# Patient Record
Sex: FEMALE | Race: WHITE | NOT HISPANIC OR LATINO | Employment: FULL TIME | URBAN - METROPOLITAN AREA
[De-identification: names, ages, dates, MRNs, and addresses within clinical notes are randomized per-mention and may not be internally consistent; named-entity substitution may affect disease eponyms.]

---

## 2017-01-04 ENCOUNTER — ALLSCRIPTS OFFICE VISIT (OUTPATIENT)
Dept: OTHER | Facility: OTHER | Age: 52
End: 2017-01-04

## 2017-01-13 ENCOUNTER — HOSPITAL ENCOUNTER (OUTPATIENT)
Dept: NON INVASIVE DIAGNOSTICS | Facility: CLINIC | Age: 52
Discharge: HOME | End: 2017-01-13
Payer: COMMERCIAL

## 2017-01-13 DIAGNOSIS — C50.011 MALIGNANT NEOPLASM INVOLVING BOTH NIPPLE AND AREOLA OF RIGHT BREAST IN FEMALE (HCC): ICD-10-CM

## 2017-01-13 PROCEDURE — 93308 TTE F-UP OR LMTD: CPT

## 2017-01-18 ENCOUNTER — ALLSCRIPTS OFFICE VISIT (OUTPATIENT)
Dept: OTHER | Facility: OTHER | Age: 52
End: 2017-01-18

## 2017-01-19 RX ORDER — SODIUM CHLORIDE 9 MG/ML
20 INJECTION, SOLUTION INTRAVENOUS CONTINUOUS
Status: DISCONTINUED | OUTPATIENT
Start: 2017-01-20 | End: 2017-01-23 | Stop reason: HOSPADM

## 2017-01-20 ENCOUNTER — HOSPITAL ENCOUNTER (OUTPATIENT)
Dept: INFUSION CENTER | Facility: CLINIC | Age: 52
Discharge: HOME | End: 2017-01-20
Payer: COMMERCIAL

## 2017-01-20 VITALS
WEIGHT: 203.48 LBS | DIASTOLIC BLOOD PRESSURE: 76 MMHG | SYSTOLIC BLOOD PRESSURE: 151 MMHG | RESPIRATION RATE: 18 BRPM | TEMPERATURE: 98.5 F | BODY MASS INDEX: 33.86 KG/M2 | HEART RATE: 87 BPM

## 2017-01-20 PROCEDURE — 96413 CHEMO IV INFUSION 1 HR: CPT

## 2017-01-20 PROCEDURE — 96417 CHEMO IV INFUS EACH ADDL SEQ: CPT

## 2017-01-20 RX ADMIN — Medication 565 MG: at 14:49

## 2017-01-20 RX ADMIN — PERTUZUMAB 420 MG: 30 INJECTION, SOLUTION, CONCENTRATE INTRAVENOUS at 14:16

## 2017-01-20 RX ADMIN — Medication 300 UNITS: at 15:24

## 2017-01-20 RX ADMIN — SODIUM CHLORIDE 20 ML/HR: 0.9 INJECTION, SOLUTION INTRAVENOUS at 13:36

## 2017-01-26 ENCOUNTER — GENERIC CONVERSION - ENCOUNTER (OUTPATIENT)
Dept: OTHER | Facility: OTHER | Age: 52
End: 2017-01-26

## 2017-01-26 ENCOUNTER — APPOINTMENT (OUTPATIENT)
Dept: RADIATION ONCOLOGY | Facility: CLINIC | Age: 52
End: 2017-01-26
Attending: RADIOLOGY
Payer: COMMERCIAL

## 2017-01-26 PROCEDURE — 99214 OFFICE O/P EST MOD 30 MIN: CPT | Performed by: RADIOLOGY

## 2017-02-09 RX ORDER — SODIUM CHLORIDE 9 MG/ML
20 INJECTION, SOLUTION INTRAVENOUS CONTINUOUS
Status: DISCONTINUED | OUTPATIENT
Start: 2017-02-10 | End: 2017-02-13 | Stop reason: HOSPADM

## 2017-02-10 ENCOUNTER — HOSPITAL ENCOUNTER (OUTPATIENT)
Dept: INFUSION CENTER | Facility: CLINIC | Age: 52
Discharge: HOME | End: 2017-02-10
Payer: COMMERCIAL

## 2017-02-10 VITALS
WEIGHT: 207.23 LBS | RESPIRATION RATE: 18 BRPM | HEART RATE: 90 BPM | DIASTOLIC BLOOD PRESSURE: 87 MMHG | SYSTOLIC BLOOD PRESSURE: 167 MMHG | TEMPERATURE: 99.1 F | BODY MASS INDEX: 34.49 KG/M2

## 2017-02-10 LAB — CALCIUM SERPL-MCNC: 8.8 MG/DL (ref 8.3–10.1)

## 2017-02-10 PROCEDURE — 96401 CHEMO ANTI-NEOPL SQ/IM: CPT

## 2017-02-10 PROCEDURE — 96413 CHEMO IV INFUSION 1 HR: CPT

## 2017-02-10 PROCEDURE — 96417 CHEMO IV INFUS EACH ADDL SEQ: CPT

## 2017-02-10 PROCEDURE — 82310 ASSAY OF CALCIUM: CPT | Performed by: INTERNAL MEDICINE

## 2017-02-10 RX ADMIN — SODIUM CHLORIDE 20 ML/HR: 0.9 INJECTION, SOLUTION INTRAVENOUS at 13:35

## 2017-02-10 RX ADMIN — Medication 300 UNITS: at 14:47

## 2017-02-10 RX ADMIN — Medication 562 MG: at 14:16

## 2017-02-10 RX ADMIN — DENOSUMAB 120 MG: 120 INJECTION SUBCUTANEOUS at 14:47

## 2017-02-10 RX ADMIN — PERTUZUMAB 420 MG: 30 INJECTION, SOLUTION, CONCENTRATE INTRAVENOUS at 13:42

## 2017-02-10 NOTE — PROGRESS NOTES
Patient tolerated chemotherapy infusion without complication  Patients calcium level 8 8  XGeva injection given in right upper arm  Patient aware of next appointments   Declined AVS

## 2017-03-02 RX ORDER — SODIUM CHLORIDE 9 MG/ML
20 INJECTION, SOLUTION INTRAVENOUS CONTINUOUS
Status: DISCONTINUED | OUTPATIENT
Start: 2017-03-03 | End: 2017-03-06 | Stop reason: HOSPADM

## 2017-03-03 ENCOUNTER — HOSPITAL ENCOUNTER (OUTPATIENT)
Dept: INFUSION CENTER | Facility: CLINIC | Age: 52
Discharge: HOME | End: 2017-03-03
Payer: COMMERCIAL

## 2017-03-03 VITALS
WEIGHT: 209.44 LBS | HEIGHT: 66 IN | RESPIRATION RATE: 20 BRPM | BODY MASS INDEX: 33.66 KG/M2 | TEMPERATURE: 98.2 F | HEART RATE: 89 BPM | DIASTOLIC BLOOD PRESSURE: 70 MMHG | SYSTOLIC BLOOD PRESSURE: 148 MMHG

## 2017-03-03 PROCEDURE — 96413 CHEMO IV INFUSION 1 HR: CPT

## 2017-03-03 PROCEDURE — 96417 CHEMO IV INFUS EACH ADDL SEQ: CPT

## 2017-03-03 RX ADMIN — Medication 562 MG: at 14:41

## 2017-03-03 RX ADMIN — SODIUM CHLORIDE 20 ML/HR: 0.9 INJECTION, SOLUTION INTRAVENOUS at 13:46

## 2017-03-03 RX ADMIN — PERTUZUMAB 420 MG: 30 INJECTION, SOLUTION, CONCENTRATE INTRAVENOUS at 14:06

## 2017-03-03 RX ADMIN — Medication 300 UNITS: at 15:17

## 2017-03-03 NOTE — PLAN OF CARE
Problem: Potential for Falls  Goal: Patient will remain free of falls  INTERVENTIONS:  - Assess patient frequently for physical needs  - Identify cognitive and physical deficits and behaviors that affect risk of falls    - [place] fall precautions as indicated by assessment   - Educate patient/family on patient safety including physical limitations  - Instruct patient to call for assistance with activity based on assessment  - Modify environment to reduce risk of injury  - Consider OT/PT consult to assist with strengthening/mobility   Outcome: Progressing

## 2017-03-23 RX ORDER — SODIUM CHLORIDE 9 MG/ML
20 INJECTION, SOLUTION INTRAVENOUS CONTINUOUS
Status: DISCONTINUED | OUTPATIENT
Start: 2017-03-24 | End: 2017-03-27 | Stop reason: HOSPADM

## 2017-03-24 ENCOUNTER — HOSPITAL ENCOUNTER (OUTPATIENT)
Dept: INFUSION CENTER | Facility: CLINIC | Age: 52
Discharge: HOME | End: 2017-03-24
Payer: COMMERCIAL

## 2017-03-24 VITALS
WEIGHT: 210.98 LBS | TEMPERATURE: 97.3 F | HEART RATE: 74 BPM | BODY MASS INDEX: 34.52 KG/M2 | RESPIRATION RATE: 18 BRPM | SYSTOLIC BLOOD PRESSURE: 153 MMHG | DIASTOLIC BLOOD PRESSURE: 96 MMHG

## 2017-03-24 DIAGNOSIS — C79.51 SECONDARY MALIGNANT NEOPLASM OF BONE (HCC): ICD-10-CM

## 2017-03-24 LAB — CALCIUM SERPL-MCNC: 8.9 MG/DL (ref 8.3–10.1)

## 2017-03-24 PROCEDURE — 96413 CHEMO IV INFUSION 1 HR: CPT

## 2017-03-24 PROCEDURE — 36593 DECLOT VASCULAR DEVICE: CPT

## 2017-03-24 PROCEDURE — 96417 CHEMO IV INFUS EACH ADDL SEQ: CPT

## 2017-03-24 PROCEDURE — 96401 CHEMO ANTI-NEOPL SQ/IM: CPT

## 2017-03-24 PROCEDURE — 82310 ASSAY OF CALCIUM: CPT | Performed by: INTERNAL MEDICINE

## 2017-03-24 RX ADMIN — DENOSUMAB 120 MG: 120 INJECTION SUBCUTANEOUS at 14:33

## 2017-03-24 RX ADMIN — PERTUZUMAB 420 MG: 30 INJECTION, SOLUTION, CONCENTRATE INTRAVENOUS at 14:01

## 2017-03-24 RX ADMIN — SODIUM CHLORIDE 20 ML/HR: 0.9 INJECTION, SOLUTION INTRAVENOUS at 14:00

## 2017-03-24 RX ADMIN — Medication 300 UNITS: at 15:10

## 2017-03-24 RX ADMIN — ALTEPLASE 2 MG: 2.2 INJECTION, POWDER, LYOPHILIZED, FOR SOLUTION INTRAVENOUS at 13:54

## 2017-03-24 RX ADMIN — TRASTUZUMAB 562 MG: KIT at 14:32

## 2017-03-24 NOTE — PROGRESS NOTES
No blood return noted when port accessed  Cath fausto instilled and good blood return noted after 30 minutes  Peripheral IV started and Calcium level collected  Pt tolerated Perjeta and Herceptin infusion well  Xgeva given as ordered  Pt is aware of next appointment   Refused AVS

## 2017-04-03 DIAGNOSIS — C50.412 MALIGNANT NEOPLASM OF UPPER-OUTER QUADRANT OF LEFT FEMALE BREAST (HCC): ICD-10-CM

## 2017-04-04 ENCOUNTER — ALLSCRIPTS OFFICE VISIT (OUTPATIENT)
Dept: OTHER | Facility: OTHER | Age: 52
End: 2017-04-04

## 2017-04-13 RX ORDER — SODIUM CHLORIDE 9 MG/ML
20 INJECTION, SOLUTION INTRAVENOUS CONTINUOUS
Status: DISCONTINUED | OUTPATIENT
Start: 2017-04-14 | End: 2017-04-17 | Stop reason: HOSPADM

## 2017-04-14 ENCOUNTER — HOSPITAL ENCOUNTER (OUTPATIENT)
Dept: INFUSION CENTER | Facility: CLINIC | Age: 52
Discharge: HOME | End: 2017-04-14
Payer: COMMERCIAL

## 2017-04-14 VITALS
WEIGHT: 207.23 LBS | TEMPERATURE: 98.3 F | RESPIRATION RATE: 18 BRPM | HEART RATE: 76 BPM | BODY MASS INDEX: 33.91 KG/M2 | DIASTOLIC BLOOD PRESSURE: 87 MMHG | SYSTOLIC BLOOD PRESSURE: 151 MMHG

## 2017-04-14 LAB
ALBUMIN SERPL BCP-MCNC: 3.7 G/DL (ref 3.5–5)
ALP SERPL-CCNC: 42 U/L (ref 46–116)
ALT SERPL W P-5'-P-CCNC: 14 U/L (ref 12–78)
ANION GAP SERPL CALCULATED.3IONS-SCNC: 8 MMOL/L (ref 4–13)
ANISOCYTOSIS BLD QL SMEAR: PRESENT
AST SERPL W P-5'-P-CCNC: 12 U/L (ref 5–45)
BASOPHILS # BLD AUTO: 0.08 THOUSAND/UL (ref 0–0.1)
BASOPHILS NFR MAR MANUAL: 1 % (ref 0–1)
BILIRUB SERPL-MCNC: 0.5 MG/DL (ref 0.2–1)
BUN SERPL-MCNC: 15 MG/DL (ref 5–25)
CALCIUM SERPL-MCNC: 9.2 MG/DL (ref 8.3–10.1)
CHLORIDE SERPL-SCNC: 108 MMOL/L (ref 100–108)
CO2 SERPL-SCNC: 25 MMOL/L (ref 21–32)
CREAT SERPL-MCNC: 0.85 MG/DL (ref 0.6–1.3)
EOSINOPHIL # BLD AUTO: 0.53 THOUSAND/UL (ref 0–0.61)
EOSINOPHIL NFR BLD MANUAL: 7 % (ref 0–6)
ERYTHROCYTE [DISTWIDTH] IN BLOOD BY AUTOMATED COUNT: 15.3 % (ref 11.6–15.1)
GFR SERPL CREATININE-BSD FRML MDRD: >60 ML/MIN/1.73SQ M
GLUCOSE SERPL-MCNC: 98 MG/DL (ref 65–140)
HCT VFR BLD AUTO: 39.7 % (ref 34.8–46.1)
HGB BLD-MCNC: 13.2 G/DL (ref 11.5–15.4)
LYMPHOCYTES # BLD AUTO: 1.5 THOUSAND/UL (ref 0.6–4.47)
LYMPHOCYTES # BLD AUTO: 20 % (ref 14–44)
MCH RBC QN AUTO: 28.3 PG (ref 26.8–34.3)
MCHC RBC AUTO-ENTMCNC: 33.3 G/DL (ref 31.4–37.4)
MCV RBC AUTO: 85 FL (ref 82–98)
MONOCYTES # BLD AUTO: 0.08 THOUSAND/UL (ref 0–1.22)
MONOCYTES NFR BLD AUTO: 1 % (ref 4–12)
NEUTS BAND NFR BLD MANUAL: 2 % (ref 0–8)
NEUTS SEG # BLD: 5.18 THOUSAND/UL (ref 1.81–6.82)
NEUTS SEG NFR BLD AUTO: 67 % (ref 43–75)
PLATELET # BLD AUTO: 293 THOUSANDS/UL (ref 149–390)
PLATELET BLD QL SMEAR: ADEQUATE
PMV BLD AUTO: 8.6 FL (ref 8.9–12.7)
POTASSIUM SERPL-SCNC: 4 MMOL/L (ref 3.5–5.3)
PROT SERPL-MCNC: 6.7 G/DL (ref 6.4–8.2)
RBC # BLD AUTO: 4.66 MILLION/UL (ref 3.81–5.12)
SODIUM SERPL-SCNC: 141 MMOL/L (ref 136–145)
TOTAL CELLS COUNTED SPEC: 100
VARIANT LYMPHS # BLD AUTO: 2 % (ref 0–0)
WBC # BLD AUTO: 7.5 THOUSAND/UL (ref 4.31–10.16)
WBC NRBC COR # BLD: 7.5 THOUSAND/UL (ref 4.31–10.16)

## 2017-04-14 PROCEDURE — 85027 COMPLETE CBC AUTOMATED: CPT | Performed by: INTERNAL MEDICINE

## 2017-04-14 PROCEDURE — 80053 COMPREHEN METABOLIC PANEL: CPT | Performed by: INTERNAL MEDICINE

## 2017-04-14 PROCEDURE — 85007 BL SMEAR W/DIFF WBC COUNT: CPT | Performed by: INTERNAL MEDICINE

## 2017-04-14 PROCEDURE — 86300 IMMUNOASSAY TUMOR CA 15-3: CPT | Performed by: INTERNAL MEDICINE

## 2017-04-14 PROCEDURE — 96413 CHEMO IV INFUSION 1 HR: CPT

## 2017-04-14 PROCEDURE — 96417 CHEMO IV INFUS EACH ADDL SEQ: CPT

## 2017-04-14 RX ADMIN — PERTUZUMAB 420 MG: 30 INJECTION, SOLUTION, CONCENTRATE INTRAVENOUS at 13:37

## 2017-04-14 RX ADMIN — SODIUM CHLORIDE 20 ML/HR: 0.9 INJECTION, SOLUTION INTRAVENOUS at 13:37

## 2017-04-14 RX ADMIN — Medication 300 UNITS: at 14:50

## 2017-04-14 RX ADMIN — Medication 562 MG: at 14:13

## 2017-04-15 LAB — CANCER AG27-29 SERPL-ACNC: 27.1 U/ML (ref 0–42.3)

## 2017-04-19 ENCOUNTER — ALLSCRIPTS OFFICE VISIT (OUTPATIENT)
Dept: OTHER | Facility: OTHER | Age: 52
End: 2017-04-19

## 2017-04-19 ENCOUNTER — TRANSCRIBE ORDERS (OUTPATIENT)
Dept: ADMINISTRATIVE | Facility: HOSPITAL | Age: 52
End: 2017-04-19

## 2017-04-19 DIAGNOSIS — C50.412 MALIGNANT NEOPLASM OF UPPER-OUTER QUADRANT OF LEFT FEMALE BREAST (HCC): Primary | ICD-10-CM

## 2017-05-02 ENCOUNTER — HOSPITAL ENCOUNTER (OUTPATIENT)
Dept: NON INVASIVE DIAGNOSTICS | Facility: HOSPITAL | Age: 52
Discharge: HOME | End: 2017-05-02
Attending: INTERNAL MEDICINE
Payer: COMMERCIAL

## 2017-05-02 DIAGNOSIS — C50.412 MALIGNANT NEOPLASM OF UPPER-OUTER QUADRANT OF LEFT FEMALE BREAST (HCC): ICD-10-CM

## 2017-05-02 PROCEDURE — 93308 TTE F-UP OR LMTD: CPT

## 2017-05-04 RX ORDER — SODIUM CHLORIDE 9 MG/ML
20 INJECTION, SOLUTION INTRAVENOUS CONTINUOUS
Status: DISCONTINUED | OUTPATIENT
Start: 2017-05-05 | End: 2017-05-08 | Stop reason: HOSPADM

## 2017-05-05 ENCOUNTER — HOSPITAL ENCOUNTER (OUTPATIENT)
Dept: INFUSION CENTER | Facility: CLINIC | Age: 52
Discharge: HOME | End: 2017-05-05
Payer: COMMERCIAL

## 2017-05-05 VITALS
SYSTOLIC BLOOD PRESSURE: 152 MMHG | HEART RATE: 76 BPM | TEMPERATURE: 98.6 F | RESPIRATION RATE: 18 BRPM | WEIGHT: 209.44 LBS | BODY MASS INDEX: 34.27 KG/M2 | DIASTOLIC BLOOD PRESSURE: 78 MMHG

## 2017-05-05 PROCEDURE — 96413 CHEMO IV INFUSION 1 HR: CPT

## 2017-05-05 PROCEDURE — 96401 CHEMO ANTI-NEOPL SQ/IM: CPT

## 2017-05-05 PROCEDURE — 96417 CHEMO IV INFUS EACH ADDL SEQ: CPT

## 2017-05-05 RX ADMIN — DENOSUMAB 120 MG: 120 INJECTION SUBCUTANEOUS at 15:00

## 2017-05-05 RX ADMIN — PERTUZUMAB 420 MG: 30 INJECTION, SOLUTION, CONCENTRATE INTRAVENOUS at 13:51

## 2017-05-05 RX ADMIN — Medication 300 UNITS: at 15:00

## 2017-05-05 RX ADMIN — Medication 567 MG: at 14:28

## 2017-05-05 RX ADMIN — SODIUM CHLORIDE 20 ML/HR: 0.9 INJECTION, SOLUTION INTRAVENOUS at 13:40

## 2017-05-05 NOTE — PROGRESS NOTES
Patient tolerated chemotherapy treqatment today without complications  Yari Harp given in left upper arm  Patient tolerated well

## 2017-05-25 RX ORDER — SODIUM CHLORIDE 9 MG/ML
20 INJECTION, SOLUTION INTRAVENOUS CONTINUOUS
Status: DISCONTINUED | OUTPATIENT
Start: 2017-05-26 | End: 2017-05-29 | Stop reason: HOSPADM

## 2017-05-26 ENCOUNTER — HOSPITAL ENCOUNTER (OUTPATIENT)
Dept: INFUSION CENTER | Facility: CLINIC | Age: 52
Discharge: HOME | End: 2017-05-26
Payer: COMMERCIAL

## 2017-05-26 VITALS
DIASTOLIC BLOOD PRESSURE: 86 MMHG | BODY MASS INDEX: 34.41 KG/M2 | RESPIRATION RATE: 18 BRPM | WEIGHT: 210.32 LBS | TEMPERATURE: 98.9 F | HEART RATE: 78 BPM | SYSTOLIC BLOOD PRESSURE: 137 MMHG

## 2017-05-26 PROCEDURE — 96417 CHEMO IV INFUS EACH ADDL SEQ: CPT

## 2017-05-26 PROCEDURE — 96413 CHEMO IV INFUSION 1 HR: CPT

## 2017-05-26 RX ADMIN — PERTUZUMAB 420 MG: 30 INJECTION, SOLUTION, CONCENTRATE INTRAVENOUS at 14:09

## 2017-05-26 RX ADMIN — TRASTUZUMAB 567 MG: KIT at 14:43

## 2017-05-26 RX ADMIN — SODIUM CHLORIDE 20 ML/HR: 0.9 INJECTION, SOLUTION INTRAVENOUS at 14:06

## 2017-05-26 RX ADMIN — Medication 300 UNITS: at 15:20

## 2017-05-26 NOTE — PLAN OF CARE
Problem: Potential for Falls  Goal: Patient will remain free of falls  INTERVENTIONS:  - Assess patient frequently for physical needs  - Identify cognitive and physical deficits and behaviors that affect risk of falls    - [place] fall precautions as indicated by assessment   - Educate patient/family on patient safety including physical limitations  - Instruct patient to call for assistance with activity based on assessment  - Modify environment to reduce risk of injury  - Consider OT/PT consult to assist with strengthening/mobility   Outcome: Progressing

## 2017-06-15 RX ORDER — SODIUM CHLORIDE 9 MG/ML
20 INJECTION, SOLUTION INTRAVENOUS CONTINUOUS
Status: DISCONTINUED | OUTPATIENT
Start: 2017-06-16 | End: 2017-06-19 | Stop reason: HOSPADM

## 2017-06-16 ENCOUNTER — HOSPITAL ENCOUNTER (OUTPATIENT)
Dept: INFUSION CENTER | Facility: CLINIC | Age: 52
Discharge: HOME | End: 2017-06-16
Payer: COMMERCIAL

## 2017-06-16 VITALS
HEART RATE: 82 BPM | TEMPERATURE: 97.5 F | DIASTOLIC BLOOD PRESSURE: 88 MMHG | SYSTOLIC BLOOD PRESSURE: 158 MMHG | BODY MASS INDEX: 34.36 KG/M2 | WEIGHT: 210 LBS | RESPIRATION RATE: 16 BRPM

## 2017-06-16 DIAGNOSIS — C79.51 SECONDARY MALIGNANT NEOPLASM OF BONE (HCC): ICD-10-CM

## 2017-06-16 LAB
ALBUMIN SERPL BCP-MCNC: 3.5 G/DL (ref 3.5–5)
ALP SERPL-CCNC: 46 U/L (ref 46–116)
ALT SERPL W P-5'-P-CCNC: 17 U/L (ref 12–78)
ANION GAP SERPL CALCULATED.3IONS-SCNC: 6 MMOL/L (ref 4–13)
AST SERPL W P-5'-P-CCNC: 15 U/L (ref 5–45)
BILIRUB SERPL-MCNC: 0.4 MG/DL (ref 0.2–1)
BUN SERPL-MCNC: 16 MG/DL (ref 5–25)
CALCIUM SERPL-MCNC: 8.7 MG/DL (ref 8.3–10.1)
CHLORIDE SERPL-SCNC: 107 MMOL/L (ref 100–108)
CO2 SERPL-SCNC: 28 MMOL/L (ref 21–32)
CREAT SERPL-MCNC: 0.85 MG/DL (ref 0.6–1.3)
GFR SERPL CREATININE-BSD FRML MDRD: >60 ML/MIN/1.73SQ M
GLUCOSE SERPL-MCNC: 107 MG/DL (ref 65–140)
POTASSIUM SERPL-SCNC: 3.8 MMOL/L (ref 3.5–5.3)
PROT SERPL-MCNC: 6.2 G/DL (ref 6.4–8.2)
SODIUM SERPL-SCNC: 141 MMOL/L (ref 136–145)

## 2017-06-16 PROCEDURE — 80053 COMPREHEN METABOLIC PANEL: CPT | Performed by: INTERNAL MEDICINE

## 2017-06-16 PROCEDURE — 96401 CHEMO ANTI-NEOPL SQ/IM: CPT

## 2017-06-16 PROCEDURE — 96417 CHEMO IV INFUS EACH ADDL SEQ: CPT

## 2017-06-16 PROCEDURE — 96413 CHEMO IV INFUSION 1 HR: CPT

## 2017-06-16 RX ADMIN — TRASTUZUMAB 567 MG: KIT at 15:04

## 2017-06-16 RX ADMIN — DENOSUMAB 120 MG: 120 INJECTION SUBCUTANEOUS at 15:37

## 2017-06-16 RX ADMIN — SODIUM CHLORIDE 20 ML/HR: 0.9 INJECTION, SOLUTION INTRAVENOUS at 14:25

## 2017-06-16 RX ADMIN — PERTUZUMAB 420 MG: 30 INJECTION, SOLUTION, CONCENTRATE INTRAVENOUS at 14:28

## 2017-06-16 RX ADMIN — Medication 300 UNITS: at 15:40

## 2017-06-16 NOTE — PROGRESS NOTES
Patient calcium level 8 7  Bessiee Funmilayo given in patients right upper arm  Patient tolerated well  Aware of upcoming appointments   Declined AVS

## 2017-06-16 NOTE — PLAN OF CARE
Problem: Potential for Falls  Goal: Patient will remain free of falls  INTERVENTIONS:  - Assess patient frequently for physical needs  -  Identify cognitive and physical deficits and behaviors that affect risk of falls  -  [place] fall precautions as indicated by assessment   - Educate patient/family on patient safety including physical limitations  - Instruct patient to call for assistance with activity based on assessment  - Modify environment to reduce risk of injury  - Consider OT/PT consult to assist with strengthening/mobility   Outcome: Progressing      Problem: SAFETY ADULT  Goal: Patient will remain free of falls  INTERVENTIONS:  - Assess patient frequently for physical needs  -  Identify cognitive and physical deficits and behaviors that affect risk of falls  -  [place] fall precautions as indicated by assessment   - Educate patient/family on patient safety including physical limitations  - Instruct patient to call for assistance with activity based on assessment  - Modify environment to reduce risk of injury  - Consider OT/PT consult to assist with strengthening/mobility   Outcome: Progressing      Problem: Knowledge Deficit  Goal: Patient/family/caregiver demonstrates understanding of disease process, treatment plan, medications, and discharge instructions  Complete learning assessment and assess knowledge base    Interventions:  - Provide teaching at level of understanding  - Provide teaching via preferred learning methods  Outcome: Progressing

## 2017-06-29 ENCOUNTER — APPOINTMENT (OUTPATIENT)
Dept: RADIATION ONCOLOGY | Facility: CLINIC | Age: 52
End: 2017-06-29
Attending: RADIOLOGY
Payer: COMMERCIAL

## 2017-06-29 ENCOUNTER — GENERIC CONVERSION - ENCOUNTER (OUTPATIENT)
Dept: OTHER | Facility: OTHER | Age: 52
End: 2017-06-29

## 2017-06-29 PROCEDURE — 99214 OFFICE O/P EST MOD 30 MIN: CPT | Performed by: RADIOLOGY

## 2017-06-29 PROCEDURE — G0463 HOSPITAL OUTPT CLINIC VISIT: HCPCS | Performed by: RADIOLOGY

## 2017-07-03 ENCOUNTER — HOSPITAL ENCOUNTER (OUTPATIENT)
Dept: NUCLEAR MEDICINE | Facility: HOSPITAL | Age: 52
Discharge: HOME | End: 2017-07-03
Attending: INTERNAL MEDICINE
Payer: COMMERCIAL

## 2017-07-03 ENCOUNTER — HOSPITAL ENCOUNTER (OUTPATIENT)
Dept: CT IMAGING | Facility: HOSPITAL | Age: 52
Discharge: HOME | End: 2017-07-03
Attending: INTERNAL MEDICINE
Payer: COMMERCIAL

## 2017-07-03 DIAGNOSIS — C50.412 MALIGNANT NEOPLASM OF UPPER-OUTER QUADRANT OF LEFT FEMALE BREAST (HCC): ICD-10-CM

## 2017-07-03 DIAGNOSIS — C79.51 SECONDARY MALIGNANT NEOPLASM OF BONE (HCC): ICD-10-CM

## 2017-07-03 PROCEDURE — 74177 CT ABD & PELVIS W/CONTRAST: CPT

## 2017-07-03 PROCEDURE — 71260 CT THORAX DX C+: CPT

## 2017-07-03 PROCEDURE — 78306 BONE IMAGING WHOLE BODY: CPT

## 2017-07-03 PROCEDURE — A9503 TC99M MEDRONATE: HCPCS

## 2017-07-03 RX ADMIN — IOHEXOL 100 ML: 350 INJECTION, SOLUTION INTRAVENOUS at 09:58

## 2017-07-05 ENCOUNTER — ALLSCRIPTS OFFICE VISIT (OUTPATIENT)
Dept: OTHER | Facility: OTHER | Age: 52
End: 2017-07-05

## 2017-07-05 ENCOUNTER — TRANSCRIBE ORDERS (OUTPATIENT)
Dept: ADMINISTRATIVE | Facility: HOSPITAL | Age: 52
End: 2017-07-05

## 2017-07-05 ENCOUNTER — GENERIC CONVERSION - ENCOUNTER (OUTPATIENT)
Dept: OTHER | Facility: OTHER | Age: 52
End: 2017-07-05

## 2017-07-05 DIAGNOSIS — C50.412 MALIGNANT NEOPLASM OF UPPER-OUTER QUADRANT OF LEFT FEMALE BREAST (HCC): Primary | ICD-10-CM

## 2017-07-06 RX ORDER — SODIUM CHLORIDE 9 MG/ML
20 INJECTION, SOLUTION INTRAVENOUS CONTINUOUS
Status: DISCONTINUED | OUTPATIENT
Start: 2017-07-07 | End: 2017-07-10 | Stop reason: HOSPADM

## 2017-07-07 ENCOUNTER — HOSPITAL ENCOUNTER (OUTPATIENT)
Dept: INFUSION CENTER | Facility: CLINIC | Age: 52
Discharge: HOME | End: 2017-07-07
Payer: COMMERCIAL

## 2017-07-07 VITALS
WEIGHT: 212.08 LBS | SYSTOLIC BLOOD PRESSURE: 150 MMHG | DIASTOLIC BLOOD PRESSURE: 84 MMHG | BODY MASS INDEX: 34.7 KG/M2 | RESPIRATION RATE: 16 BRPM | TEMPERATURE: 97.6 F | HEART RATE: 75 BPM

## 2017-07-07 PROCEDURE — 96417 CHEMO IV INFUS EACH ADDL SEQ: CPT

## 2017-07-07 PROCEDURE — 96413 CHEMO IV INFUSION 1 HR: CPT

## 2017-07-07 RX ADMIN — PERTUZUMAB 420 MG: 30 INJECTION, SOLUTION, CONCENTRATE INTRAVENOUS at 13:51

## 2017-07-07 RX ADMIN — TRASTUZUMAB 569 MG: 150 INJECTION, POWDER, LYOPHILIZED, FOR SOLUTION INTRAVENOUS at 14:29

## 2017-07-07 RX ADMIN — Medication 300 UNITS: at 15:05

## 2017-07-07 RX ADMIN — SODIUM CHLORIDE 20 ML/HR: 0.9 INJECTION, SOLUTION INTRAVENOUS at 13:30

## 2017-07-27 RX ORDER — SODIUM CHLORIDE 9 MG/ML
20 INJECTION, SOLUTION INTRAVENOUS CONTINUOUS
Status: DISCONTINUED | OUTPATIENT
Start: 2017-07-28 | End: 2017-07-31 | Stop reason: HOSPADM

## 2017-07-28 ENCOUNTER — HOSPITAL ENCOUNTER (OUTPATIENT)
Dept: INFUSION CENTER | Facility: CLINIC | Age: 52
Discharge: HOME | End: 2017-07-28
Payer: COMMERCIAL

## 2017-07-28 VITALS
BODY MASS INDEX: 34.05 KG/M2 | TEMPERATURE: 98.3 F | DIASTOLIC BLOOD PRESSURE: 84 MMHG | HEART RATE: 87 BPM | SYSTOLIC BLOOD PRESSURE: 152 MMHG | WEIGHT: 208.11 LBS | RESPIRATION RATE: 18 BRPM

## 2017-07-28 LAB
ALBUMIN SERPL BCP-MCNC: 3.2 G/DL (ref 3.2–5)
ALP SERPL-CCNC: 53 U/L (ref 46–116)
ALT SERPL W P-5'-P-CCNC: 43 U/L (ref 12–78)
ANION GAP SERPL CALCULATED.3IONS-SCNC: 1 MMOL/L (ref 4–13)
ANISOCYTOSIS BLD QL SMEAR: PRESENT
AST SERPL W P-5'-P-CCNC: 41 U/L (ref 5–45)
BASOPHILS # BLD AUTO: 0 THOUSAND/UL (ref 0–0.1)
BASOPHILS NFR MAR MANUAL: 0 % (ref 0–1)
BILIRUB SERPL-MCNC: 0.6 MG/DL (ref 0.2–1)
BUN SERPL-MCNC: 10 MG/DL (ref 5–25)
CALCIUM SERPL-MCNC: 9.4 MG/DL (ref 8.3–10.1)
CHLORIDE SERPL-SCNC: 108 MMOL/L (ref 98–108)
CO2 SERPL-SCNC: 27 MMOL/L (ref 21–32)
CREAT SERPL-MCNC: 1 MG/DL (ref 0.6–1.3)
EOSINOPHIL # BLD AUTO: 0.38 THOUSAND/UL (ref 0–0.61)
EOSINOPHIL NFR BLD MANUAL: 4 % (ref 0–6)
ERYTHROCYTE [DISTWIDTH] IN BLOOD BY AUTOMATED COUNT: 15.6 % (ref 11.6–15.1)
GFR SERPL CREATININE-BSD FRML MDRD: 66 ML/MIN/1.73SQ M
GLUCOSE SERPL-MCNC: 109 MG/DL (ref 65–140)
HCT VFR BLD AUTO: 44.1 % (ref 34.8–46.1)
HGB BLD-MCNC: 13.9 G/DL (ref 11.5–15.4)
LYMPHOCYTES # BLD AUTO: 1.88 THOUSAND/UL (ref 0.6–4.47)
LYMPHOCYTES # BLD AUTO: 20 % (ref 14–44)
MCH RBC QN AUTO: 27 PG (ref 26.8–34.3)
MCHC RBC AUTO-ENTMCNC: 31.5 G/DL (ref 31.4–37.4)
MCV RBC AUTO: 86 FL (ref 82–98)
MONOCYTES # BLD AUTO: 0.38 THOUSAND/UL (ref 0–1.22)
MONOCYTES NFR BLD AUTO: 4 % (ref 4–12)
NEUTS BAND NFR BLD MANUAL: 0 % (ref 0–8)
NEUTS SEG # BLD: 6.77 THOUSAND/UL (ref 1.81–6.82)
NEUTS SEG NFR BLD AUTO: 72 % (ref 43–75)
PLATELET # BLD AUTO: 297 THOUSANDS/UL (ref 149–390)
PLATELET BLD QL SMEAR: ADEQUATE
PMV BLD AUTO: 8.4 FL (ref 8.9–12.7)
POTASSIUM SERPL-SCNC: 4.1 MMOL/L (ref 3.5–5.3)
PROT SERPL-MCNC: 6.8 G/DL (ref 6.4–8.2)
RBC # BLD AUTO: 5.13 MILLION/UL (ref 3.81–5.12)
SODIUM SERPL-SCNC: 136 MMOL/L (ref 136–145)
TOTAL CELLS COUNTED SPEC: 100
WBC # BLD AUTO: 9.4 THOUSAND/UL (ref 4.31–10.16)
WBC NRBC COR # BLD: 9.4 THOUSAND/UL (ref 4.31–10.16)

## 2017-07-28 PROCEDURE — 96413 CHEMO IV INFUSION 1 HR: CPT

## 2017-07-28 PROCEDURE — 96417 CHEMO IV INFUS EACH ADDL SEQ: CPT

## 2017-07-28 PROCEDURE — 96401 CHEMO ANTI-NEOPL SQ/IM: CPT

## 2017-07-28 PROCEDURE — 80053 COMPREHEN METABOLIC PANEL: CPT | Performed by: INTERNAL MEDICINE

## 2017-07-28 PROCEDURE — 85007 BL SMEAR W/DIFF WBC COUNT: CPT | Performed by: INTERNAL MEDICINE

## 2017-07-28 PROCEDURE — 85027 COMPLETE CBC AUTOMATED: CPT | Performed by: INTERNAL MEDICINE

## 2017-07-28 RX ADMIN — SODIUM CHLORIDE 20 ML/HR: 0.9 INJECTION, SOLUTION INTRAVENOUS at 13:52

## 2017-07-28 RX ADMIN — Medication 300 UNITS: at 15:31

## 2017-07-28 RX ADMIN — DENOSUMAB 120 MG: 120 INJECTION SUBCUTANEOUS at 14:16

## 2017-07-28 RX ADMIN — PERTUZUMAB 420 MG: 30 INJECTION, SOLUTION, CONCENTRATE INTRAVENOUS at 14:20

## 2017-07-28 RX ADMIN — TRASTUZUMAB 570 MG: 150 INJECTION, POWDER, LYOPHILIZED, FOR SOLUTION INTRAVENOUS at 14:54

## 2017-07-28 NOTE — PLAN OF CARE
Problem: Potential for Falls  Goal: Patient will remain free of falls  INTERVENTIONS:  - Assess patient frequently for physical needs  -  Identify cognitive and physical deficits and behaviors that affect risk of falls    -  [place] fall precautions as indicated by assessment   - Educate patient/family on patient safety including physical limitations  - Instruct patient to call for assistance with activity based on assessment  - Modify environment to reduce risk of injury  - Consider OT/PT consult to assist with strengthening/mobility   Outcome: Progressing

## 2017-08-17 RX ORDER — SODIUM CHLORIDE 9 MG/ML
20 INJECTION, SOLUTION INTRAVENOUS CONTINUOUS
Status: DISCONTINUED | OUTPATIENT
Start: 2017-08-18 | End: 2017-08-21 | Stop reason: HOSPADM

## 2017-08-18 ENCOUNTER — HOSPITAL ENCOUNTER (OUTPATIENT)
Dept: INFUSION CENTER | Facility: CLINIC | Age: 52
Discharge: HOME | End: 2017-08-18
Payer: COMMERCIAL

## 2017-08-18 VITALS — RESPIRATION RATE: 16 BRPM | BODY MASS INDEX: 33.73 KG/M2 | HEART RATE: 86 BPM | TEMPERATURE: 98.3 F | WEIGHT: 206.13 LBS

## 2017-08-18 PROCEDURE — 96413 CHEMO IV INFUSION 1 HR: CPT

## 2017-08-18 PROCEDURE — 96417 CHEMO IV INFUS EACH ADDL SEQ: CPT

## 2017-08-18 RX ADMIN — PERTUZUMAB 420 MG: 30 INJECTION, SOLUTION, CONCENTRATE INTRAVENOUS at 13:48

## 2017-08-18 RX ADMIN — Medication 300 UNITS: at 14:55

## 2017-08-18 RX ADMIN — TRASTUZUMAB 570 MG: 150 INJECTION, POWDER, LYOPHILIZED, FOR SOLUTION INTRAVENOUS at 14:21

## 2017-08-18 RX ADMIN — SODIUM CHLORIDE 20 ML/HR: 0.9 INJECTION, SOLUTION INTRAVENOUS at 13:35

## 2017-08-18 NOTE — PLAN OF CARE
Problem: Potential for Falls  Goal: Patient will remain free of falls  INTERVENTIONS:  - Assess patient frequently for physical needs  -  Identify cognitive and physical deficits and behaviors that affect risk of falls    -  [place] fall precautions as indicated by assessment   - Educate patient/family on patient safety including physical limitations  - Instruct patient to call for assistance with activity based on assessment  - Modify environment to reduce risk of injury  - Consider OT/PT consult to assist with strengthening/mobility   Outcome: Progressing

## 2017-08-22 DIAGNOSIS — C50.412 MALIGNANT NEOPLASM OF UPPER-OUTER QUADRANT OF LEFT FEMALE BREAST (HCC): ICD-10-CM

## 2017-08-31 ENCOUNTER — HOSPITAL ENCOUNTER (OUTPATIENT)
Dept: NON INVASIVE DIAGNOSTICS | Facility: CLINIC | Age: 52
Discharge: HOME | End: 2017-08-31
Payer: COMMERCIAL

## 2017-08-31 DIAGNOSIS — C50.412 MALIGNANT NEOPLASM OF UPPER-OUTER QUADRANT OF LEFT FEMALE BREAST (HCC): ICD-10-CM

## 2017-08-31 PROCEDURE — 93308 TTE F-UP OR LMTD: CPT

## 2017-09-07 RX ORDER — SODIUM CHLORIDE 9 MG/ML
20 INJECTION, SOLUTION INTRAVENOUS CONTINUOUS
Status: DISCONTINUED | OUTPATIENT
Start: 2017-09-08 | End: 2017-09-11 | Stop reason: HOSPADM

## 2017-09-08 ENCOUNTER — HOSPITAL ENCOUNTER (OUTPATIENT)
Dept: INFUSION CENTER | Facility: CLINIC | Age: 52
Discharge: HOME | End: 2017-09-08
Payer: COMMERCIAL

## 2017-09-08 VITALS
RESPIRATION RATE: 18 BRPM | HEART RATE: 80 BPM | SYSTOLIC BLOOD PRESSURE: 152 MMHG | BODY MASS INDEX: 34.38 KG/M2 | TEMPERATURE: 98.6 F | WEIGHT: 210.1 LBS | DIASTOLIC BLOOD PRESSURE: 85 MMHG

## 2017-09-08 DIAGNOSIS — C50.412 MALIGNANT NEOPLASM OF UPPER-OUTER QUADRANT OF LEFT FEMALE BREAST (HCC): ICD-10-CM

## 2017-09-08 DIAGNOSIS — C79.51 SECONDARY MALIGNANT NEOPLASM OF BONE (HCC): ICD-10-CM

## 2017-09-08 LAB
ALBUMIN SERPL BCP-MCNC: 2.9 G/DL (ref 3.2–5)
ALP SERPL-CCNC: 53 U/L (ref 46–116)
ALT SERPL W P-5'-P-CCNC: 44 U/L (ref 12–78)
ANION GAP SERPL CALCULATED.3IONS-SCNC: 4 MMOL/L (ref 4–13)
AST SERPL W P-5'-P-CCNC: 33 U/L (ref 5–45)
BILIRUB SERPL-MCNC: 0.5 MG/DL (ref 0.2–1)
BUN SERPL-MCNC: 15 MG/DL (ref 5–25)
CALCIUM SERPL-MCNC: 8.9 MG/DL (ref 8.3–10.1)
CHLORIDE SERPL-SCNC: 107 MMOL/L (ref 98–108)
CO2 SERPL-SCNC: 24 MMOL/L (ref 21–32)
CREAT SERPL-MCNC: 0.8 MG/DL (ref 0.6–1.3)
GFR SERPL CREATININE-BSD FRML MDRD: 87 ML/MIN/1.73SQ M
GLUCOSE SERPL-MCNC: 181 MG/DL (ref 65–140)
POTASSIUM SERPL-SCNC: 3.8 MMOL/L (ref 3.5–5.3)
PROT SERPL-MCNC: 6.5 G/DL (ref 6.4–8.2)
SODIUM SERPL-SCNC: 135 MMOL/L (ref 136–145)

## 2017-09-08 PROCEDURE — 80053 COMPREHEN METABOLIC PANEL: CPT | Performed by: INTERNAL MEDICINE

## 2017-09-08 PROCEDURE — 96413 CHEMO IV INFUSION 1 HR: CPT

## 2017-09-08 PROCEDURE — 96401 CHEMO ANTI-NEOPL SQ/IM: CPT

## 2017-09-08 PROCEDURE — 96417 CHEMO IV INFUS EACH ADDL SEQ: CPT

## 2017-09-08 RX ADMIN — PERTUZUMAB 420 MG: 30 INJECTION, SOLUTION, CONCENTRATE INTRAVENOUS at 13:50

## 2017-09-08 RX ADMIN — DENOSUMAB 120 MG: 120 INJECTION SUBCUTANEOUS at 14:31

## 2017-09-08 RX ADMIN — TRASTUZUMAB 569 MG: 150 INJECTION, POWDER, LYOPHILIZED, FOR SOLUTION INTRAVENOUS at 14:26

## 2017-09-08 RX ADMIN — Medication 300 UNITS: at 15:00

## 2017-09-08 RX ADMIN — SODIUM CHLORIDE 20 ML/HR: 0.9 INJECTION, SOLUTION INTRAVENOUS at 13:15

## 2017-09-28 RX ORDER — SODIUM CHLORIDE 9 MG/ML
20 INJECTION, SOLUTION INTRAVENOUS CONTINUOUS
Status: DISCONTINUED | OUTPATIENT
Start: 2017-09-29 | End: 2017-10-02 | Stop reason: HOSPADM

## 2017-09-29 ENCOUNTER — HOSPITAL ENCOUNTER (OUTPATIENT)
Dept: INFUSION CENTER | Facility: CLINIC | Age: 52
Discharge: HOME | End: 2017-09-29
Payer: COMMERCIAL

## 2017-09-29 VITALS
WEIGHT: 209.44 LBS | DIASTOLIC BLOOD PRESSURE: 84 MMHG | RESPIRATION RATE: 16 BRPM | HEART RATE: 75 BPM | BODY MASS INDEX: 34.27 KG/M2 | TEMPERATURE: 97.4 F | SYSTOLIC BLOOD PRESSURE: 152 MMHG

## 2017-09-29 LAB
ALBUMIN SERPL BCP-MCNC: 3.3 G/DL (ref 3.2–5)
ALP SERPL-CCNC: 53 U/L (ref 46–116)
ALT SERPL W P-5'-P-CCNC: 45 U/L (ref 12–78)
ANION GAP SERPL CALCULATED.3IONS-SCNC: 12 MMOL/L (ref 4–13)
AST SERPL W P-5'-P-CCNC: 36 U/L (ref 5–45)
BASOPHILS # BLD AUTO: 0 THOUSAND/UL (ref 0–0.1)
BASOPHILS NFR MAR MANUAL: 0 % (ref 0–1)
BILIRUB SERPL-MCNC: 0.6 MG/DL (ref 0.2–1)
BUN SERPL-MCNC: 17 MG/DL (ref 5–25)
CALCIUM SERPL-MCNC: 9.6 MG/DL (ref 8.3–10.1)
CHLORIDE SERPL-SCNC: 105 MMOL/L (ref 98–108)
CO2 SERPL-SCNC: 27 MMOL/L (ref 21–32)
CREAT SERPL-MCNC: 1 MG/DL (ref 0.6–1.3)
EOSINOPHIL # BLD AUTO: 0 THOUSAND/UL (ref 0–0.61)
EOSINOPHIL NFR BLD MANUAL: 0 % (ref 0–6)
ERYTHROCYTE [DISTWIDTH] IN BLOOD BY AUTOMATED COUNT: 14.5 % (ref 11.6–15.1)
GFR SERPL CREATININE-BSD FRML MDRD: 66 ML/MIN/1.73SQ M
GLUCOSE P FAST SERPL-MCNC: 97 MG/DL (ref 65–99)
GLUCOSE SERPL-MCNC: 97 MG/DL (ref 65–140)
HCT VFR BLD AUTO: 40.5 % (ref 34.8–46.1)
HGB BLD-MCNC: 13.5 G/DL (ref 11.5–15.4)
LG PLATELETS BLD QL SMEAR: PRESENT
LYMPHOCYTES # BLD AUTO: 1.64 THOUSAND/UL (ref 0.6–4.47)
LYMPHOCYTES # BLD AUTO: 21 % (ref 14–44)
MCH RBC QN AUTO: 28.7 PG (ref 26.8–34.3)
MCHC RBC AUTO-ENTMCNC: 33.3 G/DL (ref 31.4–37.4)
MCV RBC AUTO: 86 FL (ref 82–98)
MONOCYTES # BLD AUTO: 0.08 THOUSAND/UL (ref 0–1.22)
MONOCYTES NFR BLD AUTO: 1 % (ref 4–12)
MYELOCYTES NFR BLD MANUAL: 1 % (ref 0–1)
NEUTS BAND NFR BLD MANUAL: 2 % (ref 0–8)
NEUTS SEG # BLD: 6.01 THOUSAND/UL (ref 1.81–6.82)
NEUTS SEG NFR BLD AUTO: 75 % (ref 43–75)
OVALOCYTES BLD QL SMEAR: PRESENT
PLATELET # BLD AUTO: 262 THOUSANDS/UL (ref 149–390)
PLATELET BLD QL SMEAR: ADEQUATE
PMV BLD AUTO: 9 FL (ref 8.9–12.7)
POTASSIUM SERPL-SCNC: 4 MMOL/L (ref 3.5–5.3)
PROT SERPL-MCNC: 7 G/DL (ref 6.4–8.2)
RBC # BLD AUTO: 4.71 MILLION/UL (ref 3.81–5.12)
SODIUM SERPL-SCNC: 144 MMOL/L (ref 136–145)
TOTAL CELLS COUNTED SPEC: 100
WBC # BLD AUTO: 7.8 THOUSAND/UL (ref 4.31–10.16)
WBC NRBC COR # BLD: 7.8 THOUSAND/UL (ref 4.31–10.16)

## 2017-09-29 PROCEDURE — 86300 IMMUNOASSAY TUMOR CA 15-3: CPT | Performed by: INTERNAL MEDICINE

## 2017-09-29 PROCEDURE — 96413 CHEMO IV INFUSION 1 HR: CPT

## 2017-09-29 PROCEDURE — 85027 COMPLETE CBC AUTOMATED: CPT | Performed by: INTERNAL MEDICINE

## 2017-09-29 PROCEDURE — 96417 CHEMO IV INFUS EACH ADDL SEQ: CPT

## 2017-09-29 PROCEDURE — 80053 COMPREHEN METABOLIC PANEL: CPT | Performed by: INTERNAL MEDICINE

## 2017-09-29 PROCEDURE — 85007 BL SMEAR W/DIFF WBC COUNT: CPT | Performed by: INTERNAL MEDICINE

## 2017-09-29 RX ADMIN — PERTUZUMAB 420 MG: 30 INJECTION, SOLUTION, CONCENTRATE INTRAVENOUS at 14:10

## 2017-09-29 RX ADMIN — Medication 300 UNITS: at 15:15

## 2017-09-29 RX ADMIN — TRASTUZUMAB 570 MG: 150 INJECTION, POWDER, LYOPHILIZED, FOR SOLUTION INTRAVENOUS at 14:42

## 2017-09-29 RX ADMIN — SODIUM CHLORIDE 20 ML/HR: 0.9 INJECTION, SOLUTION INTRAVENOUS at 13:50

## 2017-09-29 NOTE — PROGRESS NOTES
Blood work collected via port a cath  Perjeta/Herceptin infused as ordered  Pt is aware of next appointment

## 2017-09-29 NOTE — PLAN OF CARE
Problem: Potential for Falls  Goal: Patient will remain free of falls  INTERVENTIONS:  - Assess patient frequently for physical needs  -  Identify cognitive and physical deficits and behaviors that affect risk of falls    -  [place] fall precautions as indicated by assessment   - Educate patient/family on patient safety including physical limitations  - Instruct patient to call for assistance with activity based on assessment  - Modify environment to reduce risk of injury  - Consider OT/PT consult to assist with strengthening/mobility   Outcome: Progressing

## 2017-09-30 LAB — CANCER AG27-29 SERPL-ACNC: 20.4 U/ML (ref 0–42.3)

## 2017-10-11 ENCOUNTER — ALLSCRIPTS OFFICE VISIT (OUTPATIENT)
Dept: OTHER | Facility: OTHER | Age: 52
End: 2017-10-11

## 2017-10-11 ENCOUNTER — TRANSCRIBE ORDERS (OUTPATIENT)
Dept: ADMINISTRATIVE | Facility: HOSPITAL | Age: 52
End: 2017-10-11

## 2017-10-11 DIAGNOSIS — C50.412 MALIGNANT NEOPLASM OF UPPER-OUTER QUADRANT OF LEFT FEMALE BREAST, UNSPECIFIED ESTROGEN RECEPTOR STATUS (HCC): Primary | ICD-10-CM

## 2017-10-11 DIAGNOSIS — C50.412 MALIGNANT NEOPLASM OF UPPER-OUTER QUADRANT OF LEFT FEMALE BREAST (HCC): ICD-10-CM

## 2017-10-16 ENCOUNTER — GENERIC CONVERSION - ENCOUNTER (OUTPATIENT)
Dept: OTHER | Facility: OTHER | Age: 52
End: 2017-10-16

## 2017-10-19 RX ORDER — SODIUM CHLORIDE 9 MG/ML
20 INJECTION, SOLUTION INTRAVENOUS CONTINUOUS
Status: DISCONTINUED | OUTPATIENT
Start: 2017-10-20 | End: 2017-10-23 | Stop reason: HOSPADM

## 2017-10-20 ENCOUNTER — HOSPITAL ENCOUNTER (OUTPATIENT)
Dept: INFUSION CENTER | Facility: CLINIC | Age: 52
Discharge: HOME | End: 2017-10-20
Payer: COMMERCIAL

## 2017-10-20 VITALS
HEART RATE: 78 BPM | BODY MASS INDEX: 34.59 KG/M2 | RESPIRATION RATE: 18 BRPM | SYSTOLIC BLOOD PRESSURE: 164 MMHG | TEMPERATURE: 97.8 F | DIASTOLIC BLOOD PRESSURE: 90 MMHG | WEIGHT: 211.42 LBS

## 2017-10-20 DIAGNOSIS — C79.51 SECONDARY MALIGNANT NEOPLASM OF BONE (HCC): ICD-10-CM

## 2017-10-20 PROCEDURE — 96401 CHEMO ANTI-NEOPL SQ/IM: CPT

## 2017-10-20 PROCEDURE — 96413 CHEMO IV INFUSION 1 HR: CPT

## 2017-10-20 PROCEDURE — 96417 CHEMO IV INFUS EACH ADDL SEQ: CPT

## 2017-10-20 RX ADMIN — DENOSUMAB 120 MG: 120 INJECTION SUBCUTANEOUS at 15:04

## 2017-10-20 RX ADMIN — SODIUM CHLORIDE 20 ML/HR: 0.9 INJECTION, SOLUTION INTRAVENOUS at 13:41

## 2017-10-20 RX ADMIN — TRASTUZUMAB 570 MG: 150 INJECTION, POWDER, LYOPHILIZED, FOR SOLUTION INTRAVENOUS at 14:50

## 2017-10-20 RX ADMIN — Medication 300 UNITS: at 15:29

## 2017-10-20 RX ADMIN — PERTUZUMAB 420 MG: 30 INJECTION, SOLUTION, CONCENTRATE INTRAVENOUS at 14:17

## 2017-10-20 NOTE — PLAN OF CARE
Problem: Potential for Falls  Goal: Patient will remain free of falls  INTERVENTIONS:  - Assess patient frequently for physical needs  -  Identify cognitive and physical deficits and behaviors that affect risk of falls    -  [place] fall precautions as indicated by assessment   - Educate patient/family on patient safety including physical limitations  - Instruct patient to call for assistance with activity based on assessment  - Modify environment to reduce risk of injury  - Consider OT/PT consult to assist with strengthening/mobility   Outcome: Progressing

## 2017-10-29 NOTE — PROGRESS NOTES
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)    Plan  Malignant neoplasm of upper-outer quadrant of left female breast    · We recommend that you examine your own breasts for lumps and other changes  ;Status:Complete;   Done: [id number]   Ordered; For:Malignant neoplasm of upper-outer quadrant of left female breast; Ordered By:Proothi, Sincere;   · (1) CA 27 29; Status:Active; Requested HEF:[id number]; Perform:[facility]; UMW:[id number]; Last Updated By:Pranav Felipe; 10/11/2017 10:01:42 AM;Ordered; For:Malignant neoplasm of upper-outer quadrant of left female breast; Ordered By:Proothi, Sincere;   · (1) CA 27 29; Status:Active; Requested for:[id number]; Perform:[facility]; DPY:31VQR1161; Last Updated By:Pranav Felipe; 10/11/2017 10:02:32 AM;Ordered; For:Malignant neoplasm of upper-outer quadrant of left female breast; Ordered By:Proothi, Sincere;   · (1) CA 27 29; Status:Active; Requested for:2017;    Perform:[facility]; Due:2018; Ordered; For:Malignant neoplasm of upper-outer quadrant of left female breast; Ordered By:Proothi, Sincere;   · (1) CA 27 29; Status:Active; Requested for:[id number]; Perform:[facility]; DLI:[id number]; Last Updated By:Pranav Felipe; 10/11/2017 10:01:57 AM;Ordered; For:Malignant neoplasm of upper-outer quadrant of left female breast; Ordered By:Proothi, Sincere;   · (1) CA 27 29; Status:Active; Requested for:2017;    Perform:[facility]; [id number]; Last Updated By:Pranav Felipe; 10/11/2017 10:01:27 AM;Ordered; For:Malignant neoplasm of upper-outer quadrant of left female breast; Ordered By:Sincere Benavidez;   · (1) CA 27 29; Status:Active; Requested for:2018;     Perform:[facility]; DVF:[id number]; Last Updated By:Anand Felipe; 10/11/2017 10:02:14 AM;Ordered;neoplasm of upper-outer quadrant of left female breast; Ordered By:Proothi, Sincere;   · (1) CA 27 29; Status:Complete; Requested for:Recurring Schedule: 10/11/2017;2017; 1/3/2018; 2018; 3/28/2018; 2018 ; Perform:[facility]; Due:2018; Ordered; For:Malignant neoplasm of upper-outer quadrant of left female breast; Ordered By:Proothi, Sincere;   · (1) CBC/PLT/DIFF; Status:Active; Requested NANETTE:[id number]; Perform:[facility]; [id number]; Last Updated By:Anand Felipe; 10/11/2017 10:01:42 AM;Ordered; For:Malignant neoplasm of upper-outer quadrant of left female breast; Ordered By:Proothi, Sincere;   · (1) CBC/PLT/DIFF; Status:Active; Requested for:[id number]; Perform:[facility]; PXE:[id number]; Last Updated By:Anand Felipe; 10/11/2017 10:02:32 AM;Ordered; For:Malignant neoplasm of upper-outer quadrant of left female breast; Ordered By:Proothi, Sincere;   · (1) CBC/PLT/DIFF; Status:Active; Requested for:2017;    Perform:[facility]; Due:2018; Ordered; For:Malignant neoplasm of upper-outer quadrant of left female breast; Ordered By:Proothi, Sincere;   · (1) CBC/PLT/DIFF; Status:Active; Requested for:[id number]; Perform:[facility]; CWC:52LKP5247; Last Updated By:Anand Felipe; 10/11/2017 10:01:57 AM;Ordered; For:Malignant neoplasm of upper-outer quadrant of left female breast; Ordered By:Proothi, Sincere;   · (1) CBC/PLT/DIFF; Status:Active; Requested for:2017;    Perform:[facility]; CUT:[id number]; Last Updated By:Anand Felipe; 10/11/2017 10:01:27 AM;Ordered; For:Malignant neoplasm of upper-outer quadrant of left female breast; Ordered By:Sincere Benavidez;   · (1) CBC/PLT/DIFF; Status:Active; Requested for:2018;     Perform:[facility]; Due:2019; Last Updated By:Anand Felipe; 10/11/2017 10:02:14 AM;Ordered;neoplasm of upper-outer quadrant of left female breast; Ordered By:Proothi, Sincere;   · (1) CBC/PLT/DIFF; Status:Complete; Requested for:Recurring Schedule: 10/11/2017;11/22/2017; 1/3/2018; 2/14/2018; 3/28/2018; 5/9/2018 ; Perform:[facility]; Due:11Oct2018; Ordered; For:Malignant neoplasm of upper-outer quadrant of left female breast; Ordered By:Proothi, Sincere;   · (1) COMPREHENSIVE METABOLIC PANEL; Status:Active; Requested HHQ:[id number]; Perform:[facility]; PGD:[id number]; Last Updated By:Wilfred Felipe; 10/11/2017 10:01:42 AM;Ordered; For:Malignant neoplasm of upper-outer quadrant of left female breast; Ordered By:Proothi, Sincere;   · (1) COMPREHENSIVE METABOLIC PANEL; Status:Active; Requested for:[id number]; Perform:[facility]; QWZ:08GOP7638; Last Updated By:Wilfred Felipe; 10/11/2017 10:02:32 AM;Ordered; For:Malignant neoplasm of upper-outer quadrant of left female breast; Ordered By:Proothi Sincere;   · (1) COMPREHENSIVE METABOLIC PANEL; Status:Active; Requested for:11Oct2017;    Perform:[facility]; Due:11Oct2018; Ordered; For:Malignant neoplasm of upper-outer quadrant of left female breast; Ordered By:Proothi Sincere;   · (1) COMPREHENSIVE METABOLIC PANEL; Status:Active; Requested for:[id number]; Perform:[facility]; DLD:[id number]; Last Updated By:Wilfred Felipe; 10/11/2017 10:01:57 AM;Ordered; For:Malignant neoplasm of upper-outer quadrant of left female breast; Ordered By:Proothi Sincere;   · (1) COMPREHENSIVE METABOLIC PANEL; Status:Active; Requested for:22Nov2017;    Perform:[facility]; EMD:[id number]; Last Updated By:Wilfred Felipe; 10/11/2017 10:01:27 AM;Ordered; For:Malignant neoplasm of upper-outer quadrant of left female breast; Ordered By:Sincere Benavidez;   · (1) COMPREHENSIVE METABOLIC PANEL; Status:Active; Requested for:28Mar2018;     Perform:[facility]; Due:28Mar2019; Last Updated By:Vangie Felipe; 10/11/2017 10:02:14 AM;Ordered;neoplasm of upper-outer quadrant of left female breast; Ordered By:ProVernell crosshash;   · (1) COMPREHENSIVE METABOLIC PANEL; Status:Complete; Requested for:RecurringSchedule: 10/11/2017; 11/22/2017; 1/3/2018; 2/14/2018; 3/28/2018; 5/9/2018 ; Perform:[facility]; Due:73Vdu4802; Ordered;neoplasm of upper-outer quadrant of left female breast; Ordered By:Vernell Benavidezhash;   · ECHO LIMITED WITH CONTRAST IF INDICATED; Status:Need Information - FinancialAuthorization; Requested SJB:[id number] 08:00AM;    Perform:[facility]; RJZ:[id number]; Last Updated By:Vangie Felipe; 10/11/2017 10:08:00 AM;Ordered;neoplasm of upper-outer quadrant of left female breast; Ordered By:Sincere Benavidez;   · Follow-up visit in 3 months Evaluation and Treatment  Follow-up  Status: Complete Done: 12UWJ3349 08:45AM   Ordered;Malignant neoplasm of upper-outer quadrant of left female breast; Ordered By: Fadi Roach Performed:  Due: 39BMX2493; Last Updated By: Nidhi Mcginnis; 10/11/2017 10:00:44 AM    Discussion/Summary  Discussion Summary:     Follow-up visit for triple positive stage IV left breast cancer, hormone receptor positive and HER-2 positive  This was diagnosed in January 2016  Grade was 3  She had locally advanced left breast cancer with metastases to liver and bones  She was started on a combination of Taxotere and Herceptin and Perjeta  She had very good response  After 6 cycles of systemic therapy Taxotere was discontinued and tamoxifen was added  Because her response was so good she had lumpectomy of left breast in September 2016 followed by radiation therapy and also she received radiation to her left hip for palliation  At the time of lumpectomy there was minimal residual disease, 1 4 cm  Lymph nodes were not sampled  She had radiation to left hip lesion  She has chemotherapy-induced amenorrhea  She gets hot flashes  Occasionally leg cramps at night  Presently patient is on Herceptin , Perjeta and tamoxifen  She is also on X Geva with calcium and vitamin D for bony metastatic disease  Negative MRI scan of the brain for metastatic disease  Normal ejection fraction  she gets cardiac evaluation every 3 months  Overall she is doing reasonably well  For leg cramps at night and she is taking one baby aspirin daily with food in the evening and also one magnesium tablet daily  She has Port-A-Cath blood pressure is high, 170/100  She states she was nervous coming in today  She will go to her primary physician for management of hypertension and for high blood sugar 181  Quinn Spruce examination and test results are as recorded and discussed  MRI scan of abdomen did not show liver lesions  It showed fatty changes in the liver and benign left adrenal adenoma   She is being continued on present medications as described above in detail  Condition discussed and explained  Questions answered       Counseling Documentation With Imm: The patient was counseled regarding diagnostic results,-- prognosis,-- patient and family education,-- impressions,-- importance of compliance with treatment  total time of encounter was 30 minutes-- and-- 20 minutes was spent counseling  Goals and Barriers: The patient has the current Goals: Treatment of stage IV breast cancer and prolongation of survival  The patent has the current Barriers: None  Patient's Capacity to Self-Care: Patient is able to Self-Care  Medication SE Review and Pt Understands Tx: Possible side effects of new medications were reviewed with the patient/guardian today  The treatment plan was reviewed with the patient/guardian  The patient/guardian understands and agrees with the treatment plan   Self Referrals:   Self Referrals: No   Understands and agrees with treatment plan: The treatment plan was reviewed with the patient/guardian   The patient/guardian understands and agrees with the treatment plan      Chief Complaint  Chief Complaint: Chief Complaint:  The patient presents to the office today with Breast cancer, stage IV and follow-up visit  History of Present Illness  HPI:   Follow-up visit for triple positive stage IV left breast cancer, hormone receptor positive and HER-2 positive  This was diagnosed in January 2016  Grade was 3  She had locally advanced left breast cancer with metastases to liver and bones  She was started on a combination of Taxotere and Herceptin and Perjeta  She had very good response  After 6 cycles of systemic therapy Taxotere was discontinued and tamoxifen was added  Because her response was so good she had lumpectomy of left breast in September 2016 followed by radiation therapy and also she received radiation to her left hip for palliation  At the time of lumpectomy there was minimal residual disease, 1 4 cm  Lymph nodes were not sampled  She had radiation to left hip lesion  She has chemotherapy-induced amenorrhea  She gets hot flashes  Occasionally leg cramps at night  Presently patient is on Herceptin , Perjeta and tamoxifen  She is also on X Geva with calcium and vitamin D for bony metastatic disease  Negative MRI scan of the brain for metastatic disease  Normal ejection fraction  she gets cardiac evaluation every 3 months  Overall she is doing reasonably well  For leg cramps at night and she is taking one baby aspirin daily with food in the evening and also one magnesium tablet daily  She has Port-A-Cath blood pressure is high, 170/100  She states she was nervous coming in today  She will go to her primary physician for management of hypertension  Review of Systems                                No headaches, seizures, diplopia, dysphagia, hoarseness, angina pain, chest pain, palpitations, shortness of breath, cough, hemoptysis, abdominal pain, melena, or hematuria  No fever, chills, bleeding, bone pains,  weight loss, nausea, vomiting and no change in bowel habits  Appetite is fair   No night sweats  No arthritic symptoms  No swelling of the ankles  No swelling of the left arm  No swollen glands  Anxious  Not unusually sensitive to heat or cold  No recent or frequent infections  No GYN symptoms  Reviewed 14 systems  Other symptoms as in history of present illness         ROS Reviewed:   ROS reviewed  Active Problems  1  Bone metastases (198 5) (C79 51)   2  Breast cancer (174 9) (C50 919)   3  Chemotherapy-induced nausea (787 02,E933 1) (R11 0,T45  1X5A)   4  Liver metastases (197 7) (C78 7)   5  Long term use of drug (V58 69) (Z79 899)   6  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)   7  Malignant neoplasm of upper-outer quadrant of left female breast (174 4) (C50 412)   8  Rash, drug (693 0) (L27 0)   9  Use of tamoxifen (Nolvadex) (V07 51) (Z79 810)    Past Medical History  1  History of chemotherapy (V87 41) (Z92 21)   2  History of pregnancy (V13 29)   3  History of radiation therapy (V15 3) (Z92 3)   4  History of Menarche (V21 8)   5  History of Open wound (879 8) (T14 8XXA)  Active Problems And Past Medical History Reviewed: The active problems and past medical history were reviewed and updated today  No significant past medical history  Surgical History    1  History of Biopsy Breast Percutaneous Needle Core   2  History of Ul  [address]   3  History of Cholecystectomy Laparoscopic   4  History of Left Breast Partial Mastectomy  Surgical History Reviewed: The surgical history was reviewed and updated today  No significant past surgical history  Family History  Mother    1  No pertinent family history  Father    2  No pertinent family history  Family History Reviewed: The family history was reviewed and updated today  Social History     · Former smoker (L39 44) (B02 417)   ·    · Non drinker / no alcohol use   · Two children  Social History Reviewed:  The social history was reviewed and updated today  Current Meds   1  Acetaminophen-Codeine #3 300-30 MG Oral Tablet; TAKE 1 TABLET EVERY 4 TO 6 HOURS AS NEEDED FOR PAIN; Therapy: 49Dur7583 to (Evaluate:04Sep2016); Last Rx:30And4582 Ordered   2  Calcium 600 600 MG Oral Tablet; Therapy: (Recorded:23Jan2017) to Recorded   3  Herceptin 440 MG Intravenous Solution Reconstituted; Therapy: ([facility]:58WBA4888) to Recorded   4  Magnesium 250 MG Oral Tablet; TAKE 1 TABLET DAILY; Therapy: ([facility]ury) to Recorded   5  Tamoxifen Citrate 20 MG Oral Tablet; TAKE 1 TABLET DAILY; Therapy: 35WOZ5789 to (Evaluate:21Apr2017)  Requested for: 20Feb2017; Last Rx:33Yny9854 Ordered   6  Vitamin D 1000 UNIT Oral Tablet; [facility] to Recorded   7  Xgeva 120 MG/1 7ML Subcutaneous Solution; Therapy: (Recorded:23Jan2017) to Recorded    Allergies    1  No Known Drug Allergies               Reviewed   Vitals  Vital Signs    Recorded: 32BCA8513 09:45AM Recorded: 32GVR3572 09:38AM   Temperature  98 3 F   Heart Rate  102   Respiration  15   Systolic 528, LUE, Sitting 238, LUE, Sitting   Diastolic 112, LUE, Sitting 118, LUE, Sitting   Height  5 ft 5 in   Weight  209 lb 8 oz   BMI Calculated  34 86   BSA Calculated  2 02   O2 Saturation  96   Pain Scale  0            Reviewed  Hypertension  Blood pressure 170/100   Physical Exam                                 Patient is alert and oriented  She is not in distress     High blood pressure       No icterus  No oral thrush  No palpable neck mass  Regular heart rate  Lung fields are clear to percussion and auscultation  Lumpectomy scar left breast   No lymphedema     Abdomen soft  Liver not palpably enlarged  No other palpable abdominal mass  No ascites  No edema of ankles  No calf tenderness  No peripheral palpable lymphadenopathy  No focal neurological deficit  No skin rash  Good arterial pulses  Anxious  No clubbing  Performance status 0     ECOG 0       Results/Data  (1) COMPREHENSIVE METABOLIC PANEL 63DOR1151 36:92TI Proothi, Sincere     Test Name Result Flag Reference   GLUCOSE,RANDM 181 mg/dL H      If the patient is fasting, the ADA then defines impaired fasting glucose as > 100 mg/dL and diabetes as > or equal to 123 mg/dL  Specimen collection should occur prior to Sulfasalazine administration due to the potential for falsely depressed results  Specimen collection should occur prior to Sulfapyridine administration due to the potential for falsely elevated results  SODIUM 135 mmol/L L 136-145   POTASSIUM 3 8 mmol/L  3 5-5 3   CHLORIDE 107 mmol/L     CARBON DIOXIDE 24 mmol/L  21-32   ANION GAP (CALC) 4 mmol/L  4-13   BLOOD UREA NITROGEN 15 mg/dL  5-25   CREATININE 0 80 mg/dL  0 60-1 30   Standardized to IDMS reference method   CALCIUM 8 9 mg/dL  8 3-10 1   BILI, TOTAL 0 50 mg/dL  0 20-1 00   ALK PHOSPHATAS 53 U/L     ALT (SGPT) 44 U/L  12-78   Specimen collection should occur prior to Sulfasalazine administration due to the potential for falsely depressed results  AST(SGOT) 33 U/L  5-45   Specimen collection should occur prior to Sulfasalazine administration due to the potential for falsely depressed results  ALBUMIN 2 9 g/dL L 3 2-5 0   TOTAL PROTEIN 6 5 g/dL  6 4-8 2   eGFR 87 ml/min/1 73sq m       National Kidney Disease Education Program recommendations are as follows: GFR calculation is accurate only with a steady state creatinine Chronic Kidney disease less than 60 ml/min/1 73 sq  meters Kidney failure less than 15 ml/min/1 73 sq  meters  ECHO LIMITED WITH CONTRAST IF INDICATED 26Vrl9687 02:55PM Cruz Hunter Order Number: [id number]   - Patient Instructions: HEART AND VASCULAR  [facility]     Test Name Result Flag Reference   ECHO LIMITED WITH CONTRAST IF INDICATED (Report)       Jadon Molina [address]    [place], [address]  [phone]   Transthoracic Echocardiogram  Limited 2D, Doppler, and Color Doppler   Study date: 31-Aug-2017   Patient: Kirstie Aly  MR number: [id number]  Account number: [de-identified]  : 1965  Age: 54 years  Gender: Female  Status: Outpatient  Location: [address] [place] [address]  Height: 65 in  Weight: 209 lb  BP: 174/ 86 mmHg   Indications: Chemotherapy treatment for breast cancer Assess left ventricular function  Diagnoses: V58 1 - CHEMOTHERAPY ENCOUNTER   Sonographer: NIELS Doss  Primary Physician: Naveed Javed MD  Referring Physician: Sb Brewer MD  Group: [facility]  Interpreting Physician: Iva Monreal DO   SUMMARY   PROCEDURE INFORMATION:  This was a technically difficult study  LEFT VENTRICLE:  Systolic function was normal  Ejection fraction was estimated to be 65 %  This study was inadequate for the evaluation of regional wall motion  Doppler parameters were consistent with abnormal left ventricular relaxation (grade 1 diastolic dysfunction)  HISTORY: PRIOR HISTORY: Chemotherapy  PROCEDURE: The study was performed in the [address] [place] [address]  This was a routine study  The transthoracic approach was used  The study included limited 2D imaging, limited spectral Doppler, and color Doppler  The heart rate was  69 bpm, at the start of the study  Echocardiographic views were limited due to decreased penetration  This was a technically difficult study  LEFT VENTRICLE: Size was normal  Systolic function was normal  Ejection fraction was estimated to be 65 %  This study was inadequate for the evaluation of regional wall motion  Wall thickness was normal  DOPPLER: Doppler parameters were  consistent with abnormal left ventricular relaxation (grade 1 diastolic dysfunction)  RIGHT VENTRICLE: The size was normal  Systolic function was normal  Wall thickness was normal    LEFT ATRIUM: Size was normal    RIGHT ATRIUM: Size was normal    MITRAL VALVE: Not well visualized   DOPPLER: There was no evidence for stenosis  There was no regurgitation  AORTIC VALVE: Leaflets exhibited normal thickness and normal cuspal separation  DOPPLER: Transaortic velocity was within the normal range  There was no evidence for stenosis  There was no regurgitation  TRICUSPID VALVE: Not well visualized  DOPPLER: There was no significant regurgitation  PULMONIC VALVE: Not assessed  PERICARDIUM: There was no pericardial effusion  The pericardium was normal in appearance  AORTA: The root exhibited normal size  SYSTEM MEASUREMENT TABLES   2D  %FS: 44 %  Ao Diam: 2 9 cm  EDV(Teich): 80 8 ml  EF(Teich): 75 5 %  ESV(Teich): 19 8 ml  IVSd: 0 9 cm  LA Diam: 3 cm  LVEDV MOD A4C: 80 2 ml  LVEF MOD A4C: 67 7 %  LVESV MOD A4C: 25 9 ml  LVIDd: 4 3 cm  LVIDs: 2 4 cm  LVLd A4C: 8 8 cm  LVLs A4C: 6 6 cm  LVPWd: 0 9 cm  SV MOD A4C: 54 3 ml  SV(Teich): 61 ml   PW  AVC: 364 1 ms  MV A David: 0 7 m/s  MV Dec [place]: 2 5 m/s2  MV DecT: 231 5 ms  MV E David: 0 6 m/s  MV E/A Ratio: 0 8  MV PHT: 67 1 ms  MVA By PHT: 3 3 cm2   [facility] Accredited Echocardiography Laboratory   Prepared and electronically signed by   Carly Guadarrama DO  Signed 31-Aug-2017 16:49:29     (1) CBC/PLT/DIFF [id number] 01:36PM Proothi, Sincere     Test Name Result Flag Reference   WBC COUNT 9 40 Thousand/uL  4 31-10 16   WBC ADJUSTED 9 40 Thousand/uL  4 31-10 16   RBC COUNT 5 13 Million/uL H 3 81-5 12   HEMOGLOBIN 13 9 g/dL  11 5-15 4   HEMATOCRIT 44 1 %  34 8-46  1   MCV 86 fL  82-98   MCH 27 0 pg  26 8-34 3   MCHC 31 5 g/dL  31 4-37 4   RDW 15 6 % H 11 6-15 1   MPV 8 4 fL L 8 9-12 7   PLATELET COUNT 512 Thousands/uL  149-390     * NM BONE SCAN WHOLE BODY 50JZR8349 09:02AM Proothi, Sincere     Test Name Result Flag Reference   NM BONE SCAN WHOLE BODY (Report)       BONE SCAN WHOLE BODY   INDICATION: Left breast cancer, status post lumpectomy, restaging for treatment management   PREVIOUS FILM CORRELATION:  No prior pertinent studies for comparison     TECHNIQUE: This study was performed following the intravenous administration of 26 1 mCi Tc-99m labeled MDP  Delayed, anterior and posterior whole body images were acquired, 2-3 hours after radiopharmaceutical administration  FINDINGS:    Probable degenerative change in the sternoclavicular joints  Mild activity in the proximal left femur just below the greater trochanter, corresponding with previously seen lytic lesion  This is compatible with a metastasis  No additional metastatic   osseous lesions visualized  Symmetric renal uptake  IMPRESSION:   1  Mild activity in the proximal left femur corresponding with previously seen lytic lesion, compatible with a metastasis  No additional osseous metastases visualized  Workstation performed: [id number]   Signed by:  Jahaira Boykin MD  7/3/17     * CT CHEST ABDOMEN PELVIS W CONTRAST 35SSS6724 09:00AM Pedro Luis eBnavidez Order Number: [id number]   - Patient Instructions: To schedule this appointment, please contact Central Scheduling at [phone]  Test Name Result Flag Reference   CT CHEST ABDOMEN PELVIS W CONTRAST (Report)       CT CHEST, ABDOMEN AND PELVIS WITH IV CONTRAST   INDICATION: History of left breast cancer  Follow-up evaluation  COMPARISON: August 2, 2016   TECHNIQUE: CT examination of the chest, abdomen and pelvis was performed  Reformatted images were created in axial, sagittal, and coronal planes  Radiation dose length product (DLP) for this visit: 732 mGy-cm   This examination, like all CT scans performed in the [facility], was performed utilizing techniques to minimize radiation dose exposure, including the use of iterative   reconstruction and automated exposure control  IV Contrast: 100 mL of iohexol (OMNIPAQUE)     Enteric Contrast: Enteric contrast was administered  FINDINGS:   CHEST   LUNGS: Lungs are clear  There is no tracheal or endobronchial lesion  PLEURA: Unremarkable     HEART/GREAT VESSELS: Unremarkable for patient's age  MEDIASTINUM AND INDER: Unremarkable  CHEST WALL AND LOWER NECK: There are postoperative changes of the left breast with what appears to be a postop seroma in the upper inner breast measuring 2 2 x 4 6 cm  There are some skin thickening of the left breast  The right breast is   unremarkable  There is no axillary adenopathy seen on either side  Infusion port catheter device present in the right upper chest wall  Visualized base of neck within normal limits  ABDOMEN   LIVER/BILIARY TREE: Interval development of significant regional fatty infiltration in the liver compared with August 2, 2016  The heterogeneity of the attenuation of the liver parenchyma on today's study could potentially limit the sensitivity for   detecting metastatic lesions  The 2 small hypoenhancing masses which were described on the prior study in the right hepatic lobe and caudate lobe are not visible on this exam  Near the surface of the left hepatic lobe on image 51 series 2 there is a 14  mm diameter rounded relative hyperdensity which may be focal fatty sparing although a mass is not excluded  The other areas of fatty sparing have a more typical geographic appearance  Patient may require follow-up MRI of the liver for more sensitive   characterization  There is no biliary dilatation  Portal and hepatic veins are patent  GALLBLADDER: Gallbladder is surgically absent  SPLEEN: Unremarkable  PANCREAS: Unremarkable  ADRENAL GLANDS: The right adrenal is normal  The left adrenal nodule is stable compared with the prior study, again most likely an adenoma  It measures 2 2 cm diameter  KIDNEYS/URETERS: Unremarkable  No hydronephrosis  STOMACH AND BOWEL: Unremarkable  APPENDIX: No findings to suggest appendicitis  ABDOMINOPELVIC CAVITY: No ascites or free intraperitoneal air  No lymphadenopathy  VESSELS: Unremarkable for patient's age  PELVIS   REPRODUCTIVE ORGANS: Unremarkable for patient's age     URINARY BLADDER: Unremarkable  ABDOMINAL WALL/INGUINAL REGIONS: Unremarkable  OSSEOUS STRUCTURES: There is an area of focal abnormal appearance within the proximal left femur in the greater trochanter area  It appears similar to the prior CT scan  On the prior PET CT scan from January 7, 2016 it appeared to be larger and more   lytic  The slightly increased sclerosis and cortical thickening in the area suggest that this is probably a treated metastatic lesion  Correlation with bone scan might be helpful for further assessment if deemed clinically relevant  Inferior endplate   Schmorl's node noted at T12  No acute fractures are seen  IMPRESSION:   The patient has developed moderate amount of regional fatty infiltration in the liver which limits assessment for underlying lesions  There is one relative rounded hyperdense lesion in the left lobe which might represent a mass and might require further  assessment with hepatic MRI  The previously identified subtle hypoenhancing caudate and right hepatic lobe lesions are no longer visible  Stable left adrenal nodule  Suspected treated osseous metastasis of the left greater trochanter of the femur  Postop changes of the left breast with suspected seroma and some skin thickening on the left as well  No axillary adenopathy seen  ##sigslh##sigslh    Workstation performed: [id number]   Signed by:  Shon Buerger, MD  7/5/17     (1) CA 27 29 [id number] 01:48PM Sincere Benavidez     Test Name Result Flag Reference   CA 27 29(NEW) 27 1 U/mL  0 0-42 3     Future Appointments    Date/Time Provider Specialty Site   10/16/2017 03:45 PM JOSE ALBERTO Faustin   Surgical Oncology [facility]   01/17/2018 08:45 AM Trina Benavidez MD Hematology Oncology [facility]       Signatures   Electronically signed by : Jeovanny Bell MD; Oct 12 2017  4:44AM EST                       (Author)

## 2017-11-09 RX ORDER — SODIUM CHLORIDE 9 MG/ML
20 INJECTION, SOLUTION INTRAVENOUS CONTINUOUS
Status: DISCONTINUED | OUTPATIENT
Start: 2017-11-10 | End: 2017-11-13 | Stop reason: HOSPADM

## 2017-11-10 ENCOUNTER — HOSPITAL ENCOUNTER (OUTPATIENT)
Dept: INFUSION CENTER | Facility: CLINIC | Age: 52
Discharge: HOME | End: 2017-11-10
Payer: COMMERCIAL

## 2017-11-10 VITALS
WEIGHT: 211.2 LBS | HEART RATE: 80 BPM | SYSTOLIC BLOOD PRESSURE: 150 MMHG | TEMPERATURE: 98.5 F | DIASTOLIC BLOOD PRESSURE: 88 MMHG | BODY MASS INDEX: 34.56 KG/M2 | RESPIRATION RATE: 16 BRPM

## 2017-11-10 PROCEDURE — 96417 CHEMO IV INFUS EACH ADDL SEQ: CPT

## 2017-11-10 PROCEDURE — 96413 CHEMO IV INFUSION 1 HR: CPT

## 2017-11-10 RX ADMIN — TRASTUZUMAB 581 MG: 150 INJECTION, POWDER, LYOPHILIZED, FOR SOLUTION INTRAVENOUS at 14:43

## 2017-11-10 RX ADMIN — Medication 300 UNITS: at 15:21

## 2017-11-10 RX ADMIN — PERTUZUMAB 420 MG: 30 INJECTION, SOLUTION, CONCENTRATE INTRAVENOUS at 14:05

## 2017-11-10 RX ADMIN — SODIUM CHLORIDE 20 ML/HR: 9 INJECTION, SOLUTION INTRAVENOUS at 14:01

## 2017-11-20 ENCOUNTER — APPOINTMENT (OUTPATIENT)
Dept: RADIOLOGY | Facility: MEDICAL CENTER | Age: 52
End: 2017-11-20
Payer: COMMERCIAL

## 2017-11-20 ENCOUNTER — TRANSCRIBE ORDERS (OUTPATIENT)
Dept: URGENT CARE | Facility: MEDICAL CENTER | Age: 52
End: 2017-11-20

## 2017-11-20 ENCOUNTER — HOSPITAL ENCOUNTER (EMERGENCY)
Facility: HOSPITAL | Age: 52
Discharge: HOME | End: 2017-11-20
Attending: EMERGENCY MEDICINE | Admitting: EMERGENCY MEDICINE
Payer: COMMERCIAL

## 2017-11-20 ENCOUNTER — OFFICE VISIT (OUTPATIENT)
Dept: URGENT CARE | Facility: MEDICAL CENTER | Age: 52
End: 2017-11-20
Payer: COMMERCIAL

## 2017-11-20 ENCOUNTER — APPOINTMENT (EMERGENCY)
Dept: CT IMAGING | Facility: HOSPITAL | Age: 52
End: 2017-11-20
Payer: COMMERCIAL

## 2017-11-20 VITALS
BODY MASS INDEX: 34.2 KG/M2 | TEMPERATURE: 98.4 F | DIASTOLIC BLOOD PRESSURE: 92 MMHG | WEIGHT: 209 LBS | SYSTOLIC BLOOD PRESSURE: 198 MMHG | RESPIRATION RATE: 18 BRPM | HEART RATE: 107 BPM | OXYGEN SATURATION: 97 %

## 2017-11-20 DIAGNOSIS — J18.9 PNEUMONIA: Primary | ICD-10-CM

## 2017-11-20 DIAGNOSIS — R05.9 COUGH: Primary | ICD-10-CM

## 2017-11-20 DIAGNOSIS — I10 HYPERTENSION: ICD-10-CM

## 2017-11-20 DIAGNOSIS — R05.9 COUGH: ICD-10-CM

## 2017-11-20 DIAGNOSIS — R06.2 WHEEZING: ICD-10-CM

## 2017-11-20 LAB
ANION GAP SERPL CALCULATED.3IONS-SCNC: 6 MMOL/L (ref 4–13)
APTT PPP: 31 SECONDS (ref 23–35)
BASOPHILS # BLD AUTO: 0.02 THOUSANDS/ΜL (ref 0–0.1)
BASOPHILS NFR BLD AUTO: 0 % (ref 0–1)
BILIRUB UR QL STRIP: NEGATIVE
BUN SERPL-MCNC: 14 MG/DL (ref 5–25)
CALCIUM SERPL-MCNC: 9.4 MG/DL (ref 8.3–10.1)
CHLORIDE SERPL-SCNC: 104 MMOL/L (ref 100–108)
CLARITY UR: CLEAR
CLARITY, POC: CLEAR
CO2 SERPL-SCNC: 31 MMOL/L (ref 21–32)
COLOR UR: YELLOW
COLOR, POC: YELLOW
CREAT SERPL-MCNC: 0.95 MG/DL (ref 0.6–1.3)
EOSINOPHIL # BLD AUTO: 0.46 THOUSAND/ΜL (ref 0–0.61)
EOSINOPHIL NFR BLD AUTO: 5 % (ref 0–6)
ERYTHROCYTE [DISTWIDTH] IN BLOOD BY AUTOMATED COUNT: 13.8 % (ref 11.6–15.1)
EXT PREG TEST URINE: NEGATIVE
GFR SERPL CREATININE-BSD FRML MDRD: 71 ML/MIN/1.73SQ M
GLUCOSE SERPL-MCNC: 126 MG/DL (ref 65–140)
GLUCOSE UR STRIP-MCNC: NEGATIVE MG/DL
HCT VFR BLD AUTO: 44.5 % (ref 34.8–46.1)
HGB BLD-MCNC: 14.8 G/DL (ref 11.5–15.4)
HGB UR QL STRIP.AUTO: NEGATIVE
INR PPP: 0.92 (ref 0.86–1.16)
KETONES UR STRIP-MCNC: NEGATIVE MG/DL
LEUKOCYTE ESTERASE UR QL STRIP: NEGATIVE
LYMPHOCYTES # BLD AUTO: 1.86 THOUSANDS/ΜL (ref 0.6–4.47)
LYMPHOCYTES NFR BLD AUTO: 19 % (ref 14–44)
MCH RBC QN AUTO: 28.8 PG (ref 26.8–34.3)
MCHC RBC AUTO-ENTMCNC: 33.3 G/DL (ref 31.4–37.4)
MCV RBC AUTO: 87 FL (ref 82–98)
MONOCYTES # BLD AUTO: 0.64 THOUSAND/ΜL (ref 0.17–1.22)
MONOCYTES NFR BLD AUTO: 6 % (ref 4–12)
NEUTROPHILS # BLD AUTO: 7.02 THOUSANDS/ΜL (ref 1.85–7.62)
NEUTS SEG NFR BLD AUTO: 70 % (ref 43–75)
NITRITE UR QL STRIP: NEGATIVE
NRBC BLD AUTO-RTO: 0 /100 WBCS
NT-PROBNP SERPL-MCNC: 9 PG/ML
PH UR STRIP.AUTO: 5.5 [PH] (ref 4.5–8)
PLATELET # BLD AUTO: 344 THOUSANDS/UL (ref 149–390)
PMV BLD AUTO: 10.3 FL (ref 8.9–12.7)
POTASSIUM SERPL-SCNC: 4.1 MMOL/L (ref 3.5–5.3)
PROT UR STRIP-MCNC: NEGATIVE MG/DL
PROTHROMBIN TIME: 12.4 SECONDS (ref 12.1–14.4)
RBC # BLD AUTO: 5.13 MILLION/UL (ref 3.81–5.12)
SODIUM SERPL-SCNC: 141 MMOL/L (ref 136–145)
SP GR UR STRIP.AUTO: 1.02 (ref 1–1.03)
SPECIMEN SOURCE: NORMAL
TROPONIN I BLD-MCNC: 0 NG/ML (ref 0–0.08)
UROBILINOGEN UR QL STRIP.AUTO: 0.2 E.U./DL
WBC # BLD AUTO: 10 THOUSAND/UL (ref 4.31–10.16)

## 2017-11-20 PROCEDURE — 81002 URINALYSIS NONAUTO W/O SCOPE: CPT | Performed by: EMERGENCY MEDICINE

## 2017-11-20 PROCEDURE — 83880 ASSAY OF NATRIURETIC PEPTIDE: CPT | Performed by: EMERGENCY MEDICINE

## 2017-11-20 PROCEDURE — 81003 URINALYSIS AUTO W/O SCOPE: CPT

## 2017-11-20 PROCEDURE — 80048 BASIC METABOLIC PNL TOTAL CA: CPT | Performed by: EMERGENCY MEDICINE

## 2017-11-20 PROCEDURE — 71020 HB CHEST X-RAY 2VW FRONTAL&LATL: CPT

## 2017-11-20 PROCEDURE — 94640 AIRWAY INHALATION TREATMENT: CPT

## 2017-11-20 PROCEDURE — 71275 CT ANGIOGRAPHY CHEST: CPT

## 2017-11-20 PROCEDURE — 85610 PROTHROMBIN TIME: CPT | Performed by: EMERGENCY MEDICINE

## 2017-11-20 PROCEDURE — 99203 OFFICE O/P NEW LOW 30 MIN: CPT

## 2017-11-20 PROCEDURE — 85025 COMPLETE CBC W/AUTO DIFF WBC: CPT | Performed by: EMERGENCY MEDICINE

## 2017-11-20 PROCEDURE — 93005 ELECTROCARDIOGRAM TRACING: CPT

## 2017-11-20 PROCEDURE — 99284 EMERGENCY DEPT VISIT MOD MDM: CPT

## 2017-11-20 PROCEDURE — 84484 ASSAY OF TROPONIN QUANT: CPT

## 2017-11-20 PROCEDURE — 36415 COLL VENOUS BLD VENIPUNCTURE: CPT | Performed by: EMERGENCY MEDICINE

## 2017-11-20 PROCEDURE — 85730 THROMBOPLASTIN TIME PARTIAL: CPT | Performed by: EMERGENCY MEDICINE

## 2017-11-20 PROCEDURE — 81025 URINE PREGNANCY TEST: CPT | Performed by: EMERGENCY MEDICINE

## 2017-11-20 PROCEDURE — 93005 ELECTROCARDIOGRAM TRACING: CPT | Performed by: EMERGENCY MEDICINE

## 2017-11-20 RX ORDER — ALBUTEROL SULFATE 2.5 MG/3ML
5 SOLUTION RESPIRATORY (INHALATION) ONCE
Status: COMPLETED | OUTPATIENT
Start: 2017-11-20 | End: 2017-11-20

## 2017-11-20 RX ORDER — AZITHROMYCIN 250 MG/1
250 TABLET, FILM COATED ORAL DAILY
Qty: 6 TABLET | Refills: 0 | Status: SHIPPED | OUTPATIENT
Start: 2017-11-20 | End: 2017-11-25

## 2017-11-20 RX ORDER — ALBUTEROL SULFATE 90 UG/1
1-2 AEROSOL, METERED RESPIRATORY (INHALATION) EVERY 6 HOURS PRN
Qty: 1 INHALER | Refills: 0 | Status: SHIPPED | OUTPATIENT
Start: 2017-11-20 | End: 2018-05-02

## 2017-11-20 RX ADMIN — ALBUTEROL SULFATE 5 MG: 2.5 SOLUTION RESPIRATORY (INHALATION) at 11:42

## 2017-11-20 RX ADMIN — IOHEXOL 85 ML: 350 INJECTION, SOLUTION INTRAVENOUS at 12:24

## 2017-11-20 NOTE — ED PROVIDER NOTES
History  Chief Complaint   Patient presents with    Cough     pt reports dry productive cough for 5 days, was seen at urgent care and had a CXR was referred to ED for r/o PE d/t hx of breast cancer  pt denies SOB, or CP  pt reports yellow mucus production  History provided by:  Patient   used: No    Cough   Cough characteristics:  Non-productive  Sputum characteristics:  Nondescript  Severity:  Moderate  Onset quality:  Gradual  Duration:  5 days  Timing:  Intermittent  Progression:  Waxing and waning  Chronicity:  New  Smoker: no    Context: weather changes    Relieved by:  Nothing  Worsened by:  Nothing  Ineffective treatments:  None tried  Associated symptoms: wheezing (mild)    Associated symptoms: no chest pain, no chills, no fever, no headaches, no rash, no shortness of breath and no sore throat    Wheezing:     Severity:  Mild    Onset quality:  Gradual    Duration:  5 days    Timing:  Intermittent    Progression:  Unchanged    Chronicity:  New  Risk factors: no recent travel    Risk factors comment:  Breast cancer      Prior to Admission Medications   Prescriptions Last Dose Informant Patient Reported? Taking? Calcium Carbonate-Vit D-Min (CALCIUM 1200 PO)   Yes Yes   Sig: Take 1 tablet by mouth daily  Trastuzumab (HERCEPTIN IV)   Yes Yes   Sig: Infuse into a venous catheter every 21 days  At infusion center   VITAMIN D, CHOLECALCIFEROL, PO   Yes Yes   Sig: Take by mouth daily  multivitamin (THERAGRAN) TABS   Yes Yes   Sig: Take 1 tablet by mouth daily  pertuzumab (PERJETA) 420 mg/14 mL SOLN   Yes Yes   Sig: Infuse into a venous catheter every 21 days  At infusion  center   tamoxifen (NOLVADEX) 20 mg tablet   Yes Yes   Sig: Take 20 mg by mouth daily        Facility-Administered Medications: None       Past Medical History:   Diagnosis Date    Cancer [facility])     left breast    Port-a-cath in place     right chest       Past Surgical History:   Procedure Laterality Date  BREAST SURGERY Left     biopsy    CHOLECYSTECTOMY      CHOLECYSTECTOMY      PORTACATH PLACEMENT Right     SIMPLE MASTECTOMY Left 9/16/2016    Procedure: BREAST PARTIAL MASTECTOMY ;  Surgeon: Isamar Lora MD;  Location: [facility];  Service:        History reviewed  No pertinent family history  I have reviewed and agree with the history as documented  Social History   Substance Use Topics    Smoking status: Former Smoker    Smokeless tobacco: Never Used    Alcohol use No        Review of Systems   Constitutional: Negative for activity change, chills and fever  HENT: Negative for facial swelling, sore throat and trouble swallowing  Eyes: Negative for pain and visual disturbance  Respiratory: Positive for cough and wheezing (mild)  Negative for chest tightness and shortness of breath  Cardiovascular: Negative for chest pain and leg swelling  Gastrointestinal: Negative for abdominal pain, blood in stool, diarrhea, nausea and vomiting  Genitourinary: Negative for dysuria and flank pain  Musculoskeletal: Negative for back pain, neck pain and neck stiffness  Skin: Negative for pallor and rash  Allergic/Immunologic: Negative for environmental allergies and immunocompromised state  Neurological: Negative for dizziness and headaches  Hematological: Negative for adenopathy  Does not bruise/bleed easily  Psychiatric/Behavioral: Negative for agitation and behavioral problems  All other systems reviewed and are negative        Physical Exam  ED Triage Vitals   Temperature Pulse Respirations Blood Pressure SpO2   11/20/17 1056 11/20/17 1056 11/20/17 1056 11/20/17 1056 11/20/17 1056   98 4 °F (36 9 °C) 97 17 (!) 209/98 95 %      Temp Source Heart Rate Source Patient Position - Orthostatic VS BP Location FiO2 (%)   11/20/17 1056 11/20/17 1056 11/20/17 1056 11/20/17 1056 --   Oral Monitor Sitting Right arm       Pain Score       11/20/17 1205       No Pain           Orthostatic Vital Signs  Vitals:    11/20/17 1056 11/20/17 1205   BP: (!) 209/98 (!) 198/92   Pulse: 97 (!) 107   Patient Position - Orthostatic VS: Sitting Lying       Physical Exam   Constitutional: She is oriented to person, place, and time  She appears well-developed and well-nourished  No distress  HENT:   Head: Normocephalic and atraumatic  Eyes: EOM are normal    Neck: Normal range of motion  Neck supple  Cardiovascular: Normal rate, regular rhythm, normal heart sounds and intact distal pulses  Pulmonary/Chest: Effort normal and breath sounds normal    Abdominal: Soft  Bowel sounds are normal  There is no tenderness  There is no rebound and no guarding  Musculoskeletal: Normal range of motion  Neurological: She is alert and oriented to person, place, and time  Skin: Skin is warm and dry  Psychiatric: She has a normal mood and affect  Nursing note and vitals reviewed  ED Medications  Medications   albuterol inhalation solution 5 mg (5 mg Nebulization Given 11/20/17 1142)   iohexol (OMNIPAQUE) 350 MG/ML injection (MULTI-DOSE) 85 mL (85 mL Intravenous Given 11/20/17 1224)       Diagnostic Studies  Results Reviewed     Procedure Component Value Units Date/Time    Basic metabolic panel [[id number]] Collected:  11/20/17 1140    Lab Status:  Final result Specimen:  Blood from Arm, Left Updated:  11/20/17 1207     Sodium 141 mmol/L      Potassium 4 1 mmol/L      Chloride 104 mmol/L      CO2 31 mmol/L      Anion Gap 6 mmol/L      BUN 14 mg/dL      Creatinine 0 95 mg/dL      Glucose 126 mg/dL      Calcium 9 4 mg/dL      eGFR 71 ml/min/1 73sq m     Narrative:         National Kidney Disease Education Program recommendations are as follows:  GFR calculation is accurate only with a steady state creatinine  Chronic Kidney disease less than 60 ml/min/1 73 sq  meters  Kidney failure less than 15 ml/min/1 73 sq  meters      B-type natriuretic peptide [[id number]]  (Normal) Collected:  11/20/17 1140    Lab Status:  Final result Specimen:  Blood from Arm, Left Updated:  11/20/17 1207     NT-proBNP 9 pg/mL     POCT troponin [[id number]]  (Normal) Collected:  11/20/17 1145    Lab Status:  Final result Updated:  11/20/17 1159     POC Troponin I 0 00 ng/ml      Specimen Type VENOUS    Narrative:         [employer] i-Stat handheld analyzer 99% cutoff is > 0 08ng/mL in network Emergency Departments    o cTnI 99% cutoff is useful only when applied to patients in the clinical setting of myocardial ischemia  o cTnI 99% cutoff should be interpreted in the context of clinical history, ECG findings and possibly cardiac imaging to establish correct diagnosis  o cTnI 99% cutoff may be suggestive but clearly not indicative of a coronary event without the clinical setting of myocardial ischemia  Gavin Serrano [[id number]]  (Normal) Collected:  11/20/17 1140    Lab Status:  Final result Specimen:  Blood from Arm, Left Updated:  11/20/17 1156     Protime 12 4 seconds      INR 0 92    APTT [[id number]]  (Normal) Collected:  11/20/17 1140    Lab Status:  Final result Specimen:  Blood from Arm, Left Updated:  11/20/17 1156     PTT 31 seconds     Narrative:          Therapeutic Heparin Range = 60-90 seconds    CBC and differential [[id number]]  (Abnormal) Collected:  11/20/17 1140    Lab Status:  Final result Specimen:  Blood from Arm, Left Updated:  11/20/17 1149     WBC 10 00 Thousand/uL      RBC 5 13 (H) Million/uL      Hemoglobin 14 8 g/dL      Hematocrit 44 5 %      MCV 87 fL      MCH 28 8 pg      MCHC 33 3 g/dL      RDW 13 8 %      MPV 10 3 fL      Platelets 994 Thousands/uL      nRBC 0 /100 WBCs      Neutrophils Relative 70 %      Lymphocytes Relative 19 %      Monocytes Relative 6 %      Eosinophils Relative 5 %      Basophils Relative 0 %      Neutrophils Absolute 7 02 Thousands/µL      Lymphocytes Absolute 1 86 Thousands/µL      Monocytes Absolute 0 64 Thousand/µL      Eosinophils Absolute 0 46 Thousand/µL      Basophils Absolute 0 02 Thousands/µL     POCT urinalysis dipstick [[id number]]  (Normal) Resulted:  11/20/17 1146    Lab Status:  Final result Specimen:  Urine Updated:  11/20/17 1148     Color, UA yellow     Clarity, UA clear    POCT pregnancy, urine [[id number]]  (Normal) Resulted:  11/20/17 1146    Lab Status:  Final result Updated:  11/20/17 1147     EXT PREG TEST UR (Ref: Negative) negative    ED Urine Macroscopic [[id number]]  (Normal) Collected:  11/20/17 1302    Lab Status:  Final result Specimen:  Urine Updated:  11/20/17 1146     Color, UA Yellow     Clarity, UA Clear     pH, UA 5 5     Leukocytes, UA Negative     Nitrite, UA Negative     Protein, UA Negative mg/dl      Glucose, UA Negative mg/dl      Ketones, UA Negative mg/dl      Urobilinogen, UA 0 2 E U /dl      Bilirubin, UA Negative     Blood, UA Negative     Specific Gravity, UA 1 020    Narrative:       CLINITEK RESULT                 CTA ED chest PE study   Final Result by Shon Buerger, MD (11/20 1258)      Minimally motion degraded study  No pulmonary emboli visible  There is bronchial wall thickening bilaterally with areas of presumed mucous plugging within lower lobe bronchial branches  Aspiration would be another potential consideration  Correlate with clinical history  Minimal infiltrate in the left upper lobe suspicious for an early pneumonia  Recommend reevaluation in about a month                             Workstation performed: [id number]                    Procedures  ECG 12 Lead Documentation  Date/Time: 11/20/2017 11:44 AM  Performed by: Tatyana Medina  Authorized by: Tatyana Medina     Indications / Diagnosis:  Cough, Dyspnea  ECG reviewed by me, the ED Provider: yes    Patient location:  ED  Interpretation:     Interpretation: normal    Rate:     ECG rate:  91    ECG rate assessment: normal    Rhythm:     Rhythm: sinus rhythm    Ectopy:     Ectopy: none    QRS:     QRS axis:  Normal  Conduction:     Conduction: normal    ST segments:     ST segments:  Normal  T waves:     T waves: normal             Phone Contacts  ED Phone Contact    ED Course  ED Course as of Nov 20 1521   Mon Nov 20, 2017   1317 Labs and Ct results reviewed, possible HEENA pneumonia vs Bronchitis  No PE  We will treat with Azithromycin and Albuterol inhaler; follow up with PCP; re-check BP as appears to be hypertensive without any end organ symptoms  MDM  Number of Diagnoses or Management Options  Cough: new and requires workup  Hypertension: new and requires workup  Pneumonia: new and requires workup  Wheezing: new and requires workup  Diagnosis management comments: Patient is a 53-year-old female, with history of breast cancer on hormone treatment, as PICC line, comes in with complaint of cough, dry, mild wheezing, going on for 5 days, also reports mild shortness of breath, seen at urgent care and sent here for evaluation for possible PE due to risk factor of cancer  On exam no acute distress, vital signs noted, oxygen saturation dipping down to 93-94 intermittently; lung exam shows mild wheezing, no peripheral edema, cardiovascular normal heart sounds, abdomen soft nontender, neuro intact  Differential diagnosis:  URI, bronchitis, wheezing, possible PE doubt acute coronary syndrome or congestive heart failure, she had echo done in August that was normal  Will check labs including CBC, BMP, EKG, troponin, CT PE study, given albuterol treatment         Amount and/or Complexity of Data Reviewed  Clinical lab tests: reviewed and ordered  Tests in the radiology section of CPT®: ordered and reviewed  Tests in the medicine section of CPT®: ordered and reviewed  Discuss the patient with other providers: yes  Independent visualization of images, tracings, or specimens: yes      CritCare Time    Disposition  Final diagnoses:   Pneumonia - possible   Cough   Wheezing - mild   Hypertension     Time reflects when diagnosis was documented in both MDM as applicable and the Disposition within this note     Time User Action Codes Description Comment    11/20/2017  1:20 PM Luisa Reinoso Add [J18 9] Pneumonia     11/20/2017  1:21 PM Alam, [address] [J18 9] Pneumonia possible    11/20/2017  1:21 PM Luisa Reinoso Add [R05] Cough     11/20/2017  1:21 PM Luisa Reinoso Add [R06 2] Wheezing     11/20/2017  1:21 PM Alam, [address] [R06 2] Wheezing mild    11/20/2017  1:22 PM Alam, [address] Hypertension       ED Disposition     ED Disposition Condition Comment    Discharge  Alejandrina Barba discharge to home/self care  Condition at discharge: Stable        Follow-up Information     Follow up With Specialties Details Why Angelita Narayan MD    [address] [address]  [phone]          Discharge Medication List as of 11/20/2017  1:23 PM      START taking these medications    Details   albuterol (PROVENTIL HFA,VENTOLIN HFA) 90 mcg/act inhaler Inhale 1-2 puffs every 6 (six) hours as needed for wheezing, Starting Mon 11/20/2017, Print      azithromycin (ZITHROMAX) 250 mg tablet Take 1 tablet by mouth daily for 5 days Take first 2 tablets together, then 1 every day until finished , Starting Mon 11/20/2017, Until Sat 11/25/2017, Print         CONTINUE these medications which have NOT CHANGED    Details   Calcium Carbonate-Vit D-Min (CALCIUM 1200 PO) Take 1 tablet by mouth daily  , Until Discontinued, Historical Med      multivitamin (THERAGRAN) TABS Take 1 tablet by mouth daily  , Until Discontinued, Historical Med      pertuzumab (PERJETA) 420 mg/14 mL SOLN Infuse into a venous catheter every 21 days  At infusion  center, Until Discontinued, Historical Med      tamoxifen (NOLVADEX) 20 mg tablet Take 20 mg by mouth daily  , Until Discontinued, Historical Med      Trastuzumab (HERCEPTIN IV) Infuse into a venous catheter every 21 days  At infusion center, Until Discontinued, Historical Med      VITAMIN D, CHOLECALCIFEROL, PO Take by mouth daily    , Until Discontinued, Historical Med           No discharge procedures on file      ED Provider  Electronically Signed by           Rodríguez Jain MD  11/20/17 5554

## 2017-11-20 NOTE — DISCHARGE INSTRUCTIONS
Pneumonia   WHAT YOU NEED TO KNOW:   What is pneumonia? Pneumonia is an infection in your lungs caused by bacteria, viruses, fungi, or parasites  You can become infected if you come in contact with someone who is sick  You can get pneumonia if you recently had surgery or needed a ventilator to help you breathe  Pneumonia can also be caused by accidentally inhaling saliva or small pieces of food  Pneumonia may cause mild symptoms, or it can be severe and life-threatening  What increases my risk for pneumonia? · A cold or the flu    · Health conditions, such as heart or lung disease    · A weakened immune system caused by HIV, cancer, or steroid use    · Recent hospitalization    · Smoking    · Excess alcohol use    · Older age  What are the signs and symptoms of pneumonia? · Fever or chills    · Cough    · Shortness of breath or rapid breathing    · Chest pain when you cough or breathe deeply    · Headache    · Vomiting    · Fatigue or confusion  How is pneumonia diagnosed? Your healthcare provider will listen to your lungs  Tell him or her if you have other health conditions  Give your provider a complete list of all medicines you have taken recently  You may need any of the following:  · Blood tests  may show signs of an infection or the bacteria causing your pneumonia  Blood tests can also show how much oxygen is in your blood  · A chest x-ray  may show signs of infection in your lungs  · Pulse oximetry  measures the amount of oxygen in your blood  · A mucus sample  is collected and tested for the germ that is causing your illness  It can help your healthcare provider choose the best medicine to treat the infection  How is pneumonia treated? · Antibiotics  treat pneumonia caused by bacteria  · Acetaminophen  decreases pain and fever  It is available without a doctor's order  Ask how much to take and how often to take it  Follow directions   Read the labels of all other medicines you are using to see if they also contain acetaminophen, or ask your doctor or pharmacist  Acetaminophen can cause liver damage if not taken correctly  Do not use more than 4 grams (4,000 milligrams) total of acetaminophen in one day  · NSAIDs , such as ibuprofen, help decrease swelling, pain, and fever  This medicine is available with or without a doctor's order  NSAIDs can cause stomach bleeding or kidney problems in certain people  If you take blood thinner medicine, always ask your healthcare provider if NSAIDs are safe for you  Always read the medicine label and follow directions  · Airway clearance techniques  are exercises to help remove mucus so you can breathe more easily  Your healthcare provider will show you how to do the exercises  These exercises may be used along with machines or devices to help decrease your symptoms  · Respiratory support  is given to help you breathe  You may receive oxygen to increase the level of oxygen in your blood  You may also need a machine to help you breathe  How can I manage my symptoms? · Rest as needed  Rest often while you recover  Slowly start to do more each day  · Drink liquids as directed  Ask how much liquid to drink each day and which liquids are best for you  Liquids help thin your mucus, which may make it easier for you to cough it up  · Do not smoke  Avoid secondhand smoke  Smoking increases your risk for pneumonia  Smoking also makes it harder for you to get better after you have had pneumonia  Ask your healthcare provider for information if you currently smoke and need help to quit  E-cigarettes or smokeless tobacco still contain nicotine  Talk to your healthcare provider before you use these products  · Use a cool mist humidifier  A humidifier will help increase air moisture in your home  This may make it easier for you to breathe and help decrease your cough  · Keep your head elevated    You may be able to breathe better if any medical regimen to see if it is safe and effective for you  © 2017 [employer] [employer] Information is for End User's use only and may not be sold, redistributed or otherwise used for commercial purposes  All illustrations and images included in CareNotes® are the copyrighted property of [employer]  or [employer]

## 2017-11-21 LAB
ATRIAL RATE: 91 BPM
P AXIS: 64 DEGREES
PR INTERVAL: 180 MS
QRS AXIS: 31 DEGREES
QRSD INTERVAL: 82 MS
QT INTERVAL: 332 MS
QTC INTERVAL: 408 MS
T WAVE AXIS: 76 DEGREES
VENTRICULAR RATE: 91 BPM

## 2017-11-21 NOTE — PROGRESS NOTES
Assessment  1  Cough (786 2) (R05)    Discussion/Summary  Discussion Summary:   Recommend patient go to nearest ED for further eval at this time  Chief Complaint    1  Cough  Chief Complaint Free Text Note Form: Patient here with a cough for several days  She states that initially it was a dry cough, but now she is expectorating thick yellow sputum  Denies fever  She has been taking DayQuil and NyQuil  Taj Shelton History of Present Illness  HPI: Patient is a 54year old female with a PMH of stage 4 breast cancer with liver and bone mets presenting with productive cough for 5 days  Patient states she has been spitting up thick yellow mucus, and the cough is worse at night  Patient states she has noticed some wheezing over the past few days  Associated symptoms of nasal congestion and sore throat  Nothing makes the cough better or worse  Took DayQuil and Nyquil with no relief of symptoms  Denies history of seasonal allergies or asthma  Denies history of blood clots, pleural effusion or pulmonary edema  Denies fever, chills, night sweats, blood in sputum, shortness of breath, chest pain, abdominal pain, nausea, vomiting, diarrhea  presents with dry cough for past 5 days  Reports it is worse at night  PMH includes stage 4 breast CA, currently undergoing treatment  Reports has been taking dayquil and nyquil for symptoms  Denies fevers, chills, night sweats though reports warm sensation after nyquil intake  Denies CP, SOB  + wheezing  Current complaint is of dry throat and dry cough  Denies history of blood clots, pleural effusions  Hospital Based Practices Required Assessment:  Pain Assessment  the patient states they do not have pain  Abuse And Domestic Violence Screen   Yes, the patient is safe at home  -- The patient states no one is hurting them  Depression And Suicide Screen  No, the patient has not had thoughts of hurting themself  No, the patient has not felt depressed in the past 7 days    Prefered Language vel [place]  Primary Language is  English  Review of Systems  Focused-Female:  Constitutional: no fever-- and-- no chills  ENT: sore throat,-- nasal discharge-- and-- hoarseness, but-- no earache-- and-- no hearing loss  Cardiovascular: no chest pain  Respiratory: cough,-- wheezing-- and-- Noted wheezing as squeaking, but-- no shortness of breath  Gastrointestinal: no abdominal pain,-- no nausea,-- no vomiting,-- no constipation-- and-- no diarrhea  Genitourinary: incontinence-- and-- Stress incontinence from cough  Neurological: no headache  Active Problems  1  Bone metastases (198 5) (C79 51)   2  Chemotherapy-induced nausea (787 02,E933 1) (R11 0,T45  1X5A)   3  Liver metastases (197 7) (C78 7)   4  Long term use of drug (V58 69) (Z79 899)   5  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)   6  Malignant neoplasm of upper-outer quadrant of left female breast (174 4) (C50 412)   7  Rash, drug (693 0) (L27 0)   8  Use of tamoxifen (Nolvadex) (V07 51) (Z79 810)    Past Medical History  1  History of chemotherapy (V87 41) (Z92 21)   2  History of pregnancy (V13 29)   3  History of radiation therapy (V15 3) (Z92 3)   4  History of Menarche (V21 8)   5  History of Open wound (879 8) (T14 8XXA)  Active Problems And Past Medical History Reviewed: The active problems and past medical history were reviewed and updated today  Family History  Mother    1  No pertinent family history  Father    2  No pertinent family history  Family History Reviewed: The family history was reviewed and updated today  Social History   · Former smoker (U07 37) (H20 943)   ·    · Non drinker / no alcohol use   · Two children  Social History Reviewed: The social history was reviewed and updated today  The social history was reviewed and is unchanged  Surgical History    1  History of Biopsy Breast Percutaneous Needle Core   2   History of [address]  [facility]   3  History of Cholecystectomy Laparoscopic   4  History of Left Breast Partial Mastectomy  Surgical History Reviewed: The surgical history was reviewed and updated today  Current Meds   1  Calcium 600 600 MG Oral Tablet; Therapy: (Recorded:2017) to Recorded   2  Herceptin 440 MG Intravenous Solution Reconstituted; Therapy: (Recorded:2017) to Recorded   3  Magnesium 250 MG Oral Tablet; TAKE 1 TABLET DAILY; Therapy: ([employer]) to Recorded   4  Tamoxifen Citrate 20 MG Oral Tablet; TAKE 1 TABLET DAILY; Therapy: 32GMF3376 to (Evaluate:2017)  Requested for: 2017; Last Rx:2017 Ordered   5  Vitamin D 1000 UNIT Oral Tablet; Therapy: ([id number]) to Recorded   6  Xgeva 120 MG/1 7ML Subcutaneous Solution; Therapy: (QZCOKBUH:36YAX6924) to Recorded  Medication List Reviewed: The medication list was reviewed and updated today  Allergies    1  No Known Drug Allergies    Vitals  Signs   Recorded: 2017 10:36AM   Heart Rate: 108, Apical  Respiration: 20  O2 Saturation: 97  Recorded: 2017 08:55AM   Temperature: 99 F, Tympanic  Heart Rate: 102  Respiration: 20  Systolic: 238  Diastolic: 84  Height: 5 ft 5 in  Weight: 213 lb   BMI Calculated: 35 45  BSA Calculated: 2 03  O2 Saturation: 93  Pain Scale: 0    Physical Exam   Constitutional  General appearance: No acute distress, well appearing and well nourished  Eyes  Conjunctiva and lids: No swelling, erythema or discharge  Pupils and irises: Equal, round and reactive to light  Ears, Nose, Mouth, and Throat  External inspection of ears and nose: Normal    Otoscopic examination: Tympanic membranes translucent with normal light reflex  Canals patent without erythema  Nasal mucosa, septum, and turbinates: Normal without edema or erythema  Oropharynx: Abnormal  -- Slightly erythematous  Pulmonary  Respiratory effort: Abnormal  -- Tachypneic    Auscultation of lungs: Abnormal  -- Rales noted in upper lobes b/l, end expiratory wheezing and rhonci noted in lower lobes b/l  Cardiovascular  Auscultation of heart: Abnormal  -- Tachycardic  Lymphatic  Palpation of lymph nodes in neck: No lymphadenopathy  Provider Comments  Provider Comments:   I have reviewed the student's history and physical, I have personally examined the patient and agree with the above stated findings and plan  In office chest x ray with suspicious findings though radiologist read as negative  Based on clinical presentation including cough, tachypnea, tachycardia and pmh, recommend patient have further eval in ED at this time, diff include: PE, pleural effusion, sepsis, pneumonia  Patient hesitant though compliant with suggestion  Refused ambulance escort  Reports will drive home, which is 2 min per patient, and have  take to ED  Patient left office in stable condition at 1008 to [facility] 109  Report called to hospital notes based on my personal attainment of history and physical exam of this patient  x 3, in no acute distress, pleasant and cooperative  Does not appear ill wnl, posterior pharynx slightly erythematous  Diminished throughout, with rales noted in upper lobes and rhonchi/expiratory wheezing in lower lobes  sounds wnl, no rubs, murmurs, or gallops noted  Dry and intact  Future Appointments    Date/Time Provider Specialty Site   05/16/2018 03:15 PM JOSE ALBERTO Giraldo   Surgical Oncology [facility]   01/17/2018 08:45 AM Hattie Benavidez MD Hematology Oncology [facility]       Signatures   Electronically signed by : LA NENA Romo; Nov 20 2017 10:44AM EST                       (Author)    Electronically signed by : JOSE ALBERTO Hoffman ; Nov 20 2017 12:25PM EST

## 2017-11-22 DIAGNOSIS — C79.51 SECONDARY MALIGNANT NEOPLASM OF BONE (HCC): ICD-10-CM

## 2017-11-22 DIAGNOSIS — C50.412 MALIGNANT NEOPLASM OF UPPER-OUTER QUADRANT OF LEFT FEMALE BREAST (HCC): ICD-10-CM

## 2017-11-30 RX ORDER — SODIUM CHLORIDE 9 MG/ML
20 INJECTION, SOLUTION INTRAVENOUS CONTINUOUS
Status: DISCONTINUED | OUTPATIENT
Start: 2017-12-01 | End: 2017-12-04 | Stop reason: HOSPADM

## 2017-12-01 ENCOUNTER — HOSPITAL ENCOUNTER (OUTPATIENT)
Dept: INFUSION CENTER | Facility: CLINIC | Age: 52
Discharge: HOME | End: 2017-12-01
Payer: COMMERCIAL

## 2017-12-01 VITALS
WEIGHT: 210.1 LBS | RESPIRATION RATE: 18 BRPM | DIASTOLIC BLOOD PRESSURE: 92 MMHG | HEART RATE: 78 BPM | TEMPERATURE: 97.6 F | BODY MASS INDEX: 34.38 KG/M2 | SYSTOLIC BLOOD PRESSURE: 142 MMHG

## 2017-12-01 PROCEDURE — 96401 CHEMO ANTI-NEOPL SQ/IM: CPT

## 2017-12-01 PROCEDURE — 96417 CHEMO IV INFUS EACH ADDL SEQ: CPT

## 2017-12-01 PROCEDURE — 96413 CHEMO IV INFUSION 1 HR: CPT

## 2017-12-01 RX ADMIN — Medication 300 UNITS: at 15:29

## 2017-12-01 RX ADMIN — DENOSUMAB 120 MG: 120 INJECTION SUBCUTANEOUS at 15:27

## 2017-12-01 RX ADMIN — SODIUM CHLORIDE 20 ML/HR: 0.9 INJECTION, SOLUTION INTRAVENOUS at 14:02

## 2017-12-01 RX ADMIN — TRASTUZUMAB 600 MG: 150 INJECTION, POWDER, LYOPHILIZED, FOR SOLUTION INTRAVENOUS at 14:52

## 2017-12-01 RX ADMIN — PERTUZUMAB 420 MG: 30 INJECTION, SOLUTION, CONCENTRATE INTRAVENOUS at 14:16

## 2017-12-21 RX ORDER — SODIUM CHLORIDE 9 MG/ML
20 INJECTION, SOLUTION INTRAVENOUS CONTINUOUS
Status: DISCONTINUED | OUTPATIENT
Start: 2017-12-22 | End: 2017-12-25 | Stop reason: HOSPADM

## 2017-12-22 ENCOUNTER — HOSPITAL ENCOUNTER (OUTPATIENT)
Dept: INFUSION CENTER | Facility: CLINIC | Age: 52
Discharge: HOME | End: 2017-12-24
Payer: COMMERCIAL

## 2017-12-22 VITALS
WEIGHT: 211.86 LBS | SYSTOLIC BLOOD PRESSURE: 148 MMHG | BODY MASS INDEX: 34.05 KG/M2 | RESPIRATION RATE: 18 BRPM | HEART RATE: 80 BPM | DIASTOLIC BLOOD PRESSURE: 88 MMHG | HEIGHT: 66 IN | TEMPERATURE: 97.1 F

## 2017-12-22 PROCEDURE — 96417 CHEMO IV INFUS EACH ADDL SEQ: CPT

## 2017-12-22 PROCEDURE — 96413 CHEMO IV INFUSION 1 HR: CPT

## 2017-12-22 RX ADMIN — TRASTUZUMAB 600 MG: 150 INJECTION, POWDER, LYOPHILIZED, FOR SOLUTION INTRAVENOUS at 14:47

## 2017-12-22 RX ADMIN — PERTUZUMAB 420 MG: 30 INJECTION, SOLUTION, CONCENTRATE INTRAVENOUS at 14:08

## 2017-12-22 RX ADMIN — Medication 300 UNITS: at 15:25

## 2017-12-22 RX ADMIN — SODIUM CHLORIDE 20 ML/HR: 9 INJECTION, SOLUTION INTRAVENOUS at 13:50

## 2017-12-22 NOTE — PROGRESS NOTES
Pt  Tolerated Perjeta and Herceptin without adverse event  Pt  Is scheduled to see Dr Orlena Litten on 1/17/18  Next infusion appointment is 1/12/18; Pt  Requires CBC, CMP and Ca 27 29  Prior to next appointment with Dr Orlena Litten  AVS provided

## 2017-12-22 NOTE — PROGRESS NOTES
Pt  Denies new symptoms or concerns at this time  Next echo scheduled for Jan 2018  Perjeta and Herceptin ordered for infusion today

## 2018-01-03 DIAGNOSIS — C50.412 MALIGNANT NEOPLASM OF UPPER-OUTER QUADRANT OF LEFT FEMALE BREAST (HCC): ICD-10-CM

## 2018-01-04 ENCOUNTER — GENERIC CONVERSION - ENCOUNTER (OUTPATIENT)
Dept: OTHER | Facility: OTHER | Age: 53
End: 2018-01-04

## 2018-01-04 ENCOUNTER — APPOINTMENT (OUTPATIENT)
Dept: RADIATION ONCOLOGY | Facility: CLINIC | Age: 53
End: 2018-01-04
Attending: RADIOLOGY
Payer: COMMERCIAL

## 2018-01-04 PROCEDURE — G0463 HOSPITAL OUTPT CLINIC VISIT: HCPCS | Performed by: RADIOLOGY

## 2018-01-04 PROCEDURE — 99214 OFFICE O/P EST MOD 30 MIN: CPT | Performed by: RADIOLOGY

## 2018-01-08 ENCOUNTER — GENERIC CONVERSION - ENCOUNTER (OUTPATIENT)
Dept: HEMATOLOGY ONCOLOGY | Facility: CLINIC | Age: 53
End: 2018-01-08

## 2018-01-08 ENCOUNTER — HOSPITAL ENCOUNTER (OUTPATIENT)
Dept: NON INVASIVE DIAGNOSTICS | Facility: CLINIC | Age: 53
Discharge: HOME | End: 2018-01-08
Payer: COMMERCIAL

## 2018-01-08 DIAGNOSIS — C50.412 MALIGNANT NEOPLASM OF UPPER-OUTER QUADRANT OF LEFT FEMALE BREAST, UNSPECIFIED ESTROGEN RECEPTOR STATUS (HCC): ICD-10-CM

## 2018-01-08 PROCEDURE — 93308 TTE F-UP OR LMTD: CPT

## 2018-01-08 PROCEDURE — 0399T HB MYOCARDIAL STRAIN IMAGING: CPT

## 2018-01-10 NOTE — MISCELLANEOUS
Message  Explained his CT scan findings to the patient and we'll order MRI scan of the liver  She was okay with that        Signatures   Electronically signed by : Jeovanny Bell MD; Jul 5 2017  5:31PM EST                       (Author)

## 2018-01-11 RX ORDER — SODIUM CHLORIDE 9 MG/ML
20 INJECTION, SOLUTION INTRAVENOUS CONTINUOUS
Status: DISCONTINUED | OUTPATIENT
Start: 2018-01-12 | End: 2018-01-15 | Stop reason: HOSPADM

## 2018-01-11 NOTE — MISCELLANEOUS
Message  Called pt for post operative follow up  She shares she has mild discomfort at surgical site  Her incision line had one episode of scant amount of drainage, serous in color  Her incision line is currently dry and intact  She is wearing the surgical bra which is comfortable for her  She denied any questions  She has follow up appt scheduled with Dr Loida MONSIVAIS 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)   7  Open wound (879 8) (T14 8)   8  Rash, drug (693 0) (L27 0)   9  Use of tamoxifen (Nolvadex) (V07 51) (Z79 810)    Current Meds   1  Acetaminophen-Codeine #3 300-30 MG Oral Tablet; TAKE 1 TABLET EVERY 4 TO 6   HOURS AS NEEDED FOR PAIN;   Therapy: 17Pnu9735 to (Evaluate:25Rwg0945); Last Rx:58Ryw5961 Ordered   2  Ondansetron HCl - 8 MG Oral Tablet; 1 TAB PO Q 8 HR PRN N/V;   Therapy: 14KYM5728 to (Last Rx:04Knu3127)  Requested for: 01UHA4780 Ordered   3  Tamoxifen Citrate 20 MG Oral Tablet; TAKE 1 TABLET DAILY; Therapy: 50MRO0372 to (Evaluate:84Grg0200)  Requested for: 69KOC6566; Last   Rx:46Pyb6867 Ordered    Allergies    1  No Known Drug Allergies    Signatures   Electronically signed by :  Ayla Lorenz, ; Sep 23 2016 10:01AM EST                       (Author)

## 2018-01-12 ENCOUNTER — HOSPITAL ENCOUNTER (OUTPATIENT)
Dept: INFUSION CENTER | Facility: CLINIC | Age: 53
Discharge: HOME | End: 2018-01-14
Payer: COMMERCIAL

## 2018-01-12 VITALS
TEMPERATURE: 98 F | WEIGHT: 210.1 LBS | RESPIRATION RATE: 18 BRPM | HEART RATE: 78 BPM | HEIGHT: 66 IN | BODY MASS INDEX: 33.77 KG/M2 | DIASTOLIC BLOOD PRESSURE: 78 MMHG | SYSTOLIC BLOOD PRESSURE: 128 MMHG

## 2018-01-12 LAB
ALBUMIN SERPL BCP-MCNC: 3.1 G/DL (ref 3.2–5)
ALP SERPL-CCNC: 46 U/L (ref 46–116)
ALT SERPL W P-5'-P-CCNC: 28 U/L (ref 12–78)
ANION GAP SERPL CALCULATED.3IONS-SCNC: 9 MMOL/L (ref 4–13)
ANISOCYTOSIS BLD QL SMEAR: PRESENT
AST SERPL W P-5'-P-CCNC: 28 U/L (ref 5–45)
BASOPHILS # BLD AUTO: 0.18 THOUSAND/UL (ref 0–0.1)
BASOPHILS NFR MAR MANUAL: 2 % (ref 0–1)
BILIRUB SERPL-MCNC: 0.6 MG/DL (ref 0.2–1)
BUN SERPL-MCNC: 13 MG/DL (ref 5–25)
CALCIUM SERPL-MCNC: 9.2 MG/DL (ref 8.3–10.1)
CHLORIDE SERPL-SCNC: 104 MMOL/L (ref 98–108)
CO2 SERPL-SCNC: 28 MMOL/L (ref 21–32)
CREAT SERPL-MCNC: 1 MG/DL (ref 0.6–1.3)
EOSINOPHIL # BLD AUTO: 0.53 THOUSAND/UL (ref 0–0.61)
EOSINOPHIL NFR BLD MANUAL: 6 % (ref 0–6)
ERYTHROCYTE [DISTWIDTH] IN BLOOD BY AUTOMATED COUNT: 14.5 % (ref 11.6–15.1)
GFR SERPL CREATININE-BSD FRML MDRD: 66 ML/MIN/1.73SQ M
GLUCOSE SERPL-MCNC: 125 MG/DL (ref 65–140)
HCT VFR BLD AUTO: 40.6 % (ref 34.8–46.1)
HGB BLD-MCNC: 13.3 G/DL (ref 11.5–15.4)
LYMPHOCYTES # BLD AUTO: 2.31 THOUSAND/UL (ref 0.6–4.47)
LYMPHOCYTES # BLD AUTO: 26 % (ref 14–44)
MCH RBC QN AUTO: 27.8 PG (ref 26.8–34.3)
MCHC RBC AUTO-ENTMCNC: 32.8 G/DL (ref 31.4–37.4)
MCV RBC AUTO: 85 FL (ref 82–98)
MONOCYTES # BLD AUTO: 0.36 THOUSAND/UL (ref 0–1.22)
MONOCYTES NFR BLD AUTO: 4 % (ref 4–12)
NEUTS BAND NFR BLD MANUAL: 0 % (ref 0–8)
NEUTS SEG # BLD: 5.52 THOUSAND/UL (ref 1.81–6.82)
NEUTS SEG NFR BLD AUTO: 62 % (ref 43–75)
PLATELET # BLD AUTO: 259 THOUSANDS/UL (ref 149–390)
PLATELET BLD QL SMEAR: ADEQUATE
PMV BLD AUTO: 8.7 FL (ref 8.9–12.7)
POTASSIUM SERPL-SCNC: 3.8 MMOL/L (ref 3.5–5.3)
PROT SERPL-MCNC: 6.5 G/DL (ref 6.4–8.2)
RBC # BLD AUTO: 4.8 MILLION/UL (ref 3.81–5.12)
SODIUM SERPL-SCNC: 141 MMOL/L (ref 136–145)
TOTAL CELLS COUNTED SPEC: 100
WBC # BLD AUTO: 8.9 THOUSAND/UL (ref 4.31–10.16)
WBC NRBC COR # BLD: 8.9 THOUSAND/UL (ref 4.31–10.16)

## 2018-01-12 PROCEDURE — 85027 COMPLETE CBC AUTOMATED: CPT | Performed by: INTERNAL MEDICINE

## 2018-01-12 PROCEDURE — 96413 CHEMO IV INFUSION 1 HR: CPT

## 2018-01-12 PROCEDURE — 85007 BL SMEAR W/DIFF WBC COUNT: CPT | Performed by: INTERNAL MEDICINE

## 2018-01-12 PROCEDURE — 96401 CHEMO ANTI-NEOPL SQ/IM: CPT

## 2018-01-12 PROCEDURE — 80053 COMPREHEN METABOLIC PANEL: CPT | Performed by: INTERNAL MEDICINE

## 2018-01-12 PROCEDURE — 96417 CHEMO IV INFUS EACH ADDL SEQ: CPT

## 2018-01-12 RX ADMIN — TRASTUZUMAB 600 MG: 150 INJECTION, POWDER, LYOPHILIZED, FOR SOLUTION INTRAVENOUS at 14:49

## 2018-01-12 RX ADMIN — SODIUM CHLORIDE 20 ML/HR: 0.9 INJECTION, SOLUTION INTRAVENOUS at 14:13

## 2018-01-12 RX ADMIN — PERTUZUMAB 420 MG: 30 INJECTION, SOLUTION, CONCENTRATE INTRAVENOUS at 14:14

## 2018-01-12 RX ADMIN — Medication 300 UNITS: at 15:26

## 2018-01-12 RX ADMIN — DENOSUMAB 120 MG: 120 INJECTION SUBCUTANEOUS at 14:56

## 2018-01-12 NOTE — PLAN OF CARE
Problem: Potential for Falls  Goal: Patient will remain free of falls  INTERVENTIONS:  - Assess patient frequently for physical needs  -  Identify cognitive and physical deficits and behaviors that affect risk of falls    -  [place] fall precautions as indicated by assessment   - Educate patient/family on patient safety including physical limitations  - Instruct patient to call for assistance with activity based on assessment  - Modify environment to reduce risk of injury  - Consider OT/PT consult to assist with strengthening/mobility   Outcome: Progressing

## 2018-01-13 VITALS
SYSTOLIC BLOOD PRESSURE: 176 MMHG | TEMPERATURE: 97.7 F | RESPIRATION RATE: 16 BRPM | HEART RATE: 105 BPM | BODY MASS INDEX: 34.86 KG/M2 | HEIGHT: 65 IN | WEIGHT: 209.25 LBS | OXYGEN SATURATION: 96 % | DIASTOLIC BLOOD PRESSURE: 86 MMHG

## 2018-01-13 VITALS
HEART RATE: 89 BPM | TEMPERATURE: 97.9 F | WEIGHT: 203.25 LBS | BODY MASS INDEX: 32.66 KG/M2 | OXYGEN SATURATION: 97 % | HEIGHT: 66 IN | DIASTOLIC BLOOD PRESSURE: 84 MMHG | RESPIRATION RATE: 15 BRPM | SYSTOLIC BLOOD PRESSURE: 138 MMHG

## 2018-01-13 VITALS
OXYGEN SATURATION: 97 % | BODY MASS INDEX: 33.17 KG/M2 | WEIGHT: 206.4 LBS | HEIGHT: 66 IN | HEART RATE: 82 BPM | SYSTOLIC BLOOD PRESSURE: 142 MMHG | DIASTOLIC BLOOD PRESSURE: 80 MMHG | RESPIRATION RATE: 16 BRPM

## 2018-01-13 VITALS
SYSTOLIC BLOOD PRESSURE: 142 MMHG | BODY MASS INDEX: 35.02 KG/M2 | RESPIRATION RATE: 16 BRPM | HEIGHT: 65 IN | OXYGEN SATURATION: 96 % | DIASTOLIC BLOOD PRESSURE: 88 MMHG | HEART RATE: 91 BPM | WEIGHT: 210.2 LBS

## 2018-01-13 VITALS
DIASTOLIC BLOOD PRESSURE: 102 MMHG | SYSTOLIC BLOOD PRESSURE: 178 MMHG | OXYGEN SATURATION: 96 % | RESPIRATION RATE: 15 BRPM | BODY MASS INDEX: 34.91 KG/M2 | HEIGHT: 65 IN | HEART RATE: 102 BPM | TEMPERATURE: 98.3 F | WEIGHT: 209.5 LBS

## 2018-01-13 VITALS
DIASTOLIC BLOOD PRESSURE: 82 MMHG | RESPIRATION RATE: 16 BRPM | OXYGEN SATURATION: 97 % | WEIGHT: 205.13 LBS | HEART RATE: 84 BPM | BODY MASS INDEX: 32.97 KG/M2 | TEMPERATURE: 97.1 F | HEIGHT: 66 IN | SYSTOLIC BLOOD PRESSURE: 130 MMHG

## 2018-01-13 NOTE — MISCELLANEOUS
Message   Recorded as Task   Date: 02/11/2016 11:48 AM, Created By: Sabine Roa   Task Name: Call Back   Assigned To: Wil Overall   Regarding Patient: Gayle Agustin, Status: Active   Ashley Connors - 11 Feb 2016 11:48 AM     TASK CREATED  Caller: Self; [phone] (Home)  Pt spoke with her insurance co b/c she would like a plastic penguin cap so she doesn't loose her hair during chemo  Insurance co needs a script with the procedure code on it if we do it? 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)   6  Malignant neoplasm of upper-outer quadrant of left female breast (174 4) (C50 412)    Current Meds   1  Dexamethasone 4 MG Oral Tablet; 2 tabs po bid x 5 doses with food  Start day before   chemo; Therapy: 52PUJ8173 to (Last Rx:79Ukw9703)  Requested for: 79ZWA1273; Status:   ACTIVE - Retrospective By Protocol Authorization Ordered   2  Ondansetron HCl - 8 MG Oral Tablet; 1 TAB PO Q 8 HR PRN N/V;   Therapy: 57OPW3056 to (Last Rx:04Hdz6838)  Requested for: 74VFF2180; Status:   ACTIVE - Retrospective By Protocol Authorization Ordered    Allergies    1   No Known Drug Allergies    Signatures   Electronically signed by : Adele Bethea RN; Feb 11 2016  2:52PM EST                       (Author)

## 2018-01-14 VITALS
SYSTOLIC BLOOD PRESSURE: 124 MMHG | WEIGHT: 211.38 LBS | DIASTOLIC BLOOD PRESSURE: 80 MMHG | HEART RATE: 88 BPM | OXYGEN SATURATION: 98 % | RESPIRATION RATE: 14 BRPM | BODY MASS INDEX: 33.97 KG/M2 | TEMPERATURE: 98.2 F | HEIGHT: 66 IN

## 2018-01-14 VITALS
SYSTOLIC BLOOD PRESSURE: 124 MMHG | RESPIRATION RATE: 17 BRPM | OXYGEN SATURATION: 97 % | BODY MASS INDEX: 34.7 KG/M2 | HEIGHT: 65 IN | HEART RATE: 94 BPM | WEIGHT: 208.25 LBS | DIASTOLIC BLOOD PRESSURE: 78 MMHG | TEMPERATURE: 97.3 F

## 2018-01-17 ENCOUNTER — TRANSCRIBE ORDERS (OUTPATIENT)
Dept: ADMINISTRATIVE | Facility: HOSPITAL | Age: 53
End: 2018-01-17

## 2018-01-17 ENCOUNTER — ALLSCRIPTS OFFICE VISIT (OUTPATIENT)
Dept: OTHER | Facility: OTHER | Age: 53
End: 2018-01-17

## 2018-01-17 DIAGNOSIS — C50.412 MALIGNANT NEOPLASM OF UPPER-OUTER QUADRANT OF LEFT FEMALE BREAST, UNSPECIFIED ESTROGEN RECEPTOR STATUS (HCC): Primary | ICD-10-CM

## 2018-01-18 NOTE — PROGRESS NOTES
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)   3  Liver metastases (197 7) (C78 7)   4  Bone metastases (198 5) (C79 51)    Plan   Bone metastases    · We recommend that you examine your own breasts for lumps and other changes ;    Status:Complete;   Done: [id number]   Ordered; For:Bone metastases; Ordered By:Proothi, Sincere;  Malignant neoplasm of upper-outer quadrant of left female breast    · Follow-up visit in 3 months Evaluation and Treatment  Follow-up  Status: Hold For -    Scheduling  Requested for: [id number]   Ordered; For: Malignant neoplasm of upper-outer quadrant of left female breast; Ordered By: Nguyễn Irby Performed:  Due: [id number]   · (1) CA 27 29; Status:Active; Requested QWD:78XPC2019; Perform:[facility]; RAT:33CVR6398;ONODTIM; For:Malignant neoplasm of upper-outer quadrant of left female breast; Ordered By:Proothi, Sincere;   · (1) CBC/PLT/DIFF; Status:Active; Requested NCF:93AQO6353; Perform:[facility]; DGV:86WXW1597;GHRGTXX; For:Malignant neoplasm of upper-outer quadrant of left female breast; Ordered By:Proothi, Sincere;   · (1) COMPREHENSIVE METABOLIC PANEL; Status:Active; Requested IRX:[id number]; Perform:[facility]; GRT:89BMN2727;HOXMHJQ; For:Malignant neoplasm of upper-outer quadrant of left female breast; Ordered By:Proothi, Sincere;   · ECHO LIMITED WITH CONTRAST IF INDICATED; Status:Need Information - Financial    Authorization; Requested for:30Apr2018; Perform:[address] [facility]; Due:30Apr2019;Ordered; For:Malignant neoplasm of upper-outer quadrant of left female breast; Ordered By:Proothi, Sincere;    Discussion/Summary   Discussion Summary:    Presently patient is on Perjeta, Herceptin and tamoxifen and also Xgeva with calcium and vitamin-D for 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  she has been tolerating treatments without much problem  Left breast cancer was diagnosed in January 2016  Grade was 3  She had locally advanced left breast cancer with metastases to liver and bones  She was started on a combination of Taxotere and Herceptin and Perjeta  She had very good response  After 6 cycles of systemic therapy Taxotere was discontinued and tamoxifen was added  Because her response was so good she had lumpectomy of left breast in September 2016 followed by radiation therapy and also she received radiation to her left hip for palliation  At the time of lumpectomy there was minimal residual disease, 1 4 cm  Lymph nodes were not sampled  She had radiation to left hip lesion  She has chemotherapy-induced amenorrhea  She gets hot flashes  Occasionally leg cramps at night  Negative MRI scan of the brain for metastatic disease  Normal ejection fraction  she gets cardiac evaluation every 3 months  Overall she is doing reasonably well  For leg cramps at night and she is taking one baby aspirin daily with food in the evening and also one magnesium tablet daily  She has Port-A-Cath  She will be going out of country in the 2nd half of April 2018  examination and test results are as recorded and discussed  MRI scan of abdomen did not show liver lesions  It showed fatty changes in the liver and benign left adrenal adenoma   She is being continued on present medications as described above in detail  Rawleibertha Spillers is being changed to once every 3 months  She will have imaging studies, CT scans, bone scan and mammography in July 2018  Condition discussed and explained  Questions answered             Counseling Documentation With Imm: The patient was counseled regarding diagnostic results,-- instructions for management,-- prognosis,-- patient and family education,-- impressions,-- importance of compliance with treatment  total time of encounter was 30 minutes-- and-- 20 minutes was spent counseling  Goals and Barriers: The patient has the current Goals: Treatment of stage IV breast cancer and prolongation of survival  The patent has the current Barriers: None  Patient's Capacity to Self-Care: Patient is able to Self-Care  Medication SE Review and Pt Understands Tx: Possible side effects of new medications were reviewed with the patient/guardian today  The treatment plan was reviewed with the patient/guardian  The patient/guardian understands and agrees with the treatment plan    Self Referrals:    Self Referrals: No    Understands and agrees with treatment plan: The treatment plan was reviewed with the patient/guardian  The patient/guardian understands and agrees with the treatment plan      Chief Complaint   Chief Complaint: Chief Complaint:      The patient presents to the office today with Breast cancer, stage IV and follow-up visit  History of Present Illness   HPI: Presently patient is on Perjeta, Herceptin and tamoxifen and also Xgeva with calcium and vitamin-D for 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  she has been tolerating treatments without much problem  Left breast cancer was diagnosed in January 2016  Grade was 3  She had locally advanced left breast cancer with metastases to liver and bones  She was started on a combination of Taxotere and Herceptin and Perjeta  She had very good response  After 6 cycles of systemic therapy Taxotere was discontinued and tamoxifen was added  Because her response was so good she had lumpectomy of left breast in September 2016 followed by radiation therapy and also she received radiation to her left hip for palliation  At the time of lumpectomy there was minimal residual disease, 1 4 cm  Lymph nodes were not sampled  She had radiation to left hip lesion  She has chemotherapy-induced amenorrhea  She gets hot flashes  Occasionally leg cramps at night  Negative MRI scan of the brain for metastatic disease   Normal ejection fraction  she gets cardiac evaluation every 3 months  Overall she is doing reasonably well  For leg cramps at night and she is taking one baby aspirin daily with food in the evening and also one magnesium tablet daily  She has Port-A-Cath  She will be going out of country in the 2nd half of April 2018  Review of Systems                                                                                          No headaches, seizures, diplopia, dysphagia, hoarseness, angina pain, chest pain, palpitations, shortness of breath, cough, hemoptysis, abdominal pain, melena, or hematuria  No fever, chills, bleeding, bone pains,  weight loss, nausea, vomiting and no change in bowel habits  Appetite is fair  No night sweats  No arthritic symptoms  No swelling of the ankles  No swelling of the left arm  No swollen glands  Anxious  Not unusually sensitive to heat or cold  No recent or frequent infections  No GYN symptoms  Reviewed 14 systems  Other symptoms as in history of present illness                        ROS Reviewed:    ROS reviewed  Active Problems   1  Bone metastases (198 5) (C79 51)   2  Chemotherapy-induced nausea (787 02,E933 1) (R11 0,T45  1X5A)   3  Cough (786 2) (R05)   4  Liver metastases (197 7) (C78 7)   5  Long term use of drug (V58 69) (Z79 899)   6  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)   7  Malignant neoplasm of upper-outer quadrant of left female breast (174 4) (C50 412)   8  Rash, drug (693 0) (L27 0)   9  Use of tamoxifen (Nolvadex) (V07 51) (Z79 810)    Past Medical History   1  History of chemotherapy (V87 41) (Z92 21)   2  History of pregnancy (V13 29)   3  History of radiation therapy (V15 3) (Z92 3)   4  History of Menarche (V21 8)   5  History of Open wound (879 8) (T14 8XXA)  Active Problems And Past Medical History Reviewed: The active problems and past medical history were reviewed and updated today     No significant past medical history  Surgical History   1  History of Biopsy Breast Percutaneous Needle Core   2  History of [address]   3  History of Cholecystectomy Laparoscopic   4  History of Left Breast Partial Mastectomy  Surgical History Reviewed: The surgical history was reviewed and updated today  No significant past surgical history  Family History   Mother    1  No pertinent family history  Father    2  No pertinent family history  Family History Reviewed: The family history was reviewed and updated today  Social History    · Former smoker (F13 29) (M37 106)   ·    · Non drinker / no alcohol use   · Two children  Social History Reviewed: The social history was reviewed and updated today  Current Meds    1  Calcium 600 600 MG Oral Tablet; Therapy: (Recorded:23Jan2017) to Recorded   2  Herceptin 440 MG Intravenous Solution Reconstituted; Therapy: (Recorded:23Jan2017) to Recorded   3  Magnesium 250 MG Oral Tablet; TAKE 1 TABLET DAILY; Therapy: (Neetu Valdes) to Recorded   4  Tamoxifen Citrate 20 MG Oral Tablet; TAKE 1 TABLET DAILY; Therapy: 70VEY7902 to (Evaluate:26Jan2018)  Requested for: 66KHF6085; Last     Rx:27Nov2017 Ordered   5  Vitamin D 1000 UNIT Oral Tablet; Therapy: (OTDGFJVF:09DCJ4752) to Recorded   6  Xgeva 120 MG/1 7ML Subcutaneous Solution; Therapy: (Recorded:23Jan2017) to Recorded    Allergies   1  No Known Drug Allergies                                                                        Reviewed       Vitals   Vital Signs    Recorded: 88MWH1335 08:53AM   Temperature 98 3 F   Heart Rate 93   Respiration 15   Systolic 449   Diastolic 82   Height 5 ft 5 in   Weight 211 lb 4 oz   BMI Calculated 35 15   BSA Calculated 2 02   O2 Saturation 95   Pain Scale 0                                       Reviewed  Hypertension    Blood pressure 170/100       Physical Exam Patient is alert and oriented  She is not in distress     High blood pressure       No icterus  No oral thrush  No palpable neck mass  Regular heart rate  Lung fields are clear to percussion and auscultation  Lumpectomy scar left breast   No lymphedema     Abdomen soft  Liver not palpably enlarged  No other palpable abdominal mass  No ascites  No edema of ankles  No calf tenderness  No peripheral palpable lymphadenopathy  No focal neurological deficit  No skin rash  Good arterial pulses  Anxious  No clubbing  Performance status 0  ECOG 0       Results/Data   (1) CBC/PLT/DIFF 02KTM0463 01:40PM ProothiMarlonHattie Lisa      Test Name Result Flag Reference   WBC COUNT 8 90 Thousand/uL  4 31-10 16   WBC ADJUSTED 8 90 Thousand/uL  4 31-10 16   RBC COUNT 4 80 Million/uL  3 81-5 12   HEMOGLOBIN 13 3 g/dL  11 5-15 4   HEMATOCRIT 40 6 %  34 8-46  1   MCV 85 fL  82-98   MCH 27 8 pg  26 8-34 3   MCHC 32 8 g/dL  31 4-37 4   RDW 14 5 %  11 6-15 1   MPV 8 7 fL L 8 9-12 7   PLATELET COUNT 955 Thousands/uL  149-390      (1) COMPREHENSIVE METABOLIC PANEL 15PUY4766 02:12GH Proothi, Sincere      Test Name Result Flag Reference   GLUCOSE,RANDM 125 mg/dL     If the patient is fasting, the ADA then defines impaired fasting glucose as > 100 mg/dL and diabetes as > or equal to 123 mg/dL  Specimen collection should occur prior to Sulfasalazine administration due to the potential for falsely depressed results  Specimen collection should occur prior to Sulfapyridine administration due to the potential for falsely elevated results     SODIUM 141 mmol/L  136-145   POTASSIUM 3 8 mmol/L  3 5-5 3   CHLORIDE 104 mmol/L     CARBON DIOXIDE 28 mmol/L  21-32   ANION GAP (CALC) 9 mmol/L  4-13   BLOOD UREA NITROGEN 13 mg/dL  5-25   CREATININE 1 00 mg/dL  0 60-1 30   Standardized to IDMS reference method   CALCIUM 9 2 mg/dL  8 3-10 1   BILI, TOTAL 0 60 mg/dL  0 20-1 00 ALK PHOSPHATAS 46 U/L     ALT (SGPT) 28 U/L  12-78   Specimen collection should occur prior to Sulfasalazine administration due to the potential for falsely depressed results  AST(SGOT) 28 U/L  5-45   Specimen collection should occur prior to Sulfasalazine administration due to the potential for falsely depressed results  ALBUMIN 3 1 g/dL L 3 2-5 0   TOTAL PROTEIN 6 5 g/dL  6 4-8 2   eGFR 66 ml/min/1 73sq m     National Kidney Disease Education Program recommendations are as follows:     GFR calculation is accurate only with a steady state creatinine     Chronic Kidney disease less than 60 ml/min/1 73 sq  meters     Kidney failure less than 15 ml/min/1 73 sq  meters  ECHO LIMITED WITH CONTRAST IF INDICATED [id number] 08:07AM Proothi, Sincere      Test Name Result Flag Reference   ECHO LIMITED WITH CONTRAST IF INDICATED (Report)     [address] [address]  [place], [address] [facility]      [phone]           Transthoracic Echocardiogram      Limited 2D, M-mode, Doppler, and Color Doppler           Study date: 2018           Patient: Jana Jacques      MR number: [id number]      Account number: [de-identified]      : 1965      Age: 50 years      Gender: Female      Status: Outpatient      Location: [facility]      Height: 65 in      Weight: 211 lb      BP: 146/ 80 mmHg           Indications: Breast CA           Diagnoses: T45 1X1A - Poisoning by antineoplastic and immunosuppressive drugs, accidental (unintentional), initial encount           Sonographer: NIELS Bhat      Primary Physician: Henrik Escamilla MD      Referring Physician: Jones Arevalo MD      Group: [facility]      cc: Valeriy Joseph MD      Interpreting Physician: Radha Wallace MD           SUMMARY           PROCEDURE INFORMATION:      This was a technically difficult study             LEFT VENTRICLE:      Systolic function was hyperdynamic  Ejection fraction was estimated to be 75 %  There were no regional wall motion abnormalities  Doppler parameters were consistent with abnormal left ventricular relaxation (grade 1 diastolic dysfunction)  HISTORY: PRIOR HISTORY: Chemotherapy, Breath CA, Liver metastases, Bone metastases           PROCEDURE: The study was performed in the [address] [place] [address] [facility]  This was a routine study  The transthoracic approach was used  The study included limited 2D imaging, M-mode, limited spectral Doppler, and color Doppler  The heart      rate was 92 bpm, at the start of the study  Images were obtained from the parasternal, apical, subcostal, and suprasternal notch acoustic windows  Echocardiographic views were limited due to lung interference  This was a technically      difficult study  LEFT VENTRICLE: Size was normal  Systolic function was hyperdynamic  Ejection fraction was estimated to be 75 %  There were no regional wall motion abnormalities  Wall thickness was normal  No evidence of apical thrombus  DOPPLER: Doppler      parameters were consistent with abnormal left ventricular relaxation (grade 1 diastolic dysfunction)  RIGHT VENTRICLE: The size was normal  Systolic function was normal  Wall thickness was normal            LEFT ATRIUM: Size was normal            RIGHT ATRIUM: Size was normal            MITRAL VALVE: Valve structure was normal  There was normal leaflet separation  DOPPLER: The transmitral velocity was within the normal range  There was no evidence for stenosis  There was no significant regurgitation  AORTIC VALVE: The valve was trileaflet  Leaflets exhibited normal thickness and normal cuspal separation  DOPPLER: Transaortic velocity was within the normal range  There was no evidence for stenosis  There was no significant      regurgitation             TRICUSPID VALVE: The valve structure was normal  There was normal leaflet separation  DOPPLER: The transtricuspid velocity was within the normal range  There was no evidence for stenosis  There was no significant regurgitation  The      tricuspid jet envelope definition was inadequate for estimation of RV systolic pressure  There are no indirect findings (abnormal RV volume or geometry, altered pulmonary flow velocity profile, or leftward septal displacement) which would      suggest moderate or severe pulmonary hypertension  PULMONIC VALVE: Leaflets exhibited normal thickness, no calcification, and normal cuspal separation  DOPPLER: The transpulmonic velocity was within the normal range  There was no significant regurgitation  PERICARDIUM: There was no pericardial effusion  The pericardium was normal in appearance  AORTA: The root exhibited normal size  SYSTEMIC VEINS: IVC: The inferior vena cava was normal in size   Respirophasic changes were normal            SYSTEM MEASUREMENT TABLES           2D      %FS: 47 6 %      Ao Diam: 3 3 cm      EDV(Teich): 77 ml      EF(Teich): 79 4 %      ESV(Teich): 15 9 ml      IVSd: 1 1 cm      LA Area: 15 4 cm2      LA Diam: 3 2 cm      LVEDV MOD A4C: 79 4 ml      LVEF MOD A4C: 75 4 %      LVESV MOD A4C: 19 5 ml      LVIDd: 4 2 cm      LVIDs: 2 2 cm      LVLd A4C: 8 5 cm      LVLs A4C: 7 1 cm      LVPWd: 1 1 cm      RA Area: 9 4 cm2      RVIDd: 2 9 cm      SV MOD A4C: 59 9 ml      SV(Teich): 61 1 ml           PW      AVC: 289 1 ms      E': 0 1 m/s      E/E': 8 8      MV A David: 0 9 m/s      MV Dec [place]: 3 8 m/s2      MV DecT: 191 8 ms      MV E David: 0 7 m/s      MV E/A Ratio: 0 8      MV PHT: 55 6 ms      MVA By PHT: 4 cm2           [employer] Accredited Echocardiography Laboratory           Prepared and electronically signed by           Melissa Flowers MD      Signed 08-Jan-2018 09:38:00           Addendum Date: 1/9/2018 10:15:12 AM      Left ventricular systolic function was normal by ejection fraction (biplane method of disks)  Ejection fraction was 61%  Left ventricular global longitudinal strain was measured at -20 0% (normal)  Serial assessment of left ventricular      strain may be helpful in early recognition of chemotherapy-associated myocardial dysfunction  Addendum entered by: Vicky Barcenas MD      Future Appointments      Date/Time Provider Specialty Site   05/16/2018 03:15 PM JOSE ALBERTO Waite   [facility] [place]     Signatures    Electronically signed by : Nicky Vail MD; Jan 17 2018  9:26AM EST                       (Author)

## 2018-01-22 VITALS
HEIGHT: 65 IN | OXYGEN SATURATION: 95 % | HEART RATE: 80 BPM | SYSTOLIC BLOOD PRESSURE: 146 MMHG | BODY MASS INDEX: 35.74 KG/M2 | RESPIRATION RATE: 16 BRPM | WEIGHT: 214.5 LBS | TEMPERATURE: 98.1 F | DIASTOLIC BLOOD PRESSURE: 80 MMHG

## 2018-01-22 VITALS
WEIGHT: 211.25 LBS | BODY MASS INDEX: 35.2 KG/M2 | HEIGHT: 65 IN | SYSTOLIC BLOOD PRESSURE: 144 MMHG | RESPIRATION RATE: 15 BRPM | HEART RATE: 93 BPM | DIASTOLIC BLOOD PRESSURE: 82 MMHG | OXYGEN SATURATION: 95 % | TEMPERATURE: 98.3 F

## 2018-01-22 VITALS
TEMPERATURE: 98.1 F | WEIGHT: 213.5 LBS | HEART RATE: 97 BPM | HEIGHT: 65 IN | DIASTOLIC BLOOD PRESSURE: 90 MMHG | OXYGEN SATURATION: 97 % | BODY MASS INDEX: 35.57 KG/M2 | SYSTOLIC BLOOD PRESSURE: 142 MMHG | RESPIRATION RATE: 15 BRPM

## 2018-02-01 RX ORDER — SODIUM CHLORIDE 9 MG/ML
20 INJECTION, SOLUTION INTRAVENOUS CONTINUOUS
Status: DISCONTINUED | OUTPATIENT
Start: 2018-02-02 | End: 2018-02-05 | Stop reason: HOSPADM

## 2018-02-02 ENCOUNTER — HOSPITAL ENCOUNTER (OUTPATIENT)
Dept: INFUSION CENTER | Facility: CLINIC | Age: 53
Discharge: HOME | End: 2018-02-02
Payer: COMMERCIAL

## 2018-02-02 VITALS
WEIGHT: 208.78 LBS | RESPIRATION RATE: 18 BRPM | HEART RATE: 64 BPM | SYSTOLIC BLOOD PRESSURE: 142 MMHG | TEMPERATURE: 97 F | BODY MASS INDEX: 34.74 KG/M2 | DIASTOLIC BLOOD PRESSURE: 85 MMHG

## 2018-02-02 PROCEDURE — 96413 CHEMO IV INFUSION 1 HR: CPT

## 2018-02-02 PROCEDURE — 96417 CHEMO IV INFUS EACH ADDL SEQ: CPT

## 2018-02-02 RX ADMIN — SODIUM CHLORIDE 20 ML/HR: 0.9 INJECTION, SOLUTION INTRAVENOUS at 14:00

## 2018-02-02 RX ADMIN — Medication 300 UNITS: at 15:30

## 2018-02-02 RX ADMIN — TRASTUZUMAB 600 MG: 150 INJECTION, POWDER, LYOPHILIZED, FOR SOLUTION INTRAVENOUS at 14:54

## 2018-02-02 RX ADMIN — PERTUZUMAB 420 MG: 30 INJECTION, SOLUTION, CONCENTRATE INTRAVENOUS at 14:22

## 2018-02-02 NOTE — PROGRESS NOTES
Herceptin/Perjecta infused as ordered  Patient offers no complaints  Pt is aware of all future appointments

## 2018-02-02 NOTE — PLAN OF CARE
Problem: Potential for Falls  Goal: Patient will remain free of falls  INTERVENTIONS:  - Assess patient frequently for physical needs  -  Identify cognitive and physical deficits and behaviors that affect risk of falls    -  [place] fall precautions as indicated by assessment   - Educate patient/family on patient safety including physical limitations  - Instruct patient to call for assistance with activity based on assessment  - Modify environment to reduce risk of injury  - Consider OT/PT consult to assist with strengthening/mobility   Outcome: Progressing

## 2018-02-14 DIAGNOSIS — C50.412 MALIGNANT NEOPLASM OF UPPER-OUTER QUADRANT OF LEFT FEMALE BREAST (HCC): ICD-10-CM

## 2018-02-22 RX ORDER — SODIUM CHLORIDE 9 MG/ML
20 INJECTION, SOLUTION INTRAVENOUS CONTINUOUS
Status: DISCONTINUED | OUTPATIENT
Start: 2018-02-23 | End: 2018-02-26 | Stop reason: HOSPADM

## 2018-02-23 ENCOUNTER — HOSPITAL ENCOUNTER (OUTPATIENT)
Dept: INFUSION CENTER | Facility: CLINIC | Age: 53
Discharge: HOME | End: 2018-02-23
Payer: COMMERCIAL

## 2018-02-23 VITALS
SYSTOLIC BLOOD PRESSURE: 140 MMHG | BODY MASS INDEX: 35.07 KG/M2 | TEMPERATURE: 97.6 F | WEIGHT: 210.76 LBS | HEART RATE: 78 BPM | DIASTOLIC BLOOD PRESSURE: 92 MMHG

## 2018-02-23 PROCEDURE — 96417 CHEMO IV INFUS EACH ADDL SEQ: CPT

## 2018-02-23 PROCEDURE — 96413 CHEMO IV INFUSION 1 HR: CPT

## 2018-02-23 RX ADMIN — TRASTUZUMAB 568 MG: 150 INJECTION, POWDER, LYOPHILIZED, FOR SOLUTION INTRAVENOUS at 14:46

## 2018-02-23 RX ADMIN — Medication 300 UNITS: at 15:27

## 2018-02-23 RX ADMIN — SODIUM CHLORIDE 20 ML/HR: 9 INJECTION, SOLUTION INTRAVENOUS at 14:09

## 2018-02-23 RX ADMIN — PERTUZUMAB 420 MG: 30 INJECTION, SOLUTION, CONCENTRATE INTRAVENOUS at 14:09

## 2018-02-23 NOTE — PROGRESS NOTES
Confirmed with Natalie Stoddard RN for Dr Niki Segal, that labs are not needed for today  Labs are needed prior to appointment on May 5th

## 2018-02-23 NOTE — PLAN OF CARE

## 2018-03-14 RX ORDER — SODIUM CHLORIDE 9 MG/ML
20 INJECTION, SOLUTION INTRAVENOUS CONTINUOUS
Status: DISCONTINUED | OUTPATIENT
Start: 2018-03-15 | End: 2018-03-18 | Stop reason: HOSPADM

## 2018-03-15 ENCOUNTER — HOSPITAL ENCOUNTER (OUTPATIENT)
Dept: INFUSION CENTER | Facility: CLINIC | Age: 53
Discharge: HOME | End: 2018-03-15
Payer: COMMERCIAL

## 2018-03-15 VITALS
HEART RATE: 86 BPM | BODY MASS INDEX: 35.77 KG/M2 | RESPIRATION RATE: 18 BRPM | SYSTOLIC BLOOD PRESSURE: 153 MMHG | DIASTOLIC BLOOD PRESSURE: 92 MMHG | WEIGHT: 214.95 LBS | TEMPERATURE: 98.8 F

## 2018-03-15 PROCEDURE — 96417 CHEMO IV INFUS EACH ADDL SEQ: CPT

## 2018-03-15 PROCEDURE — 96413 CHEMO IV INFUSION 1 HR: CPT

## 2018-03-15 RX ADMIN — PERTUZUMAB 420 MG: 30 INJECTION, SOLUTION, CONCENTRATE INTRAVENOUS at 14:12

## 2018-03-15 RX ADMIN — Medication 300 UNITS: at 15:29

## 2018-03-15 RX ADMIN — TRASTUZUMAB 568 MG: 150 INJECTION, POWDER, LYOPHILIZED, FOR SOLUTION INTRAVENOUS at 14:48

## 2018-03-15 RX ADMIN — SODIUM CHLORIDE 20 ML/HR: 0.9 INJECTION, SOLUTION INTRAVENOUS at 14:12

## 2018-03-15 NOTE — PLAN OF CARE

## 2018-03-28 DIAGNOSIS — C50.412 MALIGNANT NEOPLASM OF UPPER-OUTER QUADRANT OF LEFT FEMALE BREAST (HCC): ICD-10-CM

## 2018-04-05 RX ORDER — SODIUM CHLORIDE 9 MG/ML
20 INJECTION, SOLUTION INTRAVENOUS CONTINUOUS
Status: DISCONTINUED | OUTPATIENT
Start: 2018-04-06 | End: 2018-04-09 | Stop reason: HOSPADM

## 2018-04-06 ENCOUNTER — HOSPITAL ENCOUNTER (OUTPATIENT)
Dept: INFUSION CENTER | Facility: CLINIC | Age: 53
Discharge: HOME | End: 2018-04-06
Payer: COMMERCIAL

## 2018-04-06 VITALS
BODY MASS INDEX: 35.88 KG/M2 | SYSTOLIC BLOOD PRESSURE: 139 MMHG | HEART RATE: 76 BPM | TEMPERATURE: 98.3 F | DIASTOLIC BLOOD PRESSURE: 86 MMHG | WEIGHT: 215.61 LBS | RESPIRATION RATE: 18 BRPM

## 2018-04-06 PROCEDURE — 96417 CHEMO IV INFUS EACH ADDL SEQ: CPT

## 2018-04-06 PROCEDURE — 96413 CHEMO IV INFUSION 1 HR: CPT

## 2018-04-06 RX ADMIN — PERTUZUMAB 420 MG: 30 INJECTION, SOLUTION, CONCENTRATE INTRAVENOUS at 14:54

## 2018-04-06 RX ADMIN — Medication 300 UNITS: at 16:09

## 2018-04-06 RX ADMIN — TRASTUZUMAB 568 MG: 150 INJECTION, POWDER, LYOPHILIZED, FOR SOLUTION INTRAVENOUS at 15:34

## 2018-04-06 RX ADMIN — SODIUM CHLORIDE 20 ML/HR: 0.9 INJECTION, SOLUTION INTRAVENOUS at 14:55

## 2018-04-06 NOTE — PLAN OF CARE
Problem: Potential for Falls  Goal: Patient will remain free of falls  INTERVENTIONS:  - Assess patient frequently for physical needs  -  Identify cognitive and physical deficits and behaviors that affect risk of falls    -  [place] fall precautions as indicated by assessment   - Educate patient/family on patient safety including physical limitations  - Instruct patient to call for assistance with activity based on assessment  - Modify environment to reduce risk of injury  - Consider OT/PT consult to assist with strengthening/mobility   Outcome: Progressing

## 2018-04-09 ENCOUNTER — HOSPITAL ENCOUNTER (OUTPATIENT)
Dept: NON INVASIVE DIAGNOSTICS | Facility: CLINIC | Age: 53
Discharge: HOME | End: 2018-04-09
Payer: COMMERCIAL

## 2018-04-09 DIAGNOSIS — C50.412 MALIGNANT NEOPLASM OF UPPER-OUTER QUADRANT OF LEFT FEMALE BREAST, UNSPECIFIED ESTROGEN RECEPTOR STATUS (HCC): ICD-10-CM

## 2018-04-09 PROCEDURE — 93306 TTE W/DOPPLER COMPLETE: CPT | Performed by: INTERNAL MEDICINE

## 2018-04-09 PROCEDURE — 93308 TTE F-UP OR LMTD: CPT

## 2018-04-26 RX ORDER — SODIUM CHLORIDE 9 MG/ML
20 INJECTION, SOLUTION INTRAVENOUS CONTINUOUS
Status: DISCONTINUED | OUTPATIENT
Start: 2018-04-27 | End: 2018-04-30 | Stop reason: HOSPADM

## 2018-04-27 ENCOUNTER — HOSPITAL ENCOUNTER (OUTPATIENT)
Dept: INFUSION CENTER | Facility: CLINIC | Age: 53
Discharge: HOME | End: 2018-04-27
Payer: COMMERCIAL

## 2018-04-27 VITALS
RESPIRATION RATE: 18 BRPM | TEMPERATURE: 99 F | WEIGHT: 209.66 LBS | SYSTOLIC BLOOD PRESSURE: 144 MMHG | BODY MASS INDEX: 34.89 KG/M2 | DIASTOLIC BLOOD PRESSURE: 86 MMHG | HEART RATE: 82 BPM

## 2018-04-27 DIAGNOSIS — C50.412 MALIGNANT NEOPLASM OF UPPER-OUTER QUADRANT OF LEFT FEMALE BREAST (HCC): ICD-10-CM

## 2018-04-27 LAB
ALBUMIN SERPL BCP-MCNC: 2.9 G/DL (ref 3.5–5.7)
ALP SERPL-CCNC: 47 U/L (ref 46–116)
ALT SERPL W P-5'-P-CCNC: 51 U/L (ref 12–78)
ANION GAP SERPL CALCULATED.3IONS-SCNC: 9 MMOL/L (ref 4–13)
AST SERPL W P-5'-P-CCNC: 39 U/L (ref 5–45)
BASOPHILS # BLD AUTO: 0 THOUSAND/UL (ref 0–0.1)
BASOPHILS NFR MAR MANUAL: 0 % (ref 0–1)
BILIRUB SERPL-MCNC: 0.5 MG/DL (ref 0.2–1)
BUN SERPL-MCNC: 10 MG/DL (ref 5–25)
CALCIUM SERPL-MCNC: 8.8 MG/DL (ref 8.3–10.1)
CHLORIDE SERPL-SCNC: 109 MMOL/L (ref 98–108)
CO2 SERPL-SCNC: 25 MMOL/L (ref 21–32)
CREAT SERPL-MCNC: 1.1 MG/DL (ref 0.6–1.3)
EOSINOPHIL # BLD AUTO: 0.11 THOUSAND/UL (ref 0–0.61)
EOSINOPHIL NFR BLD MANUAL: 1 % (ref 0–6)
ERYTHROCYTE [DISTWIDTH] IN BLOOD BY AUTOMATED COUNT: 15 % (ref 11.6–15.1)
GFR SERPL CREATININE-BSD FRML MDRD: 59 ML/MIN/1.73SQ M
GLUCOSE SERPL-MCNC: 104 MG/DL (ref 65–140)
HCT VFR BLD AUTO: 39.7 % (ref 34.8–46.1)
HGB BLD-MCNC: 12.5 G/DL (ref 11.5–15.4)
LYMPHOCYTES # BLD AUTO: 1.59 THOUSAND/UL (ref 0.6–4.47)
LYMPHOCYTES # BLD AUTO: 15 % (ref 14–44)
MCH RBC QN AUTO: 27.4 PG (ref 26.8–34.3)
MCHC RBC AUTO-ENTMCNC: 31.4 G/DL (ref 31.4–37.4)
MCV RBC AUTO: 87 FL (ref 82–98)
MONOCYTES # BLD AUTO: 0.64 THOUSAND/UL (ref 0–1.22)
MONOCYTES NFR BLD AUTO: 6 % (ref 4–12)
NEUTS BAND NFR BLD MANUAL: 0 % (ref 0–8)
NEUTS SEG # BLD: 8.27 THOUSAND/UL (ref 1.81–6.82)
NEUTS SEG NFR BLD AUTO: 78 % (ref 43–75)
OVALOCYTES BLD QL SMEAR: PRESENT
PLATELET # BLD AUTO: 271 THOUSANDS/UL (ref 149–390)
PLATELET BLD QL SMEAR: ADEQUATE
PMV BLD AUTO: 8.6 FL (ref 8.9–12.7)
POTASSIUM SERPL-SCNC: 3.7 MMOL/L (ref 3.5–5.3)
PROT SERPL-MCNC: 6.3 G/DL (ref 6.4–8.2)
RBC # BLD AUTO: 4.56 MILLION/UL (ref 3.81–5.12)
SODIUM SERPL-SCNC: 143 MMOL/L (ref 136–145)
TOTAL CELLS COUNTED SPEC: 100
WBC # BLD AUTO: 10.6 THOUSAND/UL (ref 4.31–10.16)
WBC NRBC COR # BLD: 10.6 THOUSAND/UL (ref 4.31–10.16)

## 2018-04-27 PROCEDURE — 85027 COMPLETE CBC AUTOMATED: CPT

## 2018-04-27 PROCEDURE — 80053 COMPREHEN METABOLIC PANEL: CPT

## 2018-04-27 PROCEDURE — 85007 BL SMEAR W/DIFF WBC COUNT: CPT

## 2018-04-27 PROCEDURE — 96401 CHEMO ANTI-NEOPL SQ/IM: CPT

## 2018-04-27 PROCEDURE — 86300 IMMUNOASSAY TUMOR CA 15-3: CPT

## 2018-04-27 PROCEDURE — 96413 CHEMO IV INFUSION 1 HR: CPT

## 2018-04-27 PROCEDURE — 96417 CHEMO IV INFUS EACH ADDL SEQ: CPT

## 2018-04-27 RX ADMIN — DENOSUMAB 120 MG: 120 INJECTION SUBCUTANEOUS at 15:00

## 2018-04-27 RX ADMIN — SODIUM CHLORIDE 20 ML/HR: 0.9 INJECTION, SOLUTION INTRAVENOUS at 14:25

## 2018-04-27 RX ADMIN — PERTUZUMAB 420 MG: 30 INJECTION, SOLUTION, CONCENTRATE INTRAVENOUS at 14:47

## 2018-04-27 RX ADMIN — Medication 300 UNITS: at 15:54

## 2018-04-27 RX ADMIN — TRASTUZUMAB 600 MG: 150 INJECTION, POWDER, LYOPHILIZED, FOR SOLUTION INTRAVENOUS at 15:18

## 2018-04-27 NOTE — PROGRESS NOTES
Patient tolerated infusion well  Xgeva given as ordered  Patient is aware of all future appointments

## 2018-04-27 NOTE — PLAN OF CARE
Problem: Potential for Falls  Goal: Patient will remain free of falls  INTERVENTIONS:  - Assess patient frequently for physical needs  -  Identify cognitive and physical deficits and behaviors that affect risk of falls    -  [place] fall precautions as indicated by assessment   - Educate patient/family on patient safety including physical limitations  - Instruct patient to call for assistance with activity based on assessment  - Modify environment to reduce risk of injury  - Consider OT/PT consult to assist with strengthening/mobility   Outcome: Progressing

## 2018-04-28 LAB — CANCER AG27-29 SERPL-ACNC: 16.2 U/ML (ref 0–42.3)

## 2018-05-02 ENCOUNTER — OFFICE VISIT (OUTPATIENT)
Dept: SURGICAL ONCOLOGY | Facility: CLINIC | Age: 53
End: 2018-05-02
Payer: COMMERCIAL

## 2018-05-02 ENCOUNTER — OFFICE VISIT (OUTPATIENT)
Dept: HEMATOLOGY ONCOLOGY | Facility: CLINIC | Age: 53
End: 2018-05-02
Payer: COMMERCIAL

## 2018-05-02 VITALS
HEIGHT: 65 IN | SYSTOLIC BLOOD PRESSURE: 164 MMHG | BODY MASS INDEX: 34.32 KG/M2 | DIASTOLIC BLOOD PRESSURE: 88 MMHG | HEART RATE: 89 BPM | WEIGHT: 206 LBS | TEMPERATURE: 98.4 F | RESPIRATION RATE: 17 BRPM | OXYGEN SATURATION: 98 %

## 2018-05-02 VITALS
BODY MASS INDEX: 34.32 KG/M2 | RESPIRATION RATE: 17 BRPM | TEMPERATURE: 98.4 F | SYSTOLIC BLOOD PRESSURE: 164 MMHG | HEIGHT: 65 IN | HEART RATE: 89 BPM | DIASTOLIC BLOOD PRESSURE: 88 MMHG | WEIGHT: 206 LBS

## 2018-05-02 DIAGNOSIS — C79.51 BONE METASTASIS (HCC): ICD-10-CM

## 2018-05-02 DIAGNOSIS — C78.7 LIVER METASTASES (HCC): Primary | ICD-10-CM

## 2018-05-02 DIAGNOSIS — C78.7 LIVER METASTASES (HCC): ICD-10-CM

## 2018-05-02 DIAGNOSIS — Z95.828 PORT-A-CATH IN PLACE: ICD-10-CM

## 2018-05-02 DIAGNOSIS — Z79.810 USE OF TAMOXIFEN (NOLVADEX): ICD-10-CM

## 2018-05-02 DIAGNOSIS — Z79.899 LONG TERM USE OF DRUG: ICD-10-CM

## 2018-05-02 DIAGNOSIS — Z17.0 MALIGNANT NEOPLASM OF UPPER-OUTER QUADRANT OF LEFT BREAST IN FEMALE, ESTROGEN RECEPTOR POSITIVE (HCC): Primary | ICD-10-CM

## 2018-05-02 DIAGNOSIS — Z17.0 MALIGNANT NEOPLASM OF UPPER-INNER QUADRANT OF LEFT BREAST IN FEMALE, ESTROGEN RECEPTOR POSITIVE (HCC): ICD-10-CM

## 2018-05-02 DIAGNOSIS — C50.412 MALIGNANT NEOPLASM OF UPPER-OUTER QUADRANT OF LEFT BREAST IN FEMALE, ESTROGEN RECEPTOR POSITIVE (HCC): Primary | ICD-10-CM

## 2018-05-02 DIAGNOSIS — C50.212 MALIGNANT NEOPLASM OF UPPER-INNER QUADRANT OF LEFT BREAST IN FEMALE, ESTROGEN RECEPTOR POSITIVE (HCC): ICD-10-CM

## 2018-05-02 DIAGNOSIS — I42.7 CARDIOMYOPATHY SECONDARY TO DRUG (HCC): ICD-10-CM

## 2018-05-02 PROBLEM — Z92.3 HX OF RADIATION THERAPY: Status: RESOLVED | Noted: 2018-05-02 | Resolved: 2018-05-02

## 2018-05-02 PROBLEM — Z92.21 HX ANTINEOPLASTIC CHEMOTHERAPY: Status: RESOLVED | Noted: 2018-05-02 | Resolved: 2018-05-02

## 2018-05-02 PROCEDURE — 99214 OFFICE O/P EST MOD 30 MIN: CPT | Performed by: SURGERY

## 2018-05-02 PROCEDURE — 99214 OFFICE O/P EST MOD 30 MIN: CPT | Performed by: INTERNAL MEDICINE

## 2018-05-02 RX ORDER — MULTIVITAMIN WITH IRON
1 TABLET ORAL DAILY
COMMUNITY
End: 2019-08-08

## 2018-05-02 NOTE — LETTER
May 2, 2018     Celina Clemons MD  [address] [facility] [address]    Patient: Audie Jackson   YOB: 1965   Date of Visit: 5/2/2018       Dear Dr Nicolas Sparks: Thank you for referring Audie Jackson to me for evaluation  Below are my notes for this consultation  If you have questions, please do not hesitate to call me  I look forward to following your patient along with you  Sincerely,        Nahid Mendoza MD        CC: No Recipients  Nahid Mendoza MD  5/2/2018  9:40 AM  Sign at close encounter   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)   3  Liver metastases (197 7) (C78 7)   4  Bone metastases (198 5) (C79 51)      Last week patient came back from [facility]  and she is recovering from traveler's diarrhea  Also her  ear drum was popping in the plane and she still has some hearing problem  If he did not get better she will see an ENT doctor  Patient is being treated for stage IV breast cancer and she is on Perjeta, Herceptin and tamoxifen   Also Xgeva with calcium and vitamin-D for 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  she has been tolerating treatments without much problem  [place] Marnie[place] is being stopped because she had it for more than 2 years  Left breast cancer was diagnosed in January 2016  Grade was 3  She had locally advanced left breast cancer with metastases to liver and bones  She was started on a combination of Taxotere and Herceptin and Perjeta  She had very good response  After 6 cycles of systemic therapy Taxotere was discontinued and tamoxifen was added  Because her response was so good she had lumpectomy of left breast in September 2016 followed by radiation therapy and also she received radiation to her left hip for palliation   At the time of lumpectomy there was minimal residual disease, 1 4 cm  Lymph nodes were not sampled  She had radiation to left hip lesion    She has chemotherapy-induced amenorrhea  She gets hot flashes  Occasionally leg cramps at night  Negative MRI scan of the brain for metastatic disease  Normal ejection fraction  she gets cardiac evaluation every 3 months  Overall she is doing reasonably well  For leg cramps at night and she is taking one baby aspirin daily with food in the evening and also one magnesium tablet daily  She has Port-A-Cath               Current Outpatient Prescriptions:     Calcium Carbonate-Vit D-Min (CALCIUM 1200 PO), Take 1 tablet by mouth daily  , Disp: , Rfl:     Magnesium 250 MG TABS, Take 1 tablet by mouth daily, Disp: , Rfl:     multivitamin (THERAGRAN) TABS, Take 1 tablet by mouth daily  , Disp: , Rfl:     pertuzumab (PERJETA) 420 mg/14 mL SOLN, Infuse into a venous catheter every 21 days  At infusion  center, Disp: , Rfl:     tamoxifen (NOLVADEX) 20 mg tablet, Take 20 mg by mouth daily  , Disp: , Rfl:     Trastuzumab (HERCEPTIN IV), Infuse into a venous catheter every 21 days  At infusion center, Disp: , Rfl:     VITAMIN D, CHOLECALCIFEROL, PO, Take by mouth daily  , Disp: , Rfl:     No Known Allergies    ROS:  05/02/18 Reviewed 13 systems:  Presently no headaches, seizures, dizziness, diplopia, dysphagia, hoarseness, chest pain, palpitations, shortness of breath, cough, hemoptysis, abdominal pain, nausea, vomiting,  melena, hematuria, fever, chills, bleeding, bone pains, skin rash, weight loss, arthritic symptoms, tiredness ,  weakness, numbness,  claudication and gait problem  No frequent infections  No swelling of the ankles  No swollen glands  Patient is anxious     No GYN symptoms Other symptoms are in HPI        /88 (BP Location: Right arm, Cuff Size: Adult)   Pulse 89   Temp 98 4 °F (36 9 °C) (Tympanic)   Resp 17   Ht 5' 5" (1 651 m)   Wt 93 4 kg (206 lb)   SpO2 98%   BMI 34 28 kg/m² Physical Exam:  Alert, oriented, not in distress, no icterus, no oral thrush, no palpable neck mass, clear lung fields, regular heart rate, abdomen  soft and non tender, no palpable abdominal mass, no ascites, no edema of ankles, no calf tenderness, no focal neurological deficit, no skin rash, no palpable lymphadenopathy, good arterial pulses, no clubbing  Patient is anxious  No lymphedema Performance status 1      IMAGING:  No orders to display       LABS:  Results for orders placed or performed during the hospital encounter of 04/27/18   Cancer antigen 27 29   Result Value Ref Range    CA 27 29 16 2 0 0 - 42 3 U/mL   CBC and differential   Result Value Ref Range    WBC 10 60 (H) 4 31 - 10 16 Thousand/uL    Adjusted WBC 10 60 (H) 4 31 - 10 16 Thousand/uL    RBC 4 56 3 81 - 5 12 Million/uL    Hemoglobin 12 5 11 5 - 15 4 g/dL    Hematocrit 39 7 34 8 - 46 1 %    MCV 87 82 - 98 fL    MCH 27 4 26 8 - 34 3 pg    MCHC 31 4 31 4 - 37 4 g/dL    RDW 15 0 11 6 - 15 1 %    MPV 8 6 (L) 8 9 - 12 7 fL    Platelets 940 939 - 830 Thousands/uL   Comprehensive metabolic panel   Result Value Ref Range    Sodium 143 136 - 145 mmol/L    Potassium 3 7 3 5 - 5 3 mmol/L    Chloride 109 (H) 98 - 108 mmol/L    CO2 25 21 - 32 mmol/L    Anion Gap 9 4 - 13 mmol/L    BUN 10 5 - 25 mg/dL    Creatinine 1 10 0 60 - 1 30 mg/dL    Glucose 104 65 - 140 mg/dL    Calcium 8 8 8 3 - 10 1 mg/dL    AST 39 5 - 45 U/L    ALT 51 12 - 78 U/L    Alkaline Phosphatase 47 46 - 116 U/L    Total Protein 6 3 (L) 6 4 - 8 2 g/dL    Albumin 2 9 (L) 3 5 - 5 7 g/dL    Total Bilirubin 0 50 0 20 - 1 00 mg/dL    eGFR 59 ml/min/1 73sq m   Manual Differential (Non Wam)   Result Value Ref Range    Segmented % 78 (H) 43 - 75 %    Bands % 0 0 - 8 %    Lymphocytes % 15 14 - 44 %    Monocytes % 6 4 - 12 %    Eosinophils % 1 0 - 6 %    Basophils % 0 0 - 1 %    Neutrophils Absolute 8 27 (H) 1 81 - 6 82 Thousand/uL    Lymphocytes Absolute 1 59 0 60 - 4 47 Thousand/uL Monocytes Absolute 0 64 0 00 - 1 22 Thousand/uL    Eosinophils Absolute 0 11 0 00 - 0 61 Thousand/uL    Basophils Absolute 0 00 0 00 - 0 10 Thousand/uL    Total Counted 100     Ovalocytes Present     Platelet Estimate Adequate Adequate     Labs, Imaging, & Other studies:   All pertinent labs and imaging studies were personally reviewed    Lab Results   Component Value Date     04/27/2018    K 3 7 04/27/2018     (H) 04/27/2018    CO2 25 04/27/2018    ANIONGAP 9 04/27/2018    BUN 10 04/27/2018    CREATININE 1 10 04/27/2018    GLUCOSE 104 04/27/2018    GLUF 97 09/29/2017    CALCIUM 8 8 04/27/2018    AST 39 04/27/2018    ALT 51 04/27/2018    ALKPHOS 47 04/27/2018    PROT 6 3 (L) 04/27/2018    BILITOT 0 50 04/27/2018    EGFR 59 04/27/2018     Lab Results   Component Value Date    WBC 10 60 (H) 04/27/2018    HGB 12 5 04/27/2018    HCT 39 7 04/27/2018    MCV 87 04/27/2018     04/27/2018   No results found for: SEDRATE    Reviewed and discussed with patient  Assessment and plan: Follow-up visit for  1  Malignant neoplasm of upper-outer quadrant of left female breast (174 4) (C50 412)   2  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)   3  Liver metastases (197 7) (C78 7)   4  Bone metastases (198 5) (C79 51)      Last week patient came back from [facility]  and she is recovering from traveler's diarrhea  Also her  ear drum was popping in the plane and she still has some hearing problem  If he did not get better she will see an ENT doctor  Patient is being treated for stage IV breast cancer and she is on Perjeta, Herceptin and tamoxifen   Also Xgeva with calcium and vitamin-D for 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  she has been tolerating treatments without much problem  [place] is being stopped because she had it for more than 2 years  Left breast cancer was diagnosed in January 2016  Grade was 3   She had locally advanced left breast cancer with metastases to liver and bones  She was started on a combination of Taxotere and Herceptin and Perjeta  She had very good response  After 6 cycles of systemic therapy Taxotere was discontinued and tamoxifen was added  Because her response was so good she had lumpectomy of left breast in September 2016 followed by radiation therapy and also she received radiation to her left hip for palliation  At the time of lumpectomy there was minimal residual disease, 1 4 cm  Lymph nodes were not sampled  She had radiation to left hip lesion    She has chemotherapy-induced amenorrhea  She gets hot flashes  Occasionally leg cramps at night  Negative MRI scan of the brain for metastatic disease  Normal ejection fraction  she gets cardiac evaluation every 3 months  Overall she is doing reasonably well  For leg cramps at night and she is taking one baby aspirin daily with food in the evening and also one magnesium tablet daily  She has Port-A-Cath  Physical examination and test results as recorded and discussed  She continues to respond to systemic therapy for stage IV breast cancer  Stopping Xgeva  Condition and plan discussed  Questions answered  She will come back in 3 months with blood tests and echocardiogram   1  Malignant neoplasm of upper-outer quadrant of left breast in female, estrogen receptor positive (HCC)      - Cancer antigen 27 29; Future  - CBC and differential; Future  - Comprehensive metabolic panel; Future    2  Bone metastasis ([address] )        3  Liver metastases ([address] )        4  Cardiomyopathy secondary to drug ([address] )      - Echo limited with contrast if indicated; Future          Patient voiced understanding and agreement in the discussion  Counseling / Coordination of Care   Greater than 50% of total time was spent with the patient and / or family counseling and / or coordination of care

## 2018-05-02 NOTE — PROGRESS NOTES
Surgical Oncology Follow Up       [address] [facility] SURGICAL ONCOLOGY [address]  [facility] [address]    Alejandrina Barba  1965  [id number]  [facility] SURGICAL ONCOLOGY [place]  [address] [place] [address]    Chief Complaint   Patient presents with    Breast Cancer     Pt here for follow up       Assessment/Plan   Diagnoses and all orders for this visit:    Liver metastases ([facility] )    Bone metastasis ([facility] )    Malignant neoplasm of upper-inner quadrant of left breast in female, estrogen receptor positive ([facility] )    Port-a-cath in place    Long term use of drug    Use of tamoxifen (Nolvadex)        Advance Care Planning/Advance Directives:  Did not discuss  with the patient  Oncology History:       Malignant neoplasm of upper-outer quadrant of left female breast [facility])     Initial Diagnosis     Malignant neoplasm of upper-outer quadrant of left female breast ([facility] )       1/27/2016 Surgery     Port placement         9/16/2016 Surgery     Left breast partial mastectomy         11/7/2016 - 12/23/2016 Radiation     WBRT          Radiation     Left hip RT          Chemotherapy     taxotere, herceptin, perjeta, xgeva  Dr James Getting     Tamoxifen   Dr Clifton Klein            History of Present Illness: breast cancer follow up  -Interval History:none    Review of Systems:  Review of Systems   Constitutional: Negative  Negative for appetite change and fever  HENT: Positive for ear pain (after recent flight)  Eyes: Negative  Respiratory: Negative for shortness of breath  Cardiovascular: Negative  Gastrointestinal: Positive for diarrhea (resolving-from traveling)  Endocrine: Negative  Genitourinary: Negative  Musculoskeletal: Negative  Negative for arthralgias and myalgias  Skin: Negative  Allergic/Immunologic: Negative  Neurological: Negative  Hematological: Negative    Negative for adenopathy  Does not bruise/bleed easily  Psychiatric/Behavioral: Negative  Patient Active Problem List   Diagnosis    Malignant neoplasm of upper-outer quadrant of left female breast ([facility] )    Liver metastases ([facility] )    Bone metastasis ([facility] )    Port-a-cath in place    Long term use of drug    Use of tamoxifen (Nolvadex)     Past Medical History:   Diagnosis Date    Hx antineoplastic chemotherapy     Hx of radiation therapy     breast and left  hip    Liver metastases ([facility] )     Long term use of drug     Port-a-cath in place     right chest    Rash, drug     Use of tamoxifen (Nolvadex)      Past Surgical History:   Procedure Laterality Date    BREAST BIOPSY Left 12/30/2015    BREAST LUMPECTOMY Left 09/16/2016    BREAST SURGERY Left     biopsy    CHOLECYSTECTOMY      CHOLECYSTECTOMY      PORTACATH PLACEMENT Right     SIMPLE MASTECTOMY Left 9/16/2016    Procedure: BREAST PARTIAL MASTECTOMY ;  Surgeon: Carlee Manjarrez MD;  Location: [facility] OR;  Service:      Family History   Problem Relation Age of Onset    No Known Problems Mother     No Known Problems Father      Social History     Social History    Marital status: /Civil Union     Spouse name: N/A    Number of children: N/A    Years of education: N/A     Occupational History    Not on file  Social History Main Topics    Smoking status: Former Smoker    Smokeless tobacco: Never Used    Alcohol use No    Drug use: No    Sexual activity: Not on file     Other Topics Concern    Not on file     Social History Narrative    No narrative on file       Current Outpatient Prescriptions:     Calcium Carbonate-Vit D-Min (CALCIUM 1200 PO), Take 1 tablet by mouth daily  , Disp: , Rfl:     Magnesium 250 MG TABS, Take 1 tablet by mouth daily, Disp: , Rfl:     multivitamin (THERAGRAN) TABS, Take 1 tablet by mouth daily  , Disp: , Rfl:     pertuzumab (PERJETA) 420 mg/14 mL SOLN, Infuse into a venous catheter every 21 days   At infusion  center, Disp: , Rfl:     tamoxifen (NOLVADEX) 20 mg tablet, Take 20 mg by mouth daily  , Disp: , Rfl:     Trastuzumab (HERCEPTIN IV), Infuse into a venous catheter every 21 days  At infusion center, Disp: , Rfl:     VITAMIN D, CHOLECALCIFEROL, PO, Take by mouth daily  , Disp: , Rfl:   No Known Allergies    The following portions of the patient's history were reviewed and updated as appropriate: allergies, current medications, past family history, past medical history, past social history, past surgical history and problem list         Vitals:    05/02/18 1014   BP: 164/88   Pulse: 89   Resp: 17   Temp: 98 4 °F (36 9 °C)       Physical Exam   Constitutional: She is oriented to person, place, and time  She appears well-developed and well-nourished  HENT:   Head: Normocephalic and atraumatic  Cardiovascular: Normal heart sounds  Pulmonary/Chest: Breath sounds normal  Right breast exhibits no inverted nipple, no mass, no nipple discharge, no skin change and no tenderness  Left breast exhibits skin change (lumpectomy scar with radiation changes)  Left breast exhibits no inverted nipple, no mass, no nipple discharge and no tenderness  Abdominal: Soft  Lymphadenopathy:        Right axillary: No pectoral and no lateral adenopathy present  Left axillary: No pectoral and no lateral adenopathy present  Right: No supraclavicular adenopathy present  Left: No supraclavicular adenopathy present  Neurological: She is alert and oriented to person, place, and time  Psychiatric: She has a normal mood and affect  Discussion/Summary:  82-year-old female with stage IV breast carcinoma  She continues on Herceptin and Perjeta as well as tamoxifen  The [place] has been discontinued  There are no concerns on her examination today other then post lumpectomy and radiation changes in the left breast   She has not had recent imaging performed    She has not had any dedicated breast imaging for over a year and a half  Given the stability of her disease, I again discussed doing a mammogram   She would like to hold off on this  She seeing Dr Gary Mendoza again in three months  I will plan to see her again in six months or sooner should the need arise

## 2018-05-02 NOTE — PROGRESS NOTES
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)   3  Liver metastases (197 7) (C78 7)   4  Bone metastases (198 5) (C79 51)      Last week patient came back from [facility]  and she is recovering from traveler's diarrhea  Also her  ear drum was popping in the plane and she still has some hearing problem  If he did not get better she will see an ENT doctor  Patient is being treated for stage IV breast cancer and she is on Perjeta, Herceptin and tamoxifen   Also Xgeva with calcium and vitamin-D for 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  she has been tolerating treatments without much problem  Christina Jones is being stopped because she had it for more than 2 years  Left breast cancer was diagnosed in January 2016  Grade was 3  She had locally advanced left breast cancer with metastases to liver and bones  She was started on a combination of Taxotere and Herceptin and Perjeta  She had very good response  After 6 cycles of systemic therapy Taxotere was discontinued and tamoxifen was added  Because her response was so good she had lumpectomy of left breast in September 2016 followed by radiation therapy and also she received radiation to her left hip for palliation  At the time of lumpectomy there was minimal residual disease, 1 4 cm  Lymph nodes were not sampled  She had radiation to left hip lesion    She has chemotherapy-induced amenorrhea  She gets hot flashes  Occasionally leg cramps at night  Negative MRI scan of the brain for metastatic disease  Normal ejection fraction  she gets cardiac evaluation every 3 months  Overall she is doing reasonably well  For leg cramps at night and she is taking one baby aspirin daily with food in the evening and also one magnesium tablet daily      She has Port-A-Cath               Current Outpatient Prescriptions:     Calcium Carbonate-Vit D-Min (CALCIUM 1200 PO), Take 1 tablet by mouth daily  , Disp: , Rfl:     Magnesium 250 MG TABS, Take 1 tablet by mouth daily, Disp: , Rfl:     multivitamin (THERAGRAN) TABS, Take 1 tablet by mouth daily  , Disp: , Rfl:     pertuzumab (PERJETA) 420 mg/14 mL SOLN, Infuse into a venous catheter every 21 days  At infusion  center, Disp: , Rfl:     tamoxifen (NOLVADEX) 20 mg tablet, Take 20 mg by mouth daily  , Disp: , Rfl:     Trastuzumab (HERCEPTIN IV), Infuse into a venous catheter every 21 days  At infusion center, Disp: , Rfl:     VITAMIN D, CHOLECALCIFEROL, PO, Take by mouth daily  , Disp: , Rfl:     No Known Allergies    ROS:  05/02/18 Reviewed 13 systems:  Presently no headaches, seizures, dizziness, diplopia, dysphagia, hoarseness, chest pain, palpitations, shortness of breath, cough, hemoptysis, abdominal pain, nausea, vomiting,  melena, hematuria, fever, chills, bleeding, bone pains, skin rash, weight loss, arthritic symptoms, tiredness ,  weakness, numbness,  claudication and gait problem  No frequent infections  No swelling of the ankles  No swollen glands  Patient is anxious  No GYN symptoms Other symptoms are in HPI        /88 (BP Location: Right arm, Cuff Size: Adult)   Pulse 89   Temp 98 4 °F (36 9 °C) (Tympanic)   Resp 17   Ht 5' 5" (1 651 m)   Wt 93 4 kg (206 lb)   SpO2 98%   BMI 34 28 kg/m²     Physical Exam:  Alert, oriented, not in distress, no icterus, no oral thrush, no palpable neck mass, clear lung fields, regular heart rate, abdomen  soft and non tender, no palpable abdominal mass, no ascites, no edema of ankles, no calf tenderness, no focal neurological deficit, no skin rash, no palpable lymphadenopathy, good arterial pulses, no clubbing  Patient is anxious  No lymphedema Performance status 1      IMAGING:  No orders to display       LABS:  Results for orders placed or performed during the hospital encounter of 04/27/18   Cancer antigen 27 29   Result Value Ref Range    CA 27 29 16 2 0 0 - 42 3 U/mL   CBC and differential   Result Value Ref Range    WBC 10 60 (H) 4 31 - 10 16 Thousand/uL    Adjusted WBC 10 60 (H) 4 31 - 10 16 Thousand/uL    RBC 4 56 3 81 - 5 12 Million/uL    Hemoglobin 12 5 11 5 - 15 4 g/dL    Hematocrit 39 7 34 8 - 46 1 %    MCV 87 82 - 98 fL    MCH 27 4 26 8 - 34 3 pg    MCHC 31 4 31 4 - 37 4 g/dL    RDW 15 0 11 6 - 15 1 %    MPV 8 6 (L) 8 9 - 12 7 fL    Platelets 849 950 - 210 Thousands/uL   Comprehensive metabolic panel   Result Value Ref Range    Sodium 143 136 - 145 mmol/L    Potassium 3 7 3 5 - 5 3 mmol/L    Chloride 109 (H) 98 - 108 mmol/L    CO2 25 21 - 32 mmol/L    Anion Gap 9 4 - 13 mmol/L    BUN 10 5 - 25 mg/dL    Creatinine 1 10 0 60 - 1 30 mg/dL    Glucose 104 65 - 140 mg/dL    Calcium 8 8 8 3 - 10 1 mg/dL    AST 39 5 - 45 U/L    ALT 51 12 - 78 U/L    Alkaline Phosphatase 47 46 - 116 U/L    Total Protein 6 3 (L) 6 4 - 8 2 g/dL    Albumin 2 9 (L) 3 5 - 5 7 g/dL    Total Bilirubin 0 50 0 20 - 1 00 mg/dL    eGFR 59 ml/min/1 73sq m   Manual Differential (Non Wam)   Result Value Ref Range    Segmented % 78 (H) 43 - 75 %    Bands % 0 0 - 8 %    Lymphocytes % 15 14 - 44 %    Monocytes % 6 4 - 12 %    Eosinophils % 1 0 - 6 %    Basophils % 0 0 - 1 %    Neutrophils Absolute 8 27 (H) 1 81 - 6 82 Thousand/uL    Lymphocytes Absolute 1 59 0 60 - 4 47 Thousand/uL    Monocytes Absolute 0 64 0 00 - 1 22 Thousand/uL    Eosinophils Absolute 0 11 0 00 - 0 61 Thousand/uL    Basophils Absolute 0 00 0 00 - 0 10 Thousand/uL    Total Counted 100     Ovalocytes Present     Platelet Estimate Adequate Adequate     Labs, Imaging, & Other studies:   All pertinent labs and imaging studies were personally reviewed    Lab Results   Component Value Date     04/27/2018    K 3 7 04/27/2018     (H) 04/27/2018    CO2 25 04/27/2018    ANIONGAP 9 04/27/2018    BUN 10 04/27/2018    CREATININE 1 10 04/27/2018    GLUCOSE 104 04/27/2018    GLUF 97 09/29/2017    CALCIUM 8 8 04/27/2018    AST 39 04/27/2018    ALT 51 04/27/2018    ALKPHOS 47 04/27/2018    PROT 6 3 (L) 04/27/2018    BILITOT 0 50 04/27/2018    EGFR 59 04/27/2018     Lab Results   Component Value Date    WBC 10 60 (H) 04/27/2018    HGB 12 5 04/27/2018    HCT 39 7 04/27/2018    MCV 87 04/27/2018     04/27/2018   No results found for: [address] [place] [address] and discussed with patient  Assessment and plan: Follow-up visit for  1  Malignant neoplasm of upper-outer quadrant of left female breast (174 4) (C50 412)   2  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)   3  Liver metastases (197 7) (C78 7)   4  Bone metastases (198 5) (C79 51)      Last week patient came back from [facility]  and she is recovering from traveler's diarrhea  Also her  ear drum was popping in the plane and she still has some hearing problem  If he did not get better she will see an ENT doctor  Patient is being treated for stage IV breast cancer and she is on Perjeta, Herceptin and tamoxifen   Also Xgeva with calcium and vitamin-D for 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  she has been tolerating treatments without much problem  Franklin Pronto is being stopped because she had it for more than 2 years  Left breast cancer was diagnosed in January 2016  Grade was 3  She had locally advanced left breast cancer with metastases to liver and bones  She was started on a combination of Taxotere and Herceptin and Perjeta  She had very good response  After 6 cycles of systemic therapy Taxotere was discontinued and tamoxifen was added  Because her response was so good she had lumpectomy of left breast in September 2016 followed by radiation therapy and also she received radiation to her left hip for palliation  At the time of lumpectomy there was minimal residual disease, 1 4 cm  Lymph nodes were not sampled  She had radiation to left hip lesion  Terra Michael She has chemotherapy-induced amenorrhea  She gets hot flashes  Occasionally leg cramps at night  Negative MRI scan of the brain for metastatic disease  Normal ejection fraction  she gets cardiac evaluation every 3 months  Overall she is doing reasonably well  For leg cramps at night and she is taking one baby aspirin daily with food in the evening and also one magnesium tablet daily  She has Port-A-Cath  Physical examination and test results as recorded and discussed  She continues to respond to systemic therapy for stage IV breast cancer  Stopping Xgeva  Condition and plan discussed  Questions answered  She will come back in 3 months with blood tests and echocardiogram   1  Malignant neoplasm of upper-outer quadrant of left breast in female, estrogen receptor positive (HCC)      - Cancer antigen 27 29; Future  - CBC and differential; Future  - Comprehensive metabolic panel; Future    2  Bone metastasis ([address] )        3  Liver metastases ([address] )        4  Cardiomyopathy secondary to drug ([address] )      - Echo limited with contrast if indicated; Future          Patient voiced understanding and agreement in the discussion  Counseling / Coordination of Care   Greater than 50% of total time was spent with the patient and / or family counseling and / or coordination of care

## 2018-05-02 NOTE — LETTER
May 2, 2018     Constantino Vega MD  [address] [facility] [address]    Patient: Kaitlin Li   YOB: 1965   Date of Visit: 5/2/2018       Dear Dr Anoop Ayon: Thank you for referring Kaitlin Li to me for evaluation  Below are my notes for this consultation  If you have questions, please do not hesitate to call me  I look forward to following your patient along with you  Sincerely,        Shawn Lam MD        CC: No Recipients  Shawn Lam MD  5/2/2018 10:35 AM  Sign at close encounter     Surgical Oncology Follow Up       [facility]  [address] [place] [address]  1965  [id number]  [address] [facility] [address]    Chief Complaint   Patient presents with    Breast Cancer     Pt here for follow up       Assessment/Plan   Diagnoses and all orders for this visit:    Liver metastases ([facility] )    Bone metastasis ([facility] )    Malignant neoplasm of upper-inner quadrant of left breast in female, estrogen receptor positive ([facility] )    Port-a-cath in place    Long term use of drug    Use of tamoxifen (Nolvadex)        Advance Care Planning/Advance Directives:  Did not discuss  with the patient  Oncology History:       Malignant neoplasm of upper-outer quadrant of left female breast [facility])     Initial Diagnosis     Malignant neoplasm of upper-outer quadrant of left female breast ([facility] )       1/27/2016 Surgery     Port placement         9/16/2016 Surgery     Left breast partial mastectomy         11/7/2016 - 12/23/2016 Radiation     WBRT          Radiation     Left hip RT          Chemotherapy     taxotere, herceptin, perjeta, xgeva     Dr Giordano Pheasant     Tamoxifen   Dr Lisandra Esquivel            History of Present Illness: breast cancer follow up  -Interval History:none    Review of Systems:  Review of Systems Constitutional: Negative  Negative for appetite change and fever  HENT: Positive for ear pain (after recent flight)  Eyes: Negative  Respiratory: Negative for shortness of breath  Cardiovascular: Negative  Gastrointestinal: Positive for diarrhea (resolving-from traveling)  Endocrine: Negative  Genitourinary: Negative  Musculoskeletal: Negative  Negative for arthralgias and myalgias  Skin: Negative  Allergic/Immunologic: Negative  Neurological: Negative  Hematological: Negative  Negative for adenopathy  Does not bruise/bleed easily  Psychiatric/Behavioral: Negative  Patient Active Problem List   Diagnosis    Malignant neoplasm of upper-outer quadrant of left female breast ([facility] )    Liver metastases ([facility] )    Bone metastasis ([facility] )    Port-a-cath in place    Long term use of drug    Use of tamoxifen (Nolvadex)     Past Medical History:   Diagnosis Date    Hx antineoplastic chemotherapy     Hx of radiation therapy     breast and left  hip    Liver metastases ([facility] )     Long term use of drug     Port-a-cath in place     right chest    Rash, drug     Use of tamoxifen (Nolvadex)      Past Surgical History:   Procedure Laterality Date    BREAST BIOPSY Left 12/30/2015    BREAST LUMPECTOMY Left 09/16/2016    BREAST SURGERY Left     biopsy    CHOLECYSTECTOMY      CHOLECYSTECTOMY      PORTACATH PLACEMENT Right     SIMPLE MASTECTOMY Left 9/16/2016    Procedure: BREAST PARTIAL MASTECTOMY ;  Surgeon: Justine Lucio MD;  Location: [facility];  Service:      Family History   Problem Relation Age of Onset    No Known Problems Mother     No Known Problems Father      Social History     Social History    Marital status: /Civil Union     Spouse name: N/A    Number of children: N/A    Years of education: N/A     Occupational History    Not on file       Social History Main Topics    Smoking status: Former Smoker    Smokeless tobacco: Never Used    Alcohol use No    Drug use: No    Sexual activity: Not on file     Other Topics Concern    Not on file     Social History Narrative    No narrative on file       Current Outpatient Prescriptions:     Calcium Carbonate-Vit D-Min (CALCIUM 1200 PO), Take 1 tablet by mouth daily  , Disp: , Rfl:     Magnesium 250 MG TABS, Take 1 tablet by mouth daily, Disp: , Rfl:     multivitamin (THERAGRAN) TABS, Take 1 tablet by mouth daily  , Disp: , Rfl:     pertuzumab (PERJETA) 420 mg/14 mL SOLN, Infuse into a venous catheter every 21 days  At infusion  center, Disp: , Rfl:     tamoxifen (NOLVADEX) 20 mg tablet, Take 20 mg by mouth daily  , Disp: , Rfl:     Trastuzumab (HERCEPTIN IV), Infuse into a venous catheter every 21 days  At infusion center, Disp: , Rfl:     VITAMIN D, CHOLECALCIFEROL, PO, Take by mouth daily  , Disp: , Rfl:   No Known Allergies    The following portions of the patient's history were reviewed and updated as appropriate: allergies, current medications, past family history, past medical history, past social history, past surgical history and problem list         Vitals:    05/02/18 1014   BP: 164/88   Pulse: 89   Resp: 17   Temp: 98 4 °F (36 9 °C)       Physical Exam   Constitutional: She is oriented to person, place, and time  She appears well-developed and well-nourished  HENT:   Head: Normocephalic and atraumatic  Cardiovascular: Normal heart sounds  Pulmonary/Chest: Breath sounds normal  Right breast exhibits no inverted nipple, no mass, no nipple discharge, no skin change and no tenderness  Left breast exhibits skin change (lumpectomy scar with radiation changes)  Left breast exhibits no inverted nipple, no mass, no nipple discharge and no tenderness  Abdominal: Soft  Lymphadenopathy:        Right axillary: No pectoral and no lateral adenopathy present  Left axillary: No pectoral and no lateral adenopathy present  Right: No supraclavicular adenopathy present          Left: No supraclavicular adenopathy present  Neurological: She is alert and oriented to person, place, and time  Psychiatric: She has a normal mood and affect  Discussion/Summary:  46-year-old female with stage IV breast carcinoma  She continues on Herceptin and Perjeta as well as tamoxifen  The Wava Lowing has been discontinued  There are no concerns on her examination today other then post lumpectomy and radiation changes in the left breast   She has not had recent imaging performed  She has not had any dedicated breast imaging for over a year and a half  Given the stability of her disease, I again discussed doing a mammogram   She would like to hold off on this  She seeing Dr Shields Route again in three months  I will plan to see her again in six months or sooner should the need arise

## 2018-05-09 DIAGNOSIS — C50.412 MALIGNANT NEOPLASM OF UPPER-OUTER QUADRANT OF LEFT FEMALE BREAST (HCC): ICD-10-CM

## 2018-05-17 RX ORDER — SODIUM CHLORIDE 9 MG/ML
20 INJECTION, SOLUTION INTRAVENOUS CONTINUOUS
Status: DISCONTINUED | OUTPATIENT
Start: 2018-05-18 | End: 2018-05-21 | Stop reason: HOSPADM

## 2018-05-18 ENCOUNTER — HOSPITAL ENCOUNTER (OUTPATIENT)
Dept: INFUSION CENTER | Facility: CLINIC | Age: 53
Discharge: HOME | End: 2018-05-18
Payer: COMMERCIAL

## 2018-05-18 VITALS
WEIGHT: 206.13 LBS | DIASTOLIC BLOOD PRESSURE: 82 MMHG | SYSTOLIC BLOOD PRESSURE: 117 MMHG | BODY MASS INDEX: 34.3 KG/M2 | HEART RATE: 71 BPM | RESPIRATION RATE: 16 BRPM | TEMPERATURE: 97.7 F

## 2018-05-18 PROCEDURE — 96417 CHEMO IV INFUS EACH ADDL SEQ: CPT

## 2018-05-18 PROCEDURE — 36593 DECLOT VASCULAR DEVICE: CPT

## 2018-05-18 PROCEDURE — 96413 CHEMO IV INFUSION 1 HR: CPT

## 2018-05-18 RX ADMIN — PERTUZUMAB 420 MG: 30 INJECTION, SOLUTION, CONCENTRATE INTRAVENOUS at 14:50

## 2018-05-18 RX ADMIN — Medication 300 UNITS: at 15:25

## 2018-05-18 RX ADMIN — TRASTUZUMAB 560 MG: 150 INJECTION, POWDER, LYOPHILIZED, FOR SOLUTION INTRAVENOUS at 14:16

## 2018-05-18 RX ADMIN — SODIUM CHLORIDE 20 ML/HR: 0.9 INJECTION, SOLUTION INTRAVENOUS at 14:10

## 2018-05-18 RX ADMIN — ALTEPLASE 2 MG: 2.2 INJECTION, POWDER, LYOPHILIZED, FOR SOLUTION INTRAVENOUS at 14:00

## 2018-05-18 NOTE — PROGRESS NOTES
Patient arrived to the infusion center for Perjeta/Herceptin  Pt offers no complaints  Port accessed and no blood return noted  Cath fausto instilled and peripheral IV started

## 2018-05-18 NOTE — PLAN OF CARE
Problem: Potential for Falls  Goal: Patient will remain free of falls  INTERVENTIONS:  - Assess patient frequently for physical needs  -  Identify cognitive and physical deficits and behaviors that affect risk of falls    -  [place] fall precautions as indicated by assessment   - Educate patient/family on patient safety including physical limitations  - Instruct patient to call for assistance with activity based on assessment  - Modify environment to reduce risk of injury  - Consider OT/PT consult to assist with strengthening/mobility   Outcome: Progressing

## 2018-06-07 RX ORDER — SODIUM CHLORIDE 9 MG/ML
20 INJECTION, SOLUTION INTRAVENOUS CONTINUOUS
Status: DISCONTINUED | OUTPATIENT
Start: 2018-06-08 | End: 2018-06-11 | Stop reason: HOSPADM

## 2018-06-08 ENCOUNTER — HOSPITAL ENCOUNTER (OUTPATIENT)
Dept: INFUSION CENTER | Facility: CLINIC | Age: 53
Discharge: HOME | End: 2018-06-08
Payer: COMMERCIAL

## 2018-06-08 VITALS
RESPIRATION RATE: 18 BRPM | BODY MASS INDEX: 33.75 KG/M2 | SYSTOLIC BLOOD PRESSURE: 138 MMHG | DIASTOLIC BLOOD PRESSURE: 80 MMHG | WEIGHT: 202.82 LBS | TEMPERATURE: 98.9 F | HEART RATE: 68 BPM

## 2018-06-08 PROCEDURE — 96417 CHEMO IV INFUS EACH ADDL SEQ: CPT

## 2018-06-08 PROCEDURE — 96413 CHEMO IV INFUSION 1 HR: CPT

## 2018-06-08 RX ADMIN — Medication 300 UNITS: at 16:10

## 2018-06-08 RX ADMIN — TRASTUZUMAB 561 MG: 150 INJECTION, POWDER, LYOPHILIZED, FOR SOLUTION INTRAVENOUS at 15:32

## 2018-06-08 RX ADMIN — PERTUZUMAB 420 MG: 30 INJECTION, SOLUTION, CONCENTRATE INTRAVENOUS at 15:01

## 2018-06-08 RX ADMIN — SODIUM CHLORIDE 20 ML/HR: 0.9 INJECTION, SOLUTION INTRAVENOUS at 14:46

## 2018-06-08 RX ADMIN — SODIUM CHLORIDE 20 ML/HR: 0.9 INJECTION, SOLUTION INTRAVENOUS at 14:41

## 2018-06-08 NOTE — PLAN OF CARE
Problem: Potential for Falls  Goal: Patient will remain free of falls  INTERVENTIONS:  - Assess patient frequently for physical needs  -  Identify cognitive and physical deficits and behaviors that affect risk of falls    -  [place] fall precautions as indicated by assessment   - Educate patient/family on patient safety including physical limitations  - Instruct patient to call for assistance with activity based on assessment  - Modify environment to reduce risk of injury  - Consider OT/PT consult to assist with strengthening/mobility   Outcome: Progressing

## 2018-06-08 NOTE — PROGRESS NOTES
Pt wishes to move her June 29 appt to July 3rd  Spoke with Ezekiel Salmon RN who approved her request   Schedule change was made in the computer    Pt provided with GUSTAVOS

## 2018-07-02 RX ORDER — SODIUM CHLORIDE 9 MG/ML
20 INJECTION, SOLUTION INTRAVENOUS CONTINUOUS
Status: DISCONTINUED | OUTPATIENT
Start: 2018-07-03 | End: 2018-07-06 | Stop reason: HOSPADM

## 2018-07-03 ENCOUNTER — HOSPITAL ENCOUNTER (OUTPATIENT)
Dept: INFUSION CENTER | Facility: CLINIC | Age: 53
Discharge: HOME | End: 2018-07-03
Payer: COMMERCIAL

## 2018-07-03 VITALS
DIASTOLIC BLOOD PRESSURE: 87 MMHG | TEMPERATURE: 98.8 F | OXYGEN SATURATION: 95 % | BODY MASS INDEX: 34.78 KG/M2 | SYSTOLIC BLOOD PRESSURE: 149 MMHG | HEART RATE: 73 BPM | RESPIRATION RATE: 16 BRPM | WEIGHT: 209 LBS

## 2018-07-03 PROCEDURE — 96413 CHEMO IV INFUSION 1 HR: CPT

## 2018-07-03 PROCEDURE — 96417 CHEMO IV INFUS EACH ADDL SEQ: CPT

## 2018-07-03 RX ADMIN — PERTUZUMAB 420 MG: 30 INJECTION, SOLUTION, CONCENTRATE INTRAVENOUS at 14:49

## 2018-07-03 RX ADMIN — TRASTUZUMAB 552 MG: 150 INJECTION, POWDER, LYOPHILIZED, FOR SOLUTION INTRAVENOUS at 15:26

## 2018-07-03 RX ADMIN — Medication 300 UNITS: at 16:04

## 2018-07-03 RX ADMIN — SODIUM CHLORIDE 20 ML/HR: 0.9 INJECTION, SOLUTION INTRAVENOUS at 14:46

## 2018-07-03 NOTE — PLAN OF CARE
Problem: Potential for Falls  Goal: Patient will remain free of falls  INTERVENTIONS:  - Assess patient frequently for physical needs  -  Identify cognitive and physical deficits and behaviors that affect risk of falls    -  [place] fall precautions as indicated by assessment   - Educate patient/family on patient safety including physical limitations  - Instruct patient to call for assistance with activity based on assessment  - Modify environment to reduce risk of injury  - Consider OT/PT consult to assist with strengthening/mobility   Outcome: Progressing

## 2018-07-04 DIAGNOSIS — C50.412 MALIGNANT NEOPLASM OF UPPER-OUTER QUADRANT OF LEFT BREAST IN FEMALE, ESTROGEN RECEPTOR POSITIVE (HCC): Primary | ICD-10-CM

## 2018-07-04 DIAGNOSIS — Z17.0 MALIGNANT NEOPLASM OF UPPER-OUTER QUADRANT OF LEFT BREAST IN FEMALE, ESTROGEN RECEPTOR POSITIVE (HCC): Primary | ICD-10-CM

## 2018-07-04 RX ORDER — TAMOXIFEN CITRATE 20 MG/1
TABLET ORAL
Qty: 90 TABLET | Refills: 1 | Status: SHIPPED | OUTPATIENT
Start: 2018-07-04 | End: 2018-11-20 | Stop reason: SDUPTHER

## 2018-07-26 RX ORDER — SODIUM CHLORIDE 9 MG/ML
20 INJECTION, SOLUTION INTRAVENOUS CONTINUOUS
Status: DISCONTINUED | OUTPATIENT
Start: 2018-07-27 | End: 2018-07-30 | Stop reason: HOSPADM

## 2018-07-27 ENCOUNTER — HOSPITAL ENCOUNTER (OUTPATIENT)
Dept: INFUSION CENTER | Facility: CLINIC | Age: 53
Discharge: HOME | End: 2018-07-27
Payer: COMMERCIAL

## 2018-07-27 VITALS
BODY MASS INDEX: 34.96 KG/M2 | HEART RATE: 72 BPM | TEMPERATURE: 97.8 F | WEIGHT: 210.1 LBS | DIASTOLIC BLOOD PRESSURE: 94 MMHG | SYSTOLIC BLOOD PRESSURE: 166 MMHG

## 2018-07-27 PROCEDURE — 96417 CHEMO IV INFUS EACH ADDL SEQ: CPT

## 2018-07-27 PROCEDURE — 96413 CHEMO IV INFUSION 1 HR: CPT

## 2018-07-27 RX ADMIN — SODIUM CHLORIDE 20 ML/HR: 0.9 INJECTION, SOLUTION INTRAVENOUS at 14:41

## 2018-07-27 RX ADMIN — Medication 300 UNITS: at 16:02

## 2018-07-27 RX ADMIN — TRASTUZUMAB 569 MG: 150 INJECTION, POWDER, LYOPHILIZED, FOR SOLUTION INTRAVENOUS at 15:24

## 2018-07-27 RX ADMIN — PERTUZUMAB 420 MG: 30 INJECTION, SOLUTION, CONCENTRATE INTRAVENOUS at 14:47

## 2018-07-27 NOTE — PLAN OF CARE
Problem: Potential for Falls  Goal: Patient will remain free of falls  INTERVENTIONS:  - Assess patient frequently for physical needs  -  Identify cognitive and physical deficits and behaviors that affect risk of falls    -  [place] fall precautions as indicated by assessment   - Educate patient/family on patient safety including physical limitations  - Instruct patient to call for assistance with activity based on assessment  - Modify environment to reduce risk of injury  - Consider OT/PT consult to assist with strengthening/mobility   Outcome: Progressing

## 2018-08-02 ENCOUNTER — HOSPITAL ENCOUNTER (OUTPATIENT)
Dept: NON INVASIVE DIAGNOSTICS | Facility: CLINIC | Age: 53
Discharge: HOME | End: 2018-08-02
Payer: COMMERCIAL

## 2018-08-02 DIAGNOSIS — I42.7 CARDIOMYOPATHY SECONDARY TO DRUG (HCC): ICD-10-CM

## 2018-08-02 PROCEDURE — 93308 TTE F-UP OR LMTD: CPT | Performed by: INTERNAL MEDICINE

## 2018-08-02 PROCEDURE — 93308 TTE F-UP OR LMTD: CPT

## 2018-08-02 PROCEDURE — 93321 DOPPLER ECHO F-UP/LMTD STD: CPT | Performed by: INTERNAL MEDICINE

## 2018-08-02 PROCEDURE — 93325 DOPPLER ECHO COLOR FLOW MAPG: CPT | Performed by: INTERNAL MEDICINE

## 2018-08-08 ENCOUNTER — OFFICE VISIT (OUTPATIENT)
Dept: HEMATOLOGY ONCOLOGY | Facility: CLINIC | Age: 53
End: 2018-08-08
Payer: COMMERCIAL

## 2018-08-08 VITALS
BODY MASS INDEX: 34.82 KG/M2 | SYSTOLIC BLOOD PRESSURE: 152 MMHG | RESPIRATION RATE: 17 BRPM | OXYGEN SATURATION: 97 % | WEIGHT: 209 LBS | DIASTOLIC BLOOD PRESSURE: 96 MMHG | HEART RATE: 90 BPM | TEMPERATURE: 98.8 F | HEIGHT: 65 IN

## 2018-08-08 DIAGNOSIS — C50.212 MALIGNANT NEOPLASM OF UPPER-INNER QUADRANT OF LEFT BREAST IN FEMALE, ESTROGEN RECEPTOR POSITIVE (HCC): Primary | ICD-10-CM

## 2018-08-08 DIAGNOSIS — Z17.0 MALIGNANT NEOPLASM OF UPPER-INNER QUADRANT OF LEFT BREAST IN FEMALE, ESTROGEN RECEPTOR POSITIVE (HCC): Primary | ICD-10-CM

## 2018-08-08 DIAGNOSIS — C78.7 LIVER METASTASES (HCC): ICD-10-CM

## 2018-08-08 DIAGNOSIS — C79.51 BONE METASTASIS (HCC): ICD-10-CM

## 2018-08-08 PROCEDURE — 99214 OFFICE O/P EST MOD 30 MIN: CPT | Performed by: INTERNAL MEDICINE

## 2018-08-08 NOTE — PROGRESS NOTES
HPI:  Follow-up visit for  Stage IV breast cancer with metastasis and patient has been on Herceptin, Perjeta and tamoxifen and is doing well  Has some anxiety as before especially she will be going for scans and not knowing what the results are going to be  She has  1  Malignant neoplasm of upper-outer quadrant of left female breast (174 4) (C50 412)   2  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)   3  Liver metastases (197 7) (C78 7)   4  Bone metastases (198 5) (C79 51)       Patient is being treated for stage IV breast cancer and she is on Perjeta, Herceptin and tamoxifen   She is not on Xgeva anymore that she had for 2 years  She continues to take    calcium and vitamin-D for 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  she has been tolerating treatments without much problem  Left breast cancer was diagnosed in January 2016  Grade was 3  She had locally advanced left breast cancer with metastases to liver and bones  She was started on a combination of Taxotere and Herceptin and Perjeta  She had very good response  After 6 cycles of systemic therapy Taxotere was discontinued and tamoxifen was added  Because her response was so good she had lumpectomy of left breast in September 2016 followed by radiation therapy and also she received radiation to her left hip for palliation  At the time of lumpectomy there was minimal residual disease, 1 4 cm  Lymph nodes were not sampled  She had radiation to left hip lesion    She has chemotherapy-induced amenorrhea  She gets hot flashes  Occasionally leg cramps at night     Negative MRI scan of the brain for metastatic disease  Normal ejection fraction  she gets cardiac evaluation every 3 months  Overall she is doing reasonably well  For leg cramps at night and she is taking one baby aspirin daily with food in the evening and also one magnesium tablet daily     She has Port-A-Cath             Current Outpatient Prescriptions:     Calcium Carbonate-Vit D-Min (CALCIUM 1200 PO), Take 1 tablet by mouth daily  , Disp: , Rfl:     Magnesium 250 MG TABS, Take 1 tablet by mouth daily, Disp: , Rfl:     multivitamin (THERAGRAN) TABS, Take 1 tablet by mouth daily  , Disp: , Rfl:     pertuzumab (PERJETA) 420 mg/14 mL SOLN, Infuse into a venous catheter every 21 days  At infusion  center, Disp: , Rfl:     tamoxifen (NOLVADEX) 20 mg tablet, TAKE 1 TABLET DAILY  , Disp: 90 tablet, Rfl: 1    Trastuzumab (HERCEPTIN IV), Infuse into a venous catheter every 21 days  At infusion center, Disp: , Rfl:     VITAMIN D, CHOLECALCIFEROL, PO, Take by mouth daily  , Disp: , Rfl:     No Known Allergies       Malignant neoplasm of upper-inner quadrant of left female breast [facility])     Initial Diagnosis     Malignant neoplasm of upper-outer quadrant of left female breast ([facility] [address] )       1/27/2016 Surgery     Port placement         9/16/2016 Surgery     Left breast partial mastectomy         11/7/2016 - 12/23/2016 Radiation     WBRT          Radiation     Left hip RT          Chemotherapy     taxotere, herceptin, perjeta, xgeva  Dr Julianne Cuellar     Tamoxifen   Dr Sofia Aparicio            ROS:  08/08/18 Reviewed 13 systems:  Presently no headaches, seizures, dizziness, diplopia, dysphagia, hoarseness, chest pain, palpitations, shortness of breath, cough, hemoptysis, abdominal pain, nausea, vomiting,  Change in bowel movement, melena, hematuria, fever, chills, bleeding, bone pains, skin rash, weight loss, arthritic symptoms, tiredness ,  weakness, numbness,  claudication and gait problem  No frequent infections  No swelling of the ankles  No swollen glands  Patient is anxious     No GYN symptoms Other symptoms are in HPI        /96 (BP Location: Left arm, Cuff Size: Adult)   Pulse 90   Temp 98 8 °F (37 1 °C) (Tympanic)   Resp 17   Ht 5' 5" (1 651 m)   Wt 94 8 kg (209 lb)   SpO2 97%   BMI 34 78 kg/m²     Physical Exam:  Alert, oriented, not in distress, no icterus, no oral thrush, no palpable neck mass, clear lung fields, regular heart rate, abdomen  soft and non tender, no palpable abdominal mass, no ascites, no edema of ankles, no calf tenderness, no focal neurological deficit, no skin rash, no palpable lymphadenopathy, good arterial pulses, no clubbing  Patient is anxious  No lymphedema Performance status 1  IMAGING:   High normal ejection fraction on echocardiogram      Labs, Imaging, & Other studies:   All pertinent labs and imaging studies were personally reviewed    Lab Results   Component Value Date     04/27/2018    K 3 7 04/27/2018     (H) 04/27/2018    CO2 25 04/27/2018    ANIONGAP 9 04/27/2018    BUN 10 04/27/2018    CREATININE 1 10 04/27/2018    GLUCOSE 104 04/27/2018    GLUF 97 09/29/2017    CALCIUM 8 8 04/27/2018    AST 39 04/27/2018    ALT 51 04/27/2018    ALKPHOS 47 04/27/2018    PROT 6 3 (L) 04/27/2018    BILITOT 0 50 04/27/2018    EGFR 59 04/27/2018     Lab Results   Component Value Date    WBC 10 60 (H) 04/27/2018    HGB 12 5 04/27/2018    HCT 39 7 04/27/2018    MCV 87 04/27/2018     04/27/2018       Reviewed and discussed with patient  Assessment and plan: Follow-up visit for  Stage IV breast cancer with metastasis and patient has been on Herceptin, Perjeta and tamoxifen and is doing well  Has some anxiety as before especially she will be going for scans and not knowing what the results are going to be  She has  1  Malignant neoplasm of upper-outer quadrant of left female breast (174 4) (C50 412)   2  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)   3  Liver metastases (197 7) (C78 7)   4  Bone metastases (198 5) (C79 51)       Patient is being treated for stage IV breast cancer and she is on Perjeta, Herceptin and tamoxifen   She is not on Xgeva anymore that she had for 2 years    She continues to take    calcium and vitamin-D for 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  she has been tolerating treatments without much problem  Left breast cancer was diagnosed in January 2016  Grade was 3  She had locally advanced left breast cancer with metastases to liver and bones  She was started on a combination of Taxotere and Herceptin and Perjeta  She had very good response  After 6 cycles of systemic therapy Taxotere was discontinued and tamoxifen was added  Because her response was so good she had lumpectomy of left breast in September 2016 followed by radiation therapy and also she received radiation to her left hip for palliation  At the time of lumpectomy there was minimal residual disease, 1 4 cm  Lymph nodes were not sampled  She had radiation to left hip lesion    She has chemotherapy-induced amenorrhea  She gets hot flashes  Occasionally leg cramps at night     Negative MRI scan of the brain for metastatic disease  Normal ejection fraction  she gets cardiac evaluation every 3 months  Overall she is doing reasonably well  For leg cramps at night and she is taking one baby aspirin daily with food in the evening and also one magnesium tablet daily     She has Port-A-Cath  1  Malignant neoplasm of upper-inner quadrant of left breast in female, estrogen receptor positive (HCC)    - Cancer antigen 27 29; Standing  - CBC and differential; Standing  - Comprehensive metabolic panel; Standing  - Echo limited with contrast if indicated; Future  - CT chest abdomen pelvis w contrast; Future  - NM bone scan whole body; Future  - Cancer antigen 27 29  - CBC and differential  - Comprehensive metabolic panel    2  Bone metastasis ([address] )    - NM bone scan whole body; Future    3  Liver metastases ([address] )    - CT chest abdomen pelvis w contrast; Future     Goal is prolongation of progression-free survival   No change in therapy  Condition discussed and explained  Questions answered    Also discussed the importance of self-breast examination, eating healthy foods, staying active and health screening tests  Patient voiced understanding and agreement in the discussion  Counseling / Coordination of Care   Greater than 50% of total time was spent with the patient and / or family counseling and / or coordination of care

## 2018-08-15 RX ORDER — SODIUM CHLORIDE 9 MG/ML
20 INJECTION, SOLUTION INTRAVENOUS CONTINUOUS
Status: DISCONTINUED | OUTPATIENT
Start: 2018-08-16 | End: 2018-08-19 | Stop reason: HOSPADM

## 2018-08-16 ENCOUNTER — HOSPITAL ENCOUNTER (OUTPATIENT)
Dept: INFUSION CENTER | Facility: CLINIC | Age: 53
Discharge: HOME | End: 2018-08-16
Payer: COMMERCIAL

## 2018-08-16 VITALS
SYSTOLIC BLOOD PRESSURE: 112 MMHG | TEMPERATURE: 97.6 F | DIASTOLIC BLOOD PRESSURE: 82 MMHG | HEART RATE: 84 BPM | RESPIRATION RATE: 18 BRPM | WEIGHT: 208.78 LBS | HEIGHT: 66 IN | BODY MASS INDEX: 33.55 KG/M2

## 2018-08-16 DIAGNOSIS — C50.212 MALIGNANT NEOPLASM OF UPPER-INNER QUADRANT OF LEFT BREAST IN FEMALE, ESTROGEN RECEPTOR POSITIVE (HCC): ICD-10-CM

## 2018-08-16 DIAGNOSIS — Z17.0 MALIGNANT NEOPLASM OF UPPER-INNER QUADRANT OF LEFT BREAST IN FEMALE, ESTROGEN RECEPTOR POSITIVE (HCC): ICD-10-CM

## 2018-08-16 LAB
ALBUMIN SERPL BCP-MCNC: 3.1 G/DL (ref 3.5–5.7)
ALP SERPL-CCNC: 65 U/L (ref 46–116)
ALT SERPL W P-5'-P-CCNC: 28 U/L (ref 12–78)
ANION GAP SERPL CALCULATED.3IONS-SCNC: 2 MMOL/L (ref 4–13)
ANISOCYTOSIS BLD QL SMEAR: PRESENT
AST SERPL W P-5'-P-CCNC: 31 U/L (ref 5–45)
BASOPHILS # BLD AUTO: 0 THOUSAND/UL (ref 0–0.1)
BASOPHILS NFR MAR MANUAL: 0 % (ref 0–1)
BILIRUB SERPL-MCNC: 0.7 MG/DL (ref 0.2–1)
BUN SERPL-MCNC: 13 MG/DL (ref 5–25)
CALCIUM SERPL-MCNC: 9.2 MG/DL (ref 8.3–10.1)
CANCER AG27-29 SERPL-ACNC: 19.9 U/ML (ref 0–42.3)
CHLORIDE SERPL-SCNC: 106 MMOL/L (ref 98–108)
CO2 SERPL-SCNC: 29 MMOL/L (ref 21–32)
CREAT SERPL-MCNC: 0.8 MG/DL (ref 0.6–1.3)
EOSINOPHIL # BLD AUTO: 0.16 THOUSAND/UL (ref 0–0.61)
EOSINOPHIL NFR BLD MANUAL: 2 % (ref 0–6)
ERYTHROCYTE [DISTWIDTH] IN BLOOD BY AUTOMATED COUNT: 17.2 % (ref 11.6–15.1)
GFR SERPL CREATININE-BSD FRML MDRD: 86 ML/MIN/1.73SQ M
GLUCOSE SERPL-MCNC: 118 MG/DL (ref 65–140)
HCT VFR BLD AUTO: 41.3 % (ref 34.8–46.1)
HGB BLD-MCNC: 13 G/DL (ref 11.5–15.4)
LYMPHOCYTES # BLD AUTO: 1.95 THOUSAND/UL (ref 0.6–4.47)
LYMPHOCYTES # BLD AUTO: 25 % (ref 14–44)
MCH RBC QN AUTO: 28.2 PG (ref 26.8–34.3)
MCHC RBC AUTO-ENTMCNC: 31.4 G/DL (ref 31.4–37.4)
MCV RBC AUTO: 90 FL (ref 82–98)
MONOCYTES # BLD AUTO: 0.39 THOUSAND/UL (ref 0–1.22)
MONOCYTES NFR BLD AUTO: 5 % (ref 4–12)
NEUTS BAND NFR BLD MANUAL: 2 % (ref 0–8)
NEUTS SEG # BLD: 5.3 THOUSAND/UL (ref 1.81–6.82)
NEUTS SEG NFR BLD AUTO: 66 % (ref 43–75)
PLATELET # BLD AUTO: 272 THOUSANDS/UL (ref 149–390)
PLATELET BLD QL SMEAR: ADEQUATE
PMV BLD AUTO: 8.8 FL (ref 8.9–12.7)
POTASSIUM SERPL-SCNC: 3.6 MMOL/L (ref 3.5–5.3)
PROT SERPL-MCNC: 6.7 G/DL (ref 6.4–8.2)
RBC # BLD AUTO: 4.6 MILLION/UL (ref 3.81–5.12)
SODIUM SERPL-SCNC: 137 MMOL/L (ref 136–145)
TOTAL CELLS COUNTED SPEC: 100
WBC # BLD AUTO: 7.8 THOUSAND/UL (ref 4.31–10.16)
WBC NRBC COR # BLD: 7.8 THOUSAND/UL (ref 4.31–10.16)

## 2018-08-16 PROCEDURE — 85027 COMPLETE CBC AUTOMATED: CPT

## 2018-08-16 PROCEDURE — 96417 CHEMO IV INFUS EACH ADDL SEQ: CPT

## 2018-08-16 PROCEDURE — 80053 COMPREHEN METABOLIC PANEL: CPT

## 2018-08-16 PROCEDURE — 96413 CHEMO IV INFUSION 1 HR: CPT

## 2018-08-16 PROCEDURE — 86300 IMMUNOASSAY TUMOR CA 15-3: CPT

## 2018-08-16 PROCEDURE — 85007 BL SMEAR W/DIFF WBC COUNT: CPT

## 2018-08-16 RX ADMIN — Medication 300 UNITS: at 15:57

## 2018-08-16 RX ADMIN — PERTUZUMAB 420 MG: 30 INJECTION, SOLUTION, CONCENTRATE INTRAVENOUS at 14:49

## 2018-08-16 RX ADMIN — SODIUM CHLORIDE 20 ML/HR: 0.9 INJECTION, SOLUTION INTRAVENOUS at 14:49

## 2018-08-16 RX ADMIN — TRASTUZUMAB 569 MG: 150 INJECTION, POWDER, LYOPHILIZED, FOR SOLUTION INTRAVENOUS at 15:24

## 2018-08-16 NOTE — PROGRESS NOTES
Pt received all chemo without incident  Pt aware of next appt avs provided  Pt offers no complaints and no complications noted   Pt discharged in stable condition

## 2018-09-06 RX ORDER — SODIUM CHLORIDE 9 MG/ML
20 INJECTION, SOLUTION INTRAVENOUS CONTINUOUS
Status: DISCONTINUED | OUTPATIENT
Start: 2018-09-07 | End: 2018-09-10 | Stop reason: HOSPADM

## 2018-09-07 ENCOUNTER — HOSPITAL ENCOUNTER (OUTPATIENT)
Dept: INFUSION CENTER | Facility: CLINIC | Age: 53
Discharge: HOME | End: 2018-09-07
Payer: COMMERCIAL

## 2018-09-07 VITALS
DIASTOLIC BLOOD PRESSURE: 90 MMHG | HEART RATE: 84 BPM | BODY MASS INDEX: 33.29 KG/M2 | RESPIRATION RATE: 18 BRPM | SYSTOLIC BLOOD PRESSURE: 150 MMHG | WEIGHT: 205.91 LBS | TEMPERATURE: 98.8 F

## 2018-09-07 PROCEDURE — 96413 CHEMO IV INFUSION 1 HR: CPT

## 2018-09-07 PROCEDURE — 96417 CHEMO IV INFUS EACH ADDL SEQ: CPT

## 2018-09-07 RX ADMIN — PERTUZUMAB 420 MG: 30 INJECTION, SOLUTION, CONCENTRATE INTRAVENOUS at 13:57

## 2018-09-07 RX ADMIN — SODIUM CHLORIDE, PRESERVATIVE FREE 300 UNITS: 5 INJECTION INTRAVENOUS at 15:07

## 2018-09-07 RX ADMIN — SODIUM CHLORIDE 20 ML/HR: 0.9 INJECTION, SOLUTION INTRAVENOUS at 13:45

## 2018-09-07 RX ADMIN — TRASTUZUMAB 568 MG: 150 INJECTION, POWDER, LYOPHILIZED, FOR SOLUTION INTRAVENOUS at 14:35

## 2018-09-07 NOTE — PROGRESS NOTES
Pt  Tolerated Perjeta and Herceptin without adverse event  Future appointments reviewed as ordered  AVS provided

## 2018-09-07 NOTE — PLAN OF CARE
Problem: PAIN - ADULT  Goal: Verbalizes/displays adequate comfort level or baseline comfort level  Interventions:  - Encourage patient to monitor pain and request assistance  - Assess pain using appropriate pain scale  - Administer analgesics based on type and severity of pain and evaluate response  - Implement non-pharmacological measures as appropriate and evaluate response  - Consider cultural and social influences on pain and pain management  - Notify physician/advanced practitioner if interventions unsuccessful or patient reports new pain  Outcome: Progressing      Problem: INFECTION - ADULT  Goal: Absence or prevention of progression during hospitalization  INTERVENTIONS:  - Assess and monitor for signs and symptoms of infection  - Monitor lab/diagnostic results  - Monitor all insertion sites, i e  indwelling lines, tubes, and drains  - Monitor endotracheal (as able) and nasal secretions for changes in amount and color  - [place] appropriate cooling/warming therapies per order  - Administer medications as ordered  - Instruct and encourage patient and family to use good hand hygiene technique  - Identify and instruct in appropriate isolation precautions for identified infection/condition  Outcome: Progressing    Goal: Absence of fever/infection during neutropenic period  INTERVENTIONS:  - Monitor WBC  - Implement neutropenic guidelines  Outcome: Progressing      Problem: Knowledge Deficit  Goal: Patient/family/caregiver demonstrates understanding of disease process, treatment plan, medications, and discharge instructions  Complete learning assessment and assess knowledge base    Interventions:  - Provide teaching at level of understanding  - Provide teaching via preferred learning methods  Outcome: Progressing

## 2018-09-27 RX ORDER — SODIUM CHLORIDE 9 MG/ML
20 INJECTION, SOLUTION INTRAVENOUS CONTINUOUS
Status: DISCONTINUED | OUTPATIENT
Start: 2018-09-28 | End: 2018-10-01 | Stop reason: HOSPADM

## 2018-09-28 ENCOUNTER — HOSPITAL ENCOUNTER (OUTPATIENT)
Dept: INFUSION CENTER | Facility: CLINIC | Age: 53
Discharge: HOME | End: 2018-09-28
Payer: COMMERCIAL

## 2018-09-28 VITALS
RESPIRATION RATE: 18 BRPM | DIASTOLIC BLOOD PRESSURE: 90 MMHG | WEIGHT: 211.97 LBS | BODY MASS INDEX: 34.27 KG/M2 | OXYGEN SATURATION: 95 % | SYSTOLIC BLOOD PRESSURE: 158 MMHG | TEMPERATURE: 97.5 F | HEART RATE: 70 BPM

## 2018-09-28 PROCEDURE — 96417 CHEMO IV INFUS EACH ADDL SEQ: CPT

## 2018-09-28 PROCEDURE — 96413 CHEMO IV INFUSION 1 HR: CPT

## 2018-09-28 RX ADMIN — SODIUM CHLORIDE 20 ML/HR: 0.9 INJECTION, SOLUTION INTRAVENOUS at 15:03

## 2018-09-28 RX ADMIN — TRASTUZUMAB 560 MG: 150 INJECTION, POWDER, LYOPHILIZED, FOR SOLUTION INTRAVENOUS at 15:53

## 2018-09-28 RX ADMIN — Medication 300 UNITS: at 16:28

## 2018-09-28 RX ADMIN — PERTUZUMAB 420 MG: 30 INJECTION, SOLUTION, CONCENTRATE INTRAVENOUS at 15:20

## 2018-09-28 NOTE — PLAN OF CARE
Problem: Potential for Falls  Goal: Patient will remain free of falls  INTERVENTIONS:  - Assess patient frequently for physical needs  -  Identify cognitive and physical deficits and behaviors that affect risk of falls    -  [place] fall precautions as indicated by assessment   - Educate patient/family on patient safety including physical limitations  - Instruct patient to call for assistance with activity based on assessment  - Modify environment to reduce risk of injury  - Consider OT/PT consult to assist with strengthening/mobility   Outcome: Progressing

## 2018-10-17 RX ORDER — SODIUM CHLORIDE 9 MG/ML
20 INJECTION, SOLUTION INTRAVENOUS CONTINUOUS
Status: DISCONTINUED | OUTPATIENT
Start: 2018-10-18 | End: 2018-10-21 | Stop reason: HOSPADM

## 2018-10-18 ENCOUNTER — HOSPITAL ENCOUNTER (OUTPATIENT)
Dept: INFUSION CENTER | Facility: CLINIC | Age: 53
Discharge: HOME | End: 2018-10-18
Payer: COMMERCIAL

## 2018-10-18 VITALS — BODY MASS INDEX: 34.22 KG/M2 | WEIGHT: 211.64 LBS

## 2018-10-18 PROCEDURE — 96417 CHEMO IV INFUS EACH ADDL SEQ: CPT

## 2018-10-18 PROCEDURE — 96413 CHEMO IV INFUSION 1 HR: CPT

## 2018-10-18 RX ADMIN — Medication 300 UNITS: at 15:47

## 2018-10-18 RX ADMIN — PERTUZUMAB 420 MG: 30 INJECTION, SOLUTION, CONCENTRATE INTRAVENOUS at 14:36

## 2018-10-18 RX ADMIN — TRASTUZUMAB 600 MG: 150 INJECTION, POWDER, LYOPHILIZED, FOR SOLUTION INTRAVENOUS at 15:11

## 2018-10-18 RX ADMIN — SODIUM CHLORIDE 20 ML/HR: 0.9 INJECTION, SOLUTION INTRAVENOUS at 14:35

## 2018-10-18 NOTE — PLAN OF CARE
Problem: Potential for Falls  Goal: Patient will remain free of falls  INTERVENTIONS:  - Assess patient frequently for physical needs  -  Identify cognitive and physical deficits and behaviors that affect risk of falls    -  [place] fall precautions as indicated by assessment   - Educate patient/family on patient safety including physical limitations  - Instruct patient to call for assistance with activity based on assessment  - Modify environment to reduce risk of injury  - Consider OT/PT consult to assist with strengthening/mobility   Outcome: Progressing

## 2018-10-25 ENCOUNTER — CLINICAL SUPPORT (OUTPATIENT)
Dept: RADIATION ONCOLOGY | Facility: CLINIC | Age: 53
End: 2018-10-25
Payer: COMMERCIAL

## 2018-10-25 ENCOUNTER — RADIATION ONCOLOGY FOLLOW-UP (OUTPATIENT)
Dept: RADIATION ONCOLOGY | Facility: CLINIC | Age: 53
End: 2018-10-25
Attending: RADIOLOGY
Payer: COMMERCIAL

## 2018-10-25 VITALS
SYSTOLIC BLOOD PRESSURE: 150 MMHG | WEIGHT: 214.6 LBS | OXYGEN SATURATION: 94 % | TEMPERATURE: 98.3 F | RESPIRATION RATE: 18 BRPM | DIASTOLIC BLOOD PRESSURE: 82 MMHG | BODY MASS INDEX: 34.49 KG/M2 | HEART RATE: 78 BPM | HEIGHT: 66 IN

## 2018-10-25 DIAGNOSIS — C50.212 MALIGNANT NEOPLASM OF UPPER-INNER QUADRANT OF LEFT BREAST IN FEMALE, ESTROGEN RECEPTOR POSITIVE (HCC): Primary | ICD-10-CM

## 2018-10-25 DIAGNOSIS — Z17.0 MALIGNANT NEOPLASM OF UPPER-INNER QUADRANT OF LEFT BREAST IN FEMALE, ESTROGEN RECEPTOR POSITIVE (HCC): Primary | ICD-10-CM

## 2018-10-25 DIAGNOSIS — C79.51 BONE METASTASIS (HCC): ICD-10-CM

## 2018-10-25 PROCEDURE — 99215 OFFICE O/P EST HI 40 MIN: CPT | Performed by: RADIOLOGY

## 2018-10-25 PROCEDURE — G0463 HOSPITAL OUTPT CLINIC VISIT: HCPCS | Performed by: RADIOLOGY

## 2018-10-25 NOTE — PROGRESS NOTES
Follow-up - Radiation Oncology   Alejandrina Barba 1965 50 y o  female [id number]      History of Present Illness   Cancer Staging  Stage IV left breast inflammatory carcinoma  Victoria Dockery is a 50y o  year old female who returns for follow up post left femur and left breast, axillary and supraclavicular radiation completed on 12/23/16  She was last seen in follow up on 1/4/2018       Interval History:    1/17/2018 - Dr Bina Contreras - Continues Tamoxifen       5/2/2018 - Dr Mario Falk, Herceptin and tamoxifen  Xgeva discontinued as she had for more than 2 years  She has chemo induced amenorrhea and hot flashes  Doing reasonable well      5/2/2018 - Dr Nandini Duval, surg/onc  No concerns on her examination  Holding off on mammogram per patient request     Overall, she has been feeing well  She has no left breast nor left femur pain  She is performing breast massage in the shower  She has no lymphedema  She had a great trip with her  to visit her mother and friends in [place] [facility] and brother in [place] for 2 weeks in April 2018  She had not been there for 10 years  Future appointments:  CT Scan CAP and bone scan on 11/1/2018  Echo on 11/2/2018  Follow up with Dr Bina Contreras on 11/7/2018  Follow up with Dr Shelbi Moreno on 11/7/2018    Clinical Trial: no      Screening  Tobacco  Current tobacco user: no  If yes, brief counseling provided: NA     Hypertension  Hypertension screening performed: yes  Normotensive:  no  If no, referred to PCP: yes     Depression Screening  Screened for depression using PHQ-2: yes     Screened for depression using PHQ-9:  no  Screening positive or negative:  negative  If score >4, was any of the following actions taken?    Additional evaluation for depression, suicide risk assesment, referral to PCP or psychiatry, medication started:  no, n/a     Advanced Care Planning for Patients >65 years  Advanced Care Planning Discussed:  n/a  Patient named surrogate decision maker or care plan in chart: n/a    Historical Information      Malignant neoplasm of upper-inner quadrant of left female breast ([facility] [address] )    12/2015 Initial Diagnosis     Malignant neoplasm of upper-outer quadrant of left female breast ([facility] [address] )         1/27/2016 Surgery     Port placement         2/19/2016 - 6/2/2018 Chemotherapy     Taxotere,  herceptin, perjeta, xgeva  After six cycles, taxotere was discontinued and tamoxifen added     Continues with Perjeta and Herceptin every 3 weeks    - Dr Yakov Dunn           9/16/2016 Surgery     Left breast partial mastectomy         11/7/2016 - 12/23/2016 Radiation     Plan ID Energy Fractions Dose per Fraction (cGy) Total Dose Delivered (cGy) Elapsed Days   Lt Breast 6X 25 / 25 200 5,000 36   Lt Brst Boost 6X 8 / 8 200 1,600 9   Lt Femur 10X 10 / 10 300 3,000 11   Lt PAB 6X 25 / 25 60 1,500 36   Lt Sclav 6X 25 / 25 200 5,000 36                                   Total dose to left breast lumpectomy cavity: 6600 cGy                   Total dose to the supraclavicular and axillary regions: 5000 cGy            Hormone Therapy     Tamoxifen   Dr Yakov Dunn            Past Medical History:   Diagnosis Date    Hx antineoplastic chemotherapy     Hx of radiation therapy     breast and left  hip    Liver metastases ([facility] [address] )     Long term use of drug     Port-A-Cath in place     right chest    Rash, drug     Use of tamoxifen (Nolvadex)      Past Surgical History:   Procedure Laterality Date    BREAST BIOPSY Left 12/30/2015    BREAST LUMPECTOMY Left 09/16/2016    BREAST SURGERY Left     biopsy    CHOLECYSTECTOMY      CHOLECYSTECTOMY      PORTACATH PLACEMENT Right     SIMPLE MASTECTOMY Left 9/16/2016    Procedure: BREAST PARTIAL MASTECTOMY ;  Surgeon: Kelley Melara MD;  Location: [facility];  Service:        Social History   History   Alcohol Use No     History   Drug Use No     History   Smoking Status    Former Smoker   Smokeless Tobacco    Never Used     Meds/Allergies Current Outpatient Prescriptions:     Calcium Carbonate-Vit D-Min (CALCIUM 1200 PO), Take 1 tablet by mouth daily  , Disp: , Rfl:     Magnesium 250 MG TABS, Take 1 tablet by mouth daily, Disp: , Rfl:     multivitamin (THERAGRAN) TABS, Take 1 tablet by mouth daily  , Disp: , Rfl:     pertuzumab (PERJETA) 420 mg/14 mL SOLN, Infuse into a venous catheter every 21 days  At infusion  center, Disp: , Rfl:     tamoxifen (NOLVADEX) 20 mg tablet, TAKE 1 TABLET DAILY  , Disp: 90 tablet, Rfl: 1    Trastuzumab (HERCEPTIN IV), Infuse into a venous catheter every 21 days  At infusion center, Disp: , Rfl:     VITAMIN D, CHOLECALCIFEROL, PO, Take by mouth daily  , Disp: , Rfl:   No Known Allergies    Review of Systems   Constitutional: Positive for fatigue (at times)  HENT: Negative  Eyes: Negative  Respiratory: Negative  Cardiovascular: Negative  Gastrointestinal: Negative  Endocrine: Negative  Genitourinary: Negative  Musculoskeletal: Negative  Skin: Negative  Allergic/Immunologic: Negative  Neurological: Negative  Hematological: Negative  Psychiatric/Behavioral: Negative        OBJECTIVE:   /82   Pulse 78   Temp 98 3 °F (36 8 °C)   Resp 18   Ht 5' 5 5" (1 664 m)   Wt 97 3 kg (214 lb 9 6 oz)   SpO2 94%   BMI 35 17 kg/m²   Pain Assessment:  0  ECOG/[place]/WHO: 0 - Asymptomatic    Physical Exam   Constitutional: She is oriented to person, place, and time  She appears well-developed and well-nourished  No distress  HENT:   Head: Normocephalic and atraumatic  Mouth/Throat: No oropharyngeal exudate  Eyes: Pupils are equal, round, and reactive to light  Conjunctivae and EOM are normal  No scleral icterus  Neck: Normal range of motion  Neck supple  No tracheal deviation present  No thyromegaly present  Cardiovascular: Normal rate, regular rhythm and normal heart sounds  Pulmonary/Chest: Effort normal and breath sounds normal  No respiratory distress   She has no wheezes  She has no rales  She exhibits no tenderness  Right breast exhibits no inverted nipple, no mass, no nipple discharge, no skin change and no tenderness  Left breast exhibits no inverted nipple, no mass, no nipple discharge, no skin change (There is a well-healed lumpectomy incision with post treatment changes and no underlying masses noted ) and no tenderness  Abdominal: Soft  Bowel sounds are normal  She exhibits no distension and no mass  There is no tenderness  Musculoskeletal: Normal range of motion  She exhibits no edema or tenderness  Lymphadenopathy:     She has no cervical adenopathy  She has no axillary adenopathy  Right: No supraclavicular adenopathy present  Left: No supraclavicular adenopathy present  Neurological: She is alert and oriented to person, place, and time  No cranial nerve deficit  Coordination normal    Skin: Skin is warm and dry  No rash noted  She is not diaphoretic  No erythema  No pallor  Psychiatric: She has a normal mood and affect  Her behavior is normal  Judgment and thought content normal    Nursing note and vitals reviewed  RESULTS    Lab Results: No results found for this or any previous visit (from the past 672 hour(s))  Imaging Studies:No results found  Assessment/Plan:  No orders of the defined types were placed in this encounter  Mortimer Barb is a 50y o  year old female with a stage IV left breast inflammatory carcinoma that was hormone receptor positive, HER-2 nu positive with grade 3 disease having both liver and bone metastasis when she was initially diagnosed in December 2015  She did not have any significant left hip pain at that time from her left hip metastasis  She had declined prophylactic intramedullary kate placement in January and was started on chemotherapy from February 19, 2016 through June 2, 2016 with Herceptin, Perjeta and Taxol along with Vernon Laura   She had a good response with a reduction in her left breast mass as well as resolution of enlarged left axillary lymph nodes and a good response within the liver  She refused a mastectomy but did undergo a left lumpectomy on September 16, 2016 which revealed an invasive mammary carcinoma 1 4 cm with negative margins but there was invasive carcinoma directly invading the dermis without any skin ulceration  She is continuing with Perjeta, Herceptin, Xgeva and she is on tamoxifen  We recommended radiation therapy to the left breast, left supraclavicular, and axillary regions in addition to treatment to her metastatic disease in the left hip/femur  She has no symptoms from her disease and her performance status remains excellent  She worked full-time during treatment and continues to work full-time  She completed treatment on December 23, 2016  She returns today for follow-up and is having no pain in her left femur and also having no pain in the left breast  Clinically, there continues to be a complete response and she has no evidence of any residual disease within the left breast or the left axilla  She will continue on her treatment with Herceptin, Perjeta, Xgeva and tamoxifen  CT scans of the chest, abdomen, and pelvis with a bone scan from July 3, 2017 were stable without any progressive or recurrent disease  She had CTA study November 20, 2017 that was negative for PE and negative for metastatic disease  She has repeat CT scans of the chest, abdomen, and pelvis and a bone scan scheduled November 1, 2018  She will continue with her current treatment regimen and follow-up appointments with Dr Dalton Lopez and Dr Kvng Murphy  She will return here for follow-up in 6 months       Kely Gonzalez MD  10/25/2018,9:30 AM    Portions of the record may have been created with voice recognition software   Occasional wrong word or "sound a like" substitutions may have occurred due to the inherent limitations of voice recognition software   Read the chart carefully and recognize, using context, where substitutions have occurred

## 2018-10-25 NOTE — PROGRESS NOTES
Alejandrina Barba  1965   Ms Marisol Nichols is a 50 y o  female       Chief Complaint   Patient presents with    Follow-up     radiation oncology       Cancer Staging  No matching staging information was found for the patient  Oncology History    50year old with left breast cancer with liver and bone mets when diagnosed in 12/2015  Malignant neoplasm of upper-inner quadrant of left female breast ([facility] [address] )    12/2015 Initial Diagnosis     Malignant neoplasm of upper-outer quadrant of left female breast ([facility] [address] )         1/27/2016 Surgery     Port placement         2/19/2016 - 6/2/2018 Chemotherapy     taxotere,  herceptin, perjeta, xgeva  Dr Aidee Luna  After six cycles, taxotere was discontinued and tamoxifen added  9/16/2016 Surgery     Left breast partial mastectomy         11/7/2016 - 12/23/2016 Radiation     Plan ID Energy Fractions Dose per Fraction (cGy) Total Dose Delivered (cGy) Elapsed Days   Lt Breast 6X 25 / 25 200 5,000 36   Lt Brst Boost 6X 8 / 8 200 1,600 9   Lt Femur 10X 10 / 10 300 3,000 11   Lt PAB 6X 25 / 25 60 1,500 36   Lt Sclav 6X 25 / 25 200 5,000 36                                   Total dose to left breast lumpectomy cavity: 6600 cGy                   Total dose to the supraclavicular and axillary regions: 5000 cGy            Hormone Therapy     Tamoxifen   Dr Aidee Luna            Clinical Trial: no      Interval History:  Last seen 1/4/2018  Overall, she has been feeing well  No left breast or left femur pain  1/17/2018 - Dr Aidee Luna - Continues Tamoxifen  5/2/2018 - Dr Andrew Smith, Herceptin and tamoxifen  Xgeva discontinued as she had for more than 2 years  She has chemo induced amenorrhea and hot flashes  Doing reasonable well  5/2/2018 - Dr Issa Valentine, surg/onc  No concerns on her examination  Holding off on mammogram per patient request     Future appointments:  CT Scan CAP and bone scan on 11/1/2018    Echo on 11/2/2018  Follow up with   Pramod on 11/7/2018  Follow up with Dr Erica De Los Santos on 11/7/2018      Screening  Tobacco  Current tobacco user: no  If yes, brief counseling provided: NA    Hypertension  Hypertension screening performed: yes  Normotensive:  no  If no, referred to PCP: yes    Depression Screening  Screened for depression using PHQ-2: yes    Screened for depression using PHQ-9:  no  Screening positive or negative:  negative  If score >4, was any of the following actions taken?    Additional evaluation for depression, suicide risk assesment, referral to PCP or psychiatry, medication started:  no, n/a    Advanced Care Planning for Patients >65 years  Advanced Care Planning Discussed:  n/a  Patient named surrogate decision maker or care plan in chart: n/a    [unfilled]  Health Maintenance   Topic Date Due    Depression Screening PHQ  1965    CRC Screening: Colonoscopy  1965    Pneumococcal PPSV23 Highest Risk Adult (1 of 3 - PCV13) 11/24/1986    DTaP,Tdap,and Td Vaccines (1 - Tdap) 11/24/1988    INFLUENZA VACCINE  07/01/2018       Patient Active Problem List   Diagnosis    Malignant neoplasm of upper-inner quadrant of left female breast ([facility] [address] [facility]    Liver metastases ([facility] [address] [facility]    Bone metastasis ([facility] [address] [facility]    Port-A-Cath in place    Long term use of drug    Use of tamoxifen (Nolvadex)     Past Medical History:   Diagnosis Date    Hx antineoplastic chemotherapy     Hx of radiation therapy     breast and left  hip    Liver metastases ([facility] [address] [facility]     Long term use of drug     Port-A-Cath in place     right chest    Rash, drug     Use of tamoxifen (Nolvadex)      Past Surgical History:   Procedure Laterality Date    BREAST BIOPSY Left 12/30/2015    BREAST LUMPECTOMY Left 09/16/2016    BREAST SURGERY Left     biopsy    CHOLECYSTECTOMY      CHOLECYSTECTOMY      PORTACATH PLACEMENT Right     SIMPLE MASTECTOMY Left 9/16/2016    Procedure: BREAST PARTIAL MASTECTOMY ;  Surgeon: Jostin Bennett MD;  Location: [facility]; Service:      Family History   Problem Relation Age of Onset    No Known Problems Mother     No Known Problems Father      Social History     Social History    Marital status: /Civil Union     Spouse name: N/A    Number of children: N/A    Years of education: N/A     Occupational History    Not on file  Social History Main Topics    Smoking status: Former Smoker    Smokeless tobacco: Never Used    Alcohol use No    Drug use: No    Sexual activity: Not on file     Other Topics Concern    Not on file     Social History Narrative    No narrative on file       Current Outpatient Prescriptions:     Calcium Carbonate-Vit D-Min (CALCIUM 1200 PO), Take 1 tablet by mouth daily  , Disp: , Rfl:     Magnesium 250 MG TABS, Take 1 tablet by mouth daily, Disp: , Rfl:     multivitamin (THERAGRAN) TABS, Take 1 tablet by mouth daily  , Disp: , Rfl:     pertuzumab (PERJETA) 420 mg/14 mL SOLN, Infuse into a venous catheter every 21 days  At infusion  center, Disp: , Rfl:     tamoxifen (NOLVADEX) 20 mg tablet, TAKE 1 TABLET DAILY  , Disp: 90 tablet, Rfl: 1    Trastuzumab (HERCEPTIN IV), Infuse into a venous catheter every 21 days  At infusion center, Disp: , Rfl:     VITAMIN D, CHOLECALCIFEROL, PO, Take by mouth daily  , Disp: , Rfl:   No Known Allergies    Review of Systems:  Review of Systems   Constitutional: Positive for fatigue (at times)  HENT: Negative  Eyes: Negative  Respiratory: Negative  Cardiovascular: Negative  Gastrointestinal: Negative  Endocrine: Negative  Genitourinary: Negative  Musculoskeletal: Negative  Skin: Negative  Allergic/Immunologic: Negative  Neurological: Negative  Hematological: Negative  Psychiatric/Behavioral: Negative  Vitals:    10/25/18 0851   BP: 150/82   Pulse: 78   Resp: 18   Temp: 98 3 °F (36 8 °C)   SpO2: 94%   Weight: 97 3 kg (214 lb 9 6 oz)   Height: 5' 5 5" (1 664 m)            Imaging:No results found

## 2018-11-01 ENCOUNTER — HOSPITAL ENCOUNTER (OUTPATIENT)
Dept: CT IMAGING | Facility: HOSPITAL | Age: 53
Discharge: HOME | End: 2018-11-01
Attending: INTERNAL MEDICINE
Payer: COMMERCIAL

## 2018-11-01 ENCOUNTER — HOSPITAL ENCOUNTER (OUTPATIENT)
Dept: NUCLEAR MEDICINE | Facility: HOSPITAL | Age: 53
Discharge: HOME | End: 2018-11-01
Attending: INTERNAL MEDICINE
Payer: COMMERCIAL

## 2018-11-01 DIAGNOSIS — Z17.0 MALIGNANT NEOPLASM OF UPPER-INNER QUADRANT OF LEFT BREAST IN FEMALE, ESTROGEN RECEPTOR POSITIVE (HCC): ICD-10-CM

## 2018-11-01 DIAGNOSIS — C50.212 MALIGNANT NEOPLASM OF UPPER-INNER QUADRANT OF LEFT BREAST IN FEMALE, ESTROGEN RECEPTOR POSITIVE (HCC): ICD-10-CM

## 2018-11-01 DIAGNOSIS — C79.51 BONE METASTASIS (HCC): ICD-10-CM

## 2018-11-01 DIAGNOSIS — C78.7 LIVER METASTASES (HCC): ICD-10-CM

## 2018-11-01 PROCEDURE — A9503 TC99M MEDRONATE: HCPCS

## 2018-11-01 PROCEDURE — 74177 CT ABD & PELVIS W/CONTRAST: CPT

## 2018-11-01 PROCEDURE — 78306 BONE IMAGING WHOLE BODY: CPT

## 2018-11-01 PROCEDURE — 71260 CT THORAX DX C+: CPT

## 2018-11-01 RX ADMIN — IOHEXOL 100 ML: 350 INJECTION, SOLUTION INTRAVENOUS at 08:41

## 2018-11-02 ENCOUNTER — HOSPITAL ENCOUNTER (OUTPATIENT)
Dept: NON INVASIVE DIAGNOSTICS | Facility: CLINIC | Age: 53
Discharge: HOME | End: 2018-11-02
Payer: COMMERCIAL

## 2018-11-02 DIAGNOSIS — Z17.0 MALIGNANT NEOPLASM OF UPPER-INNER QUADRANT OF LEFT BREAST IN FEMALE, ESTROGEN RECEPTOR POSITIVE (HCC): ICD-10-CM

## 2018-11-02 DIAGNOSIS — C50.212 MALIGNANT NEOPLASM OF UPPER-INNER QUADRANT OF LEFT BREAST IN FEMALE, ESTROGEN RECEPTOR POSITIVE (HCC): ICD-10-CM

## 2018-11-02 PROCEDURE — 93308 TTE F-UP OR LMTD: CPT | Performed by: INTERNAL MEDICINE

## 2018-11-02 PROCEDURE — 93308 TTE F-UP OR LMTD: CPT

## 2018-11-02 PROCEDURE — 93325 DOPPLER ECHO COLOR FLOW MAPG: CPT | Performed by: INTERNAL MEDICINE

## 2018-11-02 PROCEDURE — 93321 DOPPLER ECHO F-UP/LMTD STD: CPT | Performed by: INTERNAL MEDICINE

## 2018-11-06 ENCOUNTER — TELEPHONE (OUTPATIENT)
Dept: HEMATOLOGY ONCOLOGY | Facility: CLINIC | Age: 53
End: 2018-11-06

## 2018-11-06 NOTE — TELEPHONE ENCOUNTER
Spoke to Corinne and informed her to have labs completed prior to her appt  On 11/7/18   She informed me that she will get them done on 11/06/18

## 2018-11-07 ENCOUNTER — OFFICE VISIT (OUTPATIENT)
Dept: HEMATOLOGY ONCOLOGY | Facility: CLINIC | Age: 53
End: 2018-11-07
Payer: COMMERCIAL

## 2018-11-07 ENCOUNTER — OFFICE VISIT (OUTPATIENT)
Dept: SURGICAL ONCOLOGY | Facility: CLINIC | Age: 53
End: 2018-11-07
Payer: COMMERCIAL

## 2018-11-07 ENCOUNTER — APPOINTMENT (OUTPATIENT)
Dept: LAB | Facility: CLINIC | Age: 53
End: 2018-11-07
Payer: COMMERCIAL

## 2018-11-07 VITALS
HEART RATE: 82 BPM | WEIGHT: 210.2 LBS | DIASTOLIC BLOOD PRESSURE: 96 MMHG | SYSTOLIC BLOOD PRESSURE: 138 MMHG | HEIGHT: 66 IN | OXYGEN SATURATION: 97 % | BODY MASS INDEX: 33.78 KG/M2 | TEMPERATURE: 98.6 F | RESPIRATION RATE: 20 BRPM

## 2018-11-07 VITALS
RESPIRATION RATE: 20 BRPM | TEMPERATURE: 98.6 F | HEART RATE: 82 BPM | SYSTOLIC BLOOD PRESSURE: 138 MMHG | HEIGHT: 66 IN | BODY MASS INDEX: 33.75 KG/M2 | DIASTOLIC BLOOD PRESSURE: 96 MMHG | WEIGHT: 210 LBS

## 2018-11-07 DIAGNOSIS — Z79.899 LONG TERM USE OF DRUG: ICD-10-CM

## 2018-11-07 DIAGNOSIS — C79.51 BONE METASTASIS (HCC): ICD-10-CM

## 2018-11-07 DIAGNOSIS — C78.7 LIVER METASTASES (HCC): Primary | ICD-10-CM

## 2018-11-07 DIAGNOSIS — Z79.810 USE OF TAMOXIFEN (NOLVADEX): ICD-10-CM

## 2018-11-07 DIAGNOSIS — T45.1X5D ADVERSE EFFECT OF CHEMOTHERAPY, SUBSEQUENT ENCOUNTER: ICD-10-CM

## 2018-11-07 DIAGNOSIS — C50.212 MALIGNANT NEOPLASM OF UPPER-INNER QUADRANT OF LEFT BREAST IN FEMALE, ESTROGEN RECEPTOR POSITIVE (HCC): Primary | ICD-10-CM

## 2018-11-07 DIAGNOSIS — Z95.828 PORT-A-CATH IN PLACE: ICD-10-CM

## 2018-11-07 DIAGNOSIS — Z92.21 HX ANTINEOPLASTIC CHEMOTHERAPY: ICD-10-CM

## 2018-11-07 DIAGNOSIS — Z17.0 MALIGNANT NEOPLASM OF UPPER-INNER QUADRANT OF LEFT BREAST IN FEMALE, ESTROGEN RECEPTOR POSITIVE (HCC): Primary | ICD-10-CM

## 2018-11-07 DIAGNOSIS — C78.7 LIVER METASTASES (HCC): ICD-10-CM

## 2018-11-07 DIAGNOSIS — Z92.3 HX OF RADIATION THERAPY: ICD-10-CM

## 2018-11-07 DIAGNOSIS — Z17.0 MALIGNANT NEOPLASM OF UPPER-INNER QUADRANT OF LEFT BREAST IN FEMALE, ESTROGEN RECEPTOR POSITIVE (HCC): ICD-10-CM

## 2018-11-07 DIAGNOSIS — C50.212 MALIGNANT NEOPLASM OF UPPER-INNER QUADRANT OF LEFT BREAST IN FEMALE, ESTROGEN RECEPTOR POSITIVE (HCC): ICD-10-CM

## 2018-11-07 LAB
ALBUMIN SERPL BCP-MCNC: 3.2 G/DL (ref 3.5–5.7)
ALP SERPL-CCNC: 76 U/L (ref 46–116)
ALT SERPL W P-5'-P-CCNC: 26 U/L (ref 12–78)
ANION GAP SERPL CALCULATED.3IONS-SCNC: 11 MMOL/L (ref 4–13)
AST SERPL W P-5'-P-CCNC: 30 U/L (ref 5–45)
BASOPHILS # BLD AUTO: 0 THOUSAND/UL (ref 0–0.1)
BASOPHILS NFR MAR MANUAL: 0 % (ref 0–1)
BILIRUB SERPL-MCNC: 0.5 MG/DL (ref 0.2–1)
BUN SERPL-MCNC: 15 MG/DL (ref 5–25)
CALCIUM SERPL-MCNC: 9.8 MG/DL (ref 8.3–10.1)
CANCER AG27-29 SERPL-ACNC: 25 U/ML (ref 0–42.3)
CHLORIDE SERPL-SCNC: 106 MMOL/L (ref 98–108)
CO2 SERPL-SCNC: 28 MMOL/L (ref 21–32)
CREAT SERPL-MCNC: 1.1 MG/DL (ref 0.6–1.3)
EOSINOPHIL # BLD AUTO: 0.47 THOUSAND/UL (ref 0–0.61)
EOSINOPHIL NFR BLD MANUAL: 6 % (ref 0–6)
ERYTHROCYTE [DISTWIDTH] IN BLOOD BY AUTOMATED COUNT: 13.6 % (ref 11.6–15.1)
GFR SERPL CREATININE-BSD FRML MDRD: 59 ML/MIN/1.73SQ M
GLUCOSE SERPL-MCNC: 136 MG/DL (ref 65–140)
HCT VFR BLD AUTO: 40.8 % (ref 34.8–46.1)
HGB BLD-MCNC: 14.1 G/DL (ref 11.5–15.4)
LYMPHOCYTES # BLD AUTO: 1.79 THOUSAND/UL (ref 0.6–4.47)
LYMPHOCYTES # BLD AUTO: 23 % (ref 14–44)
MCH RBC QN AUTO: 29.6 PG (ref 26.8–34.3)
MCHC RBC AUTO-ENTMCNC: 34.5 G/DL (ref 31.4–37.4)
MCV RBC AUTO: 86 FL (ref 82–98)
MONOCYTES # BLD AUTO: 0.23 THOUSAND/UL (ref 0–1.22)
MONOCYTES NFR BLD AUTO: 3 % (ref 4–12)
NEUTS BAND NFR BLD MANUAL: 2 % (ref 0–8)
NEUTS SEG # BLD: 5.3 THOUSAND/UL (ref 1.81–6.82)
NEUTS SEG NFR BLD AUTO: 66 % (ref 43–75)
PLATELET # BLD AUTO: 377 THOUSANDS/UL (ref 149–390)
PLATELET BLD QL SMEAR: ADEQUATE
PMV BLD AUTO: 8.4 FL (ref 8.9–12.7)
POTASSIUM SERPL-SCNC: 4.1 MMOL/L (ref 3.5–5.3)
PROT SERPL-MCNC: 7.5 G/DL (ref 6.4–8.2)
RBC # BLD AUTO: 4.76 MILLION/UL (ref 3.81–5.12)
RBC MORPH BLD: NORMAL
SODIUM SERPL-SCNC: 145 MMOL/L (ref 136–145)
TOTAL CELLS COUNTED SPEC: 100
WBC # BLD AUTO: 7.8 THOUSAND/UL (ref 4.31–10.16)
WBC NRBC COR # BLD: 7.8 THOUSAND/UL (ref 4.31–10.16)

## 2018-11-07 PROCEDURE — 99214 OFFICE O/P EST MOD 30 MIN: CPT | Performed by: INTERNAL MEDICINE

## 2018-11-07 PROCEDURE — 99214 OFFICE O/P EST MOD 30 MIN: CPT | Performed by: SURGERY

## 2018-11-07 PROCEDURE — 85027 COMPLETE CBC AUTOMATED: CPT | Performed by: INTERNAL MEDICINE

## 2018-11-07 PROCEDURE — 36415 COLL VENOUS BLD VENIPUNCTURE: CPT | Performed by: INTERNAL MEDICINE

## 2018-11-07 PROCEDURE — 80053 COMPREHEN METABOLIC PANEL: CPT | Performed by: INTERNAL MEDICINE

## 2018-11-07 PROCEDURE — 85007 BL SMEAR W/DIFF WBC COUNT: CPT | Performed by: INTERNAL MEDICINE

## 2018-11-07 PROCEDURE — 86300 IMMUNOASSAY TUMOR CA 15-3: CPT | Performed by: INTERNAL MEDICINE

## 2018-11-07 NOTE — PROGRESS NOTES
Surgical Oncology Follow Up       [address]  CANCER CARE [facility] SURGICAL ONCOLOGY [place]  [address] [facility]  [employer] [place] [address]    Alejandrina Barba  1965  [id number]  [address]  CANCER [facility] SURGICAL ONCOLOGY [place]  [address] [place] [id number]    Chief Complaint   Patient presents with    Breast Cancer     Pt is here for 6 month follow up        Assessment/Plan   Diagnoses and all orders for this visit:    Liver metastases ([facility] [address] )    Bone metastasis ([facility] [address] )    Hx antineoplastic chemotherapy    Hx of radiation therapy    Long term use of drug    Malignant neoplasm of upper-inner quadrant of left breast in female, estrogen receptor positive ([facility] [address] )    Port-A-Cath in place    Use of tamoxifen (Nolvadex)        Advance Care Planning/Advance Directives:  Discussed disease status, cancer treatment plans and/or cancer treatment goals with the patient  Oncology History:       Malignant neoplasm of upper-inner quadrant of left female breast ([facility] [address] )    12/2015 Initial Diagnosis     Malignant neoplasm of upper-outer quadrant of left female breast ([facility] [address] )         1/27/2016 Surgery     Port placement         2/19/2016 - 6/2/2018 Chemotherapy     Taxotere,  herceptin, perjeta, xgeva  After six cycles, taxotere was discontinued and tamoxifen added     Continues with Perjeta and Herceptin every 3 weeks    - Dr Demario Farfan           9/16/2016 Surgery     Left breast partial mastectomy         11/7/2016 - 12/23/2016 Radiation     Plan ID Energy Fractions Dose per Fraction (cGy) Total Dose Delivered (cGy) Elapsed Days   Lt Breast 6X 25 / 25 200 5,000 36   Lt Brst Boost 6X 8 / 8 200 1,600 9   Lt Femur 10X 10 / 10 300 3,000 11   Lt PAB 6X 25 / 25 60 1,500 36   Lt Sclav 6X 25 / 25 200 5,000 36                                   Total dose to left breast lumpectomy cavity: 6600 cGy                   Total dose to the supraclavicular and axillary regions: 5000 cGy            Hormone Therapy Tamoxifen   Dr Thierry Logan            History of Present Illness:  Breast cancer follow up   -Interval History: none    Review of Systems:  Review of Systems   Constitutional: Negative  Negative for appetite change and fever  Eyes: Negative  Respiratory: Negative for shortness of breath  Cardiovascular: Negative  Gastrointestinal: Negative  Endocrine: Negative  Genitourinary: Negative  Musculoskeletal: Negative  Negative for arthralgias and myalgias  Skin: Negative  Allergic/Immunologic: Negative  Neurological: Negative  Hematological: Negative  Negative for adenopathy  Does not bruise/bleed easily  Psychiatric/Behavioral: Negative  Patient Active Problem List   Diagnosis    Malignant neoplasm of upper-inner quadrant of left female breast ([facility] [address]    Liver metastases ([facility] [address] [facility]    Bone metastasis ([facility] [address] [facility]    Port-A-Cath in place    Long term use of drug    Use of tamoxifen (Nolvadex)     Past Medical History:   Diagnosis Date    Hx antineoplastic chemotherapy     Hx of radiation therapy     breast and left  hip    Liver metastases ([facility] [address] [facility]     Long term use of drug     Port-A-Cath in place     right chest    Rash, drug     Use of tamoxifen (Nolvadex)      Past Surgical History:   Procedure Laterality Date    BREAST BIOPSY Left 12/30/2015    BREAST LUMPECTOMY Left 09/16/2016    BREAST SURGERY Left     biopsy    CHOLECYSTECTOMY      CHOLECYSTECTOMY      PORTACATH PLACEMENT Right     SIMPLE MASTECTOMY Left 9/16/2016    Procedure: BREAST PARTIAL MASTECTOMY ;  Surgeon: Silvia Fernandez MD;  Location: [facility];  Service:      Family History   Problem Relation Age of Onset    No Known Problems Mother     No Known Problems Father      Social History     Social History    Marital status: /Civil Union     Spouse name: N/A    Number of children: N/A    Years of education: N/A     Occupational History    Not on file       Social History Main Topics    Smoking status: Former Smoker    Smokeless tobacco: Never Used    Alcohol use No    Drug use: No    Sexual activity: Not on file     Other Topics Concern    Not on file     Social History Narrative    No narrative on file       Current Outpatient Prescriptions:     Calcium Carbonate-Vit D-Min (CALCIUM 1200 PO), Take 1 tablet by mouth daily  , Disp: , Rfl:     Magnesium 250 MG TABS, Take 1 tablet by mouth daily, Disp: , Rfl:     multivitamin (THERAGRAN) TABS, Take 1 tablet by mouth daily  , Disp: , Rfl:     pertuzumab (PERJETA) 420 mg/14 mL SOLN, Infuse into a venous catheter every 21 days  At infusion  center, Disp: , Rfl:     tamoxifen (NOLVADEX) 20 mg tablet, TAKE 1 TABLET DAILY  , Disp: 90 tablet, Rfl: 1    Trastuzumab (HERCEPTIN IV), Infuse into a venous catheter every 21 days  At infusion center, Disp: , Rfl:     VITAMIN D, CHOLECALCIFEROL, PO, Take by mouth daily  , Disp: , Rfl:   No Known Allergies    The following portions of the patient's history were reviewed and updated as appropriate: allergies, current medications, past family history, past medical history, past social history, past surgical history and problem list         Vitals:    11/07/18 0927   BP: 138/96   Pulse: 82   Resp: 20   Temp: 98 6 °F (37 °C)       Physical Exam   Constitutional: She is oriented to person, place, and time  She appears well-developed and well-nourished  HENT:   Head: Normocephalic and atraumatic  Cardiovascular: Normal heart sounds  Pulmonary/Chest: Breath sounds normal  Right breast exhibits no inverted nipple, no mass, no nipple discharge, no skin change and no tenderness  Left breast exhibits skin change (breast scar and radiation changes)  Left breast exhibits no inverted nipple, no mass, no nipple discharge and no tenderness  Breasts are asymmetrical    Port right upper chest wall   Abdominal: Soft  Lymphadenopathy:        Right axillary: No pectoral and no lateral adenopathy present          Left axillary: No pectoral and no lateral adenopathy present  Right: No supraclavicular adenopathy present  Left: No supraclavicular adenopathy present  Neurological: She is alert and oriented to person, place, and time  Psychiatric: She has a normal mood and affect  Results:      Imaging  11/01/2018 cat scan of the chest abdomen and pelvis is stable  11/01/2018 bone scan shows a questionable small area in the left occiput suspicious for metastatic disease    I reviewed the above imaging data  Discussion/Summary:  61-year-old female with stage IV breast carcinoma with liver and bone Mets  Her recent CT scan shows no evidence of disease in the liver  Her osseous lesions were stable on the CT scan but there was a questionable new small lesion in the occiput on the bone scan  She states that Dr Rocio Ramírez will be doing a follow-up bone scan in three months  She reports feeling well overall with no issues secondary to the tamoxifen  Her port is functioning without difficulty  There are no concerns today on my examination  I will therefore see her again in six months for another clinical exam or sooner should the need arise

## 2018-11-07 NOTE — LETTER
November 7, 2018     Terra Ward MD  [address] [facility] [address]    Patient: Lucia Botello   YOB: 1965   Date of Visit: 11/7/2018       Dear Dr Ivanna Epstein: Thank you for referring Lucia Botello to me for evaluation  Below are my notes for this consultation  If you have questions, please do not hesitate to call me  I look forward to following your patient along with you  Sincerely,        Ruby Frye MD        CC: No Recipients  Ruby Frye MD  11/7/2018  9:20 AM  Sign at close encounter    HPI:  Patient has stage IV breast cancer with metastatic disease to liver and bones  Presently she is on Herceptin, Perjeta and tamoxifen for triple positive disease  She has been tolerating treatments without much problem  She goes for blood tests and echocardiogram on regular basis  Recently she had CT scans of chest abdomen and pelvis and bone scan and there was question of a 1 new area in the left occipital area  That will be monitored  There was no metastatic disease in the liver at this time  No change in therapy  She did not have genetic testing because lack of insurance coverage for the test and financial reasons  Has some anxiety as before   Elif Blend She is not on Xgeva anymore that she had for 2 years  She continues to take    calcium and vitamin-D   Left breast cancer was diagnosed in January 2016  Grade was 3  She had locally advanced left breast cancer with metastases to liver and bones  She was started on a combination of Taxotere and Herceptin and Perjeta  She had very good response  After 6 cycles of systemic therapy Taxotere was discontinued and tamoxifen was added  Because her response was so good she had lumpectomy of left breast in September 2016 followed by radiation therapy and also she received radiation to her left hip for palliation  At the time of lumpectomy there was minimal residual disease, 1 4 cm  Lymph nodes were not sampled   She had radiation to left hip lesion    She has chemotherapy-induced amenorrhea  She gets hot flashes  Occasionally leg cramps at night      For leg cramps at night and she is taking one baby aspirin daily with food in the evening and also one magnesium tablet daily     She has Port-A-Cath               Current Outpatient Prescriptions:     Calcium Carbonate-Vit D-Min (CALCIUM 1200 PO), Take 1 tablet by mouth daily  , Disp: , Rfl:     Magnesium 250 MG TABS, Take 1 tablet by mouth daily, Disp: , Rfl:     multivitamin (THERAGRAN) TABS, Take 1 tablet by mouth daily  , Disp: , Rfl:     pertuzumab (PERJETA) 420 mg/14 mL SOLN, Infuse into a venous catheter every 21 days  At infusion  center, Disp: , Rfl:     tamoxifen (NOLVADEX) 20 mg tablet, TAKE 1 TABLET DAILY  , Disp: 90 tablet, Rfl: 1    Trastuzumab (HERCEPTIN IV), Infuse into a venous catheter every 21 days  At infusion center, Disp: , Rfl:     VITAMIN D, CHOLECALCIFEROL, PO, Take by mouth daily  , Disp: , Rfl:     No Known Allergies       Malignant neoplasm of upper-inner quadrant of left female breast ([facility] [address] )    12/2015 Initial Diagnosis     Malignant neoplasm of upper-outer quadrant of left female breast ([facility] [address] )         1/27/2016 Surgery     Port placement         2/19/2016 - 6/2/2018 Chemotherapy     Taxotere,  herceptin, perjeta, xgeva  After six cycles, taxotere was discontinued and tamoxifen added     Continues with Perjeta and Herceptin every 3 weeks    - Dr Lucas Pittman           9/16/2016 Surgery     Left breast partial mastectomy         11/7/2016 - 12/23/2016 Radiation     Plan ID Energy Fractions Dose per Fraction (cGy) Total Dose Delivered (cGy) Elapsed Days   Lt Breast 6X 25 / 25 200 5,000 36   Lt Brst Boost 6X 8 / 8 200 1,600 9   Lt Femur 10X 10 / 10 300 3,000 11   Lt PAB 6X 25 / 25 60 1,500 36   Lt Sclav 6X 25 / 25 200 5,000 36                                   Total dose to left breast lumpectomy cavity: 6600 cGy                   Total dose to the supraclavicular and axillary regions: 5000 cGy            Hormone Therapy     Tamoxifen   Dr Andrei Garcia:  11/07/18 Reviewed 13 systems:  Presently no headaches, seizures, dizziness, diplopia, dysphagia, hoarseness, chest pain, palpitations, shortness of breath, cough, hemoptysis, abdominal pain, nausea, vomiting, change in bowel habits, melena, hematuria, fever, chills, bleeding, bone pains, skin rash, weight loss,  tiredness ,  weakness, numbness,   and gait problem  No frequent infections  Not unusually sensitive to  cold  No swelling of the ankles  No swollen glands  Patient is anxious  No GYN symptoms Other symptoms are in HPI        /96 (BP Location: Left arm, Patient Position: Sitting, Cuff Size: Adult)   Pulse 82   Temp 98 6 °F (37 °C)   Resp 20   Ht 5' 6 25" (1 683 m)   Wt 95 3 kg (210 lb 3 2 oz)   SpO2 97%   BMI 33 67 kg/m²      Physical Exam:  Alert, oriented, not in distress, no icterus, no oral thrush, no palpable neck mass, clear lung fields, regular heart rate, abdomen  soft and non tender, no palpable abdominal mass, no ascites, no edema of ankles, no calf tenderness, no focal neurological deficit, no skin rash, no palpable lymphadenopathy, good arterial pulses, no clubbing  Patient is anxious  No lymphedema  She goes to breast surgeon for examination and imaging studies  Performance status 0      IMAGING:      LABS:  Results for orders placed or performed during the hospital encounter of 08/16/18   Cancer antigen 27 29   Result Value Ref Range    CA 27 29 19 9 0 0 - 42 3 U/mL   CBC and differential   Result Value Ref Range    WBC 7 80 4 31 - 10 16 Thousand/uL    Adjusted WBC 7 80 4 31 - 10 16 Thousand/uL    RBC 4 60 3 81 - 5 12 Million/uL    Hemoglobin 13 0 11 5 - 15 4 g/dL    Hematocrit 41 3 34 8 - 46 1 %    MCV 90 82 - 98 fL    MCH 28 2 26 8 - 34 3 pg    MCHC 31 4 31 4 - 37 4 g/dL    RDW 17 2 (H) 11 6 - 15 1 %    MPV 8 8 (L) 8 9 - 12 7 fL    Platelets 716 928 - 234 Thousands/uL   Comprehensive metabolic panel   Result Value Ref Range    Sodium 137 136 - 145 mmol/L    Potassium 3 6 3 5 - 5 3 mmol/L    Chloride 106 98 - 108 mmol/L    CO2 29 21 - 32 mmol/L    ANION GAP 2 (L) 4 - 13 mmol/L    BUN 13 5 - 25 mg/dL    Creatinine 0 80 0 60 - 1 30 mg/dL    Glucose 118 65 - 140 mg/dL    Calcium 9 2 8 3 - 10 1 mg/dL    AST 31 5 - 45 U/L    ALT 28 12 - 78 U/L    Alkaline Phosphatase 65 46 - 116 U/L    Total Protein 6 7 6 4 - 8 2 g/dL    Albumin 3 1 (L) 3 5 - 5 7 g/dL    Total Bilirubin 0 70 0 20 - 1 00 mg/dL    eGFR 86 ml/min/1 73sq m   Manual Differential (Non Wam)   Result Value Ref Range    Segmented % 66 43 - 75 %    Bands % 2 0 - 8 %    Lymphocytes % 25 14 - 44 %    Monocytes % 5 4 - 12 %    Eosinophils, % 2 0 - 6 %    Basophils % 0 0 - 1 %    Neutrophils Absolute 5 30 1 81 - 6 82 Thousand/uL    Lymphocytes Absolute 1 95 0 60 - 4 47 Thousand/uL    Monocytes Absolute 0 39 0 00 - 1 22 Thousand/uL    Eosinophils Absolute 0 16 0 00 - 0 61 Thousand/uL    Basophils Absolute 0 00 0 00 - 0 10 Thousand/uL    Total Counted 100     RBC Morphology      Anisocytosis Present     Platelet Estimate Adequate Adequate     Labs, Imaging, & Other studies:   All pertinent labs and imaging studies were personally reviewed    Lab Results   Component Value Date    K 4 1 11/07/2018     11/07/2018    CO2 28 11/07/2018    BUN 15 11/07/2018    CREATININE 1 10 11/07/2018    GLUF 97 09/29/2017    CALCIUM 9 8 11/07/2018    AST 30 11/07/2018    ALT 26 11/07/2018    ALKPHOS 76 11/07/2018    EGFR 59 11/07/2018     Lab Results   Component Value Date    WBC 7 80 11/07/2018    HGB 14 1 11/07/2018    HCT 40 8 11/07/2018    MCV 86 11/07/2018     11/07/2018     IMPRESSION:     No suspicious pulmonary infiltrates    Minimal peribronchial thickening plus minus component of artifact related to motion      Stable postoperative changes seen in the left breast      Stable left adrenal nodule      Stable osseous lesions as above            Workstation performed: [id number]      Imaging     CT chest abdomen pelvis w contrast (Order #[id number]) on 11/1/2018 - Imaging Information     IMPRESSION:  1  Slightly more prominent focus of radiotracer uptake at the left proximal femur compatible with known osseous metastasis  2  New small focus radiotracer uptake at the left occiput suspicious for osseous metastasis  3   New focus radiotracer uptake at the midline cervical spine, nonspecific  This could be degenerative, underlying osseous lesion is not excluded  This could be further assessed with CT as warranted            Workstation performed: [id number]      Imaging     NM bone scan whole body (Order #[id number]) on 11/1/2018 - Imaging Information     Reviewed and discussed with patient  Assessment and plan:  Patient has stage IV breast cancer with metastatic disease to liver and bones  Presently she is on Herceptin, Perjeta and tamoxifen for triple positive disease  She has been tolerating treatments without much problem  She goes for blood tests and echocardiogram on regular basis  Recently she had CT scans of chest abdomen and pelvis and bone scan and there was question of a 1 new area in the left occipital area  That will be monitored  There was no metastatic disease in the liver at this time  No change in therapy  She did not have genetic testing because lack of insurance coverage for the test and financial reasons  Has some anxiety as before   Cherelle Chu She is not on Xgeva anymore that she had for 2 years  She continues to take    calcium and vitamin-D   Left breast cancer was diagnosed in January 2016  Grade was 3  She had locally advanced left breast cancer with metastases to liver and bones  She was started on a combination of Taxotere and Herceptin and Perjeta  She had very good response  After 6 cycles of systemic therapy Taxotere was discontinued and tamoxifen was added   Because her response was so good she had lumpectomy of left breast in September 2016 followed by radiation therapy and also she received radiation to her left hip for palliation  At the time of lumpectomy there was minimal residual disease, 1 4 cm  Lymph nodes were not sampled  She had radiation to left hip lesion    She has chemotherapy-induced amenorrhea  She gets hot flashes  Occasionally leg cramps at night      For leg cramps at night and she is taking one baby aspirin daily with food in the evening and also one magnesium tablet daily     She has Port-A-Cath        Physical examination and test results are as recorded and discussed especially results of CT scans and bone scan  No change in therapy  Bone scan will be checked in 3 months  Condition discussed and explained  Questions answered  Also discussed the importance of self-breast examination, eating healthy foods, staying active and health screening tests  She is self-care  1  Malignant neoplasm of upper-inner quadrant of left breast in female, estrogen receptor positive (HCC)    - Cancer antigen 27 29; Standing  - CBC and differential; Standing  - Comprehensive metabolic panel; Standing  - Cancer antigen 27 29  - CBC and differential  - Comprehensive metabolic panel  - NM bone scan whole body; Future    2  Liver metastases ([address] )      3  Bone metastasis ([address] )    - NM bone scan whole body; Future    4  Adverse effect of chemotherapy, subsequent encounter    - Echo limited with contrast if indicated; Future          Patient voiced understanding and agreement in the discussion  Counseling / Coordination of Care   Greater than 50% of total time was spent with the patient and / or family counseling and / or coordination of care

## 2018-11-07 NOTE — PROGRESS NOTES
HPI:  Patient has stage IV breast cancer with metastatic disease to liver and bones  Presently she is on Herceptin, Perjeta and tamoxifen for triple positive disease  She has been tolerating treatments without much problem  She goes for blood tests and echocardiogram on regular basis  Recently she had CT scans of chest abdomen and pelvis and bone scan and there was question of a 1 new area in the left occipital area  That will be monitored  There was no metastatic disease in the liver at this time  No change in therapy  She did not have genetic testing because lack of insurance coverage for the test and financial reasons  Has some anxiety as before   [place] She is not on Xgeva anymore that she had for 2 years  She continues to take    calcium and vitamin-D   Left breast cancer was diagnosed in January 2016  Grade was 3  She had locally advanced left breast cancer with metastases to liver and bones  She was started on a combination of Taxotere and Herceptin and Perjeta  She had very good response  After 6 cycles of systemic therapy Taxotere was discontinued and tamoxifen was added  Because her response was so good she had lumpectomy of left breast in September 2016 followed by radiation therapy and also she received radiation to her left hip for palliation  At the time of lumpectomy there was minimal residual disease, 1 4 cm  Lymph nodes were not sampled  She had radiation to left hip lesion    She has chemotherapy-induced amenorrhea  She gets hot flashes  Occasionally leg cramps at night      For leg cramps at night and she is taking one baby aspirin daily with food in the evening and also one magnesium tablet daily     She has Port-A-Cath               Current Outpatient Prescriptions:     Calcium Carbonate-Vit D-Min (CALCIUM 1200 PO), Take 1 tablet by mouth daily    , Disp: , Rfl:     Magnesium 250 MG TABS, Take 1 tablet by mouth daily, Disp: , Rfl:     multivitamin (THERAGRAN) TABS, Take 1 tablet by mouth daily  , Disp: , Rfl:     pertuzumab (PERJETA) 420 mg/14 mL SOLN, Infuse into a venous catheter every 21 days  At infusion  center, Disp: , Rfl:     tamoxifen (NOLVADEX) 20 mg tablet, TAKE 1 TABLET DAILY  , Disp: 90 tablet, Rfl: 1    Trastuzumab (HERCEPTIN IV), Infuse into a venous catheter every 21 days  At infusion center, Disp: , Rfl:     VITAMIN D, CHOLECALCIFEROL, PO, Take by mouth daily  , Disp: , Rfl:     No Known Allergies       Malignant neoplasm of upper-inner quadrant of left female breast ([facility] [address] )    12/2015 Initial Diagnosis     Malignant neoplasm of upper-outer quadrant of left female breast ([facility] [address] )         1/27/2016 Surgery     Port placement         2/19/2016 - 6/2/2018 Chemotherapy     Taxotere,  herceptin, perjeta, xgeva  After six cycles, taxotere was discontinued and tamoxifen added  Continues with Perjeta and Herceptin every 3 weeks    - Dr Leroy Gerrad           9/16/2016 Surgery     Left breast partial mastectomy         11/7/2016 - 12/23/2016 Radiation     Plan ID Energy Fractions Dose per Fraction (cGy) Total Dose Delivered (cGy) Elapsed Days   Lt Breast 6X 25 / 25 200 5,000 36   Lt Brst Boost 6X 8 / 8 200 1,600 9   Lt Femur 10X 10 / 10 300 3,000 11   Lt PAB 6X 25 / 25 60 1,500 36   Lt Sclav 6X 25 / 25 200 5,000 36                                   Total dose to left breast lumpectomy cavity: 6600 cGy                   Total dose to the supraclavicular and axillary regions: 5000 cGy            Hormone Therapy     Tamoxifen   Dr Leroy Gerard            ROS:  11/07/18 Reviewed 13 systems:  Presently no headaches, seizures, dizziness, diplopia, dysphagia, hoarseness, chest pain, palpitations, shortness of breath, cough, hemoptysis, abdominal pain, nausea, vomiting, change in bowel habits, melena, hematuria, fever, chills, bleeding, bone pains, skin rash, weight loss,  tiredness ,  weakness, numbness,   and gait problem  No frequent infections  Not unusually sensitive to  cold   No swelling of the ankles  No swollen glands  Patient is anxious  No GYN symptoms Other symptoms are in HPI        /96 (BP Location: Left arm, Patient Position: Sitting, Cuff Size: Adult)   Pulse 82   Temp 98 6 °F (37 °C)   Resp 20   Ht 5' 6 25" (1 683 m)   Wt 95 3 kg (210 lb 3 2 oz)   SpO2 97%   BMI 33 67 kg/m²     Physical Exam:  Alert, oriented, not in distress, no icterus, no oral thrush, no palpable neck mass, clear lung fields, regular heart rate, abdomen  soft and non tender, no palpable abdominal mass, no ascites, no edema of ankles, no calf tenderness, no focal neurological deficit, no skin rash, no palpable lymphadenopathy, good arterial pulses, no clubbing  Patient is anxious  No lymphedema  She goes to breast surgeon for examination and imaging studies  Performance status 0      IMAGING:      LABS:  Results for orders placed or performed during the hospital encounter of 08/16/18   Cancer antigen 27 29   Result Value Ref Range    CA 27 29 19 9 0 0 - 42 3 U/mL   CBC and differential   Result Value Ref Range    WBC 7 80 4 31 - 10 16 Thousand/uL    Adjusted WBC 7 80 4 31 - 10 16 Thousand/uL    RBC 4 60 3 81 - 5 12 Million/uL    Hemoglobin 13 0 11 5 - 15 4 g/dL    Hematocrit 41 3 34 8 - 46 1 %    MCV 90 82 - 98 fL    MCH 28 2 26 8 - 34 3 pg    MCHC 31 4 31 4 - 37 4 g/dL    RDW 17 2 (H) 11 6 - 15 1 %    MPV 8 8 (L) 8 9 - 12 7 fL    Platelets 050 369 - 648 Thousands/uL   Comprehensive metabolic panel   Result Value Ref Range    Sodium 137 136 - 145 mmol/L    Potassium 3 6 3 5 - 5 3 mmol/L    Chloride 106 98 - 108 mmol/L    CO2 29 21 - 32 mmol/L    ANION GAP 2 (L) 4 - 13 mmol/L    BUN 13 5 - 25 mg/dL    Creatinine 0 80 0 60 - 1 30 mg/dL    Glucose 118 65 - 140 mg/dL    Calcium 9 2 8 3 - 10 1 mg/dL    AST 31 5 - 45 U/L    ALT 28 12 - 78 U/L    Alkaline Phosphatase 65 46 - 116 U/L    Total Protein 6 7 6 4 - 8 2 g/dL    Albumin 3 1 (L) 3 5 - 5 7 g/dL    Total Bilirubin 0 70 0 20 - 1 00 mg/dL    eGFR 86 ml/min/1 73sq m   Manual Differential (Non Wam)   Result Value Ref Range    Segmented % 66 43 - 75 %    Bands % 2 0 - 8 %    Lymphocytes % 25 14 - 44 %    Monocytes % 5 4 - 12 %    Eosinophils, % 2 0 - 6 %    Basophils % 0 0 - 1 %    Neutrophils Absolute 5 30 1 81 - 6 82 Thousand/uL    Lymphocytes Absolute 1 95 0 60 - 4 47 Thousand/uL    Monocytes Absolute 0 39 0 00 - 1 22 Thousand/uL    Eosinophils Absolute 0 16 0 00 - 0 61 Thousand/uL    Basophils Absolute 0 00 0 00 - 0 10 Thousand/uL    Total Counted 100     RBC Morphology      Anisocytosis Present     Platelet Estimate Adequate Adequate     Labs, Imaging, & Other studies:   All pertinent labs and imaging studies were personally reviewed    Lab Results   Component Value Date    K 4 1 11/07/2018     11/07/2018    CO2 28 11/07/2018    BUN 15 11/07/2018    CREATININE 1 10 11/07/2018    GLUF 97 09/29/2017    CALCIUM 9 8 11/07/2018    AST 30 11/07/2018    ALT 26 11/07/2018    ALKPHOS 76 11/07/2018    EGFR 59 11/07/2018     Lab Results   Component Value Date    WBC 7 80 11/07/2018    HGB 14 1 11/07/2018    HCT 40 8 11/07/2018    MCV 86 11/07/2018     11/07/2018     IMPRESSION:     No suspicious pulmonary infiltrates  Minimal peribronchial thickening plus minus component of artifact related to motion      Stable postoperative changes seen in the left breast      Stable left adrenal nodule      Stable osseous lesions as above            Workstation performed: [id number]      Imaging     CT chest abdomen pelvis w contrast (Order #[id number]) on 11/1/2018 - Imaging Information     IMPRESSION:  1  Slightly more prominent focus of radiotracer uptake at the left proximal femur compatible with known osseous metastasis  2  New small focus radiotracer uptake at the left occiput suspicious for osseous metastasis  3   New focus radiotracer uptake at the midline cervical spine, nonspecific  This could be degenerative, underlying osseous lesion is not excluded  This could be further assessed with CT as warranted            Workstation performed: [id number]      Imaging     NM bone scan whole body (Order #[id number]) on 11/1/2018 - Imaging Information     Reviewed and discussed with patient  Assessment and plan:  Patient has stage IV breast cancer with metastatic disease to liver and bones  Presently she is on Herceptin, Perjeta and tamoxifen for triple positive disease  She has been tolerating treatments without much problem  She goes for blood tests and echocardiogram on regular basis  Recently she had CT scans of chest abdomen and pelvis and bone scan and there was question of a 1 new area in the left occipital area  That will be monitored  There was no metastatic disease in the liver at this time  No change in therapy  She did not have genetic testing because lack of insurance coverage for the test and financial reasons  Has some anxiety as before   Alber Christian She is not on Xgeva anymore that she had for 2 years  She continues to take    calcium and vitamin-D   Left breast cancer was diagnosed in January 2016  Grade was 3  She had locally advanced left breast cancer with metastases to liver and bones  She was started on a combination of Taxotere and Herceptin and Perjeta  She had very good response  After 6 cycles of systemic therapy Taxotere was discontinued and tamoxifen was added  Because her response was so good she had lumpectomy of left breast in September 2016 followed by radiation therapy and also she received radiation to her left hip for palliation  At the time of lumpectomy there was minimal residual disease, 1 4 cm  Lymph nodes were not sampled  She had radiation to left hip lesion    She has chemotherapy-induced amenorrhea  She gets hot flashes   Occasionally leg cramps at night      For leg cramps at night and she is taking one baby aspirin daily with food in the evening and also one magnesium tablet daily     She has Port-A-Cath        Physical examination and test results are as recorded and discussed especially results of CT scans and bone scan  No change in therapy  Bone scan will be checked in 3 months  Condition discussed and explained  Questions answered  Also discussed the importance of self-breast examination, eating healthy foods, staying active and health screening tests  She is self-care  1  Malignant neoplasm of upper-inner quadrant of left breast in female, estrogen receptor positive (HCC)    - Cancer antigen 27 29; Standing  - CBC and differential; Standing  - Comprehensive metabolic panel; Standing  - Cancer antigen 27 29  - CBC and differential  - Comprehensive metabolic panel  - NM bone scan whole body; Future    2  Liver metastases ([address] )      3  Bone metastasis ([facility] [address] )    - NM bone scan whole body; Future    4  Adverse effect of chemotherapy, subsequent encounter    - Echo limited with contrast if indicated; Future          Patient voiced understanding and agreement in the discussion  Counseling / Coordination of Care   Greater than 50% of total time was spent with the patient and / or family counseling and / or coordination of care

## 2018-11-08 RX ORDER — SODIUM CHLORIDE 9 MG/ML
20 INJECTION, SOLUTION INTRAVENOUS CONTINUOUS
Status: DISCONTINUED | OUTPATIENT
Start: 2018-11-09 | End: 2018-11-12 | Stop reason: HOSPADM

## 2018-11-09 ENCOUNTER — HOSPITAL ENCOUNTER (OUTPATIENT)
Dept: INFUSION CENTER | Facility: CLINIC | Age: 53
Discharge: HOME | End: 2018-11-09
Payer: COMMERCIAL

## 2018-11-09 VITALS
WEIGHT: 215.83 LBS | TEMPERATURE: 98.1 F | RESPIRATION RATE: 16 BRPM | BODY MASS INDEX: 34.57 KG/M2 | HEART RATE: 73 BPM | DIASTOLIC BLOOD PRESSURE: 78 MMHG | SYSTOLIC BLOOD PRESSURE: 118 MMHG

## 2018-11-09 PROCEDURE — 96417 CHEMO IV INFUS EACH ADDL SEQ: CPT

## 2018-11-09 PROCEDURE — 96413 CHEMO IV INFUSION 1 HR: CPT

## 2018-11-09 RX ADMIN — SODIUM CHLORIDE 20 ML/HR: 0.9 INJECTION, SOLUTION INTRAVENOUS at 14:45

## 2018-11-09 RX ADMIN — PERTUZUMAB 420 MG: 30 INJECTION, SOLUTION, CONCENTRATE INTRAVENOUS at 15:11

## 2018-11-09 RX ADMIN — Medication 300 UNITS: at 16:25

## 2018-11-09 RX ADMIN — TRASTUZUMAB 600 MG: 150 INJECTION, POWDER, LYOPHILIZED, FOR SOLUTION INTRAVENOUS at 15:46

## 2018-11-09 NOTE — PROGRESS NOTES
Pt  Denies new symptoms or concerns at this time  No lab parameters  EF from 11/2/18 75 %  Perjeta and Herceptin ordered for infusion today

## 2018-11-09 NOTE — PROGRESS NOTES
Pt  Tolerated Perjeta and Herceptin without adverse event  Future appointments reviewed  AVS provided

## 2018-11-09 NOTE — PLAN OF CARE
Problem: PAIN - ADULT  Goal: Verbalizes/displays adequate comfort level or baseline comfort level  Interventions:  - Encourage patient to monitor pain and request assistance  - Assess pain using appropriate pain scale  - Administer analgesics based on type and severity of pain and evaluate response  - Implement non-pharmacological measures as appropriate and evaluate response  - Consider cultural and social influences on pain and pain management  - Notify physician/advanced practitioner if interventions unsuccessful or patient reports new pain  Outcome: Progressing      Problem: INFECTION - ADULT  Goal: Absence or prevention of progression during hospitalization  INTERVENTIONS:  - Assess and monitor for signs and symptoms of infection  - Monitor lab/diagnostic results  - Monitor all insertion sites, i e  indwelling lines, tubes, and drains  - Monitor endotracheal (as able) and nasal secretions for changes in amount and color  - [place] appropriate cooling/warming therapies per order  - Administer medications as ordered  - Instruct and encourage patient and family to use good hand hygiene technique  - Identify and instruct in appropriate isolation precautions for identified infection/condition  Outcome: Progressing    Goal: Absence of fever/infection during neutropenic period  INTERVENTIONS:  - Monitor WBC  - Implement neutropenic guidelines  Outcome: Progressing      Problem: Knowledge Deficit  Goal: Patient/family/caregiver demonstrates understanding of disease process, treatment plan, medications, and discharge instructions  Complete learning assessment and assess knowledge base    Interventions:  - Provide teaching at level of understanding  - Provide teaching via preferred learning methods  Outcome: Progressing

## 2018-11-20 DIAGNOSIS — C50.412 MALIGNANT NEOPLASM OF UPPER-OUTER QUADRANT OF LEFT BREAST IN FEMALE, ESTROGEN RECEPTOR POSITIVE (HCC): ICD-10-CM

## 2018-11-20 DIAGNOSIS — Z17.0 MALIGNANT NEOPLASM OF UPPER-OUTER QUADRANT OF LEFT BREAST IN FEMALE, ESTROGEN RECEPTOR POSITIVE (HCC): ICD-10-CM

## 2018-11-20 RX ORDER — TAMOXIFEN CITRATE 20 MG/1
20 TABLET ORAL DAILY
Qty: 90 TABLET | Refills: 0 | Status: SHIPPED | OUTPATIENT
Start: 2018-11-20 | End: 2019-05-17 | Stop reason: SDUPTHER

## 2018-11-29 RX ORDER — SODIUM CHLORIDE 9 MG/ML
20 INJECTION, SOLUTION INTRAVENOUS CONTINUOUS
Status: DISCONTINUED | OUTPATIENT
Start: 2018-11-30 | End: 2018-12-03 | Stop reason: HOSPADM

## 2018-11-30 ENCOUNTER — HOSPITAL ENCOUNTER (OUTPATIENT)
Dept: INFUSION CENTER | Facility: CLINIC | Age: 53
Discharge: HOME | End: 2018-11-30
Payer: COMMERCIAL

## 2018-11-30 VITALS
BODY MASS INDEX: 33.9 KG/M2 | RESPIRATION RATE: 16 BRPM | DIASTOLIC BLOOD PRESSURE: 99 MMHG | WEIGHT: 211.64 LBS | TEMPERATURE: 98 F | HEART RATE: 73 BPM | SYSTOLIC BLOOD PRESSURE: 170 MMHG

## 2018-11-30 PROCEDURE — 36593 DECLOT VASCULAR DEVICE: CPT

## 2018-11-30 PROCEDURE — 96413 CHEMO IV INFUSION 1 HR: CPT

## 2018-11-30 PROCEDURE — 96417 CHEMO IV INFUS EACH ADDL SEQ: CPT

## 2018-11-30 RX ADMIN — PERTUZUMAB 420 MG: 30 INJECTION, SOLUTION, CONCENTRATE INTRAVENOUS at 15:33

## 2018-11-30 RX ADMIN — ALTEPLASE 2 MG: 2.2 INJECTION, POWDER, LYOPHILIZED, FOR SOLUTION INTRAVENOUS at 15:26

## 2018-11-30 RX ADMIN — SODIUM CHLORIDE 20 ML/HR: 0.9 INJECTION, SOLUTION INTRAVENOUS at 15:15

## 2018-11-30 RX ADMIN — TRASTUZUMAB 600 MG: 150 INJECTION, POWDER, LYOPHILIZED, FOR SOLUTION INTRAVENOUS at 16:04

## 2018-11-30 RX ADMIN — Medication 300 UNITS: at 16:08

## 2018-11-30 NOTE — PROGRESS NOTES
Pt arrived to unit today without complaint  No blood return from PAC, cathflo instilled, positive blood return post cathflo  Pt received Perjeta and Herceptin as ordered, tolerated well, no adverse events  AVS provided  Pt left unit without questions/concerns

## 2018-12-20 RX ORDER — SODIUM CHLORIDE 9 MG/ML
20 INJECTION, SOLUTION INTRAVENOUS CONTINUOUS
Status: DISCONTINUED | OUTPATIENT
Start: 2018-12-21 | End: 2018-12-24 | Stop reason: HOSPADM

## 2018-12-21 ENCOUNTER — HOSPITAL ENCOUNTER (OUTPATIENT)
Dept: INFUSION CENTER | Facility: CLINIC | Age: 53
Discharge: HOME | End: 2018-12-21
Payer: COMMERCIAL

## 2018-12-21 VITALS
TEMPERATURE: 98.6 F | BODY MASS INDEX: 34.26 KG/M2 | DIASTOLIC BLOOD PRESSURE: 72 MMHG | RESPIRATION RATE: 18 BRPM | SYSTOLIC BLOOD PRESSURE: 148 MMHG | WEIGHT: 213.85 LBS | HEART RATE: 78 BPM

## 2018-12-21 PROCEDURE — 96417 CHEMO IV INFUS EACH ADDL SEQ: CPT

## 2018-12-21 PROCEDURE — 96413 CHEMO IV INFUSION 1 HR: CPT

## 2018-12-21 RX ADMIN — TRASTUZUMAB 600 MG: 150 INJECTION, POWDER, LYOPHILIZED, FOR SOLUTION INTRAVENOUS at 15:34

## 2018-12-21 RX ADMIN — PERTUZUMAB 420 MG: 30 INJECTION, SOLUTION, CONCENTRATE INTRAVENOUS at 15:00

## 2018-12-21 RX ADMIN — SODIUM CHLORIDE 20 ML/HR: 0.9 INJECTION, SOLUTION INTRAVENOUS at 14:47

## 2018-12-21 RX ADMIN — Medication 300 UNITS: at 16:11

## 2019-01-10 RX ORDER — SODIUM CHLORIDE 9 MG/ML
20 INJECTION, SOLUTION INTRAVENOUS CONTINUOUS
Status: DISCONTINUED | OUTPATIENT
Start: 2019-01-11 | End: 2019-01-14 | Stop reason: HOSPADM

## 2019-01-11 ENCOUNTER — HOSPITAL ENCOUNTER (OUTPATIENT)
Dept: INFUSION CENTER | Facility: CLINIC | Age: 54
Discharge: HOME | End: 2019-01-11
Payer: COMMERCIAL

## 2019-01-11 VITALS
WEIGHT: 214.73 LBS | BODY MASS INDEX: 34.4 KG/M2 | SYSTOLIC BLOOD PRESSURE: 153 MMHG | TEMPERATURE: 98.2 F | RESPIRATION RATE: 18 BRPM | HEART RATE: 68 BPM | DIASTOLIC BLOOD PRESSURE: 91 MMHG

## 2019-01-11 PROCEDURE — 96417 CHEMO IV INFUS EACH ADDL SEQ: CPT

## 2019-01-11 PROCEDURE — 96413 CHEMO IV INFUSION 1 HR: CPT

## 2019-01-11 RX ADMIN — SODIUM CHLORIDE 20 ML/HR: 0.9 INJECTION, SOLUTION INTRAVENOUS at 15:00

## 2019-01-11 RX ADMIN — TRASTUZUMAB 600 MG: 150 INJECTION, POWDER, LYOPHILIZED, FOR SOLUTION INTRAVENOUS at 15:39

## 2019-01-11 RX ADMIN — PERTUZUMAB 420 MG: 30 INJECTION, SOLUTION, CONCENTRATE INTRAVENOUS at 15:08

## 2019-01-11 NOTE — PLAN OF CARE
Problem: Potential for Falls  Goal: Patient will remain free of falls  INTERVENTIONS:  - Assess patient frequently for physical needs  -  Identify cognitive and physical deficits and behaviors that affect risk of falls    -  [place] fall precautions as indicated by assessment   - Educate patient/family on patient safety including physical limitations  - Instruct patient to call for assistance with activity based on assessment  - Modify environment to reduce risk of injury  - Consider OT/PT consult to assist with strengthening/mobility   Outcome: Progressing

## 2019-01-31 RX ORDER — SODIUM CHLORIDE 9 MG/ML
20 INJECTION, SOLUTION INTRAVENOUS CONTINUOUS
Status: DISCONTINUED | OUTPATIENT
Start: 2019-02-01 | End: 2019-02-04 | Stop reason: HOSPADM

## 2019-02-01 ENCOUNTER — HOSPITAL ENCOUNTER (OUTPATIENT)
Dept: INFUSION CENTER | Facility: CLINIC | Age: 54
Discharge: HOME | End: 2019-02-01
Payer: COMMERCIAL

## 2019-02-01 ENCOUNTER — HOSPITAL ENCOUNTER (OUTPATIENT)
Dept: NON INVASIVE DIAGNOSTICS | Facility: CLINIC | Age: 54
Discharge: HOME | End: 2019-02-01
Payer: COMMERCIAL

## 2019-02-01 VITALS
RESPIRATION RATE: 16 BRPM | HEART RATE: 79 BPM | TEMPERATURE: 99.5 F | SYSTOLIC BLOOD PRESSURE: 151 MMHG | WEIGHT: 219.14 LBS | BODY MASS INDEX: 35.1 KG/M2 | DIASTOLIC BLOOD PRESSURE: 80 MMHG

## 2019-02-01 DIAGNOSIS — T45.1X5D ADVERSE EFFECT OF CHEMOTHERAPY, SUBSEQUENT ENCOUNTER: ICD-10-CM

## 2019-02-01 PROCEDURE — 93308 TTE F-UP OR LMTD: CPT

## 2019-02-01 PROCEDURE — 93321 DOPPLER ECHO F-UP/LMTD STD: CPT | Performed by: INTERNAL MEDICINE

## 2019-02-01 PROCEDURE — 96413 CHEMO IV INFUSION 1 HR: CPT

## 2019-02-01 PROCEDURE — 93325 DOPPLER ECHO COLOR FLOW MAPG: CPT | Performed by: INTERNAL MEDICINE

## 2019-02-01 PROCEDURE — 93308 TTE F-UP OR LMTD: CPT | Performed by: INTERNAL MEDICINE

## 2019-02-01 PROCEDURE — 96417 CHEMO IV INFUS EACH ADDL SEQ: CPT

## 2019-02-01 RX ADMIN — SODIUM CHLORIDE 20 ML/HR: 9 INJECTION, SOLUTION INTRAVENOUS at 14:25

## 2019-02-01 RX ADMIN — PERTUZUMAB 420 MG: 30 INJECTION, SOLUTION, CONCENTRATE INTRAVENOUS at 14:40

## 2019-02-01 RX ADMIN — TRASTUZUMAB 600 MG: 150 INJECTION, POWDER, LYOPHILIZED, FOR SOLUTION INTRAVENOUS at 15:16

## 2019-02-01 NOTE — PLAN OF CARE
Problem: Potential for Falls  Goal: Patient will remain free of falls  INTERVENTIONS:  - Assess patient frequently for physical needs  -  Identify cognitive and physical deficits and behaviors that affect risk of falls    -  [place] fall precautions as indicated by assessment   - Educate patient/family on patient safety including physical limitations  - Instruct patient to call for assistance with activity based on assessment  - Modify environment to reduce risk of injury  - Consider OT/PT consult to assist with strengthening/mobility   Outcome: Progressing

## 2019-02-07 ENCOUNTER — HOSPITAL ENCOUNTER (OUTPATIENT)
Dept: NUCLEAR MEDICINE | Facility: HOSPITAL | Age: 54
Discharge: HOME | End: 2019-02-07
Attending: INTERNAL MEDICINE
Payer: COMMERCIAL

## 2019-02-07 DIAGNOSIS — Z17.0 MALIGNANT NEOPLASM OF UPPER-INNER QUADRANT OF LEFT BREAST IN FEMALE, ESTROGEN RECEPTOR POSITIVE (HCC): ICD-10-CM

## 2019-02-07 DIAGNOSIS — C79.51 BONE METASTASIS (HCC): ICD-10-CM

## 2019-02-07 DIAGNOSIS — C50.212 MALIGNANT NEOPLASM OF UPPER-INNER QUADRANT OF LEFT BREAST IN FEMALE, ESTROGEN RECEPTOR POSITIVE (HCC): ICD-10-CM

## 2019-02-07 PROCEDURE — A9503 TC99M MEDRONATE: HCPCS

## 2019-02-07 PROCEDURE — 78306 BONE IMAGING WHOLE BODY: CPT

## 2019-02-13 ENCOUNTER — APPOINTMENT (OUTPATIENT)
Dept: LAB | Facility: CLINIC | Age: 54
End: 2019-02-13
Payer: COMMERCIAL

## 2019-02-13 ENCOUNTER — OFFICE VISIT (OUTPATIENT)
Dept: HEMATOLOGY ONCOLOGY | Facility: CLINIC | Age: 54
End: 2019-02-13
Payer: COMMERCIAL

## 2019-02-13 VITALS
SYSTOLIC BLOOD PRESSURE: 142 MMHG | TEMPERATURE: 98.3 F | HEIGHT: 66 IN | BODY MASS INDEX: 35.03 KG/M2 | OXYGEN SATURATION: 96 % | RESPIRATION RATE: 17 BRPM | WEIGHT: 218 LBS | HEART RATE: 76 BPM | DIASTOLIC BLOOD PRESSURE: 74 MMHG

## 2019-02-13 DIAGNOSIS — C79.51 BONE METASTASIS (HCC): ICD-10-CM

## 2019-02-13 DIAGNOSIS — Z17.0 MALIGNANT NEOPLASM OF UPPER-INNER QUADRANT OF LEFT BREAST IN FEMALE, ESTROGEN RECEPTOR POSITIVE (HCC): Primary | ICD-10-CM

## 2019-02-13 DIAGNOSIS — C50.212 MALIGNANT NEOPLASM OF UPPER-INNER QUADRANT OF LEFT BREAST IN FEMALE, ESTROGEN RECEPTOR POSITIVE (HCC): Primary | ICD-10-CM

## 2019-02-13 DIAGNOSIS — C78.7 LIVER METASTASES (HCC): ICD-10-CM

## 2019-02-13 PROCEDURE — 99214 OFFICE O/P EST MOD 30 MIN: CPT | Performed by: INTERNAL MEDICINE

## 2019-02-13 NOTE — LETTER
February 13, 2019     Michael Valadez MD  [address] [facility] [address]    Patient: Roseanne Esparza   YOB: 1965   Date of Visit: 2/13/2019       Dear Dr Trice Ribera Recipients: Thank you for referring Roseanne Esparza to me for evaluation  Below are my notes for this consultation  If you have questions, please do not hesitate to call me  I look forward to following your patient along with you  Sincerely,        Lynne Goltz, MD        CC: No Recipients  Lynne Goltz, MD  2/13/2019  3:28 PM  Sign at close encounter    HPI:  In January 2016 patient was diagnosed to have  locally advanced left breast cancer with metastases to liver and bones  She was started on a combination of Taxotere and Herceptin and Perjeta  She had very good response  After 6 cycles of systemic therapy Taxotere was discontinued and tamoxifen was added  Because her response was so good she had lumpectomy of left breast in September 2016 followed by radiation therapy and also she received radiation to her left hip for palliation  At the time of lumpectomy there was minimal residual disease, 1 4 cm  Lymph nodes were not sampled  She had radiation to left hip lesion    Presently she is on Herceptin, Perjeta and tamoxifen for triple positive disease  She has been tolerating treatments without much problem  She goes for blood tests and echocardiogram on regular basis  Recently she had CT scans of chest abdomen and pelvis and bone scan and there was question of a 1 new area in the left occipital area  On follow-up bone scan there is diminished activity in the left occipital area and left femur-responding  There was no metastatic disease in the liver at this time  She has burning sensation in her hands and feet for a day post treatment  She has chemotherapy-induced amenorrhea  She gets hot flashes  Occasionally leg cramps at night      Has some tiredness    For leg cramps at night and she is taking one baby aspirin daily with food in the evening and also one magnesium tablet daily       Has some anxiety as before   Elise Carrillo She is not on Xgeva anymore that she had for 2 years  Johnnie Bhagat continues to take    calcium and vitamin-D                Current Outpatient Medications:     Calcium Carbonate-Vit D-Min (CALCIUM 1200 PO), Take 1 tablet by mouth daily  , Disp: , Rfl:     Magnesium 250 MG TABS, Take 1 tablet by mouth daily, Disp: , Rfl:     multivitamin (THERAGRAN) TABS, Take 1 tablet by mouth daily  , Disp: , Rfl:     pertuzumab (PERJETA) 420 mg/14 mL SOLN, Infuse into a venous catheter every 21 days  At infusion  center, Disp: , Rfl:     tamoxifen (NOLVADEX) 20 mg tablet, Take 1 tablet (20 mg total) by mouth daily, Disp: 90 tablet, Rfl: 0    Trastuzumab (HERCEPTIN IV), Infuse into a venous catheter every 21 days  At infusion center, Disp: , Rfl:     VITAMIN D, CHOLECALCIFEROL, PO, Take by mouth daily  , Disp: , Rfl:     No Known Allergies       Malignant neoplasm of upper-inner quadrant of left female breast ([facility] )    12/2015 Initial Diagnosis     Malignant neoplasm of upper-outer quadrant of left female breast ([facility] [facility] )         1/27/2016 Surgery     Port placement         2/19/2016 - 6/2/2018 Chemotherapy     Taxotere,  herceptin, perjeta, xgeva  After six cycles, taxotere was discontinued and tamoxifen added     Continues with Perjeta and Herceptin every 3 weeks    - Dr Bina Contreras           9/16/2016 Surgery     Left breast partial mastectomy         11/7/2016 - 12/23/2016 Radiation     Plan ID Energy Fractions Dose per Fraction (cGy) Total Dose Delivered (cGy) Elapsed Days   Lt Breast 6X 25 / 25 200 5,000 36   Lt Brst Boost 6X 8 / 8 200 1,600 9   Lt Femur 10X 10 / 10 300 3,000 11   Lt PAB 6X 25 / 25 60 1,500 36   Lt Sclav 6X 25 / 25 200 5,000 36                                   Total dose to left breast lumpectomy cavity: 6600 cGy                   Total dose to the supraclavicular and axillary regions: 5000 cGy Hormone Therapy     Tamoxifen   Dr Maribel Flores            ROS:  02/13/19 Reviewed 13 systems:  Presently no other neurological, cardiac, pulmonary, GI and  symptoms other than mentioned above  No other symptoms like fever, chills, bleeding, bone pains, skin rash, weight loss, arthritic symptoms,   weakness, numbness,  claudication and gait problem  No frequent infections  Not unusually sensitive to cold  No swelling of the ankles  No swollen glands  Patient is anxious  Other symptoms are in HPI        /74 (BP Location: Right arm, Patient Position: Sitting)   Pulse 76   Temp 98 3 °F (36 8 °C) (Tympanic)   Resp 17   Ht 5' 6" (1 676 m)   Wt 98 9 kg (218 lb)   SpO2 96%   BMI 35 19 kg/m²      Physical Exam:  Alert, oriented, not in distress, no icterus, no oral thrush, no palpable neck mass, clear lung fields, regular heart rate, abdomen  soft and non tender, no palpable abdominal mass, no ascites, no edema of ankles, no calf tenderness, no focal neurological deficit, no skin rash, no palpable lymphadenopathy in the neck and axillary areas, good arterial pulses, no clubbing  Patient is anxious  Performance status 1  IMAGING:  IMPRESSION:     1  Diminished intensity of metastatic lesion in the proximal left femur and left occiput  2   There is no scintigraphic evidence of new osseous metastasis               Workstation performed: [id number]      Imaging     NM bone scan whole body (Order: [id number]) - 2/7/2019     Ejection fraction 70%  LABS:  Results for orders placed or performed in visit on 01/25/19   CBC and differential   Result Value Ref Range    WBC 8 36 4 31 - 10 16 Thousand/uL    RBC 4 64 3 81 - 5 12 Million/uL    Hemoglobin 12 8 11 5 - 15 4 g/dL    Hematocrit 39 7 34 8 - 46 1 %    MCV 86 82 - 98 fL    MCH 27 6 26 8 - 34 3 pg    MCHC 32 2 31 4 - 37 4 g/dL    RDW 14 3 11 6 - 15 1 %    MPV 10 1 8 9 - 12 7 fL    Platelets 695 847 - 236 Thousands/uL    Neutrophils Relative 62 43 - 75 % Lymphocytes Relative 25 14 - 44 %    Monocytes Relative 7 4 - 12 %    Eosinophils Relative 6 0 - 6 %    Basophils Relative 0 0 - 1 %    Neutrophils Absolute 5 23 1 85 - 7 62 Thousands/µL    Lymphocytes Absolute 2 05 0 60 - 4 47 Thousands/µL    Monocytes Absolute 0 59 0 17 - 1 22 Thousand/µL    Eosinophils Absolute 0 47 0 00 - 0 61 Thousand/µL    Basophils Absolute 0 02 0 00 - 0 10 Thousands/µL   Comprehensive metabolic panel   Result Value Ref Range    Sodium 141 136 - 145 mmol/L    Potassium 3 6 3 5 - 5 3 mmol/L    Chloride 108 98 - 108 mmol/L    CO2 27 21 - 32 mmol/L    ANION GAP 6 4 - 13 mmol/L    BUN 15 5 - 25 mg/dL    Creatinine 0 70 0 60 - 1 30 mg/dL    Glucose 96 65 - 140 mg/dL    Calcium 9 2 8 3 - 10 1 mg/dL    AST 28 5 - 45 U/L    ALT 27 12 - 78 U/L    Alkaline Phosphatase 103 46 - 116 U/L    Total Protein 7 0 6 4 - 8 2 g/dL    Albumin 3 0 (L) 3 5 - 5 7 g/dL    Total Bilirubin 0 60 0 20 - 1 00 mg/dL    eGFR 101 ml/min/1 73sq m    2/13/19:  Normal chemistry profile except albumin 3  Normal blood counts  Labs, Imaging, & Other studies:   All pertinent labs and imaging studies were personally reviewed    Lab Results   Component Value Date    K 3 6 02/13/2019     02/13/2019    CO2 27 02/13/2019    BUN 15 02/13/2019    CREATININE 0 70 02/13/2019    GLUF 97 09/29/2017    CALCIUM 9 2 02/13/2019    AST 28 02/13/2019    ALT 27 02/13/2019    ALKPHOS 103 02/13/2019    EGFR 101 02/13/2019     Lab Results   Component Value Date    WBC 8 36 02/13/2019    HGB 12 8 02/13/2019    HCT 39 7 02/13/2019    MCV 86 02/13/2019     02/13/2019   No results found for: [address] [place] [address] [place] and discussed with patient  Assessment and plan: In January 2016 patient was diagnosed to have  locally advanced left breast cancer with metastases to liver and bones  She was started on a combination of Taxotere and Herceptin and Perjeta  She had very good response   After 6 cycles of systemic therapy Taxotere was discontinued and tamoxifen was added  Because her response was so good she had lumpectomy of left breast in September 2016 followed by radiation therapy and also she received radiation to her left hip for palliation  At the time of lumpectomy there was minimal residual disease, 1 4 cm  Lymph nodes were not sampled  She had radiation to left hip lesion    Presently she is on Herceptin, Perjeta and tamoxifen for triple positive disease  She has been tolerating treatments without much problem  She goes for blood tests and echocardiogram on regular basis  Recently she had CT scans of chest abdomen and pelvis and bone scan and there was question of a 1 new area in the left occipital area  On follow-up bone scan there is diminished activity in the left occipital area and left femur-responding  There was no metastatic disease in the liver at this time  She has burning sensation in her hands and feet for a day post treatment  She has chemotherapy-induced amenorrhea  She gets hot flashes  Occasionally leg cramps at night  Has some tiredness    For leg cramps at night and she is taking one baby aspirin daily with food in the evening and also one magnesium tablet daily       Has some anxiety as before   Dara Soulier She is not on Xgeva anymore that she had for 2 years  Carmen Palmer continues to take    calcium and vitamin-D     No change in therapy  She did not have genetic testing because lack of insurance coverage for the test and financial reasons       Physical examination and test results are as recorded and discussed especially results of new bone scan  CA 27-29 report is pending  No change in therapy     Condition discussed and explained  Questions answered  Also discussed the importance of self-breast examination, eating healthy foods, staying active and health screening tests  She is capable of self-care  1  Malignant neoplasm of upper-inner quadrant of left breast in female, estrogen receptor positive ([facility] [address] )      2   Liver metastases ([facility] )      3  Bone metastasis ([facility] )          Patient voiced understanding and agreement in the discussion  Counseling / Coordination of Care   Greater than 50% of total time was spent with the patient and / or family counseling and / or coordination of care

## 2019-02-13 NOTE — PROGRESS NOTES
HPI:  In January 2016 patient was diagnosed to have  locally advanced left breast cancer with metastases to liver and bones  She was started on a combination of Taxotere and Herceptin and Perjeta  She had very good response  After 6 cycles of systemic therapy Taxotere was discontinued and tamoxifen was added  Because her response was so good she had lumpectomy of left breast in September 2016 followed by radiation therapy and also she received radiation to her left hip for palliation  At the time of lumpectomy there was minimal residual disease, 1 4 cm  Lymph nodes were not sampled  She had radiation to left hip lesion    Presently she is on Herceptin, Perjeta and tamoxifen for triple positive disease  She has been tolerating treatments without much problem  She goes for blood tests and echocardiogram on regular basis  Recently she had CT scans of chest abdomen and pelvis and bone scan and there was question of a 1 new area in the left occipital area  On follow-up bone scan there is diminished activity in the left occipital area and left femur-responding  There was no metastatic disease in the liver at this time  She has burning sensation in her hands and feet for a day post treatment  She has chemotherapy-induced amenorrhea  She gets hot flashes  Occasionally leg cramps at night     Has some tiredness    For leg cramps at night and she is taking one baby aspirin daily with food in the evening and also one magnesium tablet daily       Has some anxiety as before   Elif Marion She is not on Xgeva anymore that she had for 2 years  Yessi Hurtado continues to take    calcium and vitamin-D                Current Outpatient Medications:     Calcium Carbonate-Vit D-Min (CALCIUM 1200 PO), Take 1 tablet by mouth daily  , Disp: , Rfl:     Magnesium 250 MG TABS, Take 1 tablet by mouth daily, Disp: , Rfl:     multivitamin (THERAGRAN) TABS, Take 1 tablet by mouth daily  , Disp: , Rfl:     pertuzumab (PERJETA) 420 mg/14 mL SOLN, Infuse into a venous catheter every 21 days  At infusion  center, Disp: , Rfl:     tamoxifen (NOLVADEX) 20 mg tablet, Take 1 tablet (20 mg total) by mouth daily, Disp: 90 tablet, Rfl: 0    Trastuzumab (HERCEPTIN IV), Infuse into a venous catheter every 21 days  At infusion center, Disp: , Rfl:     VITAMIN D, CHOLECALCIFEROL, PO, Take by mouth daily  , Disp: , Rfl:     No Known Allergies       Malignant neoplasm of upper-inner quadrant of left female breast ([facility] [address] )    12/2015 Initial Diagnosis     Malignant neoplasm of upper-outer quadrant of left female breast ([facility] [address] )         1/27/2016 Surgery     Port placement         2/19/2016 - 6/2/2018 Chemotherapy     Taxotere,  herceptin, perjeta, xgeva  After six cycles, taxotere was discontinued and tamoxifen added  Continues with Perjeta and Herceptin every 3 weeks    - Dr Andrey Rasmussen           9/16/2016 Surgery     Left breast partial mastectomy         11/7/2016 - 12/23/2016 Radiation     Plan ID Energy Fractions Dose per Fraction (cGy) Total Dose Delivered (cGy) Elapsed Days   Lt Breast 6X 25 / 25 200 5,000 36   Lt Brst Boost 6X 8 / 8 200 1,600 9   Lt Femur 10X 10 / 10 300 3,000 11   Lt PAB 6X 25 / 25 60 1,500 36   Lt Sclav 6X 25 / 25 200 5,000 36                                   Total dose to left breast lumpectomy cavity: 6600 cGy                   Total dose to the supraclavicular and axillary regions: 5000 cGy            Hormone Therapy     Tamoxifen   Dr Andrey Rasmussen            ROS:  02/13/19 Reviewed 13 systems:  Presently no other neurological, cardiac, pulmonary, GI and  symptoms other than mentioned above  No other symptoms like fever, chills, bleeding, bone pains, skin rash, weight loss, arthritic symptoms,   weakness, numbness,  claudication and gait problem  No frequent infections  Not unusually sensitive to cold  No swelling of the ankles  No swollen glands  Patient is anxious   Other symptoms are in HPI        /74 (BP Location: Right arm, Patient Position: Sitting)   Pulse 76   Temp 98 3 °F (36 8 °C) (Tympanic)   Resp 17   Ht 5' 6" (1 676 m)   Wt 98 9 kg (218 lb)   SpO2 96%   BMI 35 19 kg/m²     Physical Exam:  Alert, oriented, not in distress, no icterus, no oral thrush, no palpable neck mass, clear lung fields, regular heart rate, abdomen  soft and non tender, no palpable abdominal mass, no ascites, no edema of ankles, no calf tenderness, no focal neurological deficit, no skin rash, no palpable lymphadenopathy in the neck and axillary areas, good arterial pulses, no clubbing  Patient is anxious  Performance status 1  IMAGING:  IMPRESSION:     1  Diminished intensity of metastatic lesion in the proximal left femur and left occiput  2   There is no scintigraphic evidence of new osseous metastasis               Workstation performed: [id number]      Imaging     NM bone scan whole body (Order: [id number]) - 2/7/2019     Ejection fraction 70%  LABS:  Results for orders placed or performed in visit on 01/25/19   CBC and differential   Result Value Ref Range    WBC 8 36 4 31 - 10 16 Thousand/uL    RBC 4 64 3 81 - 5 12 Million/uL    Hemoglobin 12 8 11 5 - 15 4 g/dL    Hematocrit 39 7 34 8 - 46 1 %    MCV 86 82 - 98 fL    MCH 27 6 26 8 - 34 3 pg    MCHC 32 2 31 4 - 37 4 g/dL    RDW 14 3 11 6 - 15 1 %    MPV 10 1 8 9 - 12 7 fL    Platelets 427 151 - 552 Thousands/uL    Neutrophils Relative 62 43 - 75 %    Lymphocytes Relative 25 14 - 44 %    Monocytes Relative 7 4 - 12 %    Eosinophils Relative 6 0 - 6 %    Basophils Relative 0 0 - 1 %    Neutrophils Absolute 5 23 1 85 - 7 62 Thousands/µL    Lymphocytes Absolute 2 05 0 60 - 4 47 Thousands/µL    Monocytes Absolute 0 59 0 17 - 1 22 Thousand/µL    Eosinophils Absolute 0 47 0 00 - 0 61 Thousand/µL    Basophils Absolute 0 02 0 00 - 0 10 Thousands/µL   Comprehensive metabolic panel   Result Value Ref Range    Sodium 141 136 - 145 mmol/L    Potassium 3 6 3 5 - 5 3 mmol/L    Chloride 108 98 - 108 mmol/L    CO2 27 21 - 32 mmol/L    ANION GAP 6 4 - 13 mmol/L    BUN 15 5 - 25 mg/dL    Creatinine 0 70 0 60 - 1 30 mg/dL    Glucose 96 65 - 140 mg/dL    Calcium 9 2 8 3 - 10 1 mg/dL    AST 28 5 - 45 U/L    ALT 27 12 - 78 U/L    Alkaline Phosphatase 103 46 - 116 U/L    Total Protein 7 0 6 4 - 8 2 g/dL    Albumin 3 0 (L) 3 5 - 5 7 g/dL    Total Bilirubin 0 60 0 20 - 1 00 mg/dL    eGFR 101 ml/min/1 73sq m    2/13/19:  Normal chemistry profile except albumin 3  Normal blood counts  Labs, Imaging, & Other studies:   All pertinent labs and imaging studies were personally reviewed    Lab Results   Component Value Date    K 3 6 02/13/2019     02/13/2019    CO2 27 02/13/2019    BUN 15 02/13/2019    CREATININE 0 70 02/13/2019    GLUF 97 09/29/2017    CALCIUM 9 2 02/13/2019    AST 28 02/13/2019    ALT 27 02/13/2019    ALKPHOS 103 02/13/2019    EGFR 101 02/13/2019     Lab Results   Component Value Date    WBC 8 36 02/13/2019    HGB 12 8 02/13/2019    HCT 39 7 02/13/2019    MCV 86 02/13/2019     02/13/2019   No results found for: [address] [place] [address] [place] and discussed with patient  Assessment and plan: In January 2016 patient was diagnosed to have  locally advanced left breast cancer with metastases to liver and bones  She was started on a combination of Taxotere and Herceptin and Perjeta  She had very good response  After 6 cycles of systemic therapy Taxotere was discontinued and tamoxifen was added  Because her response was so good she had lumpectomy of left breast in September 2016 followed by radiation therapy and also she received radiation to her left hip for palliation  At the time of lumpectomy there was minimal residual disease, 1 4 cm  Lymph nodes were not sampled  She had radiation to left hip lesion    Presently she is on Herceptin, Perjeta and tamoxifen for triple positive disease  She has been tolerating treatments without much problem  She goes for blood tests and echocardiogram on regular basis    Recently she had CT scans of chest abdomen and pelvis and bone scan and there was question of a 1 new area in the left occipital area  On follow-up bone scan there is diminished activity in the left occipital area and left femur-responding  There was no metastatic disease in the liver at this time  She has burning sensation in her hands and feet for a day post treatment  She has chemotherapy-induced amenorrhea  She gets hot flashes  Occasionally leg cramps at night  Has some tiredness    For leg cramps at night and she is taking one baby aspirin daily with food in the evening and also one magnesium tablet daily       Has some anxiety as before   Dover Organ She is not on Xgeva anymore that she had for 2 years  Sybil Jon continues to take    calcium and vitamin-D     No change in therapy  She did not have genetic testing because lack of insurance coverage for the test and financial reasons       Physical examination and test results are as recorded and discussed especially results of new bone scan  CA 27-29 report is pending  No change in therapy     Condition discussed and explained  Questions answered  Also discussed the importance of self-breast examination, eating healthy foods, staying active and health screening tests  She is capable of self-care  1  Malignant neoplasm of upper-inner quadrant of left breast in female, estrogen receptor positive ([facility] [address] )      2  Liver metastases ([facility] )      3  Bone metastasis ([facility] )          Patient voiced understanding and agreement in the discussion  Counseling / Coordination of Care   Greater than 50% of total time was spent with the patient and / or family counseling and / or coordination of care

## 2019-02-21 RX ORDER — SODIUM CHLORIDE 9 MG/ML
20 INJECTION, SOLUTION INTRAVENOUS CONTINUOUS
Status: DISCONTINUED | OUTPATIENT
Start: 2019-02-22 | End: 2019-02-25 | Stop reason: HOSPADM

## 2019-02-22 ENCOUNTER — HOSPITAL ENCOUNTER (OUTPATIENT)
Dept: INFUSION CENTER | Facility: CLINIC | Age: 54
Discharge: HOME | End: 2019-02-22
Payer: COMMERCIAL

## 2019-02-22 VITALS
SYSTOLIC BLOOD PRESSURE: 160 MMHG | HEART RATE: 71 BPM | RESPIRATION RATE: 16 BRPM | BODY MASS INDEX: 35.05 KG/M2 | TEMPERATURE: 98.2 F | WEIGHT: 217.15 LBS | DIASTOLIC BLOOD PRESSURE: 90 MMHG

## 2019-02-22 PROCEDURE — 96413 CHEMO IV INFUSION 1 HR: CPT

## 2019-02-22 PROCEDURE — 96417 CHEMO IV INFUS EACH ADDL SEQ: CPT

## 2019-02-22 RX ADMIN — PERTUZUMAB 420 MG: 30 INJECTION, SOLUTION, CONCENTRATE INTRAVENOUS at 15:01

## 2019-02-22 RX ADMIN — SODIUM CHLORIDE 20 ML/HR: 0.9 INJECTION, SOLUTION INTRAVENOUS at 14:45

## 2019-02-22 RX ADMIN — TRASTUZUMAB 600 MG: 150 INJECTION, POWDER, LYOPHILIZED, FOR SOLUTION INTRAVENOUS at 15:42

## 2019-02-22 NOTE — PLAN OF CARE
Problem: PAIN - ADULT  Goal: Verbalizes/displays adequate comfort level or baseline comfort level  Description  Interventions:  - Encourage patient to monitor pain and request assistance  - Assess pain using appropriate pain scale  - Administer analgesics based on type and severity of pain and evaluate response  - Implement non-pharmacological measures as appropriate and evaluate response  - Consider cultural and social influences on pain and pain management  - Notify physician/advanced practitioner if interventions unsuccessful or patient reports new pain  Outcome: Progressing     Problem: INFECTION - ADULT  Goal: Absence or prevention of progression during hospitalization  Description  INTERVENTIONS:  - Assess and monitor for signs and symptoms of infection  - Monitor lab/diagnostic results  - Monitor all insertion sites, i e  indwelling lines, tubes, and drains  - Monitor endotracheal (as able) and nasal secretions for changes in amount and color  - [place] appropriate cooling/warming therapies per order  - Administer medications as ordered  - Instruct and encourage patient and family to use good hand hygiene technique  - Identify and instruct in appropriate isolation precautions for identified infection/condition  Outcome: Progressing  Goal: Absence of fever/infection during neutropenic period  Description  INTERVENTIONS:  - Monitor WBC  - Implement neutropenic guidelines  Outcome: Progressing     Problem: Knowledge Deficit  Goal: Patient/family/caregiver demonstrates understanding of disease process, treatment plan, medications, and discharge instructions  Description  Complete learning assessment and assess knowledge base    Interventions:  - Provide teaching at level of understanding  - Provide teaching via preferred learning methods  Outcome: Progressing

## 2019-02-22 NOTE — PROGRESS NOTES
Pt  Tolerated Perjeta and Herceptin without adverse event  Future appointments reviewed   Declined AVS

## 2019-02-22 NOTE — PROGRESS NOTES
Pt  Denies new symptoms or concerns at this time  EF 70%  from 2/10/2019  There are no lab parameters

## 2019-03-14 RX ORDER — SODIUM CHLORIDE 9 MG/ML
20 INJECTION, SOLUTION INTRAVENOUS CONTINUOUS
Status: DISCONTINUED | OUTPATIENT
Start: 2019-03-15 | End: 2019-03-18 | Stop reason: HOSPADM

## 2019-03-15 ENCOUNTER — HOSPITAL ENCOUNTER (OUTPATIENT)
Dept: INFUSION CENTER | Facility: CLINIC | Age: 54
Discharge: HOME | End: 2019-03-15
Payer: COMMERCIAL

## 2019-03-15 VITALS
RESPIRATION RATE: 18 BRPM | BODY MASS INDEX: 34.52 KG/M2 | DIASTOLIC BLOOD PRESSURE: 88 MMHG | TEMPERATURE: 97.1 F | WEIGHT: 213.85 LBS | SYSTOLIC BLOOD PRESSURE: 147 MMHG | HEART RATE: 71 BPM

## 2019-03-15 PROCEDURE — 96417 CHEMO IV INFUS EACH ADDL SEQ: CPT

## 2019-03-15 PROCEDURE — 96413 CHEMO IV INFUSION 1 HR: CPT

## 2019-03-15 RX ADMIN — SODIUM CHLORIDE 20 ML/HR: 0.9 INJECTION, SOLUTION INTRAVENOUS at 14:45

## 2019-03-15 RX ADMIN — PERTUZUMAB 420 MG: 30 INJECTION, SOLUTION, CONCENTRATE INTRAVENOUS at 14:48

## 2019-03-15 RX ADMIN — TRASTUZUMAB 600 MG: 150 INJECTION, POWDER, LYOPHILIZED, FOR SOLUTION INTRAVENOUS at 15:22

## 2019-03-15 NOTE — PLAN OF CARE
Problem: Potential for Falls  Goal: Patient will remain free of falls  Description  INTERVENTIONS:  - Assess patient frequently for physical needs  -  Identify cognitive and physical deficits and behaviors that affect risk of falls    -  [place] fall precautions as indicated by assessment   - Educate patient/family on patient safety including physical limitations  - Instruct patient to call for assistance with activity based on assessment  - Modify environment to reduce risk of injury  - Consider OT/PT consult to assist with strengthening/mobility  3/15/2019 1540 by Valentino Serrano RN  Outcome: Progressing  3/15/2019 1540 by Valentino Serrano RN  Outcome: Progressing

## 2019-03-15 NOTE — PLAN OF CARE
Problem: Potential for Falls  Goal: Patient will remain free of falls  Description  INTERVENTIONS:  - Assess patient frequently for physical needs  -  Identify cognitive and physical deficits and behaviors that affect risk of falls  -  [place] fall precautions as indicated by assessment   - Educate patient/family on patient safety including physical limitations  - Instruct patient to call for assistance with activity based on assessment  - Modify environment to reduce risk of injury  - Consider OT/PT consult to assist with strengthening/mobility  Outcome: Progressing     Problem: Potential for Falls  Goal: Patient will remain free of falls  Description  INTERVENTIONS:  - Assess patient frequently for physical needs  -  Identify cognitive and physical deficits and behaviors that affect risk of falls    -  [place] fall precautions as indicated by assessment   - Educate patient/family on patient safety including physical limitations  - Instruct patient to call for assistance with activity based on assessment  - Modify environment to reduce risk of injury  - Consider OT/PT consult to assist with strengthening/mobility  Outcome: Progressing

## 2019-03-15 NOTE — PROGRESS NOTES
Pt arrived to unit without complaint, tolerated treatment well without adverse reaction  Pt declined AVS today, left unit in stable condition

## 2019-04-04 RX ORDER — SODIUM CHLORIDE 9 MG/ML
20 INJECTION, SOLUTION INTRAVENOUS CONTINUOUS
Status: DISCONTINUED | OUTPATIENT
Start: 2019-04-05 | End: 2019-04-08 | Stop reason: HOSPADM

## 2019-04-05 ENCOUNTER — HOSPITAL ENCOUNTER (OUTPATIENT)
Dept: INFUSION CENTER | Facility: CLINIC | Age: 54
Discharge: HOME | End: 2019-04-05
Payer: COMMERCIAL

## 2019-04-05 VITALS
TEMPERATURE: 98.5 F | DIASTOLIC BLOOD PRESSURE: 92 MMHG | SYSTOLIC BLOOD PRESSURE: 157 MMHG | BODY MASS INDEX: 34.91 KG/M2 | HEART RATE: 71 BPM | WEIGHT: 216.27 LBS | RESPIRATION RATE: 18 BRPM

## 2019-04-05 PROCEDURE — 96413 CHEMO IV INFUSION 1 HR: CPT

## 2019-04-05 PROCEDURE — 96417 CHEMO IV INFUS EACH ADDL SEQ: CPT

## 2019-04-05 RX ADMIN — SODIUM CHLORIDE 20 ML/HR: 0.9 INJECTION, SOLUTION INTRAVENOUS at 15:07

## 2019-04-05 RX ADMIN — TRASTUZUMAB 600 MG: 150 INJECTION, POWDER, LYOPHILIZED, FOR SOLUTION INTRAVENOUS at 15:41

## 2019-04-05 RX ADMIN — PERTUZUMAB 420 MG: 30 INJECTION, SOLUTION, CONCENTRATE INTRAVENOUS at 15:13

## 2019-04-05 NOTE — PLAN OF CARE
Problem: Potential for Falls  Goal: Patient will remain free of falls  Description  INTERVENTIONS:  - Assess patient frequently for physical needs  -  Identify cognitive and physical deficits and behaviors that affect risk of falls    -  [place] fall precautions as indicated by assessment   - Educate patient/family on patient safety including physical limitations  - Instruct patient to call for assistance with activity based on assessment  - Modify environment to reduce risk of injury  - Consider OT/PT consult to assist with strengthening/mobility  Outcome: Progressing

## 2019-04-25 ENCOUNTER — CLINICAL SUPPORT (OUTPATIENT)
Dept: RADIATION ONCOLOGY | Facility: CLINIC | Age: 54
End: 2019-04-25
Attending: RADIOLOGY
Payer: COMMERCIAL

## 2019-04-25 VITALS
WEIGHT: 219 LBS | HEART RATE: 80 BPM | RESPIRATION RATE: 18 BRPM | DIASTOLIC BLOOD PRESSURE: 72 MMHG | SYSTOLIC BLOOD PRESSURE: 142 MMHG | HEIGHT: 66 IN | BODY MASS INDEX: 35.2 KG/M2 | TEMPERATURE: 98.2 F | OXYGEN SATURATION: 95 %

## 2019-04-25 DIAGNOSIS — Z17.0 MALIGNANT NEOPLASM OF UPPER-INNER QUADRANT OF LEFT BREAST IN FEMALE, ESTROGEN RECEPTOR POSITIVE (HCC): Primary | ICD-10-CM

## 2019-04-25 DIAGNOSIS — C79.51 BONE METASTASIS (HCC): ICD-10-CM

## 2019-04-25 DIAGNOSIS — C50.212 MALIGNANT NEOPLASM OF UPPER-INNER QUADRANT OF LEFT BREAST IN FEMALE, ESTROGEN RECEPTOR POSITIVE (HCC): Primary | ICD-10-CM

## 2019-04-25 PROCEDURE — G0463 HOSPITAL OUTPT CLINIC VISIT: HCPCS | Performed by: RADIOLOGY

## 2019-04-25 PROCEDURE — 99215 OFFICE O/P EST HI 40 MIN: CPT | Performed by: RADIOLOGY

## 2019-04-25 RX ORDER — SODIUM CHLORIDE 9 MG/ML
20 INJECTION, SOLUTION INTRAVENOUS CONTINUOUS
Status: DISCONTINUED | OUTPATIENT
Start: 2019-04-25 | End: 2019-04-25

## 2019-04-25 RX ORDER — SODIUM CHLORIDE 9 MG/ML
20 INJECTION, SOLUTION INTRAVENOUS CONTINUOUS
Status: DISCONTINUED | OUTPATIENT
Start: 2019-04-26 | End: 2019-04-29 | Stop reason: HOSPADM

## 2019-04-26 ENCOUNTER — HOSPITAL ENCOUNTER (OUTPATIENT)
Dept: INFUSION CENTER | Facility: CLINIC | Age: 54
Discharge: HOME | End: 2019-04-26
Payer: COMMERCIAL

## 2019-04-26 VITALS
HEART RATE: 80 BPM | DIASTOLIC BLOOD PRESSURE: 88 MMHG | TEMPERATURE: 98.9 F | RESPIRATION RATE: 18 BRPM | SYSTOLIC BLOOD PRESSURE: 152 MMHG | WEIGHT: 218.81 LBS | BODY MASS INDEX: 35.86 KG/M2

## 2019-04-26 PROCEDURE — 96417 CHEMO IV INFUS EACH ADDL SEQ: CPT

## 2019-04-26 PROCEDURE — 96413 CHEMO IV INFUSION 1 HR: CPT

## 2019-04-26 RX ADMIN — TRASTUZUMAB 600 MG: 150 INJECTION, POWDER, LYOPHILIZED, FOR SOLUTION INTRAVENOUS at 15:55

## 2019-04-26 RX ADMIN — PERTUZUMAB 420 MG: 30 INJECTION, SOLUTION, CONCENTRATE INTRAVENOUS at 15:21

## 2019-04-26 RX ADMIN — SODIUM CHLORIDE 20 ML/HR: 0.9 INJECTION, SOLUTION INTRAVENOUS at 15:14

## 2019-04-26 NOTE — PLAN OF CARE
Problem: Potential for Falls  Goal: Patient will remain free of falls  Description  INTERVENTIONS:  - Assess patient frequently for physical needs  -  Identify cognitive and physical deficits and behaviors that affect risk of falls    -  [place] fall precautions as indicated by assessment   - Educate patient/family on patient safety including physical limitations  - Instruct patient to call for assistance with activity based on assessment  - Modify environment to reduce risk of injury  - Consider OT/PT consult to assist with strengthening/mobility  Outcome: Progressing

## 2019-05-08 ENCOUNTER — OFFICE VISIT (OUTPATIENT)
Dept: SURGICAL ONCOLOGY | Facility: CLINIC | Age: 54
End: 2019-05-08
Payer: COMMERCIAL

## 2019-05-08 VITALS
TEMPERATURE: 98.9 F | WEIGHT: 218.6 LBS | DIASTOLIC BLOOD PRESSURE: 70 MMHG | BODY MASS INDEX: 35.13 KG/M2 | SYSTOLIC BLOOD PRESSURE: 118 MMHG | HEART RATE: 76 BPM | RESPIRATION RATE: 14 BRPM | HEIGHT: 66 IN

## 2019-05-08 DIAGNOSIS — Z79.899 LONG TERM USE OF DRUG: ICD-10-CM

## 2019-05-08 DIAGNOSIS — Z79.810 USE OF TAMOXIFEN (NOLVADEX): ICD-10-CM

## 2019-05-08 DIAGNOSIS — C79.51 BONE METASTASIS (HCC): ICD-10-CM

## 2019-05-08 DIAGNOSIS — Z17.0 MALIGNANT NEOPLASM OF UPPER-INNER QUADRANT OF LEFT BREAST IN FEMALE, ESTROGEN RECEPTOR POSITIVE (HCC): Primary | ICD-10-CM

## 2019-05-08 DIAGNOSIS — C78.7 LIVER METASTASES (HCC): ICD-10-CM

## 2019-05-08 DIAGNOSIS — C50.212 MALIGNANT NEOPLASM OF UPPER-INNER QUADRANT OF LEFT BREAST IN FEMALE, ESTROGEN RECEPTOR POSITIVE (HCC): Primary | ICD-10-CM

## 2019-05-08 PROCEDURE — 99215 OFFICE O/P EST HI 40 MIN: CPT | Performed by: SURGERY

## 2019-05-13 DIAGNOSIS — C79.51 BONE METASTASIS (HCC): ICD-10-CM

## 2019-05-13 DIAGNOSIS — Z17.0 MALIGNANT NEOPLASM OF UPPER-INNER QUADRANT OF LEFT BREAST IN FEMALE, ESTROGEN RECEPTOR POSITIVE (HCC): ICD-10-CM

## 2019-05-13 DIAGNOSIS — C50.212 MALIGNANT NEOPLASM OF UPPER-INNER QUADRANT OF LEFT BREAST IN FEMALE, ESTROGEN RECEPTOR POSITIVE (HCC): ICD-10-CM

## 2019-05-13 DIAGNOSIS — C78.7 LIVER METASTASES (HCC): ICD-10-CM

## 2019-05-13 RX ORDER — SODIUM CHLORIDE 9 MG/ML
20 INJECTION, SOLUTION INTRAVENOUS ONCE
Status: CANCELLED | OUTPATIENT
Start: 2019-05-17

## 2019-05-17 ENCOUNTER — HOSPITAL ENCOUNTER (OUTPATIENT)
Dept: INFUSION CENTER | Facility: CLINIC | Age: 54
Discharge: HOME | End: 2019-05-17
Payer: COMMERCIAL

## 2019-05-17 VITALS
OXYGEN SATURATION: 96 % | BODY MASS INDEX: 35.41 KG/M2 | WEIGHT: 219.36 LBS | DIASTOLIC BLOOD PRESSURE: 82 MMHG | SYSTOLIC BLOOD PRESSURE: 144 MMHG | TEMPERATURE: 98.3 F | RESPIRATION RATE: 18 BRPM | HEART RATE: 78 BPM

## 2019-05-17 DIAGNOSIS — C50.412 MALIGNANT NEOPLASM OF UPPER-OUTER QUADRANT OF LEFT BREAST IN FEMALE, ESTROGEN RECEPTOR POSITIVE (HCC): ICD-10-CM

## 2019-05-17 DIAGNOSIS — Z17.0 MALIGNANT NEOPLASM OF UPPER-INNER QUADRANT OF LEFT BREAST IN FEMALE, ESTROGEN RECEPTOR POSITIVE (HCC): Primary | ICD-10-CM

## 2019-05-17 DIAGNOSIS — C79.51 BONE METASTASIS (HCC): ICD-10-CM

## 2019-05-17 DIAGNOSIS — C50.212 MALIGNANT NEOPLASM OF UPPER-INNER QUADRANT OF LEFT BREAST IN FEMALE, ESTROGEN RECEPTOR POSITIVE (HCC): Primary | ICD-10-CM

## 2019-05-17 DIAGNOSIS — Z17.0 MALIGNANT NEOPLASM OF UPPER-OUTER QUADRANT OF LEFT BREAST IN FEMALE, ESTROGEN RECEPTOR POSITIVE (HCC): ICD-10-CM

## 2019-05-17 DIAGNOSIS — C78.7 LIVER METASTASES (HCC): ICD-10-CM

## 2019-05-17 PROCEDURE — 96413 CHEMO IV INFUSION 1 HR: CPT

## 2019-05-17 PROCEDURE — 96417 CHEMO IV INFUS EACH ADDL SEQ: CPT

## 2019-05-17 RX ORDER — SODIUM CHLORIDE 9 MG/ML
20 INJECTION, SOLUTION INTRAVENOUS ONCE
Status: COMPLETED | OUTPATIENT
Start: 2019-05-17 | End: 2019-05-17

## 2019-05-17 RX ORDER — TAMOXIFEN CITRATE 20 MG/1
TABLET ORAL
Qty: 90 TABLET | Refills: 0 | Status: SHIPPED | OUTPATIENT
Start: 2019-05-17 | End: 2019-11-13 | Stop reason: ALTCHOICE

## 2019-05-17 RX ADMIN — TRASTUZUMAB 589 MG: 150 INJECTION, POWDER, LYOPHILIZED, FOR SOLUTION INTRAVENOUS at 16:07

## 2019-05-17 RX ADMIN — SODIUM CHLORIDE 20 ML/HR: 0.9 INJECTION, SOLUTION INTRAVENOUS at 15:08

## 2019-05-17 RX ADMIN — PERTUZUMAB 420 MG: 30 INJECTION, SOLUTION, CONCENTRATE INTRAVENOUS at 15:32

## 2019-05-17 NOTE — PROGRESS NOTES
Patient had last Echocardiogram on 2/1/19  No recent ejection fraction available  Yamil Arroyo at [facility] made aware  As per Yarely Flow, recent ejection fraction not needed for treatment today  Patient okay for treatment today

## 2019-05-17 NOTE — PLAN OF CARE
Problem: Potential for Falls  Goal: Patient will remain free of falls  Description  INTERVENTIONS:  - Assess patient frequently for physical needs  -  Identify cognitive and physical deficits and behaviors that affect risk of falls    -  [place] fall precautions as indicated by assessment   - Educate patient/family on patient safety including physical limitations  - Instruct patient to call for assistance with activity based on assessment  - Modify environment to reduce risk of injury  - Consider OT/PT consult to assist with strengthening/mobility  Outcome: Progressing

## 2019-05-21 ENCOUNTER — TELEPHONE (OUTPATIENT)
Dept: HEMATOLOGY ONCOLOGY | Facility: CLINIC | Age: 54
End: 2019-05-21

## 2019-05-22 ENCOUNTER — OFFICE VISIT (OUTPATIENT)
Dept: HEMATOLOGY ONCOLOGY | Facility: CLINIC | Age: 54
End: 2019-05-22
Payer: COMMERCIAL

## 2019-05-22 ENCOUNTER — TELEPHONE (OUTPATIENT)
Dept: HEMATOLOGY ONCOLOGY | Facility: CLINIC | Age: 54
End: 2019-05-22

## 2019-05-22 ENCOUNTER — APPOINTMENT (OUTPATIENT)
Dept: LAB | Facility: CLINIC | Age: 54
End: 2019-05-22
Payer: COMMERCIAL

## 2019-05-22 VITALS
SYSTOLIC BLOOD PRESSURE: 132 MMHG | HEIGHT: 66 IN | RESPIRATION RATE: 16 BRPM | HEART RATE: 84 BPM | BODY MASS INDEX: 35.03 KG/M2 | WEIGHT: 218 LBS | DIASTOLIC BLOOD PRESSURE: 72 MMHG | TEMPERATURE: 98.8 F

## 2019-05-22 DIAGNOSIS — T45.1X5A CARDIOMYOPATHY DUE TO CHEMOTHERAPY (HCC): ICD-10-CM

## 2019-05-22 DIAGNOSIS — Z15.02 BRCA1 GENE MUTATION POSITIVE IN FEMALE: ICD-10-CM

## 2019-05-22 DIAGNOSIS — C79.51 BONE METASTASIS (HCC): ICD-10-CM

## 2019-05-22 DIAGNOSIS — Z95.828 PORT-A-CATH IN PLACE: ICD-10-CM

## 2019-05-22 DIAGNOSIS — Z17.0 MALIGNANT NEOPLASM OF UPPER-INNER QUADRANT OF LEFT BREAST IN FEMALE, ESTROGEN RECEPTOR POSITIVE (HCC): Primary | ICD-10-CM

## 2019-05-22 DIAGNOSIS — Z15.09 BRCA1 GENE MUTATION POSITIVE IN FEMALE: ICD-10-CM

## 2019-05-22 DIAGNOSIS — I42.7 CARDIOMYOPATHY DUE TO CHEMOTHERAPY (HCC): ICD-10-CM

## 2019-05-22 DIAGNOSIS — C50.212 MALIGNANT NEOPLASM OF UPPER-INNER QUADRANT OF LEFT BREAST IN FEMALE, ESTROGEN RECEPTOR POSITIVE (HCC): Primary | ICD-10-CM

## 2019-05-22 DIAGNOSIS — C78.7 LIVER METASTASES (HCC): ICD-10-CM

## 2019-05-22 DIAGNOSIS — C50.212 MALIGNANT NEOPLASM OF UPPER-INNER QUADRANT OF LEFT FEMALE BREAST, UNSPECIFIED ESTROGEN RECEPTOR STATUS (HCC): Primary | ICD-10-CM

## 2019-05-22 DIAGNOSIS — Z17.0 ESTROGEN RECEPTOR POSITIVE: ICD-10-CM

## 2019-05-22 DIAGNOSIS — Z15.01 BRCA1 GENE MUTATION POSITIVE IN FEMALE: ICD-10-CM

## 2019-05-22 LAB
ALBUMIN SERPL BCP-MCNC: 3 G/DL (ref 3.5–5.7)
ALP SERPL-CCNC: 108 U/L (ref 46–116)
ALT SERPL W P-5'-P-CCNC: 35 U/L (ref 12–78)
ANION GAP SERPL CALCULATED.3IONS-SCNC: 9 MMOL/L (ref 4–13)
AST SERPL W P-5'-P-CCNC: 27 U/L (ref 5–45)
BASOPHILS # BLD AUTO: 0.01 THOUSANDS/ΜL (ref 0–0.1)
BASOPHILS NFR BLD AUTO: 0 % (ref 0–1)
BILIRUB SERPL-MCNC: 0.6 MG/DL (ref 0.2–1)
BUN SERPL-MCNC: 15 MG/DL (ref 5–25)
CALCIUM SERPL-MCNC: 9.6 MG/DL (ref 8.3–10.1)
CANCER AG27-29 SERPL-ACNC: 26.6 U/ML (ref 0–42.3)
CHLORIDE SERPL-SCNC: 106 MMOL/L (ref 98–108)
CO2 SERPL-SCNC: 28 MMOL/L (ref 21–32)
CREAT SERPL-MCNC: 1 MG/DL (ref 0.6–1.3)
EOSINOPHIL # BLD AUTO: 0.53 THOUSAND/ΜL (ref 0–0.61)
EOSINOPHIL NFR BLD AUTO: 7 % (ref 0–6)
ERYTHROCYTE [DISTWIDTH] IN BLOOD BY AUTOMATED COUNT: 14.7 % (ref 11.6–15.1)
GFR SERPL CREATININE-BSD FRML MDRD: 65 ML/MIN/1.73SQ M
GLUCOSE SERPL-MCNC: 139 MG/DL (ref 65–140)
HCT VFR BLD AUTO: 41.1 % (ref 34.8–46.1)
HGB BLD-MCNC: 13.9 G/DL (ref 11.5–15.4)
LYMPHOCYTES # BLD AUTO: 1.69 THOUSANDS/ΜL (ref 0.6–4.47)
LYMPHOCYTES NFR BLD AUTO: 22 % (ref 14–44)
MCH RBC QN AUTO: 28 PG (ref 26.8–34.3)
MCHC RBC AUTO-ENTMCNC: 33.8 G/DL (ref 31.4–37.4)
MCV RBC AUTO: 83 FL (ref 82–98)
MONOCYTES # BLD AUTO: 0.62 THOUSAND/ΜL (ref 0.17–1.22)
MONOCYTES NFR BLD AUTO: 8 % (ref 4–12)
NEUTROPHILS # BLD AUTO: 4.93 THOUSANDS/ΜL (ref 1.85–7.62)
NEUTS SEG NFR BLD AUTO: 63 % (ref 43–75)
PLATELET # BLD AUTO: 272 THOUSANDS/UL (ref 149–390)
PMV BLD AUTO: 10.9 FL (ref 8.9–12.7)
POTASSIUM SERPL-SCNC: 4 MMOL/L (ref 3.5–5.3)
PROT SERPL-MCNC: 7 G/DL (ref 6.4–8.2)
RBC # BLD AUTO: 4.96 MILLION/UL (ref 3.81–5.12)
SODIUM SERPL-SCNC: 143 MMOL/L (ref 136–145)
WBC # BLD AUTO: 7.78 THOUSAND/UL (ref 4.31–10.16)

## 2019-05-22 PROCEDURE — 85025 COMPLETE CBC W/AUTO DIFF WBC: CPT

## 2019-05-22 PROCEDURE — 86300 IMMUNOASSAY TUMOR CA 15-3: CPT

## 2019-05-22 PROCEDURE — 99215 OFFICE O/P EST HI 40 MIN: CPT | Performed by: INTERNAL MEDICINE

## 2019-05-22 PROCEDURE — 80053 COMPREHEN METABOLIC PANEL: CPT

## 2019-05-22 PROCEDURE — 36415 COLL VENOUS BLD VENIPUNCTURE: CPT

## 2019-05-22 RX ORDER — SODIUM CHLORIDE 9 MG/ML
20 INJECTION, SOLUTION INTRAVENOUS ONCE
Status: CANCELLED | OUTPATIENT
Start: 2019-06-07

## 2019-05-22 RX ORDER — SODIUM CHLORIDE 9 MG/ML
20 INJECTION, SOLUTION INTRAVENOUS ONCE
Status: CANCELLED | OUTPATIENT
Start: 2019-08-09

## 2019-05-22 RX ORDER — SODIUM CHLORIDE 9 MG/ML
20 INJECTION, SOLUTION INTRAVENOUS ONCE
Status: CANCELLED | OUTPATIENT
Start: 2019-06-28

## 2019-05-22 RX ORDER — SODIUM CHLORIDE 9 MG/ML
20 INJECTION, SOLUTION INTRAVENOUS ONCE
Status: CANCELLED | OUTPATIENT
Start: 2019-07-19

## 2019-05-22 RX ORDER — SODIUM CHLORIDE 9 MG/ML
20 INJECTION, SOLUTION INTRAVENOUS ONCE
Status: CANCELLED | OUTPATIENT
Start: 2019-08-30

## 2019-06-07 ENCOUNTER — HOSPITAL ENCOUNTER (OUTPATIENT)
Dept: INFUSION CENTER | Facility: CLINIC | Age: 54
Discharge: HOME | End: 2019-06-07
Payer: COMMERCIAL

## 2019-06-07 VITALS
RESPIRATION RATE: 18 BRPM | BODY MASS INDEX: 35.23 KG/M2 | HEART RATE: 82 BPM | TEMPERATURE: 98.9 F | DIASTOLIC BLOOD PRESSURE: 82 MMHG | WEIGHT: 218.26 LBS | SYSTOLIC BLOOD PRESSURE: 148 MMHG

## 2019-06-07 DIAGNOSIS — C78.7 LIVER METASTASES (HCC): ICD-10-CM

## 2019-06-07 DIAGNOSIS — C79.51 BONE METASTASIS (HCC): Primary | ICD-10-CM

## 2019-06-07 DIAGNOSIS — C50.212 MALIGNANT NEOPLASM OF UPPER-INNER QUADRANT OF LEFT BREAST IN FEMALE, ESTROGEN RECEPTOR POSITIVE (HCC): ICD-10-CM

## 2019-06-07 DIAGNOSIS — Z17.0 MALIGNANT NEOPLASM OF UPPER-INNER QUADRANT OF LEFT BREAST IN FEMALE, ESTROGEN RECEPTOR POSITIVE (HCC): ICD-10-CM

## 2019-06-07 PROCEDURE — 96413 CHEMO IV INFUSION 1 HR: CPT

## 2019-06-07 PROCEDURE — 96417 CHEMO IV INFUS EACH ADDL SEQ: CPT

## 2019-06-07 RX ORDER — SODIUM CHLORIDE 9 MG/ML
20 INJECTION, SOLUTION INTRAVENOUS ONCE
Status: COMPLETED | OUTPATIENT
Start: 2019-06-07 | End: 2019-06-07

## 2019-06-07 RX ADMIN — SODIUM CHLORIDE 20 ML/HR: 0.9 INJECTION, SOLUTION INTRAVENOUS at 14:20

## 2019-06-07 RX ADMIN — TRASTUZUMAB 589 MG: 150 INJECTION, POWDER, LYOPHILIZED, FOR SOLUTION INTRAVENOUS at 15:14

## 2019-06-07 RX ADMIN — PERTUZUMAB 420 MG: 30 INJECTION, SOLUTION, CONCENTRATE INTRAVENOUS at 14:40

## 2019-06-07 NOTE — PROGRESS NOTES
Pt without complaint,tolerated Perjeta and Hereptin infusions as ordered  Pt is scheduled for ECHO on 7/5/19  Pt declines AVS today, aware of all upcoming appts

## 2019-06-11 ENCOUNTER — HOSPITAL ENCOUNTER (OUTPATIENT)
Dept: MAMMOGRAPHY | Facility: CLINIC | Age: 54
Discharge: HOME | End: 2019-06-11
Payer: COMMERCIAL

## 2019-06-11 VITALS — BODY MASS INDEX: 35.03 KG/M2 | HEIGHT: 66 IN | WEIGHT: 218 LBS

## 2019-06-11 DIAGNOSIS — Z15.01 BRCA1 GENE MUTATION POSITIVE IN FEMALE: ICD-10-CM

## 2019-06-11 DIAGNOSIS — C50.212 MALIGNANT NEOPLASM OF UPPER-INNER QUADRANT OF LEFT BREAST IN FEMALE, ESTROGEN RECEPTOR POSITIVE (HCC): ICD-10-CM

## 2019-06-11 DIAGNOSIS — Z17.0 MALIGNANT NEOPLASM OF UPPER-INNER QUADRANT OF LEFT BREAST IN FEMALE, ESTROGEN RECEPTOR POSITIVE (HCC): ICD-10-CM

## 2019-06-11 DIAGNOSIS — Z15.02 BRCA1 GENE MUTATION POSITIVE IN FEMALE: ICD-10-CM

## 2019-06-11 DIAGNOSIS — Z15.09 BRCA1 GENE MUTATION POSITIVE IN FEMALE: ICD-10-CM

## 2019-06-11 PROCEDURE — G0279 TOMOSYNTHESIS, MAMMO: HCPCS

## 2019-06-11 PROCEDURE — 77066 DX MAMMO INCL CAD BI: CPT

## 2019-06-24 ENCOUNTER — CONSULT (OUTPATIENT)
Dept: GYNECOLOGIC ONCOLOGY | Facility: CLINIC | Age: 54
End: 2019-06-24
Payer: COMMERCIAL

## 2019-06-24 VITALS
HEIGHT: 66 IN | WEIGHT: 220 LBS | BODY MASS INDEX: 35.36 KG/M2 | SYSTOLIC BLOOD PRESSURE: 146 MMHG | HEART RATE: 72 BPM | DIASTOLIC BLOOD PRESSURE: 84 MMHG | TEMPERATURE: 98.5 F

## 2019-06-24 DIAGNOSIS — C79.51 BONE METASTASIS (HCC): ICD-10-CM

## 2019-06-24 DIAGNOSIS — Z15.01 BRCA1 GENE MUTATION POSITIVE IN FEMALE: Primary | ICD-10-CM

## 2019-06-24 DIAGNOSIS — Z15.02 BRCA1 GENE MUTATION POSITIVE IN FEMALE: Primary | ICD-10-CM

## 2019-06-24 DIAGNOSIS — Z15.09 BRCA1 GENE MUTATION POSITIVE IN FEMALE: Primary | ICD-10-CM

## 2019-06-24 DIAGNOSIS — Z17.0 MALIGNANT NEOPLASM OF UPPER-INNER QUADRANT OF LEFT BREAST IN FEMALE, ESTROGEN RECEPTOR POSITIVE (HCC): ICD-10-CM

## 2019-06-24 DIAGNOSIS — C78.7 LIVER METASTASES (HCC): ICD-10-CM

## 2019-06-24 DIAGNOSIS — C50.212 MALIGNANT NEOPLASM OF UPPER-INNER QUADRANT OF LEFT BREAST IN FEMALE, ESTROGEN RECEPTOR POSITIVE (HCC): ICD-10-CM

## 2019-06-24 PROCEDURE — 99244 OFF/OP CNSLTJ NEW/EST MOD 40: CPT | Performed by: OBSTETRICS & GYNECOLOGY

## 2019-06-24 RX ORDER — ACETAMINOPHEN 325 MG/1
975 TABLET ORAL ONCE
Status: CANCELLED | OUTPATIENT
Start: 2019-08-16 | End: 2019-06-24

## 2019-06-24 RX ORDER — GABAPENTIN 100 MG/1
100 CAPSULE ORAL ONCE
Status: CANCELLED | OUTPATIENT
Start: 2019-08-16 | End: 2019-06-24

## 2019-06-24 RX ORDER — HEPARIN SODIUM 5000 [USP'U]/ML
5000 INJECTION, SOLUTION INTRAVENOUS; SUBCUTANEOUS
Status: CANCELLED | OUTPATIENT
Start: 2019-08-16 | End: 2019-08-17

## 2019-06-28 ENCOUNTER — HOSPITAL ENCOUNTER (OUTPATIENT)
Dept: INFUSION CENTER | Facility: CLINIC | Age: 54
Discharge: HOME | End: 2019-06-28
Payer: COMMERCIAL

## 2019-06-28 VITALS
RESPIRATION RATE: 18 BRPM | WEIGHT: 219.8 LBS | BODY MASS INDEX: 35.48 KG/M2 | TEMPERATURE: 98.4 F | SYSTOLIC BLOOD PRESSURE: 152 MMHG | HEART RATE: 77 BPM | DIASTOLIC BLOOD PRESSURE: 89 MMHG

## 2019-06-28 DIAGNOSIS — C79.51 BONE METASTASIS (HCC): Primary | ICD-10-CM

## 2019-06-28 DIAGNOSIS — C50.212 MALIGNANT NEOPLASM OF UPPER-INNER QUADRANT OF LEFT BREAST IN FEMALE, ESTROGEN RECEPTOR POSITIVE (HCC): ICD-10-CM

## 2019-06-28 DIAGNOSIS — Z17.0 MALIGNANT NEOPLASM OF UPPER-INNER QUADRANT OF LEFT BREAST IN FEMALE, ESTROGEN RECEPTOR POSITIVE (HCC): ICD-10-CM

## 2019-06-28 DIAGNOSIS — C78.7 LIVER METASTASES (HCC): ICD-10-CM

## 2019-06-28 PROCEDURE — 96413 CHEMO IV INFUSION 1 HR: CPT

## 2019-06-28 PROCEDURE — 96417 CHEMO IV INFUS EACH ADDL SEQ: CPT

## 2019-06-28 RX ORDER — SODIUM CHLORIDE 9 MG/ML
20 INJECTION, SOLUTION INTRAVENOUS ONCE
Status: COMPLETED | OUTPATIENT
Start: 2019-06-28 | End: 2019-06-28

## 2019-06-28 RX ADMIN — TRASTUZUMAB 600 MG: 150 INJECTION, POWDER, LYOPHILIZED, FOR SOLUTION INTRAVENOUS at 15:28

## 2019-06-28 RX ADMIN — PERTUZUMAB 420 MG: 30 INJECTION, SOLUTION, CONCENTRATE INTRAVENOUS at 14:58

## 2019-06-28 RX ADMIN — SODIUM CHLORIDE 20 ML/HR: 0.9 INJECTION, SOLUTION INTRAVENOUS at 14:45

## 2019-06-28 NOTE — PROGRESS NOTES
Patient tolerated Perjeta/Herceptin infusions well  Denies any discomfort  Pt is aware of all future appointments   Refused AVS

## 2019-06-28 NOTE — PLAN OF CARE
Problem: Potential for Falls  Goal: Patient will remain free of falls  Description  INTERVENTIONS:  - Assess patient frequently for physical needs  -  Identify cognitive and physical deficits and behaviors that affect risk of falls    -  [place] fall precautions as indicated by assessment   - Educate patient/family on patient safety including physical limitations  - Instruct patient to call for assistance with activity based on assessment  - Modify environment to reduce risk of injury  - Consider OT/PT consult to assist with strengthening/mobility  Outcome: Progressing

## 2019-07-01 ENCOUNTER — TELEPHONE (OUTPATIENT)
Dept: HEMATOLOGY ONCOLOGY | Facility: HOSPITAL | Age: 54
End: 2019-07-01

## 2019-07-01 NOTE — TELEPHONE ENCOUNTER
Spoke to patient and R/S her appt that was scheduled for 9/4/19  Her new appt is 9/11/19 at 11:00 ma with Dr Azucena Sharma at the [facility]

## 2019-07-05 ENCOUNTER — HOSPITAL ENCOUNTER (OUTPATIENT)
Dept: NON INVASIVE DIAGNOSTICS | Facility: CLINIC | Age: 54
Discharge: HOME | End: 2019-07-05
Payer: COMMERCIAL

## 2019-07-05 DIAGNOSIS — I42.7 CARDIOMYOPATHY DUE TO CHEMOTHERAPY (HCC): ICD-10-CM

## 2019-07-05 DIAGNOSIS — T45.1X5A CARDIOMYOPATHY DUE TO CHEMOTHERAPY (HCC): ICD-10-CM

## 2019-07-05 PROCEDURE — 93325 DOPPLER ECHO COLOR FLOW MAPG: CPT | Performed by: INTERNAL MEDICINE

## 2019-07-05 PROCEDURE — 93308 TTE F-UP OR LMTD: CPT | Performed by: INTERNAL MEDICINE

## 2019-07-05 PROCEDURE — 93308 TTE F-UP OR LMTD: CPT

## 2019-07-05 PROCEDURE — 93321 DOPPLER ECHO F-UP/LMTD STD: CPT | Performed by: INTERNAL MEDICINE

## 2019-07-19 ENCOUNTER — HOSPITAL ENCOUNTER (OUTPATIENT)
Dept: INFUSION CENTER | Facility: CLINIC | Age: 54
Discharge: HOME | End: 2019-07-19
Payer: COMMERCIAL

## 2019-07-19 VITALS
DIASTOLIC BLOOD PRESSURE: 70 MMHG | HEART RATE: 79 BPM | SYSTOLIC BLOOD PRESSURE: 110 MMHG | WEIGHT: 222.44 LBS | RESPIRATION RATE: 18 BRPM | BODY MASS INDEX: 35.9 KG/M2 | TEMPERATURE: 97.7 F

## 2019-07-19 DIAGNOSIS — C79.51 BONE METASTASIS (HCC): Primary | ICD-10-CM

## 2019-07-19 DIAGNOSIS — Z17.0 MALIGNANT NEOPLASM OF UPPER-INNER QUADRANT OF LEFT BREAST IN FEMALE, ESTROGEN RECEPTOR POSITIVE (HCC): ICD-10-CM

## 2019-07-19 DIAGNOSIS — C78.7 LIVER METASTASES (HCC): ICD-10-CM

## 2019-07-19 DIAGNOSIS — C50.212 MALIGNANT NEOPLASM OF UPPER-INNER QUADRANT OF LEFT BREAST IN FEMALE, ESTROGEN RECEPTOR POSITIVE (HCC): ICD-10-CM

## 2019-07-19 PROCEDURE — 96413 CHEMO IV INFUSION 1 HR: CPT

## 2019-07-19 PROCEDURE — 96417 CHEMO IV INFUS EACH ADDL SEQ: CPT

## 2019-07-19 RX ORDER — SODIUM CHLORIDE 9 MG/ML
20 INJECTION, SOLUTION INTRAVENOUS ONCE
Status: COMPLETED | OUTPATIENT
Start: 2019-07-19 | End: 2019-07-19

## 2019-07-19 RX ADMIN — SODIUM CHLORIDE 20 ML/HR: 0.9 INJECTION, SOLUTION INTRAVENOUS at 14:28

## 2019-07-19 RX ADMIN — TRASTUZUMAB 600 MG: 150 INJECTION, POWDER, LYOPHILIZED, FOR SOLUTION INTRAVENOUS at 15:20

## 2019-07-19 RX ADMIN — PERTUZUMAB 420 MG: 30 INJECTION, SOLUTION, CONCENTRATE INTRAVENOUS at 14:43

## 2019-08-08 NOTE — PRE-PROCEDURE INSTRUCTIONS
Pre-Surgery Instructions:   Medication Instructions    multivitamin (THERAGRAN) TABS Instructed patient per Anesthesia Guidelines   pertuzumab (PERJETA) 420 mg/14 mL SOLN Instructed patient per Anesthesia Guidelines   tamoxifen (NOLVADEX) 20 mg tablet Instructed patient per Anesthesia Guidelines   Trastuzumab (HERCEPTIN IV) Instructed patient per Anesthesia Guidelines   VITAMIN D, CHOLECALCIFEROL, PO Instructed patient per Anesthesia Guidelines  No meds needed am of surgery per anesthesia

## 2019-08-09 ENCOUNTER — HOSPITAL ENCOUNTER (OUTPATIENT)
Dept: INFUSION CENTER | Facility: CLINIC | Age: 54
Discharge: HOME | End: 2019-08-09
Payer: COMMERCIAL

## 2019-08-09 VITALS
WEIGHT: 220.68 LBS | HEART RATE: 72 BPM | TEMPERATURE: 98.8 F | BODY MASS INDEX: 35.62 KG/M2 | RESPIRATION RATE: 18 BRPM | SYSTOLIC BLOOD PRESSURE: 92 MMHG | DIASTOLIC BLOOD PRESSURE: 69 MMHG

## 2019-08-09 DIAGNOSIS — C78.7 LIVER METASTASES (HCC): ICD-10-CM

## 2019-08-09 DIAGNOSIS — C50.212 MALIGNANT NEOPLASM OF UPPER-INNER QUADRANT OF LEFT BREAST IN FEMALE, ESTROGEN RECEPTOR POSITIVE (HCC): ICD-10-CM

## 2019-08-09 DIAGNOSIS — C79.51 BONE METASTASIS (HCC): Primary | ICD-10-CM

## 2019-08-09 DIAGNOSIS — Z17.0 MALIGNANT NEOPLASM OF UPPER-INNER QUADRANT OF LEFT BREAST IN FEMALE, ESTROGEN RECEPTOR POSITIVE (HCC): ICD-10-CM

## 2019-08-09 PROCEDURE — 96413 CHEMO IV INFUSION 1 HR: CPT

## 2019-08-09 PROCEDURE — 96417 CHEMO IV INFUS EACH ADDL SEQ: CPT

## 2019-08-09 RX ORDER — SODIUM CHLORIDE 9 MG/ML
20 INJECTION, SOLUTION INTRAVENOUS ONCE
Status: COMPLETED | OUTPATIENT
Start: 2019-08-09 | End: 2019-08-09

## 2019-08-09 RX ADMIN — PERTUZUMAB 420 MG: 30 INJECTION, SOLUTION, CONCENTRATE INTRAVENOUS at 14:15

## 2019-08-09 RX ADMIN — SODIUM CHLORIDE 20 ML/HR: 0.9 INJECTION, SOLUTION INTRAVENOUS at 14:12

## 2019-08-09 RX ADMIN — TRASTUZUMAB 600 MG: 150 INJECTION, POWDER, LYOPHILIZED, FOR SOLUTION INTRAVENOUS at 14:45

## 2019-08-09 NOTE — PROGRESS NOTES
Patient tolerated Perjeta/Herceptin infusions well  Offers no complaints  Pt is aware of all future appointments   Refused AVS

## 2019-08-09 NOTE — PLAN OF CARE
Problem: Potential for Falls  Goal: Patient will remain free of falls  Description  INTERVENTIONS:  - Assess patient frequently for physical needs  -  Identify cognitive and physical deficits and behaviors that affect risk of falls    -  [place] fall precautions as indicated by assessment   - Educate patient/family on patient safety including physical limitations  - Instruct patient to call for assistance with activity based on assessment  - Modify environment to reduce risk of injury  - Consider OT/PT consult to assist with strengthening/mobility  Outcome: Progressing

## 2019-08-15 ENCOUNTER — ANESTHESIA EVENT (OUTPATIENT)
Dept: PERIOP | Facility: HOSPITAL | Age: 54
End: 2019-08-15
Payer: COMMERCIAL

## 2019-08-16 ENCOUNTER — ANESTHESIA (OUTPATIENT)
Dept: PERIOP | Facility: HOSPITAL | Age: 54
End: 2019-08-16
Payer: COMMERCIAL

## 2019-08-16 ENCOUNTER — HOSPITAL ENCOUNTER (OUTPATIENT)
Facility: HOSPITAL | Age: 54
Setting detail: OUTPATIENT SURGERY
Discharge: HOME | End: 2019-08-16
Attending: OBSTETRICS & GYNECOLOGY | Admitting: OBSTETRICS & GYNECOLOGY
Payer: COMMERCIAL

## 2019-08-16 VITALS
BODY MASS INDEX: 31.98 KG/M2 | WEIGHT: 199 LBS | TEMPERATURE: 98.1 F | OXYGEN SATURATION: 95 % | RESPIRATION RATE: 16 BRPM | DIASTOLIC BLOOD PRESSURE: 80 MMHG | HEIGHT: 66 IN | HEART RATE: 79 BPM | SYSTOLIC BLOOD PRESSURE: 149 MMHG

## 2019-08-16 DIAGNOSIS — C78.7 LIVER METASTASES (HCC): ICD-10-CM

## 2019-08-16 DIAGNOSIS — Z15.01 BRCA1 GENE MUTATION POSITIVE IN FEMALE: ICD-10-CM

## 2019-08-16 DIAGNOSIS — Z90.722 STATUS POST BILATERAL SALPINGO-OOPHORECTOMY (BSO): Primary | ICD-10-CM

## 2019-08-16 DIAGNOSIS — C79.51 BONE METASTASIS (HCC): ICD-10-CM

## 2019-08-16 DIAGNOSIS — C50.212 MALIGNANT NEOPLASM OF UPPER-INNER QUADRANT OF LEFT BREAST IN FEMALE, ESTROGEN RECEPTOR POSITIVE (HCC): ICD-10-CM

## 2019-08-16 DIAGNOSIS — Z17.0 MALIGNANT NEOPLASM OF UPPER-INNER QUADRANT OF LEFT BREAST IN FEMALE, ESTROGEN RECEPTOR POSITIVE (HCC): ICD-10-CM

## 2019-08-16 DIAGNOSIS — Z15.09 BRCA1 GENE MUTATION POSITIVE IN FEMALE: ICD-10-CM

## 2019-08-16 DIAGNOSIS — Z15.02 BRCA1 GENE MUTATION POSITIVE IN FEMALE: ICD-10-CM

## 2019-08-16 PROBLEM — Z90.79 HISTORY OF BILATERAL SALPINGO-OOPHORECTOMY (BSO): Status: ACTIVE | Noted: 2019-08-16

## 2019-08-16 LAB
ABO GROUP BLD: NORMAL
BLD GP AB SCN SERPL QL: NEGATIVE
RH BLD: POSITIVE
SPECIMEN EXPIRATION DATE: NORMAL

## 2019-08-16 PROCEDURE — 58661 LAPAROSCOPY REMOVE ADNEXA: CPT | Performed by: OBSTETRICS & GYNECOLOGY

## 2019-08-16 PROCEDURE — 86900 BLOOD TYPING SEROLOGIC ABO: CPT | Performed by: NURSE ANESTHETIST, CERTIFIED REGISTERED

## 2019-08-16 PROCEDURE — 88305 TISSUE EXAM BY PATHOLOGIST: CPT | Performed by: PATHOLOGY

## 2019-08-16 PROCEDURE — NC001 PR NO CHARGE: Performed by: OBSTETRICS & GYNECOLOGY

## 2019-08-16 PROCEDURE — 86850 RBC ANTIBODY SCREEN: CPT | Performed by: NURSE ANESTHETIST, CERTIFIED REGISTERED

## 2019-08-16 PROCEDURE — 86901 BLOOD TYPING SEROLOGIC RH(D): CPT | Performed by: NURSE ANESTHETIST, CERTIFIED REGISTERED

## 2019-08-16 RX ORDER — ROCURONIUM BROMIDE 10 MG/ML
INJECTION, SOLUTION INTRAVENOUS AS NEEDED
Status: DISCONTINUED | OUTPATIENT
Start: 2019-08-16 | End: 2019-08-16 | Stop reason: SURG

## 2019-08-16 RX ORDER — FENTANYL CITRATE 50 UG/ML
INJECTION, SOLUTION INTRAMUSCULAR; INTRAVENOUS AS NEEDED
Status: DISCONTINUED | OUTPATIENT
Start: 2019-08-16 | End: 2019-08-16 | Stop reason: SURG

## 2019-08-16 RX ORDER — OXYCODONE HYDROCHLORIDE 5 MG/1
5 TABLET ORAL EVERY 4 HOURS PRN
Qty: 5 TABLET | Refills: 0 | Status: SHIPPED | OUTPATIENT
Start: 2019-08-16 | End: 2019-11-13 | Stop reason: ALTCHOICE

## 2019-08-16 RX ORDER — ONDANSETRON 2 MG/ML
4 INJECTION INTRAMUSCULAR; INTRAVENOUS ONCE AS NEEDED
Status: DISCONTINUED | OUTPATIENT
Start: 2019-08-16 | End: 2019-08-16 | Stop reason: HOSPADM

## 2019-08-16 RX ORDER — PROPOFOL 10 MG/ML
INJECTION, EMULSION INTRAVENOUS AS NEEDED
Status: DISCONTINUED | OUTPATIENT
Start: 2019-08-16 | End: 2019-08-16 | Stop reason: SURG

## 2019-08-16 RX ORDER — HYDROMORPHONE HCL/PF 1 MG/ML
SYRINGE (ML) INJECTION AS NEEDED
Status: DISCONTINUED | OUTPATIENT
Start: 2019-08-16 | End: 2019-08-16 | Stop reason: SURG

## 2019-08-16 RX ORDER — IBUPROFEN 600 MG/1
600 TABLET ORAL EVERY 6 HOURS PRN
Status: DISCONTINUED | OUTPATIENT
Start: 2019-08-16 | End: 2019-08-16 | Stop reason: HOSPADM

## 2019-08-16 RX ORDER — GLYCOPYRROLATE 0.2 MG/ML
INJECTION INTRAMUSCULAR; INTRAVENOUS AS NEEDED
Status: DISCONTINUED | OUTPATIENT
Start: 2019-08-16 | End: 2019-08-16 | Stop reason: SURG

## 2019-08-16 RX ORDER — OXYCODONE HYDROCHLORIDE 5 MG/1
5 TABLET ORAL EVERY 4 HOURS PRN
Qty: 30 TABLET | Refills: 0 | Status: SHIPPED | OUTPATIENT
Start: 2019-08-16 | End: 2019-08-16

## 2019-08-16 RX ORDER — SODIUM CHLORIDE, SODIUM LACTATE, POTASSIUM CHLORIDE, CALCIUM CHLORIDE 600; 310; 30; 20 MG/100ML; MG/100ML; MG/100ML; MG/100ML
INJECTION, SOLUTION INTRAVENOUS CONTINUOUS PRN
Status: DISCONTINUED | OUTPATIENT
Start: 2019-08-16 | End: 2019-08-16 | Stop reason: SURG

## 2019-08-16 RX ORDER — HYDROMORPHONE HCL/PF 1 MG/ML
0.5 SYRINGE (ML) INJECTION
Status: DISCONTINUED | OUTPATIENT
Start: 2019-08-16 | End: 2019-08-16 | Stop reason: HOSPADM

## 2019-08-16 RX ORDER — BUPIVACAINE HYDROCHLORIDE 2.5 MG/ML
INJECTION, SOLUTION INFILTRATION; PERINEURAL AS NEEDED
Status: DISCONTINUED | OUTPATIENT
Start: 2019-08-16 | End: 2019-08-16 | Stop reason: HOSPADM

## 2019-08-16 RX ORDER — NEOSTIGMINE METHYLSULFATE 1 MG/ML
INJECTION INTRAVENOUS AS NEEDED
Status: DISCONTINUED | OUTPATIENT
Start: 2019-08-16 | End: 2019-08-16 | Stop reason: SURG

## 2019-08-16 RX ORDER — DEXAMETHASONE SODIUM PHOSPHATE 4 MG/ML
INJECTION, SOLUTION INTRA-ARTICULAR; INTRALESIONAL; INTRAMUSCULAR; INTRAVENOUS; SOFT TISSUE AS NEEDED
Status: DISCONTINUED | OUTPATIENT
Start: 2019-08-16 | End: 2019-08-16 | Stop reason: SURG

## 2019-08-16 RX ORDER — ACETAMINOPHEN 325 MG/1
975 TABLET ORAL ONCE
Status: COMPLETED | OUTPATIENT
Start: 2019-08-16 | End: 2019-08-16

## 2019-08-16 RX ORDER — HEPARIN SODIUM 5000 [USP'U]/ML
5000 INJECTION, SOLUTION INTRAVENOUS; SUBCUTANEOUS
Status: COMPLETED | OUTPATIENT
Start: 2019-08-16 | End: 2019-08-16

## 2019-08-16 RX ORDER — MIDAZOLAM HYDROCHLORIDE 1 MG/ML
INJECTION INTRAMUSCULAR; INTRAVENOUS AS NEEDED
Status: DISCONTINUED | OUTPATIENT
Start: 2019-08-16 | End: 2019-08-16 | Stop reason: SURG

## 2019-08-16 RX ORDER — OXYCODONE HYDROCHLORIDE 5 MG/1
5 TABLET ORAL EVERY 4 HOURS PRN
Status: DISCONTINUED | OUTPATIENT
Start: 2019-08-16 | End: 2019-08-16 | Stop reason: HOSPADM

## 2019-08-16 RX ORDER — ACETAMINOPHEN 325 MG/1
650 TABLET ORAL EVERY 6 HOURS PRN
Status: DISCONTINUED | OUTPATIENT
Start: 2019-08-16 | End: 2019-08-16 | Stop reason: HOSPADM

## 2019-08-16 RX ORDER — MEPERIDINE HYDROCHLORIDE 50 MG/ML
12.5 INJECTION INTRAMUSCULAR; INTRAVENOUS; SUBCUTANEOUS ONCE AS NEEDED
Status: DISCONTINUED | OUTPATIENT
Start: 2019-08-16 | End: 2019-08-16 | Stop reason: HOSPADM

## 2019-08-16 RX ORDER — SODIUM CHLORIDE 9 MG/ML
125 INJECTION, SOLUTION INTRAVENOUS CONTINUOUS
Status: DISCONTINUED | OUTPATIENT
Start: 2019-08-16 | End: 2019-08-16

## 2019-08-16 RX ORDER — FENTANYL CITRATE/PF 50 MCG/ML
50 SYRINGE (ML) INJECTION
Status: DISCONTINUED | OUTPATIENT
Start: 2019-08-16 | End: 2019-08-16 | Stop reason: HOSPADM

## 2019-08-16 RX ORDER — ONDANSETRON 2 MG/ML
4 INJECTION INTRAMUSCULAR; INTRAVENOUS EVERY 6 HOURS PRN
Status: DISCONTINUED | OUTPATIENT
Start: 2019-08-16 | End: 2019-08-16 | Stop reason: HOSPADM

## 2019-08-16 RX ORDER — ONDANSETRON 2 MG/ML
INJECTION INTRAMUSCULAR; INTRAVENOUS AS NEEDED
Status: DISCONTINUED | OUTPATIENT
Start: 2019-08-16 | End: 2019-08-16 | Stop reason: SURG

## 2019-08-16 RX ORDER — GABAPENTIN 100 MG/1
100 CAPSULE ORAL ONCE
Status: COMPLETED | OUTPATIENT
Start: 2019-08-16 | End: 2019-08-16

## 2019-08-16 RX ADMIN — ROCURONIUM BROMIDE 10 MG: 50 INJECTION, SOLUTION INTRAVENOUS at 08:03

## 2019-08-16 RX ADMIN — ONDANSETRON 4 MG: 2 INJECTION INTRAMUSCULAR; INTRAVENOUS at 08:44

## 2019-08-16 RX ADMIN — ROCURONIUM BROMIDE 40 MG: 50 INJECTION, SOLUTION INTRAVENOUS at 07:42

## 2019-08-16 RX ADMIN — FENTANYL CITRATE 150 MCG: 50 INJECTION, SOLUTION INTRAMUSCULAR; INTRAVENOUS at 07:40

## 2019-08-16 RX ADMIN — SODIUM CHLORIDE 125 ML/HR: 0.9 INJECTION, SOLUTION INTRAVENOUS at 07:06

## 2019-08-16 RX ADMIN — GLYCOPYRROLATE 0.4 MG: 0.2 INJECTION INTRAMUSCULAR; INTRAVENOUS at 08:43

## 2019-08-16 RX ADMIN — DEXAMETHASONE SODIUM PHOSPHATE 8 MG: 4 INJECTION, SOLUTION INTRAMUSCULAR; INTRAVENOUS at 08:30

## 2019-08-16 RX ADMIN — PROPOFOL 50 MG: 10 INJECTION, EMULSION INTRAVENOUS at 07:42

## 2019-08-16 RX ADMIN — HYDROMORPHONE HYDROCHLORIDE 0.5 MG: 1 INJECTION, SOLUTION INTRAMUSCULAR; INTRAVENOUS; SUBCUTANEOUS at 08:17

## 2019-08-16 RX ADMIN — ACETAMINOPHEN 650 MG: 325 TABLET ORAL at 11:31

## 2019-08-16 RX ADMIN — SODIUM CHLORIDE, SODIUM LACTATE, POTASSIUM CHLORIDE, AND CALCIUM CHLORIDE: .6; .31; .03; .02 INJECTION, SOLUTION INTRAVENOUS at 07:47

## 2019-08-16 RX ADMIN — PROPOFOL 150 MG: 10 INJECTION, EMULSION INTRAVENOUS at 07:40

## 2019-08-16 RX ADMIN — GABAPENTIN 100 MG: 100 CAPSULE ORAL at 06:37

## 2019-08-16 RX ADMIN — FENTANYL CITRATE 50 MCG: 50 INJECTION, SOLUTION INTRAMUSCULAR; INTRAVENOUS at 08:44

## 2019-08-16 RX ADMIN — LIDOCAINE HYDROCHLORIDE 50 MG: 20 INJECTION, SOLUTION INTRAVENOUS at 07:40

## 2019-08-16 RX ADMIN — NEOSTIGMINE METHYLSULFATE 3 MG: 1 INJECTION, SOLUTION INTRAVENOUS at 08:43

## 2019-08-16 RX ADMIN — ACETAMINOPHEN 975 MG: 325 TABLET ORAL at 06:37

## 2019-08-16 RX ADMIN — HEPARIN SODIUM 5000 UNITS: 5000 INJECTION INTRAVENOUS; SUBCUTANEOUS at 07:07

## 2019-08-16 RX ADMIN — MIDAZOLAM 2 MG: 1 INJECTION INTRAMUSCULAR; INTRAVENOUS at 07:29

## 2019-08-16 NOTE — ANESTHESIA PREPROCEDURE EVALUATION
Review of Systems/Medical History  Patient summary reviewed  Chart reviewed      Cardiovascular  Negative cardio ROS    Pulmonary  Smoker ,   Comment: Past hx of social smoking     GI/Hepatic      Comment: Questionable liver mets from breast CA but later follow ups were clear     Negative  ROS        Endo/Other  Negative endo/other ROS      GYN    Breast cancer left lumpectomy       Hematology  Negative hematology ROS      Musculoskeletal  Negative musculoskeletal ROS        Neurology  Negative neurology ROS      Psychology   Negative psychology ROS              Physical Exam    Airway    Mallampati score: III  TM Distance: <3 FB  Neck ROM: full     Dental   No notable dental hx     Cardiovascular  Comment: Negative ROS, Rhythm: regular, Rate: normal, Cardiovascular exam normal    Pulmonary  Pulmonary exam normal Breath sounds clear to auscultation,     Other Findings        Anesthesia Plan  ASA Score- 2     Anesthesia Type- general with ASA Monitors  Additional Monitors:   Airway Plan: ETT  Plan Factors-    Induction- intravenous  Postoperative Plan- Plan for postoperative opioid use  Planned trial extubation    Informed Consent- Anesthetic plan and risks discussed with patient and spouse

## 2019-08-16 NOTE — OP NOTE
OPERATIVE REPORT  PATIENT NAME: Leora Lyle    :  1965  MRN: [id number]  Pt Location: [facility]OM 06    SURGERY DATE: 2019    Surgeon(s) and Role:     * Raymundo Walters MD - Primary     * Liz Soriano MD - Assisting    Preop Diagnosis:  BRCA1 gene mutation positive in female [Z15 01, Z15 02, Z15 09]  Malignant neoplasm of upper-inner quadrant of left breast in female, estrogen receptor positive ([address] ) [C50 212, Z17 0]  Bone metastasis ([address] ) [C79 51]  Liver metastases ([address] ) [C78 7]    Post-Op Diagnosis Codes:     * BRCA1 gene mutation positive in female [Z15 01, Z15 02, Z15 09]     * Malignant neoplasm of upper-inner quadrant of left breast in female, estrogen receptor positive ([address] ) [C50 212, Z17 0]     * Bone metastasis (H[facility]) [C79 51]     * Liver metastases (HCC) [C78 7]    Procedure(s) (LRB):  LAPAROSCOPIC SALPINGO-OOPHORECTOMY (Bilateral)    Specimen(s):  ID Type Source Tests Collected by Time Destination   1 : BILATERAL FALLOPIAN TUBES AND OVARIES Tissue Fallopian Tubes, Bilateral TISSUE EXAM Raymundo Walters MD 2019 0830        Estimated Blood Loss:   Minimal    Drains:  [REMOVED] NG/OG/Enteral Tube Orogastric 18 Fr Center mouth (Removed)   Status Suction-low continuous 2019  8:11 AM   Number of days: 0       Anesthesia Type:   General    Operative Indications:  BRCA1 gene mutation positive in female [Z15 01, Z15 02, Z15 09]  Malignant neoplasm of upper-inner quadrant of left breast in female, estrogen receptor positive ([address] ) [C50 212, Z17 0]  Bone metastasis ([address] ) [C79 51]  Liver metastases ([address] ) [C78 7]    Operative Findings:  1  Retroverted uterus with left ovary somewhat adherent to the posterior left broad ligament/parametrium  2  Grossly normal uterus and cervix  3  Grossly normal bilateral fallopian tubes and ovaries  4  Thin adhesions from sigmoid colon to left pelvic sidewall and left ovarian vessels  Complications:   None    Procedure and Technique:   The patient was taken to the operating room where general endotracheal anesthesia was induced without complications  Sequential compression devices were activated and medical deep vein thrombosis prophylaxis was administered per hospital protocol  The patient was placed in the dorsal lithotomy position using Danny stirrups and her abdomen, perineum and vagina were prepped and draped in the usual sterile fashion  Under direct visualization a uterine dilator and tenaculum were secured in place for uterine manipulation  A Anna catheter was inserted  Attention was turned to the abdomen  A 10 mm skin incision was made at the base of the umbilicus with an 11 blade knife  Please note that this and all other incisions were infiltrated with 0 25% bupivacaine at the beginning and completion of the procedure  Using a 5 mm Endopath Excel trocar and a 5 mm laparoscope the peritoneal cavity was entered under direct visualization  Good intraperitoneal location was confirmed and pneumoperitoneum was created to maximal pressure of 15 mmHg  The patient was placed in steep Trendelenburg and the above-mentioned findings were noted  Two 5 mm trocars were placed in a similar fashion in the right and left lower quadrants under direct visualization and the umbilical port site was up sized to an 11 mm cannula without incident  Thin adhesions from sigmoid colon to left ovarian vessels and pelvic sidewall were taken down sharply with the EnSeal device  The ureters were clearly identified on both sides  The right a adnexa was elevated and the utero-ovarian ligament and fallopian tube was cauterized and transected at the cornual region  Then, the anterior leaf of broad ligament was divided and the ovarian vessels were elevated above the level of the pelvic brim    The medial leaf of the broad ligament was then divided with above the level of the ureter and the ovarian vessels were cauterized and divided at the level of the pelvic brim  On the left side due to adhesions from the ovary to left ovarian fossa/parametrium we opened the pelvic sidewall lateral to the ovarian vessels, developed pararectal space and clearly identified the ureter 1st   Then, the ovarian vessels were skeletonized cauterized and divided with the EnSeal device  Serially and well keeping the ureter away of harms weighs adhesions from the ovary to the left parametrium and ovarian fossa were taken down sharply with the EnSeal device  The ovary was completely excised without difficulties  The tubes and ovaries were retrieved individually and extracted using a laparoscopic specimen retrieval bag through the umbilical trocar site  Then, copious irrigation was used and hemostasis was confirmed at low intraperitoneal pressures  The fascia at the 11 mm umbilical trocar site was closed using a deep stitch of zero Vicryl   Laparoscopic assessment revealed good airtight umbilical trocar site closure  Pneumoperitoneum was completely released with the assistance of Valsalva maneuvers and then all trocars were removed  The skin at all port sites was closed using a subcuticular stitch of 4-0 Monocryl and Histoacryl was applied  Uterine manipulation instruments were removed and vaginal exam revealed excellent hemostasis  The Anna catheter was removed  The patient tolerated the procedure well, sponge, lap, needle and instrument counts were all reported as correct ×2  The patient was successfully extubated and awakened in the operating room and transferred to the postanesthesia care unit in stable condition       I was present for the entire procedure    Patient Disposition:  PACU     SIGNATURE: Lina Stevens MD, Redell Courier  DATE: August 16, 2019  TIME: 8:45 AM

## 2019-08-16 NOTE — H&P
H&P Exam - Gynecologic Oncology   Alejandrina Barba 48 y o  female MRN: [id number]  Unit/Bed#: OR POOL Encounter: [id number]    Assessment/Plan     Hormone receptor positive breast cancer, BRCA1 mutation carrier:  After counseling she has agreed to proceed with laparoscopic bilateral salpingo-oophorectomy  She has been consented and understands indications, risks benefits and alternatives  All questions answered  Agrees to proceed with surgery as planned  History of Present Illness   HX and PE limited by: N/A  HPI:  Camila Corrales is a 48 y o  female who presents for scheduled elective laparoscopic bilateral salpingo-oophorectomy for history of hormone receptor positive her 2 positive breast cancer and BRCA1 mutation  Review of Systems   All other systems reviewed and are negative  Asymptomatic  Historical Information   Previous Oncology History:  Stage IV breast cancer, see details in Oncology and last gynecologic oncology notes  Past Medical History:   Diagnosis Date    BRCA1 positive     Breast cancer [facility] ) 2016    left breast    History of chemotherapy     Hx of radiation therapy     breast and left  hip    Liver metastases ([facility] )     Port-A-Cath in place     right chest    Wears glasses      Past Surgical History:   Procedure Laterality Date    BREAST BIOPSY Left 2015    BREAST LUMPECTOMY Left 2016    BREAST SURGERY Left     biopsy    CHOLECYSTECTOMY      CHOLECYSTECTOMY      PORTACATH PLACEMENT Right     SIMPLE MASTECTOMY Left 2016    Procedure: BREAST PARTIAL MASTECTOMY ;  Surgeon: Sabrina Keller MD;  Location: [facility];  Service:      OB/GYN History:     Family History   Problem Relation Age of Onset    No Known Problems Mother     Heart attack Father     No Known Problems Daughter     No Known Problems Maternal Grandmother     No Known Problems Maternal Grandfather     No Known Problems Paternal Aunt     No Known Problems Paternal Aunt     Lung cancer Maternal Uncle 79     Social History   Social History     Substance and Sexual Activity   Alcohol Use No     Social History     Substance and Sexual Activity   Drug Use No     Social History     Tobacco Use   Smoking Status Former Smoker    Last attempt to quit: 8/8/2009    Years since quitting: 10 0   Smokeless Tobacco Never Used       Meds/Allergies   all current active meds have been reviewed  No Known Allergies    Objective   Vitals: Blood pressure (!) 197/90, pulse 82, temperature 97 6 °F (36 4 °C), temperature source Tympanic, resp  rate 20, height 5' 6" (1 676 m), weight 90 3 kg (199 lb), SpO2 95 %, not currently breastfeeding  No intake or output data in the 24 hours ending 08/16/19 0703    Invasive Devices:   Port A Cath 01/10/16 Right Chest (Active)       Physical Exam   Constitutional: She is oriented to person, place, and time  She appears well-nourished  No distress  Eyes: No scleral icterus  Cardiovascular: Normal rate and regular rhythm  No murmur heard  Pulmonary/Chest: Effort normal and breath sounds normal  No respiratory distress  Abdominal: Soft  Neurological: She is alert and oriented to person, place, and time  Psychiatric: She has a normal mood and affect  Lab Results: I have personally reviewed pertinent reports  Imaging: I have personally reviewed pertinent reports  EKG, Pathology, and Other Studies: I have personally reviewed pertinent reports        Otto Wilkinson MD, [address] [place] [address]  8/16/2019  7:06 AM

## 2019-08-16 NOTE — DISCHARGE INSTRUCTIONS
Laparoscopic Salpingo-oophorectomy   WHAT YOU SHOULD KNOW:   Laparoscopic salpingo-oophorectomy is surgery to remove one or both fallopian tubes together with the ovaries  AFTER YOU LEAVE:   Medicines:   · NSAIDs  help decrease swelling, pain, and fever  This medicine is available without a doctor's order  NSAIDs can cause stomach bleeding or kidney problems  If you take blood thinner medicine, always ask your primary healthcare provider (PHP) if NSAIDs are safe for you  Always read the medicine label and follow directions  · Acetaminophen  decreases pain and fever  It is available without a doctor's order  Ask how much to take and how often to take it  Follow directions  Acetaminophen can cause liver damage if not taken correctly  · Prescription pain medicine  may be given  Ask your PHP how to take this medicine safely  · Take your medicine as directed  Contact your PHP if you think your medicine is not helping or if you have side effects  Tell him if you are allergic to any medicine  Keep a list of the medicines, vitamins, and herbs you take  Include the amounts, and when and why you take them  Bring the list or the pill bottles to follow-up visits  Carry your medicine list with you in case of an emergency  Follow up with your surgeon or gynecologist as directed:  Write down your questions so you remember to ask them during your visits  Wound care:  Carefully wash the wound with soap and water  Dry the area and put on new, clean bandages as directed  Change your bandages when they get wet or dirty  Counseling: This surgery may be life-changing for you and your family  Sudden changes in the levels of your hormones may occur and cause mood swings and depression  You may feel angry, sad, or frightened, or cry frequently and unexpectedly  These feelings are normal  Talk to your caregivers, family, or friends about your feelings   You may need to attend meetings with a caregiver, family members, or other people who are close to you  These meetings can help everyone better understand your condition, surgery, and care  Activity:  Ask when you can return to your usual activities, such as exercise  It is best to start exercise slowly and do more as you get stronger  Exercise makes your heart stronger, lowers blood pressure, and keeps your bones healthy  Contact your surgeon or gynecologist if:   · You have a fever  · You have chills, a cough, or feel weak and achy  · You have nausea or are vomiting  · Your skin is itchy, swollen, or has a rash  · You have questions or concerns about your condition or care  Seek care immediately or call 911 if:   · You feel lightheaded, short of breath, and have chest pain  · You cough up blood  · Your arm or leg feels warm, tender, and painful  It may look swollen and red  · You feel something is bulging into your vagina, or you have vaginal bleeding  · You have lower abdominal or back pain that does not go away even after you take medicine  · You have pus or a foul-smelling odor coming from your vagina  · You have trouble urinating or having a bowel movement  · Blood soaks through your bandage  · Your symptoms return  © 2014 [address] is for End User's use only and may not be sold, redistributed or otherwise used for commercial purposes  All illustrations and images included in CareNotes® are the copyrighted property of [employer]  or [employer]  The above information is an  only  It is not intended as medical advice for individual conditions or treatments  Talk to your doctor, nurse or pharmacist before following any medical regimen to see if it is safe and effective for you

## 2019-08-16 NOTE — ANESTHESIA POSTPROCEDURE EVALUATION
Post-Op Assessment Note    CV Status:  Stable  Pain Score: 2    Pain management: adequate     Mental Status:  Alert and awake   Hydration Status:  Euvolemic and stable   PONV Controlled:  Controlled   Airway Patency:  Patent   Post Op Vitals Reviewed: Yes      Staff: Anesthesiologist           /75 (08/16/19 0935)    Temp     Pulse 74 (08/16/19 0935)   Resp 15 (08/16/19 0935)    SpO2 93 % (08/16/19 0935)

## 2019-08-19 ENCOUNTER — TELEPHONE (OUTPATIENT)
Dept: GYNECOLOGIC ONCOLOGY | Facility: CLINIC | Age: 54
End: 2019-08-19

## 2019-08-19 NOTE — TELEPHONE ENCOUNTER
Post-operative telephone call made to patient  Recovering well from surgery without questions or concerns

## 2019-08-30 ENCOUNTER — HOSPITAL ENCOUNTER (OUTPATIENT)
Dept: INFUSION CENTER | Facility: CLINIC | Age: 54
Discharge: HOME | End: 2019-08-30
Payer: COMMERCIAL

## 2019-08-30 ENCOUNTER — OFFICE VISIT (OUTPATIENT)
Dept: GYNECOLOGIC ONCOLOGY | Facility: CLINIC | Age: 54
End: 2019-08-30

## 2019-08-30 VITALS
DIASTOLIC BLOOD PRESSURE: 80 MMHG | SYSTOLIC BLOOD PRESSURE: 124 MMHG | BODY MASS INDEX: 35.36 KG/M2 | HEART RATE: 78 BPM | HEIGHT: 66 IN | WEIGHT: 220 LBS

## 2019-08-30 VITALS
DIASTOLIC BLOOD PRESSURE: 88 MMHG | HEART RATE: 82 BPM | TEMPERATURE: 98.2 F | SYSTOLIC BLOOD PRESSURE: 150 MMHG | RESPIRATION RATE: 18 BRPM | WEIGHT: 220.9 LBS | BODY MASS INDEX: 35.65 KG/M2

## 2019-08-30 DIAGNOSIS — Z90.79 HISTORY OF BILATERAL SALPINGO-OOPHORECTOMY (BSO): ICD-10-CM

## 2019-08-30 DIAGNOSIS — C79.51 BONE METASTASIS (HCC): Primary | ICD-10-CM

## 2019-08-30 DIAGNOSIS — Z15.01 BRCA1 GENE MUTATION POSITIVE IN FEMALE: Primary | ICD-10-CM

## 2019-08-30 DIAGNOSIS — Z15.02 BRCA1 GENE MUTATION POSITIVE IN FEMALE: Primary | ICD-10-CM

## 2019-08-30 DIAGNOSIS — Z17.0 ESTROGEN RECEPTOR POSITIVE: ICD-10-CM

## 2019-08-30 DIAGNOSIS — Z17.0 MALIGNANT NEOPLASM OF UPPER-INNER QUADRANT OF LEFT BREAST IN FEMALE, ESTROGEN RECEPTOR POSITIVE (HCC): ICD-10-CM

## 2019-08-30 DIAGNOSIS — C50.212 MALIGNANT NEOPLASM OF UPPER-INNER QUADRANT OF LEFT BREAST IN FEMALE, ESTROGEN RECEPTOR POSITIVE (HCC): ICD-10-CM

## 2019-08-30 DIAGNOSIS — C78.7 LIVER METASTASES (HCC): ICD-10-CM

## 2019-08-30 DIAGNOSIS — C50.212 MALIGNANT NEOPLASM OF UPPER-INNER QUADRANT OF LEFT FEMALE BREAST, UNSPECIFIED ESTROGEN RECEPTOR STATUS (HCC): ICD-10-CM

## 2019-08-30 DIAGNOSIS — Z90.722 S/P BSO (BILATERAL SALPINGO-OOPHORECTOMY): ICD-10-CM

## 2019-08-30 DIAGNOSIS — Z15.09 BRCA1 GENE MUTATION POSITIVE IN FEMALE: Primary | ICD-10-CM

## 2019-08-30 DIAGNOSIS — Z90.722 HISTORY OF BILATERAL SALPINGO-OOPHORECTOMY (BSO): ICD-10-CM

## 2019-08-30 LAB
ALBUMIN SERPL BCP-MCNC: 2.9 G/DL (ref 3.5–5.7)
ALP SERPL-CCNC: 101 U/L (ref 46–116)
ALT SERPL W P-5'-P-CCNC: 32 U/L (ref 12–78)
ANION GAP SERPL CALCULATED.3IONS-SCNC: 1 MMOL/L (ref 4–13)
AST SERPL W P-5'-P-CCNC: 25 U/L (ref 5–45)
BASOPHILS # BLD AUTO: 0.03 THOUSANDS/ΜL (ref 0–0.1)
BASOPHILS NFR BLD AUTO: 0 % (ref 0–1)
BILIRUB SERPL-MCNC: 0.6 MG/DL (ref 0.2–1)
BUN SERPL-MCNC: 14 MG/DL (ref 5–25)
CALCIUM SERPL-MCNC: 9.2 MG/DL (ref 8.3–10.1)
CANCER AG27-29 SERPL-ACNC: 27.2 U/ML (ref 0–42.3)
CHLORIDE SERPL-SCNC: 104 MMOL/L (ref 98–108)
CO2 SERPL-SCNC: 28 MMOL/L (ref 21–32)
CREAT SERPL-MCNC: 0.9 MG/DL (ref 0.6–1.3)
EOSINOPHIL # BLD AUTO: 0.39 THOUSAND/ΜL (ref 0–0.61)
EOSINOPHIL NFR BLD AUTO: 5 % (ref 0–6)
ERYTHROCYTE [DISTWIDTH] IN BLOOD BY AUTOMATED COUNT: 15.1 % (ref 11.6–15.1)
GFR SERPL CREATININE-BSD FRML MDRD: 74 ML/MIN/1.73SQ M
GLUCOSE SERPL-MCNC: 162 MG/DL (ref 65–140)
HCT VFR BLD AUTO: 36.3 % (ref 34.8–46.1)
HGB BLD-MCNC: 12.2 G/DL (ref 11.5–15.4)
LYMPHOCYTES # BLD AUTO: 1.68 THOUSANDS/ΜL (ref 0.6–4.47)
LYMPHOCYTES NFR BLD AUTO: 20 % (ref 14–44)
MCH RBC QN AUTO: 29 PG (ref 26.8–34.3)
MCHC RBC AUTO-ENTMCNC: 33.6 G/DL (ref 31.4–37.4)
MCV RBC AUTO: 86 FL (ref 82–98)
MONOCYTES # BLD AUTO: 0.4 THOUSAND/ΜL (ref 0.17–1.22)
MONOCYTES NFR BLD AUTO: 5 % (ref 4–12)
NEUTROPHILS # BLD AUTO: 6.13 THOUSANDS/ΜL (ref 1.85–7.62)
NEUTS SEG NFR BLD AUTO: 70 % (ref 43–75)
PLATELET # BLD AUTO: 276 THOUSANDS/UL (ref 149–390)
PMV BLD AUTO: 10.3 FL (ref 8.9–12.7)
POTASSIUM SERPL-SCNC: 3.6 MMOL/L (ref 3.5–5.3)
PROT SERPL-MCNC: 6.2 G/DL (ref 6.4–8.2)
RBC # BLD AUTO: 4.21 MILLION/UL (ref 3.81–5.12)
SODIUM SERPL-SCNC: 133 MMOL/L (ref 136–145)
WBC # BLD AUTO: 8.63 THOUSAND/UL (ref 4.31–10.16)

## 2019-08-30 PROCEDURE — 80053 COMPREHEN METABOLIC PANEL: CPT | Performed by: INTERNAL MEDICINE

## 2019-08-30 PROCEDURE — 85025 COMPLETE CBC W/AUTO DIFF WBC: CPT | Performed by: INTERNAL MEDICINE

## 2019-08-30 PROCEDURE — 86300 IMMUNOASSAY TUMOR CA 15-3: CPT | Performed by: INTERNAL MEDICINE

## 2019-08-30 PROCEDURE — 99024 POSTOP FOLLOW-UP VISIT: CPT | Performed by: OBSTETRICS & GYNECOLOGY

## 2019-08-30 PROCEDURE — 96417 CHEMO IV INFUS EACH ADDL SEQ: CPT

## 2019-08-30 PROCEDURE — 96413 CHEMO IV INFUSION 1 HR: CPT

## 2019-08-30 RX ORDER — SODIUM CHLORIDE 9 MG/ML
20 INJECTION, SOLUTION INTRAVENOUS ONCE
Status: COMPLETED | OUTPATIENT
Start: 2019-08-30 | End: 2019-08-30

## 2019-08-30 RX ADMIN — PERTUZUMAB 420 MG: 30 INJECTION, SOLUTION, CONCENTRATE INTRAVENOUS at 14:21

## 2019-08-30 RX ADMIN — TRASTUZUMAB 600 MG: 150 INJECTION, POWDER, LYOPHILIZED, FOR SOLUTION INTRAVENOUS at 15:06

## 2019-08-30 RX ADMIN — SODIUM CHLORIDE 20 ML/HR: 0.9 INJECTION, SOLUTION INTRAVENOUS at 14:15

## 2019-08-30 NOTE — PROGRESS NOTES
Assessment/Plan:    Problem List Items Addressed This Visit        Other    BRCA1 gene mutation positive in female - Primary    Malignant neoplasm of upper-inner quadrant of left breast in female, estrogen receptor positive ([facility] [address] )    S/P BSO (bilateral salpingo-oophorectomy)     Status post bilateral salpingo-oophorectomy for hormone receptor positive breast cancer and BRCA1 pathogenic mutation  Final pathology demonstrated normal ovaries with no evidence of atypia or carcinoma  Patient has recovered well from surgery  I emphasized importance of no heavy lifting for additional 3 weeks  Otherwise, she can resume all activities of daily living with no restrictions  Surgical pathology discussed with patient  All questions answered  Patient is now menopausal   I defer to Dr Phylicia Rivera consideration of discontinuing tamoxifen and initiating aromatase inhibitors  CHIEF COMPLAINT:     Postoperative follow-up  Previous therapy:     Malignant neoplasm of upper-inner quadrant of left female breast ([facility] [address] )    12/2015 Initial Diagnosis     Malignant neoplasm of upper-outer quadrant of left female breast ([facility] [address] )      1/27/2016 Surgery     Port placement      2/19/2016 - 6/2/2018 Chemotherapy     Taxotere,  herceptin, perjeta, xgeva  After six cycles, taxotere was discontinued and tamoxifen added     Continues with Perjeta and Herceptin every 3 weeks    - Dr Phylicia Rivera        9/16/2016 Surgery     Left breast partial mastectomy      11/7/2016 - 12/23/2016 Radiation     Plan ID Energy Fractions Dose per Fraction (cGy) Total Dose Delivered (cGy) Elapsed Days   Lt Breast 6X 25 / 25 200 5,000 36   Lt Brst Boost 6X 8 / 8 200 1,600 9   Lt Femur 10X 10 / 10 300 3,000 11   Lt PAB 6X 25 / 25 60 1,500 36   Lt Sclav 6X 25 / 25 200 5,000 36                                   Total dose to left breast lumpectomy cavity: 6600 cGy                   Total dose to the supraclavicular and axillary regions: 5000 cGy Hormone Therapy     Tamoxifen   Dr Perez Martines      4/17/2019 -  Chemotherapy     pertuzumab (PERJETA) 840 mg in sodium chloride 0 9 % 250 mL IVPB, 840 mg, Intravenous, Once, 6 of 8 cycles  Administration: 420 mg (5/17/2019), 420 mg (6/7/2019), 420 mg (6/28/2019), 420 mg (7/19/2019), 420 mg (8/9/2019)  trastuzumab (HERCEPTIN) 589 mg in sodium chloride 0 9 % 250 mL chemo infusion, 6 mg/kg = 589 mg (75 % of original dose 8 mg/kg), Intravenous, Once, 6 of 8 cycles  Dose modification: 6 mg/kg (original dose 8 mg/kg, Cycle 1, Reason: Other (See Comments))  Administration: 589 mg (5/17/2019), 589 mg (6/7/2019), 600 mg (6/28/2019), 600 mg (7/19/2019), 600 mg (8/9/2019)        Liver metastases ([address] )    4/27/2018 Initial Diagnosis     Liver metastases ([address] )      4/17/2019 -  Chemotherapy     pertuzumab (PERJETA) 840 mg in sodium chloride 0 9 % 250 mL IVPB, 840 mg, Intravenous, Once, 6 of 8 cycles  Administration: 420 mg (5/17/2019), 420 mg (6/7/2019), 420 mg (6/28/2019), 420 mg (7/19/2019), 420 mg (8/9/2019)  trastuzumab (HERCEPTIN) 589 mg in sodium chloride 0 9 % 250 mL chemo infusion, 6 mg/kg = 589 mg (75 % of original dose 8 mg/kg), Intravenous, Once, 6 of 8 cycles  Dose modification: 6 mg/kg (original dose 8 mg/kg, Cycle 1, Reason: Other (See Comments))  Administration: 589 mg (5/17/2019), 589 mg (6/7/2019), 600 mg (6/28/2019), 600 mg (7/19/2019), 600 mg (8/9/2019)        Bone metastasis ([facility])    4/27/2018 Initial Diagnosis     Bone metastasis ([address] )      4/17/2019 -  Chemotherapy     pertuzumab (PERJETA) 840 mg in sodium chloride 0 9 % 250 mL IVPB, 840 mg, Intravenous, Once, 6 of 8 cycles  Administration: 420 mg (5/17/2019), 420 mg (6/7/2019), 420 mg (6/28/2019), 420 mg (7/19/2019), 420 mg (8/9/2019)  trastuzumab (HERCEPTIN) 589 mg in sodium chloride 0 9 % 250 mL chemo infusion, 6 mg/kg = 589 mg (75 % of original dose 8 mg/kg), Intravenous, Once, 6 of 8 cycles  Dose modification: 6 mg/kg (original dose 8 mg/kg, Cycle 1, Reason: Other (See Comments))  Administration: 589 mg (5/17/2019), 589 mg (6/7/2019), 600 mg (6/28/2019), 600 mg (7/19/2019), 600 mg (8/9/2019)           Patient ID: Angelo Moore is a 48 y o  female  HPI  Patient is approximately 2 weeks out from laparoscopic bilateral salpingo-oophorectomy for hormone receptor positive breast cancer and BRCA1 mutation  She has recovered uneventfully  Has no complaints  Denies vaginal bleeding, drainage or discharge  Incisions well-healed  Normal bowel and bladder function  The following portions of the patient's history were reviewed and updated as appropriate: allergies, current medications, past family history, past medical history, past social history, past surgical history and problem list     Review of Systems  As above  Twelve point review of systems unremarkable  Current Outpatient Medications:     multivitamin (THERAGRAN) TABS, Take 1 tablet by mouth 3 (three) times a week , Disp: , Rfl:     oxyCODONE (ROXICODONE) 5 mg immediate release tablet, Take 1 tablet (5 mg total) by mouth every 4 (four) hours as needed for moderate pain for up to 5 dosesMax Daily Amount: 30 mg, Disp: 5 tablet, Rfl: 0    pertuzumab (PERJETA) 420 mg/14 mL SOLN, Infuse into a venous catheter every 21 days  At infusion  center, Disp: , Rfl:     tamoxifen (NOLVADEX) 20 mg tablet, TAKE 1 TABLET BY MOUTH EVERY DAY, Disp: 90 tablet, Rfl: 0    Trastuzumab (HERCEPTIN IV), Infuse into a venous catheter every 21 days  At infusion center, Disp: , Rfl:     VITAMIN D, CHOLECALCIFEROL, PO, Take by mouth 3 (three) times a week , Disp: , Rfl:     Objective:    Blood pressure 124/80, pulse 78, height 5' 6" (1 676 m), weight 99 8 kg (220 lb), not currently breastfeeding  Body mass index is 35 51 kg/m²  Body surface area is 2 08 meters squared  Physical Exam  Abdomen:  Incisions well-healed  No hernias        Case Report   Surgical Pathology Report                         Case: [id number]                                    Authorizing Provider: Lola Em MD      Collected:           08/16/2019 0830               Ordering Location:     [facility]        Received:            08/16/2019 [address] [place] [address] Operating Room                                                      Pathologist:           Yoanna Chavez MD                                                               Specimen:    Fallopian Tubes, Bilateral, BILATERAL FALLOPIAN TUBES AND OVARIES                          Final Diagnosis   A  Fallopian Tubes, bilateral fallopian tubes and ovaries  -Benign  Bilateral ovaries with simple cyst  -Benign bilateral fallopian tubes with paratubal cysts  -No evidence of malignancy seen  Electronically signed by Yoanna Chavez MD on 8/20/2019 at  1:53 PM   Note    Clinical data  Op notes  Preop Diagnosis:  BRCA1 gene mutation positive in female [Z15 01, Z15 02, Z15 09]  Malignant neoplasm of upper-inner quadrant of left breast in female, estrogen receptor positive ([facility] [address] ) [C50 212, Z17 0]  Bone metastasis ([address] ) [C79 51]  Liver metastases ([address] ) [C78 7]      Additional Information    All controls performed with the immunohistochemical stains reported above reacted appropriately  These tests were developed and their performance characteristics determined by [facility] or [facility]  They may not be cleared or approved by the [place]  Food and Drug Administration  The FDA has determined that such clearance or approval is not necessary  These tests are used for clinical purposes  They should not be regarded as investigational or for research  This laboratory has been approved by CLIA 88, designated as a high-complexity laboratory and is qualified to perform these tests  Gross Description    A  The specimen is received in formalin, labeled with the patient's name and hospital number, and is designated "bilateral fallopian tubes and ovaries"  The specimen consists of bilateral fallopian tubes and ovaries  The 1st fallopian tube with fimbria measures 4 5 cm in length and up to 0 4 cm in diameter  It is grossly unremarkable  The attached ovary measures 2 3 x 1 4 x 1 0 cm  It is grossly unremarkable  The 2nd fallopian tube with fimbria measures 4 5 cm in length and up to 0 3 cm in diameter  It exhibits a paratubal cysts and is otherwise grossly unremarkable  The attached ovary measures 2 8 x 1 9 x 1 0 cm  It is grossly unremarkable  The entire ovaries and fallopian tubes are submitted in 14 cassettes      1-4:  1st described fallopian tube  5-7:  1st described ovary  8-10:  2nd described fallopian tube  11-14:  2nd described ovary     Note: The estimated total formalin fixation time based upon information provided by the submitting clinician and the standard processing schedule is over 72 hours     Alireza Ren MD, [place], FACS  8/30/2019  12:21 PM

## 2019-08-30 NOTE — ASSESSMENT & PLAN NOTE
Status post bilateral salpingo-oophorectomy for hormone receptor positive breast cancer and BRCA1 pathogenic mutation  Final pathology demonstrated normal ovaries with no evidence of atypia or carcinoma  Patient has recovered well from surgery  I emphasized importance of no heavy lifting for additional 3 weeks  Otherwise, she can resume all activities of daily living with no restrictions  Surgical pathology discussed with patient  All questions answered  Patient is now menopausal   I defer to Dr Abby Jin consideration of discontinuing tamoxifen and initiating aromatase inhibitors

## 2019-08-30 NOTE — LETTER
August 30, 2019     Orin Chaudhary MD  [address] [place] [address]    Patient: Tena Mcginnis   YOB: 1965   Date of Visit: 8/30/2019       Dear Dr Maldonado Learn:    Thank you for referring Tena Mcginnis to me for evaluation  Below are my notes for this consultation  If you have questions, please do not hesitate to call me  I look forward to following your patient along with you  Sincerely,        Severino Melendez MD        CC: MD Severino Melo MD  8/30/2019 12:21 PM  Sign at close encounter  Assessment/Plan:    Problem List Items Addressed This Visit        Other    BRCA1 gene mutation positive in female - Primary    Malignant neoplasm of upper-inner quadrant of left breast in female, estrogen receptor positive ([facility] [address] )    S/P BSO (bilateral salpingo-oophorectomy)     Status post bilateral salpingo-oophorectomy for hormone receptor positive breast cancer and BRCA1 pathogenic mutation  Final pathology demonstrated normal ovaries with no evidence of atypia or carcinoma  Patient has recovered well from surgery  I emphasized importance of no heavy lifting for additional 3 weeks  Otherwise, she can resume all activities of daily living with no restrictions  Surgical pathology discussed with patient  All questions answered  Patient is now menopausal   I defer to Dr Erica Tom consideration of discontinuing tamoxifen and initiating aromatase inhibitors  CHIEF COMPLAINT:     Postoperative follow-up  Previous therapy:     Malignant neoplasm of upper-inner quadrant of left female breast ([address] )    12/2015 Initial Diagnosis     Malignant neoplasm of upper-outer quadrant of left female breast ([facility] [address] )      1/27/2016 Surgery     Port placement      2/19/2016 - 6/2/2018 Chemotherapy     Taxotere,  herceptin, perjeta, xgeva  After six cycles, taxotere was discontinued and tamoxifen added     Continues with Perjeta and Herceptin every 3 weeks    - Dr Kavita Troy        9/16/2016 Surgery     Left breast partial mastectomy      11/7/2016 - 12/23/2016 Radiation     Plan ID Energy Fractions Dose per Fraction (cGy) Total Dose Delivered (cGy) Elapsed Days   Lt Breast 6X 25 / 25 200 5,000 36   Lt Brst Boost 6X 8 / 8 200 1,600 9   Lt Femur 10X 10 / 10 300 3,000 11   Lt PAB 6X 25 / 25 60 1,500 36   Lt Sclav 6X 25 / 25 200 5,000 36                                   Total dose to left breast lumpectomy cavity: 6600 cGy                   Total dose to the supraclavicular and axillary regions: 5000 cGy         Hormone Therapy     Tamoxifen   Dr Kavita Troy      4/17/2019 -  Chemotherapy     pertuzumab (PERJETA) 840 mg in sodium chloride 0 9 % 250 mL IVPB, 840 mg, Intravenous, Once, 6 of 8 cycles  Administration: 420 mg (5/17/2019), 420 mg (6/7/2019), 420 mg (6/28/2019), 420 mg (7/19/2019), 420 mg (8/9/2019)  trastuzumab (HERCEPTIN) 589 mg in sodium chloride 0 9 % 250 mL chemo infusion, 6 mg/kg = 589 mg (75 % of original dose 8 mg/kg), Intravenous, Once, 6 of 8 cycles  Dose modification: 6 mg/kg (original dose 8 mg/kg, Cycle 1, Reason: Other (See Comments))  Administration: 589 mg (5/17/2019), 589 mg (6/7/2019), 600 mg (6/28/2019), 600 mg (7/19/2019), 600 mg (8/9/2019)        Liver metastases ([address] )    4/27/2018 Initial Diagnosis     Liver metastases ([address] )      4/17/2019 -  Chemotherapy     pertuzumab (PERJETA) 840 mg in sodium chloride 0 9 % 250 mL IVPB, 840 mg, Intravenous, Once, 6 of 8 cycles  Administration: 420 mg (5/17/2019), 420 mg (6/7/2019), 420 mg (6/28/2019), 420 mg (7/19/2019), 420 mg (8/9/2019)  trastuzumab (HERCEPTIN) 589 mg in sodium chloride 0 9 % 250 mL chemo infusion, 6 mg/kg = 589 mg (75 % of original dose 8 mg/kg), Intravenous, Once, 6 of 8 cycles  Dose modification: 6 mg/kg (original dose 8 mg/kg, Cycle 1, Reason: Other (See Comments))  Administration: 589 mg (5/17/2019), 589 mg (6/7/2019), 600 mg (6/28/2019), 600 mg (7/19/2019), 600 mg (8/9/2019) Bone metastasis ([facility] [address] )    4/27/2018 Initial Diagnosis     Bone metastasis ([facility] [address] )      4/17/2019 -  Chemotherapy     pertuzumab (PERJETA) 840 mg in sodium chloride 0 9 % 250 mL IVPB, 840 mg, Intravenous, Once, 6 of 8 cycles  Administration: 420 mg (5/17/2019), 420 mg (6/7/2019), 420 mg (6/28/2019), 420 mg (7/19/2019), 420 mg (8/9/2019)  trastuzumab (HERCEPTIN) 589 mg in sodium chloride 0 9 % 250 mL chemo infusion, 6 mg/kg = 589 mg (75 % of original dose 8 mg/kg), Intravenous, Once, 6 of 8 cycles  Dose modification: 6 mg/kg (original dose 8 mg/kg, Cycle 1, Reason: Other (See Comments))  Administration: 589 mg (5/17/2019), 589 mg (6/7/2019), 600 mg (6/28/2019), 600 mg (7/19/2019), 600 mg (8/9/2019)           Patient ID: Theo Muro is a 48 y o  female  HPI  Patient is approximately 2 weeks out from laparoscopic bilateral salpingo-oophorectomy for hormone receptor positive breast cancer and BRCA1 mutation  She has recovered uneventfully  Has no complaints  Denies vaginal bleeding, drainage or discharge  Incisions well-healed  Normal bowel and bladder function  The following portions of the patient's history were reviewed and updated as appropriate: allergies, current medications, past family history, past medical history, past social history, past surgical history and problem list     Review of Systems  As above  Twelve point review of systems unremarkable  Current Outpatient Medications:     multivitamin (THERAGRAN) TABS, Take 1 tablet by mouth 3 (three) times a week , Disp: , Rfl:     oxyCODONE (ROXICODONE) 5 mg immediate release tablet, Take 1 tablet (5 mg total) by mouth every 4 (four) hours as needed for moderate pain for up to 5 dosesMax Daily Amount: 30 mg, Disp: 5 tablet, Rfl: 0    pertuzumab (PERJETA) 420 mg/14 mL SOLN, Infuse into a venous catheter every 21 days   At infusion  center, Disp: , Rfl:     tamoxifen (NOLVADEX) 20 mg tablet, TAKE 1 TABLET BY MOUTH EVERY DAY, Disp: 90 tablet, Rfl: 0    Trastuzumab (HERCEPTIN IV), Infuse into a venous catheter every 21 days  At infusion center, Disp: , Rfl:     VITAMIN D, CHOLECALCIFEROL, PO, Take by mouth 3 (three) times a week , Disp: , Rfl:     Objective:    Blood pressure 124/80, pulse 78, height 5' 6" (1 676 m), weight 99 8 kg (220 lb), not currently breastfeeding  Body mass index is 35 51 kg/m²  Body surface area is 2 08 meters squared  Physical Exam  Abdomen:  Incisions well-healed  No hernias  Case Report   Surgical Pathology Report                         Case: [id number]                                    Authorizing Provider: Lola Em MD      Collected:           08/16/2019 0830               Ordering Location:     [facility]        Received:            08/16/2019 [address] [place] [address] Operating Room                                                      Pathologist:           Yoanna Chavez MD                                                               Specimen:    Fallopian Tubes, Bilateral, BILATERAL FALLOPIAN TUBES AND OVARIES                          Final Diagnosis   A  Fallopian Tubes, bilateral fallopian tubes and ovaries  -Benign  Bilateral ovaries with simple cyst  -Benign bilateral fallopian tubes with paratubal cysts  -No evidence of malignancy seen  Electronically signed by Yoanna Chavez MD on 8/20/2019 at  1:53 PM   Note    Clinical data  Op notes  Preop Diagnosis:  BRCA1 gene mutation positive in female [Z15 01, Z15 02, Z15 09]  Malignant neoplasm of upper-inner quadrant of left breast in female, estrogen receptor positive ([address] ) [C50 212, Z17 0]  Bone metastasis ([address] ) [C79 51]  Liver metastases ([address] ) [C78 7]      Additional Information    All controls performed with the immunohistochemical stains reported above reacted appropriately  These tests were developed and their performance characteristics determined by [facility] or [facility]  They may not be cleared or approved by the [place]  Food and Drug Administration  The FDA has determined that such clearance or approval is not necessary  These tests are used for clinical purposes  They should not be regarded as investigational or for research  This laboratory has been approved by [place] 88, designated as a high-complexity laboratory and is qualified to perform these tests  Gross Description    A  The specimen is received in formalin, labeled with the patient's name and hospital number, and is designated "bilateral fallopian tubes and ovaries"  The specimen consists of bilateral fallopian tubes and ovaries  The 1st fallopian tube with fimbria measures 4 5 cm in length and up to 0 4 cm in diameter  It is grossly unremarkable  The attached ovary measures 2 3 x 1 4 x 1 0 cm  It is grossly unremarkable  The 2nd fallopian tube with fimbria measures 4 5 cm in length and up to 0 3 cm in diameter  It exhibits a paratubal cysts and is otherwise grossly unremarkable  The attached ovary measures 2 8 x 1 9 x 1 0 cm  It is grossly unremarkable  The entire ovaries and fallopian tubes are submitted in 14 cassettes      1-4:  1st described fallopian tube  5-7:  1st described ovary  8-10:  2nd described fallopian tube  11-14:  2nd described ovary     Note: The estimated total formalin fixation time based upon information provided by the submitting clinician and the standard processing schedule is over 72 hours     Fide Kendall MD, [place], FACS  8/30/2019  12:21 PM

## 2019-09-11 ENCOUNTER — DOCUMENTATION (OUTPATIENT)
Dept: HEMATOLOGY ONCOLOGY | Facility: CLINIC | Age: 54
End: 2019-09-11

## 2019-09-11 ENCOUNTER — OFFICE VISIT (OUTPATIENT)
Dept: HEMATOLOGY ONCOLOGY | Facility: CLINIC | Age: 54
End: 2019-09-11
Payer: COMMERCIAL

## 2019-09-11 VITALS
HEIGHT: 66 IN | WEIGHT: 220.4 LBS | TEMPERATURE: 97.7 F | DIASTOLIC BLOOD PRESSURE: 68 MMHG | BODY MASS INDEX: 35.42 KG/M2 | RESPIRATION RATE: 18 BRPM | OXYGEN SATURATION: 97 % | HEART RATE: 71 BPM | SYSTOLIC BLOOD PRESSURE: 150 MMHG

## 2019-09-11 DIAGNOSIS — C50.919 MALIGNANT NEOPLASM OF FEMALE BREAST, UNSPECIFIED ESTROGEN RECEPTOR STATUS, UNSPECIFIED LATERALITY, UNSPECIFIED SITE OF BREAST (HCC): Primary | ICD-10-CM

## 2019-09-11 DIAGNOSIS — Z15.09 BRCA1 GENE MUTATION POSITIVE IN FEMALE: ICD-10-CM

## 2019-09-11 DIAGNOSIS — T45.1X5A CARDIOMYOPATHY DUE TO CHEMOTHERAPY (HCC): ICD-10-CM

## 2019-09-11 DIAGNOSIS — C50.212 MALIGNANT NEOPLASM OF UPPER-INNER QUADRANT OF LEFT BREAST IN FEMALE, ESTROGEN RECEPTOR POSITIVE (HCC): Primary | ICD-10-CM

## 2019-09-11 DIAGNOSIS — M81.8 OSTEOPOROSIS DUE TO AROMATASE INHIBITOR: ICD-10-CM

## 2019-09-11 DIAGNOSIS — Z90.722 S/P BSO (BILATERAL SALPINGO-OOPHORECTOMY): ICD-10-CM

## 2019-09-11 DIAGNOSIS — Z17.0 MALIGNANT NEOPLASM OF UPPER-INNER QUADRANT OF LEFT BREAST IN FEMALE, ESTROGEN RECEPTOR POSITIVE (HCC): Primary | ICD-10-CM

## 2019-09-11 DIAGNOSIS — Z15.02 BRCA1 GENE MUTATION POSITIVE IN FEMALE: ICD-10-CM

## 2019-09-11 DIAGNOSIS — Z15.01 BRCA1 GENE MUTATION POSITIVE IN FEMALE: ICD-10-CM

## 2019-09-11 DIAGNOSIS — T38.6X5A OSTEOPOROSIS DUE TO AROMATASE INHIBITOR: ICD-10-CM

## 2019-09-11 DIAGNOSIS — C79.51 BONE METASTASIS (HCC): ICD-10-CM

## 2019-09-11 DIAGNOSIS — C78.7 LIVER METASTASES (HCC): ICD-10-CM

## 2019-09-11 DIAGNOSIS — I42.7 CARDIOMYOPATHY DUE TO CHEMOTHERAPY (HCC): ICD-10-CM

## 2019-09-11 PROCEDURE — 99215 OFFICE O/P EST HI 40 MIN: CPT | Performed by: INTERNAL MEDICINE

## 2019-09-11 RX ORDER — LETROZOLE 2.5 MG/1
2.5 TABLET, FILM COATED ORAL DAILY
Qty: 30 TABLET | Refills: 6 | Status: SHIPPED | OUTPATIENT
Start: 2019-09-11 | End: 2020-02-12 | Stop reason: SDUPTHER

## 2019-09-11 NOTE — PROGRESS NOTES
HPI:  In January 2016 patient was diagnosed to have triple positive stage IV left breast cancer , locally advanced and  metastatic to liver and bones   Patient was started on a combination of Taxotere, Herceptin and Perjeta and after 6 cycles Taxotere was discontinued and tamoxifen was added  Herceptin and Perjeta were continued  She  had radiation to left hip for palliation  She had very good response  In September 2016 patient had lumpectomy of left breast followed by radiation therapy  At the time of lumpectomy there was minimal residual disease, 1 4 cm  Lymph nodes were not sampled  Recently patient was found to have positive BRCA 1 mutation and we discussed the importance of that, for her and her family  On 08/16/2019 she underwent BSO  She is postmenopausal now and tamoxifen will be changed to aromatase inhibitor  She goes to Dr Mac Carrasco for breast examination and imaging studies  She gets CT scans to look at pancreas and other organs  She does not want to see another doctor like dermatologist for melanoma screen  Cancer of pancreas and melanoma are more common in patient with BRCA 2 than BRCA 1     She has been tolerating treatments without much problem   She goes for blood tests and echocardiogram on regular basis          There was no metastatic disease in the liver on her last CT scan     She has burning sensation in her hands and feet for a day post treatment    She has grade 1 peripheral neuropathy    Has some tiredness    For leg cramps at night and she is taking one baby aspirin daily with food in the evening and also one magnesium tablet daily       Has some anxiety as before        She is not on Xgeva anymore that she had for 2 years  Manolo Saeed continues to take    calcium and vitamin-D                                  Current Outpatient Medications:     multivitamin (THERAGRAN) TABS, Take 1 tablet by mouth 3 (three) times a week , Disp: , Rfl:     oxyCODONE (ROXICODONE) 5 mg immediate release tablet, Take 1 tablet (5 mg total) by mouth every 4 (four) hours as needed for moderate pain for up to 5 dosesMax Daily Amount: 30 mg, Disp: 5 tablet, Rfl: 0    pertuzumab (PERJETA) 420 mg/14 mL SOLN, Infuse into a venous catheter every 21 days  At infusion  center, Disp: , Rfl:     tamoxifen (NOLVADEX) 20 mg tablet, TAKE 1 TABLET BY MOUTH EVERY DAY, Disp: 90 tablet, Rfl: 0    Trastuzumab (HERCEPTIN IV), Infuse into a venous catheter every 21 days  At infusion center, Disp: , Rfl:     VITAMIN D, CHOLECALCIFEROL, PO, Take by mouth 3 (three) times a week , Disp: , Rfl:     No Known Allergies       Malignant neoplasm of upper-inner quadrant of left female breast ([facility] [address] )    12/2015 Initial Diagnosis     Malignant neoplasm of upper-outer quadrant of left female breast ([facility] [address] )      1/27/2016 Surgery     Port placement      2/19/2016 - 6/2/2018 Chemotherapy     Taxotere,  herceptin, perjeta, xgeva  After six cycles, taxotere was discontinued and tamoxifen added     Continues with Perjeta and Herceptin every 3 weeks    - Dr Shields Route        9/16/2016 Surgery     Left breast partial mastectomy      11/7/2016 - 12/23/2016 Radiation     Plan ID Energy Fractions Dose per Fraction (cGy) Total Dose Delivered (cGy) Elapsed Days   Lt Breast 6X 25 / 25 200 5,000 36   Lt Brst Boost 6X 8 / 8 200 1,600 9   Lt Femur 10X 10 / 10 300 3,000 11   Lt PAB 6X 25 / 25 60 1,500 36   Lt Sclav 6X 25 / 25 200 5,000 36                                   Total dose to left breast lumpectomy cavity: 6600 cGy                   Total dose to the supraclavicular and axillary regions: 5000 cGy         Hormone Therapy     Tamoxifen   Dr Shields Route      4/17/2019 -  Chemotherapy     pertuzumab (PERJETA) 840 mg in sodium chloride 0 9 % 250 mL IVPB, 840 mg, Intravenous, Once, 7 of 8 cycles  Administration: 420 mg (5/17/2019), 420 mg (6/7/2019), 420 mg (6/28/2019), 420 mg (7/19/2019), 420 mg (8/30/2019), 420 mg (8/9/2019)  trastuzumab (HERCEPTIN) 589 mg in sodium chloride 0 9 % 250 mL chemo infusion, 6 mg/kg = 589 mg (75 % of original dose 8 mg/kg), Intravenous, Once, 7 of 8 cycles  Dose modification: 6 mg/kg (original dose 8 mg/kg, Cycle 1, Reason: Other (See Comments))  Administration: 589 mg (5/17/2019), 589 mg (6/7/2019), 600 mg (6/28/2019), 600 mg (7/19/2019), 600 mg (8/30/2019), 600 mg (8/9/2019)        Liver metastases ([address] )    4/27/2018 Initial Diagnosis     Liver metastases ([facility] [address] )      4/17/2019 -  Chemotherapy     pertuzumab (PERJETA) 840 mg in sodium chloride 0 9 % 250 mL IVPB, 840 mg, Intravenous, Once, 7 of 8 cycles  Administration: 420 mg (5/17/2019), 420 mg (6/7/2019), 420 mg (6/28/2019), 420 mg (7/19/2019), 420 mg (8/30/2019), 420 mg (8/9/2019)  trastuzumab (HERCEPTIN) 589 mg in sodium chloride 0 9 % 250 mL chemo infusion, 6 mg/kg = 589 mg (75 % of original dose 8 mg/kg), Intravenous, Once, 7 of 8 cycles  Dose modification: 6 mg/kg (original dose 8 mg/kg, Cycle 1, Reason: Other (See Comments))  Administration: 589 mg (5/17/2019), 589 mg (6/7/2019), 600 mg (6/28/2019), 600 mg (7/19/2019), 600 mg (8/30/2019), 600 mg (8/9/2019)        Bone metastasis ([facility])    4/27/2018 Initial Diagnosis     Bone metastasis ([facility] [address] )      4/17/2019 -  Chemotherapy     pertuzumab (PERJETA) 840 mg in sodium chloride 0 9 % 250 mL IVPB, 840 mg, Intravenous, Once, 7 of 8 cycles  Administration: 420 mg (5/17/2019), 420 mg (6/7/2019), 420 mg (6/28/2019), 420 mg (7/19/2019), 420 mg (8/30/2019), 420 mg (8/9/2019)  trastuzumab (HERCEPTIN) 589 mg in sodium chloride 0 9 % 250 mL chemo infusion, 6 mg/kg = 589 mg (75 % of original dose 8 mg/kg), Intravenous, Once, 7 of 8 cycles  Dose modification: 6 mg/kg (original dose 8 mg/kg, Cycle 1, Reason: Other (See Comments))  Administration: 589 mg (5/17/2019), 589 mg (6/7/2019), 600 mg (6/28/2019), 600 mg (7/19/2019), 600 mg (8/30/2019), 600 mg (8/9/2019)         ROS:  09/11/19 Reviewed 13 systems:  Presently no other neurological, cardiac, pulmonary, GI and  symptoms other than mentioned above  No other symptoms like   fever, chills, bleeding, bone pains, skin rash, weight loss,  weakness,  and gait problem  No frequent infections  Not unusually sensitive to heat or cold  No swelling of the ankles  No swollen glands  Patient is anxious  Other symptoms are in HPI        /68 (BP Location: Left arm, Patient Position: Sitting, Cuff Size: Adult)   Pulse 71   Temp 97 7 °F (36 5 °C)   Resp 18   Ht 5' 6" (1 676 m)   Wt 100 kg (220 lb 6 4 oz)   SpO2 97%   BMI 35 57 kg/m²     Physical Exam:    Patient is alert and oriented  She is not in distress  Vital signs are as above  There is no scleral icterus, no oral thrush, no palpable neck mass, clear lung fields, regular heart rate, abdomen  soft and non tender, no palpable abdominal mass, no ascites, no edema of ankles, no calf tenderness, no focal neurological deficit, no skin rash, no palpable lymphadenopathy in the neck and axillary areas, good arterial pulses, no clubbing  Patient is anxious  Performance status 1   No lymphedema    IMAGING:      LABS:  Results for orders placed or performed during the hospital encounter of 08/30/19   Cancer antigen 27 29   Result Value Ref Range    CA 27 29 27 2 0 0 - 42 3 U/mL   CBC and differential   Result Value Ref Range    WBC 8 63 4 31 - 10 16 Thousand/uL    RBC 4 21 3 81 - 5 12 Million/uL    Hemoglobin 12 2 11 5 - 15 4 g/dL    Hematocrit 36 3 34 8 - 46 1 %    MCV 86 82 - 98 fL    MCH 29 0 26 8 - 34 3 pg    MCHC 33 6 31 4 - 37 4 g/dL    RDW 15 1 11 6 - 15 1 %    MPV 10 3 8 9 - 12 7 fL    Platelets 760 098 - 919 Thousands/uL    Neutrophils Relative 70 43 - 75 %    Lymphocytes Relative 20 14 - 44 %    Monocytes Relative 5 4 - 12 %    Eosinophils Relative 5 0 - 6 %    Basophils Relative 0 0 - 1 %    Neutrophils Absolute 6 13 1 85 - 7 62 Thousands/µL    Lymphocytes Absolute 1 68 0 60 - 4 47 Thousands/µL    Monocytes Absolute 0 40 0 17 - 1 22 Thousand/µL    Eosinophils Absolute 0 39 0 00 - 0 61 Thousand/µL    Basophils Absolute 0 03 0 00 - 0 10 Thousands/µL   Comprehensive metabolic panel   Result Value Ref Range    Sodium 133 (L) 136 - 145 mmol/L    Potassium 3 6 3 5 - 5 3 mmol/L    Chloride 104 98 - 108 mmol/L    CO2 28 21 - 32 mmol/L    ANION GAP 1 (L) 4 - 13 mmol/L    BUN 14 5 - 25 mg/dL    Creatinine 0 90 0 60 - 1 30 mg/dL    Glucose 162 (H) 65 - 140 mg/dL    Calcium 9 2 8 3 - 10 1 mg/dL    AST 25 5 - 45 U/L    ALT 32 12 - 78 U/L    Alkaline Phosphatase 101 46 - 116 U/L    Total Protein 6 2 (L) 6 4 - 8 2 g/dL    Albumin 2 9 (L) 3 5 - 5 7 g/dL    Total Bilirubin 0 60 0 20 - 1 00 mg/dL    eGFR 74 ml/min/1 73sq m     Labs, Imaging, & Other studies:   All pertinent labs and imaging studies were personally reviewed    Lab Results   Component Value Date    K 3 6 08/30/2019     08/30/2019    CO2 28 08/30/2019    BUN 14 08/30/2019    CREATININE 0 90 08/30/2019    GLUF 97 09/29/2017    CALCIUM 9 2 08/30/2019    AST 25 08/30/2019    ALT 32 08/30/2019    ALKPHOS 101 08/30/2019    EGFR 74 08/30/2019     Lab Results   Component Value Date    WBC 8 63 08/30/2019    HGB 12 2 08/30/2019    HCT 36 3 08/30/2019    MCV 86 08/30/2019     08/30/2019   Tissue Exam: [id number]   Order: [id number]   Collected:  8/16/2019  8:30 AM Status:  Final result   Visible to patient:  Yes (MyChart) Dx:  Bone metastasis ([address] ); Liver metastas       Component    Case Report   Surgical Pathology Report                         Case: [id number]                                    Authorizing Provider: Corrinne Bi, MD      Collected:           08/16/2019 0830               Ordering Location:     [facility]        Received:            08/16/2019 [address] [place] [address] Operating Room                                                      Pathologist:           Anthony Stallworth MD                                                             Specimen:    Fallopian Tubes, Bilateral, BILATERAL FALLOPIAN TUBES AND OVARIES                          Final Diagnosis   A  Fallopian Tubes, bilateral fallopian tubes and ovaries  -Benign  Bilateral ovaries with simple cyst  -Benign bilateral fallopian tubes with paratubal cysts  -No evidence of malignancy seen  Electronically signed by Freddie Gonzales MD on 8/20/2019 at  1:53 PM               Reviewed and discussed with patient  Assessment and plan: In January 2016 patient was diagnosed to have triple positive stage IV left breast cancer , locally advanced and  metastatic to liver and bones   Patient was started on a combination of Taxotere, Herceptin and Perjeta and after 6 cycles Taxotere was discontinued and tamoxifen was added  Herceptin and Perjeta were continued  She  had radiation to left hip for palliation  She had very good response  In September 2016 patient had lumpectomy of left breast followed by radiation therapy  At the time of lumpectomy there was minimal residual disease, 1 4 cm  Lymph nodes were not sampled  Recently patient was found to have positive BRCA 1 mutation and we discussed the importance of that, for her and her family  On 08/16/2019 she underwent BSO  She is postmenopausal now and tamoxifen will be changed to aromatase inhibitor  She goes to Dr Lisandro Carrasco for breast examination and imaging studies  She gets CT scans to look at pancreas and other organs  She does not want to see another doctor like dermatologist for melanoma screen  Cancer of pancreas and melanoma are more common in patient with BRCA 2 than BRCA 1     She has been tolerating treatments without much problem   She goes for blood tests and echocardiogram on regular basis          There was no metastatic disease in the liver on her last CT scan     She has burning sensation in her hands and feet for a day post treatment    She has grade 1 peripheral neuropathy    Has some tiredness    For leg cramps at night and she is taking one baby aspirin daily with food in the evening and also one magnesium tablet daily       Has some anxiety as before        She is not on Xgeva anymore that she had for 2 years  [facility] continues to take    calcium and vitamin-D           Physical examination and test results are as recorded and discussed  Discussed switching from tamoxifen to aromatase inhibitor  Discussed the differences in efficacy and side effects  Discussed all 3 aromatase inhibitors and patient signed informed consent for all 3 aromatase inhibitors  She will be started on Femara  She will take calcium and vitamin-D 3  She will have bone density test    All discussed in very much detail  Questions answered     Zahra Mahamed discussed the importance of self-breast examination, eating healthy foods, staying active and health screening tests   She is capable of self-care     See diagnoses and orders below  1  Malignant neoplasm of upper-inner quadrant of left breast in female, estrogen receptor positive ([facility] [address] )    - CT chest abdomen pelvis w contrast; Future  - NM bone scan whole body; Future    2  BRCA1 gene mutation positive in female    - CT chest abdomen pelvis w contrast; Future  - NM bone scan whole body; Future    3  S/P BSO (bilateral salpingo-oophorectomy)      4  Liver metastases ([address] )      5  Bone metastasis ([address] )      6  Cardiomyopathy due to chemotherapy ([facility] [address] )    - Echo limited with contrast if indicated; Future    7  Osteoporosis due to aromatase inhibitor    - DXA bone density spine hip and pelvis; Future            Patient voiced understanding and agreement in the discussion  Counseling / Coordination of Care:  50 minutes   Greater than 50% of total time was spent with the patient and / or family counseling and / or coordination of care

## 2019-09-11 NOTE — PROGRESS NOTES
Completed nursing teach with patient regarding Femara for breast cancer  Additionally, reviewed other Aromatase inhibitors Arimidex and Aromasin  Reviewed mechanism of action, storage instructions, possible side effects, and when to call the doctor  All questions answered  Patient appreciative and would like RX sent to [facility] [address]  Consent signed and scanned into the chart

## 2019-09-20 ENCOUNTER — HOSPITAL ENCOUNTER (OUTPATIENT)
Dept: INFUSION CENTER | Facility: CLINIC | Age: 54
Discharge: HOME | End: 2019-09-20
Payer: COMMERCIAL

## 2019-09-20 VITALS
HEART RATE: 80 BPM | SYSTOLIC BLOOD PRESSURE: 153 MMHG | RESPIRATION RATE: 18 BRPM | BODY MASS INDEX: 35.73 KG/M2 | TEMPERATURE: 98.9 F | WEIGHT: 221.34 LBS | DIASTOLIC BLOOD PRESSURE: 79 MMHG

## 2019-09-20 DIAGNOSIS — Z90.79 HISTORY OF BILATERAL SALPINGO-OOPHORECTOMY (BSO): ICD-10-CM

## 2019-09-20 DIAGNOSIS — C79.51 BONE METASTASIS (HCC): ICD-10-CM

## 2019-09-20 DIAGNOSIS — Z90.722 HISTORY OF BILATERAL SALPINGO-OOPHORECTOMY (BSO): ICD-10-CM

## 2019-09-20 DIAGNOSIS — C50.212 MALIGNANT NEOPLASM OF UPPER-INNER QUADRANT OF LEFT BREAST IN FEMALE, ESTROGEN RECEPTOR POSITIVE (HCC): ICD-10-CM

## 2019-09-20 DIAGNOSIS — C79.51 BONE METASTASIS (HCC): Primary | ICD-10-CM

## 2019-09-20 DIAGNOSIS — C78.7 LIVER METASTASES (HCC): ICD-10-CM

## 2019-09-20 DIAGNOSIS — Z17.0 MALIGNANT NEOPLASM OF UPPER-INNER QUADRANT OF LEFT BREAST IN FEMALE, ESTROGEN RECEPTOR POSITIVE (HCC): ICD-10-CM

## 2019-09-20 PROCEDURE — 96417 CHEMO IV INFUS EACH ADDL SEQ: CPT

## 2019-09-20 PROCEDURE — 96413 CHEMO IV INFUSION 1 HR: CPT

## 2019-09-20 RX ORDER — SODIUM CHLORIDE 9 MG/ML
20 INJECTION, SOLUTION INTRAVENOUS ONCE
Status: COMPLETED | OUTPATIENT
Start: 2019-09-20 | End: 2019-09-20

## 2019-09-20 RX ORDER — SODIUM CHLORIDE 9 MG/ML
20 INJECTION, SOLUTION INTRAVENOUS ONCE
Status: CANCELLED | OUTPATIENT
Start: 2019-09-20

## 2019-09-20 RX ADMIN — TRASTUZUMAB 600 MG: 150 INJECTION, POWDER, LYOPHILIZED, FOR SOLUTION INTRAVENOUS at 15:03

## 2019-09-20 RX ADMIN — SODIUM CHLORIDE 20 ML/HR: 0.9 INJECTION, SOLUTION INTRAVENOUS at 14:10

## 2019-09-20 RX ADMIN — PERTUZUMAB 420 MG: 30 INJECTION, SOLUTION, CONCENTRATE INTRAVENOUS at 14:30

## 2019-09-20 NOTE — PROGRESS NOTES
Patient tolerated Perjeta / Herceptin infusions well  Offers no complaints  Pt is aware of all future appointments   Refused AVS

## 2019-09-20 NOTE — PLAN OF CARE
Problem: Potential for Falls  Goal: Patient will remain free of falls  Description  INTERVENTIONS:  - Assess patient frequently for physical needs  -  Identify cognitive and physical deficits and behaviors that affect risk of falls    -  [place] fall precautions as indicated by assessment   - Educate patient/family on patient safety including physical limitations  - Instruct patient to call for assistance with activity based on assessment  - Modify environment to reduce risk of injury  - Consider OT/PT consult to assist with strengthening/mobility  Outcome: Progressing

## 2019-10-04 RX ORDER — SODIUM CHLORIDE 9 MG/ML
20 INJECTION, SOLUTION INTRAVENOUS ONCE
Status: CANCELLED | OUTPATIENT
Start: 2019-10-09

## 2019-10-09 ENCOUNTER — HOSPITAL ENCOUNTER (OUTPATIENT)
Dept: INFUSION CENTER | Facility: CLINIC | Age: 54
Discharge: HOME | End: 2019-10-09
Payer: COMMERCIAL

## 2019-10-09 VITALS
HEART RATE: 74 BPM | RESPIRATION RATE: 18 BRPM | DIASTOLIC BLOOD PRESSURE: 86 MMHG | BODY MASS INDEX: 35.19 KG/M2 | TEMPERATURE: 99.2 F | WEIGHT: 218 LBS | SYSTOLIC BLOOD PRESSURE: 130 MMHG

## 2019-10-09 DIAGNOSIS — C78.7 LIVER METASTASES (HCC): ICD-10-CM

## 2019-10-09 DIAGNOSIS — Z90.722 HISTORY OF BILATERAL SALPINGO-OOPHORECTOMY (BSO): ICD-10-CM

## 2019-10-09 DIAGNOSIS — C50.212 MALIGNANT NEOPLASM OF UPPER-INNER QUADRANT OF LEFT BREAST IN FEMALE, ESTROGEN RECEPTOR POSITIVE (HCC): ICD-10-CM

## 2019-10-09 DIAGNOSIS — Z90.79 HISTORY OF BILATERAL SALPINGO-OOPHORECTOMY (BSO): ICD-10-CM

## 2019-10-09 DIAGNOSIS — C79.51 BONE METASTASIS (HCC): Primary | ICD-10-CM

## 2019-10-09 DIAGNOSIS — Z17.0 MALIGNANT NEOPLASM OF UPPER-INNER QUADRANT OF LEFT BREAST IN FEMALE, ESTROGEN RECEPTOR POSITIVE (HCC): ICD-10-CM

## 2019-10-09 PROCEDURE — 96417 CHEMO IV INFUS EACH ADDL SEQ: CPT

## 2019-10-09 PROCEDURE — 96413 CHEMO IV INFUSION 1 HR: CPT

## 2019-10-09 RX ORDER — SODIUM CHLORIDE 9 MG/ML
20 INJECTION, SOLUTION INTRAVENOUS ONCE
Status: CANCELLED | OUTPATIENT
Start: 2019-11-01

## 2019-10-09 RX ORDER — SODIUM CHLORIDE 9 MG/ML
20 INJECTION, SOLUTION INTRAVENOUS ONCE
Status: COMPLETED | OUTPATIENT
Start: 2019-10-09 | End: 2019-10-09

## 2019-10-09 RX ADMIN — PERTUZUMAB 420 MG: 30 INJECTION, SOLUTION, CONCENTRATE INTRAVENOUS at 14:15

## 2019-10-09 RX ADMIN — TRASTUZUMAB 600 MG: 150 INJECTION, POWDER, LYOPHILIZED, FOR SOLUTION INTRAVENOUS at 14:51

## 2019-10-09 RX ADMIN — SODIUM CHLORIDE 20 ML/HR: 0.9 INJECTION, SOLUTION INTRAVENOUS at 14:11

## 2019-10-09 NOTE — PLAN OF CARE
Problem: SAFETY ADULT  Goal: Patient will remain free of falls  Description  INTERVENTIONS:  - Assess patient frequently for physical needs  -  Identify cognitive and physical deficits and behaviors that affect risk of falls    -  [place] fall precautions as indicated by assessment   - Educate patient/family on patient safety including physical limitations  - Instruct patient to call for assistance with activity based on assessment  - Modify environment to reduce risk of injury  - Consider OT/PT consult to assist with strengthening/mobility  Outcome: Progressing

## 2019-11-01 ENCOUNTER — HOSPITAL ENCOUNTER (OUTPATIENT)
Dept: BONE DENSITY | Facility: MEDICAL CENTER | Age: 54
Discharge: HOME | End: 2019-11-01
Payer: COMMERCIAL

## 2019-11-01 ENCOUNTER — HOSPITAL ENCOUNTER (OUTPATIENT)
Dept: INFUSION CENTER | Facility: CLINIC | Age: 54
Discharge: HOME | End: 2019-11-01
Payer: COMMERCIAL

## 2019-11-01 ENCOUNTER — HOSPITAL ENCOUNTER (OUTPATIENT)
Dept: NON INVASIVE DIAGNOSTICS | Facility: CLINIC | Age: 54
Discharge: HOME | End: 2019-11-01
Payer: COMMERCIAL

## 2019-11-01 VITALS
WEIGHT: 219.03 LBS | RESPIRATION RATE: 18 BRPM | TEMPERATURE: 98.3 F | BODY MASS INDEX: 35.35 KG/M2 | DIASTOLIC BLOOD PRESSURE: 85 MMHG | SYSTOLIC BLOOD PRESSURE: 146 MMHG | HEART RATE: 72 BPM

## 2019-11-01 DIAGNOSIS — C79.51 BONE METASTASIS (HCC): Primary | ICD-10-CM

## 2019-11-01 DIAGNOSIS — Z90.79 HISTORY OF BILATERAL SALPINGO-OOPHORECTOMY (BSO): ICD-10-CM

## 2019-11-01 DIAGNOSIS — Z17.0 ESTROGEN RECEPTOR POSITIVE: ICD-10-CM

## 2019-11-01 DIAGNOSIS — Z17.0 MALIGNANT NEOPLASM OF UPPER-INNER QUADRANT OF LEFT BREAST IN FEMALE, ESTROGEN RECEPTOR POSITIVE (HCC): ICD-10-CM

## 2019-11-01 DIAGNOSIS — C50.212 MALIGNANT NEOPLASM OF UPPER-INNER QUADRANT OF LEFT BREAST IN FEMALE, ESTROGEN RECEPTOR POSITIVE (HCC): ICD-10-CM

## 2019-11-01 DIAGNOSIS — T38.6X5A OSTEOPOROSIS DUE TO AROMATASE INHIBITOR: ICD-10-CM

## 2019-11-01 DIAGNOSIS — I42.7 CARDIOMYOPATHY DUE TO CHEMOTHERAPY (HCC): ICD-10-CM

## 2019-11-01 DIAGNOSIS — Z90.722 HISTORY OF BILATERAL SALPINGO-OOPHORECTOMY (BSO): ICD-10-CM

## 2019-11-01 DIAGNOSIS — M81.8 OSTEOPOROSIS DUE TO AROMATASE INHIBITOR: ICD-10-CM

## 2019-11-01 DIAGNOSIS — C50.212 MALIGNANT NEOPLASM OF UPPER-INNER QUADRANT OF LEFT FEMALE BREAST, UNSPECIFIED ESTROGEN RECEPTOR STATUS (HCC): ICD-10-CM

## 2019-11-01 DIAGNOSIS — C78.7 LIVER METASTASES (HCC): ICD-10-CM

## 2019-11-01 DIAGNOSIS — T45.1X5A CARDIOMYOPATHY DUE TO CHEMOTHERAPY (HCC): ICD-10-CM

## 2019-11-01 LAB
ALBUMIN SERPL BCP-MCNC: 3.4 G/DL (ref 3.5–5.7)
ALP SERPL-CCNC: 91 U/L (ref 46–116)
ALT SERPL W P-5'-P-CCNC: 38 U/L (ref 12–78)
ANION GAP SERPL CALCULATED.3IONS-SCNC: 9 MMOL/L (ref 4–13)
AST SERPL W P-5'-P-CCNC: 39 U/L (ref 5–45)
BASOPHILS # BLD AUTO: 0.01 THOUSANDS/ΜL (ref 0–0.1)
BASOPHILS NFR BLD AUTO: 0 % (ref 0–1)
BILIRUB SERPL-MCNC: 0.6 MG/DL (ref 0.2–1)
BUN SERPL-MCNC: 16 MG/DL (ref 5–25)
CALCIUM SERPL-MCNC: 10.1 MG/DL (ref 8.3–10.1)
CANCER AG27-29 SERPL-ACNC: 24.4 U/ML (ref 0–42.3)
CHLORIDE SERPL-SCNC: 106 MMOL/L (ref 98–108)
CO2 SERPL-SCNC: 28 MMOL/L (ref 21–32)
CREAT SERPL-MCNC: 0.8 MG/DL (ref 0.6–1.3)
EOSINOPHIL # BLD AUTO: 0.41 THOUSAND/ΜL (ref 0–0.61)
EOSINOPHIL NFR BLD AUTO: 5 % (ref 0–6)
ERYTHROCYTE [DISTWIDTH] IN BLOOD BY AUTOMATED COUNT: 13.4 % (ref 11.6–15.1)
GFR SERPL CREATININE-BSD FRML MDRD: 86 ML/MIN/1.73SQ M
GLUCOSE SERPL-MCNC: 104 MG/DL (ref 65–140)
HCT VFR BLD AUTO: 42 % (ref 34.8–46.1)
HGB BLD-MCNC: 13.6 G/DL (ref 11.5–15.4)
LYMPHOCYTES # BLD AUTO: 1.94 THOUSANDS/ΜL (ref 0.6–4.47)
LYMPHOCYTES NFR BLD AUTO: 24 % (ref 14–44)
MCH RBC QN AUTO: 28.2 PG (ref 26.8–34.3)
MCHC RBC AUTO-ENTMCNC: 32.4 G/DL (ref 31.4–37.4)
MCV RBC AUTO: 87 FL (ref 82–98)
MONOCYTES # BLD AUTO: 0.52 THOUSAND/ΜL (ref 0.17–1.22)
MONOCYTES NFR BLD AUTO: 6 % (ref 4–12)
NEUTROPHILS # BLD AUTO: 5.32 THOUSANDS/ΜL (ref 1.85–7.62)
NEUTS SEG NFR BLD AUTO: 65 % (ref 43–75)
PLATELET # BLD AUTO: 335 THOUSANDS/UL (ref 149–390)
PMV BLD AUTO: 11.3 FL (ref 8.9–12.7)
POTASSIUM SERPL-SCNC: 4.2 MMOL/L (ref 3.5–5.3)
PROT SERPL-MCNC: 7.2 G/DL (ref 6.4–8.2)
RBC # BLD AUTO: 4.82 MILLION/UL (ref 3.81–5.12)
SODIUM SERPL-SCNC: 143 MMOL/L (ref 136–145)
WBC # BLD AUTO: 8.2 THOUSAND/UL (ref 4.31–10.16)

## 2019-11-01 PROCEDURE — 80053 COMPREHEN METABOLIC PANEL: CPT | Performed by: INTERNAL MEDICINE

## 2019-11-01 PROCEDURE — 93308 TTE F-UP OR LMTD: CPT

## 2019-11-01 PROCEDURE — 85025 COMPLETE CBC W/AUTO DIFF WBC: CPT | Performed by: INTERNAL MEDICINE

## 2019-11-01 PROCEDURE — 93325 DOPPLER ECHO COLOR FLOW MAPG: CPT

## 2019-11-01 PROCEDURE — 96413 CHEMO IV INFUSION 1 HR: CPT

## 2019-11-01 PROCEDURE — 96417 CHEMO IV INFUS EACH ADDL SEQ: CPT

## 2019-11-01 PROCEDURE — 93321 DOPPLER ECHO F-UP/LMTD STD: CPT

## 2019-11-01 PROCEDURE — 77080 DXA BONE DENSITY AXIAL: CPT

## 2019-11-01 PROCEDURE — 86300 IMMUNOASSAY TUMOR CA 15-3: CPT | Performed by: INTERNAL MEDICINE

## 2019-11-01 RX ORDER — SODIUM CHLORIDE 9 MG/ML
20 INJECTION, SOLUTION INTRAVENOUS ONCE
Status: COMPLETED | OUTPATIENT
Start: 2019-11-01 | End: 2019-11-01

## 2019-11-01 RX ADMIN — PERTUZUMAB 420 MG: 30 INJECTION, SOLUTION, CONCENTRATE INTRAVENOUS at 14:34

## 2019-11-01 RX ADMIN — SODIUM CHLORIDE 20 ML/HR: 0.9 INJECTION, SOLUTION INTRAVENOUS at 14:15

## 2019-11-01 RX ADMIN — TRASTUZUMAB 600 MG: 150 INJECTION, POWDER, LYOPHILIZED, FOR SOLUTION INTRAVENOUS at 15:09

## 2019-11-01 NOTE — PLAN OF CARE
Problem: Potential for Falls  Goal: Patient will remain free of falls  Description  INTERVENTIONS:  - Assess patient frequently for physical needs  -  Identify cognitive and physical deficits and behaviors that affect risk of falls    -  [place] fall precautions as indicated by assessment   - Educate patient/family on patient safety including physical limitations  - Instruct patient to call for assistance with activity based on assessment  - Modify environment to reduce risk of injury  - Consider OT/PT consult to assist with strengthening/mobility  Outcome: Progressing

## 2019-11-05 ENCOUNTER — HOSPITAL ENCOUNTER (OUTPATIENT)
Dept: NUCLEAR MEDICINE | Facility: HOSPITAL | Age: 54
Discharge: HOME | End: 2019-11-05
Attending: INTERNAL MEDICINE
Payer: COMMERCIAL

## 2019-11-05 ENCOUNTER — HOSPITAL ENCOUNTER (OUTPATIENT)
Dept: CT IMAGING | Facility: HOSPITAL | Age: 54
Discharge: HOME | End: 2019-11-05
Attending: INTERNAL MEDICINE
Payer: COMMERCIAL

## 2019-11-05 DIAGNOSIS — Z15.09 BRCA1 GENE MUTATION POSITIVE IN FEMALE: ICD-10-CM

## 2019-11-05 DIAGNOSIS — Z15.01 BRCA1 GENE MUTATION POSITIVE IN FEMALE: ICD-10-CM

## 2019-11-05 DIAGNOSIS — Z17.0 MALIGNANT NEOPLASM OF UPPER-INNER QUADRANT OF LEFT BREAST IN FEMALE, ESTROGEN RECEPTOR POSITIVE (HCC): ICD-10-CM

## 2019-11-05 DIAGNOSIS — Z15.02 BRCA1 GENE MUTATION POSITIVE IN FEMALE: ICD-10-CM

## 2019-11-05 DIAGNOSIS — C50.212 MALIGNANT NEOPLASM OF UPPER-INNER QUADRANT OF LEFT BREAST IN FEMALE, ESTROGEN RECEPTOR POSITIVE (HCC): ICD-10-CM

## 2019-11-05 PROCEDURE — 74177 CT ABD & PELVIS W/CONTRAST: CPT

## 2019-11-05 PROCEDURE — 71260 CT THORAX DX C+: CPT

## 2019-11-05 PROCEDURE — 78306 BONE IMAGING WHOLE BODY: CPT

## 2019-11-05 PROCEDURE — A9503 TC99M MEDRONATE: HCPCS

## 2019-11-05 RX ADMIN — IOHEXOL 100 ML: 350 INJECTION, SOLUTION INTRAVENOUS at 08:07

## 2019-11-06 ENCOUNTER — OFFICE VISIT (OUTPATIENT)
Dept: SURGICAL ONCOLOGY | Facility: CLINIC | Age: 54
End: 2019-11-06
Payer: COMMERCIAL

## 2019-11-06 VITALS
HEART RATE: 73 BPM | SYSTOLIC BLOOD PRESSURE: 112 MMHG | WEIGHT: 220.6 LBS | DIASTOLIC BLOOD PRESSURE: 70 MMHG | TEMPERATURE: 98.6 F | RESPIRATION RATE: 18 BRPM | HEIGHT: 66 IN | BODY MASS INDEX: 35.45 KG/M2

## 2019-11-06 DIAGNOSIS — C78.7 LIVER METASTASES (HCC): ICD-10-CM

## 2019-11-06 DIAGNOSIS — Z17.0 MALIGNANT NEOPLASM OF UPPER-INNER QUADRANT OF LEFT BREAST IN FEMALE, ESTROGEN RECEPTOR POSITIVE (HCC): Primary | ICD-10-CM

## 2019-11-06 DIAGNOSIS — Z79.811 AROMATASE INHIBITOR USE: ICD-10-CM

## 2019-11-06 DIAGNOSIS — Z15.01 BRCA1 GENE MUTATION POSITIVE IN FEMALE: ICD-10-CM

## 2019-11-06 DIAGNOSIS — R92.2 DENSE BREAST TISSUE: ICD-10-CM

## 2019-11-06 DIAGNOSIS — C79.51 BONE METASTASIS (HCC): ICD-10-CM

## 2019-11-06 DIAGNOSIS — C50.212 MALIGNANT NEOPLASM OF UPPER-INNER QUADRANT OF LEFT BREAST IN FEMALE, ESTROGEN RECEPTOR POSITIVE (HCC): Primary | ICD-10-CM

## 2019-11-06 DIAGNOSIS — Z15.02 BRCA1 GENE MUTATION POSITIVE IN FEMALE: ICD-10-CM

## 2019-11-06 DIAGNOSIS — Z15.09 BRCA1 GENE MUTATION POSITIVE IN FEMALE: ICD-10-CM

## 2019-11-06 PROBLEM — Z79.810 USE OF TAMOXIFEN (NOLVADEX): Status: RESOLVED | Noted: 2018-05-02 | Resolved: 2019-11-06

## 2019-11-06 PROBLEM — Z79.899 LONG TERM USE OF DRUG: Status: RESOLVED | Noted: 2018-05-02 | Resolved: 2019-11-06

## 2019-11-06 PROBLEM — R92.30 DENSE BREAST TISSUE: Status: ACTIVE | Noted: 2019-11-06

## 2019-11-06 PROCEDURE — 99215 OFFICE O/P EST HI 40 MIN: CPT | Performed by: SURGERY

## 2019-11-06 RX ORDER — PANTOPRAZOLE SODIUM 40 MG/1
TABLET, DELAYED RELEASE ORAL DAILY
COMMUNITY
Start: 2012-04-02 | End: 2019-11-13 | Stop reason: ALTCHOICE

## 2019-11-06 NOTE — PROGRESS NOTES
Surgical Oncology Follow Up       [facility] SURGICAL ONCOLOGY [place] [address]    Alejandrina Barba  1965  [id number]  [address]  [facility] SURGICAL ONCOLOGY [place]  [address] [place] [address]    Chief Complaint   Patient presents with    Follow-up       Assessment/Plan   Diagnoses and all orders for this visit:    Malignant neoplasm of upper-inner quadrant of left breast in female, estrogen receptor positive ([facility] )  -     Mammo diagnostic bilateral w 3d & cad; Future    Liver metastases ([facility] )    Bone metastasis ([place] [address] )    BRCA1 gene mutation positive in female    Aromatase inhibitor use    Dense breast tissue    Other orders  -     pantoprazole (PROTONIX) 40 mg tablet; Take by mouth Daily        Advance Care Planning/Advance Directives:  Discussed disease status, cancer treatment plans and/or cancer treatment goals with the patient  Oncology History:    Oncology History                          Malignant neoplasm of upper-inner quadrant of left female breast ([facility] )    12/2015 Initial Diagnosis     Malignant neoplasm of upper-outer quadrant of left female breast ([facility] )      1/27/2016 Surgery     Port placement      2/19/2016 - 6/2/2018 Chemotherapy     Taxotere,  herceptin, perjeta, xgeva  After six cycles, taxotere was discontinued and tamoxifen added     Continues with Perjeta and Herceptin every 3 weeks     - Dr Michelle Hammer        9/16/2016 Surgery     Left breast partial mastectomy      11/7/2016 - 12/23/2016 Radiation     Plan ID Energy Fractions Dose per Fraction (cGy) Total Dose Delivered (cGy) Elapsed Days   Lt Breast 6X 25 / 25 200 5,000 36   Lt Brst Boost 6X 8 / 8 200 1,600 9   Lt Femur 10X 10 / 10 300 3,000 11   Lt PAB 6X 25 / 25 60 1,500 36   Lt Sclav 6X 25 / 25 200 5,000 36                                   Total dose to left breast lumpectomy cavity: 6600 cGy                   Total dose to the supraclavicular and axillary regions: 5000 cGy         Hormone Therapy     Tamoxifen   Dr Ryan Greer      4/17/2019 -  Chemotherapy     pertuzumab (PERJETA) 840 mg in sodium chloride 0 9 % 250 mL IVPB, 840 mg, Intravenous, Once, 10 of 13 cycles  Administration: 420 mg (5/17/2019), 420 mg (6/7/2019), 420 mg (6/28/2019), 420 mg (7/19/2019), 420 mg (8/30/2019), 420 mg (9/20/2019), 420 mg (8/9/2019), 420 mg (10/9/2019), 420 mg (11/1/2019)  trastuzumab (HERCEPTIN) 589 mg in sodium chloride 0 9 % 250 mL chemo infusion, 6 mg/kg = 589 mg (75 % of original dose 8 mg/kg), Intravenous, Once, 10 of 13 cycles  Dose modification: 6 mg/kg (original dose 8 mg/kg, Cycle 1, Reason: Other (See Comments))  Administration: 589 mg (5/17/2019), 589 mg (6/7/2019), 600 mg (6/28/2019), 600 mg (7/19/2019), 600 mg (8/30/2019), 600 mg (9/20/2019), 600 mg (8/9/2019), 600 mg (10/9/2019), 600 mg (11/1/2019)        Liver metastases ([facility] )    4/27/2018 Initial Diagnosis     Liver metastases ([facility] )      4/17/2019 -  Chemotherapy     pertuzumab (PERJETA) 840 mg in sodium chloride 0 9 % 250 mL IVPB, 840 mg, Intravenous, Once, 10 of 13 cycles  Administration: 420 mg (5/17/2019), 420 mg (6/7/2019), 420 mg (6/28/2019), 420 mg (7/19/2019), 420 mg (8/30/2019), 420 mg (9/20/2019), 420 mg (8/9/2019), 420 mg (10/9/2019), 420 mg (11/1/2019)  trastuzumab (HERCEPTIN) 589 mg in sodium chloride 0 9 % 250 mL chemo infusion, 6 mg/kg = 589 mg (75 % of original dose 8 mg/kg), Intravenous, Once, 10 of 13 cycles  Dose modification: 6 mg/kg (original dose 8 mg/kg, Cycle 1, Reason: Other (See Comments))  Administration: 589 mg (5/17/2019), 589 mg (6/7/2019), 600 mg (6/28/2019), 600 mg (7/19/2019), 600 mg (8/30/2019), 600 mg (9/20/2019), 600 mg (8/9/2019), 600 mg (10/9/2019), 600 mg (11/1/2019)        Bone metastasis ([address] )    4/27/2018 Initial Diagnosis     Bone metastasis ([address] )      4/17/2019 -  Chemotherapy     pertuzumab (PERJETA) 840 mg in sodium chloride 0 9 % 250 mL IVPB, 840 mg, Intravenous, Once, 10 of 13 cycles  Administration: 420 mg (5/17/2019), 420 mg (6/7/2019), 420 mg (6/28/2019), 420 mg (7/19/2019), 420 mg (8/30/2019), 420 mg (9/20/2019), 420 mg (8/9/2019), 420 mg (10/9/2019), 420 mg (11/1/2019)  trastuzumab (HERCEPTIN) 589 mg in sodium chloride 0 9 % 250 mL chemo infusion, 6 mg/kg = 589 mg (75 % of original dose 8 mg/kg), Intravenous, Once, 10 of 13 cycles  Dose modification: 6 mg/kg (original dose 8 mg/kg, Cycle 1, Reason: Other (See Comments))  Administration: 589 mg (5/17/2019), 589 mg (6/7/2019), 600 mg (6/28/2019), 600 mg (7/19/2019), 600 mg (8/30/2019), 600 mg (9/20/2019), 600 mg (8/9/2019), 600 mg (10/9/2019), 600 mg (11/1/2019)         History of Present Illness:  Breast cancer follow-up, had genetic testing which was positive, had bilateral salpingo oophorectomy, reports no concerns, has switched to letrozole from tamoxifen  -Interval History:  Recent scans done yesterday, recent mammogram    Review of Systems:  Review of Systems   Constitutional: Negative  Negative for appetite change and fever  Eyes: Negative  Respiratory: Negative for shortness of breath  Cardiovascular: Negative  Gastrointestinal: Negative  Endocrine: Negative  Genitourinary: Negative  Musculoskeletal: Negative  Negative for arthralgias and myalgias  Skin: Negative  Allergic/Immunologic: Negative  Neurological: Negative  Hematological: Negative  Negative for adenopathy  Does not bruise/bleed easily  Psychiatric/Behavioral: Negative          Patient Active Problem List   Diagnosis    Malignant neoplasm of upper-inner quadrant of left female breast ([address] )    Liver metastases ([address] )    Bone metastasis ([address] )    Port-A-Cath in place    BRCA1 gene mutation positive in female    History of bilateral salpingo-oophorectomy (BSO)    S/P BSO (bilateral salpingo-oophorectomy)    Aromatase inhibitor use    Dense breast tissue     Past Medical History:   Diagnosis Date    Hx of radiation therapy     breast and left  hip    [facility] ([facility] )     Port-A-Cath in place     right chest    Wears glasses      Past Surgical History:   Procedure Laterality Date    BREAST BIOPSY Left 12/30/2015    BREAST LUMPECTOMY Left 09/16/2016    BREAST SURGERY Left     biopsy    CHOLECYSTECTOMY      CHOLECYSTECTOMY      PORTACATH PLACEMENT Right     MS LAP,RMV  ADNEXAL STRUCTURE Bilateral 8/16/2019    Procedure: LAPAROSCOPIC SALPINGO-OOPHORECTOMY;  Surgeon: Joann Samuels MD;  Location: [facility];  Service: Gynecology Oncology    SIMPLE MASTECTOMY Left 9/16/2016    Procedure: BREAST PARTIAL MASTECTOMY ;  Surgeon: Nadir Whalen MD;  Location: [facility];  Service:      Family History   Problem Relation Age of Onset    No Known Problems Mother     Heart attack Father     No Known Problems Daughter     No Known Problems Maternal Grandmother     No Known Problems Maternal Grandfather     No Known Problems Paternal Aunt     No Known Problems Paternal Aunt     Lung cancer Maternal Uncle 79     Social History     Socioeconomic History    Marital status: /Civil Union     Spouse name: Not on file    Number of children: Not on file    Years of education: Not on file    Highest education level: Not on file   Occupational History    Not on file   Social Needs    Financial resource strain: Not on file    Food insecurity:     Worry: Not on file     Inability: Not on file    Transportation needs:     Medical: Not on file     Non-medical: Not on file   Tobacco Use    Smoking status: Former Smoker     Last attempt to quit: 8/8/2009     Years since quitting: 10 2    Smokeless tobacco: Never Used   Substance and Sexual Activity    Alcohol use: No    Drug use: No    Sexual activity: Not on file   Lifestyle    Physical activity:     Days per week: Not on file     Minutes per session: Not on file    Stress: Not on file   Relationships    Social connections:     Talks on phone: Not on file     Gets together: Not on file     Attends [employer] service: Not on file     Active member of club or organization: Not on file     Attends meetings of clubs or organizations: Not on file     Relationship status: Not on file    Intimate partner violence:     Fear of current or ex partner: Not on file     Emotionally abused: Not on file     Physically abused: Not on file     Forced sexual activity: Not on file   Other Topics Concern    Not on file   Social History Narrative    Not on file       Current Outpatient Medications:     letrozole ([address] [facility]) 2 5 mg tablet, Take 1 tablet (2 5 mg total) by mouth daily, Disp: 30 tablet, Rfl: 6    multivitamin (THERAGRAN) TABS, Take 1 tablet by mouth 3 (three) times a week , Disp: , Rfl:     pantoprazole (PROTONIX) 40 mg tablet, Take by mouth Daily, Disp: , Rfl:     pertuzumab (PERJETA) 420 mg/14 mL SOLN, Infuse into a venous catheter every 21 days  At infusion  center, Disp: , Rfl:     Trastuzumab (HERCEPTIN IV), Infuse into a venous catheter every 21 days   At infusion center, Disp: , Rfl:     VITAMIN D, CHOLECALCIFEROL, PO, Take by mouth 3 (three) times a week , Disp: , Rfl:     oxyCODONE (ROXICODONE) 5 mg immediate release tablet, Take 1 tablet (5 mg total) by mouth every 4 (four) hours as needed for moderate pain for up to 5 dosesMax Daily Amount: 30 mg (Patient not taking: Reported on 11/6/2019), Disp: 5 tablet, Rfl: 0    tamoxifen (NOLVADEX) 20 mg tablet, TAKE 1 TABLET BY MOUTH EVERY DAY (Patient not taking: Reported on 11/6/2019), Disp: 90 tablet, Rfl: 0  No Known Allergies    The following portions of the patient's history were reviewed and updated as appropriate: allergies, current medications, past family history, past medical history, past social history, past surgical history and problem list         Vitals:    11/06/19 1002   BP: 112/70   Pulse: 73   Resp: 18   Temp: 98 6 °F (37 °C)       Physical Exam   Constitutional: She is oriented to person, place, and time  She appears well-developed and well-nourished  HENT:   Head: Normocephalic and atraumatic  Cardiovascular: Normal heart sounds  Pulmonary/Chest: Breath sounds normal  Right breast exhibits no inverted nipple, no mass, no nipple discharge, no skin change and no tenderness  Left breast exhibits skin change (Lumpectomy scar and radiation changes in the medial aspect of the left breast)  Left breast exhibits no inverted nipple, no mass, no nipple discharge and no tenderness  MediPort upper right chest wall   Abdominal: Soft  Lymphadenopathy:        Right axillary: No pectoral and no lateral adenopathy present  Left axillary: No pectoral and no lateral adenopathy present  Right: No supraclavicular adenopathy present  Left: No supraclavicular adenopathy present  Neurological: She is alert and oriented to person, place, and time  Psychiatric: She has a normal mood and affect  Results:  Labs:  BRCA one positive    Imaging  06/11/2019 bilateral 3D diagnostic mammogram was benign BI-RADS two with a density of three  11/05/2019 cat scan of the chest abdomen and pelvis is stable  11/05/2019 bone scan is stable    I reviewed the above laboratory and imaging data  Discussion/Summary:  79-year-old female with stage IV carcinoma of the left breast   There are no concerns on examination today  She did have genetic testing and has a mutation in BRCA one  I therefore discussed alternating mammography and MRI with her  She would like to hold off on the MRI for now as she recently had additional scans  She did have a bilateral salpingo oophorectomy  She was therefore switched to letrozole from the tamoxifen  All of her imaging was benign  I will make arrangements for her mammogram due in June  I will see her again at that time or sooner should the need arise

## 2019-11-13 ENCOUNTER — OFFICE VISIT (OUTPATIENT)
Dept: HEMATOLOGY ONCOLOGY | Facility: CLINIC | Age: 54
End: 2019-11-13
Payer: COMMERCIAL

## 2019-11-13 ENCOUNTER — CLINICAL SUPPORT (OUTPATIENT)
Dept: RADIATION ONCOLOGY | Facility: CLINIC | Age: 54
End: 2019-11-13
Attending: RADIOLOGY
Payer: COMMERCIAL

## 2019-11-13 VITALS
RESPIRATION RATE: 18 BRPM | HEIGHT: 65 IN | OXYGEN SATURATION: 98 % | TEMPERATURE: 98.5 F | BODY MASS INDEX: 36.15 KG/M2 | DIASTOLIC BLOOD PRESSURE: 70 MMHG | WEIGHT: 217 LBS | HEART RATE: 73 BPM | SYSTOLIC BLOOD PRESSURE: 126 MMHG

## 2019-11-13 VITALS
TEMPERATURE: 98.6 F | SYSTOLIC BLOOD PRESSURE: 148 MMHG | RESPIRATION RATE: 16 BRPM | BODY MASS INDEX: 35.36 KG/M2 | HEIGHT: 66 IN | HEART RATE: 77 BPM | DIASTOLIC BLOOD PRESSURE: 86 MMHG | WEIGHT: 220.02 LBS

## 2019-11-13 DIAGNOSIS — Z17.0 MALIGNANT NEOPLASM OF UPPER-INNER QUADRANT OF LEFT BREAST IN FEMALE, ESTROGEN RECEPTOR POSITIVE (HCC): Primary | ICD-10-CM

## 2019-11-13 DIAGNOSIS — C50.212 MALIGNANT NEOPLASM OF UPPER-INNER QUADRANT OF LEFT BREAST IN FEMALE, ESTROGEN RECEPTOR POSITIVE (HCC): Primary | ICD-10-CM

## 2019-11-13 DIAGNOSIS — C78.7 LIVER METASTASES (HCC): ICD-10-CM

## 2019-11-13 DIAGNOSIS — Z95.828 PORT-A-CATH IN PLACE: ICD-10-CM

## 2019-11-13 DIAGNOSIS — C79.51 BONE METASTASIS (HCC): ICD-10-CM

## 2019-11-13 DIAGNOSIS — Z90.79 HISTORY OF BILATERAL SALPINGO-OOPHORECTOMY (BSO): ICD-10-CM

## 2019-11-13 DIAGNOSIS — Z15.09 BRCA1 GENE MUTATION POSITIVE IN FEMALE: ICD-10-CM

## 2019-11-13 DIAGNOSIS — Z15.01 BRCA1 GENE MUTATION POSITIVE IN FEMALE: ICD-10-CM

## 2019-11-13 DIAGNOSIS — Z15.02 BRCA1 GENE MUTATION POSITIVE IN FEMALE: ICD-10-CM

## 2019-11-13 DIAGNOSIS — Z90.722 HISTORY OF BILATERAL SALPINGO-OOPHORECTOMY (BSO): ICD-10-CM

## 2019-11-13 DIAGNOSIS — T45.1X5A CARDIOMYOPATHY DUE TO CHEMOTHERAPY (HCC): ICD-10-CM

## 2019-11-13 DIAGNOSIS — Z79.811 AROMATASE INHIBITOR USE: ICD-10-CM

## 2019-11-13 DIAGNOSIS — I42.7 CARDIOMYOPATHY DUE TO CHEMOTHERAPY (HCC): ICD-10-CM

## 2019-11-13 PROCEDURE — 99211 OFF/OP EST MAY X REQ PHY/QHP: CPT | Performed by: RADIOLOGY

## 2019-11-13 PROCEDURE — 99214 OFFICE O/P EST MOD 30 MIN: CPT | Performed by: INTERNAL MEDICINE

## 2019-11-13 PROCEDURE — G0463 HOSPITAL OUTPT CLINIC VISIT: HCPCS | Performed by: RADIOLOGY

## 2019-11-13 NOTE — PROGRESS NOTES
Lidia Celeste 1965 is a 48 y o  female     Follow up visit   [address] year old female with stage IV left breast carcinoma metastatic to liver and bone  She returns today for follow up after completing Radiation Treatments to the left breast and left femur on 12/23/2016  She was last seen in our office on 4/25/19 5/8/19- Dr Gaspar Nyhan- Surg Onc  - No evidence of progression upon examination  -Schedule genetic testing, F/U in 6 months      6/11/19- 3D diagnostic bilateral mammogram  IMPRESSION:   Postoperative change of the left breast   No significant change in the parenchymal pattern of the right breast   BI-RADS:  Overall: 2 - Benign    8/16/19- Surgery   laparoscopic bilateral salpingo-oophorectomy done by Dr Viktor Odom     9/11/19- Dr Niki Segal   -Urmila Perez with Perjeta and Herceptin every 3 weeks     11/5/19 CT C/A/P  IMPRESSION:   1  Stable postoperative change in the left breast   2  Stable lucent lesion in the proximal left femur consistent with known metastatic disease  3  No evidence for new metastatic lesions  4  Stable left adrenal nodule  5  Additional findings as discussed      11/5/19 Bone Scan  IMPRESSION:   1   Known metastases involving the proximal left femur and posterior calvarium demonstrate essentially resolved radiotracer activity  2   No scintigraphic evidence of new osseous metastasis      11/13/19 Dr Niki GEORGES  6/12/20 3D diagnostic bilateral mammogram  6/23/20 Dr Gaspar Nyhan FU       Malignant neoplasm of upper-inner quadrant of left female breast [facility])    12/2015 Initial Diagnosis     Malignant neoplasm of upper-outer quadrant of left female breast ([facility] [address] )      1/27/2016 Surgery     Port placement      2/19/2016 - 6/2/2018 Chemotherapy     Taxotere,  herceptin, perjeta, xgeva  After six cycles, taxotere was discontinued and tamoxifen added     Continues with Perjeta and Herceptin every 3 weeks     - Dr Niki Segal        9/16/2016 Surgery     Left breast partial mastectomy      11/7/2016 - 12/23/2016 Radiation     Plan ID Energy Fractions Dose per Fraction (cGy) Total Dose Delivered (cGy) Elapsed Days   Lt Breast 6X 25 / 25 200 5,000 36   Lt Brst Boost 6X 8 / 8 200 1,600 9   Lt Femur 10X 10 / 10 300 3,000 11   Lt PAB 6X 25 / 25 60 1,500 36   Lt Sclav 6X 25 / 25 200 5,000 36                                   Total dose to left breast lumpectomy cavity: 6600 cGy                   Total dose to the supraclavicular and axillary regions: 5000 cGy         Hormone Therapy     Tamoxifen   Dr Rosemray Lawson      4/17/2019 -  Chemotherapy     pertuzumab (PERJETA) 840 mg in sodium chloride 0 9 % 250 mL IVPB, 840 mg, Intravenous, Once, 10 of 13 cycles  Administration: 420 mg (5/17/2019), 420 mg (6/7/2019), 420 mg (6/28/2019), 420 mg (7/19/2019), 420 mg (8/30/2019), 420 mg (9/20/2019), 420 mg (8/9/2019), 420 mg (10/9/2019), 420 mg (11/1/2019)  trastuzumab (HERCEPTIN) 589 mg in sodium chloride 0 9 % 250 mL chemo infusion, 6 mg/kg = 589 mg (75 % of original dose 8 mg/kg), Intravenous, Once, 10 of 13 cycles  Dose modification: 6 mg/kg (original dose 8 mg/kg, Cycle 1, Reason: Other (See Comments))  Administration: 589 mg (5/17/2019), 589 mg (6/7/2019), 600 mg (6/28/2019), 600 mg (7/19/2019), 600 mg (8/30/2019), 600 mg (9/20/2019), 600 mg (8/9/2019), 600 mg (10/9/2019), 600 mg (11/1/2019)        Liver metastases ([address] )    4/27/2018 Initial Diagnosis     Liver metastases ([address] )      4/17/2019 -  Chemotherapy     pertuzumab (PERJETA) 840 mg in sodium chloride 0 9 % 250 mL IVPB, 840 mg, Intravenous, Once, 10 of 13 cycles  Administration: 420 mg (5/17/2019), 420 mg (6/7/2019), 420 mg (6/28/2019), 420 mg (7/19/2019), 420 mg (8/30/2019), 420 mg (9/20/2019), 420 mg (8/9/2019), 420 mg (10/9/2019), 420 mg (11/1/2019)  trastuzumab (HERCEPTIN) 589 mg in sodium chloride 0 9 % 250 mL chemo infusion, 6 mg/kg = 589 mg (75 % of original dose 8 mg/kg), Intravenous, Once, 10 of 13 cycles  Dose modification: 6 mg/kg (original dose 8 mg/kg, Cycle 1, Reason: Other (See Comments))  Administration: 589 mg (5/17/2019), 589 mg (6/7/2019), 600 mg (6/28/2019), 600 mg (7/19/2019), 600 mg (8/30/2019), 600 mg (9/20/2019), 600 mg (8/9/2019), 600 mg (10/9/2019), 600 mg (11/1/2019)        Bone metastasis ([facility] [address] )    4/27/2018 Initial Diagnosis     Bone metastasis ([facility] [address] )      4/17/2019 -  Chemotherapy     pertuzumab (PERJETA) 840 mg in sodium chloride 0 9 % 250 mL IVPB, 840 mg, Intravenous, Once, 10 of 13 cycles  Administration: 420 mg (5/17/2019), 420 mg (6/7/2019), 420 mg (6/28/2019), 420 mg (7/19/2019), 420 mg (8/30/2019), 420 mg (9/20/2019), 420 mg (8/9/2019), 420 mg (10/9/2019), 420 mg (11/1/2019)  trastuzumab (HERCEPTIN) 589 mg in sodium chloride 0 9 % 250 mL chemo infusion, 6 mg/kg = 589 mg (75 % of original dose 8 mg/kg), Intravenous, Once, 10 of 13 cycles  Dose modification: 6 mg/kg (original dose 8 mg/kg, Cycle 1, Reason: Other (See Comments))  Administration: 589 mg (5/17/2019), 589 mg (6/7/2019), 600 mg (6/28/2019), 600 mg (7/19/2019), 600 mg (8/30/2019), 600 mg (9/20/2019), 600 mg (8/9/2019), 600 mg (10/9/2019), 600 mg (11/1/2019)     Clinical Trial: no    Imaging    Health Maintenance   Topic Date Due    CRC Screening: Colonoscopy  1965    Pneumococcal Vaccine: Pediatrics (0 to 5 Years) and At-Risk Patients (6 to 59 Years) (1 of 3 - PCV13) 11/24/1973    HIV Screening  11/24/1982    BMI: Followup Plan  11/24/1985    DTaP,Tdap,and Td Vaccines (1 - Tdap) 11/24/1988    Cervical Cancer Screening  11/24/1988    INFLUENZA VACCINE  07/01/2019    Depression Screening PHQ  04/25/2020    MAMMOGRAM  06/11/2020    MAMMOGRAPHY BRCA POSITIVE  06/11/2020    BMI: Adult  11/06/2020    Pneumococcal Vaccine: 65+ Years (1 of 2 - PCV13) 11/24/2032    HEPATITIS B VACCINES  Aged Out       Patient Active Problem List   Diagnosis    Malignant neoplasm of upper-inner quadrant of left female breast ([address] )    Liver metastases ([address] )    Bone metastasis ([facility] [address] )    Port-A-Cath in place    BRCA1 gene mutation positive in female    History of bilateral salpingo-oophorectomy (BSO)    S/P BSO (bilateral salpingo-oophorectomy)    Aromatase inhibitor use    Dense breast tissue     Past Medical History:   Diagnosis Date    Hx of radiation therapy     breast and left  hip    Liver metastases ([facility] [address] )     Port-A-Cath in place     right chest    Wears glasses      Past Surgical History:   Procedure Laterality Date    BREAST BIOPSY Left 12/30/2015    BREAST LUMPECTOMY Left 09/16/2016    BREAST SURGERY Left     biopsy    CHOLECYSTECTOMY      CHOLECYSTECTOMY      PORTACATH PLACEMENT Right     UT LAP,RMV  ADNEXAL STRUCTURE Bilateral 8/16/2019    Procedure: LAPAROSCOPIC SALPINGO-OOPHORECTOMY;  Surgeon: Natalie Hernández MD;  Location: [facility];  Service: Gynecology Oncology    SIMPLE MASTECTOMY Left 9/16/2016    Procedure: BREAST PARTIAL MASTECTOMY ;  Surgeon: Kelly Contreras MD;  Location: [facility];  Service:      Family History   Problem Relation Age of Onset    No Known Problems Mother     Heart attack Father     No Known Problems Daughter     No Known Problems Maternal Grandmother     No Known Problems Maternal Grandfather     No Known Problems Paternal Aunt     No Known Problems Paternal Aunt     Lung cancer Maternal Uncle 79     Social History     Socioeconomic History    Marital status: /Civil Union     Spouse name: Not on file    Number of children: Not on file    Years of education: Not on file    Highest education level: Not on file   Occupational History    Not on file   Social Needs    Financial resource strain: Not on file    Food insecurity:     Worry: Not on file     Inability: Not on file    Transportation needs:     Medical: Not on file     Non-medical: Not on file   Tobacco Use    Smoking status: Former Smoker     Last attempt to quit: 8/8/2009     Years since quitting: 10 2    Smokeless tobacco: Never Used Substance and Sexual Activity    Alcohol use: No    Drug use: No    Sexual activity: Not on file   Lifestyle    Physical activity:     Days per week: Not on file     Minutes per session: Not on file    Stress: Not on file   Relationships    Social connections:     Talks on phone: Not on file     Gets together: Not on file     Attends Yazdanism service: Not on file     Active member of club or organization: Not on file     Attends meetings of clubs or organizations: Not on file     Relationship status: Not on file    Intimate partner violence:     Fear of current or ex partner: Not on file     Emotionally abused: Not on file     Physically abused: Not on file     Forced sexual activity: Not on file   Other Topics Concern    Not on file   Social History Narrative    Not on file       Current Outpatient Medications:     multivitamin (THERAGRAN) TABS, Take 1 tablet by mouth 3 (three) times a week , Disp: , Rfl:     pertuzumab (PERJETA) 420 mg/14 mL SOLN, Infuse into a venous catheter every 21 days  At infusion  center, Disp: , Rfl:     Trastuzumab (HERCEPTIN IV), Infuse into a venous catheter every 21 days  At infusion center, Disp: , Rfl:     VITAMIN D, CHOLECALCIFEROL, PO, Take by mouth 3 (three) times a week , Disp: , Rfl:     letrozole (FEMARA) 2 5 mg tablet, Take 1 tablet (2 5 mg total) by mouth daily, Disp: 30 tablet, Rfl: 6  No Known Allergies    Review of Systems:  Review of Systems   Constitutional: Positive for fatigue  HENT: Negative  Eyes: Negative  Respiratory: Negative  Cardiovascular: Negative  Gastrointestinal: Negative  Endocrine: Negative  Intermittent hot flashes   Genitourinary: Negative  Musculoskeletal:        Occasional muscle cramps in legs during the night   Skin: Negative  Allergic/Immunologic: Negative  Neurological: Positive for numbness (Bilateral hands and feet)  Intermittent neuropathy legs and feet   Hematological: Negative  Psychiatric/Behavioral: Negative  Vitals:    11/13/19 1252   BP: 148/86   BP Location: Right arm   Patient Position: Sitting   Cuff Size: Large   Pulse: 77   Resp: 16   Temp: 98 6 °F (37 °C)   TempSrc: Temporal   Weight: 99 8 kg (220 lb 0 3 oz)   Height: 5' 5 5" (1 664 m)      Pain assessment:0    Pain Score: 0-No pain    Imaging:Nm Bone Scan Whole Body    Result Date: 11/5/2019  Narrative: BONE SCAN  WHOLE BODY INDICATION: C50 212: Malignant neoplasm of upper-inner quadrant of left female breast Z17 0: Estrogen receptor positive status (ER+) Z15 01: Genetic susceptibility to malignant neoplasm of breast Z15 02: Genetic susceptibility to malignant neoplasm of ovary Z15 09: Genetic susceptibility to other malignant neoplasm PREVIOUS FILM CORRELATION:    Bone scan 2/7/2019 TECHNIQUE:   This study was performed following the intravenous administration of 27 5 mCi Tc-99m labeled MDP  Delayed, anterior and posterior whole body images were acquired, 2-3 hours after radiopharmaceutical administration  FINDINGS:  Known metastases involving the proximal left femur and posterior calvarium demonstrate essentially resolved radiotracer activity  Minimal degenerative change in the sternoclavicular joints  Symmetric renal uptake  There is no scintigraphic evidence of  new osseous metastasis  Impression: 1  Known metastases involving the proximal left femur and posterior calvarium demonstrate essentially resolved radiotracer activity  2   No scintigraphic evidence of new osseous metastasis   Workstation performed: [id number]     Ct Chest Abdomen Pelvis W Contrast    Result Date: 11/5/2019  Narrative: CT CHEST, ABDOMEN AND PELVIS WITH IV CONTRAST INDICATION:   C50 212: Malignant neoplasm of upper-inner quadrant of left female breast Z17 0: Estrogen receptor positive status (ER+) Z15 01: Genetic susceptibility to malignant neoplasm of breast Z15 02: Genetic susceptibility to malignant neoplasm of ovary Z15 09: Genetic susceptibility to other malignant neoplasm  COMPARISON:  CT 11/1/2018 TECHNIQUE: CT examination of the chest, abdomen and pelvis was performed  Axial, sagittal, and coronal 2D reformatted images were created from the source data and submitted for interpretation  Radiation dose length product (DLP) for this visit:  1064 mGy-cm   This examination, like all CT scans performed in the [facility], was performed utilizing techniques to minimize radiation dose exposure, including the use of iterative reconstruction and automated exposure control  IV Contrast:  100 mL of iohexol (OMNIPAQUE) Enteric Contrast: Enteric contrast was administered  FINDINGS: CHEST LUNGS:  Lungs are clear  There is no tracheal or endobronchial lesion  PLEURA:  Unremarkable  HEART/GREAT VESSELS:  Unremarkable for patient's age  MEDIASTINUM AND INDER:  Unremarkable  CHEST WALL AND LOWER NECK: Stable postoperative change in the left breast  Implantable right chest wall port again noted with catheter entering the internal jugular vein and tip present at the cavoatrial junction  ABDOMEN LIVER/BILIARY TREE:  Diffuse hepatic steatosis is present without focal abnormality  The liver is mildly enlarged  GALLBLADDER:  Gallbladder is surgically absent  SPLEEN:  Unremarkable  PANCREAS:  Unremarkable  ADRENAL GLANDS:  Stable 2 2 x 2 2 cm well-circumscribed left posterior triangle nodule again identified with normal-appearing right adrenal gland  KIDNEYS/URETERS:  Unremarkable  No hydronephrosis  STOMACH AND BOWEL:  Unremarkable  APPENDIX:  No findings to suggest appendicitis  ABDOMINOPELVIC CAVITY:  No ascites or free intraperitoneal air  No lymphadenopathy  VESSELS:  Unremarkable for patient's age  PELVIS REPRODUCTIVE ORGANS:  Unremarkable for patient's age  URINARY BLADDER:  Unremarkable  ABDOMINAL WALL/INGUINAL REGIONS:  Unremarkable   OSSEOUS STRUCTURES:  Stable lucent lesion in the proximal left femur consistent with known metastatic lesion  Stable Schmorl's node involving the inferior endplate of A85  No other lucent or sclerotic lesions identified  Impression: 1  Stable postoperative change in the left breast  2  Stable lucent lesion in the proximal left femur consistent with known metastatic disease  3  No evidence for new metastatic lesions  4  Stable left adrenal nodule  5  Additional findings as discussed  Workstation performed: [id number]     Dxa Bone Density Spine Hip And Pelvis    Result Date: 11/4/2019  Narrative: CENTRAL  DXA SCAN CLINICAL HISTORY:   48year old post-menopausal  female risk factors include estrogen deficiency  Personal history of breast cancer, 2016, with chemotherapy radiation and bone losing medication  Metastases to liver and left proximal hip  TECHNIQUE: Bone densitometry was performed using a dineout's W bone densitometer  Regions of interest appear properly placed  There are   other confounding variables which could limit the study  Degenerative or osteoarthritic changes appear to be present on the spine image eliminating L4 from evaluation Her elevated BMI will also influence the bone density result  COMPARISON:  Baseline  RESULTS: LUMBAR SPINE:  L1-L3: BMD 1 030 gm/cm2 T-score normal, 0 1 Z-score 0 9 LEFT TOTAL HIP: BMD 1 198 gm/cm2 T-score above normal, +2 1  The right hip T score is above normal, +1 7 Z-score +2 6 LEFT FEMORAL NECK: BMD 0 940 gm/cm2 T-score normal, 0 8  The right hip femoral neck T score is normal, 0 0 Z-score +1 7     Impression: 1  Based on the [facility] classification, this study is normal and the patient is considered at current low risk for fracture  2  A daily intake of calcium of at least 1200 mg and vitamin D, 800-1000 IU, as well as weight bearing and muscle strengthening exercise, fall prevention and avoidance of tobacco and excessive alcohol intake as basic preventive measures are recommended   3  Repeat DXA scan on the same equipment in 18-24 months as clinically indicated  The 10 year risk of hip fracture is <0 1%, with the 10 year risk of major osteoporotic fracture being 3 6%, as calculated by the [facility] fracture risk assessment tool (FRAX)  The current NOF guidelines recommend treating patients with FRAX 10 year risk score of >3% for hip fracture and >20% for major osteoporotic fracture  WHO CLASSIFICATION: Normal (a T-score of -1 0 or higher) Low bone mineral density (a T-score of less than -1 0 but higher than -2 5) Osteoporosis (a T-score of -2 5 or less) Severe osteoporosis (a T-score of -2 5 or less with a fragility fracture) Thank you for allowing us the opportunity to participate in your patient care  The expanded DEXA report will longer be arriving in your mail  If you desire to view the full report please contact [address] [place] [address] or access the PACS system   Workstation performed: [id number]

## 2019-11-13 NOTE — PROGRESS NOTES
Follow-up - Radiation Oncology   Alejandrina Barba 1965 48 y o  female [id number]      History of Present Illness   Cancer Staging  Stage IV left breast inflammatory carcinoma  Bertha Harris is a 48y o  year old female with a stage IV left breast carcinoma metastatic to liver and bone   She returns today for follow up after completing Radiation Treatments to the left breast and left femur on 12/23/2016  She was last seen in our office on 4/25/19  Interval History:  5/8/19- Dr Noyola Diss- Surg Onc  - No evidence of progression upon examination  -Schedule genetic testing, F/U in 6 months      6/11/19- 3D diagnostic bilateral mammogram  IMPRESSION:   Postoperative change of the left breast   No significant change in the parenchymal pattern of the right breast   BI-RADS:  Overall: 2 - Benign     8/16/19- Surgery   laparoscopic bilateral salpingo-oophorectomy done by Dr Demario Morales     9/11/19- Dr Tex Howard   [facility] with Perjeta and Herceptin every 3 weeks     11/5/19 CT C/A/P  IMPRESSION:   1  Stable postoperative change in the left breast   2  Stable lucent lesion in the proximal left femur consistent with known metastatic disease  3  No evidence for new metastatic lesions  4  Stable left adrenal nodule  5  Additional findings as discussed       11/5/19 Bone Scan  IMPRESSION:   1   Known metastases involving the proximal left femur and posterior calvarium demonstrate essentially resolved radiotracer activity  2   No scintigraphic evidence of new osseous metastasis    She reports she is feeling well  She denies any pain  She denies any breast masses  Her tamoxifen was switched to letrozole after her oophorectomy in August   She is having hot flashes daily  She continues to work full-time at [employer]     11/13/19 Dr Tex Howard follow-up today and she will continue on maintenance Herceptin, Perjeta and letrozole      6/12/20 3D diagnostic bilateral mammogram  6/23/20  Gaspar Nyhan     Historical Information      Malignant neoplasm of upper-inner quadrant of left female breast ([facility] )    12/2015 Initial Diagnosis     Malignant neoplasm of upper-outer quadrant of left female breast ([facility] [address] )      1/27/2016 Surgery     Port placement      2/19/2016 - 6/2/2018 Chemotherapy     Taxotere,  herceptin, perjeta, xgeva  After six cycles, taxotere was discontinued and tamoxifen added     Continues with Perjeta and Herceptin every 3 weeks     - Dr Niki Segal        9/16/2016 Surgery     Left breast partial mastectomy      11/7/2016 - 12/23/2016 Radiation     Plan ID Energy Fractions Dose per Fraction (cGy) Total Dose Delivered (cGy) Elapsed Days   Lt Breast 6X 25 / 25 200 5,000 36   Lt Brst Boost 6X 8 / 8 200 1,600 9   Lt Femur 10X 10 / 10 300 3,000 11   Lt PAB 6X 25 / 25 60 1,500 36   Lt Sclav 6X 25 / 25 200 5,000 36                                   Total dose to left breast lumpectomy cavity: 6600 cGy                   Total dose to the supraclavicular and axillary regions: 5000 cGy         Hormone Therapy     Tamoxifen   Dr Niki Segal      4/17/2019 -  Chemotherapy     pertuzumab (PERJETA) 840 mg in sodium chloride 0 9 % 250 mL IVPB, 840 mg, Intravenous, Once, 10 of 13 cycles  Administration: 420 mg (5/17/2019), 420 mg (6/7/2019), 420 mg (6/28/2019), 420 mg (7/19/2019), 420 mg (8/30/2019), 420 mg (9/20/2019), 420 mg (8/9/2019), 420 mg (10/9/2019), 420 mg (11/1/2019)  trastuzumab (HERCEPTIN) 589 mg in sodium chloride 0 9 % 250 mL chemo infusion, 6 mg/kg = 589 mg (75 % of original dose 8 mg/kg), Intravenous, Once, 10 of 13 cycles  Dose modification: 6 mg/kg (original dose 8 mg/kg, Cycle 1, Reason: Other (See Comments))  Administration: 589 mg (5/17/2019), 589 mg (6/7/2019), 600 mg (6/28/2019), 600 mg (7/19/2019), 600 mg (8/30/2019), 600 mg (9/20/2019), 600 mg (8/9/2019), 600 mg (10/9/2019), 600 mg (11/1/2019)        Liver metastases ([facility] )    4/27/2018 Initial Diagnosis     Liver metastases ([facility] ) 4/17/2019 -  Chemotherapy     pertuzumab (PERJETA) 840 mg in sodium chloride 0 9 % 250 mL IVPB, 840 mg, Intravenous, Once, 10 of 13 cycles  Administration: 420 mg (5/17/2019), 420 mg (6/7/2019), 420 mg (6/28/2019), 420 mg (7/19/2019), 420 mg (8/30/2019), 420 mg (9/20/2019), 420 mg (8/9/2019), 420 mg (10/9/2019), 420 mg (11/1/2019)  trastuzumab (HERCEPTIN) 589 mg in sodium chloride 0 9 % 250 mL chemo infusion, 6 mg/kg = 589 mg (75 % of original dose 8 mg/kg), Intravenous, Once, 10 of 13 cycles  Dose modification: 6 mg/kg (original dose 8 mg/kg, Cycle 1, Reason: Other (See Comments))  Administration: 589 mg (5/17/2019), 589 mg (6/7/2019), 600 mg (6/28/2019), 600 mg (7/19/2019), 600 mg (8/30/2019), 600 mg (9/20/2019), 600 mg (8/9/2019), 600 mg (10/9/2019), 600 mg (11/1/2019)        Bone metastasis ([facility] [address] )    4/27/2018 Initial Diagnosis     Bone metastasis ([facility] [address] )      4/17/2019 -  Chemotherapy     pertuzumab (PERJETA) 840 mg in sodium chloride 0 9 % 250 mL IVPB, 840 mg, Intravenous, Once, 10 of 13 cycles  Administration: 420 mg (5/17/2019), 420 mg (6/7/2019), 420 mg (6/28/2019), 420 mg (7/19/2019), 420 mg (8/30/2019), 420 mg (9/20/2019), 420 mg (8/9/2019), 420 mg (10/9/2019), 420 mg (11/1/2019)  trastuzumab (HERCEPTIN) 589 mg in sodium chloride 0 9 % 250 mL chemo infusion, 6 mg/kg = 589 mg (75 % of original dose 8 mg/kg), Intravenous, Once, 10 of 13 cycles  Dose modification: 6 mg/kg (original dose 8 mg/kg, Cycle 1, Reason: Other (See Comments))  Administration: 589 mg (5/17/2019), 589 mg (6/7/2019), 600 mg (6/28/2019), 600 mg (7/19/2019), 600 mg (8/30/2019), 600 mg (9/20/2019), 600 mg (8/9/2019), 600 mg (10/9/2019), 600 mg (11/1/2019)         Past Medical History:   Diagnosis Date    Hx of radiation therapy     breast and left  hip    Liver metastases ([facility] [facility] )     Port-A-Cath in place     right chest    Wears glasses      Past Surgical History:   Procedure Laterality Date    BREAST BIOPSY Left 12/30/2015    BREAST LUMPECTOMY Left 09/16/2016    BREAST SURGERY Left     biopsy    CHOLECYSTECTOMY      CHOLECYSTECTOMY      PORTACATH PLACEMENT Right     MA LAP,RMV  ADNEXAL STRUCTURE Bilateral 8/16/2019    Procedure: LAPAROSCOPIC SALPINGO-OOPHORECTOMY;  Surgeon: Maine Harkins MD;  Location: [facility];  Service: Gynecology Oncology    SIMPLE MASTECTOMY Left 9/16/2016    Procedure: BREAST PARTIAL MASTECTOMY ;  Surgeon: Lion Kruse MD;  Location: [facility];  Service:        Social History   Social History     Substance and Sexual Activity   Alcohol Use No     Social History     Substance and Sexual Activity   Drug Use No     Social History     Tobacco Use   Smoking Status Former Smoker    Last attempt to quit: 8/8/2009    Years since quitting: 10 2   Smokeless Tobacco Never Used         Meds/Allergies     Current Outpatient Medications:     multivitamin (THERAGRAN) TABS, Take 1 tablet by mouth 3 (three) times a week , Disp: , Rfl:     pertuzumab (PERJETA) 420 mg/14 mL SOLN, Infuse into a venous catheter every 21 days  At infusion  center, Disp: , Rfl:     Trastuzumab (HERCEPTIN IV), Infuse into a venous catheter every 21 days  At infusion center, Disp: , Rfl:     VITAMIN D, CHOLECALCIFEROL, PO, Take by mouth 3 (three) times a week , Disp: , Rfl:     letrozole (FEMARA) 2 5 mg tablet, Take 1 tablet (2 5 mg total) by mouth daily, Disp: 30 tablet, Rfl: 6  No Known Allergies    Review of Systems   Constitutional: Positive for fatigue  HENT: Negative  Eyes: Negative  Respiratory: Negative  Cardiovascular: Negative  Gastrointestinal: Negative  Endocrine: Negative  Intermittent hot flashes   Genitourinary: Negative  Musculoskeletal:        Occasional muscle cramps in legs during the night   Skin: Negative  Allergic/Immunologic: Negative  Neurological: Positive for numbness (Bilateral hands and feet)  Intermittent neuropathy legs and feet   Hematological: Negative  Psychiatric/Behavioral: Negative         OBJECTIVE:   /86 (BP Location: Right arm, Patient Position: Sitting, Cuff Size: Large)   Pulse 77   Temp 98 6 °F (37 °C) (Temporal)   Resp 16   Ht 5' 5 5" (1 664 m)   Wt 99 8 kg (220 lb 0 3 oz)   BMI 36 06 kg/m²   Pain Assessment:  0  ECOG/Zubrod/WHO: 0 - Asymptomatic    Physical Exam  Constitutional: She is oriented to person, place, and time  She appears well-developed and well-nourished  No distress  HENT:   Head: Normocephalic and atraumatic  Mouth/Throat: No oropharyngeal exudate  Eyes: Pupils are equal, round, and reactive to light  Conjunctivae and EOM are normal  No scleral icterus  Neck: Normal range of motion  Neck supple  No tracheal deviation present  No thyromegaly present  Cardiovascular: Normal rate, regular rhythm and normal heart sounds  Pulmonary/Chest: Effort normal and breath sounds normal  No respiratory distress  She has no wheezes  She has no rales  Right breast exhibits no inverted nipple, no mass, no nipple discharge, no skin change and no tenderness  Left breast exhibits no inverted nipple, no mass, no nipple discharge, no skin change ( There is a well-healed lumpectomy incision with post treatment changes and no underlying masses noted ) and no tenderness  Abdominal: Soft  Bowel sounds are normal  She exhibits no distension and no mass  There is no tenderness  Musculoskeletal: Normal range of motion  She exhibits no edema or tenderness  Lymphadenopathy:     She has no cervical adenopathy  She has no axillary adenopathy  Right: No supraclavicular adenopathy present  Left: No supraclavicular adenopathy present  Neurological: She is alert and oriented to person, place, and time  No cranial nerve deficit  Coordination normal    Skin: Skin is warm and dry  No rash noted  She is not diaphoretic  No erythema  No pallor  Psychiatric: She has a normal mood and affect   Her behavior is normal  Judgment and thought content normal    Nursing note and vitals reviewed      RESULTS    Lab Results:   Recent Results (from the past 672 hour(s))   Cancer antigen 27 29    Collection Time: 11/01/19  2:01 PM   Result Value Ref Range    CA 27 29 24 4 0 0 - 42 3 U/mL   CBC and differential    Collection Time: 11/01/19  2:01 PM   Result Value Ref Range    WBC 8 20 4  31 - 10 16 Thousand/uL    RBC 4 82 3 81 - 5 12 Million/uL    Hemoglobin 13 6 11 5 - 15 4 g/dL    Hematocrit 42 0 34 8 - 46 1 %    MCV 87 82 - 98 fL    MCH 28 2 26 8 - 34 3 pg    MCHC 32 4 31 4 - 37 4 g/dL    RDW 13 4 11 6 - 15 1 %    MPV 11 3 8 9 - 12 7 fL    Platelets 903 206 - 729 Thousands/uL    Neutrophils Relative 65 43 - 75 %    Lymphocytes Relative 24 14 - 44 %    Monocytes Relative 6 4 - 12 %    Eosinophils Relative 5 0 - 6 %    Basophils Relative 0 0 - 1 %    Neutrophils Absolute 5 32 1 85 - 7 62 Thousands/µL    Lymphocytes Absolute 1 94 0 60 - 4 47 Thousands/µL    Monocytes Absolute 0 52 0 17 - 1 22 Thousand/µL    Eosinophils Absolute 0 41 0 00 - 0 61 Thousand/µL    Basophils Absolute 0 01 0 00 - 0 10 Thousands/µL   Comprehensive metabolic panel    Collection Time: 11/01/19  2:01 PM   Result Value Ref Range    Sodium 143 136 - 145 mmol/L    Potassium 4 2 3 5 - 5 3 mmol/L    Chloride 106 98 - 108 mmol/L    CO2 28 21 - 32 mmol/L    ANION GAP 9 4 - 13 mmol/L    BUN 16 5 - 25 mg/dL    Creatinine 0 80 0 60 - 1 30 mg/dL    Glucose 104 65 - 140 mg/dL    Calcium 10 1 8 3 - 10 1 mg/dL    AST 39 5 - 45 U/L    ALT 38 12 - 78 U/L    Alkaline Phosphatase 91 46 - 116 U/L    Total Protein 7 2 6 4 - 8 2 g/dL    Albumin 3 4 (L) 3 5 - 5 7 g/dL    Total Bilirubin 0 60 0 20 - 1 00 mg/dL    eGFR 86 ml/min/1 73sq m     Imaging Studies:Nm Bone Scan Whole Body    Result Date: 11/5/2019  Narrative: BONE SCAN  WHOLE BODY INDICATION: C50 212: Malignant neoplasm of upper-inner quadrant of left female breast Z17 0: Estrogen receptor positive status (ER+) Z15 01: Genetic susceptibility to malignant neoplasm of breast Z15 02: Genetic susceptibility to malignant neoplasm of ovary Z15 09: Genetic susceptibility to other malignant neoplasm PREVIOUS FILM CORRELATION:    Bone scan 2/7/2019 TECHNIQUE:   This study was performed following the intravenous administration of 27 5 mCi Tc-99m labeled MDP  Delayed, anterior and posterior whole body images were acquired, 2-3 hours after radiopharmaceutical administration  FINDINGS:  Known metastases involving the proximal left femur and posterior calvarium demonstrate essentially resolved radiotracer activity  Minimal degenerative change in the sternoclavicular joints  Symmetric renal uptake  There is no scintigraphic evidence of  new osseous metastasis  Impression: 1  Known metastases involving the proximal left femur and posterior calvarium demonstrate essentially resolved radiotracer activity  2   No scintigraphic evidence of new osseous metastasis  Workstation performed: [id number]     Ct Chest Abdomen Pelvis W Contrast    Result Date: 11/5/2019  Narrative: CT CHEST, ABDOMEN AND PELVIS WITH IV CONTRAST INDICATION:   C50 212: Malignant neoplasm of upper-inner quadrant of left female breast Z17 0: Estrogen receptor positive status (ER+) Z15 01: Genetic susceptibility to malignant neoplasm of breast Z15 02: Genetic susceptibility to malignant neoplasm of ovary Z15 09: Genetic susceptibility to other malignant neoplasm  COMPARISON:  CT 11/1/2018 TECHNIQUE: CT examination of the chest, abdomen and pelvis was performed  Axial, sagittal, and coronal 2D reformatted images were created from the source data and submitted for interpretation  Radiation dose length product (DLP) for this visit:  1064 mGy-cm   This examination, like all CT scans performed in the [facility], was performed utilizing techniques to minimize radiation dose exposure, including the use of iterative reconstruction and automated exposure control   IV Contrast:  100 mL of iohexol (OMNIPAQUE) Enteric Contrast: Enteric contrast was administered  FINDINGS: CHEST LUNGS:  Lungs are clear  There is no tracheal or endobronchial lesion  PLEURA:  Unremarkable  HEART/GREAT VESSELS:  Unremarkable for patient's age  MEDIASTINUM AND INDER:  Unremarkable  CHEST WALL AND LOWER NECK: Stable postoperative change in the left breast  Implantable right chest wall port again noted with catheter entering the internal jugular vein and tip present at the cavoatrial junction  ABDOMEN LIVER/BILIARY TREE:  Diffuse hepatic steatosis is present without focal abnormality  The liver is mildly enlarged  GALLBLADDER:  Gallbladder is surgically absent  SPLEEN:  Unremarkable  PANCREAS:  Unremarkable  ADRENAL GLANDS:  Stable 2 2 x 2 2 cm well-circumscribed left posterior triangle nodule again identified with normal-appearing right adrenal gland  KIDNEYS/URETERS:  Unremarkable  No hydronephrosis  STOMACH AND BOWEL:  Unremarkable  APPENDIX:  No findings to suggest appendicitis  ABDOMINOPELVIC CAVITY:  No ascites or free intraperitoneal air  No lymphadenopathy  VESSELS:  Unremarkable for patient's age  PELVIS REPRODUCTIVE ORGANS:  Unremarkable for patient's age  URINARY BLADDER:  Unremarkable  ABDOMINAL WALL/INGUINAL REGIONS:  Unremarkable  OSSEOUS STRUCTURES:  Stable lucent lesion in the proximal left femur consistent with known metastatic lesion  Stable Schmorl's node involving the inferior endplate of L16  No other lucent or sclerotic lesions identified  Impression: 1  Stable postoperative change in the left breast  2  Stable lucent lesion in the proximal left femur consistent with known metastatic disease  3  No evidence for new metastatic lesions  4  Stable left adrenal nodule  5  Additional findings as discussed   Workstation performed: [id number]     Dxa Bone Density Spine Hip And Pelvis    Result Date: 11/4/2019  Narrative: CENTRAL  DXA SCAN CLINICAL HISTORY:   48year old post-menopausal  female risk factors include estrogen deficiency  Personal history of breast cancer, 2016, with chemotherapy radiation and bone losing medication  Metastases to liver and left proximal hip  TECHNIQUE: Bone densitometry was performed using a [employer] W bone densitometer  Regions of interest appear properly placed  There are   other confounding variables which could limit the study  Degenerative or osteoarthritic changes appear to be present on the spine image eliminating L4 from evaluation Her elevated BMI will also influence the bone density result  COMPARISON:  Baseline  RESULTS: LUMBAR SPINE:  L1-L3: BMD 1 030 gm/cm2 T-score normal, 0 1 Z-score 0 9 LEFT TOTAL HIP: BMD 1 198 gm/cm2 T-score above normal, +2 1  The right hip T score is above normal, +1 7 Z-score +2 6 LEFT FEMORAL NECK: BMD 0 940 gm/cm2 T-score normal, 0 8  The right hip femoral neck T score is normal, 0 0 Z-score +1 7     Impression: 1  Based on the [facility] classification, this study is normal and the patient is considered at current low risk for fracture  2  A daily intake of calcium of at least 1200 mg and vitamin D, 800-1000 IU, as well as weight bearing and muscle strengthening exercise, fall prevention and avoidance of tobacco and excessive alcohol intake as basic preventive measures are recommended  3  Repeat DXA scan on the same equipment in 18-24 months as clinically indicated  The 10 year risk of hip fracture is <0 1%, with the 10 year risk of major osteoporotic fracture being 3 6%, as calculated by the [facility] fracture risk assessment tool (FRAX)  The current NOF guidelines recommend treating patients with FRAX 10 year risk score of >3% for hip fracture and >20% for major osteoporotic fracture   WHO CLASSIFICATION: Normal (a T-score of -1 0 or higher) Low bone mineral density (a T-score of less than -1 0 but higher than -2 5) Osteoporosis (a T-score of -2 5 or less) Severe osteoporosis (a T-score of -2 5 or less with a fragility fracture) Thank you for allowing us the opportunity to participate in your patient care  The expanded DEXA report will longer be arriving in your mail  If you desire to view the full report please contact [address] [place] [address] or access the PACS system  Workstation performed: [id number]     Assessment/Plan:  No orders of the defined types were placed in this encounter  Ewelina Kruse is a 48y o  year old female with a stage IV left breast inflammatory carcinoma that was hormone receptor positive, HER-2 nu positive with grade 3 disease having both liver and bone metastasis when she was initially diagnosed in December 2015  She did not have any significant left hip pain at that time from her left hip metastasis  She had declined prophylactic intramedullary kate placement in January and was started on chemotherapy from February 19, 2016 through June 2, 2016 with Herceptin, Perjeta and Taxol along with Bartholome Moll  She had a good response with a reduction in her left breast mass as well as resolution of enlarged left axillary lymph nodes and a good response within the liver  She refused a mastectomy but did undergo a left lumpectomy on September 16, 2016 which revealed an invasive mammary carcinoma 1 4 cm with negative margins but there was invasive carcinoma directly invading the dermis without any skin ulceration  She is continuing with Perjeta, Herceptin, and she is now on letrozole  She completed 2 years of Xgeva and is on calcium with vitamin-D supplementation      We recommended radiation therapy to the left breast, left supraclavicular, and axillary regions in addition to treatment to her metastatic disease in the left hip/femur  She has no symptoms from her disease and her performance status remains excellent  She worked full-time during treatment and continues to work full-time   She completed treatment on December 23, 2016        She returns today for follow-up and is having no pain in her left femur and also having no pain in the left breast  Clinically, there continues to be a complete response and she has no evidence of any residual disease within the left breast or the left axilla  She will continue on her maintenance treatment with Herceptin, Perjeta, and letrozole  Her imaging with PET-CT scan February 7, 2019 revealed no evidence of any new osseous metastasis  There was decrease intensity of the lesion in the proximal left femur and left occiput  She had a diagnostic bilateral mammogram June 11, 2019 that revealed no evidence of any masses  There were postoperative changes in the left breast   She had laparoscopic bilateral salpingo-oophorectomy August 16, 2019  She had CT scans of the chest, abdomen, and pelvis as well as a bone scan November 5, 2019 that revealed no new areas of metastatic disease  There are stable postoperative changes in the left breast   There was stability of the treated lesion in the proximal left femur  Results were reviewed with the patient today  She will continue with her current treatment regimen and follow-up appointments with Dr Anderson Quezada  She will return here for follow-up in 6 months         Ean Chatterjee MD  11/13/2019,1:45 PM    Portions of the record may have been created with voice recognition software   Occasional wrong word or "sound a like" substitutions may have occurred due to the inherent limitations of voice recognition software   Read the chart carefully and recognize, using context, where substitutions have occurred

## 2019-11-13 NOTE — PROGRESS NOTES
HPI:  Follow-up visit for triple positive stage IV left breast cancer and presently patient is on maintenance Herceptin, Perjeta and letrozole  She had Xgeva for 2 years  She had tested positive for BRCA 1 mutation  In January 2016 patient was diagnosed to have triple positive stage IV left breast cancer , locally advanced and  metastatic to liver and bones   Patient was started on a combination of Taxotere, Herceptin and Perjeta and after 6 cycles Taxotere was discontinued and tamoxifen was added    Herceptin and Perjeta were continued     She  had radiation to left hip for palliation  She had very good response     In September 2016 patient had lumpectomy of left breast followed by radiation therapy   At the time of lumpectomy there was minimal residual disease, 1 4 cm   Lymph nodes were not sampled  Later she was found to have positive BRCA 1 mutation and she knows the importance of that, for her and her family  She gets CT scans to look at pancreas and other organs  She does not want to see another doctor like dermatologist for melanoma screen  On 08/16/2019 she underwent BSO  Tamoxifen was changed to letrozole      She has been tolerating treatments without much problem   She goes for blood tests and echocardiogram on regular basis          There was no metastatic disease in the liver on her last CT scan     There was stable to slightly improved bone scan  She has burning sensation in her hands and feet for a day post treatment    She has grade 1 peripheral neuropathy    Has some tiredness    For leg cramps at night and she is taking one baby aspirin daily with food in the evening and also one magnesium tablet daily       Has some anxiety as before        She is not on Xgeva anymore that she had for 2 years  Sreekanth Novak continues to take    calcium and vitamin-D        Current Outpatient Medications:     letrozole (FEMARA) 2 5 mg tablet, Take 1 tablet (2 5 mg total) by mouth daily, Disp: 30 tablet, Rfl: 6    multivitamin (THERAGRAN) TABS, Take 1 tablet by mouth 3 (three) times a week , Disp: , Rfl:     pertuzumab (PERJETA) 420 mg/14 mL SOLN, Infuse into a venous catheter every 21 days  At infusion  center, Disp: , Rfl:     Trastuzumab (HERCEPTIN IV), Infuse into a venous catheter every 21 days  At infusion center, Disp: , Rfl:     VITAMIN D, CHOLECALCIFEROL, PO, Take by mouth 3 (three) times a week , Disp: , Rfl:     oxyCODONE (ROXICODONE) 5 mg immediate release tablet, Take 1 tablet (5 mg total) by mouth every 4 (four) hours as needed for moderate pain for up to 5 dosesMax Daily Amount: 30 mg (Patient not taking: Reported on 11/6/2019), Disp: 5 tablet, Rfl: 0    pantoprazole (PROTONIX) 40 mg tablet, Take by mouth Daily, Disp: , Rfl:     tamoxifen (NOLVADEX) 20 mg tablet, TAKE 1 TABLET BY MOUTH EVERY DAY (Patient not taking: Reported on 11/6/2019), Disp: 90 tablet, Rfl: 0    No Known Allergies    Oncology History    Alejandrina Barba is M 54 year old female with stage IV left breast carcinoma metastatic to liver and bone  She returns today for follow up after completing Radiation Treatments to the left breast and left femur on 12/23/2016  She was last seen in our office on 4/25/19 5/8/19- Dr Andrez Fan- Surg Onc  - No evidence of progression upon examination  -Schedule genetic testing, F/U in 6 months      6/11/19- 3D diagnostic bilateral mammogram  IMPRESSION:   Postoperative change of the left breast   No significant change in the parenchymal pattern of the right breast   BI-RADS:  Overall: 2 - Benign    8/16/19- Surgery   laparoscopic bilateral salpingo-oophorectomy done by Dr Rom Edmonds     9/11/19- Dr Sylwia Kurtz with Perjeta and Herceptin every 3 weeks     11/5/19 CT C/A/P  IMPRESSION:   1  Stable postoperative change in the left breast   2  Stable lucent lesion in the proximal left femur consistent with known metastatic disease  3  No evidence for new metastatic lesions    4  Stable left adrenal nodule  5  Additional findings as discussed      11/5/19 Bone Scan  IMPRESSION:   1   Known metastases involving the proximal left femur and posterior calvarium demonstrate essentially resolved radiotracer activity  2   No scintigraphic evidence of new osseous metastasis      11/13/19 Dr Gloria Ely FU 6/12/20 3D diagnostic bilateral mammogram  6/23/20 Dr Marni GEORGES            Malignant neoplasm of upper-inner quadrant of left female breast [facility])    12/2015 Initial Diagnosis     Malignant neoplasm of upper-outer quadrant of left female breast ([facility] [address] )      1/27/2016 Surgery     Port placement      2/19/2016 - 6/2/2018 Chemotherapy     Taxotere,  herceptin, perjeta, xgeva  After six cycles, taxotere was discontinued and tamoxifen added     Continues with Perjeta and Herceptin every 3 weeks     - Dr Castro Jan 9/16/2016 Surgery     Left breast partial mastectomy      11/7/2016 - 12/23/2016 Radiation     Plan ID Energy Fractions Dose per Fraction (cGy) Total Dose Delivered (cGy) Elapsed Days   Lt Breast 6X 25 / 25 200 5,000 36   Lt Brst Boost 6X 8 / 8 200 1,600 9   Lt Femur 10X 10 / 10 300 3,000 11   Lt PAB 6X 25 / 25 60 1,500 36   Lt Sclav 6X 25 / 25 200 5,000 36                                   Total dose to left breast lumpectomy cavity: 6600 cGy                   Total dose to the supraclavicular and axillary regions: 5000 cGy         Hormone Therapy     Tamoxifen   Dr Castro Jan 4/17/2019 -  Chemotherapy     pertuzumab (PERJETA) 840 mg in sodium chloride 0 9 % 250 mL IVPB, 840 mg, Intravenous, Once, 10 of 13 cycles  Administration: 420 mg (5/17/2019), 420 mg (6/7/2019), 420 mg (6/28/2019), 420 mg (7/19/2019), 420 mg (8/30/2019), 420 mg (9/20/2019), 420 mg (8/9/2019), 420 mg (10/9/2019), 420 mg (11/1/2019)  trastuzumab (HERCEPTIN) 589 mg in sodium chloride 0 9 % 250 mL chemo infusion, 6 mg/kg = 589 mg (75 % of original dose 8 mg/kg), Intravenous, Once, 10 of 13 cycles  Dose modification: 6 mg/kg (original dose 8 mg/kg, Cycle 1, Reason: Other (See Comments))  Administration: 589 mg (5/17/2019), 589 mg (6/7/2019), 600 mg (6/28/2019), 600 mg (7/19/2019), 600 mg (8/30/2019), 600 mg (9/20/2019), 600 mg (8/9/2019), 600 mg (10/9/2019), 600 mg (11/1/2019)        Liver metastases ([address] )    4/27/2018 Initial Diagnosis     Liver metastases ([address] )      4/17/2019 -  Chemotherapy     pertuzumab (PERJETA) 840 mg in sodium chloride 0 9 % 250 mL IVPB, 840 mg, Intravenous, Once, 10 of 13 cycles  Administration: 420 mg (5/17/2019), 420 mg (6/7/2019), 420 mg (6/28/2019), 420 mg (7/19/2019), 420 mg (8/30/2019), 420 mg (9/20/2019), 420 mg (8/9/2019), 420 mg (10/9/2019), 420 mg (11/1/2019)  trastuzumab (HERCEPTIN) 589 mg in sodium chloride 0 9 % 250 mL chemo infusion, 6 mg/kg = 589 mg (75 % of original dose 8 mg/kg), Intravenous, Once, 10 of 13 cycles  Dose modification: 6 mg/kg (original dose 8 mg/kg, Cycle 1, Reason: Other (See Comments))  Administration: 589 mg (5/17/2019), 589 mg (6/7/2019), 600 mg (6/28/2019), 600 mg (7/19/2019), 600 mg (8/30/2019), 600 mg (9/20/2019), 600 mg (8/9/2019), 600 mg (10/9/2019), 600 mg (11/1/2019)        Bone metastasis ([address] )    4/27/2018 Initial Diagnosis     Bone metastasis ([address] )      4/17/2019 -  Chemotherapy     pertuzumab (PERJETA) 840 mg in sodium chloride 0 9 % 250 mL IVPB, 840 mg, Intravenous, Once, 10 of 13 cycles  Administration: 420 mg (5/17/2019), 420 mg (6/7/2019), 420 mg (6/28/2019), 420 mg (7/19/2019), 420 mg (8/30/2019), 420 mg (9/20/2019), 420 mg (8/9/2019), 420 mg (10/9/2019), 420 mg (11/1/2019)  trastuzumab (HERCEPTIN) 589 mg in sodium chloride 0 9 % 250 mL chemo infusion, 6 mg/kg = 589 mg (75 % of original dose 8 mg/kg), Intravenous, Once, 10 of 13 cycles  Dose modification: 6 mg/kg (original dose 8 mg/kg, Cycle 1, Reason: Other (See Comments))  Administration: 589 mg (5/17/2019), 589 mg (6/7/2019), 600 mg (6/28/2019), 600 mg (7/19/2019), 600 mg (8/30/2019), 600 mg (9/20/2019), 600 mg (8/9/2019), 600 mg (10/9/2019), 600 mg (11/1/2019)         ROS:  11/13/19 Reviewed 13 systems:  Presently no headaches, seizures, dizziness, diplopia, dysphagia, hoarseness, chest pain, palpitations, shortness of breath, cough, hemoptysis, abdominal pain, nausea, vomiting, change in bowel habits, melena, hematuria, fever, chills, bleeding, bone pains, skin rash, weight loss, arthritic symptoms,   weakness,   claudication and gait problem  No frequent infections  Not unusually sensitive to heat or cold  No swelling of the ankles  No swollen glands  Patient is anxious  Other symptoms are in HPI        /70 (BP Location: Left arm, Patient Position: Sitting, Cuff Size: Adult)   Pulse 73   Temp 98 5 °F (36 9 °C)   Resp 18   Ht 5' 5" (1 651 m)   Wt 98 4 kg (217 lb)   SpO2 98%   BMI 36 11 kg/m²     Physical Exam:  Alert, oriented, not in distress, no icterus, no oral thrush, no palpable neck mass, clear lung fields, regular heart rate, abdomen  soft and non tender, no palpable abdominal mass, no ascites, no edema of ankles, no calf tenderness, no focal neurological deficit, no skin rash, no palpable lymphadenopathy in the neck and axillary areas, good arterial pulses, no clubbing  Patient is anxious  Performance status 1  No lymphedema  IMAGING:  IMPRESSION:        1  Stable postoperative change in the left breast   2  Stable lucent lesion in the proximal left femur consistent with known metastatic disease  3  No evidence for new metastatic lesions  4  Stable left adrenal nodule  5  Additional findings as discussed      Workstation performed: [id number]      Imaging     CT chest abdomen pelvis w contrast (Order: [id number]) - 11/5/2019   IMPRESSION:     1  Known metastases involving the proximal left femur and posterior calvarium demonstrate essentially resolved radiotracer activity  2   No scintigraphic evidence of new osseous metastasis             Workstation performed: [id number]    Imaging     NM bone scan whole body (Order: [id number]) - 11/5/2019     RESULTS:   LUMBAR SPINE:  L1-L3:  BMD 1 030 gm/cm2  T-score normal, 0 1  Z-score 0 9     LEFT TOTAL HIP:  BMD 1 198 gm/cm2  T-score above normal, +2 1  The right hip T score is above normal, +1 7  Z-score +2 6     LEFT FEMORAL NECK:  BMD 0 940 gm/cm2  T-score normal, 0 8  The right hip femoral neck T score is normal, 0 0  Z-score +1 7           IMPRESSION:  1  Based on the [facility] classification, this study is normal and the patient is considered at current low risk for fracture         2  A daily intake of calcium of at least 1200 mg and vitamin D, 800-1000 IU, as well as weight bearing and muscle strengthening exercise, fall prevention and avoidance of tobacco and excessive alcohol intake as basic preventive measures are recommended      3  Repeat DXA scan on the same equipment in 18-24 months as clinically indicated         The 10 year risk of hip fracture is <0 1%, with the 10 year risk of major osteoporotic fracture being 3 6%, as calculated by the WHO fracture risk assessment tool (FRAX)  The current NOF guidelines recommend treating patients with FRAX 10 year risk   score of >3% for hip fracture and >20% for major osteoporotic fracture       WHO CLASSIFICATION:  Normal (a T-score of -1 0 or higher)  Low bone mineral density (a T-score of less than -1 0 but higher than -2 5)  Osteoporosis (a T-score of -2 5 or less)  Severe osteoporosis (a T-score of -2 5 or less with a fragility fracture)     Thank you for allowing us the opportunity to participate in your patient care      The expanded DEXA report will longer be arriving in your mail  If you desire to view the full report please contact [facility] medical records or access the PACS system               Workstation performed: [id number]      Imaging     DXA bone density spine hip and pelvis (Order: [id number]) - 11/1/2019       Ejection fraction 65%  LABS:  Results for orders placed or performed during the hospital encounter of 11/01/19   Cancer antigen 27 29   Result Value Ref Range    CA 27 29 24 4 0 0 - 42 3 U/mL   CBC and differential   Result Value Ref Range    WBC 8 20 4  31 - 10 16 Thousand/uL    RBC 4 82 3 81 - 5 12 Million/uL    Hemoglobin 13 6 11 5 - 15 4 g/dL    Hematocrit 42 0 34 8 - 46 1 %    MCV 87 82 - 98 fL    MCH 28 2 26 8 - 34 3 pg    MCHC 32 4 31 4 - 37 4 g/dL    RDW 13 4 11 6 - 15 1 %    MPV 11 3 8 9 - 12 7 fL    Platelets 112 779 - 248 Thousands/uL    Neutrophils Relative 65 43 - 75 %    Lymphocytes Relative 24 14 - 44 %    Monocytes Relative 6 4 - 12 %    Eosinophils Relative 5 0 - 6 %    Basophils Relative 0 0 - 1 %    Neutrophils Absolute 5 32 1 85 - 7 62 Thousands/µL    Lymphocytes Absolute 1 94 0 60 - 4 47 Thousands/µL    Monocytes Absolute 0 52 0 17 - 1 22 Thousand/µL    Eosinophils Absolute 0 41 0 00 - 0 61 Thousand/µL    Basophils Absolute 0 01 0 00 - 0 10 Thousands/µL   Comprehensive metabolic panel   Result Value Ref Range    Sodium 143 136 - 145 mmol/L    Potassium 4 2 3 5 - 5 3 mmol/L    Chloride 106 98 - 108 mmol/L    CO2 28 21 - 32 mmol/L    ANION GAP 9 4 - 13 mmol/L    BUN 16 5 - 25 mg/dL    Creatinine 0 80 0 60 - 1 30 mg/dL    Glucose 104 65 - 140 mg/dL    Calcium 10 1 8 3 - 10 1 mg/dL    AST 39 5 - 45 U/L    ALT 38 12 - 78 U/L    Alkaline Phosphatase 91 46 - 116 U/L    Total Protein 7 2 6 4 - 8 2 g/dL    Albumin 3 4 (L) 3 5 - 5 7 g/dL    Total Bilirubin 0 60 0 20 - 1 00 mg/dL    eGFR 86 ml/min/1 73sq m     Labs, Imaging, & Other studies:   All pertinent labs and imaging studies were personally reviewed    Lab Results   Component Value Date    K 4 2 11/01/2019     11/01/2019    CO2 28 11/01/2019    BUN 16 11/01/2019    CREATININE 0 80 11/01/2019    GLUF 97 09/29/2017    CALCIUM 10 1 11/01/2019    AST 39 11/01/2019    ALT 38 11/01/2019    ALKPHOS 91 11/01/2019    EGFR 86 11/01/2019     Lab Results   Component Value Date    WBC 8 20 11/01/2019    HGB 13 6 11/01/2019    HCT 42 0 11/01/2019    MCV 87 11/01/2019     11/01/2019       Reviewed and discussed with patient  Assessment and plan: Follow-up visit for triple positive stage IV left breast cancer and presently patient is on maintenance Herceptin, Perjeta and letrozole  She had Xgeva for 2 years  She had tested positive for BRCA 1 mutation  In January 2016 patient was diagnosed to have triple positive stage IV left breast cancer , locally advanced and  metastatic to liver and bones   Patient was started on a combination of Taxotere, Herceptin and Perjeta and after 6 cycles Taxotere was discontinued and tamoxifen was added    Herceptin and Perjeta were continued     She  had radiation to left hip for palliation  She had very good response     In September 2016 patient had lumpectomy of left breast followed by radiation therapy   At the time of lumpectomy there was minimal residual disease, 1 4 cm   Lymph nodes were not sampled  Later she was found to have positive BRCA 1 mutation and she knows the importance of that, for her and her family  She gets CT scans to look at pancreas and other organs  She does not want to see another doctor like dermatologist for melanoma screen  On 08/16/2019 she underwent BSO  Tamoxifen was changed to letrozole      She has been tolerating treatments without much problem   She goes for blood tests and echocardiogram on regular basis          There was no metastatic disease in the liver on her last CT scan     There was stable to slightly improved bone scan  She has burning sensation in her hands and feet for a day post treatment    She has grade 1 peripheral neuropathy    Has some tiredness    For leg cramps at night and she is taking one baby aspirin daily with food in the evening and also one magnesium tablet daily       Has some anxiety as before        She is not on Xgeva anymore that she had for 2 years  Benysondra Knight continues to take    calcium and vitamin-D    Physical examination and test results are as recorded and discussed  Patient continues to show response  No change in therapy  Condition discussed and explained  Questions answered  Also discussed the importance of self-breast examination, eating healthy foods, staying active and health screening tests  Patient goes to her breast surgeon for examination and  imaging studies  Patient is capable of self-care  1  Malignant neoplasm of upper-inner quadrant of left breast in female, estrogen receptor positive ([facility] [address] )    - CBC and differential; Future  - Comprehensive metabolic panel; Future  - Cancer antigen 27 29; Future    2  Liver metastases ([facility] [address] )      3  Bone metastasis ([facility] [address] )      4  BRCA1 gene mutation positive in female      11  History of bilateral salpingo-oophorectomy (BSO)      6  Aromatase inhibitor use      7  Port-A-Cath in place      8  Cardiomyopathy due to chemotherapy ([facility] [address] )    - Echo limited with contrast if indicated; Future        Patient voiced understanding and agreement in the discussion  Counseling / Coordination of Care   Greater than 50% of total time was spent with the patient and / or family counseling and / or coordination of care

## 2019-11-20 RX ORDER — SODIUM CHLORIDE 9 MG/ML
20 INJECTION, SOLUTION INTRAVENOUS ONCE
Status: CANCELLED | OUTPATIENT
Start: 2019-11-22

## 2019-11-22 ENCOUNTER — HOSPITAL ENCOUNTER (OUTPATIENT)
Dept: INFUSION CENTER | Facility: CLINIC | Age: 54
Discharge: HOME | End: 2019-11-22
Payer: COMMERCIAL

## 2019-11-22 VITALS
DIASTOLIC BLOOD PRESSURE: 91 MMHG | SYSTOLIC BLOOD PRESSURE: 157 MMHG | WEIGHT: 218.26 LBS | BODY MASS INDEX: 36.36 KG/M2 | RESPIRATION RATE: 16 BRPM | HEIGHT: 65 IN | TEMPERATURE: 97.9 F | HEART RATE: 73 BPM

## 2019-11-22 DIAGNOSIS — C50.212 MALIGNANT NEOPLASM OF UPPER-INNER QUADRANT OF LEFT BREAST IN FEMALE, ESTROGEN RECEPTOR POSITIVE (HCC): ICD-10-CM

## 2019-11-22 DIAGNOSIS — Z90.722 HISTORY OF BILATERAL SALPINGO-OOPHORECTOMY (BSO): ICD-10-CM

## 2019-11-22 DIAGNOSIS — C78.7 LIVER METASTASES (HCC): ICD-10-CM

## 2019-11-22 DIAGNOSIS — C79.51 BONE METASTASIS (HCC): Primary | ICD-10-CM

## 2019-11-22 DIAGNOSIS — Z17.0 MALIGNANT NEOPLASM OF UPPER-INNER QUADRANT OF LEFT BREAST IN FEMALE, ESTROGEN RECEPTOR POSITIVE (HCC): ICD-10-CM

## 2019-11-22 DIAGNOSIS — Z90.79 HISTORY OF BILATERAL SALPINGO-OOPHORECTOMY (BSO): ICD-10-CM

## 2019-11-22 PROCEDURE — 96413 CHEMO IV INFUSION 1 HR: CPT

## 2019-11-22 PROCEDURE — 96417 CHEMO IV INFUS EACH ADDL SEQ: CPT

## 2019-11-22 RX ORDER — SODIUM CHLORIDE 9 MG/ML
20 INJECTION, SOLUTION INTRAVENOUS ONCE
Status: COMPLETED | OUTPATIENT
Start: 2019-11-22 | End: 2019-11-22

## 2019-11-22 RX ADMIN — SODIUM CHLORIDE 20 ML/HR: 0.9 INJECTION, SOLUTION INTRAVENOUS at 14:00

## 2019-11-22 RX ADMIN — TRASTUZUMAB 600 MG: 150 INJECTION, POWDER, LYOPHILIZED, FOR SOLUTION INTRAVENOUS at 15:20

## 2019-11-22 RX ADMIN — PERTUZUMAB 420 MG: 30 INJECTION, SOLUTION, CONCENTRATE INTRAVENOUS at 14:45

## 2019-11-22 NOTE — PLAN OF CARE
Problem: PAIN - ADULT  Goal: Verbalizes/displays adequate comfort level or baseline comfort level  Description  Interventions:  - Encourage patient to monitor pain and request assistance  - Assess pain using appropriate pain scale  - Administer analgesics based on type and severity of pain and evaluate response  - Implement non-pharmacological measures as appropriate and evaluate response  - Consider cultural and social influences on pain and pain management  - Notify physician/advanced practitioner if interventions unsuccessful or patient reports new pain  Outcome: Progressing     Problem: INFECTION - ADULT  Goal: Absence or prevention of progression during hospitalization  Description  INTERVENTIONS:  - Assess and monitor for signs and symptoms of infection  - Monitor lab/diagnostic results  - Monitor all insertion sites, i e  indwelling lines, tubes, and drains  - Monitor endotracheal if appropriate and nasal secretions for changes in amount and color  - [place] appropriate cooling/warming therapies per order  - Administer medications as ordered  - Instruct and encourage patient and family to use good hand hygiene technique  - Identify and instruct in appropriate isolation precautions for identified infection/condition  Outcome: Progressing  Goal: Absence of fever/infection during neutropenic period  Description  INTERVENTIONS:  - Monitor WBC    Outcome: Progressing     Problem: Knowledge Deficit  Goal: Patient/family/caregiver demonstrates understanding of disease process, treatment plan, medications, and discharge instructions  Description  Complete learning assessment and assess knowledge base    Interventions:  - Provide teaching at level of understanding  - Provide teaching via preferred learning methods  Outcome: Progressing

## 2019-11-27 ENCOUNTER — DOCUMENTATION (OUTPATIENT)
Dept: INFUSION CENTER | Facility: HOSPITAL | Age: 54
End: 2019-11-27

## 2019-11-27 NOTE — SOCIAL WORK
MSW called pt as she is an established pt of fellow counselor aKren Sánchez  MSW introduced themselves and explained their role within support services  Pt was aware of eJmal Terrell passing as she was in the infusion center last Friday when staff received the email  Pt was very saddened by the news of Gely's passing and shared that they had lunch together just last Wednesday following a visit with Dr Andre Shelton  They spoke of [place] coming up and buying pajamas for their grandchildren  Pt states looking back that it feels like it was a goodbye lunch after having met with Karen Sánchez for the last 4 years  MSW provided emotional support and gave pt return contact information, encouraging pt to reach out at any time  Pt was very grateful for the phone call

## 2019-12-05 ENCOUNTER — TELEPHONE (OUTPATIENT)
Dept: HEMATOLOGY ONCOLOGY | Facility: CLINIC | Age: 54
End: 2019-12-05

## 2019-12-10 RX ORDER — SODIUM CHLORIDE 9 MG/ML
20 INJECTION, SOLUTION INTRAVENOUS ONCE
Status: CANCELLED | OUTPATIENT
Start: 2019-12-13

## 2019-12-13 ENCOUNTER — HOSPITAL ENCOUNTER (OUTPATIENT)
Dept: INFUSION CENTER | Facility: CLINIC | Age: 54
Discharge: HOME | End: 2019-12-13
Payer: COMMERCIAL

## 2019-12-13 VITALS
DIASTOLIC BLOOD PRESSURE: 92 MMHG | WEIGHT: 221.78 LBS | SYSTOLIC BLOOD PRESSURE: 157 MMHG | HEART RATE: 73 BPM | BODY MASS INDEX: 36.91 KG/M2 | TEMPERATURE: 97.5 F | RESPIRATION RATE: 18 BRPM

## 2019-12-13 DIAGNOSIS — C79.51 BONE METASTASIS (HCC): Primary | ICD-10-CM

## 2019-12-13 DIAGNOSIS — C50.212 MALIGNANT NEOPLASM OF UPPER-INNER QUADRANT OF LEFT BREAST IN FEMALE, ESTROGEN RECEPTOR POSITIVE (HCC): ICD-10-CM

## 2019-12-13 DIAGNOSIS — C78.7 LIVER METASTASES (HCC): ICD-10-CM

## 2019-12-13 DIAGNOSIS — Z17.0 MALIGNANT NEOPLASM OF UPPER-INNER QUADRANT OF LEFT BREAST IN FEMALE, ESTROGEN RECEPTOR POSITIVE (HCC): ICD-10-CM

## 2019-12-13 DIAGNOSIS — Z90.79 HISTORY OF BILATERAL SALPINGO-OOPHORECTOMY (BSO): ICD-10-CM

## 2019-12-13 DIAGNOSIS — Z90.722 HISTORY OF BILATERAL SALPINGO-OOPHORECTOMY (BSO): ICD-10-CM

## 2019-12-13 PROCEDURE — 96413 CHEMO IV INFUSION 1 HR: CPT

## 2019-12-13 PROCEDURE — 96417 CHEMO IV INFUS EACH ADDL SEQ: CPT

## 2019-12-13 RX ORDER — SODIUM CHLORIDE 9 MG/ML
20 INJECTION, SOLUTION INTRAVENOUS ONCE
Status: COMPLETED | OUTPATIENT
Start: 2019-12-13 | End: 2019-12-13

## 2019-12-13 RX ADMIN — SODIUM CHLORIDE 20 ML/HR: 0.9 INJECTION, SOLUTION INTRAVENOUS at 14:40

## 2019-12-13 RX ADMIN — PERTUZUMAB 420 MG: 30 INJECTION, SOLUTION, CONCENTRATE INTRAVENOUS at 14:58

## 2019-12-13 RX ADMIN — TRASTUZUMAB 600 MG: 150 INJECTION, POWDER, LYOPHILIZED, FOR SOLUTION INTRAVENOUS at 15:33

## 2019-12-13 NOTE — PLAN OF CARE
Problem: PAIN - ADULT  Goal: Verbalizes/displays adequate comfort level or baseline comfort level  Description  Interventions:  - Encourage patient to monitor pain and request assistance  - Assess pain using appropriate pain scale  - Administer analgesics based on type and severity of pain and evaluate response  - Implement non-pharmacological measures as appropriate and evaluate response  - Consider cultural and social influences on pain and pain management  - Notify physician/advanced practitioner if interventions unsuccessful or patient reports new pain  Outcome: Progressing     Problem: INFECTION - ADULT  Goal: Absence or prevention of progression during hospitalization  Description  INTERVENTIONS:  - Assess and monitor for signs and symptoms of infection  - Monitor lab/diagnostic results  - Monitor all insertion sites, i e  indwelling lines, tubes, and drains  - Monitor endotracheal if appropriate and nasal secretions for changes in amount and color  - [place] appropriate cooling/warming therapies per order  - Administer medications as ordered  - Instruct and encourage patient and family to use good hand hygiene technique  - Identify and instruct in appropriate isolation precautions for identified infection/condition  Outcome: Progressing  Goal: Absence of fever/infection during neutropenic period  Description  INTERVENTIONS:  - Monitor WBC    Outcome: Progressing     Problem: Knowledge Deficit  Goal: Patient/family/caregiver demonstrates understanding of disease process, treatment plan, medications, and discharge instructions  Description  Complete learning assessment and assess knowledge base    Interventions:  - Provide teaching at level of understanding  - Provide teaching via preferred learning methods  Outcome: Progressing

## 2019-12-13 NOTE — PROGRESS NOTES
Pt  Denies new symptoms or concerns today  EF 65% noted from 11/1/2019  Pt  Tolerated Perjeta and Herceptin without adverse event  Future appointments reviewed  AVS declined

## 2019-12-30 RX ORDER — SODIUM CHLORIDE 9 MG/ML
20 INJECTION, SOLUTION INTRAVENOUS ONCE
Status: CANCELLED | OUTPATIENT
Start: 2020-01-03

## 2020-01-03 ENCOUNTER — HOSPITAL ENCOUNTER (OUTPATIENT)
Dept: INFUSION CENTER | Facility: CLINIC | Age: 55
Discharge: HOME | End: 2020-01-03
Payer: COMMERCIAL

## 2020-01-03 VITALS
BODY MASS INDEX: 36.65 KG/M2 | SYSTOLIC BLOOD PRESSURE: 157 MMHG | DIASTOLIC BLOOD PRESSURE: 93 MMHG | HEART RATE: 70 BPM | WEIGHT: 220.24 LBS | RESPIRATION RATE: 18 BRPM | TEMPERATURE: 98.8 F

## 2020-01-03 DIAGNOSIS — C79.51 BONE METASTASIS (HCC): Primary | ICD-10-CM

## 2020-01-03 DIAGNOSIS — Z90.722 HISTORY OF BILATERAL SALPINGO-OOPHORECTOMY (BSO): ICD-10-CM

## 2020-01-03 DIAGNOSIS — C50.212 MALIGNANT NEOPLASM OF UPPER-INNER QUADRANT OF LEFT BREAST IN FEMALE, ESTROGEN RECEPTOR POSITIVE (HCC): ICD-10-CM

## 2020-01-03 DIAGNOSIS — C78.7 LIVER METASTASES (HCC): ICD-10-CM

## 2020-01-03 DIAGNOSIS — Z17.0 MALIGNANT NEOPLASM OF UPPER-INNER QUADRANT OF LEFT BREAST IN FEMALE, ESTROGEN RECEPTOR POSITIVE (HCC): ICD-10-CM

## 2020-01-03 DIAGNOSIS — Z90.79 HISTORY OF BILATERAL SALPINGO-OOPHORECTOMY (BSO): ICD-10-CM

## 2020-01-03 PROCEDURE — 96417 CHEMO IV INFUS EACH ADDL SEQ: CPT

## 2020-01-03 PROCEDURE — 96413 CHEMO IV INFUSION 1 HR: CPT

## 2020-01-03 RX ORDER — SODIUM CHLORIDE 9 MG/ML
20 INJECTION, SOLUTION INTRAVENOUS ONCE
Status: COMPLETED | OUTPATIENT
Start: 2020-01-03 | End: 2020-01-03

## 2020-01-03 RX ADMIN — TRASTUZUMAB 600 MG: 150 INJECTION, POWDER, LYOPHILIZED, FOR SOLUTION INTRAVENOUS at 15:51

## 2020-01-03 RX ADMIN — SODIUM CHLORIDE 20 ML/HR: 0.9 INJECTION, SOLUTION INTRAVENOUS at 15:03

## 2020-01-03 RX ADMIN — PERTUZUMAB 420 MG: 30 INJECTION, SOLUTION, CONCENTRATE INTRAVENOUS at 15:17

## 2020-01-03 NOTE — PLAN OF CARE
Problem: Potential for Falls  Goal: Patient will remain free of falls  Description  INTERVENTIONS:  - Assess patient frequently for physical needs  -  Identify cognitive and physical deficits and behaviors that affect risk of falls    -  [place] fall precautions as indicated by assessment   - Educate patient/family on patient safety including physical limitations  - Instruct patient to call for assistance with activity based on assessment  - Modify environment to reduce risk of injury  - Consider OT/PT consult to assist with strengthening/mobility  Outcome: Progressing

## 2020-01-14 ENCOUNTER — TELEPHONE (OUTPATIENT)
Dept: HEMATOLOGY ONCOLOGY | Facility: MEDICAL CENTER | Age: 55
End: 2020-01-14

## 2020-01-14 DIAGNOSIS — Z90.722 HISTORY OF BILATERAL SALPINGO-OOPHORECTOMY (BSO): ICD-10-CM

## 2020-01-14 DIAGNOSIS — Z90.79 HISTORY OF BILATERAL SALPINGO-OOPHORECTOMY (BSO): ICD-10-CM

## 2020-01-14 DIAGNOSIS — Z17.0 MALIGNANT NEOPLASM OF UPPER-INNER QUADRANT OF LEFT BREAST IN FEMALE, ESTROGEN RECEPTOR POSITIVE (HCC): ICD-10-CM

## 2020-01-14 DIAGNOSIS — C79.51 BONE METASTASIS (HCC): ICD-10-CM

## 2020-01-14 DIAGNOSIS — C78.7 LIVER METASTASES (HCC): ICD-10-CM

## 2020-01-14 DIAGNOSIS — C50.212 MALIGNANT NEOPLASM OF UPPER-INNER QUADRANT OF LEFT BREAST IN FEMALE, ESTROGEN RECEPTOR POSITIVE (HCC): ICD-10-CM

## 2020-01-14 NOTE — TELEPHONE ENCOUNTER
Patient called in stated that she thought she had an appointment for 1/24/2020  Please call patient back to confirm the appointment as it is not listed   A good call back number is [phone]

## 2020-01-14 NOTE — TELEPHONE ENCOUNTER
Reviewed with patient she is scheduled at [facility] for 1/24 at 1:30pm (scheduled patient with [facility] as patient was not scheduled for anymore treatments)  Patient was appreciative  Reviewed with [facility] and patient when she is at infusion on 1/24 to please schedule additional treatments as the orders are in  Patient was appreciative

## 2020-01-21 RX ORDER — SODIUM CHLORIDE 9 MG/ML
20 INJECTION, SOLUTION INTRAVENOUS ONCE
Status: CANCELLED | OUTPATIENT
Start: 2020-01-24

## 2020-01-24 ENCOUNTER — HOSPITAL ENCOUNTER (OUTPATIENT)
Dept: INFUSION CENTER | Facility: CLINIC | Age: 55
Discharge: HOME | End: 2020-01-24
Payer: COMMERCIAL

## 2020-01-24 VITALS
HEART RATE: 77 BPM | BODY MASS INDEX: 36.56 KG/M2 | DIASTOLIC BLOOD PRESSURE: 87 MMHG | WEIGHT: 219.69 LBS | TEMPERATURE: 97.8 F | SYSTOLIC BLOOD PRESSURE: 153 MMHG

## 2020-01-24 DIAGNOSIS — C79.51 BONE METASTASIS (HCC): Primary | ICD-10-CM

## 2020-01-24 DIAGNOSIS — Z90.722 HISTORY OF BILATERAL SALPINGO-OOPHORECTOMY (BSO): ICD-10-CM

## 2020-01-24 DIAGNOSIS — Z17.0 MALIGNANT NEOPLASM OF UPPER-INNER QUADRANT OF LEFT BREAST IN FEMALE, ESTROGEN RECEPTOR POSITIVE (HCC): ICD-10-CM

## 2020-01-24 DIAGNOSIS — Z90.79 HISTORY OF BILATERAL SALPINGO-OOPHORECTOMY (BSO): ICD-10-CM

## 2020-01-24 DIAGNOSIS — C50.212 MALIGNANT NEOPLASM OF UPPER-INNER QUADRANT OF LEFT BREAST IN FEMALE, ESTROGEN RECEPTOR POSITIVE (HCC): ICD-10-CM

## 2020-01-24 DIAGNOSIS — C78.7 LIVER METASTASES (HCC): ICD-10-CM

## 2020-01-24 PROCEDURE — 96417 CHEMO IV INFUS EACH ADDL SEQ: CPT

## 2020-01-24 PROCEDURE — 96413 CHEMO IV INFUSION 1 HR: CPT

## 2020-01-24 RX ORDER — SODIUM CHLORIDE 9 MG/ML
20 INJECTION, SOLUTION INTRAVENOUS ONCE
Status: COMPLETED | OUTPATIENT
Start: 2020-01-24 | End: 2020-01-24

## 2020-01-24 RX ADMIN — TRASTUZUMAB 600 MG: 150 INJECTION, POWDER, LYOPHILIZED, FOR SOLUTION INTRAVENOUS at 15:38

## 2020-01-24 RX ADMIN — SODIUM CHLORIDE 20 ML/HR: 0.9 INJECTION, SOLUTION INTRAVENOUS at 14:59

## 2020-01-24 RX ADMIN — PERTUZUMAB 420 MG: 30 INJECTION, SOLUTION, CONCENTRATE INTRAVENOUS at 15:07

## 2020-01-24 NOTE — PLAN OF CARE
Problem: Potential for Falls  Goal: Patient will remain free of falls  Description  INTERVENTIONS:  - Assess patient frequently for physical needs  -  Identify cognitive and physical deficits and behaviors that affect risk of falls    -  [place] fall precautions as indicated by assessment   - Educate patient/family on patient safety including physical limitations  - Instruct patient to call for assistance with activity based on assessment  - Modify environment to reduce risk of injury  - Consider OT/PT consult to assist with strengthening/mobility  Outcome: Progressing

## 2020-02-03 ENCOUNTER — HOSPITAL ENCOUNTER (OUTPATIENT)
Dept: NON INVASIVE DIAGNOSTICS | Facility: CLINIC | Age: 55
Discharge: HOME | End: 2020-02-03
Payer: COMMERCIAL

## 2020-02-03 DIAGNOSIS — I42.7 CARDIOMYOPATHY DUE TO CHEMOTHERAPY (HCC): ICD-10-CM

## 2020-02-03 DIAGNOSIS — T45.1X5A CARDIOMYOPATHY DUE TO CHEMOTHERAPY (HCC): ICD-10-CM

## 2020-02-03 PROCEDURE — 93321 DOPPLER ECHO F-UP/LMTD STD: CPT | Performed by: INTERNAL MEDICINE

## 2020-02-03 PROCEDURE — 93308 TTE F-UP OR LMTD: CPT

## 2020-02-03 PROCEDURE — 93325 DOPPLER ECHO COLOR FLOW MAPG: CPT | Performed by: INTERNAL MEDICINE

## 2020-02-03 PROCEDURE — 93308 TTE F-UP OR LMTD: CPT | Performed by: INTERNAL MEDICINE

## 2020-02-11 ENCOUNTER — TELEPHONE (OUTPATIENT)
Dept: HEMATOLOGY ONCOLOGY | Facility: CLINIC | Age: 55
End: 2020-02-11

## 2020-02-11 RX ORDER — SODIUM CHLORIDE 9 MG/ML
20 INJECTION, SOLUTION INTRAVENOUS ONCE
Status: CANCELLED | OUTPATIENT
Start: 2020-02-14

## 2020-02-12 ENCOUNTER — APPOINTMENT (OUTPATIENT)
Dept: LAB | Facility: MEDICAL CENTER | Age: 55
End: 2020-02-12
Payer: COMMERCIAL

## 2020-02-12 ENCOUNTER — OFFICE VISIT (OUTPATIENT)
Dept: HEMATOLOGY ONCOLOGY | Facility: CLINIC | Age: 55
End: 2020-02-12
Payer: COMMERCIAL

## 2020-02-12 VITALS
RESPIRATION RATE: 16 BRPM | HEART RATE: 89 BPM | DIASTOLIC BLOOD PRESSURE: 86 MMHG | WEIGHT: 219 LBS | BODY MASS INDEX: 36.49 KG/M2 | SYSTOLIC BLOOD PRESSURE: 152 MMHG | OXYGEN SATURATION: 96 % | TEMPERATURE: 97.8 F | HEIGHT: 65 IN

## 2020-02-12 DIAGNOSIS — C50.919 MALIGNANT NEOPLASM OF FEMALE BREAST, UNSPECIFIED ESTROGEN RECEPTOR STATUS, UNSPECIFIED LATERALITY, UNSPECIFIED SITE OF BREAST (HCC): ICD-10-CM

## 2020-02-12 DIAGNOSIS — Z79.811 AROMATASE INHIBITOR USE: ICD-10-CM

## 2020-02-12 DIAGNOSIS — T45.1X5A CARDIOMYOPATHY DUE TO CHEMOTHERAPY (HCC): ICD-10-CM

## 2020-02-12 DIAGNOSIS — C50.212 MALIGNANT NEOPLASM OF UPPER-INNER QUADRANT OF LEFT BREAST IN FEMALE, ESTROGEN RECEPTOR POSITIVE (HCC): ICD-10-CM

## 2020-02-12 DIAGNOSIS — C50.212 MALIGNANT NEOPLASM OF UPPER-INNER QUADRANT OF LEFT BREAST IN FEMALE, ESTROGEN RECEPTOR POSITIVE (HCC): Primary | ICD-10-CM

## 2020-02-12 DIAGNOSIS — I42.7 CARDIOMYOPATHY DUE TO CHEMOTHERAPY (HCC): ICD-10-CM

## 2020-02-12 DIAGNOSIS — C79.51 BONE METASTASIS (HCC): ICD-10-CM

## 2020-02-12 DIAGNOSIS — Z90.722 S/P BSO (BILATERAL SALPINGO-OOPHORECTOMY): ICD-10-CM

## 2020-02-12 DIAGNOSIS — Z17.0 MALIGNANT NEOPLASM OF UPPER-INNER QUADRANT OF LEFT BREAST IN FEMALE, ESTROGEN RECEPTOR POSITIVE (HCC): Primary | ICD-10-CM

## 2020-02-12 DIAGNOSIS — C78.7 LIVER METASTASES (HCC): ICD-10-CM

## 2020-02-12 DIAGNOSIS — Z17.0 MALIGNANT NEOPLASM OF UPPER-INNER QUADRANT OF LEFT BREAST IN FEMALE, ESTROGEN RECEPTOR POSITIVE (HCC): ICD-10-CM

## 2020-02-12 DIAGNOSIS — Z15.09 BRCA1 GENE MUTATION POSITIVE IN FEMALE: ICD-10-CM

## 2020-02-12 DIAGNOSIS — Z15.01 BRCA1 GENE MUTATION POSITIVE IN FEMALE: ICD-10-CM

## 2020-02-12 DIAGNOSIS — Z95.828 PORT-A-CATH IN PLACE: ICD-10-CM

## 2020-02-12 DIAGNOSIS — Z15.02 BRCA1 GENE MUTATION POSITIVE IN FEMALE: ICD-10-CM

## 2020-02-12 LAB
ALBUMIN SERPL BCP-MCNC: 3.2 G/DL (ref 3.5–5)
ALP SERPL-CCNC: 122 U/L (ref 46–116)
ALT SERPL W P-5'-P-CCNC: 41 U/L (ref 12–78)
ANION GAP SERPL CALCULATED.3IONS-SCNC: 6 MMOL/L (ref 4–13)
AST SERPL W P-5'-P-CCNC: 18 U/L (ref 5–45)
BASOPHILS # BLD AUTO: 0.04 THOUSANDS/ΜL (ref 0–0.1)
BASOPHILS NFR BLD AUTO: 1 % (ref 0–1)
BILIRUB SERPL-MCNC: 0.48 MG/DL (ref 0.2–1)
BUN SERPL-MCNC: 15 MG/DL (ref 5–25)
CALCIUM SERPL-MCNC: 9.2 MG/DL (ref 8.3–10.1)
CANCER AG27-29 SERPL-ACNC: 26.8 U/ML (ref 0–42.3)
CHLORIDE SERPL-SCNC: 110 MMOL/L (ref 100–108)
CO2 SERPL-SCNC: 25 MMOL/L (ref 21–32)
CREAT SERPL-MCNC: 0.87 MG/DL (ref 0.6–1.3)
EOSINOPHIL # BLD AUTO: 0.5 THOUSAND/ΜL (ref 0–0.61)
EOSINOPHIL NFR BLD AUTO: 6 % (ref 0–6)
ERYTHROCYTE [DISTWIDTH] IN BLOOD BY AUTOMATED COUNT: 14.3 % (ref 11.6–15.1)
GFR SERPL CREATININE-BSD FRML MDRD: 77 ML/MIN/1.73SQ M
GLUCOSE P FAST SERPL-MCNC: 162 MG/DL (ref 65–99)
HCT VFR BLD AUTO: 41.1 % (ref 34.8–46.1)
HGB BLD-MCNC: 13 G/DL (ref 11.5–15.4)
IMM GRANULOCYTES # BLD AUTO: 0.04 THOUSAND/UL (ref 0–0.2)
IMM GRANULOCYTES NFR BLD AUTO: 1 % (ref 0–2)
LYMPHOCYTES # BLD AUTO: 2.02 THOUSANDS/ΜL (ref 0.6–4.47)
LYMPHOCYTES NFR BLD AUTO: 24 % (ref 14–44)
MCH RBC QN AUTO: 27.3 PG (ref 26.8–34.3)
MCHC RBC AUTO-ENTMCNC: 31.6 G/DL (ref 31.4–37.4)
MCV RBC AUTO: 86 FL (ref 82–98)
MONOCYTES # BLD AUTO: 0.61 THOUSAND/ΜL (ref 0.17–1.22)
MONOCYTES NFR BLD AUTO: 7 % (ref 4–12)
NEUTROPHILS # BLD AUTO: 5.13 THOUSANDS/ΜL (ref 1.85–7.62)
NEUTS SEG NFR BLD AUTO: 61 % (ref 43–75)
NRBC BLD AUTO-RTO: 0 /100 WBCS
PLATELET # BLD AUTO: 314 THOUSANDS/UL (ref 149–390)
PMV BLD AUTO: 11 FL (ref 8.9–12.7)
POTASSIUM SERPL-SCNC: 4.1 MMOL/L (ref 3.5–5.3)
PROT SERPL-MCNC: 7.2 G/DL (ref 6.4–8.2)
RBC # BLD AUTO: 4.76 MILLION/UL (ref 3.81–5.12)
SODIUM SERPL-SCNC: 141 MMOL/L (ref 136–145)
WBC # BLD AUTO: 8.34 THOUSAND/UL (ref 4.31–10.16)

## 2020-02-12 PROCEDURE — 86300 IMMUNOASSAY TUMOR CA 15-3: CPT

## 2020-02-12 PROCEDURE — 36415 COLL VENOUS BLD VENIPUNCTURE: CPT

## 2020-02-12 PROCEDURE — 80053 COMPREHEN METABOLIC PANEL: CPT

## 2020-02-12 PROCEDURE — 99214 OFFICE O/P EST MOD 30 MIN: CPT | Performed by: INTERNAL MEDICINE

## 2020-02-12 PROCEDURE — 85025 COMPLETE CBC W/AUTO DIFF WBC: CPT

## 2020-02-12 RX ORDER — LETROZOLE 2.5 MG/1
2.5 TABLET, FILM COATED ORAL DAILY
Qty: 30 TABLET | Refills: 6 | Status: SHIPPED | OUTPATIENT
Start: 2020-02-12 | End: 2020-12-02 | Stop reason: SDUPTHER

## 2020-02-12 NOTE — PROGRESS NOTES
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  Also she had locally advanced disease  This was diagnosed in January 2016  Patient was started on a combination of Taxotere, Herceptin and Perjeta and after 6 cycles Taxotere was discontinued and tamoxifen was added    Herceptin and Perjeta were continued  Ebony He radiation to left hip for palliation  She had very good response    On 08/16/2019 she underwent BSO    Tamoxifen was changed to letrozole     Presently she is on Herceptin, Perjeta and letrozole  She has been taking vitamin-D and calcium and is staying active    She has been tolerating treatments without much problem   She goes for blood tests and echocardiogram on regular basis  There was no metastatic disease in the liver on her last CT scan      There was stable to slightly improved bone scan    In September 2016 patient had lumpectomy of left breast followed by radiation therapy   At the time of lumpectomy there was minimal residual disease, 1 4 cm   Lymph nodes were not sampled    Later she was found to have positive BRCA 1 mutation and she knows the importance of that, for her and her family   She gets CT scans to look at pancreas and other organs   She does not want to see another doctor like dermatologist for melanoma screen             She has burning sensation in her hands and feet for a day post treatment   She has grade 1 peripheral neuropathy    Has some tiredness and arthritic symptoms   For leg cramps at night and she is taking one baby aspirin daily with food in the evening and also one magnesium tablet daily and that is helping       Has some anxiety as before        She is not on Xgeva anymore that she had for 2 years  Dina Jesus continues to take    calcium and vitamin-D                          Current Outpatient Medications:     letrozole (FEMARA) 2 5 mg tablet, Take 1 tablet (2 5 mg total) by mouth daily, Disp: 30 tablet, Rfl: 6    multivitamin (THERAGRAN) TABS, Take 1 tablet by mouth 3 (three) times a week , Disp: , Rfl:     pertuzumab (PERJETA) 420 mg/14 mL SOLN, Infuse into a venous catheter every 21 days  At infusion  center, Disp: , Rfl:     Trastuzumab (HERCEPTIN IV), Infuse into a venous catheter every 21 days  At infusion center, Disp: , Rfl:     VITAMIN D, CHOLECALCIFEROL, PO, Take by mouth 3 (three) times a week , Disp: , Rfl:     No Known Allergies       Malignant neoplasm of upper-inner quadrant of left female breast ([facility] [address] )    12/2015 Initial Diagnosis     Malignant neoplasm of upper-outer quadrant of left female breast ([facility] [address] )      1/27/2016 Surgery     Port placement      2/19/2016 - 6/2/2018 Chemotherapy     Taxotere,  herceptin, perjeta, xgeva  After six cycles, taxotere was discontinued and tamoxifen added     Continues with Perjeta and Herceptin every 3 weeks     - Dr Brenda Franco        9/16/2016 Surgery     Left breast partial mastectomy      11/7/2016 - 12/23/2016 Radiation     Plan ID Energy Fractions Dose per Fraction (cGy) Total Dose Delivered (cGy) Elapsed Days   Lt Breast 6X 25 / 25 200 5,000 36   Lt Brst Boost 6X 8 / 8 200 1,600 9   Lt Femur 10X 10 / 10 300 3,000 11   Lt PAB 6X 25 / 25 60 1,500 36   Lt Sclav 6X 25 / 25 200 5,000 36                                   Total dose to left breast lumpectomy cavity: 6600 cGy                   Total dose to the supraclavicular and axillary regions: 5000 cGy         Hormone Therapy     Tamoxifen   Dr Brenda Franco      4/17/2019 -  Chemotherapy     pertuzumab (PERJETA) 840 mg in sodium chloride 0 9 % 250 mL IVPB, 840 mg, Intravenous, Once, 14 of 22 cycles  Administration: 420 mg (5/17/2019), 420 mg (6/7/2019), 420 mg (6/28/2019), 420 mg (7/19/2019), 420 mg (8/30/2019), 420 mg (9/20/2019), 420 mg (8/9/2019), 420 mg (10/9/2019), 420 mg (11/1/2019), 420 mg (11/22/2019), 420 mg (12/13/2019), 420 mg (1/3/2020), 420 mg (1/24/2020)  trastuzumab (HERCEPTIN) 589 mg in sodium chloride 0 9 % 250 mL chemo infusion, 6 mg/kg = 589 mg (75 % of original dose 8 mg/kg), Intravenous, Once, 14 of 22 cycles  Dose modification: 6 mg/kg (original dose 8 mg/kg, Cycle 1, Reason: Other (See Comments))  Administration: 589 mg (5/17/2019), 589 mg (6/7/2019), 600 mg (6/28/2019), 600 mg (7/19/2019), 600 mg (8/30/2019), 600 mg (9/20/2019), 600 mg (8/9/2019), 600 mg (10/9/2019), 600 mg (11/1/2019), 600 mg (11/22/2019), 600 mg (12/13/2019), 600 mg (1/3/2020), 600 mg (1/24/2020)        Liver metastases ([facility] [address] )    4/27/2018 Initial Diagnosis     Liver metastases ([facility] [address] )      4/17/2019 -  Chemotherapy     pertuzumab (PERJETA) 840 mg in sodium chloride 0 9 % 250 mL IVPB, 840 mg, Intravenous, Once, 14 of 22 cycles  Administration: 420 mg (5/17/2019), 420 mg (6/7/2019), 420 mg (6/28/2019), 420 mg (7/19/2019), 420 mg (8/30/2019), 420 mg (9/20/2019), 420 mg (8/9/2019), 420 mg (10/9/2019), 420 mg (11/1/2019), 420 mg (11/22/2019), 420 mg (12/13/2019), 420 mg (1/3/2020), 420 mg (1/24/2020)  trastuzumab (HERCEPTIN) 589 mg in sodium chloride 0 9 % 250 mL chemo infusion, 6 mg/kg = 589 mg (75 % of original dose 8 mg/kg), Intravenous, Once, 14 of 22 cycles  Dose modification: 6 mg/kg (original dose 8 mg/kg, Cycle 1, Reason: Other (See Comments))  Administration: 589 mg (5/17/2019), 589 mg (6/7/2019), 600 mg (6/28/2019), 600 mg (7/19/2019), 600 mg (8/30/2019), 600 mg (9/20/2019), 600 mg (8/9/2019), 600 mg (10/9/2019), 600 mg (11/1/2019), 600 mg (11/22/2019), 600 mg (12/13/2019), 600 mg (1/3/2020), 600 mg (1/24/2020)        Bone metastasis ([facility] [address] )    4/27/2018 Initial Diagnosis     Bone metastasis ([facility] [address] )      4/17/2019 -  Chemotherapy     pertuzumab (PERJETA) 840 mg in sodium chloride 0 9 % 250 mL IVPB, 840 mg, Intravenous, Once, 14 of 22 cycles  Administration: 420 mg (5/17/2019), 420 mg (6/7/2019), 420 mg (6/28/2019), 420 mg (7/19/2019), 420 mg (8/30/2019), 420 mg (9/20/2019), 420 mg (8/9/2019), 420 mg (10/9/2019), 420 mg (11/1/2019), 420 mg (11/22/2019), 420 mg (12/13/2019), 420 mg (1/3/2020), 420 mg (1/24/2020)  trastuzumab (HERCEPTIN) 589 mg in sodium chloride 0 9 % 250 mL chemo infusion, 6 mg/kg = 589 mg (75 % of original dose 8 mg/kg), Intravenous, Once, 14 of 22 cycles  Dose modification: 6 mg/kg (original dose 8 mg/kg, Cycle 1, Reason: Other (See Comments))  Administration: 589 mg (5/17/2019), 589 mg (6/7/2019), 600 mg (6/28/2019), 600 mg (7/19/2019), 600 mg (8/30/2019), 600 mg (9/20/2019), 600 mg (8/9/2019), 600 mg (10/9/2019), 600 mg (11/1/2019), 600 mg (11/22/2019), 600 mg (12/13/2019), 600 mg (1/3/2020), 600 mg (1/24/2020)         ROS:  02/12/20 Reviewed 13 systems:  Presently no other neurological, cardiac, pulmonary, GI and  symptoms other than mentioned above  No other symptoms like fever, chills, bleeding, bone pains, skin rash, weight loss,    weakness,   claudication and gait problem  No frequent infections  Not unusually sensitive to  cold  No swelling of the ankles  No swollen glands  Patient is anxious  Other symptoms are in HPI        /86 (BP Location: Left arm, Patient Position: Standing, Cuff Size: Adult)   Pulse 89   Temp 97 8 °F (36 6 °C)   Resp 16   Ht 5' 5" (1 651 m)   Wt 99 3 kg (219 lb)   SpO2 96%   BMI 36 44 kg/m²     Physical Exam:  Alert, oriented, not in distress, no icterus, no oral thrush, no palpable neck mass, clear lung fields, regular heart rate, abdomen  soft and non tender, no palpable abdominal mass, no ascites, no edema of ankles, no calf tenderness, no focal neurological deficit, no skin rash, no palpable lymphadenopathy in the neck and axillary areas, good arterial pulses, no clubbing  Patient is anxious  Performance status 1      IMAGING:      LABS:  Results for orders placed or performed in visit on 02/12/20   CBC and differential   Result Value Ref Range    WBC 8 34 4 31 - 10 16 Thousand/uL    RBC 4 76 3 81 - 5 12 Million/uL    Hemoglobin 13 0 11 5 - 15 4 g/dL    Hematocrit 41 1 34 8 - 46 1 %    MCV 86 82 - 98 fL    MCH 27 3 26 8 - 34 3 pg    MCHC 31 6 31 4 - 37 4 g/dL    RDW 14 3 11 6 - 15 1 %    MPV 11 0 8 9 - 12 7 fL    Platelets 600 066 - 346 Thousands/uL    nRBC 0 /100 WBCs    Neutrophils Relative 61 43 - 75 %    Immat GRANS % 1 0 - 2 %    Lymphocytes Relative 24 14 - 44 %    Monocytes Relative 7 4 - 12 %    Eosinophils Relative 6 0 - 6 %    Basophils Relative 1 0 - 1 %    Neutrophils Absolute 5 13 1 85 - 7 62 Thousands/µL    Immature Grans Absolute 0 04 0 00 - 0 20 Thousand/uL    Lymphocytes Absolute 2 02 0 60 - 4 47 Thousands/µL    Monocytes Absolute 0 61 0 17 - 1 22 Thousand/µL    Eosinophils Absolute 0 50 0 00 - 0 61 Thousand/µL    Basophils Absolute 0 04 0 00 - 0 10 Thousands/µL   Comprehensive metabolic panel   Result Value Ref Range    Sodium 141 136 - 145 mmol/L    Potassium 4 1 3 5 - 5 3 mmol/L    Chloride 110 (H) 100 - 108 mmol/L    CO2 25 21 - 32 mmol/L    ANION GAP 6 4 - 13 mmol/L    BUN 15 5 - 25 mg/dL    Creatinine 0 87 0 60 - 1 30 mg/dL    Glucose, Fasting 162 (H) 65 - 99 mg/dL    Calcium 9 2 8 3 - 10 1 mg/dL    AST 18 5 - 45 U/L    ALT 41 12 - 78 U/L    Alkaline Phosphatase 122 (H) 46 - 116 U/L    Total Protein 7 2 6 4 - 8 2 g/dL    Albumin 3 2 (L) 3 5 - 5 0 g/dL    Total Bilirubin 0 48 0 20 - 1 00 mg/dL    eGFR 77 ml/min/1 73sq m     Labs, Imaging, & Other studies:   All pertinent labs and imaging studies were personally reviewed    Lab Results   Component Value Date    K 4 1 02/12/2020     (H) 02/12/2020    CO2 25 02/12/2020    BUN 15 02/12/2020    CREATININE 0 87 02/12/2020    GLUF 162 (H) 02/12/2020    CALCIUM 9 2 02/12/2020    AST 18 02/12/2020    ALT 41 02/12/2020    ALKPHOS 122 (H) 02/12/2020    EGFR 77 02/12/2020     Lab Results   Component Value Date    WBC 8 34 02/12/2020    HGB 13 0 02/12/2020    HCT 41 1 02/12/2020    MCV 86 02/12/2020     02/12/2020       Reviewed and discussed with patient      Assessment and plan:  Patient is being treated for 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  Also she had locally advanced disease  This was diagnosed in January 2016  Patient was started on a combination of Taxotere, Herceptin and Perjeta and after 6 cycles Taxotere was discontinued and tamoxifen was added    Herceptin and Perjeta were continued  Anju Saucer radiation to left hip for palliation  She had very good response    On 08/16/2019 she underwent BSO    Tamoxifen was changed to letrozole     Presently she is on Herceptin, Perjeta and letrozole  She has been taking vitamin-D and calcium and is staying active    She has been tolerating treatments without much problem   She goes for blood tests and echocardiogram on regular basis  There was no metastatic disease in the liver on her last CT scan      There was stable to slightly improved bone scan    In September 2016 patient had lumpectomy of left breast followed by radiation therapy   At the time of lumpectomy there was minimal residual disease, 1 4 cm   Lymph nodes were not sampled  Later she was found to have positive BRCA 1 mutation and she knows the importance of that, for her and her family   She gets CT scans to look at pancreas and other organs   She does not want to see another doctor like dermatologist for melanoma screen             She has burning sensation in her hands and feet for a day post treatment   She has grade 1 peripheral neuropathy    Has some tiredness and arthritic symptoms   For leg cramps at night and she is taking one baby aspirin daily with food in the evening and also one magnesium tablet daily and that is helping       Has some anxiety as before        She is not on Xgeva anymore that she had for 2 years  Viky Po continues to take    calcium and vitamin-D    Physical examination and test results are as recorded and discussed  Patient continues to show response  No change in therapy  Condition discussed and explained  Questions answered  Also discussed the importance of self-breast examination, eating healthy foods, staying active and health screening tests  Patient goes to her breast surgeon for examination and  imaging studies  Patient is capable of self-care  Patient will continue to follow with her primary physician and other consultants  1  Malignant neoplasm of upper-inner quadrant of left breast in female, estrogen receptor positive ([facility] [address] )      2  L[facility] )      3  Bone metastasis ([facility] )      4  BRCA1 gene mutation positive in female      11  S/P BSO (bilateral salpingo-oophorectomy)      6  Aromatase inhibitor use      7  Port-A-Cath in place      8  Cardiomyopathy due to chemotherapy ([facility] [address] )    - Echo limited with contrast if indicated; Future          Patient voiced understanding and agreement in the discussion  Counseling / Coordination of Care   Greater than 50% of total time was spent with the patient and / or family counseling and / or coordination of care

## 2020-02-14 ENCOUNTER — HOSPITAL ENCOUNTER (OUTPATIENT)
Dept: INFUSION CENTER | Facility: CLINIC | Age: 55
Discharge: HOME | End: 2020-02-14
Payer: COMMERCIAL

## 2020-02-14 VITALS
HEART RATE: 94 BPM | BODY MASS INDEX: 36.91 KG/M2 | RESPIRATION RATE: 18 BRPM | HEIGHT: 65 IN | DIASTOLIC BLOOD PRESSURE: 100 MMHG | TEMPERATURE: 98.2 F | WEIGHT: 221.56 LBS | SYSTOLIC BLOOD PRESSURE: 165 MMHG

## 2020-02-14 DIAGNOSIS — Z17.0 MALIGNANT NEOPLASM OF UPPER-INNER QUADRANT OF LEFT BREAST IN FEMALE, ESTROGEN RECEPTOR POSITIVE (HCC): ICD-10-CM

## 2020-02-14 DIAGNOSIS — C50.212 MALIGNANT NEOPLASM OF UPPER-INNER QUADRANT OF LEFT BREAST IN FEMALE, ESTROGEN RECEPTOR POSITIVE (HCC): ICD-10-CM

## 2020-02-14 DIAGNOSIS — Z90.79 HISTORY OF BILATERAL SALPINGO-OOPHORECTOMY (BSO): ICD-10-CM

## 2020-02-14 DIAGNOSIS — C79.51 BONE METASTASIS (HCC): Primary | ICD-10-CM

## 2020-02-14 DIAGNOSIS — Z90.722 HISTORY OF BILATERAL SALPINGO-OOPHORECTOMY (BSO): ICD-10-CM

## 2020-02-14 DIAGNOSIS — C78.7 LIVER METASTASES (HCC): ICD-10-CM

## 2020-02-14 PROCEDURE — 96417 CHEMO IV INFUS EACH ADDL SEQ: CPT

## 2020-02-14 PROCEDURE — 96413 CHEMO IV INFUSION 1 HR: CPT

## 2020-02-14 RX ORDER — SODIUM CHLORIDE 9 MG/ML
20 INJECTION, SOLUTION INTRAVENOUS ONCE
Status: COMPLETED | OUTPATIENT
Start: 2020-02-14 | End: 2020-02-14

## 2020-02-14 RX ADMIN — TRASTUZUMAB 600 MG: 150 INJECTION, POWDER, LYOPHILIZED, FOR SOLUTION INTRAVENOUS at 15:45

## 2020-02-14 RX ADMIN — SODIUM CHLORIDE 20 ML/HR: 0.9 INJECTION, SOLUTION INTRAVENOUS at 14:30

## 2020-02-14 RX ADMIN — PERTUZUMAB 420 MG: 30 INJECTION, SOLUTION, CONCENTRATE INTRAVENOUS at 15:09

## 2020-02-14 NOTE — PLAN OF CARE
Problem: PAIN - ADULT  Goal: Verbalizes/displays adequate comfort level or baseline comfort level  Description  Interventions:  - Encourage patient to monitor pain and request assistance  - Assess pain using appropriate pain scale  - Administer analgesics based on type and severity of pain and evaluate response  - Implement non-pharmacological measures as appropriate and evaluate response  - Consider cultural and social influences on pain and pain management  - Notify physician/advanced practitioner if interventions unsuccessful or patient reports new pain  Outcome: Progressing     Problem: INFECTION - ADULT  Goal: Absence or prevention of progression during hospitalization  Description  INTERVENTIONS:  - Assess and monitor for signs and symptoms of infection  - Monitor lab/diagnostic results  - Monitor all insertion sites, i e  indwelling lines, tubes, and drains  - Monitor endotracheal if appropriate and nasal secretions for changes in amount and color  - [place] appropriate cooling/warming therapies per order  - Administer medications as ordered  - Instruct and encourage patient and family to use good hand hygiene technique  - Identify and instruct in appropriate isolation precautions for identified infection/condition  Outcome: Progressing  Goal: Absence of fever/infection during neutropenic period  Description  INTERVENTIONS:  - Monitor WBC    Outcome: Progressing     Problem: Knowledge Deficit  Goal: Patient/family/caregiver demonstrates understanding of disease process, treatment plan, medications, and discharge instructions  Description  Complete learning assessment and assess knowledge base    Interventions:  - Provide teaching at level of understanding  - Provide teaching via preferred learning methods  Outcome: Progressing

## 2020-03-03 RX ORDER — SODIUM CHLORIDE 9 MG/ML
20 INJECTION, SOLUTION INTRAVENOUS ONCE
Status: CANCELLED | OUTPATIENT
Start: 2020-03-06

## 2020-03-06 ENCOUNTER — HOSPITAL ENCOUNTER (OUTPATIENT)
Dept: INFUSION CENTER | Facility: CLINIC | Age: 55
Discharge: HOME | End: 2020-03-06
Payer: COMMERCIAL

## 2020-03-06 VITALS
RESPIRATION RATE: 18 BRPM | SYSTOLIC BLOOD PRESSURE: 162 MMHG | WEIGHT: 220.9 LBS | DIASTOLIC BLOOD PRESSURE: 95 MMHG | HEART RATE: 73 BPM | TEMPERATURE: 97.3 F | BODY MASS INDEX: 36.76 KG/M2

## 2020-03-06 DIAGNOSIS — C78.7 LIVER METASTASES (HCC): ICD-10-CM

## 2020-03-06 DIAGNOSIS — Z17.0 MALIGNANT NEOPLASM OF UPPER-INNER QUADRANT OF LEFT BREAST IN FEMALE, ESTROGEN RECEPTOR POSITIVE (HCC): ICD-10-CM

## 2020-03-06 DIAGNOSIS — C79.51 BONE METASTASIS (HCC): Primary | ICD-10-CM

## 2020-03-06 DIAGNOSIS — Z90.79 HISTORY OF BILATERAL SALPINGO-OOPHORECTOMY (BSO): ICD-10-CM

## 2020-03-06 DIAGNOSIS — C50.212 MALIGNANT NEOPLASM OF UPPER-INNER QUADRANT OF LEFT BREAST IN FEMALE, ESTROGEN RECEPTOR POSITIVE (HCC): ICD-10-CM

## 2020-03-06 DIAGNOSIS — Z90.722 HISTORY OF BILATERAL SALPINGO-OOPHORECTOMY (BSO): ICD-10-CM

## 2020-03-06 PROCEDURE — 96417 CHEMO IV INFUS EACH ADDL SEQ: CPT

## 2020-03-06 PROCEDURE — 96413 CHEMO IV INFUSION 1 HR: CPT

## 2020-03-06 RX ORDER — SODIUM CHLORIDE 9 MG/ML
20 INJECTION, SOLUTION INTRAVENOUS ONCE
Status: COMPLETED | OUTPATIENT
Start: 2020-03-06 | End: 2020-03-06

## 2020-03-06 RX ADMIN — TRASTUZUMAB 600 MG: 150 INJECTION, POWDER, LYOPHILIZED, FOR SOLUTION INTRAVENOUS at 15:28

## 2020-03-06 RX ADMIN — SODIUM CHLORIDE 20 ML/HR: 0.9 INJECTION, SOLUTION INTRAVENOUS at 14:47

## 2020-03-06 RX ADMIN — PERTUZUMAB 420 MG: 30 INJECTION, SOLUTION, CONCENTRATE INTRAVENOUS at 14:55

## 2020-03-06 NOTE — PROGRESS NOTES
Pt tolerated Perjeta and Herceptin infusions well  Offers no complaints  Pt is aware of all future appointments   Refused AVS

## 2020-03-24 RX ORDER — SODIUM CHLORIDE 9 MG/ML
20 INJECTION, SOLUTION INTRAVENOUS ONCE
Status: CANCELLED | OUTPATIENT
Start: 2020-03-27

## 2020-03-27 ENCOUNTER — HOSPITAL ENCOUNTER (OUTPATIENT)
Dept: INFUSION CENTER | Facility: CLINIC | Age: 55
Discharge: HOME | End: 2020-03-27
Payer: COMMERCIAL

## 2020-03-27 VITALS — WEIGHT: 219.36 LBS | BODY MASS INDEX: 36.5 KG/M2

## 2020-03-27 DIAGNOSIS — C50.212 MALIGNANT NEOPLASM OF UPPER-INNER QUADRANT OF LEFT BREAST IN FEMALE, ESTROGEN RECEPTOR POSITIVE (HCC): ICD-10-CM

## 2020-03-27 DIAGNOSIS — Z17.0 MALIGNANT NEOPLASM OF UPPER-INNER QUADRANT OF LEFT BREAST IN FEMALE, ESTROGEN RECEPTOR POSITIVE (HCC): ICD-10-CM

## 2020-03-27 DIAGNOSIS — C78.7 LIVER METASTASES (HCC): ICD-10-CM

## 2020-03-27 DIAGNOSIS — Z90.79 HISTORY OF BILATERAL SALPINGO-OOPHORECTOMY (BSO): ICD-10-CM

## 2020-03-27 DIAGNOSIS — Z90.722 HISTORY OF BILATERAL SALPINGO-OOPHORECTOMY (BSO): ICD-10-CM

## 2020-03-27 DIAGNOSIS — C79.51 BONE METASTASIS (HCC): Primary | ICD-10-CM

## 2020-03-27 PROCEDURE — 96413 CHEMO IV INFUSION 1 HR: CPT

## 2020-03-27 PROCEDURE — 96417 CHEMO IV INFUS EACH ADDL SEQ: CPT

## 2020-03-27 RX ORDER — SODIUM CHLORIDE 9 MG/ML
20 INJECTION, SOLUTION INTRAVENOUS ONCE
Status: COMPLETED | OUTPATIENT
Start: 2020-03-27 | End: 2020-03-27

## 2020-03-27 RX ADMIN — TRASTUZUMAB 600 MG: 150 INJECTION, POWDER, LYOPHILIZED, FOR SOLUTION INTRAVENOUS at 15:27

## 2020-03-27 RX ADMIN — SODIUM CHLORIDE 20 ML/HR: 0.9 INJECTION, SOLUTION INTRAVENOUS at 14:42

## 2020-03-27 RX ADMIN — PERTUZUMAB 420 MG: 30 INJECTION, SOLUTION, CONCENTRATE INTRAVENOUS at 14:57

## 2020-04-14 RX ORDER — SODIUM CHLORIDE 9 MG/ML
20 INJECTION, SOLUTION INTRAVENOUS ONCE
Status: CANCELLED | OUTPATIENT
Start: 2020-04-17

## 2020-04-17 ENCOUNTER — HOSPITAL ENCOUNTER (OUTPATIENT)
Dept: INFUSION CENTER | Facility: CLINIC | Age: 55
Discharge: HOME | End: 2020-04-17
Payer: COMMERCIAL

## 2020-04-17 VITALS
SYSTOLIC BLOOD PRESSURE: 158 MMHG | TEMPERATURE: 99 F | WEIGHT: 223.33 LBS | DIASTOLIC BLOOD PRESSURE: 92 MMHG | BODY MASS INDEX: 37.16 KG/M2 | HEART RATE: 74 BPM | RESPIRATION RATE: 18 BRPM

## 2020-04-17 DIAGNOSIS — C50.212 MALIGNANT NEOPLASM OF UPPER-INNER QUADRANT OF LEFT BREAST IN FEMALE, ESTROGEN RECEPTOR POSITIVE (HCC): ICD-10-CM

## 2020-04-17 DIAGNOSIS — Z17.0 MALIGNANT NEOPLASM OF UPPER-INNER QUADRANT OF LEFT BREAST IN FEMALE, ESTROGEN RECEPTOR POSITIVE (HCC): ICD-10-CM

## 2020-04-17 DIAGNOSIS — C78.7 LIVER METASTASES (HCC): ICD-10-CM

## 2020-04-17 DIAGNOSIS — Z90.79 HISTORY OF BILATERAL SALPINGO-OOPHORECTOMY (BSO): ICD-10-CM

## 2020-04-17 DIAGNOSIS — Z90.722 HISTORY OF BILATERAL SALPINGO-OOPHORECTOMY (BSO): ICD-10-CM

## 2020-04-17 DIAGNOSIS — C79.51 BONE METASTASIS (HCC): Primary | ICD-10-CM

## 2020-04-17 PROCEDURE — 96413 CHEMO IV INFUSION 1 HR: CPT

## 2020-04-17 PROCEDURE — 96417 CHEMO IV INFUS EACH ADDL SEQ: CPT

## 2020-04-17 RX ORDER — SODIUM CHLORIDE 9 MG/ML
20 INJECTION, SOLUTION INTRAVENOUS ONCE
Status: COMPLETED | OUTPATIENT
Start: 2020-04-17 | End: 2020-04-17

## 2020-04-17 RX ADMIN — TRASTUZUMAB 600 MG: 150 INJECTION, POWDER, LYOPHILIZED, FOR SOLUTION INTRAVENOUS at 14:53

## 2020-04-17 RX ADMIN — PERTUZUMAB 420 MG: 30 INJECTION, SOLUTION, CONCENTRATE INTRAVENOUS at 14:21

## 2020-04-17 RX ADMIN — SODIUM CHLORIDE 20 ML/HR: 0.9 INJECTION, SOLUTION INTRAVENOUS at 14:20

## 2020-05-04 ENCOUNTER — TELEPHONE (OUTPATIENT)
Dept: HEMATOLOGY ONCOLOGY | Facility: CLINIC | Age: 55
End: 2020-05-04

## 2020-05-05 ENCOUNTER — APPOINTMENT (OUTPATIENT)
Dept: LAB | Facility: CLINIC | Age: 55
End: 2020-05-05
Payer: COMMERCIAL

## 2020-05-05 ENCOUNTER — OFFICE VISIT (OUTPATIENT)
Dept: HEMATOLOGY ONCOLOGY | Facility: CLINIC | Age: 55
End: 2020-05-05
Payer: COMMERCIAL

## 2020-05-05 VITALS
HEART RATE: 78 BPM | OXYGEN SATURATION: 97 % | RESPIRATION RATE: 18 BRPM | BODY MASS INDEX: 36.99 KG/M2 | TEMPERATURE: 97.4 F | SYSTOLIC BLOOD PRESSURE: 148 MMHG | DIASTOLIC BLOOD PRESSURE: 76 MMHG | HEIGHT: 65 IN | WEIGHT: 222 LBS

## 2020-05-05 DIAGNOSIS — Z15.09 BRCA1 GENE MUTATION POSITIVE IN FEMALE: ICD-10-CM

## 2020-05-05 DIAGNOSIS — Z95.828 PORT-A-CATH IN PLACE: ICD-10-CM

## 2020-05-05 DIAGNOSIS — C78.7 LIVER METASTASES (HCC): ICD-10-CM

## 2020-05-05 DIAGNOSIS — Z90.722 S/P BSO (BILATERAL SALPINGO-OOPHORECTOMY): ICD-10-CM

## 2020-05-05 DIAGNOSIS — Z15.01 BRCA1 GENE MUTATION POSITIVE IN FEMALE: ICD-10-CM

## 2020-05-05 DIAGNOSIS — Z90.79 HISTORY OF BILATERAL SALPINGO-OOPHORECTOMY (BSO): ICD-10-CM

## 2020-05-05 DIAGNOSIS — C79.51 BONE METASTASIS (HCC): ICD-10-CM

## 2020-05-05 DIAGNOSIS — C50.212 MALIGNANT NEOPLASM OF UPPER-INNER QUADRANT OF LEFT BREAST IN FEMALE, ESTROGEN RECEPTOR POSITIVE (HCC): Primary | ICD-10-CM

## 2020-05-05 DIAGNOSIS — Z79.811 AROMATASE INHIBITOR USE: ICD-10-CM

## 2020-05-05 DIAGNOSIS — Z17.0 MALIGNANT NEOPLASM OF UPPER-INNER QUADRANT OF LEFT BREAST IN FEMALE, ESTROGEN RECEPTOR POSITIVE (HCC): Primary | ICD-10-CM

## 2020-05-05 DIAGNOSIS — T45.1X5A CARDIOMYOPATHY DUE TO CHEMOTHERAPY (HCC): ICD-10-CM

## 2020-05-05 DIAGNOSIS — Z15.02 BRCA1 GENE MUTATION POSITIVE IN FEMALE: ICD-10-CM

## 2020-05-05 DIAGNOSIS — Z90.722 HISTORY OF BILATERAL SALPINGO-OOPHORECTOMY (BSO): ICD-10-CM

## 2020-05-05 DIAGNOSIS — I42.7 CARDIOMYOPATHY DUE TO CHEMOTHERAPY (HCC): ICD-10-CM

## 2020-05-05 PROCEDURE — 99214 OFFICE O/P EST MOD 30 MIN: CPT | Performed by: INTERNAL MEDICINE

## 2020-05-05 RX ORDER — SODIUM CHLORIDE 9 MG/ML
20 INJECTION, SOLUTION INTRAVENOUS ONCE
Status: CANCELLED | OUTPATIENT
Start: 2020-05-08

## 2020-05-08 ENCOUNTER — HOSPITAL ENCOUNTER (OUTPATIENT)
Dept: INFUSION CENTER | Facility: CLINIC | Age: 55
Discharge: HOME | End: 2020-05-08
Payer: COMMERCIAL

## 2020-05-08 VITALS
RESPIRATION RATE: 18 BRPM | HEART RATE: 80 BPM | HEIGHT: 65 IN | TEMPERATURE: 98.5 F | BODY MASS INDEX: 37.47 KG/M2 | WEIGHT: 224.87 LBS | SYSTOLIC BLOOD PRESSURE: 166 MMHG | DIASTOLIC BLOOD PRESSURE: 95 MMHG

## 2020-05-08 DIAGNOSIS — C78.7 LIVER METASTASES (HCC): ICD-10-CM

## 2020-05-08 DIAGNOSIS — Z90.722 HISTORY OF BILATERAL SALPINGO-OOPHORECTOMY (BSO): ICD-10-CM

## 2020-05-08 DIAGNOSIS — C79.51 BONE METASTASIS (HCC): Primary | ICD-10-CM

## 2020-05-08 DIAGNOSIS — Z90.79 HISTORY OF BILATERAL SALPINGO-OOPHORECTOMY (BSO): ICD-10-CM

## 2020-05-08 DIAGNOSIS — Z17.0 MALIGNANT NEOPLASM OF UPPER-INNER QUADRANT OF LEFT BREAST IN FEMALE, ESTROGEN RECEPTOR POSITIVE (HCC): ICD-10-CM

## 2020-05-08 DIAGNOSIS — C50.212 MALIGNANT NEOPLASM OF UPPER-INNER QUADRANT OF LEFT BREAST IN FEMALE, ESTROGEN RECEPTOR POSITIVE (HCC): ICD-10-CM

## 2020-05-08 PROCEDURE — 96417 CHEMO IV INFUS EACH ADDL SEQ: CPT

## 2020-05-08 PROCEDURE — 96413 CHEMO IV INFUSION 1 HR: CPT

## 2020-05-08 RX ORDER — SODIUM CHLORIDE 9 MG/ML
20 INJECTION, SOLUTION INTRAVENOUS ONCE
Status: COMPLETED | OUTPATIENT
Start: 2020-05-08 | End: 2020-05-08

## 2020-05-08 RX ADMIN — SODIUM CHLORIDE 20 ML/HR: 0.9 INJECTION, SOLUTION INTRAVENOUS at 14:30

## 2020-05-08 RX ADMIN — TRASTUZUMAB 600 MG: 150 INJECTION, POWDER, LYOPHILIZED, FOR SOLUTION INTRAVENOUS at 15:34

## 2020-05-08 RX ADMIN — PERTUZUMAB 420 MG: 30 INJECTION, SOLUTION, CONCENTRATE INTRAVENOUS at 14:58

## 2020-05-28 RX ORDER — SODIUM CHLORIDE 9 MG/ML
20 INJECTION, SOLUTION INTRAVENOUS ONCE
Status: CANCELLED | OUTPATIENT
Start: 2020-05-29

## 2020-05-29 ENCOUNTER — HOSPITAL ENCOUNTER (OUTPATIENT)
Dept: INFUSION CENTER | Facility: CLINIC | Age: 55
Discharge: HOME | End: 2020-05-29
Payer: COMMERCIAL

## 2020-05-29 VITALS
TEMPERATURE: 97.2 F | SYSTOLIC BLOOD PRESSURE: 124 MMHG | BODY MASS INDEX: 37.72 KG/M2 | HEART RATE: 73 BPM | DIASTOLIC BLOOD PRESSURE: 88 MMHG | WEIGHT: 226.41 LBS | RESPIRATION RATE: 16 BRPM | HEIGHT: 65 IN

## 2020-05-29 DIAGNOSIS — C78.7 LIVER METASTASES (HCC): ICD-10-CM

## 2020-05-29 DIAGNOSIS — C50.212 MALIGNANT NEOPLASM OF UPPER-INNER QUADRANT OF LEFT BREAST IN FEMALE, ESTROGEN RECEPTOR POSITIVE (HCC): ICD-10-CM

## 2020-05-29 DIAGNOSIS — C79.51 BONE METASTASIS (HCC): Primary | ICD-10-CM

## 2020-05-29 DIAGNOSIS — Z17.0 MALIGNANT NEOPLASM OF UPPER-INNER QUADRANT OF LEFT BREAST IN FEMALE, ESTROGEN RECEPTOR POSITIVE (HCC): ICD-10-CM

## 2020-05-29 DIAGNOSIS — Z90.79 HISTORY OF BILATERAL SALPINGO-OOPHORECTOMY (BSO): ICD-10-CM

## 2020-05-29 DIAGNOSIS — Z90.722 HISTORY OF BILATERAL SALPINGO-OOPHORECTOMY (BSO): ICD-10-CM

## 2020-05-29 PROCEDURE — 36593 DECLOT VASCULAR DEVICE: CPT

## 2020-05-29 PROCEDURE — 96413 CHEMO IV INFUSION 1 HR: CPT

## 2020-05-29 PROCEDURE — 96417 CHEMO IV INFUS EACH ADDL SEQ: CPT

## 2020-05-29 RX ORDER — SODIUM CHLORIDE 9 MG/ML
20 INJECTION, SOLUTION INTRAVENOUS ONCE
Status: COMPLETED | OUTPATIENT
Start: 2020-05-29 | End: 2020-05-29

## 2020-05-29 RX ADMIN — SODIUM CHLORIDE 20 ML/HR: 0.9 INJECTION, SOLUTION INTRAVENOUS at 15:16

## 2020-05-29 RX ADMIN — ALTEPLASE 2 MG: 2.2 INJECTION, POWDER, LYOPHILIZED, FOR SOLUTION INTRAVENOUS at 15:00

## 2020-05-29 RX ADMIN — TRASTUZUMAB 600 MG: 150 INJECTION, POWDER, LYOPHILIZED, FOR SOLUTION INTRAVENOUS at 15:49

## 2020-05-29 RX ADMIN — PERTUZUMAB 420 MG: 30 INJECTION, SOLUTION, CONCENTRATE INTRAVENOUS at 15:16

## 2020-06-11 ENCOUNTER — TELEPHONE (OUTPATIENT)
Dept: HEMATOLOGY ONCOLOGY | Facility: CLINIC | Age: 55
End: 2020-06-11

## 2020-06-12 ENCOUNTER — HOSPITAL ENCOUNTER (OUTPATIENT)
Dept: MAMMOGRAPHY | Facility: CLINIC | Age: 55
Discharge: HOME | End: 2020-06-12
Payer: COMMERCIAL

## 2020-06-12 VITALS — TEMPERATURE: 97 F | BODY MASS INDEX: 37.65 KG/M2 | WEIGHT: 226 LBS | HEIGHT: 65 IN

## 2020-06-12 DIAGNOSIS — C50.212 MALIGNANT NEOPLASM OF UPPER-INNER QUADRANT OF LEFT BREAST IN FEMALE, ESTROGEN RECEPTOR POSITIVE (HCC): ICD-10-CM

## 2020-06-12 DIAGNOSIS — Z17.0 MALIGNANT NEOPLASM OF UPPER-INNER QUADRANT OF LEFT BREAST IN FEMALE, ESTROGEN RECEPTOR POSITIVE (HCC): ICD-10-CM

## 2020-06-12 PROCEDURE — 77066 DX MAMMO INCL CAD BI: CPT

## 2020-06-12 PROCEDURE — G0279 TOMOSYNTHESIS, MAMMO: HCPCS

## 2020-06-15 ENCOUNTER — HOSPITAL ENCOUNTER (OUTPATIENT)
Dept: NON INVASIVE DIAGNOSTICS | Facility: HOSPITAL | Age: 55
Discharge: HOME | End: 2020-06-15
Attending: INTERNAL MEDICINE
Payer: COMMERCIAL

## 2020-06-15 DIAGNOSIS — I42.7 CARDIOMYOPATHY DUE TO CHEMOTHERAPY (HCC): ICD-10-CM

## 2020-06-15 DIAGNOSIS — T45.1X5A CARDIOMYOPATHY DUE TO CHEMOTHERAPY (HCC): ICD-10-CM

## 2020-06-15 PROCEDURE — 93306 TTE W/DOPPLER COMPLETE: CPT | Performed by: INTERNAL MEDICINE

## 2020-06-15 PROCEDURE — 93356 MYOCRD STRAIN IMG SPCKL TRCK: CPT

## 2020-06-15 PROCEDURE — 93308 TTE F-UP OR LMTD: CPT

## 2020-06-16 RX ORDER — SODIUM CHLORIDE 9 MG/ML
20 INJECTION, SOLUTION INTRAVENOUS ONCE
Status: CANCELLED | OUTPATIENT
Start: 2020-06-19

## 2020-06-18 ENCOUNTER — TELEPHONE (OUTPATIENT)
Dept: HEMATOLOGY ONCOLOGY | Facility: CLINIC | Age: 55
End: 2020-06-18

## 2020-06-19 ENCOUNTER — HOSPITAL ENCOUNTER (OUTPATIENT)
Dept: INFUSION CENTER | Facility: CLINIC | Age: 55
Discharge: HOME | End: 2020-06-19
Payer: COMMERCIAL

## 2020-06-19 VITALS
RESPIRATION RATE: 18 BRPM | BODY MASS INDEX: 37.35 KG/M2 | DIASTOLIC BLOOD PRESSURE: 85 MMHG | WEIGHT: 224.43 LBS | SYSTOLIC BLOOD PRESSURE: 150 MMHG | HEART RATE: 73 BPM | TEMPERATURE: 98.5 F

## 2020-06-19 DIAGNOSIS — C78.7 LIVER METASTASES (HCC): ICD-10-CM

## 2020-06-19 DIAGNOSIS — Z90.79 HISTORY OF BILATERAL SALPINGO-OOPHORECTOMY (BSO): ICD-10-CM

## 2020-06-19 DIAGNOSIS — C50.212 MALIGNANT NEOPLASM OF UPPER-INNER QUADRANT OF LEFT BREAST IN FEMALE, ESTROGEN RECEPTOR POSITIVE (HCC): ICD-10-CM

## 2020-06-19 DIAGNOSIS — Z17.0 MALIGNANT NEOPLASM OF UPPER-INNER QUADRANT OF LEFT BREAST IN FEMALE, ESTROGEN RECEPTOR POSITIVE (HCC): ICD-10-CM

## 2020-06-19 DIAGNOSIS — C79.51 BONE METASTASIS (HCC): Primary | ICD-10-CM

## 2020-06-19 DIAGNOSIS — Z90.722 HISTORY OF BILATERAL SALPINGO-OOPHORECTOMY (BSO): ICD-10-CM

## 2020-06-19 PROCEDURE — 96417 CHEMO IV INFUS EACH ADDL SEQ: CPT

## 2020-06-19 PROCEDURE — 96413 CHEMO IV INFUSION 1 HR: CPT

## 2020-06-19 RX ORDER — SODIUM CHLORIDE 9 MG/ML
20 INJECTION, SOLUTION INTRAVENOUS ONCE
Status: COMPLETED | OUTPATIENT
Start: 2020-06-19 | End: 2020-06-19

## 2020-06-19 RX ADMIN — TRASTUZUMAB 600 MG: 150 INJECTION, POWDER, LYOPHILIZED, FOR SOLUTION INTRAVENOUS at 15:08

## 2020-06-19 RX ADMIN — PERTUZUMAB 420 MG: 30 INJECTION, SOLUTION, CONCENTRATE INTRAVENOUS at 14:35

## 2020-06-19 RX ADMIN — SODIUM CHLORIDE 20 ML/HR: 0.9 INJECTION, SOLUTION INTRAVENOUS at 14:30

## 2020-06-22 DIAGNOSIS — C79.51 BONE METASTASIS (HCC): ICD-10-CM

## 2020-06-22 DIAGNOSIS — C78.7 LIVER METASTASES (HCC): ICD-10-CM

## 2020-06-22 DIAGNOSIS — Z17.0 MALIGNANT NEOPLASM OF UPPER-INNER QUADRANT OF LEFT BREAST IN FEMALE, ESTROGEN RECEPTOR POSITIVE (HCC): ICD-10-CM

## 2020-06-22 DIAGNOSIS — Z90.722 HISTORY OF BILATERAL SALPINGO-OOPHORECTOMY (BSO): ICD-10-CM

## 2020-06-22 DIAGNOSIS — Z90.79 HISTORY OF BILATERAL SALPINGO-OOPHORECTOMY (BSO): ICD-10-CM

## 2020-06-22 DIAGNOSIS — C50.212 MALIGNANT NEOPLASM OF UPPER-INNER QUADRANT OF LEFT BREAST IN FEMALE, ESTROGEN RECEPTOR POSITIVE (HCC): ICD-10-CM

## 2020-06-23 ENCOUNTER — OFFICE VISIT (OUTPATIENT)
Dept: SURGICAL ONCOLOGY | Facility: CLINIC | Age: 55
End: 2020-06-23
Payer: COMMERCIAL

## 2020-06-23 VITALS
DIASTOLIC BLOOD PRESSURE: 70 MMHG | BODY MASS INDEX: 37.12 KG/M2 | HEIGHT: 65 IN | HEART RATE: 84 BPM | RESPIRATION RATE: 18 BRPM | SYSTOLIC BLOOD PRESSURE: 130 MMHG | TEMPERATURE: 98.1 F | WEIGHT: 222.8 LBS

## 2020-06-23 DIAGNOSIS — Z15.09 BRCA1 GENE MUTATION POSITIVE IN FEMALE: ICD-10-CM

## 2020-06-23 DIAGNOSIS — C78.7 LIVER METASTASES (HCC): ICD-10-CM

## 2020-06-23 DIAGNOSIS — C50.212 MALIGNANT NEOPLASM OF UPPER-INNER QUADRANT OF LEFT BREAST IN FEMALE, ESTROGEN RECEPTOR POSITIVE (HCC): Primary | ICD-10-CM

## 2020-06-23 DIAGNOSIS — R92.2 DENSE BREAST TISSUE: ICD-10-CM

## 2020-06-23 DIAGNOSIS — C79.51 BONE METASTASIS (HCC): ICD-10-CM

## 2020-06-23 DIAGNOSIS — Z17.0 MALIGNANT NEOPLASM OF UPPER-INNER QUADRANT OF LEFT BREAST IN FEMALE, ESTROGEN RECEPTOR POSITIVE (HCC): Primary | ICD-10-CM

## 2020-06-23 DIAGNOSIS — Z79.899 LONG TERM USE OF DRUG: ICD-10-CM

## 2020-06-23 DIAGNOSIS — Z15.01 BRCA1 GENE MUTATION POSITIVE IN FEMALE: ICD-10-CM

## 2020-06-23 DIAGNOSIS — Z95.828 PORT-A-CATH IN PLACE: ICD-10-CM

## 2020-06-23 DIAGNOSIS — Z79.811 AROMATASE INHIBITOR USE: ICD-10-CM

## 2020-06-23 DIAGNOSIS — Z15.02 BRCA1 GENE MUTATION POSITIVE IN FEMALE: ICD-10-CM

## 2020-06-23 PROCEDURE — 99214 OFFICE O/P EST MOD 30 MIN: CPT | Performed by: SURGERY

## 2020-07-07 RX ORDER — SODIUM CHLORIDE 9 MG/ML
20 INJECTION, SOLUTION INTRAVENOUS ONCE
Status: CANCELLED | OUTPATIENT
Start: 2020-07-10

## 2020-07-10 ENCOUNTER — HOSPITAL ENCOUNTER (OUTPATIENT)
Dept: INFUSION CENTER | Facility: CLINIC | Age: 55
Discharge: HOME | End: 2020-07-10
Payer: COMMERCIAL

## 2020-07-10 VITALS
RESPIRATION RATE: 18 BRPM | SYSTOLIC BLOOD PRESSURE: 151 MMHG | WEIGHT: 227.07 LBS | HEART RATE: 69 BPM | DIASTOLIC BLOOD PRESSURE: 80 MMHG | TEMPERATURE: 97.8 F | BODY MASS INDEX: 37.79 KG/M2

## 2020-07-10 DIAGNOSIS — Z90.79 HISTORY OF BILATERAL SALPINGO-OOPHORECTOMY (BSO): ICD-10-CM

## 2020-07-10 DIAGNOSIS — C78.7 LIVER METASTASES (HCC): ICD-10-CM

## 2020-07-10 DIAGNOSIS — Z90.722 HISTORY OF BILATERAL SALPINGO-OOPHORECTOMY (BSO): ICD-10-CM

## 2020-07-10 DIAGNOSIS — C79.51 BONE METASTASIS (HCC): Primary | ICD-10-CM

## 2020-07-10 DIAGNOSIS — C50.212 MALIGNANT NEOPLASM OF UPPER-INNER QUADRANT OF LEFT BREAST IN FEMALE, ESTROGEN RECEPTOR POSITIVE (HCC): ICD-10-CM

## 2020-07-10 DIAGNOSIS — Z17.0 MALIGNANT NEOPLASM OF UPPER-INNER QUADRANT OF LEFT BREAST IN FEMALE, ESTROGEN RECEPTOR POSITIVE (HCC): ICD-10-CM

## 2020-07-10 PROCEDURE — 96417 CHEMO IV INFUS EACH ADDL SEQ: CPT

## 2020-07-10 PROCEDURE — 96413 CHEMO IV INFUSION 1 HR: CPT

## 2020-07-10 RX ORDER — SODIUM CHLORIDE 9 MG/ML
20 INJECTION, SOLUTION INTRAVENOUS ONCE
Status: COMPLETED | OUTPATIENT
Start: 2020-07-10 | End: 2020-07-10

## 2020-07-10 RX ADMIN — TRASTUZUMAB 600 MG: 150 INJECTION, POWDER, LYOPHILIZED, FOR SOLUTION INTRAVENOUS at 15:32

## 2020-07-10 RX ADMIN — PERTUZUMAB 420 MG: 30 INJECTION, SOLUTION, CONCENTRATE INTRAVENOUS at 15:03

## 2020-07-10 RX ADMIN — SODIUM CHLORIDE 20 ML/HR: 0.9 INJECTION, SOLUTION INTRAVENOUS at 15:00

## 2020-07-10 NOTE — PLAN OF CARE
Problem: Potential for Falls  Goal: Patient will remain free of falls  Description  INTERVENTIONS:  - Assess patient frequently for physical needs  -  Identify cognitive and physical deficits and behaviors that affect risk of falls    -  [place] fall precautions as indicated by assessment   - Educate patient/family on patient safety including physical limitations  - Instruct patient to call for assistance with activity based on assessment  - Modify environment to reduce risk of injury  - Consider OT/PT consult to assist with strengthening/mobility  7/10/2020 1518 by Tiff Heck RN  Outcome: Progressing  7/10/2020 1518 by Tiff Heck RN  Outcome: Progressing

## 2020-07-10 NOTE — PLAN OF CARE
Problem: Potential for Falls  Goal: Patient will remain free of falls  Description  INTERVENTIONS:  - Assess patient frequently for physical needs  -  Identify cognitive and physical deficits and behaviors that affect risk of falls    -  [place] fall precautions as indicated by assessment   - Educate patient/family on patient safety including physical limitations  - Instruct patient to call for assistance with activity based on assessment  - Modify environment to reduce risk of injury  - Consider OT/PT consult to assist with strengthening/mobility  Outcome: Progressing

## 2020-07-28 RX ORDER — SODIUM CHLORIDE 9 MG/ML
20 INJECTION, SOLUTION INTRAVENOUS ONCE
Status: CANCELLED | OUTPATIENT
Start: 2020-07-31

## 2020-07-31 ENCOUNTER — HOSPITAL ENCOUNTER (OUTPATIENT)
Dept: INFUSION CENTER | Facility: CLINIC | Age: 55
Discharge: HOME | End: 2020-07-31
Payer: COMMERCIAL

## 2020-07-31 VITALS
BODY MASS INDEX: 37.81 KG/M2 | SYSTOLIC BLOOD PRESSURE: 143 MMHG | DIASTOLIC BLOOD PRESSURE: 78 MMHG | TEMPERATURE: 98.6 F | HEART RATE: 67 BPM | WEIGHT: 227.18 LBS | RESPIRATION RATE: 18 BRPM

## 2020-07-31 DIAGNOSIS — Z17.0 MALIGNANT NEOPLASM OF UPPER-INNER QUADRANT OF LEFT BREAST IN FEMALE, ESTROGEN RECEPTOR POSITIVE (HCC): Primary | ICD-10-CM

## 2020-07-31 DIAGNOSIS — C79.51 BONE METASTASIS (HCC): ICD-10-CM

## 2020-07-31 DIAGNOSIS — Z90.79 HISTORY OF BILATERAL SALPINGO-OOPHORECTOMY (BSO): ICD-10-CM

## 2020-07-31 DIAGNOSIS — C79.51 BONE METASTASIS (HCC): Primary | ICD-10-CM

## 2020-07-31 DIAGNOSIS — Z17.0 MALIGNANT NEOPLASM OF UPPER-INNER QUADRANT OF LEFT BREAST IN FEMALE, ESTROGEN RECEPTOR POSITIVE (HCC): ICD-10-CM

## 2020-07-31 DIAGNOSIS — C50.212 MALIGNANT NEOPLASM OF UPPER-INNER QUADRANT OF LEFT BREAST IN FEMALE, ESTROGEN RECEPTOR POSITIVE (HCC): Primary | ICD-10-CM

## 2020-07-31 DIAGNOSIS — C78.7 LIVER METASTASES (HCC): ICD-10-CM

## 2020-07-31 DIAGNOSIS — Z90.722 HISTORY OF BILATERAL SALPINGO-OOPHORECTOMY (BSO): ICD-10-CM

## 2020-07-31 DIAGNOSIS — C50.212 MALIGNANT NEOPLASM OF UPPER-INNER QUADRANT OF LEFT BREAST IN FEMALE, ESTROGEN RECEPTOR POSITIVE (HCC): ICD-10-CM

## 2020-07-31 PROCEDURE — 96417 CHEMO IV INFUS EACH ADDL SEQ: CPT

## 2020-07-31 PROCEDURE — 36593 DECLOT VASCULAR DEVICE: CPT

## 2020-07-31 PROCEDURE — 96413 CHEMO IV INFUSION 1 HR: CPT

## 2020-07-31 RX ORDER — SODIUM CHLORIDE 9 MG/ML
20 INJECTION, SOLUTION INTRAVENOUS ONCE
Status: COMPLETED | OUTPATIENT
Start: 2020-07-31 | End: 2020-07-31

## 2020-07-31 RX ADMIN — SODIUM CHLORIDE 20 ML/HR: 0.9 INJECTION, SOLUTION INTRAVENOUS at 15:00

## 2020-07-31 RX ADMIN — PERTUZUMAB 420 MG: 30 INJECTION, SOLUTION, CONCENTRATE INTRAVENOUS at 15:04

## 2020-07-31 RX ADMIN — TRASTUZUMAB 600 MG: 150 INJECTION, POWDER, LYOPHILIZED, FOR SOLUTION INTRAVENOUS at 15:35

## 2020-07-31 RX ADMIN — ALTEPLASE 2 MG: 2.2 INJECTION, POWDER, LYOPHILIZED, FOR SOLUTION INTRAVENOUS at 14:47

## 2020-07-31 NOTE — PLAN OF CARE
Problem: Potential for Falls  Goal: Patient will remain free of falls  Description  INTERVENTIONS:  - Assess patient frequently for physical needs  -  Identify cognitive and physical deficits and behaviors that affect risk of falls    -  [place] fall precautions as indicated by assessment   - Educate patient/family on patient safety including physical limitations  - Instruct patient to call for assistance with activity based on assessment  - Modify environment to reduce risk of injury  - Consider OT/PT consult to assist with strengthening/mobility  Outcome: Progressing

## 2020-07-31 NOTE — PROGRESS NOTES
Port a cath accessed and no blood return noted  Cath fausto instilled  Patient tolerated Perjeta/Herceptin infusions well via peripheral IV  Good blood return noted from port a cath prior to discharge and port de-accessed  Patient is aware of all future appointments   Refused AVS

## 2020-08-06 ENCOUNTER — TELEPHONE (OUTPATIENT)
Dept: RADIATION ONCOLOGY | Facility: HOSPITAL | Age: 55
End: 2020-08-06

## 2020-08-20 RX ORDER — SODIUM CHLORIDE 9 MG/ML
20 INJECTION, SOLUTION INTRAVENOUS ONCE
Status: CANCELLED | OUTPATIENT
Start: 2020-08-21

## 2020-08-21 ENCOUNTER — HOSPITAL ENCOUNTER (OUTPATIENT)
Dept: INFUSION CENTER | Facility: CLINIC | Age: 55
Discharge: HOME | End: 2020-08-21
Payer: COMMERCIAL

## 2020-08-21 VITALS
DIASTOLIC BLOOD PRESSURE: 80 MMHG | RESPIRATION RATE: 18 BRPM | SYSTOLIC BLOOD PRESSURE: 150 MMHG | HEART RATE: 77 BPM | WEIGHT: 227.07 LBS | BODY MASS INDEX: 37.83 KG/M2 | HEIGHT: 65 IN | TEMPERATURE: 99 F

## 2020-08-21 DIAGNOSIS — Z90.722 HISTORY OF BILATERAL SALPINGO-OOPHORECTOMY (BSO): ICD-10-CM

## 2020-08-21 DIAGNOSIS — C50.212 MALIGNANT NEOPLASM OF UPPER-INNER QUADRANT OF LEFT BREAST IN FEMALE, ESTROGEN RECEPTOR POSITIVE (HCC): ICD-10-CM

## 2020-08-21 DIAGNOSIS — Z90.79 HISTORY OF BILATERAL SALPINGO-OOPHORECTOMY (BSO): ICD-10-CM

## 2020-08-21 DIAGNOSIS — Z17.0 MALIGNANT NEOPLASM OF UPPER-INNER QUADRANT OF LEFT BREAST IN FEMALE, ESTROGEN RECEPTOR POSITIVE (HCC): ICD-10-CM

## 2020-08-21 DIAGNOSIS — C78.7 LIVER METASTASES (HCC): ICD-10-CM

## 2020-08-21 DIAGNOSIS — C79.51 BONE METASTASIS (HCC): Primary | ICD-10-CM

## 2020-08-21 LAB
ALBUMIN SERPL BCP-MCNC: 3 G/DL (ref 3.5–5.7)
ALP SERPL-CCNC: 95 U/L (ref 46–116)
ALT SERPL W P-5'-P-CCNC: 38 U/L (ref 12–78)
ANION GAP SERPL CALCULATED.3IONS-SCNC: 10 MMOL/L (ref 4–13)
AST SERPL W P-5'-P-CCNC: 30 U/L (ref 5–45)
BASOPHILS # BLD AUTO: 0.02 THOUSANDS/ΜL (ref 0–0.1)
BASOPHILS NFR BLD AUTO: 0 % (ref 0–1)
BILIRUB SERPL-MCNC: 0.6 MG/DL (ref 0.2–1)
BUN SERPL-MCNC: 19 MG/DL (ref 5–25)
CALCIUM SERPL-MCNC: 9.8 MG/DL (ref 8.3–10.1)
CANCER AG27-29 SERPL-ACNC: 21.3 U/ML (ref 0–42.3)
CHLORIDE SERPL-SCNC: 102 MMOL/L (ref 98–108)
CO2 SERPL-SCNC: 27 MMOL/L (ref 21–32)
CREAT SERPL-MCNC: 0.9 MG/DL (ref 0.6–1.3)
EOSINOPHIL # BLD AUTO: 0.38 THOUSAND/ΜL (ref 0–0.61)
EOSINOPHIL NFR BLD AUTO: 4 % (ref 0–6)
ERYTHROCYTE [DISTWIDTH] IN BLOOD BY AUTOMATED COUNT: 14.9 % (ref 11.6–15.1)
GFR SERPL CREATININE-BSD FRML MDRD: 74 ML/MIN/1.73SQ M
GLUCOSE SERPL-MCNC: 167 MG/DL (ref 65–140)
HCT VFR BLD AUTO: 40.2 % (ref 34.8–46.1)
HGB BLD-MCNC: 12.9 G/DL (ref 11.5–15.4)
LYMPHOCYTES # BLD AUTO: 2.27 THOUSANDS/ΜL (ref 0.6–4.47)
LYMPHOCYTES NFR BLD AUTO: 27 % (ref 14–44)
MCH RBC QN AUTO: 27.2 PG (ref 26.8–34.3)
MCHC RBC AUTO-ENTMCNC: 32.1 G/DL (ref 31.4–37.4)
MCV RBC AUTO: 85 FL (ref 82–98)
MONOCYTES # BLD AUTO: 0.44 THOUSAND/ΜL (ref 0.17–1.22)
MONOCYTES NFR BLD AUTO: 5 % (ref 4–12)
NEUTROPHILS # BLD AUTO: 5.47 THOUSANDS/ΜL (ref 1.85–7.62)
NEUTS SEG NFR BLD AUTO: 64 % (ref 43–75)
PLATELET # BLD AUTO: 304 THOUSANDS/UL (ref 149–390)
PMV BLD AUTO: 10.7 FL (ref 8.9–12.7)
POTASSIUM SERPL-SCNC: 3.8 MMOL/L (ref 3.5–5.3)
PROT SERPL-MCNC: 6.8 G/DL (ref 6.4–8.2)
RBC # BLD AUTO: 4.74 MILLION/UL (ref 3.81–5.12)
SODIUM SERPL-SCNC: 139 MMOL/L (ref 136–145)
WBC # BLD AUTO: 8.58 THOUSAND/UL (ref 4.31–10.16)

## 2020-08-21 PROCEDURE — 96413 CHEMO IV INFUSION 1 HR: CPT

## 2020-08-21 PROCEDURE — 80053 COMPREHEN METABOLIC PANEL: CPT | Performed by: INTERNAL MEDICINE

## 2020-08-21 PROCEDURE — 96417 CHEMO IV INFUS EACH ADDL SEQ: CPT

## 2020-08-21 PROCEDURE — 86300 IMMUNOASSAY TUMOR CA 15-3: CPT | Performed by: INTERNAL MEDICINE

## 2020-08-21 PROCEDURE — 85025 COMPLETE CBC W/AUTO DIFF WBC: CPT | Performed by: INTERNAL MEDICINE

## 2020-08-21 RX ORDER — SODIUM CHLORIDE 9 MG/ML
20 INJECTION, SOLUTION INTRAVENOUS ONCE
Status: COMPLETED | OUTPATIENT
Start: 2020-08-21 | End: 2020-08-21

## 2020-08-21 RX ADMIN — TRASTUZUMAB 600 MG: 150 INJECTION, POWDER, LYOPHILIZED, FOR SOLUTION INTRAVENOUS at 15:58

## 2020-08-21 RX ADMIN — SODIUM CHLORIDE 20 ML/HR: 0.9 INJECTION, SOLUTION INTRAVENOUS at 15:14

## 2020-08-21 RX ADMIN — PERTUZUMAB 420 MG: 30 INJECTION, SOLUTION, CONCENTRATE INTRAVENOUS at 15:24

## 2020-08-21 NOTE — PROGRESS NOTES
Pt tolerated Perjeta and Herceptin infusions without adverse reaction  Aware of next appointment   Declines AVS

## 2020-08-21 NOTE — PLAN OF CARE
Problem: Potential for Falls  Goal: Patient will remain free of falls  Description: INTERVENTIONS:  - Assess patient frequently for physical needs  -  Identify cognitive and physical deficits and behaviors that affect risk of falls    -  [place] fall precautions as indicated by assessment   - Educate patient/family on patient safety including physical limitations  - Instruct patient to call for assistance with activity based on assessment  - Modify environment to reduce risk of injury  - Consider OT/PT consult to assist with strengthening/mobility  Outcome: Progressing

## 2020-08-26 ENCOUNTER — OFFICE VISIT (OUTPATIENT)
Dept: HEMATOLOGY ONCOLOGY | Facility: CLINIC | Age: 55
End: 2020-08-26
Payer: COMMERCIAL

## 2020-08-26 VITALS
DIASTOLIC BLOOD PRESSURE: 80 MMHG | WEIGHT: 226 LBS | OXYGEN SATURATION: 98 % | TEMPERATURE: 98.1 F | SYSTOLIC BLOOD PRESSURE: 140 MMHG | RESPIRATION RATE: 18 BRPM | HEIGHT: 65 IN | BODY MASS INDEX: 37.65 KG/M2 | HEART RATE: 69 BPM

## 2020-08-26 DIAGNOSIS — Z79.811 AROMATASE INHIBITOR USE: ICD-10-CM

## 2020-08-26 DIAGNOSIS — I42.7 CARDIOMYOPATHY DUE TO CHEMOTHERAPY (HCC): ICD-10-CM

## 2020-08-26 DIAGNOSIS — Z15.01 BRCA1 GENE MUTATION POSITIVE IN FEMALE: ICD-10-CM

## 2020-08-26 DIAGNOSIS — T45.1X5A CARDIOMYOPATHY DUE TO CHEMOTHERAPY (HCC): ICD-10-CM

## 2020-08-26 DIAGNOSIS — Z15.02 BRCA1 GENE MUTATION POSITIVE IN FEMALE: ICD-10-CM

## 2020-08-26 DIAGNOSIS — C79.51 BONE METASTASIS (HCC): ICD-10-CM

## 2020-08-26 DIAGNOSIS — Z95.828 PORT-A-CATH IN PLACE: ICD-10-CM

## 2020-08-26 DIAGNOSIS — C78.7 LIVER METASTASES (HCC): ICD-10-CM

## 2020-08-26 DIAGNOSIS — Z15.09 BRCA1 GENE MUTATION POSITIVE IN FEMALE: ICD-10-CM

## 2020-08-26 DIAGNOSIS — Z17.0 MALIGNANT NEOPLASM OF UPPER-INNER QUADRANT OF LEFT BREAST IN FEMALE, ESTROGEN RECEPTOR POSITIVE (HCC): Primary | ICD-10-CM

## 2020-08-26 DIAGNOSIS — C50.212 MALIGNANT NEOPLASM OF UPPER-INNER QUADRANT OF LEFT BREAST IN FEMALE, ESTROGEN RECEPTOR POSITIVE (HCC): Primary | ICD-10-CM

## 2020-08-26 DIAGNOSIS — Z90.722 HISTORY OF BILATERAL SALPINGO-OOPHORECTOMY (BSO): ICD-10-CM

## 2020-08-26 DIAGNOSIS — Z90.79 HISTORY OF BILATERAL SALPINGO-OOPHORECTOMY (BSO): ICD-10-CM

## 2020-08-26 PROCEDURE — 99214 OFFICE O/P EST MOD 30 MIN: CPT | Performed by: INTERNAL MEDICINE

## 2020-08-26 NOTE — PROGRESS NOTES
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, diagnosed in 2016  Also she had locally advanced disease     Initial treatment was a combination of Taxotere, Herceptin and Perjeta and after 6 cycles Taxotere was discontinued and tamoxifen was added    Herceptin and Perjeta were continued  Sb Rink radiation to left hip for palliation  She had very good response    On 08/16/2019 she underwent BSO    Tamoxifen was changed to letrozole     Presently she is on Herceptin, Perjeta and letrozole  Makenzie Portillo has been taking vitamin-D and calcium and is staying active    She has been tolerating treatments without much problem   She goes for blood tests and echocardiogram on regular basis  There was no metastatic disease in the liver on her last CT scan      There was  improved bone scan    In September 2016 patient had lumpectomy of left breast followed by radiation therapy   At the time of lumpectomy there was minimal residual disease, 1 4 cm   Lymph nodes were not sampled    Later she was found to have positive BRCA 1 mutation and she knows the importance of that, for her and her family   She gets CT scans to look at pancreas and other organs   She does not want to see another doctor like dermatologist for melanoma screen             She has burning sensation in her hands and feet for a day post treatment   She has grade 1 peripheral neuropathy    Has some tiredness and arthritic symptoms   For leg cramps at night and she is taking one baby aspirin daily with food in the evening and also one magnesium tablet daily and that is helping       Has some anxiety as before        She is not on Xgeva anymore that she had for 2 years  Makenzie Bandar continues to take    calcium and vitamin-D                         Current Outpatient Medications:     letrozole (FEMARA) 2 5 mg tablet, Take 1 tablet (2 5 mg total) by mouth daily, Disp: 30 tablet, Rfl: 6    multivitamin (THERAGRAN) TABS, Take 1 tablet by mouth 3 (three) times a week , Disp: , Rfl:     pertuzumab (PERJETA) 420 mg/14 mL SOLN, Infuse into a venous catheter every 21 days  At infusion  center, Disp: , Rfl:     Trastuzumab (HERCEPTIN IV), Infuse into a venous catheter every 21 days  At infusion center, Disp: , Rfl:     VITAMIN D, CHOLECALCIFEROL, PO, Take by mouth 3 (three) times a week , Disp: , Rfl:     No Known Allergies    Oncology History   Isa Cartwright is a 48y o  year old female with a stage IV left breast carcinoma metastatic to liver and bone   She returns today for follow up after completing radiation treatments to the left breast and left femur on 12/23/2016  She was last seen in our office on 11/13/19  Last imaging 11/5/19  Her Tamoxifen was disc after she underwent a BSO 8/16/19  She continues on  Herceptin, Perjeta and letrozole  5/5/20 Dr Leroy Gerard- patient continues to show response  No change in therapy  6/12/2020 Bilateral mammogram was benign    6/23/2020 she had f/u with Dr Mackenzie Jimenez  She no c/o except some wt gain  No concerns on exam  Recent mammogram benign  Last scans stable  F/u in 6 months    7/10/20 pertuzumab (PERJETA) and trastuzumab (HERCEPTIN)           8/26/2020 f/u with Dr Leroy Gerard  1/23/2021 F/U Dr Mackenzie Jimenez        Malignant neoplasm of upper-inner quadrant of left female breast [facility])   12/2015 Initial Diagnosis    Malignant neoplasm of upper-outer quadrant of left female breast ([facility] [address] )     2016 -  Hormone Therapy    Tamoxifen  Ended 8/2019  Dr Leroy Gerard  Letrozole 9/2019 1/27/2016 Surgery    Port placement     2/19/2016 - 6/2/2018 Chemotherapy    Taxotere,  herceptin, perjeta, xgeva  After six cycles, taxotere was discontinued and tamoxifen added     Continues with Perjeta and Herceptin every 3 weeks     - Dr Leroy Gerard       9/16/2016 Surgery    Left breast partial mastectomy     11/7/2016 - 12/23/2016 Radiation    Plan ID Energy Fractions Dose per Fraction (cGy) Total Dose Delivered (cGy) Elapsed Days   Lt Breast 6X 25 / 25 200 5,000 36   Lt Brst Boost 6X 8 / 8 200 1,600 9   Lt Femur 10X 10 / 10 300 3,000 11   Lt PAB 6X 25 / 25 60 1,500 36   Lt Sclav 6X 25 / 25 200 5,000 36                                   Total dose to left breast lumpectomy cavity: 6600 cGy                   Total dose to the supraclavicular and axillary regions: 5000 cGy       4/17/2019 -  Chemotherapy    pertuzumab (PERJETA) 840 mg in sodium chloride 0 9 % 250 mL IVPB, 840 mg, Intravenous, Once, 24 of 29 cycles  Administration: 420 mg (5/17/2019), 420 mg (6/7/2019), 420 mg (6/28/2019), 420 mg (7/19/2019), 420 mg (8/30/2019), 420 mg (9/20/2019), 420 mg (8/9/2019), 420 mg (10/9/2019), 420 mg (11/1/2019), 420 mg (11/22/2019), 420 mg (12/13/2019), 420 mg (1/3/2020), 420 mg (1/24/2020), 420 mg (2/14/2020), 420 mg (3/6/2020), 420 mg (3/27/2020), 420 mg (4/17/2020), 420 mg (5/8/2020), 420 mg (5/29/2020), 420 mg (6/19/2020), 420 mg (7/10/2020), 420 mg (7/31/2020), 420 mg (8/21/2020)  trastuzumab (HERCEPTIN) 589 mg in sodium chloride 0 9 % 250 mL chemo infusion, 6 mg/kg = 589 mg (75 % of original dose 8 mg/kg), Intravenous, Once, 24 of 29 cycles  Dose modification: 6 mg/kg (original dose 8 mg/kg, Cycle 1, Reason: Other (See Comments))  Administration: 589 mg (5/17/2019), 589 mg (6/7/2019), 600 mg (6/28/2019), 600 mg (7/19/2019), 600 mg (8/30/2019), 600 mg (9/20/2019), 600 mg (8/9/2019), 600 mg (10/9/2019), 600 mg (11/1/2019), 600 mg (11/22/2019), 600 mg (12/13/2019), 600 mg (1/3/2020), 600 mg (1/24/2020), 600 mg (2/14/2020), 600 mg (3/6/2020), 600 mg (3/27/2020), 600 mg (4/17/2020), 600 mg (5/8/2020), 600 mg (5/29/2020), 600 mg (6/19/2020), 600 mg (7/10/2020), 600 mg (7/31/2020), 600 mg (8/21/2020)     8/16/2019 Surgery    she underwent BSO       Liver metastases ([facility] [address] )   4/27/2018 Initial Diagnosis    Liver metastases ([facility] [address] )     4/17/2019 -  Chemotherapy    pertuzumab (PERJETA) 840 mg in sodium chloride 0 9 % 250 mL IVPB, 840 mg, Intravenous, Once, 23 of 29 cycles  Administration: 420 mg (5/17/2019), 420 mg (6/7/2019), 420 mg (6/28/2019), 420 mg (7/19/2019), 420 mg (8/30/2019), 420 mg (9/20/2019), 420 mg (8/9/2019), 420 mg (10/9/2019), 420 mg (11/1/2019), 420 mg (11/22/2019), 420 mg (12/13/2019), 420 mg (1/3/2020), 420 mg (1/24/2020), 420 mg (2/14/2020), 420 mg (3/6/2020), 420 mg (3/27/2020), 420 mg (4/17/2020), 420 mg (5/8/2020), 420 mg (5/29/2020), 420 mg (6/19/2020), 420 mg (7/10/2020), 420 mg (7/31/2020)  trastuzumab (HERCEPTIN) 589 mg in sodium chloride 0 9 % 250 mL chemo infusion, 6 mg/kg = 589 mg (75 % of original dose 8 mg/kg), Intravenous, Once, 23 of 29 cycles  Dose modification: 6 mg/kg (original dose 8 mg/kg, Cycle 1, Reason: Other (See Comments))  Administration: 589 mg (5/17/2019), 589 mg (6/7/2019), 600 mg (6/28/2019), 600 mg (7/19/2019), 600 mg (8/30/2019), 600 mg (9/20/2019), 600 mg (8/9/2019), 600 mg (10/9/2019), 600 mg (11/1/2019), 600 mg (11/22/2019), 600 mg (12/13/2019), 600 mg (1/3/2020), 600 mg (1/24/2020), 600 mg (2/14/2020), 600 mg (3/6/2020), 600 mg (3/27/2020), 600 mg (4/17/2020), 600 mg (5/8/2020), 600 mg (5/29/2020), 600 mg (6/19/2020), 600 mg (7/10/2020), 600 mg (7/31/2020)     Bone metastasis ([address] )   4/27/2018 Initial Diagnosis    Bone metastasis ([facility] [address] )     4/17/2019 -  Chemotherapy    pertuzumab (PERJETA) 840 mg in sodium chloride 0 9 % 250 mL IVPB, 840 mg, Intravenous, Once, 23 of 29 cycles  Administration: 420 mg (5/17/2019), 420 mg (6/7/2019), 420 mg (6/28/2019), 420 mg (7/19/2019), 420 mg (8/30/2019), 420 mg (9/20/2019), 420 mg (8/9/2019), 420 mg (10/9/2019), 420 mg (11/1/2019), 420 mg (11/22/2019), 420 mg (12/13/2019), 420 mg (1/3/2020), 420 mg (1/24/2020), 420 mg (2/14/2020), 420 mg (3/6/2020), 420 mg (3/27/2020), 420 mg (4/17/2020), 420 mg (5/8/2020), 420 mg (5/29/2020), 420 mg (6/19/2020), 420 mg (7/10/2020), 420 mg (7/31/2020)  trastuzumab (HERCEPTIN) 589 mg in sodium chloride 0 9 % 250 mL chemo infusion, 6 mg/kg = 589 mg (75 % of original dose 8 mg/kg), Intravenous, Once, 23 of 29 cycles  Dose modification: 6 mg/kg (original dose 8 mg/kg, Cycle 1, Reason: Other (See Comments))  Administration: 589 mg (5/17/2019), 589 mg (6/7/2019), 600 mg (6/28/2019), 600 mg (7/19/2019), 600 mg (8/30/2019), 600 mg (9/20/2019), 600 mg (8/9/2019), 600 mg (10/9/2019), 600 mg (11/1/2019), 600 mg (11/22/2019), 600 mg (12/13/2019), 600 mg (1/3/2020), 600 mg (1/24/2020), 600 mg (2/14/2020), 600 mg (3/6/2020), 600 mg (3/27/2020), 600 mg (4/17/2020), 600 mg (5/8/2020), 600 mg (5/29/2020), 600 mg (6/19/2020), 600 mg (7/10/2020), 600 mg (7/31/2020)         ROS:  08/26/20 Reviewed 13 systems: See symptoms in HPI : She has burning sensation in her hands and feet for a day post treatment   She has grade 1 peripheral neuropathy    Has some tiredness and arthritic symptoms  Leg cramps at night  Anxiety  Presently no headaches, seizures, dizziness, diplopia, dysphagia, hoarseness, chest pain, palpitations, shortness of breath, cough, hemoptysis, abdominal pain, nausea, vomiting, change in bowel habits, melena, hematuria, fever, chills, bleeding,  skin rash, weight loss,   weakness, claudication and gait problem  No frequent infections  Not unusually sensitive to heat or cold  No swelling of the ankles  No swollen glands  Patient is anxious         /80 (BP Location: Left arm, Patient Position: Sitting, Cuff Size: Adult)   Pulse 69   Temp 98 1 °F (36 7 °C)   Resp 18   Ht 5' 5" (1 651 m)   Wt 103 kg (226 lb)   SpO2 98%   BMI 37 61 kg/m²     Physical Exam:  Alert, oriented, not in distress, no icterus, no oral thrush, no palpable neck mass, clear lung fields, regular heart rate, abdomen  soft and non tender, no palpable abdominal mass, no ascites, no edema of ankles, no calf tenderness, no focal neurological deficit, no skin rash, no palpable lymphadenopathy in the neck and axilla, good arterial pulses, no clubbing  Patient is anxious  Performance status 1      IMAGING:      LABS:  Results for orders placed or performed during the hospital encounter of 08/21/20   Cancer antigen 27 29   Result Value Ref Range    CA 27 29 21 3 0 0 - 42 3 U/mL   CBC and differential   Result Value Ref Range    WBC 8 58 4 31 - 10 16 Thousand/uL    RBC 4 74 3 81 - 5 12 Million/uL    Hemoglobin 12 9 11 5 - 15 4 g/dL    Hematocrit 40 2 34 8 - 46 1 %    MCV 85 82 - 98 fL    MCH 27 2 26 8 - 34 3 pg    MCHC 32 1 31 4 - 37 4 g/dL    RDW 14 9 11 6 - 15 1 %    MPV 10 7 8 9 - 12 7 fL    Platelets 028 732 - 693 Thousands/uL    Neutrophils Relative 64 43 - 75 %    Lymphocytes Relative 27 14 - 44 %    Monocytes Relative 5 4 - 12 %    Eosinophils Relative 4 0 - 6 %    Basophils Relative 0 0 - 1 %    Neutrophils Absolute 5 47 1 85 - 7 62 Thousands/µL    Lymphocytes Absolute 2 27 0 60 - 4 47 Thousands/µL    Monocytes Absolute 0 44 0 17 - 1 22 Thousand/µL    Eosinophils Absolute 0 38 0 00 - 0 61 Thousand/µL    Basophils Absolute 0 02 0 00 - 0 10 Thousands/µL   Comprehensive metabolic panel   Result Value Ref Range    Sodium 139 136 - 145 mmol/L    Potassium 3 8 3 5 - 5 3 mmol/L    Chloride 102 98 - 108 mmol/L    CO2 27 21 - 32 mmol/L    ANION GAP 10 4 - 13 mmol/L    BUN 19 5 - 25 mg/dL    Creatinine 0 90 0 60 - 1 30 mg/dL    Glucose 167 (H) 65 - 140 mg/dL    Calcium 9 8 8 3 - 10 1 mg/dL    AST 30 5 - 45 U/L    ALT 38 12 - 78 U/L    Alkaline Phosphatase 95 46 - 116 U/L    Total Protein 6 8 6 4 - 8 2 g/dL    Albumin 3 0 (L) 3 5 - 5 7 g/dL    Total Bilirubin 0 60 0 20 - 1 00 mg/dL    eGFR 74 ml/min/1 73sq m     Labs, Imaging, & Other studies:   All pertinent labs and imaging studies were personally reviewed    Lab Results   Component Value Date    K 3 8 08/21/2020     08/21/2020    CO2 27 08/21/2020    BUN 19 08/21/2020    CREATININE 0 90 08/21/2020    GLUF 162 (H) 02/12/2020    CALCIUM 9 8 08/21/2020    AST 30 08/21/2020    ALT 38 08/21/2020    ALKPHOS 95 08/21/2020    EGFR 74 08/21/2020     Lab Results   Component Value Date    WBC 8 58 08/21/2020    HGB 12 9 08/21/2020    HCT 40 2 08/21/2020    MCV 85 08/21/2020     08/21/2020       Reviewed and discussed with patient  Assessment and plan:  Patient is on Herceptin, Perjeta and letrozole for 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, diagnosed in 2016  Also she had locally advanced disease     Initial treatment was a combination of Taxotere, Herceptin and Perjeta and after 6 cycles Taxotere was discontinued and tamoxifen was added    Herceptin and Perjeta were continued  Emely Saunas radiation to left hip for palliation  She had very good response    On 08/16/2019 she underwent BSO    Tamoxifen was changed to letrozole     Presently she is on Herceptin, Perjeta and letrozole  Sha Good has been taking vitamin-D and calcium and is staying active    She has been tolerating treatments without much problem   She goes for blood tests and echocardiogram on regular basis  There was no metastatic disease in the liver on her last CT scan      There was  improved bone scan    In September 2016 patient had lumpectomy of left breast followed by radiation therapy   At the time of lumpectomy there was minimal residual disease, 1 4 cm   Lymph nodes were not sampled    Later she was found to have positive BRCA 1 mutation and she knows the importance of that, for her and her family   She gets CT scans to look at pancreas and other organs   She does not want to see another doctor like dermatologist for melanoma screen             She has burning sensation in her hands and feet for a day post treatment   She has grade 1 peripheral neuropathy    Has some tiredness and arthritic symptoms   For leg cramps at night and she is taking one baby aspirin daily with food in the evening and also one magnesium tablet daily and that is helping       Has some anxiety as before        She is not on Xgeva anymore that she had for 2 years  Douglas King continues to take    calcium and vitamin-D     Physical examination and test results are as recorded and discussed  Jennifer Kuhn continues to show response   No change in therapy  She will have follow-up CT scans     She gets an echocardiogram on regular basis   Condition discussed and explained   Questions answered  Teresa العلي discussed the importance of self-breast examination, eating healthy foods, staying active and health screening tests   Patient goes to her breast surgeon for examination and  imaging studies  Jennifer Kuhn is capable of self-care  Goal is prolonged survival from stage IV breast cancer  Patient will continue to follow with her primary physician and other consultants  Discussed precautions against coronavirus  1  Malignant neoplasm of upper-inner quadrant of left breast in female, estrogen receptor positive ([address] )    - CBC and differential; Future  - Comprehensive metabolic panel; Future  - Cancer antigen 27 29; Future  - CT chest abdomen pelvis w wo contrast; Future    2  BRCA1 gene mutation positive in female      3  Aromatase inhibitor use      4  History of bilateral salpingo-oophorectomy (BSO)      5  Port-A-Cath in place      6  Bone metastasis ([address] )    - CT chest abdomen pelvis w wo contrast; Future    7  Cardiomyopathy due to chemotherapy ([address] )    - Echo limited with contrast if indicated; Future    8  Liver metastases ([address] )    - CT chest abdomen pelvis w wo contrast; Future                      Patient voiced understanding and agreement in the discussion  Counseling / Coordination of Care   Greater than 50% of total time was spent with the patient and / or family counseling and / or coordination of care

## 2020-09-09 RX ORDER — SODIUM CHLORIDE 9 MG/ML
20 INJECTION, SOLUTION INTRAVENOUS ONCE
Status: CANCELLED | OUTPATIENT
Start: 2020-09-11

## 2020-09-11 ENCOUNTER — HOSPITAL ENCOUNTER (OUTPATIENT)
Dept: INFUSION CENTER | Facility: CLINIC | Age: 55
Discharge: HOME | End: 2020-09-11
Payer: COMMERCIAL

## 2020-09-11 VITALS
BODY MASS INDEX: 38.34 KG/M2 | RESPIRATION RATE: 18 BRPM | DIASTOLIC BLOOD PRESSURE: 81 MMHG | HEART RATE: 69 BPM | SYSTOLIC BLOOD PRESSURE: 132 MMHG | WEIGHT: 230.38 LBS | TEMPERATURE: 98.2 F

## 2020-09-11 DIAGNOSIS — Z90.79 HISTORY OF BILATERAL SALPINGO-OOPHORECTOMY (BSO): ICD-10-CM

## 2020-09-11 DIAGNOSIS — Z17.0 MALIGNANT NEOPLASM OF UPPER-INNER QUADRANT OF LEFT BREAST IN FEMALE, ESTROGEN RECEPTOR POSITIVE (HCC): ICD-10-CM

## 2020-09-11 DIAGNOSIS — C78.7 LIVER METASTASES (HCC): ICD-10-CM

## 2020-09-11 DIAGNOSIS — C50.212 MALIGNANT NEOPLASM OF UPPER-INNER QUADRANT OF LEFT BREAST IN FEMALE, ESTROGEN RECEPTOR POSITIVE (HCC): ICD-10-CM

## 2020-09-11 DIAGNOSIS — Z90.722 HISTORY OF BILATERAL SALPINGO-OOPHORECTOMY (BSO): ICD-10-CM

## 2020-09-11 DIAGNOSIS — C79.51 BONE METASTASIS (HCC): Primary | ICD-10-CM

## 2020-09-11 PROCEDURE — 96417 CHEMO IV INFUS EACH ADDL SEQ: CPT

## 2020-09-11 PROCEDURE — 96413 CHEMO IV INFUSION 1 HR: CPT

## 2020-09-11 RX ORDER — SODIUM CHLORIDE 9 MG/ML
20 INJECTION, SOLUTION INTRAVENOUS ONCE
Status: COMPLETED | OUTPATIENT
Start: 2020-09-11 | End: 2020-09-11

## 2020-09-11 RX ADMIN — SODIUM CHLORIDE 20 ML/HR: 0.9 INJECTION, SOLUTION INTRAVENOUS at 14:45

## 2020-09-11 RX ADMIN — TRASTUZUMAB 600 MG: 150 INJECTION, POWDER, LYOPHILIZED, FOR SOLUTION INTRAVENOUS at 15:22

## 2020-09-11 RX ADMIN — PERTUZUMAB 420 MG: 30 INJECTION, SOLUTION, CONCENTRATE INTRAVENOUS at 14:48

## 2020-09-11 NOTE — PLAN OF CARE
Problem: Potential for Falls  Goal: Patient will remain free of falls  Description: INTERVENTIONS:  - Assess patient frequently for physical needs  -  Identify cognitive and physical deficits and behaviors that affect risk of falls    -  [place] fall precautions as indicated by assessment   - Educate patient/family on patient safety including physical limitations  - Instruct patient to call for assistance with activity based on assessment  - Modify environment to reduce risk of injury  - Consider OT/PT consult to assist with strengthening/mobility  Outcome: Progressing     Problem: PAIN - ADULT  Goal: Verbalizes/displays adequate comfort level or baseline comfort level  Description: Interventions:  - Encourage patient to monitor pain and request assistance  - Assess pain using appropriate pain scale  - Administer analgesics based on type and severity of pain and evaluate response  - Implement non-pharmacological measures as appropriate and evaluate response  - Consider cultural and social influences on pain and pain management  - Notify physician/advanced practitioner if interventions unsuccessful or patient reports new pain  Outcome: Progressing     Problem: INFECTION - ADULT  Goal: Absence or prevention of progression during hospitalization  Description: INTERVENTIONS:  - Assess and monitor for signs and symptoms of infection  - Monitor lab/diagnostic results  - Monitor all insertion sites, i e  indwelling lines, tubes, and drains  - Monitor endotracheal if appropriate and nasal secretions for changes in amount and color  - [place] appropriate cooling/warming therapies per order  - Administer medications as ordered  - Instruct and encourage patient and family to use good hand hygiene technique  - Identify and instruct in appropriate isolation precautions for identified infection/condition  Outcome: Progressing  Goal: Absence of fever/infection during neutropenic period  Description: INTERVENTIONS:  - Monitor WBC    Outcome: Progressing

## 2020-09-11 NOTE — PLAN OF CARE
Problem: Potential for Falls  Goal: Patient will remain free of falls  Description: INTERVENTIONS:  - Assess patient frequently for physical needs  -  Identify cognitive and physical deficits and behaviors that affect risk of falls    -  [place] fall precautions as indicated by assessment   - Educate patient/family on patient safety including physical limitations  - Instruct patient to call for assistance with activity based on assessment  - Modify environment to reduce risk of injury  - Consider OT/PT consult to assist with strengthening/mobility  9/11/2020 1545 by Layo Agrawal RN  Outcome: Progressing  9/11/2020 1543 by Layo Agrawal RN  Outcome: Progressing     Problem: PAIN - ADULT  Goal: Verbalizes/displays adequate comfort level or baseline comfort level  Description: Interventions:  - Encourage patient to monitor pain and request assistance  - Assess pain using appropriate pain scale  - Administer analgesics based on type and severity of pain and evaluate response  - Implement non-pharmacological measures as appropriate and evaluate response  - Consider cultural and social influences on pain and pain management  - Notify physician/advanced practitioner if interventions unsuccessful or patient reports new pain  9/11/2020 1545 by Layo Agrawal RN  Outcome: Progressing  9/11/2020 1543 by Layo Agrawal RN  Outcome: Progressing     Problem: INFECTION - ADULT  Goal: Absence or prevention of progression during hospitalization  Description: INTERVENTIONS:  - Assess and monitor for signs and symptoms of infection  - Monitor lab/diagnostic results  - Monitor all insertion sites, i e  indwelling lines, tubes, and drains  - Monitor endotracheal if appropriate and nasal secretions for changes in amount and color  - [place] appropriate cooling/warming therapies per order  - Administer medications as ordered  - Instruct and encourage patient and family to use good hand hygiene technique  - Identify and instruct in appropriate isolation precautions for identified infection/condition  9/11/2020 1545 by Nani Ganser, RN  Outcome: Progressing  9/11/2020 1543 by Nani Ganser, RN  Outcome: Progressing  Goal: Absence of fever/infection during neutropenic period  Description: INTERVENTIONS:  - Monitor WBC    9/11/2020 1545 by Nani Ganser, RN  Outcome: Progressing  9/11/2020 1543 by Nani Ganser, RN  Outcome: Progressing

## 2020-09-30 RX ORDER — SODIUM CHLORIDE 9 MG/ML
20 INJECTION, SOLUTION INTRAVENOUS ONCE
Status: CANCELLED | OUTPATIENT
Start: 2020-10-02

## 2020-10-02 ENCOUNTER — HOSPITAL ENCOUNTER (OUTPATIENT)
Dept: INFUSION CENTER | Facility: CLINIC | Age: 55
Discharge: HOME | End: 2020-10-02
Payer: COMMERCIAL

## 2020-10-02 VITALS
WEIGHT: 229.61 LBS | BODY MASS INDEX: 38.21 KG/M2 | DIASTOLIC BLOOD PRESSURE: 85 MMHG | RESPIRATION RATE: 18 BRPM | TEMPERATURE: 98.2 F | HEART RATE: 73 BPM | SYSTOLIC BLOOD PRESSURE: 148 MMHG

## 2020-10-02 DIAGNOSIS — Z90.79 HISTORY OF BILATERAL SALPINGO-OOPHORECTOMY (BSO): ICD-10-CM

## 2020-10-02 DIAGNOSIS — C50.212 MALIGNANT NEOPLASM OF UPPER-INNER QUADRANT OF LEFT BREAST IN FEMALE, ESTROGEN RECEPTOR POSITIVE (HCC): ICD-10-CM

## 2020-10-02 DIAGNOSIS — C78.7 LIVER METASTASES (HCC): ICD-10-CM

## 2020-10-02 DIAGNOSIS — Z17.0 MALIGNANT NEOPLASM OF UPPER-INNER QUADRANT OF LEFT BREAST IN FEMALE, ESTROGEN RECEPTOR POSITIVE (HCC): ICD-10-CM

## 2020-10-02 DIAGNOSIS — C79.51 BONE METASTASIS (HCC): Primary | ICD-10-CM

## 2020-10-02 DIAGNOSIS — Z90.722 HISTORY OF BILATERAL SALPINGO-OOPHORECTOMY (BSO): ICD-10-CM

## 2020-10-02 PROCEDURE — 96413 CHEMO IV INFUSION 1 HR: CPT

## 2020-10-02 PROCEDURE — 96417 CHEMO IV INFUS EACH ADDL SEQ: CPT

## 2020-10-02 RX ORDER — SODIUM CHLORIDE 9 MG/ML
20 INJECTION, SOLUTION INTRAVENOUS ONCE
Status: COMPLETED | OUTPATIENT
Start: 2020-10-02 | End: 2020-10-02

## 2020-10-02 RX ADMIN — PERTUZUMAB 420 MG: 30 INJECTION, SOLUTION, CONCENTRATE INTRAVENOUS at 15:04

## 2020-10-02 RX ADMIN — SODIUM CHLORIDE 20 ML/HR: 0.9 INJECTION, SOLUTION INTRAVENOUS at 15:04

## 2020-10-02 RX ADMIN — TRASTUZUMAB 600 MG: 150 INJECTION, POWDER, LYOPHILIZED, FOR SOLUTION INTRAVENOUS at 15:38

## 2020-10-21 RX ORDER — SODIUM CHLORIDE 9 MG/ML
20 INJECTION, SOLUTION INTRAVENOUS ONCE
Status: CANCELLED | OUTPATIENT
Start: 2020-10-23

## 2020-10-23 ENCOUNTER — HOSPITAL ENCOUNTER (OUTPATIENT)
Dept: INFUSION CENTER | Facility: CLINIC | Age: 55
Discharge: HOME | End: 2020-10-23
Payer: COMMERCIAL

## 2020-10-23 VITALS
WEIGHT: 230.82 LBS | DIASTOLIC BLOOD PRESSURE: 83 MMHG | TEMPERATURE: 98.3 F | BODY MASS INDEX: 38.41 KG/M2 | SYSTOLIC BLOOD PRESSURE: 154 MMHG | RESPIRATION RATE: 18 BRPM | HEART RATE: 70 BPM

## 2020-10-23 DIAGNOSIS — Z17.0 MALIGNANT NEOPLASM OF UPPER-INNER QUADRANT OF LEFT BREAST IN FEMALE, ESTROGEN RECEPTOR POSITIVE (HCC): ICD-10-CM

## 2020-10-23 DIAGNOSIS — Z90.79 HISTORY OF BILATERAL SALPINGO-OOPHORECTOMY (BSO): ICD-10-CM

## 2020-10-23 DIAGNOSIS — C78.7 LIVER METASTASES (HCC): ICD-10-CM

## 2020-10-23 DIAGNOSIS — Z90.722 HISTORY OF BILATERAL SALPINGO-OOPHORECTOMY (BSO): ICD-10-CM

## 2020-10-23 DIAGNOSIS — C50.212 MALIGNANT NEOPLASM OF UPPER-INNER QUADRANT OF LEFT BREAST IN FEMALE, ESTROGEN RECEPTOR POSITIVE (HCC): ICD-10-CM

## 2020-10-23 DIAGNOSIS — C79.51 BONE METASTASIS (HCC): Primary | ICD-10-CM

## 2020-10-23 PROCEDURE — 96417 CHEMO IV INFUS EACH ADDL SEQ: CPT

## 2020-10-23 PROCEDURE — 96413 CHEMO IV INFUSION 1 HR: CPT

## 2020-10-23 RX ORDER — SODIUM CHLORIDE 9 MG/ML
20 INJECTION, SOLUTION INTRAVENOUS ONCE
Status: COMPLETED | OUTPATIENT
Start: 2020-10-23 | End: 2020-10-23

## 2020-10-23 RX ADMIN — PERTUZUMAB 420 MG: 30 INJECTION, SOLUTION, CONCENTRATE INTRAVENOUS at 15:01

## 2020-10-23 RX ADMIN — SODIUM CHLORIDE 20 ML/HR: 0.9 INJECTION, SOLUTION INTRAVENOUS at 14:52

## 2020-10-23 RX ADMIN — TRASTUZUMAB 600 MG: 150 INJECTION, POWDER, LYOPHILIZED, FOR SOLUTION INTRAVENOUS at 15:34

## 2020-11-03 ENCOUNTER — HOSPITAL ENCOUNTER (OUTPATIENT)
Dept: NON INVASIVE DIAGNOSTICS | Facility: HOSPITAL | Age: 55
Discharge: HOME | End: 2020-11-03
Attending: INTERNAL MEDICINE
Payer: COMMERCIAL

## 2020-11-03 DIAGNOSIS — I42.7 CARDIOMYOPATHY DUE TO CHEMOTHERAPY (HCC): ICD-10-CM

## 2020-11-03 DIAGNOSIS — T45.1X5A CARDIOMYOPATHY DUE TO CHEMOTHERAPY (HCC): ICD-10-CM

## 2020-11-03 PROCEDURE — 93308 TTE F-UP OR LMTD: CPT

## 2020-11-03 PROCEDURE — 93321 DOPPLER ECHO F-UP/LMTD STD: CPT | Performed by: INTERNAL MEDICINE

## 2020-11-03 PROCEDURE — 93308 TTE F-UP OR LMTD: CPT | Performed by: INTERNAL MEDICINE

## 2020-11-03 PROCEDURE — 93325 DOPPLER ECHO COLOR FLOW MAPG: CPT | Performed by: INTERNAL MEDICINE

## 2020-11-05 ENCOUNTER — TELEPHONE (OUTPATIENT)
Dept: HEMATOLOGY ONCOLOGY | Facility: CLINIC | Age: 55
End: 2020-11-05

## 2020-11-09 RX ORDER — SODIUM CHLORIDE 9 MG/ML
20 INJECTION, SOLUTION INTRAVENOUS ONCE
Status: CANCELLED | OUTPATIENT
Start: 2020-11-13

## 2020-11-13 ENCOUNTER — HOSPITAL ENCOUNTER (OUTPATIENT)
Dept: INFUSION CENTER | Facility: CLINIC | Age: 55
Discharge: HOME | End: 2020-11-13
Payer: COMMERCIAL

## 2020-11-13 VITALS
BODY MASS INDEX: 38.1 KG/M2 | RESPIRATION RATE: 18 BRPM | TEMPERATURE: 98.6 F | HEART RATE: 69 BPM | DIASTOLIC BLOOD PRESSURE: 94 MMHG | SYSTOLIC BLOOD PRESSURE: 151 MMHG | WEIGHT: 228.95 LBS

## 2020-11-13 DIAGNOSIS — Z90.79 HISTORY OF BILATERAL SALPINGO-OOPHORECTOMY (BSO): Primary | ICD-10-CM

## 2020-11-13 DIAGNOSIS — Z90.79 HISTORY OF BILATERAL SALPINGO-OOPHORECTOMY (BSO): ICD-10-CM

## 2020-11-13 DIAGNOSIS — Z17.0 MALIGNANT NEOPLASM OF UPPER-INNER QUADRANT OF LEFT BREAST IN FEMALE, ESTROGEN RECEPTOR POSITIVE (HCC): ICD-10-CM

## 2020-11-13 DIAGNOSIS — C50.212 MALIGNANT NEOPLASM OF UPPER-INNER QUADRANT OF LEFT BREAST IN FEMALE, ESTROGEN RECEPTOR POSITIVE (HCC): ICD-10-CM

## 2020-11-13 DIAGNOSIS — C79.51 BONE METASTASIS (HCC): ICD-10-CM

## 2020-11-13 DIAGNOSIS — C78.7 LIVER METASTASES (HCC): ICD-10-CM

## 2020-11-13 DIAGNOSIS — Z90.722 HISTORY OF BILATERAL SALPINGO-OOPHORECTOMY (BSO): Primary | ICD-10-CM

## 2020-11-13 DIAGNOSIS — C79.51 BONE METASTASIS (HCC): Primary | ICD-10-CM

## 2020-11-13 DIAGNOSIS — Z90.722 HISTORY OF BILATERAL SALPINGO-OOPHORECTOMY (BSO): ICD-10-CM

## 2020-11-13 PROCEDURE — 96413 CHEMO IV INFUSION 1 HR: CPT

## 2020-11-13 PROCEDURE — 96417 CHEMO IV INFUS EACH ADDL SEQ: CPT

## 2020-11-13 RX ORDER — SODIUM CHLORIDE 9 MG/ML
20 INJECTION, SOLUTION INTRAVENOUS ONCE
Status: COMPLETED | OUTPATIENT
Start: 2020-11-13 | End: 2020-11-13

## 2020-11-13 RX ADMIN — SODIUM CHLORIDE 20 ML/HR: 0.9 INJECTION, SOLUTION INTRAVENOUS at 15:08

## 2020-11-13 RX ADMIN — TRASTUZUMAB 600 MG: 150 INJECTION, POWDER, LYOPHILIZED, FOR SOLUTION INTRAVENOUS at 15:42

## 2020-11-13 RX ADMIN — PERTUZUMAB 420 MG: 30 INJECTION, SOLUTION, CONCENTRATE INTRAVENOUS at 15:08

## 2020-11-16 ENCOUNTER — TELEPHONE (OUTPATIENT)
Dept: HEMATOLOGY ONCOLOGY | Facility: CLINIC | Age: 55
End: 2020-11-16

## 2020-12-02 ENCOUNTER — OFFICE VISIT (OUTPATIENT)
Dept: HEMATOLOGY ONCOLOGY | Facility: CLINIC | Age: 55
End: 2020-12-02
Payer: COMMERCIAL

## 2020-12-02 VITALS
SYSTOLIC BLOOD PRESSURE: 146 MMHG | RESPIRATION RATE: 18 BRPM | BODY MASS INDEX: 36.8 KG/M2 | DIASTOLIC BLOOD PRESSURE: 82 MMHG | WEIGHT: 229 LBS | HEART RATE: 81 BPM | OXYGEN SATURATION: 99 % | TEMPERATURE: 99.2 F | HEIGHT: 66 IN

## 2020-12-02 DIAGNOSIS — Z95.828 PORT-A-CATH IN PLACE: ICD-10-CM

## 2020-12-02 DIAGNOSIS — Z15.09 BRCA1 GENE MUTATION POSITIVE IN FEMALE: ICD-10-CM

## 2020-12-02 DIAGNOSIS — Z90.722 S/P BSO (BILATERAL SALPINGO-OOPHORECTOMY): ICD-10-CM

## 2020-12-02 DIAGNOSIS — C79.51 BONE METASTASIS (HCC): ICD-10-CM

## 2020-12-02 DIAGNOSIS — T45.1X5A CARDIOMYOPATHY DUE TO CHEMOTHERAPY (HCC): ICD-10-CM

## 2020-12-02 DIAGNOSIS — C50.919 MALIGNANT NEOPLASM OF FEMALE BREAST, UNSPECIFIED ESTROGEN RECEPTOR STATUS, UNSPECIFIED LATERALITY, UNSPECIFIED SITE OF BREAST (HCC): ICD-10-CM

## 2020-12-02 DIAGNOSIS — C78.7 LIVER METASTASES (HCC): ICD-10-CM

## 2020-12-02 DIAGNOSIS — Z17.0 MALIGNANT NEOPLASM OF UPPER-INNER QUADRANT OF LEFT BREAST IN FEMALE, ESTROGEN RECEPTOR POSITIVE (HCC): Primary | ICD-10-CM

## 2020-12-02 DIAGNOSIS — C50.212 MALIGNANT NEOPLASM OF UPPER-INNER QUADRANT OF LEFT BREAST IN FEMALE, ESTROGEN RECEPTOR POSITIVE (HCC): Primary | ICD-10-CM

## 2020-12-02 DIAGNOSIS — Z15.02 BRCA1 GENE MUTATION POSITIVE IN FEMALE: ICD-10-CM

## 2020-12-02 DIAGNOSIS — Z15.01 BRCA1 GENE MUTATION POSITIVE IN FEMALE: ICD-10-CM

## 2020-12-02 DIAGNOSIS — Z14.8 CARRIER OF GENE MUTATION FOR HIGH RISK OF CANCER: ICD-10-CM

## 2020-12-02 DIAGNOSIS — I42.7 CARDIOMYOPATHY DUE TO CHEMOTHERAPY (HCC): ICD-10-CM

## 2020-12-02 DIAGNOSIS — Z79.811 AROMATASE INHIBITOR USE: ICD-10-CM

## 2020-12-02 PROCEDURE — 99214 OFFICE O/P EST MOD 30 MIN: CPT | Performed by: INTERNAL MEDICINE

## 2020-12-02 RX ORDER — SODIUM CHLORIDE 9 MG/ML
20 INJECTION, SOLUTION INTRAVENOUS ONCE
Status: CANCELLED | OUTPATIENT
Start: 2020-12-04

## 2020-12-02 RX ORDER — LETROZOLE 2.5 MG/1
2.5 TABLET, FILM COATED ORAL DAILY
Qty: 30 TABLET | Refills: 6 | Status: SHIPPED | OUTPATIENT
Start: 2020-12-02 | End: 2022-04-20 | Stop reason: SDUPTHER

## 2020-12-04 ENCOUNTER — HOSPITAL ENCOUNTER (OUTPATIENT)
Dept: INFUSION CENTER | Facility: CLINIC | Age: 55
Discharge: HOME | End: 2020-12-04
Payer: COMMERCIAL

## 2020-12-04 VITALS
DIASTOLIC BLOOD PRESSURE: 80 MMHG | SYSTOLIC BLOOD PRESSURE: 162 MMHG | TEMPERATURE: 99.6 F | HEIGHT: 66 IN | HEART RATE: 80 BPM | BODY MASS INDEX: 36.85 KG/M2 | RESPIRATION RATE: 18 BRPM | WEIGHT: 229.28 LBS

## 2020-12-04 DIAGNOSIS — C79.51 BONE METASTASIS (HCC): Primary | ICD-10-CM

## 2020-12-04 DIAGNOSIS — Z90.722 HISTORY OF BILATERAL SALPINGO-OOPHORECTOMY (BSO): ICD-10-CM

## 2020-12-04 DIAGNOSIS — Z90.79 HISTORY OF BILATERAL SALPINGO-OOPHORECTOMY (BSO): ICD-10-CM

## 2020-12-04 DIAGNOSIS — C50.212 MALIGNANT NEOPLASM OF UPPER-INNER QUADRANT OF LEFT BREAST IN FEMALE, ESTROGEN RECEPTOR POSITIVE (HCC): ICD-10-CM

## 2020-12-04 DIAGNOSIS — C78.7 LIVER METASTASES (HCC): ICD-10-CM

## 2020-12-04 DIAGNOSIS — Z17.0 MALIGNANT NEOPLASM OF UPPER-INNER QUADRANT OF LEFT BREAST IN FEMALE, ESTROGEN RECEPTOR POSITIVE (HCC): ICD-10-CM

## 2020-12-04 PROCEDURE — 96417 CHEMO IV INFUS EACH ADDL SEQ: CPT

## 2020-12-04 PROCEDURE — 96413 CHEMO IV INFUSION 1 HR: CPT

## 2020-12-04 RX ORDER — SODIUM CHLORIDE 9 MG/ML
20 INJECTION, SOLUTION INTRAVENOUS ONCE
Status: COMPLETED | OUTPATIENT
Start: 2020-12-04 | End: 2020-12-04

## 2020-12-04 RX ADMIN — TRASTUZUMAB 600 MG: 150 INJECTION, POWDER, LYOPHILIZED, FOR SOLUTION INTRAVENOUS at 15:27

## 2020-12-04 RX ADMIN — SODIUM CHLORIDE 20 ML/HR: 0.9 INJECTION, SOLUTION INTRAVENOUS at 14:53

## 2020-12-04 RX ADMIN — PERTUZUMAB 420 MG: 30 INJECTION, SOLUTION, CONCENTRATE INTRAVENOUS at 14:53

## 2020-12-24 ENCOUNTER — HOSPITAL ENCOUNTER (OUTPATIENT)
Dept: INFUSION CENTER | Facility: CLINIC | Age: 55
Discharge: HOME | End: 2020-12-24
Payer: COMMERCIAL

## 2020-12-24 VITALS
HEART RATE: 76 BPM | SYSTOLIC BLOOD PRESSURE: 137 MMHG | RESPIRATION RATE: 18 BRPM | TEMPERATURE: 97.2 F | HEIGHT: 66 IN | WEIGHT: 227.51 LBS | DIASTOLIC BLOOD PRESSURE: 84 MMHG | BODY MASS INDEX: 36.56 KG/M2

## 2020-12-24 DIAGNOSIS — Z90.79 HISTORY OF BILATERAL SALPINGO-OOPHORECTOMY (BSO): ICD-10-CM

## 2020-12-24 DIAGNOSIS — Z90.722 HISTORY OF BILATERAL SALPINGO-OOPHORECTOMY (BSO): ICD-10-CM

## 2020-12-24 DIAGNOSIS — C79.51 BONE METASTASIS (HCC): Primary | ICD-10-CM

## 2020-12-24 DIAGNOSIS — Z17.0 MALIGNANT NEOPLASM OF UPPER-INNER QUADRANT OF LEFT BREAST IN FEMALE, ESTROGEN RECEPTOR POSITIVE (HCC): ICD-10-CM

## 2020-12-24 DIAGNOSIS — C78.7 LIVER METASTASES (HCC): ICD-10-CM

## 2020-12-24 DIAGNOSIS — C50.212 MALIGNANT NEOPLASM OF UPPER-INNER QUADRANT OF LEFT BREAST IN FEMALE, ESTROGEN RECEPTOR POSITIVE (HCC): ICD-10-CM

## 2020-12-24 PROCEDURE — 96413 CHEMO IV INFUSION 1 HR: CPT

## 2020-12-24 PROCEDURE — 96417 CHEMO IV INFUS EACH ADDL SEQ: CPT

## 2020-12-24 RX ORDER — SODIUM CHLORIDE 9 MG/ML
20 INJECTION, SOLUTION INTRAVENOUS ONCE
Status: COMPLETED | OUTPATIENT
Start: 2020-12-24 | End: 2020-12-24

## 2020-12-24 RX ORDER — SODIUM CHLORIDE 9 MG/ML
20 INJECTION, SOLUTION INTRAVENOUS ONCE
Status: CANCELLED | OUTPATIENT
Start: 2020-12-24

## 2020-12-24 RX ADMIN — SODIUM CHLORIDE 20 ML/HR: 0.9 INJECTION, SOLUTION INTRAVENOUS at 12:00

## 2020-12-24 RX ADMIN — TRASTUZUMAB 600 MG: 150 INJECTION, POWDER, LYOPHILIZED, FOR SOLUTION INTRAVENOUS at 12:50

## 2020-12-24 RX ADMIN — PERTUZUMAB 420 MG: 30 INJECTION, SOLUTION, CONCENTRATE INTRAVENOUS at 12:09

## 2021-01-08 RX ORDER — SODIUM CHLORIDE 9 MG/ML
20 INJECTION, SOLUTION INTRAVENOUS ONCE
Status: CANCELLED | OUTPATIENT
Start: 2021-01-15

## 2021-01-15 ENCOUNTER — HOSPITAL ENCOUNTER (OUTPATIENT)
Dept: INFUSION CENTER | Facility: CLINIC | Age: 56
Discharge: HOME | End: 2021-01-15
Payer: COMMERCIAL

## 2021-01-15 VITALS
SYSTOLIC BLOOD PRESSURE: 162 MMHG | BODY MASS INDEX: 36.64 KG/M2 | HEART RATE: 78 BPM | DIASTOLIC BLOOD PRESSURE: 85 MMHG | RESPIRATION RATE: 18 BRPM | WEIGHT: 226.85 LBS | TEMPERATURE: 98.7 F

## 2021-01-15 DIAGNOSIS — C79.51 BONE METASTASIS (HCC): Primary | ICD-10-CM

## 2021-01-15 DIAGNOSIS — C50.212 MALIGNANT NEOPLASM OF UPPER-INNER QUADRANT OF LEFT BREAST IN FEMALE, ESTROGEN RECEPTOR POSITIVE (HCC): ICD-10-CM

## 2021-01-15 DIAGNOSIS — Z17.0 MALIGNANT NEOPLASM OF UPPER-INNER QUADRANT OF LEFT BREAST IN FEMALE, ESTROGEN RECEPTOR POSITIVE (HCC): ICD-10-CM

## 2021-01-15 DIAGNOSIS — Z90.722 HISTORY OF BILATERAL SALPINGO-OOPHORECTOMY (BSO): ICD-10-CM

## 2021-01-15 DIAGNOSIS — C78.7 LIVER METASTASES (HCC): ICD-10-CM

## 2021-01-15 DIAGNOSIS — Z90.79 HISTORY OF BILATERAL SALPINGO-OOPHORECTOMY (BSO): ICD-10-CM

## 2021-01-15 LAB
ALBUMIN SERPL BCP-MCNC: 3.3 G/DL (ref 3.5–5)
ALP SERPL-CCNC: 112 U/L (ref 46–116)
ALT SERPL W P-5'-P-CCNC: 52 U/L (ref 12–78)
ANION GAP SERPL CALCULATED.3IONS-SCNC: 5 MMOL/L (ref 4–13)
AST SERPL W P-5'-P-CCNC: 25 U/L (ref 5–45)
BASOPHILS # BLD AUTO: 0.04 THOUSANDS/ΜL (ref 0–0.1)
BASOPHILS NFR BLD AUTO: 0 % (ref 0–1)
BILIRUB SERPL-MCNC: 0.31 MG/DL (ref 0.2–1)
BUN SERPL-MCNC: 21 MG/DL (ref 5–25)
CALCIUM ALBUM COR SERPL-MCNC: 9.8 MG/DL (ref 8.3–10.1)
CALCIUM SERPL-MCNC: 9.2 MG/DL (ref 8.3–10.1)
CHLORIDE SERPL-SCNC: 107 MMOL/L (ref 100–108)
CO2 SERPL-SCNC: 29 MMOL/L (ref 21–32)
CREAT SERPL-MCNC: 0.8 MG/DL (ref 0.6–1.3)
EOSINOPHIL # BLD AUTO: 0.41 THOUSAND/ΜL (ref 0–0.61)
EOSINOPHIL NFR BLD AUTO: 5 % (ref 0–6)
ERYTHROCYTE [DISTWIDTH] IN BLOOD BY AUTOMATED COUNT: 14.5 % (ref 11.6–15.1)
GFR SERPL CREATININE-BSD FRML MDRD: 84 ML/MIN/1.73SQ M
GLUCOSE SERPL-MCNC: 126 MG/DL (ref 65–140)
HCT VFR BLD AUTO: 41.7 % (ref 34.8–46.1)
HGB BLD-MCNC: 13 G/DL (ref 11.5–15.4)
IMM GRANULOCYTES # BLD AUTO: 0.03 THOUSAND/UL (ref 0–0.2)
IMM GRANULOCYTES NFR BLD AUTO: 0 % (ref 0–2)
LYMPHOCYTES # BLD AUTO: 2.25 THOUSANDS/ΜL (ref 0.6–4.47)
LYMPHOCYTES NFR BLD AUTO: 25 % (ref 14–44)
MCH RBC QN AUTO: 27.1 PG (ref 26.8–34.3)
MCHC RBC AUTO-ENTMCNC: 31.2 G/DL (ref 31.4–37.4)
MCV RBC AUTO: 87 FL (ref 82–98)
MONOCYTES # BLD AUTO: 0.57 THOUSAND/ΜL (ref 0.17–1.22)
MONOCYTES NFR BLD AUTO: 6 % (ref 4–12)
NEUTROPHILS # BLD AUTO: 5.87 THOUSANDS/ΜL (ref 1.85–7.62)
NEUTS SEG NFR BLD AUTO: 64 % (ref 43–75)
NRBC BLD AUTO-RTO: 0 /100 WBCS
PLATELET # BLD AUTO: 304 THOUSANDS/UL (ref 149–390)
PMV BLD AUTO: 11.4 FL (ref 8.9–12.7)
POTASSIUM SERPL-SCNC: 4 MMOL/L (ref 3.5–5.3)
PROT SERPL-MCNC: 7.7 G/DL (ref 6.4–8.2)
RBC # BLD AUTO: 4.8 MILLION/UL (ref 3.81–5.12)
SODIUM SERPL-SCNC: 141 MMOL/L (ref 136–145)
WBC # BLD AUTO: 9.17 THOUSAND/UL (ref 4.31–10.16)

## 2021-01-15 PROCEDURE — 96413 CHEMO IV INFUSION 1 HR: CPT

## 2021-01-15 PROCEDURE — 96417 CHEMO IV INFUS EACH ADDL SEQ: CPT

## 2021-01-15 PROCEDURE — 86300 IMMUNOASSAY TUMOR CA 15-3: CPT

## 2021-01-15 PROCEDURE — 80053 COMPREHEN METABOLIC PANEL: CPT

## 2021-01-15 PROCEDURE — 85025 COMPLETE CBC W/AUTO DIFF WBC: CPT

## 2021-01-15 RX ORDER — SODIUM CHLORIDE 9 MG/ML
20 INJECTION, SOLUTION INTRAVENOUS ONCE
Status: COMPLETED | OUTPATIENT
Start: 2021-01-15 | End: 2021-01-15

## 2021-01-15 RX ADMIN — SODIUM CHLORIDE 20 ML/HR: 9 INJECTION, SOLUTION INTRAVENOUS at 14:58

## 2021-01-15 RX ADMIN — PERTUZUMAB 420 MG: 30 INJECTION, SOLUTION, CONCENTRATE INTRAVENOUS at 15:09

## 2021-01-15 RX ADMIN — TRASTUZUMAB 600 MG: 150 INJECTION, POWDER, LYOPHILIZED, FOR SOLUTION INTRAVENOUS at 15:43

## 2021-01-15 NOTE — PROGRESS NOTES
Pt arrived on unit for chemo  Pt offers no complaints at this time besides fatigue and neuropathy  RCW port accessed, routine labs drawn  Will cont to kevin

## 2021-01-15 NOTE — PROGRESS NOTES
Pt tolerated infusion without adverse reaction  Ambulated off unit with steady gait  Pt aware of next appt

## 2021-01-16 LAB — CANCER AG27-29 SERPL-ACNC: 29.9 U/ML (ref 0–42.3)

## 2021-01-20 ENCOUNTER — TELEPHONE (OUTPATIENT)
Dept: SURGICAL ONCOLOGY | Facility: CLINIC | Age: 56
End: 2021-01-20

## 2021-01-20 NOTE — TELEPHONE ENCOUNTER
Patient is not  having any symptoms of  fever, cough, shortness of breath, chills, repeated shaking with chills, muscle pain, headache, sore throat, or new loss of taste/smell  Patient has not  Tested negative for COVID -19  Patient has not been in contact with someone confirmed to have COVID-19  Patient has not travelled in the past 14 days  Reviewed masking policy with patient  Reviewed visitor policy with patient

## 2021-01-21 ENCOUNTER — OFFICE VISIT (OUTPATIENT)
Dept: SURGICAL ONCOLOGY | Facility: CLINIC | Age: 56
End: 2021-01-21
Payer: COMMERCIAL

## 2021-01-21 VITALS
HEIGHT: 65 IN | SYSTOLIC BLOOD PRESSURE: 160 MMHG | DIASTOLIC BLOOD PRESSURE: 84 MMHG | WEIGHT: 216 LBS | BODY MASS INDEX: 35.99 KG/M2 | HEART RATE: 65 BPM | TEMPERATURE: 98 F

## 2021-01-21 DIAGNOSIS — Z78.9 NEED FOR FOLLOW-UP BY SOCIAL WORKER: ICD-10-CM

## 2021-01-21 DIAGNOSIS — Z15.02 BRCA1 GENE MUTATION POSITIVE IN FEMALE: ICD-10-CM

## 2021-01-21 DIAGNOSIS — Z15.09 BRCA1 GENE MUTATION POSITIVE IN FEMALE: ICD-10-CM

## 2021-01-21 DIAGNOSIS — C79.51 BONE METASTASIS (HCC): ICD-10-CM

## 2021-01-21 DIAGNOSIS — R92.2 DENSE BREAST TISSUE: ICD-10-CM

## 2021-01-21 DIAGNOSIS — Z15.01 BRCA1 GENE MUTATION POSITIVE IN FEMALE: ICD-10-CM

## 2021-01-21 DIAGNOSIS — Z79.811 AROMATASE INHIBITOR USE: ICD-10-CM

## 2021-01-21 DIAGNOSIS — Z17.0 MALIGNANT NEOPLASM OF UPPER-INNER QUADRANT OF LEFT BREAST IN FEMALE, ESTROGEN RECEPTOR POSITIVE (HCC): Primary | ICD-10-CM

## 2021-01-21 DIAGNOSIS — Z79.899 LONG TERM USE OF DRUG: ICD-10-CM

## 2021-01-21 DIAGNOSIS — C78.7 LIVER METASTASES (HCC): ICD-10-CM

## 2021-01-21 DIAGNOSIS — Z95.828 PORT-A-CATH IN PLACE: ICD-10-CM

## 2021-01-21 DIAGNOSIS — C50.212 MALIGNANT NEOPLASM OF UPPER-INNER QUADRANT OF LEFT BREAST IN FEMALE, ESTROGEN RECEPTOR POSITIVE (HCC): Primary | ICD-10-CM

## 2021-01-21 PROCEDURE — 99214 OFFICE O/P EST MOD 30 MIN: CPT | Performed by: SURGERY

## 2021-01-21 NOTE — PROGRESS NOTES
Surgical Oncology Follow Up       [facility] SURGICAL ONCOLOGY [facility] [address]    Alejandrina Barba  1965  [id number]  [facility] SURGICAL ONCOLOGY [facility] [address]    Chief Complaint   Patient presents with    Follow-up       Assessment/Plan   Diagnoses and all orders for this visit:    Malignant neoplasm of upper-inner quadrant of left breast in female, estrogen receptor positive ([facility] )    Liver metastases ([facility] )    Bone metastasis ([facility] )    Port-A-Cath in place    Long term use of drug    BRCA1 gene mutation positive in female    Aromatase inhibitor use    Dense breast tissue        Advance Care Planning/Advance Directives:  Discussed disease status, cancer treatment plans and/or cancer treatment goals with the patient  Oncology History:    Oncology History   Malignant neoplasm of upper-inner quadrant of left female breast ([facility] )   12/2015 Initial Diagnosis    Malignant neoplasm of upper-outer quadrant of left female breast ([facility] [address] )     2016 -  Hormone Therapy    Tamoxifen  Ended 8/2019  Dr Alicia Meng  Letrozole 9/2019 1/27/2016 Surgery    Port placement     2/19/2016 - 6/2/2018 Chemotherapy    Taxotere,  herceptin, perjeta, xgeva  After six cycles, taxotere was discontinued and tamoxifen added     Continues with Perjeta and Herceptin every 3 weeks     - Dr Alicia Meng       9/16/2016 Surgery    Left breast partial mastectomy     11/7/2016 - 12/23/2016 Radiation    Plan ID Energy Fractions Dose per Fraction (cGy) Total Dose Delivered (cGy) Elapsed Days   Lt Breast 6X 25 / 25 200 5,000 36   Lt Brst Boost 6X 8 / 8 200 1,600 9   Lt Femur 10X 10 / 10 300 3,000 11   Lt PAB 6X 25 / 25 60 1,500 36   Lt Sclav 6X 25 / 25 200 5,000 36                                   Total dose to left breast lumpectomy cavity: 6600 cGy                   Total dose to the supraclavicular and axillary regions: 5000 cGy       4/17/2019 -  Chemotherapy    pertuzumab (PERJETA) 840 mg in sodium chloride 0 9 % 250 mL IVPB, 840 mg, Intravenous, Once, 23 of 29 cycles  Administration: 420 mg (5/17/2019), 420 mg (6/7/2019), 420 mg (6/28/2019), 420 mg (7/19/2019), 420 mg (8/30/2019), 420 mg (9/20/2019), 420 mg (8/9/2019), 420 mg (10/9/2019), 420 mg (11/1/2019), 420 mg (11/22/2019), 420 mg (12/13/2019), 420 mg (1/3/2020), 420 mg (1/24/2020), 420 mg (2/14/2020), 420 mg (3/6/2020), 420 mg (3/27/2020), 420 mg (4/17/2020), 420 mg (5/8/2020), 420 mg (5/29/2020), 420 mg (6/19/2020), 420 mg (7/10/2020), 420 mg (7/31/2020)  trastuzumab (HERCEPTIN) 589 mg in sodium chloride 0 9 % 250 mL chemo infusion, 6 mg/kg = 589 mg (75 % of original dose 8 mg/kg), Intravenous, Once, 23 of 29 cycles  Dose modification: 6 mg/kg (original dose 8 mg/kg, Cycle 1, Reason: Other (See Comments))  Administration: 589 mg (5/17/2019), 589 mg (6/7/2019), 600 mg (6/28/2019), 600 mg (7/19/2019), 600 mg (8/30/2019), 600 mg (9/20/2019), 600 mg (8/9/2019), 600 mg (10/9/2019), 600 mg (11/1/2019), 600 mg (11/22/2019), 600 mg (12/13/2019), 600 mg (1/3/2020), 600 mg (1/24/2020), 600 mg (2/14/2020), 600 mg (3/6/2020), 600 mg (3/27/2020), 600 mg (4/17/2020), 600 mg (5/8/2020), 600 mg (5/29/2020), 600 mg (6/19/2020), 600 mg (7/10/2020), 600 mg (7/31/2020)     8/16/2019 Surgery    she underwent BSO       Liver metastases ([facility] )   4/27/2018 Initial Diagnosis    Liver metastases ([facility] [address] )     4/17/2019 -  Chemotherapy    pertuzumab (PERJETA) 840 mg in sodium chloride 0 9 % 250 mL IVPB, 840 mg, Intravenous, Once, 23 of 29 cycles  Administration: 420 mg (5/17/2019), 420 mg (6/7/2019), 420 mg (6/28/2019), 420 mg (7/19/2019), 420 mg (8/30/2019), 420 mg (9/20/2019), 420 mg (8/9/2019), 420 mg (10/9/2019), 420 mg (11/1/2019), 420 mg (11/22/2019), 420 mg (12/13/2019), 420 mg (1/3/2020), 420 mg (1/24/2020), 420 mg (2/14/2020), 420 mg (3/6/2020), 420 mg (3/27/2020), 420 mg (4/17/2020), 420 mg (5/8/2020), 420 mg (5/29/2020), 420 mg (6/19/2020), 420 mg (7/10/2020), 420 mg (7/31/2020)  trastuzumab (HERCEPTIN) 589 mg in sodium chloride 0 9 % 250 mL chemo infusion, 6 mg/kg = 589 mg (75 % of original dose 8 mg/kg), Intravenous, Once, 23 of 29 cycles  Dose modification: 6 mg/kg (original dose 8 mg/kg, Cycle 1, Reason: Other (See Comments))  Administration: 589 mg (5/17/2019), 589 mg (6/7/2019), 600 mg (6/28/2019), 600 mg (7/19/2019), 600 mg (8/30/2019), 600 mg (9/20/2019), 600 mg (8/9/2019), 600 mg (10/9/2019), 600 mg (11/1/2019), 600 mg (11/22/2019), 600 mg (12/13/2019), 600 mg (1/3/2020), 600 mg (1/24/2020), 600 mg (2/14/2020), 600 mg (3/6/2020), 600 mg (3/27/2020), 600 mg (4/17/2020), 600 mg (5/8/2020), 600 mg (5/29/2020), 600 mg (6/19/2020), 600 mg (7/10/2020), 600 mg (7/31/2020)     Bone metastasis ([address] )   4/27/2018 Initial Diagnosis    Bone metastasis ([facility] [address] )     4/17/2019 -  Chemotherapy    pertuzumab (PERJETA) 840 mg in sodium chloride 0 9 % 250 mL IVPB, 840 mg, Intravenous, Once, 23 of 29 cycles  Administration: 420 mg (5/17/2019), 420 mg (6/7/2019), 420 mg (6/28/2019), 420 mg (7/19/2019), 420 mg (8/30/2019), 420 mg (9/20/2019), 420 mg (8/9/2019), 420 mg (10/9/2019), 420 mg (11/1/2019), 420 mg (11/22/2019), 420 mg (12/13/2019), 420 mg (1/3/2020), 420 mg (1/24/2020), 420 mg (2/14/2020), 420 mg (3/6/2020), 420 mg (3/27/2020), 420 mg (4/17/2020), 420 mg (5/8/2020), 420 mg (5/29/2020), 420 mg (6/19/2020), 420 mg (7/10/2020), 420 mg (7/31/2020)  trastuzumab (HERCEPTIN) 589 mg in sodium chloride 0 9 % 250 mL chemo infusion, 6 mg/kg = 589 mg (75 % of original dose 8 mg/kg), Intravenous, Once, 23 of 29 cycles  Dose modification: 6 mg/kg (original dose 8 mg/kg, Cycle 1, Reason: Other (See Comments))  Administration: 589 mg (5/17/2019), 589 mg (6/7/2019), 600 mg (6/28/2019), 600 mg (7/19/2019), 600 mg (8/30/2019), 600 mg (9/20/2019), 600 mg (8/9/2019), 600 mg (10/9/2019), 600 mg (11/1/2019), 600 mg (11/22/2019), 600 mg (12/13/2019), 600 mg (1/3/2020), 600 mg (1/24/2020), 600 mg (2/14/2020), 600 mg (3/6/2020), 600 mg (3/27/2020), 600 mg (4/17/2020), 600 mg (5/8/2020), 600 mg (5/29/2020), 600 mg (6/19/2020), 600 mg (7/10/2020), 600 mg (7/31/2020)         History of Present Illness:  Breast cancer follow-up, no specific concerns  -Interval History:  None    Review of Systems:  Review of Systems   Constitutional: Negative  Negative for appetite change and fever  Eyes: Negative  Respiratory: Negative for shortness of breath  Cardiovascular: Negative  Gastrointestinal: Negative  Endocrine: Negative  Genitourinary: Negative  Musculoskeletal: Negative  Negative for arthralgias and myalgias  Skin: Negative  Allergic/Immunologic: Negative  Neurological: Negative  Hematological: Negative  Negative for adenopathy  Does not bruise/bleed easily  Psychiatric/Behavioral: Negative          Patient Active Problem List   Diagnosis    Malignant neoplasm of upper-inner quadrant of left female breast ([address] )    Liver metastases ([address] )    Bone metastasis ([address] )    Port-A-Cath in place    Long term use of drug    BRCA1 gene mutation positive in female    History of bilateral salpingo-oophorectomy (BSO)    S/P BSO (bilateral salpingo-oophorectomy)    Aromatase inhibitor use    Dense breast tissue    Cardiomyopathy due to chemotherapy ([address] )    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     Past Medical History:   Diagnosis Date    Hx of radiation therapy     breast and left  hip    Liver metastases ([address] )     Port-A-Cath in place     right chest    Wears glasses      Past Surgical History:   Procedure Laterality Date    BREAST BIOPSY Left 12/30/2015    BREAST LUMPECTOMY Left 09/16/2016    BREAST SURGERY Left     biopsy    CHOLECYSTECTOMY      CHOLECYSTECTOMY      PORTACATH PLACEMENT Right     DC LAP,RMV  ADNEXAL STRUCTURE Bilateral 8/16/2019    Procedure: LAPAROSCOPIC SALPINGO-OOPHORECTOMY;  Surgeon: Mary Armando MD;  Location: [facility];  Service: Gynecology Oncology    SIMPLE MASTECTOMY Left 2016    Procedure: BREAST PARTIAL MASTECTOMY ;  Surgeon: Radha Long MD;  Location: [facility];  Service:      Family History   Problem Relation Age of Onset    No Known Problems Mother     Heart attack Father     No Known Problems Daughter     No Known Problems Maternal Grandmother     No Known Problems Maternal Grandfather     No Known Problems Paternal Aunt     No Known Problems Paternal Aunt     Lung cancer Maternal Uncle 79     Social History     Socioeconomic History    Marital status: /Civil Union     Spouse name: Not on file    Number of children: Not on file    Years of education: Not on file    Highest education level: Not on file   Occupational History    Not on file   Social Needs    Financial resource strain: Not on file    Food insecurity     Worry: Not on file     Inability: Not on file    Transportation needs     Medical: Not on file     Non-medical: Not on file   Tobacco Use    Smoking status: Former Smoker     Quit date: 2009     Years since quittin 4    Smokeless tobacco: Never Used   Substance and Sexual Activity    Alcohol use: No    Drug use: No    Sexual activity: Not on file   Lifestyle    Physical activity     Days per week: Not on file     Minutes per session: Not on file    Stress: Not on file   Relationships    Social connections     Talks on phone: Not on file     Gets together: Not on file     Attends [employer] service: Not on file     Active member of club or organization: Not on file     Attends meetings of clubs or organizations: Not on file     Relationship status: Not on file    Intimate partner violence     Fear of current or ex partner: Not on file     Emotionally abused: Not on file     Physically abused: Not on file     Forced sexual activity: Not on file   Other Topics Concern    Not on file Social History Narrative    Not on file       Current Outpatient Medications:     letrozole (FEMARA) 2 5 mg tablet, Take 1 tablet (2 5 mg total) by mouth daily, Disp: 30 tablet, Rfl: 6    multivitamin (THERAGRAN) TABS, Take 1 tablet by mouth 3 (three) times a week , Disp: , Rfl:     pertuzumab (PERJETA) 420 mg/14 mL SOLN, Infuse into a venous catheter every 21 days  At infusion  center, Disp: , Rfl:     Trastuzumab (HERCEPTIN IV), Infuse into a venous catheter every 21 days  At infusion center, Disp: , Rfl:     VITAMIN D, CHOLECALCIFEROL, PO, Take by mouth 3 (three) times a week , Disp: , Rfl:   No Known Allergies    The following portions of the patient's history were reviewed and updated as appropriate: allergies, current medications, past family history, past medical history, past social history, past surgical history and problem list         Vitals:    01/21/21 1550   BP: 160/84   Pulse: 65   Temp: 98 °F (36 7 °C)       Physical Exam  Constitutional:       General: She is not in acute distress  Appearance: She is well-developed  HENT:      Head: Normocephalic and atraumatic  Cardiovascular:      Heart sounds: Normal heart sounds  Pulmonary:      Breath sounds: Normal breath sounds  Chest:      Breasts: Breasts are asymmetrical          Right: No inverted nipple, mass, nipple discharge, skin change or tenderness  Left: Skin change (Lumpectomy scar with radiation changes) present  No inverted nipple, mass, nipple discharge or tenderness  Comments: Tightness at the left pectoralis edge  Abdominal:      Palpations: Abdomen is soft  Musculoskeletal:      Left shoulder: She exhibits decreased range of motion  Lymphadenopathy:      Upper Body:      Right upper body: No supraclavicular, axillary or pectoral adenopathy  Left upper body: No supraclavicular, axillary or pectoral adenopathy  Neurological:      Mental Status: She is alert and oriented to person, place, and time  Psychiatric:         Mood and Affect: Mood normal            Results:  Labs:  01/15/2021 CA 27 29 is normal    Imaging  06/12/2020 bilateral 3D diagnostic mammogram is benign BI-RADS two with a density of three    I reviewed the above laboratory and imaging data  Discussion/Summary:  62-year-old female with metastatic breast carcinoma  She is also BRCA one positive  She continues on letrozole along with anti HER2 therapy  There is no evidence of disease based on examination today  She is scheduled for scans next month for her medical oncologist   She is asking if she needs these  I informed her it has been sometime since she has any staging scans; however, with the normal tumor marker, she could discuss bumping this out with her medical oncologist   Her last mammogram was June of 2020  This was benign  She would like to hold off on this as well  I will therefore see her again in six months for another exam or sooner should the need arise

## 2021-01-22 ENCOUNTER — PATIENT OUTREACH (OUTPATIENT)
Dept: CASE MANAGEMENT | Facility: HOSPITAL | Age: 56
End: 2021-01-22

## 2021-01-22 NOTE — PROGRESS NOTES
Oncology LSW received pt's completed distress thermometer in which pt self scored a 5/10 but did not indicate any physical or emotional stressors  Because pt scored a "5", a supportive call is warranted  Pt has a h/o breast cancer  Phoned pt today  Introduced self and role  Pt stated she marked off a "5" because of the current COVID pandemic and current events  In regards to her cancer history, she feels very confident with her treatments and care team   Confirmed pt has good support at home  No other SW needs identified at this time  Pt expressed appreciation for call

## 2021-02-05 ENCOUNTER — HOSPITAL ENCOUNTER (OUTPATIENT)
Dept: INFUSION CENTER | Facility: CLINIC | Age: 56
Discharge: HOME | End: 2021-02-05
Payer: COMMERCIAL

## 2021-02-05 VITALS — WEIGHT: 224.87 LBS | BODY MASS INDEX: 37.42 KG/M2

## 2021-02-05 DIAGNOSIS — C79.51 BONE METASTASIS (HCC): Primary | ICD-10-CM

## 2021-02-05 DIAGNOSIS — Z90.79 HISTORY OF BILATERAL SALPINGO-OOPHORECTOMY (BSO): ICD-10-CM

## 2021-02-05 DIAGNOSIS — C50.212 MALIGNANT NEOPLASM OF UPPER-INNER QUADRANT OF LEFT BREAST IN FEMALE, ESTROGEN RECEPTOR POSITIVE (HCC): ICD-10-CM

## 2021-02-05 DIAGNOSIS — C78.7 LIVER METASTASES (HCC): ICD-10-CM

## 2021-02-05 DIAGNOSIS — Z90.722 HISTORY OF BILATERAL SALPINGO-OOPHORECTOMY (BSO): ICD-10-CM

## 2021-02-05 DIAGNOSIS — Z17.0 MALIGNANT NEOPLASM OF UPPER-INNER QUADRANT OF LEFT BREAST IN FEMALE, ESTROGEN RECEPTOR POSITIVE (HCC): ICD-10-CM

## 2021-02-05 PROCEDURE — 96417 CHEMO IV INFUS EACH ADDL SEQ: CPT

## 2021-02-05 PROCEDURE — 96413 CHEMO IV INFUSION 1 HR: CPT

## 2021-02-05 RX ORDER — SODIUM CHLORIDE 9 MG/ML
20 INJECTION, SOLUTION INTRAVENOUS ONCE
Status: COMPLETED | OUTPATIENT
Start: 2021-02-05 | End: 2021-02-05

## 2021-02-05 RX ORDER — SODIUM CHLORIDE 9 MG/ML
20 INJECTION, SOLUTION INTRAVENOUS ONCE
Status: CANCELLED | OUTPATIENT
Start: 2021-02-05

## 2021-02-05 RX ADMIN — SODIUM CHLORIDE 20 ML/HR: 0.9 INJECTION, SOLUTION INTRAVENOUS at 15:05

## 2021-02-05 RX ADMIN — PERTUZUMAB 420 MG: 30 INJECTION, SOLUTION, CONCENTRATE INTRAVENOUS at 15:21

## 2021-02-05 RX ADMIN — TRASTUZUMAB 600 MG: 150 INJECTION, POWDER, LYOPHILIZED, FOR SOLUTION INTRAVENOUS at 15:53

## 2021-02-23 ENCOUNTER — HOSPITAL ENCOUNTER (OUTPATIENT)
Dept: NON INVASIVE DIAGNOSTICS | Facility: HOSPITAL | Age: 56
Discharge: HOME | End: 2021-02-23
Attending: INTERNAL MEDICINE
Payer: COMMERCIAL

## 2021-02-23 DIAGNOSIS — T45.1X5A CARDIOMYOPATHY DUE TO CHEMOTHERAPY (HCC): ICD-10-CM

## 2021-02-23 DIAGNOSIS — I42.7 CARDIOMYOPATHY DUE TO CHEMOTHERAPY (HCC): ICD-10-CM

## 2021-02-23 PROCEDURE — 93356 MYOCRD STRAIN IMG SPCKL TRCK: CPT

## 2021-02-23 PROCEDURE — 93308 TTE F-UP OR LMTD: CPT | Performed by: INTERNAL MEDICINE

## 2021-02-23 PROCEDURE — 93325 DOPPLER ECHO COLOR FLOW MAPG: CPT | Performed by: INTERNAL MEDICINE

## 2021-02-23 PROCEDURE — 93308 TTE F-UP OR LMTD: CPT

## 2021-02-23 PROCEDURE — 93321 DOPPLER ECHO F-UP/LMTD STD: CPT | Performed by: INTERNAL MEDICINE

## 2021-02-24 ENCOUNTER — HOSPITAL ENCOUNTER (OUTPATIENT)
Dept: NUCLEAR MEDICINE | Facility: HOSPITAL | Age: 56
Discharge: HOME | End: 2021-02-24
Attending: INTERNAL MEDICINE
Payer: COMMERCIAL

## 2021-02-24 ENCOUNTER — HOSPITAL ENCOUNTER (OUTPATIENT)
Dept: CT IMAGING | Facility: HOSPITAL | Age: 56
Discharge: HOME | End: 2021-02-24
Attending: INTERNAL MEDICINE
Payer: COMMERCIAL

## 2021-02-24 DIAGNOSIS — Z90.79 HISTORY OF BILATERAL SALPINGO-OOPHORECTOMY (BSO): Primary | ICD-10-CM

## 2021-02-24 DIAGNOSIS — C78.7 LIVER METASTASES (HCC): ICD-10-CM

## 2021-02-24 DIAGNOSIS — C50.212 MALIGNANT NEOPLASM OF UPPER-INNER QUADRANT OF LEFT BREAST IN FEMALE, ESTROGEN RECEPTOR POSITIVE (HCC): ICD-10-CM

## 2021-02-24 DIAGNOSIS — C79.51 BONE METASTASIS (HCC): ICD-10-CM

## 2021-02-24 DIAGNOSIS — Z15.02 BRCA1 GENE MUTATION POSITIVE IN FEMALE: ICD-10-CM

## 2021-02-24 DIAGNOSIS — Z90.722 HISTORY OF BILATERAL SALPINGO-OOPHORECTOMY (BSO): Primary | ICD-10-CM

## 2021-02-24 DIAGNOSIS — Z15.01 BRCA1 GENE MUTATION POSITIVE IN FEMALE: ICD-10-CM

## 2021-02-24 DIAGNOSIS — Z15.09 BRCA1 GENE MUTATION POSITIVE IN FEMALE: ICD-10-CM

## 2021-02-24 DIAGNOSIS — Z17.0 MALIGNANT NEOPLASM OF UPPER-INNER QUADRANT OF LEFT BREAST IN FEMALE, ESTROGEN RECEPTOR POSITIVE (HCC): ICD-10-CM

## 2021-02-24 DIAGNOSIS — Z14.8 CARRIER OF GENE MUTATION FOR HIGH RISK OF CANCER: ICD-10-CM

## 2021-02-24 PROCEDURE — 74160 CT ABDOMEN W/CONTRAST: CPT

## 2021-02-24 PROCEDURE — 78306 BONE IMAGING WHOLE BODY: CPT

## 2021-02-24 PROCEDURE — G1004 CDSM NDSC: HCPCS

## 2021-02-24 PROCEDURE — A9503 TC99M MEDRONATE: HCPCS

## 2021-02-24 PROCEDURE — 71260 CT THORAX DX C+: CPT

## 2021-02-24 RX ADMIN — IODIXANOL 100 ML: 320 INJECTION, SOLUTION INTRAVASCULAR at 08:28

## 2021-02-26 ENCOUNTER — HOSPITAL ENCOUNTER (OUTPATIENT)
Dept: INFUSION CENTER | Facility: CLINIC | Age: 56
Discharge: HOME | End: 2021-02-26
Payer: COMMERCIAL

## 2021-02-26 VITALS
WEIGHT: 223.11 LBS | DIASTOLIC BLOOD PRESSURE: 93 MMHG | HEART RATE: 73 BPM | TEMPERATURE: 98.6 F | RESPIRATION RATE: 18 BRPM | SYSTOLIC BLOOD PRESSURE: 169 MMHG | BODY MASS INDEX: 37.13 KG/M2

## 2021-02-26 DIAGNOSIS — C50.212 MALIGNANT NEOPLASM OF UPPER-INNER QUADRANT OF LEFT BREAST IN FEMALE, ESTROGEN RECEPTOR POSITIVE (HCC): ICD-10-CM

## 2021-02-26 DIAGNOSIS — Z90.722 HISTORY OF BILATERAL SALPINGO-OOPHORECTOMY (BSO): ICD-10-CM

## 2021-02-26 DIAGNOSIS — C78.7 LIVER METASTASES (HCC): ICD-10-CM

## 2021-02-26 DIAGNOSIS — C79.51 BONE METASTASIS (HCC): Primary | ICD-10-CM

## 2021-02-26 DIAGNOSIS — Z17.0 MALIGNANT NEOPLASM OF UPPER-INNER QUADRANT OF LEFT BREAST IN FEMALE, ESTROGEN RECEPTOR POSITIVE (HCC): ICD-10-CM

## 2021-02-26 DIAGNOSIS — Z90.79 HISTORY OF BILATERAL SALPINGO-OOPHORECTOMY (BSO): ICD-10-CM

## 2021-02-26 PROCEDURE — 96417 CHEMO IV INFUS EACH ADDL SEQ: CPT

## 2021-02-26 PROCEDURE — 96413 CHEMO IV INFUSION 1 HR: CPT

## 2021-02-26 RX ORDER — SODIUM CHLORIDE 9 MG/ML
20 INJECTION, SOLUTION INTRAVENOUS ONCE
Status: CANCELLED | OUTPATIENT
Start: 2021-02-26

## 2021-02-26 RX ORDER — SODIUM CHLORIDE 9 MG/ML
20 INJECTION, SOLUTION INTRAVENOUS ONCE
Status: COMPLETED | OUTPATIENT
Start: 2021-02-26 | End: 2021-02-26

## 2021-02-26 RX ADMIN — PERTUZUMAB 420 MG: 30 INJECTION, SOLUTION, CONCENTRATE INTRAVENOUS at 14:39

## 2021-02-26 RX ADMIN — SODIUM CHLORIDE 20 ML/HR: 0.9 INJECTION, SOLUTION INTRAVENOUS at 14:10

## 2021-02-26 RX ADMIN — TRASTUZUMAB 600 MG: 150 INJECTION, POWDER, LYOPHILIZED, FOR SOLUTION INTRAVENOUS at 15:14

## 2021-02-26 NOTE — PLAN OF CARE
Problem: Potential for Falls  Goal: Patient will remain free of falls  Description: INTERVENTIONS:  - Assess patient frequently for physical needs  -  Identify cognitive and physical deficits and behaviors that affect risk of falls    -  [place] fall precautions as indicated by assessment   - Educate patient/family on patient safety including physical limitations  - Instruct patient to call for assistance with activity based on assessment  - Modify environment to reduce risk of injury  - Consider OT/PT consult to assist with strengthening/mobility  Outcome: Progressing

## 2021-03-03 ENCOUNTER — OFFICE VISIT (OUTPATIENT)
Dept: HEMATOLOGY ONCOLOGY | Facility: CLINIC | Age: 56
End: 2021-03-03
Payer: COMMERCIAL

## 2021-03-03 VITALS
TEMPERATURE: 97.9 F | RESPIRATION RATE: 14 BRPM | WEIGHT: 224.2 LBS | HEART RATE: 68 BPM | BODY MASS INDEX: 37.36 KG/M2 | SYSTOLIC BLOOD PRESSURE: 138 MMHG | DIASTOLIC BLOOD PRESSURE: 88 MMHG | HEIGHT: 65 IN

## 2021-03-03 DIAGNOSIS — Z14.8 CARRIER OF GENE MUTATION FOR HIGH RISK OF CANCER: ICD-10-CM

## 2021-03-03 DIAGNOSIS — I42.7 CARDIOMYOPATHY DUE TO CHEMOTHERAPY (HCC): Primary | ICD-10-CM

## 2021-03-03 DIAGNOSIS — T45.1X5A CARDIOMYOPATHY DUE TO CHEMOTHERAPY (HCC): Primary | ICD-10-CM

## 2021-03-03 DIAGNOSIS — Z90.722 HISTORY OF BILATERAL SALPINGO-OOPHORECTOMY (BSO): ICD-10-CM

## 2021-03-03 DIAGNOSIS — Z90.79 HISTORY OF BILATERAL SALPINGO-OOPHORECTOMY (BSO): ICD-10-CM

## 2021-03-03 DIAGNOSIS — Z15.02 BRCA1 GENE MUTATION POSITIVE IN FEMALE: ICD-10-CM

## 2021-03-03 DIAGNOSIS — Z79.811 AROMATASE INHIBITOR USE: ICD-10-CM

## 2021-03-03 DIAGNOSIS — C79.51 BONE METASTASIS (HCC): ICD-10-CM

## 2021-03-03 DIAGNOSIS — Z15.01 BRCA1 GENE MUTATION POSITIVE IN FEMALE: ICD-10-CM

## 2021-03-03 DIAGNOSIS — C50.212 MALIGNANT NEOPLASM OF UPPER-INNER QUADRANT OF LEFT BREAST IN FEMALE, ESTROGEN RECEPTOR POSITIVE (HCC): ICD-10-CM

## 2021-03-03 DIAGNOSIS — Z95.828 PORT-A-CATH IN PLACE: ICD-10-CM

## 2021-03-03 DIAGNOSIS — C78.7 LIVER METASTASES (HCC): ICD-10-CM

## 2021-03-03 DIAGNOSIS — Z15.09 BRCA1 GENE MUTATION POSITIVE IN FEMALE: ICD-10-CM

## 2021-03-03 DIAGNOSIS — Z17.0 MALIGNANT NEOPLASM OF UPPER-INNER QUADRANT OF LEFT BREAST IN FEMALE, ESTROGEN RECEPTOR POSITIVE (HCC): ICD-10-CM

## 2021-03-03 PROCEDURE — 99214 OFFICE O/P EST MOD 30 MIN: CPT | Performed by: INTERNAL MEDICINE

## 2021-03-03 NOTE — PROGRESS NOTES
HPI:    In 2016 patient was diagnosed to have de Tricia  triple positive  stage IV breast cancer with metastatic disease to bones and liver and  she had locally advanced disease in the left breast   Patient  responded nicely to Taxotere plus Herceptin and Perjeta and after 6 cycles Taxotere was discontinued and tamoxifen was added    Herceptin and Perjeta were continued  Artur Carter radiation to left hip for palliation    On 08/16/2019 she underwent BSO    Tamoxifen was changed to letrozole     Presently she is on Herceptin, Perjeta and letrozole  Douglas Leija has been taking vitamin-D and calcium and is staying active    She has been tolerating treatments without much problem   She goes for blood tests and echocardiogram on regular basis  There was no metastatic disease on the recent CT scans and bone scan     In September 2016 patient had lumpectomy of left breast followed by radiation therapy   At the time of lumpectomy there was minimal residual disease, 1 4 cm   Lymph nodes were not sampled  Later she was found to have positive BRCA 1 mutation     Patient is aware that she is at high risk for other cancers like breast cancer, ovarian cancer, pancreatic cancer, peritoneal cancer, melanoma and other cancers  Patient goes to breast surgeon for evaluation and imaging studies  She gets CT scans and that will check the pancreas  She had BSO  She does not want to go to a dermatologist for melanoma mapping  She does not want to go to ophthalmologist for melanoma in the eye  Also she does not want to have colonoscopy and not even Cologuard or stool test for blood       She has grade 1 peripheral neuropathy    Has some tiredness and arthritic symptoms   For leg cramps at night and she is taking one baby aspirin daily with food in the evening and also one magnesium tablet daily and that is helping       Has less anxiety   Steph Jhaveri    She is not on Xgeva anymore at this time    She had Xgeva for 2 years  Douglas Leija continues to take    calcium and vitamin-D                              Current Outpatient Medications:     letrozole (FEMARA) 2 5 mg tablet, Take 1 tablet (2 5 mg total) by mouth daily, Disp: 30 tablet, Rfl: 6    multivitamin (THERAGRAN) TABS, Take 1 tablet by mouth 3 (three) times a week , Disp: , Rfl:     pertuzumab (PERJETA) 420 mg/14 mL SOLN, Infuse into a venous catheter every 21 days  At infusion  center, Disp: , Rfl:     Trastuzumab (HERCEPTIN IV), Infuse into a venous catheter every 21 days  At infusion center, Disp: , Rfl:     VITAMIN D, CHOLECALCIFEROL, PO, Take 1,000 Units by mouth 3 (three) times a week , Disp: , Rfl:     No Known Allergies    Oncology History   Malignant neoplasm of upper-inner quadrant of left female breast ([facility] [address] )   12/2015 Initial Diagnosis    Malignant neoplasm of upper-outer quadrant of left female breast ([facility] [address] )     2016 -  Hormone Therapy    Tamoxifen  Ended 8/2019  Dr eTx Howard  Letrozole 9/2019 1/27/2016 Surgery    Port placement     2/19/2016 - 6/2/2018 Chemotherapy    Taxotere,  herceptin, perjeta, xgeva  After six cycles, taxotere was discontinued and tamoxifen added     Continues with Perjeta and Herceptin every 3 weeks     - Dr Tex Howard       9/16/2016 Surgery    Left breast partial mastectomy     11/7/2016 - 12/23/2016 Radiation    Plan ID Energy Fractions Dose per Fraction (cGy) Total Dose Delivered (cGy) Elapsed Days   Lt Breast 6X 25 / 25 200 5,000 36   Lt Brst Boost 6X 8 / 8 200 1,600 9   Lt Femur 10X 10 / 10 300 3,000 11   Lt PAB 6X 25 / 25 60 1,500 36   Lt Sclav 6X 25 / 25 200 5,000 36                                   Total dose to left breast lumpectomy cavity: 6600 cGy                   Total dose to the supraclavicular and axillary regions: 5000 cGy       4/17/2019 -  Chemotherapy    pertuzumab (PERJETA) 840 mg in sodium chloride 0 9 % 250 mL IVPB, 840 mg, Intravenous, Once, 33 of 42 cycles  Administration: 420 mg (5/17/2019), 420 mg (6/7/2019), 420 mg (6/28/2019), 420 mg (7/19/2019), 420 mg (8/30/2019), 420 mg (9/20/2019), 420 mg (8/9/2019), 420 mg (10/9/2019), 420 mg (11/1/2019), 420 mg (11/22/2019), 420 mg (12/13/2019), 420 mg (1/3/2020), 420 mg (1/24/2020), 420 mg (2/14/2020), 420 mg (3/6/2020), 420 mg (3/27/2020), 420 mg (4/17/2020), 420 mg (5/8/2020), 420 mg (5/29/2020), 420 mg (6/19/2020), 420 mg (7/10/2020), 420 mg (7/31/2020), 420 mg (8/21/2020), 420 mg (9/11/2020), 420 mg (10/2/2020), 420 mg (10/23/2020), 420 mg (11/13/2020), 420 mg (12/4/2020), 420 mg (12/24/2020), 420 mg (1/15/2021), 420 mg (2/5/2021)  trastuzumab (HERCEPTIN) 589 mg in sodium chloride 0 9 % 250 mL chemo infusion, 6 mg/kg = 589 mg (75 % of original dose 8 mg/kg), Intravenous, Once, 33 of 42 cycles  Dose modification: 6 mg/kg (original dose 8 mg/kg, Cycle 1, Reason: Other (See Comments))  Administration: 589 mg (5/17/2019), 589 mg (6/7/2019), 600 mg (6/28/2019), 600 mg (7/19/2019), 600 mg (8/30/2019), 600 mg (9/20/2019), 600 mg (8/9/2019), 600 mg (10/9/2019), 600 mg (11/1/2019), 600 mg (11/22/2019), 600 mg (12/13/2019), 600 mg (1/3/2020), 600 mg (1/24/2020), 600 mg (2/14/2020), 600 mg (3/6/2020), 600 mg (3/27/2020), 600 mg (4/17/2020), 600 mg (5/8/2020), 600 mg (5/29/2020), 600 mg (6/19/2020), 600 mg (7/10/2020), 600 mg (7/31/2020), 600 mg (8/21/2020), 600 mg (9/11/2020), 600 mg (10/2/2020), 600 mg (10/23/2020), 600 mg (11/13/2020), 600 mg (12/4/2020), 600 mg (12/24/2020), 600 mg (1/15/2021), 600 mg (2/5/2021)     8/16/2019 Surgery    she underwent BSO       Liver metastases ([facility] [address] )   4/27/2018 Initial Diagnosis    Liver metastases ([facility] [address] )     4/17/2019 -  Chemotherapy    pertuzumab (PERJETA) 840 mg in sodium chloride 0 9 % 250 mL IVPB, 840 mg, Intravenous, Once, 33 of 42 cycles  Administration: 420 mg (5/17/2019), 420 mg (6/7/2019), 420 mg (6/28/2019), 420 mg (7/19/2019), 420 mg (8/30/2019), 420 mg (9/20/2019), 420 mg (8/9/2019), 420 mg (10/9/2019), 420 mg (11/1/2019), 420 mg (11/22/2019), 420 mg (12/13/2019), 420 mg (1/3/2020), 420 mg (1/24/2020), 420 mg (2/14/2020), 420 mg (3/6/2020), 420 mg (3/27/2020), 420 mg (4/17/2020), 420 mg (5/8/2020), 420 mg (5/29/2020), 420 mg (6/19/2020), 420 mg (7/10/2020), 420 mg (7/31/2020), 420 mg (8/21/2020), 420 mg (9/11/2020), 420 mg (10/2/2020), 420 mg (10/23/2020), 420 mg (11/13/2020), 420 mg (12/4/2020), 420 mg (12/24/2020), 420 mg (1/15/2021), 420 mg (2/5/2021)  trastuzumab (HERCEPTIN) 589 mg in sodium chloride 0 9 % 250 mL chemo infusion, 6 mg/kg = 589 mg (75 % of original dose 8 mg/kg), Intravenous, Once, 33 of 42 cycles  Dose modification: 6 mg/kg (original dose 8 mg/kg, Cycle 1, Reason: Other (See Comments))  Administration: 589 mg (5/17/2019), 589 mg (6/7/2019), 600 mg (6/28/2019), 600 mg (7/19/2019), 600 mg (8/30/2019), 600 mg (9/20/2019), 600 mg (8/9/2019), 600 mg (10/9/2019), 600 mg (11/1/2019), 600 mg (11/22/2019), 600 mg (12/13/2019), 600 mg (1/3/2020), 600 mg (1/24/2020), 600 mg (2/14/2020), 600 mg (3/6/2020), 600 mg (3/27/2020), 600 mg (4/17/2020), 600 mg (5/8/2020), 600 mg (5/29/2020), 600 mg (6/19/2020), 600 mg (7/10/2020), 600 mg (7/31/2020), 600 mg (8/21/2020), 600 mg (9/11/2020), 600 mg (10/2/2020), 600 mg (10/23/2020), 600 mg (11/13/2020), 600 mg (12/4/2020), 600 mg (12/24/2020), 600 mg (1/15/2021), 600 mg (2/5/2021)     Bone metastasis ([facility] [address] )   4/27/2018 Initial Diagnosis    Bone metastasis ([facility] [address] )     4/17/2019 -  Chemotherapy    pertuzumab (PERJETA) 840 mg in sodium chloride 0 9 % 250 mL IVPB, 840 mg, Intravenous, Once, 33 of 42 cycles  Administration: 420 mg (5/17/2019), 420 mg (6/7/2019), 420 mg (6/28/2019), 420 mg (7/19/2019), 420 mg (8/30/2019), 420 mg (9/20/2019), 420 mg (8/9/2019), 420 mg (10/9/2019), 420 mg (11/1/2019), 420 mg (11/22/2019), 420 mg (12/13/2019), 420 mg (1/3/2020), 420 mg (1/24/2020), 420 mg (2/14/2020), 420 mg (3/6/2020), 420 mg (3/27/2020), 420 mg (4/17/2020), 420 mg (5/8/2020), 420 mg (5/29/2020), 420 mg (6/19/2020), 420 mg (7/10/2020), 420 mg (7/31/2020), 420 mg (8/21/2020), 420 mg (9/11/2020), 420 mg (10/2/2020), 420 mg (10/23/2020), 420 mg (11/13/2020), 420 mg (12/4/2020), 420 mg (12/24/2020), 420 mg (1/15/2021), 420 mg (2/5/2021)  trastuzumab (HERCEPTIN) 589 mg in sodium chloride 0 9 % 250 mL chemo infusion, 6 mg/kg = 589 mg (75 % of original dose 8 mg/kg), Intravenous, Once, 33 of 42 cycles  Dose modification: 6 mg/kg (original dose 8 mg/kg, Cycle 1, Reason: Other (See Comments))  Administration: 589 mg (5/17/2019), 589 mg (6/7/2019), 600 mg (6/28/2019), 600 mg (7/19/2019), 600 mg (8/30/2019), 600 mg (9/20/2019), 600 mg (8/9/2019), 600 mg (10/9/2019), 600 mg (11/1/2019), 600 mg (11/22/2019), 600 mg (12/13/2019), 600 mg (1/3/2020), 600 mg (1/24/2020), 600 mg (2/14/2020), 600 mg (3/6/2020), 600 mg (3/27/2020), 600 mg (4/17/2020), 600 mg (5/8/2020), 600 mg (5/29/2020), 600 mg (6/19/2020), 600 mg (7/10/2020), 600 mg (7/31/2020), 600 mg (8/21/2020), 600 mg (9/11/2020), 600 mg (10/2/2020), 600 mg (10/23/2020), 600 mg (11/13/2020), 600 mg (12/4/2020), 600 mg (12/24/2020), 600 mg (1/15/2021), 600 mg (2/5/2021)         ROS:  03/03/21 Reviewed 12 systems:   Presently no other neurological, cardiac, pulmonary, GI and  symptoms other than mentioned   in HPI  No other symptoms like  fever, chills, bleeding, bone pains, skin rash, weight loss, weakness,  claudication and gait problem  No frequent infections  Not unusually sensitive to heat or cold  No swelling of the ankles  No swollen glands  Patient is anxious  /88 (BP Location: Left arm, Patient Position: Lying right side, Cuff Size: Standard)   Pulse 68   Temp 97 9 °F (36 6 °C) (Temporal)   Resp 14   Ht 5' 5" (1 651 m)   Wt 102 kg (224 lb 3 2 oz)   BMI 37 31 kg/m²     Physical Exam: Our medical assistant Chris Irby was in the room with me during examination    Alert, oriented, not in distress, stable vital signs, no icterus ,, no oral thrush, no palpable neck mass, clear lung fields, regular heart rate, abdomen  soft and non tender, no palpable abdominal mass, no ascites, no edema of ankles, no calf tenderness, no objective focal neurological deficit, no skin rash, no palpable lymphadenopathy in the neck and axillary areas, no clubbing, Patient is less anxious  Performance status 1  No lymphedema  I examined her skin  Waist up and there was no suspicious skin lesion    IMAGING:  IMPRESSION:     1  No scintigraphic evidence of osseous metastasis           Workstation performed: [id number]      Imaging    NM bone scan whole body (Order: [id number]) - 2/24/2021    IMPRESSION:     No CT findings to suggest metastasis    Other findings are stable         Workstation performed: [id number]      Imaging    CT chest and abdomen w contrast (Order: [id number]) - 2/24/2021    LABS:    Results for orders placed or performed during the hospital encounter of 01/15/21   Cancer antigen 27 29   Result Value Ref Range    CA 27 29 29 9 0 0 - 42 3 U/mL   CBC and differential   Result Value Ref Range    WBC 9 17 4 31 - 10 16 Thousand/uL    RBC 4 80 3 81 - 5 12 Million/uL    Hemoglobin 13 0 11 5 - 15 4 g/dL    Hematocrit 41 7 34 8 - 46 1 %    MCV 87 82 - 98 fL    MCH 27 1 26 8 - 34 3 pg    MCHC 31 2 (L) 31 4 - 37 4 g/dL    RDW 14 5 11 6 - 15 1 %    MPV 11 4 8 9 - 12 7 fL    Platelets 603 770 - 888 Thousands/uL    nRBC 0 /100 WBCs    Neutrophils Relative 64 43 - 75 %    Immat GRANS % 0 0 - 2 %    Lymphocytes Relative 25 14 - 44 %    Monocytes Relative 6 4 - 12 %    Eosinophils Relative 5 0 - 6 %    Basophils Relative 0 0 - 1 %    Neutrophils Absolute 5 87 1 85 - 7 62 Thousands/µL    Immature Grans Absolute 0 03 0 00 - 0 20 Thousand/uL    Lymphocytes Absolute 2 25 0 60 - 4 47 Thousands/µL    Monocytes Absolute 0 57 0 17 - 1 22 Thousand/µL    Eosinophils Absolute 0 41 0 00 - 0 61 Thousand/µL    Basophils Absolute 0 04 0 00 - 0 10 Thousands/µL   Comprehensive metabolic panel   Result Value Ref Range Sodium 141 136 - 145 mmol/L    Potassium 4 0 3 5 - 5 3 mmol/L    Chloride 107 100 - 108 mmol/L    CO2 29 21 - 32 mmol/L    ANION GAP 5 4 - 13 mmol/L    BUN 21 5 - 25 mg/dL    Creatinine 0 80 0 60 - 1 30 mg/dL    Glucose 126 65 - 140 mg/dL    Calcium 9 2 8 3 - 10 1 mg/dL    Corrected Calcium 9 8 8 3 - 10 1 mg/dL    AST 25 5 - 45 U/L    ALT 52 12 - 78 U/L    Alkaline Phosphatase 112 46 - 116 U/L    Total Protein 7 7 6 4 - 8 2 g/dL    Albumin 3 3 (L) 3 5 - 5 0 g/dL    Total Bilirubin 0 31 0 20 - 1 00 mg/dL    eGFR 84 ml/min/1 73sq m     Labs, Imaging, & Other studies:   All pertinent labs and imaging studies were personally reviewed    Lab Results   Component Value Date    K 4 0 01/15/2021     01/15/2021    CO2 29 01/15/2021    BUN 21 01/15/2021    CREATININE 0 80 01/15/2021    GLUF 162 (H) 02/12/2020    CALCIUM 9 2 01/15/2021    CORRECTEDCA 9 8 01/15/2021    AST 25 01/15/2021    ALT 52 01/15/2021    ALKPHOS 112 01/15/2021    EGFR 84 01/15/2021     Lab Results   Component Value Date    WBC 9 17 01/15/2021    HGB 13 0 01/15/2021    HCT 41 7 01/15/2021    MCV 87 01/15/2021     01/15/2021       Reviewed  CBC, CMP, CA 27-29, bone scan and CT scans and discussed with patient and explained  Assessment and plan:    In 2016 patient was diagnosed to have de Tricia  triple positive  stage IV breast cancer with metastatic disease to bones and liver and  she had locally advanced disease in the left breast   Patient  responded nicely to Taxotere plus Herceptin and Perjeta and after 6 cycles Taxotere was discontinued and tamoxifen was added    Herceptin and Perjeta were continued  Nidhi Medina radiation to left hip for palliation    On 08/16/2019 she underwent BSO    Tamoxifen was changed to letrozole     Presently she is on Herceptin, Perjeta and letrozole  Holli Damico has been taking vitamin-D and calcium and is staying active    She has been tolerating treatments without much problem   She goes for blood tests and echocardiogram on regular basis  There was no metastatic disease on the recent CT scans and bone scan     In September 2016 patient had lumpectomy of left breast followed by radiation therapy   At the time of lumpectomy there was minimal residual disease, 1 4 cm   Lymph nodes were not sampled  Later she was found to have positive BRCA 1 mutation     Patient is aware that she is at high risk for other cancers like breast cancer, ovarian cancer, pancreatic cancer, peritoneal cancer, melanoma and other cancers  Patient goes to breast surgeon for evaluation and imaging studies  She gets CT scans and that will check the pancreas  She had BSO  She does not want to go to a dermatologist for melanoma mapping  She does not want to go to ophthalmologist for melanoma in the eye  Also she does not want to have colonoscopy and not even Cologuard or stool test for blood       She has grade 1 peripheral neuropathy    Has some tiredness and arthritic symptoms   For leg cramps at night and she is taking one baby aspirin daily with food in the evening and also one magnesium tablet daily and that is helping       Has less anxiety   Collins Hall    She is not on Xgeva anymore at this time  She had Xgeva for 2 years  Camille Ashley continues to take    calcium and vitamin-D         Physical examination and test results are as recorded and discussed  Reviewed  CBC, CMP, CA 27-29, bone scan and CT scans and discussed with patient and explained  Ruslan Giron continues to show response   No change in therapy      She gets an echocardiogram on regular basis   Condition discussed and explained   Questions answered  Jamison Arias discussed the importance of self-breast examination, eating healthy foods, staying active and health screening tests   Patient goes to her breast surgeon for examination and  imaging studies  Ruslan Giron is capable of self-care   Goal is prolonged survival from stage IV breast cancer  Patient will continue to follow with her primary physician and other consultants  Discussed precautions against coronavirus  1  Malignant neoplasm of upper-inner quadrant of left breast in female, estrogen receptor positive ([facility] [address] )      2  L[facility] metastases ([facility] )      3  Bone metastasis ([facility] )      4  BRCA1 gene mutation positive in female      11  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      6  History of bilateral salpingo-oophorectomy (BSO)      7  Port-A-Cath in place      8  Aromatase inhibitor use      9  Cardiomyopathy due to chemotherapy ([facility] )    - Echo follow up/limited with contrast if indicated; Future                        No change in therapy  Echocardiogram prior to next visit in 3 months  She has standing orders for blood tests and treatments            Patient voiced understanding and agreement in the discussion  Counseling / Coordination of Care       Provided counseling and support

## 2021-03-03 NOTE — PATIENT INSTRUCTIONS
No change in therapy  Echocardiogram prior to next visit in 3 months    She has standing orders for blood tests and treatments

## 2021-03-19 ENCOUNTER — HOSPITAL ENCOUNTER (OUTPATIENT)
Dept: INFUSION CENTER | Facility: CLINIC | Age: 56
Discharge: HOME | End: 2021-03-19
Payer: COMMERCIAL

## 2021-03-19 VITALS
DIASTOLIC BLOOD PRESSURE: 87 MMHG | HEART RATE: 74 BPM | HEIGHT: 65 IN | TEMPERATURE: 98.6 F | BODY MASS INDEX: 37.1 KG/M2 | WEIGHT: 222.66 LBS | SYSTOLIC BLOOD PRESSURE: 154 MMHG | RESPIRATION RATE: 18 BRPM

## 2021-03-19 DIAGNOSIS — C79.51 BONE METASTASIS (HCC): Primary | ICD-10-CM

## 2021-03-19 DIAGNOSIS — C50.212 MALIGNANT NEOPLASM OF UPPER-INNER QUADRANT OF LEFT BREAST IN FEMALE, ESTROGEN RECEPTOR POSITIVE (HCC): ICD-10-CM

## 2021-03-19 DIAGNOSIS — Z17.0 MALIGNANT NEOPLASM OF UPPER-INNER QUADRANT OF LEFT BREAST IN FEMALE, ESTROGEN RECEPTOR POSITIVE (HCC): ICD-10-CM

## 2021-03-19 DIAGNOSIS — Z90.79 HISTORY OF BILATERAL SALPINGO-OOPHORECTOMY (BSO): ICD-10-CM

## 2021-03-19 DIAGNOSIS — C78.7 LIVER METASTASES (HCC): ICD-10-CM

## 2021-03-19 DIAGNOSIS — Z90.722 HISTORY OF BILATERAL SALPINGO-OOPHORECTOMY (BSO): ICD-10-CM

## 2021-03-19 PROCEDURE — 96417 CHEMO IV INFUS EACH ADDL SEQ: CPT

## 2021-03-19 PROCEDURE — 96413 CHEMO IV INFUSION 1 HR: CPT

## 2021-03-19 RX ORDER — SODIUM CHLORIDE 9 MG/ML
20 INJECTION, SOLUTION INTRAVENOUS ONCE
Status: CANCELLED | OUTPATIENT
Start: 2021-03-19

## 2021-03-19 RX ORDER — SODIUM CHLORIDE 9 MG/ML
20 INJECTION, SOLUTION INTRAVENOUS ONCE
Status: COMPLETED | OUTPATIENT
Start: 2021-03-19 | End: 2021-03-19

## 2021-03-19 RX ADMIN — TRASTUZUMAB 600 MG: 150 INJECTION, POWDER, LYOPHILIZED, FOR SOLUTION INTRAVENOUS at 15:52

## 2021-03-19 RX ADMIN — SODIUM CHLORIDE 20 ML/HR: 0.9 INJECTION, SOLUTION INTRAVENOUS at 15:19

## 2021-03-19 RX ADMIN — PERTUZUMAB 420 MG: 30 INJECTION, SOLUTION, CONCENTRATE INTRAVENOUS at 15:21

## 2021-04-08 RX ORDER — SODIUM CHLORIDE 9 MG/ML
20 INJECTION, SOLUTION INTRAVENOUS ONCE
Status: CANCELLED | OUTPATIENT
Start: 2021-04-09

## 2021-04-09 ENCOUNTER — HOSPITAL ENCOUNTER (OUTPATIENT)
Dept: INFUSION CENTER | Facility: CLINIC | Age: 56
Discharge: HOME | End: 2021-04-09
Payer: COMMERCIAL

## 2021-04-09 VITALS
BODY MASS INDEX: 37.65 KG/M2 | SYSTOLIC BLOOD PRESSURE: 163 MMHG | DIASTOLIC BLOOD PRESSURE: 82 MMHG | WEIGHT: 225.97 LBS | RESPIRATION RATE: 18 BRPM | HEART RATE: 73 BPM | TEMPERATURE: 98.1 F | HEIGHT: 65 IN

## 2021-04-09 DIAGNOSIS — Z90.79 HISTORY OF BILATERAL SALPINGO-OOPHORECTOMY (BSO): ICD-10-CM

## 2021-04-09 DIAGNOSIS — C79.51 BONE METASTASIS (HCC): Primary | ICD-10-CM

## 2021-04-09 DIAGNOSIS — C50.212 MALIGNANT NEOPLASM OF UPPER-INNER QUADRANT OF LEFT BREAST IN FEMALE, ESTROGEN RECEPTOR POSITIVE (HCC): ICD-10-CM

## 2021-04-09 DIAGNOSIS — Z90.722 HISTORY OF BILATERAL SALPINGO-OOPHORECTOMY (BSO): ICD-10-CM

## 2021-04-09 DIAGNOSIS — C78.7 LIVER METASTASES (HCC): ICD-10-CM

## 2021-04-09 DIAGNOSIS — Z17.0 MALIGNANT NEOPLASM OF UPPER-INNER QUADRANT OF LEFT BREAST IN FEMALE, ESTROGEN RECEPTOR POSITIVE (HCC): ICD-10-CM

## 2021-04-09 PROCEDURE — 96413 CHEMO IV INFUSION 1 HR: CPT

## 2021-04-09 PROCEDURE — 96549 UNLISTED CHEMOTHERAPY PX: CPT

## 2021-04-09 RX ORDER — SODIUM CHLORIDE 9 MG/ML
20 INJECTION, SOLUTION INTRAVENOUS ONCE
Status: COMPLETED | OUTPATIENT
Start: 2021-04-09 | End: 2021-04-09

## 2021-04-09 RX ADMIN — SODIUM CHLORIDE 20 ML/HR: 0.9 INJECTION, SOLUTION INTRAVENOUS at 15:06

## 2021-04-09 RX ADMIN — TRASTUZUMAB 600 MG: 150 INJECTION, POWDER, LYOPHILIZED, FOR SOLUTION INTRAVENOUS at 16:04

## 2021-04-09 RX ADMIN — PERTUZUMAB 420 MG: 30 INJECTION, SOLUTION, CONCENTRATE INTRAVENOUS at 15:31

## 2021-04-09 NOTE — PLAN OF CARE
Problem: Potential for Falls  Goal: Patient will remain free of falls  Description: INTERVENTIONS:  - Assess patient frequently for physical needs  -  Identify cognitive and physical deficits and behaviors that affect risk of falls    -  [place] fall precautions as indicated by assessment   - Educate patient/family on patient safety including physical limitations  - Instruct patient to call for assistance with activity based on assessment  - Modify environment to reduce risk of injury  - Consider OT/PT consult to assist with strengthening/mobility  Outcome: Progressing

## 2021-04-09 NOTE — PROGRESS NOTES
Patient arrived to the unit and denied complications or infections  She tolerated her treatment well without adverse reaction

## 2021-04-28 RX ORDER — SODIUM CHLORIDE 9 MG/ML
20 INJECTION, SOLUTION INTRAVENOUS ONCE
Status: CANCELLED | OUTPATIENT
Start: 2021-04-30

## 2021-04-30 ENCOUNTER — HOSPITAL ENCOUNTER (OUTPATIENT)
Dept: INFUSION CENTER | Facility: CLINIC | Age: 56
Discharge: HOME | End: 2021-04-30
Payer: COMMERCIAL

## 2021-04-30 VITALS
BODY MASS INDEX: 37.78 KG/M2 | SYSTOLIC BLOOD PRESSURE: 160 MMHG | DIASTOLIC BLOOD PRESSURE: 88 MMHG | TEMPERATURE: 97.4 F | RESPIRATION RATE: 18 BRPM | HEART RATE: 76 BPM | HEIGHT: 65 IN | WEIGHT: 226.74 LBS

## 2021-04-30 DIAGNOSIS — C78.7 LIVER METASTASES (HCC): ICD-10-CM

## 2021-04-30 DIAGNOSIS — Z17.0 MALIGNANT NEOPLASM OF UPPER-INNER QUADRANT OF LEFT BREAST IN FEMALE, ESTROGEN RECEPTOR POSITIVE (HCC): ICD-10-CM

## 2021-04-30 DIAGNOSIS — C79.51 BONE METASTASIS (HCC): Primary | ICD-10-CM

## 2021-04-30 DIAGNOSIS — C50.212 MALIGNANT NEOPLASM OF UPPER-INNER QUADRANT OF LEFT BREAST IN FEMALE, ESTROGEN RECEPTOR POSITIVE (HCC): ICD-10-CM

## 2021-04-30 DIAGNOSIS — Z90.79 HISTORY OF BILATERAL SALPINGO-OOPHORECTOMY (BSO): ICD-10-CM

## 2021-04-30 DIAGNOSIS — Z90.722 HISTORY OF BILATERAL SALPINGO-OOPHORECTOMY (BSO): ICD-10-CM

## 2021-04-30 PROCEDURE — 96417 CHEMO IV INFUS EACH ADDL SEQ: CPT

## 2021-04-30 PROCEDURE — 96413 CHEMO IV INFUSION 1 HR: CPT

## 2021-04-30 RX ORDER — SODIUM CHLORIDE 9 MG/ML
20 INJECTION, SOLUTION INTRAVENOUS ONCE
Status: COMPLETED | OUTPATIENT
Start: 2021-04-30 | End: 2021-04-30

## 2021-04-30 RX ADMIN — TRASTUZUMAB 600 MG: 150 INJECTION, POWDER, LYOPHILIZED, FOR SOLUTION INTRAVENOUS at 16:01

## 2021-04-30 RX ADMIN — PERTUZUMAB 420 MG: 30 INJECTION, SOLUTION, CONCENTRATE INTRAVENOUS at 15:29

## 2021-04-30 RX ADMIN — SODIUM CHLORIDE 20 ML/HR: 0.9 INJECTION, SOLUTION INTRAVENOUS at 15:17

## 2021-04-30 NOTE — PLAN OF CARE
Problem: Potential for Falls  Goal: Patient will remain free of falls  Description: INTERVENTIONS:  - Assess patient frequently for physical needs  -  Identify cognitive and physical deficits and behaviors that affect risk of falls    -  [place] fall precautions as indicated by assessment   - Educate patient/family on patient safety including physical limitations  - Instruct patient to call for assistance with activity based on assessment  - Modify environment to reduce risk of injury  - Consider OT/PT consult to assist with strengthening/mobility  Outcome: Progressing

## 2021-05-19 RX ORDER — SODIUM CHLORIDE 9 MG/ML
20 INJECTION, SOLUTION INTRAVENOUS ONCE
Status: CANCELLED | OUTPATIENT
Start: 2021-05-21

## 2021-05-21 ENCOUNTER — HOSPITAL ENCOUNTER (OUTPATIENT)
Dept: INFUSION CENTER | Facility: CLINIC | Age: 56
Discharge: HOME | End: 2021-05-21
Payer: COMMERCIAL

## 2021-05-21 VITALS
WEIGHT: 218.26 LBS | HEIGHT: 65 IN | TEMPERATURE: 98.6 F | DIASTOLIC BLOOD PRESSURE: 83 MMHG | HEART RATE: 69 BPM | SYSTOLIC BLOOD PRESSURE: 143 MMHG | RESPIRATION RATE: 18 BRPM | BODY MASS INDEX: 36.36 KG/M2

## 2021-05-21 DIAGNOSIS — C78.7 LIVER METASTASES (HCC): ICD-10-CM

## 2021-05-21 DIAGNOSIS — Z90.722 HISTORY OF BILATERAL SALPINGO-OOPHORECTOMY (BSO): ICD-10-CM

## 2021-05-21 DIAGNOSIS — Z17.0 MALIGNANT NEOPLASM OF UPPER-INNER QUADRANT OF LEFT BREAST IN FEMALE, ESTROGEN RECEPTOR POSITIVE (HCC): ICD-10-CM

## 2021-05-21 DIAGNOSIS — C50.212 MALIGNANT NEOPLASM OF UPPER-INNER QUADRANT OF LEFT BREAST IN FEMALE, ESTROGEN RECEPTOR POSITIVE (HCC): ICD-10-CM

## 2021-05-21 DIAGNOSIS — Z90.79 HISTORY OF BILATERAL SALPINGO-OOPHORECTOMY (BSO): ICD-10-CM

## 2021-05-21 DIAGNOSIS — C79.51 BONE METASTASIS (HCC): Primary | ICD-10-CM

## 2021-05-21 PROCEDURE — 96417 CHEMO IV INFUS EACH ADDL SEQ: CPT

## 2021-05-21 PROCEDURE — 96413 CHEMO IV INFUSION 1 HR: CPT

## 2021-05-21 RX ORDER — SODIUM CHLORIDE 9 MG/ML
20 INJECTION, SOLUTION INTRAVENOUS ONCE
Status: COMPLETED | OUTPATIENT
Start: 2021-05-21 | End: 2021-05-21

## 2021-05-21 RX ADMIN — TRASTUZUMAB 600 MG: 150 INJECTION, POWDER, LYOPHILIZED, FOR SOLUTION INTRAVENOUS at 15:38

## 2021-05-21 RX ADMIN — PERTUZUMAB 420 MG: 30 INJECTION, SOLUTION, CONCENTRATE INTRAVENOUS at 15:02

## 2021-05-21 RX ADMIN — SODIUM CHLORIDE 20 ML/HR: 0.9 INJECTION, SOLUTION INTRAVENOUS at 14:30

## 2021-05-21 NOTE — PLAN OF CARE
Problem: Potential for Falls  Goal: Patient will remain free of falls  Description: INTERVENTIONS:  - Assess patient frequently for physical needs  -  Identify cognitive and physical deficits and behaviors that affect risk of falls  -  [place] fall precautions as indicated by assessment   - Educate patient/family on patient safety including physical limitations  - Instruct patient to call for assistance with activity based on assessment  - Modify environment to reduce risk of injury  - Consider OT/PT consult to assist with strengthening/mobility  Outcome: Progressing     Problem: Knowledge Deficit  Goal: Patient/family/caregiver demonstrates understanding of disease process, treatment plan, medications, and discharge instructions  Description: Complete learning assessment and assess knowledge base    Interventions:  - Provide teaching at level of understanding  - Provide teaching via preferred learning methods  Outcome: Progressing

## 2021-05-21 NOTE — PROGRESS NOTES
Pt  Denies new symptoms or concerns today  There are no lab parameters  EF 65 %  Perjeta and Herceptin ordered for infusion today

## 2021-05-28 ENCOUNTER — HOSPITAL ENCOUNTER (OUTPATIENT)
Dept: NON INVASIVE DIAGNOSTICS | Facility: HOSPITAL | Age: 56
Discharge: HOME | End: 2021-05-28
Attending: INTERNAL MEDICINE
Payer: COMMERCIAL

## 2021-05-28 DIAGNOSIS — T45.1X5A CARDIOMYOPATHY DUE TO CHEMOTHERAPY (HCC): ICD-10-CM

## 2021-05-28 DIAGNOSIS — I42.7 CARDIOMYOPATHY DUE TO CHEMOTHERAPY (HCC): ICD-10-CM

## 2021-05-28 PROCEDURE — 93308 TTE F-UP OR LMTD: CPT

## 2021-05-28 PROCEDURE — 93325 DOPPLER ECHO COLOR FLOW MAPG: CPT | Performed by: INTERNAL MEDICINE

## 2021-05-28 PROCEDURE — 93321 DOPPLER ECHO F-UP/LMTD STD: CPT | Performed by: INTERNAL MEDICINE

## 2021-05-28 PROCEDURE — 93308 TTE F-UP OR LMTD: CPT | Performed by: INTERNAL MEDICINE

## 2021-06-09 ENCOUNTER — TELEPHONE (OUTPATIENT)
Dept: HEMATOLOGY ONCOLOGY | Facility: MEDICAL CENTER | Age: 56
End: 2021-06-09

## 2021-06-09 NOTE — TELEPHONE ENCOUNTER
Spoke to patient and informed her that her appt on 6/16/21 needed to be R/S  Her new appt is 6/15/21 at 9:40 am at the [facility] location with Dr Demario Farfan  Patient voiced understanding of appt details

## 2021-06-10 RX ORDER — SODIUM CHLORIDE 9 MG/ML
20 INJECTION, SOLUTION INTRAVENOUS ONCE
Status: CANCELLED | OUTPATIENT
Start: 2021-07-02

## 2021-06-10 RX ORDER — SODIUM CHLORIDE 9 MG/ML
20 INJECTION, SOLUTION INTRAVENOUS ONCE
Status: CANCELLED | OUTPATIENT
Start: 2021-06-11

## 2021-06-11 ENCOUNTER — HOSPITAL ENCOUNTER (OUTPATIENT)
Dept: INFUSION CENTER | Facility: CLINIC | Age: 56
Discharge: HOME | End: 2021-06-11
Payer: COMMERCIAL

## 2021-06-11 VITALS
DIASTOLIC BLOOD PRESSURE: 82 MMHG | SYSTOLIC BLOOD PRESSURE: 134 MMHG | TEMPERATURE: 96.4 F | HEART RATE: 67 BPM | BODY MASS INDEX: 37.22 KG/M2 | WEIGHT: 223.66 LBS | RESPIRATION RATE: 18 BRPM

## 2021-06-11 DIAGNOSIS — C78.7 LIVER METASTASES (HCC): ICD-10-CM

## 2021-06-11 DIAGNOSIS — C50.212 MALIGNANT NEOPLASM OF UPPER-INNER QUADRANT OF LEFT BREAST IN FEMALE, ESTROGEN RECEPTOR POSITIVE (HCC): ICD-10-CM

## 2021-06-11 DIAGNOSIS — Z90.722 HISTORY OF BILATERAL SALPINGO-OOPHORECTOMY (BSO): ICD-10-CM

## 2021-06-11 DIAGNOSIS — C79.51 BONE METASTASIS (HCC): Primary | ICD-10-CM

## 2021-06-11 DIAGNOSIS — Z17.0 MALIGNANT NEOPLASM OF UPPER-INNER QUADRANT OF LEFT BREAST IN FEMALE, ESTROGEN RECEPTOR POSITIVE (HCC): ICD-10-CM

## 2021-06-11 DIAGNOSIS — Z90.79 HISTORY OF BILATERAL SALPINGO-OOPHORECTOMY (BSO): ICD-10-CM

## 2021-06-11 PROCEDURE — 96417 CHEMO IV INFUS EACH ADDL SEQ: CPT

## 2021-06-11 PROCEDURE — 96413 CHEMO IV INFUSION 1 HR: CPT

## 2021-06-11 RX ORDER — SODIUM CHLORIDE 9 MG/ML
20 INJECTION, SOLUTION INTRAVENOUS ONCE
Status: COMPLETED | OUTPATIENT
Start: 2021-06-11 | End: 2021-06-11

## 2021-06-11 RX ADMIN — TRASTUZUMAB 600 MG: 150 INJECTION, POWDER, LYOPHILIZED, FOR SOLUTION INTRAVENOUS at 15:52

## 2021-06-11 RX ADMIN — PERTUZUMAB 420 MG: 30 INJECTION, SOLUTION, CONCENTRATE INTRAVENOUS at 15:20

## 2021-06-11 RX ADMIN — SODIUM CHLORIDE 20 ML/HR: 0.9 INJECTION, SOLUTION INTRAVENOUS at 15:18

## 2021-06-11 NOTE — PROGRESS NOTES
Patient tolerated infusion well with no complications  Pt is aware to return on Tuesday for labs   Declined AVS

## 2021-06-14 ENCOUNTER — TELEPHONE (OUTPATIENT)
Dept: HEMATOLOGY ONCOLOGY | Facility: CLINIC | Age: 56
End: 2021-06-14

## 2021-06-15 ENCOUNTER — OFFICE VISIT (OUTPATIENT)
Dept: HEMATOLOGY ONCOLOGY | Facility: CLINIC | Age: 56
End: 2021-06-15
Payer: COMMERCIAL

## 2021-06-15 ENCOUNTER — TELEPHONE (OUTPATIENT)
Dept: SURGICAL ONCOLOGY | Facility: CLINIC | Age: 56
End: 2021-06-15

## 2021-06-15 ENCOUNTER — HOSPITAL ENCOUNTER (OUTPATIENT)
Dept: INFUSION CENTER | Facility: CLINIC | Age: 56
Discharge: HOME | End: 2021-06-15
Payer: COMMERCIAL

## 2021-06-15 VITALS
BODY MASS INDEX: 37.49 KG/M2 | HEIGHT: 65 IN | HEART RATE: 76 BPM | RESPIRATION RATE: 14 BRPM | OXYGEN SATURATION: 97 % | WEIGHT: 225 LBS | SYSTOLIC BLOOD PRESSURE: 122 MMHG | DIASTOLIC BLOOD PRESSURE: 80 MMHG | TEMPERATURE: 98.4 F

## 2021-06-15 VITALS — TEMPERATURE: 97.8 F

## 2021-06-15 DIAGNOSIS — Z15.09 BRCA1 GENE MUTATION POSITIVE IN FEMALE: ICD-10-CM

## 2021-06-15 DIAGNOSIS — I42.7 CARDIOMYOPATHY DUE TO CHEMOTHERAPY (HCC): ICD-10-CM

## 2021-06-15 DIAGNOSIS — Z90.79 HISTORY OF BILATERAL SALPINGO-OOPHORECTOMY (BSO): ICD-10-CM

## 2021-06-15 DIAGNOSIS — C79.51 BONE METASTASIS (HCC): ICD-10-CM

## 2021-06-15 DIAGNOSIS — C50.212 MALIGNANT NEOPLASM OF UPPER-INNER QUADRANT OF LEFT BREAST IN FEMALE, ESTROGEN RECEPTOR POSITIVE (HCC): Primary | ICD-10-CM

## 2021-06-15 DIAGNOSIS — Z95.828 PORT-A-CATH IN PLACE: ICD-10-CM

## 2021-06-15 DIAGNOSIS — Z17.0 MALIGNANT NEOPLASM OF UPPER-INNER QUADRANT OF LEFT BREAST IN FEMALE, ESTROGEN RECEPTOR POSITIVE (HCC): Primary | ICD-10-CM

## 2021-06-15 DIAGNOSIS — Z90.722 HISTORY OF BILATERAL SALPINGO-OOPHORECTOMY (BSO): ICD-10-CM

## 2021-06-15 DIAGNOSIS — Z15.02 BRCA1 GENE MUTATION POSITIVE IN FEMALE: ICD-10-CM

## 2021-06-15 DIAGNOSIS — Z15.01 BRCA1 GENE MUTATION POSITIVE IN FEMALE: ICD-10-CM

## 2021-06-15 DIAGNOSIS — T45.1X5A CARDIOMYOPATHY DUE TO CHEMOTHERAPY (HCC): ICD-10-CM

## 2021-06-15 DIAGNOSIS — C78.7 LIVER METASTASES (HCC): ICD-10-CM

## 2021-06-15 DIAGNOSIS — Z14.8 CARRIER OF GENE MUTATION FOR HIGH RISK OF CANCER: ICD-10-CM

## 2021-06-15 DIAGNOSIS — Z45.2 ENCOUNTER FOR CARE RELATED TO VASCULAR ACCESS PORT: ICD-10-CM

## 2021-06-15 LAB
ALBUMIN SERPL BCP-MCNC: 2.9 G/DL (ref 3.5–5.7)
ALP SERPL-CCNC: 93 U/L (ref 46–116)
ALT SERPL W P-5'-P-CCNC: 39 U/L (ref 12–78)
ANION GAP SERPL CALCULATED.3IONS-SCNC: 7 MMOL/L (ref 4–13)
AST SERPL W P-5'-P-CCNC: 28 U/L (ref 5–45)
BASOPHILS # BLD AUTO: 0.02 THOUSANDS/ΜL (ref 0–0.1)
BASOPHILS NFR BLD AUTO: 0 % (ref 0–1)
BILIRUB SERPL-MCNC: 0.6 MG/DL (ref 0.2–1)
BUN SERPL-MCNC: 17 MG/DL (ref 5–25)
CALCIUM ALBUM COR SERPL-MCNC: 10.3 MG/DL (ref 8.3–10.1)
CALCIUM SERPL-MCNC: 9.4 MG/DL (ref 8.3–10.1)
CANCER AG27-29 SERPL-ACNC: 21.1 U/ML (ref 0–42.3)
CHLORIDE SERPL-SCNC: 106 MMOL/L (ref 98–108)
CO2 SERPL-SCNC: 28 MMOL/L (ref 21–32)
CREAT SERPL-MCNC: 1.1 MG/DL (ref 0.6–1.3)
EOSINOPHIL # BLD AUTO: 0.38 THOUSAND/ΜL (ref 0–0.61)
EOSINOPHIL NFR BLD AUTO: 5 % (ref 0–6)
ERYTHROCYTE [DISTWIDTH] IN BLOOD BY AUTOMATED COUNT: 15.1 % (ref 11.6–15.1)
GFR SERPL CREATININE-BSD FRML MDRD: 57 ML/MIN/1.73SQ M
GLUCOSE SERPL-MCNC: 112 MG/DL (ref 65–140)
HCT VFR BLD AUTO: 40.5 % (ref 34.8–46.1)
HGB BLD-MCNC: 13 G/DL (ref 11.5–15.4)
LYMPHOCYTES # BLD AUTO: 1.8 THOUSANDS/ΜL (ref 0.6–4.47)
LYMPHOCYTES NFR BLD AUTO: 24 % (ref 14–44)
MCH RBC QN AUTO: 26.9 PG (ref 26.8–34.3)
MCHC RBC AUTO-ENTMCNC: 32.1 G/DL (ref 31.4–37.4)
MCV RBC AUTO: 84 FL (ref 82–98)
MONOCYTES # BLD AUTO: 0.61 THOUSAND/ΜL (ref 0.17–1.22)
MONOCYTES NFR BLD AUTO: 8 % (ref 4–12)
NEUTROPHILS # BLD AUTO: 4.63 THOUSANDS/ΜL (ref 1.85–7.62)
NEUTS SEG NFR BLD AUTO: 63 % (ref 43–75)
PLATELET # BLD AUTO: 309 THOUSANDS/UL (ref 149–390)
PMV BLD AUTO: 10.8 FL (ref 8.9–12.7)
POTASSIUM SERPL-SCNC: 3.9 MMOL/L (ref 3.5–5.3)
PROT SERPL-MCNC: 6.7 G/DL (ref 6.4–8.2)
RBC # BLD AUTO: 4.83 MILLION/UL (ref 3.81–5.12)
SODIUM SERPL-SCNC: 141 MMOL/L (ref 136–145)
WBC # BLD AUTO: 7.44 THOUSAND/UL (ref 4.31–10.16)

## 2021-06-15 PROCEDURE — 99214 OFFICE O/P EST MOD 30 MIN: CPT | Performed by: INTERNAL MEDICINE

## 2021-06-15 PROCEDURE — 80053 COMPREHEN METABOLIC PANEL: CPT

## 2021-06-15 PROCEDURE — 85025 COMPLETE CBC W/AUTO DIFF WBC: CPT

## 2021-06-15 PROCEDURE — 86300 IMMUNOASSAY TUMOR CA 15-3: CPT

## 2021-06-15 NOTE — TELEPHONE ENCOUNTER
Patient stopped into office today after seeing Dr Kiera Milian and requested a mammogram script  Her appointment with Dr Mac Carrasco is 8/3/2021, and she Alize Sun has her mammogram done in June  Please call patient when script is in epic

## 2021-06-15 NOTE — PROGRESS NOTES
HPI:    Follow-up visit for de brandon  triple positive  stage IV breast cancer with metastatic disease to bones and liver, diagnosed in 2016  Patient  had locally advanced disease in the left breast   Patient was started on a combination of   Taxotere plus Herceptin and Perjeta and she had very good response and after 6 cycles Taxotere was discontinued and tamoxifen was added    Herceptin and Perjeta were continued  [facility] radiation to left hip for palliation    On 08/16/2019 she underwent BSO    Tamoxifen was changed to letrozole     Presently she is on Herceptin, Perjeta and letrozole  Pham Webb has been taking vitamin-D and calcium and is staying active    She has been tolerating treatments without much problem   She goes for blood tests and echocardiogram on regular basis  There was no metastatic disease on the recent CT scans and bone scan     In September 2016 patient had lumpectomy of left breast followed by radiation therapy   At the time of lumpectomy there was minimal residual disease, 1 4 cm   Lymph nodes were not sampled  Later she was found to have positive BRCA 1 mutation     Patient knows that she is at high risk for breast and other cancers like  ovarian cancer, pancreatic cancer, peritoneal cancer, melanoma and other cancers  Patient goes to breast surgeon for evaluation and imaging studies  She gets CT scans and that will check the pancreas  She had BSO  She does not want to go to a dermatologist for melanoma mapping  She does not want to go to ophthalmologist for melanoma in the eye  Also she does not want to have colonoscopy and not even Cologuard or stool test for blood       She has grade 1 peripheral neuropathy    Has some tiredness and arthritic symptoms   For leg cramps at night and she is taking one baby aspirin daily with food in the evening and also one magnesium tablet daily and that is helping       Has less anxiety   Suhas Nascimento    She is not on Xgeva anymore at this time    She had Lucilla Fleischer for 2 years  Raysasonipato Palmer continues to take    calcium and vitamin-D                              Current Outpatient Medications:     letrozole (FEMARA) 2 5 mg tablet, Take 1 tablet (2 5 mg total) by mouth daily, Disp: 30 tablet, Rfl: 6    multivitamin (THERAGRAN) TABS, Take 1 tablet by mouth 3 (three) times a week , Disp: , Rfl:     pertuzumab (PERJETA) 420 mg/14 mL SOLN, Infuse into a venous catheter every 21 days  At infusion  center, Disp: , Rfl:     Trastuzumab (HERCEPTIN IV), Infuse into a venous catheter every 21 days  At infusion center, Disp: , Rfl:     VITAMIN D, CHOLECALCIFEROL, PO, Take 1,000 Units by mouth 3 (three) times a week , Disp: , Rfl:     No Known Allergies    Oncology History   Malignant neoplasm of upper-inner quadrant of left female breast ([facility] [address] )   12/2015 Initial Diagnosis    Malignant neoplasm of upper-outer quadrant of left female breast ([facility] [address] )     2016 -  Hormone Therapy    Tamoxifen  Ended 8/2019  Dr Green Sensing  Letrozole 9/2019 1/27/2016 Surgery    Port placement     2/19/2016 - 6/2/2018 Chemotherapy    Taxotere,  herceptin, perjeta, xgeva  After six cycles, taxotere was discontinued and tamoxifen added     Continues with Perjeta and Herceptin every 3 weeks     - Dr Green Sensing       9/16/2016 Surgery    Left breast partial mastectomy     11/7/2016 - 12/23/2016 Radiation    Plan ID Energy Fractions Dose per Fraction (cGy) Total Dose Delivered (cGy) Elapsed Days   Lt Breast 6X 25 / 25 200 5,000 36   Lt Brst Boost 6X 8 / 8 200 1,600 9   Lt Femur 10X 10 / 10 300 3,000 11   Lt PAB 6X 25 / 25 60 1,500 36   Lt Sclav 6X 25 / 25 200 5,000 36                                   Total dose to left breast lumpectomy cavity: 6600 cGy                   Total dose to the supraclavicular and axillary regions: 5000 cGy       4/17/2019 -  Chemotherapy    pertuzumab (PERJETA) 840 mg in sodium chloride 0 9 % 250 mL IVPB, 840 mg, Intravenous, Once, 38 of 42 cycles  Administration: 420 mg (5/17/2019), 420 mg (6/7/2019), 420 mg (6/28/2019), 420 mg (7/19/2019), 420 mg (8/30/2019), 420 mg (9/20/2019), 420 mg (8/9/2019), 420 mg (10/9/2019), 420 mg (11/1/2019), 420 mg (11/22/2019), 420 mg (12/13/2019), 420 mg (1/3/2020), 420 mg (1/24/2020), 420 mg (2/14/2020), 420 mg (3/6/2020), 420 mg (3/27/2020), 420 mg (4/17/2020), 420 mg (5/8/2020), 420 mg (5/29/2020), 420 mg (6/19/2020), 420 mg (7/10/2020), 420 mg (7/31/2020), 420 mg (8/21/2020), 420 mg (9/11/2020), 420 mg (10/2/2020), 420 mg (10/23/2020), 420 mg (11/13/2020), 420 mg (12/4/2020), 420 mg (12/24/2020), 420 mg (1/15/2021), 420 mg (2/5/2021), 420 mg (2/26/2021), 420 mg (3/19/2021), 420 mg (4/9/2021), 420 mg (4/30/2021), 420 mg (5/21/2021), 420 mg (6/11/2021)  trastuzumab (HERCEPTIN) 589 mg in sodium chloride 0 9 % 250 mL chemo infusion, 6 mg/kg = 589 mg (75 % of original dose 8 mg/kg), Intravenous, Once, 38 of 42 cycles  Dose modification: 6 mg/kg (original dose 8 mg/kg, Cycle 1, Reason: Other (Must fill in a comment))  Administration: 589 mg (5/17/2019), 589 mg (6/7/2019), 600 mg (6/28/2019), 600 mg (7/19/2019), 600 mg (8/30/2019), 600 mg (9/20/2019), 600 mg (8/9/2019), 600 mg (10/9/2019), 600 mg (11/1/2019), 600 mg (11/22/2019), 600 mg (12/13/2019), 600 mg (1/3/2020), 600 mg (1/24/2020), 600 mg (2/14/2020), 600 mg (3/6/2020), 600 mg (3/27/2020), 600 mg (4/17/2020), 600 mg (5/8/2020), 600 mg (5/29/2020), 600 mg (6/19/2020), 600 mg (7/10/2020), 600 mg (7/31/2020), 600 mg (8/21/2020), 600 mg (9/11/2020), 600 mg (10/2/2020), 600 mg (10/23/2020), 600 mg (11/13/2020), 600 mg (12/4/2020), 600 mg (12/24/2020), 600 mg (1/15/2021), 600 mg (2/5/2021), 600 mg (2/26/2021), 600 mg (3/19/2021), 600 mg (4/9/2021), 600 mg (4/30/2021), 600 mg (5/21/2021), 600 mg (6/11/2021)     8/16/2019 Surgery    she underwent BSO       Liver metastases ([facility] [address] )   4/27/2018 Initial Diagnosis    Liver metastases ([facility] [address] )     4/17/2019 -  Chemotherapy    pertuzumab (PERJETA) 840 mg in sodium chloride 0 9 % 250 mL IVPB, 840 mg, Intravenous, Once, 38 of 42 cycles  Administration: 420 mg (5/17/2019), 420 mg (6/7/2019), 420 mg (6/28/2019), 420 mg (7/19/2019), 420 mg (8/30/2019), 420 mg (9/20/2019), 420 mg (8/9/2019), 420 mg (10/9/2019), 420 mg (11/1/2019), 420 mg (11/22/2019), 420 mg (12/13/2019), 420 mg (1/3/2020), 420 mg (1/24/2020), 420 mg (2/14/2020), 420 mg (3/6/2020), 420 mg (3/27/2020), 420 mg (4/17/2020), 420 mg (5/8/2020), 420 mg (5/29/2020), 420 mg (6/19/2020), 420 mg (7/10/2020), 420 mg (7/31/2020), 420 mg (8/21/2020), 420 mg (9/11/2020), 420 mg (10/2/2020), 420 mg (10/23/2020), 420 mg (11/13/2020), 420 mg (12/4/2020), 420 mg (12/24/2020), 420 mg (1/15/2021), 420 mg (2/5/2021), 420 mg (2/26/2021), 420 mg (3/19/2021), 420 mg (4/9/2021), 420 mg (4/30/2021), 420 mg (5/21/2021), 420 mg (6/11/2021)  trastuzumab (HERCEPTIN) 589 mg in sodium chloride 0 9 % 250 mL chemo infusion, 6 mg/kg = 589 mg (75 % of original dose 8 mg/kg), Intravenous, Once, 38 of 42 cycles  Dose modification: 6 mg/kg (original dose 8 mg/kg, Cycle 1, Reason: Other (Must fill in a comment))  Administration: 589 mg (5/17/2019), 589 mg (6/7/2019), 600 mg (6/28/2019), 600 mg (7/19/2019), 600 mg (8/30/2019), 600 mg (9/20/2019), 600 mg (8/9/2019), 600 mg (10/9/2019), 600 mg (11/1/2019), 600 mg (11/22/2019), 600 mg (12/13/2019), 600 mg (1/3/2020), 600 mg (1/24/2020), 600 mg (2/14/2020), 600 mg (3/6/2020), 600 mg (3/27/2020), 600 mg (4/17/2020), 600 mg (5/8/2020), 600 mg (5/29/2020), 600 mg (6/19/2020), 600 mg (7/10/2020), 600 mg (7/31/2020), 600 mg (8/21/2020), 600 mg (9/11/2020), 600 mg (10/2/2020), 600 mg (10/23/2020), 600 mg (11/13/2020), 600 mg (12/4/2020), 600 mg (12/24/2020), 600 mg (1/15/2021), 600 mg (2/5/2021), 600 mg (2/26/2021), 600 mg (3/19/2021), 600 mg (4/9/2021), 600 mg (4/30/2021), 600 mg (5/21/2021), 600 mg (6/11/2021)     Bone metastasis ([facility] [address] )   4/27/2018 Initial Diagnosis    Bone metastasis ([facility] [address] )     4/17/2019 -  Chemotherapy pertuzumab (PERJETA) 840 mg in sodium chloride 0 9 % 250 mL IVPB, 840 mg, Intravenous, Once, 38 of 42 cycles  Administration: 420 mg (5/17/2019), 420 mg (6/7/2019), 420 mg (6/28/2019), 420 mg (7/19/2019), 420 mg (8/30/2019), 420 mg (9/20/2019), 420 mg (8/9/2019), 420 mg (10/9/2019), 420 mg (11/1/2019), 420 mg (11/22/2019), 420 mg (12/13/2019), 420 mg (1/3/2020), 420 mg (1/24/2020), 420 mg (2/14/2020), 420 mg (3/6/2020), 420 mg (3/27/2020), 420 mg (4/17/2020), 420 mg (5/8/2020), 420 mg (5/29/2020), 420 mg (6/19/2020), 420 mg (7/10/2020), 420 mg (7/31/2020), 420 mg (8/21/2020), 420 mg (9/11/2020), 420 mg (10/2/2020), 420 mg (10/23/2020), 420 mg (11/13/2020), 420 mg (12/4/2020), 420 mg (12/24/2020), 420 mg (1/15/2021), 420 mg (2/5/2021), 420 mg (2/26/2021), 420 mg (3/19/2021), 420 mg (4/9/2021), 420 mg (4/30/2021), 420 mg (5/21/2021), 420 mg (6/11/2021)  trastuzumab (HERCEPTIN) 589 mg in sodium chloride 0 9 % 250 mL chemo infusion, 6 mg/kg = 589 mg (75 % of original dose 8 mg/kg), Intravenous, Once, 38 of 42 cycles  Dose modification: 6 mg/kg (original dose 8 mg/kg, Cycle 1, Reason: Other (Must fill in a comment))  Administration: 589 mg (5/17/2019), 589 mg (6/7/2019), 600 mg (6/28/2019), 600 mg (7/19/2019), 600 mg (8/30/2019), 600 mg (9/20/2019), 600 mg (8/9/2019), 600 mg (10/9/2019), 600 mg (11/1/2019), 600 mg (11/22/2019), 600 mg (12/13/2019), 600 mg (1/3/2020), 600 mg (1/24/2020), 600 mg (2/14/2020), 600 mg (3/6/2020), 600 mg (3/27/2020), 600 mg (4/17/2020), 600 mg (5/8/2020), 600 mg (5/29/2020), 600 mg (6/19/2020), 600 mg (7/10/2020), 600 mg (7/31/2020), 600 mg (8/21/2020), 600 mg (9/11/2020), 600 mg (10/2/2020), 600 mg (10/23/2020), 600 mg (11/13/2020), 600 mg (12/4/2020), 600 mg (12/24/2020), 600 mg (1/15/2021), 600 mg (2/5/2021), 600 mg (2/26/2021), 600 mg (3/19/2021), 600 mg (4/9/2021), 600 mg (4/30/2021), 600 mg (5/21/2021), 600 mg (6/11/2021)         ROS:  06/15/21 Reviewed 12 systems:   Presently no other neurological, cardiac, pulmonary, GI and  symptoms other than mentioned   in HPI  No other symptoms like  fever, chills, bleeding, bone pains, skin rash, weight loss, weakness,  claudication and gait problem  No frequent infections  Not unusually sensitive to heat or cold  No swelling of the ankles  No swollen glands  Patient is less anxious  No new symptoms since her last visit in the office    /80 (BP Location: Left arm, Cuff Size: Large)   Pulse 76   Temp 98 4 °F (36 9 °C) (Temporal)   Resp 14   Ht 5' 5" (1 651 m)   Wt 102 kg (225 lb)   SpO2 97%   BMI 37 44 kg/m²     Physical Exam: Our medical assistant [facility] was in the room with me during examination  Alert, oriented, not in distress, stable vital signs, no icterus ,, no oral thrush, no palpable neck mass, clear lung fields, regular heart rate, abdomen  soft and non tender, no palpable abdominal mass, no ascites, no edema of ankles, no calf tenderness, no objective focal neurological deficit, no skin rash, no palpable lymphadenopathy in the neck and axillary areas, no clubbing, Patient is less anxious  Performance status 1  No lymphedema     No significant change in physical examination compared to last time  IMAGING:  IMPRESSION:     1  No scintigraphic evidence of osseous metastasis           Workstation performed: [id number]      Imaging     NM bone scan whole body (Order: [id number]) - 2/24/2021    IMPRESSION:     No CT findings to suggest metastasis    Other findings are stable         Workstation performed: [id number]      Imaging    CT chest and abdomen w contrast (Order: [id number]) - 2/24/2021    LABS:    Results for orders placed or performed during the hospital encounter of 06/15/21   CBC and differential   Result Value Ref Range    WBC 7 44 4 31 - 10 16 Thousand/uL    RBC 4 83 3 81 - 5 12 Million/uL    Hemoglobin 13 0 11 5 - 15 4 g/dL    Hematocrit 40 5 34 8 - 46 1 %    MCV 84 82 - 98 fL    MCH 26 9 26 8 - 34 3 pg    MCHC 32 1 31 4 - 37 4 g/dL    RDW 15 1 11 6 - 15 1 %    MPV 10 8 8 9 - 12 7 fL    Platelets 917 822 - 715 Thousands/uL    Neutrophils Relative 63 43 - 75 %    Lymphocytes Relative 24 14 - 44 %    Monocytes Relative 8 4 - 12 %    Eosinophils Relative 5 0 - 6 %    Basophils Relative 0 0 - 1 %    Neutrophils Absolute 4 63 1 85 - 7 62 Thousands/µL    Lymphocytes Absolute 1 80 0 60 - 4 47 Thousands/µL    Monocytes Absolute 0 61 0 17 - 1 22 Thousand/µL    Eosinophils Absolute 0 38 0 00 - 0 61 Thousand/µL    Basophils Absolute 0 02 0 00 - 0 10 Thousands/µL   Comprehensive metabolic panel   Result Value Ref Range    Sodium 141 136 - 145 mmol/L    Potassium 3 9 3 5 - 5 3 mmol/L    Chloride 106 98 - 108 mmol/L    CO2 28 21 - 32 mmol/L    ANION GAP 7 4 - 13 mmol/L    BUN 17 5 - 25 mg/dL    Creatinine 1 10 0 60 - 1 30 mg/dL    Glucose 112 65 - 140 mg/dL    Calcium 9 4 8 3 - 10 1 mg/dL    Corrected Calcium 10 3 (H) 8 3 - 10 1 mg/dL    AST 28 5 - 45 U/L    ALT 39 12 - 78 U/L    Alkaline Phosphatase 93 46 - 116 U/L    Total Protein 6 7 6 4 - 8 2 g/dL    Albumin 2 9 (L) 3 5 - 5 7 g/dL    Total Bilirubin 0 60 0 20 - 1 00 mg/dL    eGFR 57 ml/min/1 73sq m     Labs, Imaging, & Other studies:   All pertinent labs and imaging studies were personally reviewed    Lab Results   Component Value Date    K 3 9 06/15/2021     06/15/2021    CO2 28 06/15/2021    BUN 17 06/15/2021    CREATININE 1 10 06/15/2021    GLUF 162 (H) 02/12/2020    CALCIUM 9 4 06/15/2021    CORRECTEDCA 10 3 (H) 06/15/2021    AST 28 06/15/2021    ALT 39 06/15/2021    ALKPHOS 93 06/15/2021    EGFR 57 06/15/2021     Lab Results   Component Value Date    WBC 7 44 06/15/2021    HGB 13 0 06/15/2021    HCT 40 5 06/15/2021    MCV 84 06/15/2021     06/15/2021    Normal differential count  Blood sugar 112  CA 27-29 is 21 1  Reviewed  CBC, CMP and CA 27-29 and discussed with patient and explained  Assessment and plan:     Follow-up visit for de brandon  triple positive  stage IV breast cancer with metastatic disease to bones and liver, diagnosed in 2016  Patient  had locally advanced disease in the left breast   Patient was started on a combination of   Taxotere plus Herceptin and Perjeta and she had very good response and after 6 cycles Taxotere was discontinued and tamoxifen was added    Herceptin and Perjeta were continued  [facility] radiation to left hip for palliation    On 08/16/2019 she underwent BSO    Tamoxifen was changed to letrozole     Presently she is on Herceptin, Perjeta and letrozole  Lisandra Hall has been taking vitamin-D and calcium and is staying active    She has been tolerating treatments without much problem   She goes for blood tests and echocardiogram on regular basis  There was no metastatic disease on the recent CT scans and bone scan     In September 2016 patient had lumpectomy of left breast followed by radiation therapy   At the time of lumpectomy there was minimal residual disease, 1 4 cm   Lymph nodes were not sampled  Later she was found to have positive BRCA 1 mutation     Patient knows that she is at high risk for breast and other cancers like  ovarian cancer, pancreatic cancer, peritoneal cancer, melanoma and other cancers  Patient goes to breast surgeon for evaluation and imaging studies  She gets CT scans and that will check the pancreas  She had BSO  She does not want to go to a dermatologist for melanoma mapping  She does not want to go to ophthalmologist for melanoma in the eye  Also she does not want to have colonoscopy and not even Cologuard or stool test for blood       She has grade 1 peripheral neuropathy    Has some tiredness and arthritic symptoms   For leg cramps at night and she is taking one baby aspirin daily with food in the evening and also one magnesium tablet daily and that is helping       Has less anxiety   Betty Shelley    She is not on Xgeva anymore at this time    She had Xgeva for 2 years  Lisandra Hall continues to take    calcium and vitamin-D       Physical examination and test results are as recorded and discussed    Patient continues to show response  TARUN  No change in therapy  She gets an echocardiogram on regular basis   Condition discussed and explained   Questions answered  Raj Orozco discussed the importance of self-breast examination, eating healthy foods, staying active and health screening tests   Patient goes to her breast surgeon for examination and  imaging studies  Poornima Encinas is capable of self-care   Goal is prolonged survival from stage IV breast cancer  Patient will continue to follow with her primary physician and other consultants  Discussed precautions against coronavirus     See diagnoses, orders and instructions below    1  Malignant neoplasm of upper-inner quadrant of left breast in female, estrogen receptor positive ([facility] [address] )      2  Liver metastases ([facility] [address] )      3  Bone metastasis ([facility] [address] )      4  BRCA1 gene mutation positive in female      11  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      6  Cardiomyopathy due to chemotherapy ([facility] [address] )    - Echo follow up/limited with contrast if indicated; Future    7  History of bilateral salpingo-oophorectomy (BSO)      8  Port-A-Cath in place       No change in therapy  She gets Herceptin and Perjeta every 3 weeks  Blood work as before  Echocardiogram end of August 2021  Follow-up in 3 months                                   Patient voiced understanding and agreed       Counseling / Coordination of Care       Provided counseling and support

## 2021-06-15 NOTE — PLAN OF CARE
Problem: Potential for Falls  Goal: Patient will remain free of falls  Description: INTERVENTIONS:  - Assess patient frequently for physical needs  -  Identify cognitive and physical deficits and behaviors that affect risk of falls    -  [place] fall precautions as indicated by assessment   - Educate patient/family on patient safety including physical limitations  - Instruct patient to call for assistance with activity based on assessment  - Modify environment to reduce risk of injury  - Consider OT/PT consult to assist with strengthening/mobility  Outcome: Progressing

## 2021-06-15 NOTE — PATIENT INSTRUCTIONS
No change in therapy  She gets Herceptin and Perjeta every 3 weeks  Blood work as before  Echocardiogram end of August 2021  Follow-up in 3 months

## 2021-07-02 ENCOUNTER — HOSPITAL ENCOUNTER (OUTPATIENT)
Dept: INFUSION CENTER | Facility: CLINIC | Age: 56
Discharge: HOME | End: 2021-07-02
Payer: COMMERCIAL

## 2021-07-02 VITALS
HEART RATE: 75 BPM | RESPIRATION RATE: 18 BRPM | WEIGHT: 222.22 LBS | HEIGHT: 65 IN | DIASTOLIC BLOOD PRESSURE: 89 MMHG | SYSTOLIC BLOOD PRESSURE: 145 MMHG | TEMPERATURE: 98.6 F | BODY MASS INDEX: 37.02 KG/M2

## 2021-07-02 DIAGNOSIS — C78.7 LIVER METASTASES (HCC): ICD-10-CM

## 2021-07-02 DIAGNOSIS — C79.51 BONE METASTASIS (HCC): Primary | ICD-10-CM

## 2021-07-02 DIAGNOSIS — C50.212 MALIGNANT NEOPLASM OF UPPER-INNER QUADRANT OF LEFT BREAST IN FEMALE, ESTROGEN RECEPTOR POSITIVE (HCC): ICD-10-CM

## 2021-07-02 DIAGNOSIS — Z90.79 HISTORY OF BILATERAL SALPINGO-OOPHORECTOMY (BSO): ICD-10-CM

## 2021-07-02 DIAGNOSIS — Z90.722 HISTORY OF BILATERAL SALPINGO-OOPHORECTOMY (BSO): ICD-10-CM

## 2021-07-02 DIAGNOSIS — Z17.0 MALIGNANT NEOPLASM OF UPPER-INNER QUADRANT OF LEFT BREAST IN FEMALE, ESTROGEN RECEPTOR POSITIVE (HCC): ICD-10-CM

## 2021-07-02 PROCEDURE — 96413 CHEMO IV INFUSION 1 HR: CPT

## 2021-07-02 PROCEDURE — 96417 CHEMO IV INFUS EACH ADDL SEQ: CPT

## 2021-07-02 RX ORDER — SODIUM CHLORIDE 9 MG/ML
20 INJECTION, SOLUTION INTRAVENOUS ONCE
Status: COMPLETED | OUTPATIENT
Start: 2021-07-02 | End: 2021-07-02

## 2021-07-02 RX ADMIN — TRASTUZUMAB 600 MG: 150 INJECTION, POWDER, LYOPHILIZED, FOR SOLUTION INTRAVENOUS at 15:41

## 2021-07-02 RX ADMIN — PERTUZUMAB 420 MG: 30 INJECTION, SOLUTION, CONCENTRATE INTRAVENOUS at 15:11

## 2021-07-02 RX ADMIN — SODIUM CHLORIDE 20 ML/HR: 0.9 INJECTION, SOLUTION INTRAVENOUS at 14:59

## 2021-07-02 NOTE — PLAN OF CARE
Problem: Potential for Falls  Goal: Patient will remain free of falls  Description: INTERVENTIONS:  - Assess patient frequently for physical needs  -  Identify cognitive and physical deficits and behaviors that affect risk of falls  -  [place] fall precautions as indicated by assessment   - Educate patient/family on patient safety including physical limitations  - Instruct patient to call for assistance with activity based on assessment  - Modify environment to reduce risk of injury  - Consider OT/PT consult to assist with strengthening/mobility  Outcome: Progressing     Problem: Knowledge Deficit  Goal: Patient/family/caregiver demonstrates understanding of disease process, treatment plan, medications, and discharge instructions  Description: Complete learning assessment and assess knowledge base    Interventions:  - Provide teaching at level of understanding  - Provide teaching via preferred learning methods  Outcome: Progressing

## 2021-07-02 NOTE — PROGRESS NOTES
Pt  Denies new symptoms or concerns today  No lab parameters  EF 70 %  Perjeta and Herceptin ordered today

## 2021-07-02 NOTE — PROGRESS NOTES
Pt  Tolerated Perjeta and Herceptin without adverse event  Future appointments reviewed  AVS declined

## 2021-07-23 ENCOUNTER — HOSPITAL ENCOUNTER (OUTPATIENT)
Dept: INFUSION CENTER | Facility: CLINIC | Age: 56
Discharge: HOME | End: 2021-07-23
Payer: COMMERCIAL

## 2021-07-23 VITALS
HEART RATE: 64 BPM | DIASTOLIC BLOOD PRESSURE: 81 MMHG | TEMPERATURE: 97.6 F | BODY MASS INDEX: 36.69 KG/M2 | RESPIRATION RATE: 18 BRPM | SYSTOLIC BLOOD PRESSURE: 133 MMHG | WEIGHT: 220.46 LBS

## 2021-07-23 DIAGNOSIS — C78.7 LIVER METASTASES (HCC): ICD-10-CM

## 2021-07-23 DIAGNOSIS — Z90.79 HISTORY OF BILATERAL SALPINGO-OOPHORECTOMY (BSO): ICD-10-CM

## 2021-07-23 DIAGNOSIS — C79.51 BONE METASTASIS (HCC): Primary | ICD-10-CM

## 2021-07-23 DIAGNOSIS — C50.212 MALIGNANT NEOPLASM OF UPPER-INNER QUADRANT OF LEFT BREAST IN FEMALE, ESTROGEN RECEPTOR POSITIVE (HCC): ICD-10-CM

## 2021-07-23 DIAGNOSIS — Z90.722 HISTORY OF BILATERAL SALPINGO-OOPHORECTOMY (BSO): ICD-10-CM

## 2021-07-23 DIAGNOSIS — Z17.0 MALIGNANT NEOPLASM OF UPPER-INNER QUADRANT OF LEFT BREAST IN FEMALE, ESTROGEN RECEPTOR POSITIVE (HCC): ICD-10-CM

## 2021-07-23 PROCEDURE — 96417 CHEMO IV INFUS EACH ADDL SEQ: CPT

## 2021-07-23 PROCEDURE — 96413 CHEMO IV INFUSION 1 HR: CPT

## 2021-07-23 RX ORDER — SODIUM CHLORIDE 9 MG/ML
20 INJECTION, SOLUTION INTRAVENOUS ONCE
Status: COMPLETED | OUTPATIENT
Start: 2021-07-23 | End: 2021-07-23

## 2021-07-23 RX ADMIN — PERTUZUMAB 420 MG: 30 INJECTION, SOLUTION, CONCENTRATE INTRAVENOUS at 15:11

## 2021-07-23 RX ADMIN — SODIUM CHLORIDE 20 ML/HR: 0.9 INJECTION, SOLUTION INTRAVENOUS at 14:57

## 2021-07-23 RX ADMIN — TRASTUZUMAB 600 MG: 150 INJECTION, POWDER, LYOPHILIZED, FOR SOLUTION INTRAVENOUS at 15:44

## 2021-08-03 ENCOUNTER — TELEPHONE (OUTPATIENT)
Dept: HEMATOLOGY ONCOLOGY | Facility: CLINIC | Age: 56
End: 2021-08-03

## 2021-08-11 RX ORDER — SODIUM CHLORIDE 9 MG/ML
20 INJECTION, SOLUTION INTRAVENOUS ONCE
Status: CANCELLED | OUTPATIENT
Start: 2021-08-13

## 2021-08-13 ENCOUNTER — HOSPITAL ENCOUNTER (OUTPATIENT)
Dept: INFUSION CENTER | Facility: CLINIC | Age: 56
Discharge: HOME | End: 2021-08-13
Payer: COMMERCIAL

## 2021-08-13 VITALS
TEMPERATURE: 98.2 F | BODY MASS INDEX: 37.05 KG/M2 | SYSTOLIC BLOOD PRESSURE: 156 MMHG | DIASTOLIC BLOOD PRESSURE: 86 MMHG | WEIGHT: 222.66 LBS | HEART RATE: 75 BPM

## 2021-08-13 DIAGNOSIS — C50.212 MALIGNANT NEOPLASM OF UPPER-INNER QUADRANT OF LEFT BREAST IN FEMALE, ESTROGEN RECEPTOR POSITIVE (HCC): ICD-10-CM

## 2021-08-13 DIAGNOSIS — Z17.0 MALIGNANT NEOPLASM OF UPPER-INNER QUADRANT OF LEFT BREAST IN FEMALE, ESTROGEN RECEPTOR POSITIVE (HCC): ICD-10-CM

## 2021-08-13 DIAGNOSIS — Z90.722 HISTORY OF BILATERAL SALPINGO-OOPHORECTOMY (BSO): ICD-10-CM

## 2021-08-13 DIAGNOSIS — C79.51 BONE METASTASIS (HCC): Primary | ICD-10-CM

## 2021-08-13 DIAGNOSIS — Z90.79 HISTORY OF BILATERAL SALPINGO-OOPHORECTOMY (BSO): ICD-10-CM

## 2021-08-13 DIAGNOSIS — C78.7 LIVER METASTASES (HCC): ICD-10-CM

## 2021-08-13 PROCEDURE — 96417 CHEMO IV INFUS EACH ADDL SEQ: CPT

## 2021-08-13 PROCEDURE — 96413 CHEMO IV INFUSION 1 HR: CPT

## 2021-08-13 PROCEDURE — 36593 DECLOT VASCULAR DEVICE: CPT

## 2021-08-13 RX ORDER — SODIUM CHLORIDE 9 MG/ML
20 INJECTION, SOLUTION INTRAVENOUS ONCE
Status: COMPLETED | OUTPATIENT
Start: 2021-08-13 | End: 2021-08-13

## 2021-08-13 RX ADMIN — TRASTUZUMAB 600 MG: 150 INJECTION, POWDER, LYOPHILIZED, FOR SOLUTION INTRAVENOUS at 15:29

## 2021-08-13 RX ADMIN — SODIUM CHLORIDE 20 ML/HR: 0.9 INJECTION, SOLUTION INTRAVENOUS at 14:39

## 2021-08-13 RX ADMIN — PERTUZUMAB 420 MG: 30 INJECTION, SOLUTION, CONCENTRATE INTRAVENOUS at 14:59

## 2021-08-13 RX ADMIN — ALTEPLASE 2 MG: 2.2 INJECTION, POWDER, LYOPHILIZED, FOR SOLUTION INTRAVENOUS at 14:39

## 2021-08-13 NOTE — PLAN OF CARE
Problem: Potential for Falls  Goal: Patient will remain free of falls  Description: INTERVENTIONS:  - Educate patient/family on patient safety including physical limitations  - Instruct patient to call for assistance with activity   - Consult OT/PT to assist with strengthening/mobility   - Keep Call bell within reach  - Keep bed low and locked with side rails adjusted as appropriate  - Keep care items and personal belongings within reach  - Initiate and maintain comfort rounds  - Make Fall Risk Sign visible to staff  - Consider moving patient to room near nurses station  Outcome: Progressing

## 2021-08-13 NOTE — PROGRESS NOTES
Pt arrived to unit without complaint  Pt tolerated treatment without incident  AVS declined, but pt aware of future appt  Pt left unit in stable condition

## 2021-08-26 ENCOUNTER — TELEPHONE (OUTPATIENT)
Dept: HEMATOLOGY ONCOLOGY | Facility: CLINIC | Age: 56
End: 2021-08-26

## 2021-08-26 NOTE — TELEPHONE ENCOUNTER
Appointment Confirmation     Appointment with  Wilfredo [id number]    Appointment date & time 9-14-21 @ 9:20am    Location [facility]    Patient verbilized Understanding

## 2021-08-30 ENCOUNTER — HOSPITAL ENCOUNTER (OUTPATIENT)
Dept: NON INVASIVE DIAGNOSTICS | Facility: HOSPITAL | Age: 56
Discharge: HOME | End: 2021-08-30
Attending: INTERNAL MEDICINE
Payer: COMMERCIAL

## 2021-08-30 DIAGNOSIS — I42.7 CARDIOMYOPATHY DUE TO CHEMOTHERAPY (HCC): ICD-10-CM

## 2021-08-30 DIAGNOSIS — T45.1X5A CARDIOMYOPATHY DUE TO CHEMOTHERAPY (HCC): ICD-10-CM

## 2021-08-30 PROCEDURE — 93325 DOPPLER ECHO COLOR FLOW MAPG: CPT | Performed by: INTERNAL MEDICINE

## 2021-08-30 PROCEDURE — 93308 TTE F-UP OR LMTD: CPT | Performed by: INTERNAL MEDICINE

## 2021-08-30 PROCEDURE — 93321 DOPPLER ECHO F-UP/LMTD STD: CPT | Performed by: INTERNAL MEDICINE

## 2021-08-30 PROCEDURE — 93308 TTE F-UP OR LMTD: CPT

## 2021-08-31 ENCOUNTER — TELEPHONE (OUTPATIENT)
Dept: HEMATOLOGY ONCOLOGY | Facility: CLINIC | Age: 56
End: 2021-08-31

## 2021-08-31 NOTE — TELEPHONE ENCOUNTER
Echo is good  Patient could be schedule for more Herceptin treatments  Orders are placed  If she wants to wait until appt with Dr Adelia Teague to schedule these infusion appts then that is also fine

## 2021-08-31 NOTE — TELEPHONE ENCOUNTER
I called Augustine Briscoe at the [facility] and I was able to schedule the patient out till 11/26 for her herceptin treatment  I then called the patient and left the information and location on the appointments and that [facility]  I then voiced if any questions to call the office

## 2021-09-03 ENCOUNTER — HOSPITAL ENCOUNTER (OUTPATIENT)
Dept: INFUSION CENTER | Facility: CLINIC | Age: 56
Discharge: HOME | End: 2021-09-03
Payer: COMMERCIAL

## 2021-09-03 VITALS
TEMPERATURE: 96.8 F | HEART RATE: 75 BPM | WEIGHT: 225.86 LBS | SYSTOLIC BLOOD PRESSURE: 153 MMHG | BODY MASS INDEX: 37.59 KG/M2 | DIASTOLIC BLOOD PRESSURE: 83 MMHG | RESPIRATION RATE: 18 BRPM

## 2021-09-03 DIAGNOSIS — C79.51 BONE METASTASIS (HCC): ICD-10-CM

## 2021-09-03 DIAGNOSIS — C50.212 MALIGNANT NEOPLASM OF UPPER-INNER QUADRANT OF LEFT BREAST IN FEMALE, ESTROGEN RECEPTOR POSITIVE (HCC): ICD-10-CM

## 2021-09-03 DIAGNOSIS — Z90.79 HISTORY OF BILATERAL SALPINGO-OOPHORECTOMY (BSO): ICD-10-CM

## 2021-09-03 DIAGNOSIS — C50.212 MALIGNANT NEOPLASM OF UPPER-INNER QUADRANT OF LEFT BREAST IN FEMALE, ESTROGEN RECEPTOR POSITIVE (HCC): Primary | ICD-10-CM

## 2021-09-03 DIAGNOSIS — Z90.722 HISTORY OF BILATERAL SALPINGO-OOPHORECTOMY (BSO): ICD-10-CM

## 2021-09-03 DIAGNOSIS — C78.7 LIVER METASTASES (HCC): ICD-10-CM

## 2021-09-03 DIAGNOSIS — C79.51 BONE METASTASIS (HCC): Primary | ICD-10-CM

## 2021-09-03 DIAGNOSIS — Z17.0 MALIGNANT NEOPLASM OF UPPER-INNER QUADRANT OF LEFT BREAST IN FEMALE, ESTROGEN RECEPTOR POSITIVE (HCC): Primary | ICD-10-CM

## 2021-09-03 DIAGNOSIS — Z17.0 MALIGNANT NEOPLASM OF UPPER-INNER QUADRANT OF LEFT BREAST IN FEMALE, ESTROGEN RECEPTOR POSITIVE (HCC): ICD-10-CM

## 2021-09-03 PROCEDURE — 96417 CHEMO IV INFUS EACH ADDL SEQ: CPT

## 2021-09-03 PROCEDURE — 96413 CHEMO IV INFUSION 1 HR: CPT

## 2021-09-03 RX ORDER — SODIUM CHLORIDE 9 MG/ML
20 INJECTION, SOLUTION INTRAVENOUS ONCE
Status: COMPLETED | OUTPATIENT
Start: 2021-09-03 | End: 2021-09-03

## 2021-09-03 RX ADMIN — SODIUM CHLORIDE 20 ML/HR: 0.9 INJECTION, SOLUTION INTRAVENOUS at 15:23

## 2021-09-03 RX ADMIN — PERTUZUMAB 420 MG: 30 INJECTION, SOLUTION, CONCENTRATE INTRAVENOUS at 15:30

## 2021-09-03 RX ADMIN — TRASTUZUMAB 600 MG: 150 INJECTION, POWDER, LYOPHILIZED, FOR SOLUTION INTRAVENOUS at 16:02

## 2021-09-03 NOTE — PROGRESS NOTES
Patient tolerated chemotherapy infusions, declined AVS, stated she is aware of next appointment  Patient left infusion center in baseline condition

## 2021-09-10 ENCOUNTER — TELEPHONE (OUTPATIENT)
Dept: HEMATOLOGY ONCOLOGY | Facility: CLINIC | Age: 56
End: 2021-09-10

## 2021-09-10 NOTE — TELEPHONE ENCOUNTER
Patient has been rescheduled to Wednesday 9/15/2021 at 10:00 am per Dr Davila General  Patient verbalized understanding and agreement

## 2021-09-15 ENCOUNTER — APPOINTMENT (OUTPATIENT)
Dept: LAB | Facility: MEDICAL CENTER | Age: 56
End: 2021-09-15
Payer: COMMERCIAL

## 2021-09-15 ENCOUNTER — OFFICE VISIT (OUTPATIENT)
Dept: HEMATOLOGY ONCOLOGY | Facility: CLINIC | Age: 56
End: 2021-09-15
Payer: COMMERCIAL

## 2021-09-15 VITALS
HEART RATE: 79 BPM | TEMPERATURE: 98.1 F | SYSTOLIC BLOOD PRESSURE: 144 MMHG | OXYGEN SATURATION: 97 % | DIASTOLIC BLOOD PRESSURE: 84 MMHG | WEIGHT: 220 LBS | RESPIRATION RATE: 18 BRPM | HEIGHT: 66 IN | BODY MASS INDEX: 35.36 KG/M2

## 2021-09-15 DIAGNOSIS — Z90.722 S/P BSO (BILATERAL SALPINGO-OOPHORECTOMY): ICD-10-CM

## 2021-09-15 DIAGNOSIS — C78.7 LIVER METASTASES (HCC): ICD-10-CM

## 2021-09-15 DIAGNOSIS — Z17.0 MALIGNANT NEOPLASM OF UPPER-INNER QUADRANT OF LEFT BREAST IN FEMALE, ESTROGEN RECEPTOR POSITIVE (HCC): ICD-10-CM

## 2021-09-15 DIAGNOSIS — Z14.8 CARRIER OF GENE MUTATION FOR HIGH RISK OF CANCER: ICD-10-CM

## 2021-09-15 DIAGNOSIS — T45.1X5A CARDIOMYOPATHY DUE TO CHEMOTHERAPY (HCC): ICD-10-CM

## 2021-09-15 DIAGNOSIS — C79.51 BONE METASTASIS (HCC): ICD-10-CM

## 2021-09-15 DIAGNOSIS — Z15.01 BRCA1 GENE MUTATION POSITIVE IN FEMALE: ICD-10-CM

## 2021-09-15 DIAGNOSIS — Z15.02 BRCA1 GENE MUTATION POSITIVE IN FEMALE: ICD-10-CM

## 2021-09-15 DIAGNOSIS — Z17.0 MALIGNANT NEOPLASM OF UPPER-INNER QUADRANT OF LEFT BREAST IN FEMALE, ESTROGEN RECEPTOR POSITIVE (HCC): Primary | ICD-10-CM

## 2021-09-15 DIAGNOSIS — C50.212 MALIGNANT NEOPLASM OF UPPER-INNER QUADRANT OF LEFT BREAST IN FEMALE, ESTROGEN RECEPTOR POSITIVE (HCC): Primary | ICD-10-CM

## 2021-09-15 DIAGNOSIS — Z95.828 PORT-A-CATH IN PLACE: ICD-10-CM

## 2021-09-15 DIAGNOSIS — I42.7 CARDIOMYOPATHY DUE TO CHEMOTHERAPY (HCC): ICD-10-CM

## 2021-09-15 DIAGNOSIS — C50.212 MALIGNANT NEOPLASM OF UPPER-INNER QUADRANT OF LEFT BREAST IN FEMALE, ESTROGEN RECEPTOR POSITIVE (HCC): ICD-10-CM

## 2021-09-15 DIAGNOSIS — Z15.09 BRCA1 GENE MUTATION POSITIVE IN FEMALE: ICD-10-CM

## 2021-09-15 LAB
ALBUMIN SERPL BCP-MCNC: 2.9 G/DL (ref 3.5–5)
ALP SERPL-CCNC: 107 U/L (ref 46–116)
ALT SERPL W P-5'-P-CCNC: 50 U/L (ref 12–78)
ANION GAP SERPL CALCULATED.3IONS-SCNC: 4 MMOL/L (ref 4–13)
AST SERPL W P-5'-P-CCNC: 25 U/L (ref 5–45)
BASOPHILS # BLD AUTO: 0.04 THOUSANDS/ΜL (ref 0–0.1)
BASOPHILS NFR BLD AUTO: 1 % (ref 0–1)
BILIRUB SERPL-MCNC: 0.35 MG/DL (ref 0.2–1)
BUN SERPL-MCNC: 19 MG/DL (ref 5–25)
CALCIUM ALBUM COR SERPL-MCNC: 9.7 MG/DL (ref 8.3–10.1)
CALCIUM SERPL-MCNC: 8.8 MG/DL (ref 8.3–10.1)
CANCER AG27-29 SERPL-ACNC: 23 U/ML (ref 0–42.3)
CHLORIDE SERPL-SCNC: 112 MMOL/L (ref 100–108)
CO2 SERPL-SCNC: 26 MMOL/L (ref 21–32)
CREAT SERPL-MCNC: 0.84 MG/DL (ref 0.6–1.3)
EOSINOPHIL # BLD AUTO: 0.47 THOUSAND/ΜL (ref 0–0.61)
EOSINOPHIL NFR BLD AUTO: 5 % (ref 0–6)
ERYTHROCYTE [DISTWIDTH] IN BLOOD BY AUTOMATED COUNT: 14.5 % (ref 11.6–15.1)
GFR SERPL CREATININE-BSD FRML MDRD: 80 ML/MIN/1.73SQ M
GLUCOSE SERPL-MCNC: 128 MG/DL (ref 65–140)
HCT VFR BLD AUTO: 42.1 % (ref 34.8–46.1)
HGB BLD-MCNC: 13.4 G/DL (ref 11.5–15.4)
IMM GRANULOCYTES # BLD AUTO: 0.04 THOUSAND/UL (ref 0–0.2)
IMM GRANULOCYTES NFR BLD AUTO: 1 % (ref 0–2)
LYMPHOCYTES # BLD AUTO: 2.33 THOUSANDS/ΜL (ref 0.6–4.47)
LYMPHOCYTES NFR BLD AUTO: 27 % (ref 14–44)
MCH RBC QN AUTO: 27.3 PG (ref 26.8–34.3)
MCHC RBC AUTO-ENTMCNC: 31.8 G/DL (ref 31.4–37.4)
MCV RBC AUTO: 86 FL (ref 82–98)
MONOCYTES # BLD AUTO: 0.5 THOUSAND/ΜL (ref 0.17–1.22)
MONOCYTES NFR BLD AUTO: 6 % (ref 4–12)
NEUTROPHILS # BLD AUTO: 5.26 THOUSANDS/ΜL (ref 1.85–7.62)
NEUTS SEG NFR BLD AUTO: 60 % (ref 43–75)
NRBC BLD AUTO-RTO: 0 /100 WBCS
PLATELET # BLD AUTO: 363 THOUSANDS/UL (ref 149–390)
PMV BLD AUTO: 11.1 FL (ref 8.9–12.7)
POTASSIUM SERPL-SCNC: 3.8 MMOL/L (ref 3.5–5.3)
PROT SERPL-MCNC: 7.3 G/DL (ref 6.4–8.2)
RBC # BLD AUTO: 4.91 MILLION/UL (ref 3.81–5.12)
SODIUM SERPL-SCNC: 142 MMOL/L (ref 136–145)
WBC # BLD AUTO: 8.64 THOUSAND/UL (ref 4.31–10.16)

## 2021-09-15 PROCEDURE — 36415 COLL VENOUS BLD VENIPUNCTURE: CPT | Performed by: INTERNAL MEDICINE

## 2021-09-15 PROCEDURE — 86300 IMMUNOASSAY TUMOR CA 15-3: CPT | Performed by: INTERNAL MEDICINE

## 2021-09-15 PROCEDURE — 99214 OFFICE O/P EST MOD 30 MIN: CPT | Performed by: INTERNAL MEDICINE

## 2021-09-15 PROCEDURE — 80053 COMPREHEN METABOLIC PANEL: CPT | Performed by: INTERNAL MEDICINE

## 2021-09-15 PROCEDURE — 85025 COMPLETE CBC W/AUTO DIFF WBC: CPT | Performed by: INTERNAL MEDICINE

## 2021-09-15 NOTE — PROGRESS NOTES
HPI:  Patient has been on maintenance Herceptin, Perjeta and letrozole for history of triple positive breast cancer metastatic disease to bones and liver, de Tricia stage IV disease diagnosed in 2016  Patient  had locally advanced disease in the left breast and metastatic disease  Also she has BRCA 1 mutation     Patient was started on a combination of   Taxotere plus Herceptin and Perjeta and she had very good response and after 6 cycles Taxotere was discontinued and tamoxifen was added    Herceptin and Perjeta were continued  Deryl Due radiation to left hip for palliation    On 08/16/2019 she underwent BSO    Tamoxifen was changed to letrozole     Presently she is on Herceptin, Perjeta and letrozole  Lars Marsh has been taking vitamin-D and calcium and is staying active    She has been tolerating treatments without much problem   She goes for blood tests and echocardiogram on regular basis  There was no metastatic disease on the recent CT scans and bone scan     In September 2016 patient had lumpectomy of left breast followed by radiation therapy   At the time of lumpectomy there was minimal residual disease, 1 4 cm   Lymph nodes were not sampled  Later she was found to have positive BRCA 1 mutation     Patient knows that she is at high risk for breast and other cancers like  ovarian cancer, pancreatic cancer, peritoneal cancer, melanoma and other cancers  Patient goes to breast surgeon for evaluation and imaging studies  She gets CT scans and that will check the pancreas and peritoneum  She had BSO  She does not want to go to a dermatologist for melanoma mapping  She does not want to go to ophthalmologist for melanoma in the eye    Also she does not want to have colonoscopy and not even Cologuard or stool test for blood       She has grade 1 peripheral neuropathy    Has some tiredness and arthritic symptoms   For leg cramps at night and she is taking one baby aspirin daily with food in the evening and also one magnesium tablet daily and that is helping       Has less anxiety   Lisbeth Thompson    She is not on Xgeva anymore at this time  She had Xgeva for 2 years  [facility] continues to take    calcium and vitamin-D                              Current Outpatient Medications:     letrozole (FEMARA) 2 5 mg tablet, Take 1 tablet (2 5 mg total) by mouth daily, Disp: 30 tablet, Rfl: 6    multivitamin (THERAGRAN) TABS, Take 1 tablet by mouth 3 (three) times a week , Disp: , Rfl:     pertuzumab (PERJETA) 420 mg/14 mL SOLN, Infuse into a venous catheter every 21 days  At infusion  center, Disp: , Rfl:     Trastuzumab (HERCEPTIN IV), Infuse into a venous catheter every 21 days  At infusion center, Disp: , Rfl:     VITAMIN D, CHOLECALCIFEROL, PO, Take 1,000 Units by mouth 3 (three) times a week , Disp: , Rfl:     No Known Allergies    Oncology History   Malignant neoplasm of upper-inner quadrant of left female breast ([facility] [address] )   12/2015 Initial Diagnosis    Malignant neoplasm of upper-outer quadrant of left female breast ([facility] [address] )     2016 -  Hormone Therapy    Tamoxifen  Ended 8/2019  Dr Kallie Kang  Letrozole 9/2019 1/27/2016 Surgery    Port placement     2/19/2016 - 6/2/2018 Chemotherapy    Taxotere,  herceptin, perjeta, xgeva  After six cycles, taxotere was discontinued and tamoxifen added     Continues with Perjeta and Herceptin every 3 weeks     - Dr Kallie Kang       9/16/2016 Surgery    Left breast partial mastectomy     11/7/2016 - 12/23/2016 Radiation    Plan ID Energy Fractions Dose per Fraction (cGy) Total Dose Delivered (cGy) Elapsed Days   Lt Breast 6X 25 / 25 200 5,000 36   Lt Brst Boost 6X 8 / 8 200 1,600 9   Lt Femur 10X 10 / 10 300 3,000 11   Lt PAB 6X 25 / 25 60 1,500 36   Lt Sclav 6X 25 / 25 200 5,000 36                                   Total dose to left breast lumpectomy cavity: 6600 cGy                   Total dose to the supraclavicular and axillary regions: 5000 cGy       4/17/2019 -  Chemotherapy    pertuzumab (PERJETA) IVPB, 840 mg, Intravenous, Once, 42 of 46 cycles  Administration: 420 mg (5/17/2019), 420 mg (6/7/2019), 420 mg (6/28/2019), 420 mg (7/19/2019), 420 mg (8/30/2019), 420 mg (9/20/2019), 420 mg (8/9/2019), 420 mg (10/9/2019), 420 mg (11/1/2019), 420 mg (11/22/2019), 420 mg (12/13/2019), 420 mg (1/3/2020), 420 mg (1/24/2020), 420 mg (2/14/2020), 420 mg (3/6/2020), 420 mg (3/27/2020), 420 mg (4/17/2020), 420 mg (5/8/2020), 420 mg (5/29/2020), 420 mg (6/19/2020), 420 mg (7/10/2020), 420 mg (7/31/2020), 420 mg (8/21/2020), 420 mg (9/11/2020), 420 mg (10/2/2020), 420 mg (10/23/2020), 420 mg (11/13/2020), 420 mg (12/4/2020), 420 mg (12/24/2020), 420 mg (1/15/2021), 420 mg (2/5/2021), 420 mg (2/26/2021), 420 mg (3/19/2021), 420 mg (4/9/2021), 420 mg (4/30/2021), 420 mg (5/21/2021), 420 mg (6/11/2021), 420 mg (7/2/2021), 420 mg (7/23/2021), 420 mg (8/13/2021), 420 mg (9/3/2021)  trastuzumab (HERCEPTIN) chemo infusion, 6 mg/kg = 589 mg (75 % of original dose 8 mg/kg), Intravenous, Once, 42 of 46 cycles  Dose modification: 6 mg/kg (original dose 8 mg/kg, Cycle 1, Reason: Other (Must fill in a comment))  Administration: 589 mg (5/17/2019), 589 mg (6/7/2019), 600 mg (6/28/2019), 600 mg (7/19/2019), 600 mg (8/30/2019), 600 mg (9/20/2019), 600 mg (8/9/2019), 600 mg (10/9/2019), 600 mg (11/1/2019), 600 mg (11/22/2019), 600 mg (12/13/2019), 600 mg (1/3/2020), 600 mg (1/24/2020), 600 mg (2/14/2020), 600 mg (3/6/2020), 600 mg (3/27/2020), 600 mg (4/17/2020), 600 mg (5/8/2020), 600 mg (5/29/2020), 600 mg (6/19/2020), 600 mg (7/10/2020), 600 mg (7/31/2020), 600 mg (8/21/2020), 600 mg (9/11/2020), 600 mg (10/2/2020), 600 mg (10/23/2020), 600 mg (11/13/2020), 600 mg (12/4/2020), 600 mg (12/24/2020), 600 mg (1/15/2021), 600 mg (2/5/2021), 600 mg (2/26/2021), 600 mg (3/19/2021), 600 mg (4/9/2021), 600 mg (4/30/2021), 600 mg (5/21/2021), 600 mg (6/11/2021), 600 mg (7/2/2021), 600 mg (7/23/2021), 600 mg (8/13/2021), 600 mg (9/3/2021) 8/16/2019 Surgery    she underwent BSO       Liver metastases ([facility] [address] )   4/27/2018 Initial Diagnosis    Liver metastases ([facility] [address] )     4/17/2019 -  Chemotherapy    pertuzumab (PERJETA) IVPB, 840 mg, Intravenous, Once, 42 of 46 cycles  Administration: 420 mg (5/17/2019), 420 mg (6/7/2019), 420 mg (6/28/2019), 420 mg (7/19/2019), 420 mg (8/30/2019), 420 mg (9/20/2019), 420 mg (8/9/2019), 420 mg (10/9/2019), 420 mg (11/1/2019), 420 mg (11/22/2019), 420 mg (12/13/2019), 420 mg (1/3/2020), 420 mg (1/24/2020), 420 mg (2/14/2020), 420 mg (3/6/2020), 420 mg (3/27/2020), 420 mg (4/17/2020), 420 mg (5/8/2020), 420 mg (5/29/2020), 420 mg (6/19/2020), 420 mg (7/10/2020), 420 mg (7/31/2020), 420 mg (8/21/2020), 420 mg (9/11/2020), 420 mg (10/2/2020), 420 mg (10/23/2020), 420 mg (11/13/2020), 420 mg (12/4/2020), 420 mg (12/24/2020), 420 mg (1/15/2021), 420 mg (2/5/2021), 420 mg (2/26/2021), 420 mg (3/19/2021), 420 mg (4/9/2021), 420 mg (4/30/2021), 420 mg (5/21/2021), 420 mg (6/11/2021), 420 mg (7/2/2021), 420 mg (7/23/2021), 420 mg (8/13/2021), 420 mg (9/3/2021)  trastuzumab (HERCEPTIN) chemo infusion, 6 mg/kg = 589 mg (75 % of original dose 8 mg/kg), Intravenous, Once, 42 of 46 cycles  Dose modification: 6 mg/kg (original dose 8 mg/kg, Cycle 1, Reason: Other (Must fill in a comment))  Administration: 589 mg (5/17/2019), 589 mg (6/7/2019), 600 mg (6/28/2019), 600 mg (7/19/2019), 600 mg (8/30/2019), 600 mg (9/20/2019), 600 mg (8/9/2019), 600 mg (10/9/2019), 600 mg (11/1/2019), 600 mg (11/22/2019), 600 mg (12/13/2019), 600 mg (1/3/2020), 600 mg (1/24/2020), 600 mg (2/14/2020), 600 mg (3/6/2020), 600 mg (3/27/2020), 600 mg (4/17/2020), 600 mg (5/8/2020), 600 mg (5/29/2020), 600 mg (6/19/2020), 600 mg (7/10/2020), 600 mg (7/31/2020), 600 mg (8/21/2020), 600 mg (9/11/2020), 600 mg (10/2/2020), 600 mg (10/23/2020), 600 mg (11/13/2020), 600 mg (12/4/2020), 600 mg (12/24/2020), 600 mg (1/15/2021), 600 mg (2/5/2021), 600 mg (2/26/2021), 600 mg (3/19/2021), 600 mg (4/9/2021), 600 mg (4/30/2021), 600 mg (5/21/2021), 600 mg (6/11/2021), 600 mg (7/2/2021), 600 mg (7/23/2021), 600 mg (8/13/2021), 600 mg (9/3/2021)     Bone metastasis ([facility] [address] )   4/27/2018 Initial Diagnosis    Bone metastasis ([facility] [address] )     4/17/2019 -  Chemotherapy    pertuzumab (PERJETA) IVPB, 840 mg, Intravenous, Once, 42 of 46 cycles  Administration: 420 mg (5/17/2019), 420 mg (6/7/2019), 420 mg (6/28/2019), 420 mg (7/19/2019), 420 mg (8/30/2019), 420 mg (9/20/2019), 420 mg (8/9/2019), 420 mg (10/9/2019), 420 mg (11/1/2019), 420 mg (11/22/2019), 420 mg (12/13/2019), 420 mg (1/3/2020), 420 mg (1/24/2020), 420 mg (2/14/2020), 420 mg (3/6/2020), 420 mg (3/27/2020), 420 mg (4/17/2020), 420 mg (5/8/2020), 420 mg (5/29/2020), 420 mg (6/19/2020), 420 mg (7/10/2020), 420 mg (7/31/2020), 420 mg (8/21/2020), 420 mg (9/11/2020), 420 mg (10/2/2020), 420 mg (10/23/2020), 420 mg (11/13/2020), 420 mg (12/4/2020), 420 mg (12/24/2020), 420 mg (1/15/2021), 420 mg (2/5/2021), 420 mg (2/26/2021), 420 mg (3/19/2021), 420 mg (4/9/2021), 420 mg (4/30/2021), 420 mg (5/21/2021), 420 mg (6/11/2021), 420 mg (7/2/2021), 420 mg (7/23/2021), 420 mg (8/13/2021), 420 mg (9/3/2021)  trastuzumab (HERCEPTIN) chemo infusion, 6 mg/kg = 589 mg (75 % of original dose 8 mg/kg), Intravenous, Once, 42 of 46 cycles  Dose modification: 6 mg/kg (original dose 8 mg/kg, Cycle 1, Reason: Other (Must fill in a comment))  Administration: 589 mg (5/17/2019), 589 mg (6/7/2019), 600 mg (6/28/2019), 600 mg (7/19/2019), 600 mg (8/30/2019), 600 mg (9/20/2019), 600 mg (8/9/2019), 600 mg (10/9/2019), 600 mg (11/1/2019), 600 mg (11/22/2019), 600 mg (12/13/2019), 600 mg (1/3/2020), 600 mg (1/24/2020), 600 mg (2/14/2020), 600 mg (3/6/2020), 600 mg (3/27/2020), 600 mg (4/17/2020), 600 mg (5/8/2020), 600 mg (5/29/2020), 600 mg (6/19/2020), 600 mg (7/10/2020), 600 mg (7/31/2020), 600 mg (8/21/2020), 600 mg (9/11/2020), 600 mg (10/2/2020), 600 mg (10/23/2020), 600 mg (11/13/2020), 600 mg (12/4/2020), 600 mg (12/24/2020), 600 mg (1/15/2021), 600 mg (2/5/2021), 600 mg (2/26/2021), 600 mg (3/19/2021), 600 mg (4/9/2021), 600 mg (4/30/2021), 600 mg (5/21/2021), 600 mg (6/11/2021), 600 mg (7/2/2021), 600 mg (7/23/2021), 600 mg (8/13/2021), 600 mg (9/3/2021)         ROS:  09/15/21 Reviewed 12 systems:    See symptoms in HPI  Presently no other neurological, cardiac, pulmonary, GI and  symptoms other than mentioned   in HPI     Other symptoms are in HPI  No other symptoms like  fever, chills, bleeding, bone pains, skin rash, weight loss, weakness,  claudication and gait problem  No frequent infections  Not unusually sensitive to heat or cold  No swelling of the ankles  No swollen glands  Patient is less anxious  /84 (BP Location: Left arm, Patient Position: Sitting, Cuff Size: Adult)   Pulse 79   Temp 98 1 °F (36 7 °C)   Resp 18   Ht 5' 6" (1 676 m)   Wt 99 8 kg (220 lb)   SpO2 97%   BMI 35 51 kg/m²     Physical Exam:     Patient is alert and oriented  She is not in distress  Vital signs are above  There is no icterus ,, no oral thrush, no palpable neck mass, clear lung fields, regular heart rate, abdomen  soft and non tender, no palpable abdominal mass, no ascites, no edema of ankles, no calf tenderness, no objective focal neurological deficit, no skin rash, no palpable lymphadenopathy in the neck and axillary areas, no clubbing, Patient is less anxious  Performance status 1  No lymphedema      IMAGING:  IMPRESSION:     1  No scintigraphic evidence of osseous metastasis             Workstation performed: [id number]      Imaging     Echocardiogram on 08/30/2021 showed ejection fraction 65%        LABS:    Results for orders placed or performed during the hospital encounter of 06/15/21   Cancer antigen 27 29   Result Value Ref Range    CA 27 29 21 1 0 0 - 42 3 U/mL   CBC and differential   Result Value Ref Range    WBC 7 44 4 31 - 10 16 Thousand/uL RBC 4 83 3 81 - 5 12 Million/uL    Hemoglobin 13 0 11 5 - 15 4 g/dL    Hematocrit 40 5 34 8 - 46 1 %    MCV 84 82 - 98 fL    MCH 26 9 26 8 - 34 3 pg    MCHC 32 1 31 4 - 37 4 g/dL    RDW 15 1 11 6 - 15 1 %    MPV 10 8 8 9 - 12 7 fL    Platelets 190 503 - 517 Thousands/uL    Neutrophils Relative 63 43 - 75 %    Lymphocytes Relative 24 14 - 44 %    Monocytes Relative 8 4 - 12 %    Eosinophils Relative 5 0 - 6 %    Basophils Relative 0 0 - 1 %    Neutrophils Absolute 4 63 1 85 - 7 62 Thousands/µL    Lymphocytes Absolute 1 80 0 60 - 4 47 Thousands/µL    Monocytes Absolute 0 61 0 17 - 1 22 Thousand/µL    Eosinophils Absolute 0 38 0 00 - 0 61 Thousand/µL    Basophils Absolute 0 02 0 00 - 0 10 Thousands/µL   Comprehensive metabolic panel   Result Value Ref Range    Sodium 141 136 - 145 mmol/L    Potassium 3 9 3 5 - 5 3 mmol/L    Chloride 106 98 - 108 mmol/L    CO2 28 21 - 32 mmol/L    ANION GAP 7 4 - 13 mmol/L    BUN 17 5 - 25 mg/dL    Creatinine 1 10 0 60 - 1 30 mg/dL    Glucose 112 65 - 140 mg/dL    Calcium 9 4 8 3 - 10 1 mg/dL    Corrected Calcium 10 3 (H) 8 3 - 10 1 mg/dL    AST 28 5 - 45 U/L    ALT 39 12 - 78 U/L    Alkaline Phosphatase 93 46 - 116 U/L    Total Protein 6 7 6 4 - 8 2 g/dL    Albumin 2 9 (L) 3 5 - 5 7 g/dL    Total Bilirubin 0 60 0 20 - 1 00 mg/dL    eGFR 57 ml/min/1 73sq m     Labs, Imaging, & Other studies:   All pertinent labs and imaging studies were personally reviewed    Lab Results   Component Value Date    K 3 9 06/15/2021     06/15/2021    CO2 28 06/15/2021    BUN 17 06/15/2021    CREATININE 1 10 06/15/2021    GLUF 162 (H) 02/12/2020    CALCIUM 9 4 06/15/2021    CORRECTEDCA 10 3 (H) 06/15/2021    AST 28 06/15/2021    ALT 39 06/15/2021    ALKPHOS 93 06/15/2021    EGFR 57 06/15/2021     Lab Results   Component Value Date    WBC 7 44 06/15/2021    HGB 13 0 06/15/2021    HCT 40 5 06/15/2021    MCV 84 06/15/2021     06/15/2021      Reviewed  CBC, CMP and CA 27-29 and echocardiogram and discussed with patient and explained  Assessment and plan:    Patient has been on maintenance Herceptin, Perjeta and letrozole for history of triple positive breast cancer metastatic disease to bones and liver, de Tricia stage IV disease diagnosed in 2016  Patient  had locally advanced disease in the left breast and metastatic disease  Also she has BRCA 1 mutation     Patient was started on a combination of   Taxotere plus Herceptin and Perjeta and she had very good response and after 6 cycles Taxotere was discontinued and tamoxifen was added    Herceptin and Perjeta were continued  Faythe Bosworth radiation to left hip for palliation    On 08/16/2019 she underwent BSO    Tamoxifen was changed to letrozole     Presently she is on Herceptin, Perjeta and letrozole  Garett Elias has been taking vitamin-D and calcium and is staying active    She has been tolerating treatments without much problem   She goes for blood tests and echocardiogram on regular basis  There was no metastatic disease on the recent CT scans and bone scan     In September 2016 patient had lumpectomy of left breast followed by radiation therapy   At the time of lumpectomy there was minimal residual disease, 1 4 cm   Lymph nodes were not sampled  Later she was found to have positive BRCA 1 mutation     Patient knows that she is at high risk for breast and other cancers like  ovarian cancer, pancreatic cancer, peritoneal cancer, melanoma and other cancers  Patient goes to breast surgeon for evaluation and imaging studies  She gets CT scans and that will check the pancreas and peritoneum  She had BSO  She does not want to go to a dermatologist for melanoma mapping  She does not want to go to ophthalmologist for melanoma in the eye    Also she does not want to have colonoscopy and not even Cologuard or stool test for blood       She has grade 1 peripheral neuropathy    Has some tiredness and arthritic symptoms   For leg cramps at night and she is taking one baby aspirin daily with food in the evening and also one magnesium tablet daily and that is helping       Has less anxiety   Ruthyue Gonzalezhussein    She is not on Xgeva anymore at this time  She had Xgeva for 2 years  Elan  continues to take    calcium and vitamin-D       Physical examination and test results are as recorded and discussed   Chayo Chacko continues to show response  TARUN  No change in therapy  She gets an echocardiogram on regular basis   Condition discussed and explained   Questions answered  Latasha Hatch discussed the importance of self-breast examination, eating healthy foods, staying active and health screening tests   Patient goes to her breast surgeon for examination and  imaging studies  Chayo Chacko is capable of self-care   Goal is prolonged survival from stage IV breast cancer  Patient will continue to follow with her primary physician and other consultants  Discussed precautions against coronavirus     See diagnoses, orders and instructions below    1  Malignant neoplasm of upper-inner quadrant of left breast in female, estrogen receptor positive ([facility] [address] )      2  Liver metastases ([facility] [address] )      3  Bone metastasis ([facility] [address] )      4  BRCA1 gene mutation positive in female      11  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      6  Cardiomyopathy due to chemotherapy ([facility] [address] )    - Echo follow up/limited with contrast if indicated; Future    7  S/P BSO (bilateral salpingo-oophorectomy)      8  Port-A-Cath in place    No change in blood work and therapy  Ordered echo for next time  Follow-up in 3 months                                         Patient voiced understanding and agreed       Counseling / Coordination of Care       Provided counseling and support

## 2021-09-23 RX ORDER — SODIUM CHLORIDE 9 MG/ML
20 INJECTION, SOLUTION INTRAVENOUS ONCE
Status: CANCELLED | OUTPATIENT
Start: 2021-09-24

## 2021-09-24 ENCOUNTER — HOSPITAL ENCOUNTER (OUTPATIENT)
Dept: INFUSION CENTER | Facility: CLINIC | Age: 56
Discharge: HOME | End: 2021-09-24
Payer: COMMERCIAL

## 2021-09-24 VITALS
TEMPERATURE: 98 F | DIASTOLIC BLOOD PRESSURE: 84 MMHG | HEART RATE: 73 BPM | HEIGHT: 66 IN | BODY MASS INDEX: 35.55 KG/M2 | SYSTOLIC BLOOD PRESSURE: 166 MMHG | WEIGHT: 221.23 LBS | RESPIRATION RATE: 18 BRPM

## 2021-09-24 DIAGNOSIS — Z17.0 MALIGNANT NEOPLASM OF UPPER-INNER QUADRANT OF LEFT BREAST IN FEMALE, ESTROGEN RECEPTOR POSITIVE (HCC): ICD-10-CM

## 2021-09-24 DIAGNOSIS — C79.51 BONE METASTASIS (HCC): Primary | ICD-10-CM

## 2021-09-24 DIAGNOSIS — Z90.722 HISTORY OF BILATERAL SALPINGO-OOPHORECTOMY (BSO): ICD-10-CM

## 2021-09-24 DIAGNOSIS — C50.212 MALIGNANT NEOPLASM OF UPPER-INNER QUADRANT OF LEFT BREAST IN FEMALE, ESTROGEN RECEPTOR POSITIVE (HCC): ICD-10-CM

## 2021-09-24 DIAGNOSIS — Z90.79 HISTORY OF BILATERAL SALPINGO-OOPHORECTOMY (BSO): ICD-10-CM

## 2021-09-24 DIAGNOSIS — C78.7 LIVER METASTASES (HCC): ICD-10-CM

## 2021-09-24 PROCEDURE — 96417 CHEMO IV INFUS EACH ADDL SEQ: CPT

## 2021-09-24 PROCEDURE — 96413 CHEMO IV INFUSION 1 HR: CPT

## 2021-09-24 RX ORDER — SODIUM CHLORIDE 9 MG/ML
20 INJECTION, SOLUTION INTRAVENOUS ONCE
Status: COMPLETED | OUTPATIENT
Start: 2021-09-24 | End: 2021-09-24

## 2021-09-24 RX ADMIN — PERTUZUMAB 420 MG: 30 INJECTION, SOLUTION, CONCENTRATE INTRAVENOUS at 14:29

## 2021-09-24 RX ADMIN — SODIUM CHLORIDE 20 ML/HR: 0.9 INJECTION, SOLUTION INTRAVENOUS at 14:34

## 2021-09-24 RX ADMIN — TRASTUZUMAB 600 MG: 150 INJECTION, POWDER, LYOPHILIZED, FOR SOLUTION INTRAVENOUS at 15:01

## 2021-09-24 NOTE — PLAN OF CARE
Problem: INFECTION - ADULT  Goal: Absence or prevention of progression during hospitalization  Description: INTERVENTIONS:  - Assess and monitor for signs and symptoms of infection  - Monitor lab/diagnostic results  - Monitor all insertion sites, i e  indwelling lines, tubes, and drains  - Monitor endotracheal if appropriate and nasal secretions for changes in amount and color  - [place] appropriate cooling/warming therapies per order  - Administer medications as ordered  - Instruct and encourage patient and family to use good hand hygiene technique  - Identify and instruct in appropriate isolation precautions for identified infection/condition  Outcome: Progressing

## 2021-10-13 RX ORDER — SODIUM CHLORIDE 9 MG/ML
20 INJECTION, SOLUTION INTRAVENOUS ONCE
Status: CANCELLED | OUTPATIENT
Start: 2021-10-15

## 2021-10-15 ENCOUNTER — HOSPITAL ENCOUNTER (OUTPATIENT)
Dept: INFUSION CENTER | Facility: CLINIC | Age: 56
Discharge: HOME | End: 2021-10-15
Payer: COMMERCIAL

## 2021-10-15 VITALS
DIASTOLIC BLOOD PRESSURE: 90 MMHG | TEMPERATURE: 98.3 F | RESPIRATION RATE: 18 BRPM | WEIGHT: 221.34 LBS | SYSTOLIC BLOOD PRESSURE: 152 MMHG | HEART RATE: 69 BPM | BODY MASS INDEX: 35.73 KG/M2

## 2021-10-15 DIAGNOSIS — Z90.722 HISTORY OF BILATERAL SALPINGO-OOPHORECTOMY (BSO): ICD-10-CM

## 2021-10-15 DIAGNOSIS — C78.7 LIVER METASTASES (HCC): ICD-10-CM

## 2021-10-15 DIAGNOSIS — C79.51 BONE METASTASIS (HCC): Primary | ICD-10-CM

## 2021-10-15 DIAGNOSIS — Z17.0 MALIGNANT NEOPLASM OF UPPER-INNER QUADRANT OF LEFT BREAST IN FEMALE, ESTROGEN RECEPTOR POSITIVE (HCC): ICD-10-CM

## 2021-10-15 DIAGNOSIS — Z90.79 HISTORY OF BILATERAL SALPINGO-OOPHORECTOMY (BSO): ICD-10-CM

## 2021-10-15 DIAGNOSIS — C50.212 MALIGNANT NEOPLASM OF UPPER-INNER QUADRANT OF LEFT BREAST IN FEMALE, ESTROGEN RECEPTOR POSITIVE (HCC): ICD-10-CM

## 2021-10-15 PROCEDURE — 96413 CHEMO IV INFUSION 1 HR: CPT

## 2021-10-15 PROCEDURE — 96417 CHEMO IV INFUS EACH ADDL SEQ: CPT

## 2021-10-15 RX ORDER — SODIUM CHLORIDE 9 MG/ML
20 INJECTION, SOLUTION INTRAVENOUS ONCE
Status: COMPLETED | OUTPATIENT
Start: 2021-10-15 | End: 2021-10-15

## 2021-10-15 RX ADMIN — SODIUM CHLORIDE 20 ML/HR: 0.9 INJECTION, SOLUTION INTRAVENOUS at 14:40

## 2021-10-15 RX ADMIN — TRASTUZUMAB 600 MG: 150 INJECTION, POWDER, LYOPHILIZED, FOR SOLUTION INTRAVENOUS at 15:23

## 2021-10-15 RX ADMIN — PERTUZUMAB 420 MG: 30 INJECTION, SOLUTION, CONCENTRATE INTRAVENOUS at 14:50

## 2021-11-04 RX ORDER — SODIUM CHLORIDE 9 MG/ML
20 INJECTION, SOLUTION INTRAVENOUS ONCE
Status: CANCELLED | OUTPATIENT
Start: 2021-11-05

## 2021-11-05 ENCOUNTER — HOSPITAL ENCOUNTER (OUTPATIENT)
Dept: INFUSION CENTER | Facility: CLINIC | Age: 56
Discharge: HOME | End: 2021-11-05
Payer: COMMERCIAL

## 2021-11-05 VITALS
SYSTOLIC BLOOD PRESSURE: 147 MMHG | WEIGHT: 224.43 LBS | HEART RATE: 70 BPM | BODY MASS INDEX: 36.22 KG/M2 | RESPIRATION RATE: 18 BRPM | DIASTOLIC BLOOD PRESSURE: 86 MMHG | TEMPERATURE: 98.6 F

## 2021-11-05 DIAGNOSIS — C79.51 BONE METASTASIS (HCC): Primary | ICD-10-CM

## 2021-11-05 DIAGNOSIS — Z90.79 HISTORY OF BILATERAL SALPINGO-OOPHORECTOMY (BSO): ICD-10-CM

## 2021-11-05 DIAGNOSIS — C50.212 MALIGNANT NEOPLASM OF UPPER-INNER QUADRANT OF LEFT BREAST IN FEMALE, ESTROGEN RECEPTOR POSITIVE (HCC): ICD-10-CM

## 2021-11-05 DIAGNOSIS — Z90.722 HISTORY OF BILATERAL SALPINGO-OOPHORECTOMY (BSO): ICD-10-CM

## 2021-11-05 DIAGNOSIS — Z17.0 MALIGNANT NEOPLASM OF UPPER-INNER QUADRANT OF LEFT BREAST IN FEMALE, ESTROGEN RECEPTOR POSITIVE (HCC): ICD-10-CM

## 2021-11-05 DIAGNOSIS — C78.7 LIVER METASTASES (HCC): ICD-10-CM

## 2021-11-05 PROCEDURE — 96413 CHEMO IV INFUSION 1 HR: CPT

## 2021-11-05 PROCEDURE — 96417 CHEMO IV INFUS EACH ADDL SEQ: CPT

## 2021-11-05 RX ORDER — SODIUM CHLORIDE 9 MG/ML
20 INJECTION, SOLUTION INTRAVENOUS ONCE
Status: COMPLETED | OUTPATIENT
Start: 2021-11-05 | End: 2021-11-05

## 2021-11-05 RX ADMIN — SODIUM CHLORIDE 20 ML/HR: 0.9 INJECTION, SOLUTION INTRAVENOUS at 14:35

## 2021-11-05 RX ADMIN — PERTUZUMAB 420 MG: 30 INJECTION, SOLUTION, CONCENTRATE INTRAVENOUS at 14:35

## 2021-11-05 RX ADMIN — TRASTUZUMAB 600 MG: 150 INJECTION, POWDER, LYOPHILIZED, FOR SOLUTION INTRAVENOUS at 15:11

## 2021-11-26 ENCOUNTER — HOSPITAL ENCOUNTER (OUTPATIENT)
Dept: INFUSION CENTER | Facility: CLINIC | Age: 56
Discharge: HOME | End: 2021-11-26
Payer: COMMERCIAL

## 2021-11-26 VITALS
RESPIRATION RATE: 18 BRPM | HEART RATE: 67 BPM | DIASTOLIC BLOOD PRESSURE: 80 MMHG | SYSTOLIC BLOOD PRESSURE: 162 MMHG | TEMPERATURE: 98.2 F | BODY MASS INDEX: 36.01 KG/M2 | WEIGHT: 223.11 LBS

## 2021-11-26 DIAGNOSIS — C79.51 BONE METASTASIS (HCC): Primary | ICD-10-CM

## 2021-11-26 DIAGNOSIS — C50.212 MALIGNANT NEOPLASM OF UPPER-INNER QUADRANT OF LEFT BREAST IN FEMALE, ESTROGEN RECEPTOR POSITIVE (HCC): ICD-10-CM

## 2021-11-26 DIAGNOSIS — Z90.79 HISTORY OF BILATERAL SALPINGO-OOPHORECTOMY (BSO): ICD-10-CM

## 2021-11-26 DIAGNOSIS — C78.7 LIVER METASTASES (HCC): ICD-10-CM

## 2021-11-26 DIAGNOSIS — Z90.722 HISTORY OF BILATERAL SALPINGO-OOPHORECTOMY (BSO): ICD-10-CM

## 2021-11-26 DIAGNOSIS — Z17.0 MALIGNANT NEOPLASM OF UPPER-INNER QUADRANT OF LEFT BREAST IN FEMALE, ESTROGEN RECEPTOR POSITIVE (HCC): ICD-10-CM

## 2021-11-26 PROCEDURE — 96413 CHEMO IV INFUSION 1 HR: CPT

## 2021-11-26 PROCEDURE — 96417 CHEMO IV INFUS EACH ADDL SEQ: CPT

## 2021-11-26 RX ORDER — SODIUM CHLORIDE 9 MG/ML
20 INJECTION, SOLUTION INTRAVENOUS ONCE
Status: COMPLETED | OUTPATIENT
Start: 2021-11-26 | End: 2021-11-26

## 2021-11-26 RX ADMIN — TRASTUZUMAB 600 MG: 150 INJECTION, POWDER, LYOPHILIZED, FOR SOLUTION INTRAVENOUS at 15:34

## 2021-11-26 RX ADMIN — PERTUZUMAB 420 MG: 30 INJECTION, SOLUTION, CONCENTRATE INTRAVENOUS at 15:00

## 2021-11-26 RX ADMIN — SODIUM CHLORIDE 20 ML/HR: 0.9 INJECTION, SOLUTION INTRAVENOUS at 15:03

## 2021-12-01 ENCOUNTER — HOSPITAL ENCOUNTER (OUTPATIENT)
Dept: NON INVASIVE DIAGNOSTICS | Facility: HOSPITAL | Age: 56
Discharge: HOME | End: 2021-12-01
Attending: INTERNAL MEDICINE
Payer: COMMERCIAL

## 2021-12-01 VITALS
WEIGHT: 223 LBS | HEART RATE: 67 BPM | SYSTOLIC BLOOD PRESSURE: 150 MMHG | BODY MASS INDEX: 35.84 KG/M2 | DIASTOLIC BLOOD PRESSURE: 80 MMHG | HEIGHT: 66 IN

## 2021-12-01 DIAGNOSIS — I42.7 CARDIOMYOPATHY DUE TO CHEMOTHERAPY (HCC): ICD-10-CM

## 2021-12-01 DIAGNOSIS — T45.1X5A CARDIOMYOPATHY DUE TO CHEMOTHERAPY (HCC): ICD-10-CM

## 2021-12-01 LAB
APICAL FOUR CHAMBER EJECTION FRACTION: 69 %
E WAVE DECELERATION TIME: 202 MS
LEFT ATRIUM AREA SYSTOLE SINGLE PLANE A4C: 11.7 CM2
MV PEAK A VEL: 0.77 M/S
MV PEAK E VEL: 74 CM/S
MV STENOSIS PRESSURE HALF TIME: 0 MS
RIGHT ATRIUM AREA SYSTOLE A4C: 12.7 CM2
RIGHT VENTRICLE ID DIMENSION: 4.2 CM
SL CV LV EF: 65
TRICUSPID VALVE S': 0.7 CM/S

## 2021-12-01 PROCEDURE — 93308 TTE F-UP OR LMTD: CPT

## 2021-12-01 PROCEDURE — 93325 DOPPLER ECHO COLOR FLOW MAPG: CPT

## 2021-12-01 PROCEDURE — 93321 DOPPLER ECHO F-UP/LMTD STD: CPT | Performed by: INTERNAL MEDICINE

## 2021-12-01 PROCEDURE — 93356 MYOCRD STRAIN IMG SPCKL TRCK: CPT | Performed by: INTERNAL MEDICINE

## 2021-12-01 PROCEDURE — 93321 DOPPLER ECHO F-UP/LMTD STD: CPT

## 2021-12-01 PROCEDURE — 93308 TTE F-UP OR LMTD: CPT | Performed by: INTERNAL MEDICINE

## 2021-12-01 PROCEDURE — 93325 DOPPLER ECHO COLOR FLOW MAPG: CPT | Performed by: INTERNAL MEDICINE

## 2021-12-10 RX ORDER — SODIUM CHLORIDE 9 MG/ML
20 INJECTION, SOLUTION INTRAVENOUS ONCE
Status: CANCELLED | OUTPATIENT
Start: 2021-12-17

## 2021-12-14 NOTE — PLAN OF CARE
Problem: Knowledge Deficit  Goal: Patient/family/caregiver demonstrates understanding of disease process, treatment plan, medications, and discharge instructions  Complete learning assessment and assess knowledge base    Interventions:  - Provide teaching at level of understanding  - Provide teaching via preferred learning methods  Outcome: Progressing
No

## 2021-12-15 ENCOUNTER — OFFICE VISIT (OUTPATIENT)
Dept: HEMATOLOGY ONCOLOGY | Facility: CLINIC | Age: 56
End: 2021-12-15
Payer: COMMERCIAL

## 2021-12-15 VITALS
SYSTOLIC BLOOD PRESSURE: 132 MMHG | TEMPERATURE: 98.9 F | HEIGHT: 66 IN | HEART RATE: 75 BPM | BODY MASS INDEX: 35.52 KG/M2 | OXYGEN SATURATION: 97 % | WEIGHT: 221 LBS | RESPIRATION RATE: 18 BRPM | DIASTOLIC BLOOD PRESSURE: 76 MMHG

## 2021-12-15 DIAGNOSIS — Z14.8 CARRIER OF GENE MUTATION FOR HIGH RISK OF CANCER: ICD-10-CM

## 2021-12-15 DIAGNOSIS — Z90.79 HISTORY OF BILATERAL SALPINGO-OOPHORECTOMY (BSO): ICD-10-CM

## 2021-12-15 DIAGNOSIS — C78.7 LIVER METASTASES (HCC): ICD-10-CM

## 2021-12-15 DIAGNOSIS — C79.51 BONE METASTASIS (HCC): ICD-10-CM

## 2021-12-15 DIAGNOSIS — Z90.722 HISTORY OF BILATERAL SALPINGO-OOPHORECTOMY (BSO): ICD-10-CM

## 2021-12-15 DIAGNOSIS — C50.212 MALIGNANT NEOPLASM OF UPPER-INNER QUADRANT OF LEFT BREAST IN FEMALE, ESTROGEN RECEPTOR POSITIVE (HCC): Primary | ICD-10-CM

## 2021-12-15 DIAGNOSIS — Z17.0 MALIGNANT NEOPLASM OF UPPER-INNER QUADRANT OF LEFT BREAST IN FEMALE, ESTROGEN RECEPTOR POSITIVE (HCC): Primary | ICD-10-CM

## 2021-12-15 DIAGNOSIS — M81.8 OSTEOPOROSIS DUE TO AROMATASE INHIBITOR: ICD-10-CM

## 2021-12-15 DIAGNOSIS — Z15.02 BRCA1 GENE MUTATION POSITIVE IN FEMALE: ICD-10-CM

## 2021-12-15 DIAGNOSIS — Z15.09 BRCA1 GENE MUTATION POSITIVE IN FEMALE: ICD-10-CM

## 2021-12-15 DIAGNOSIS — Z95.828 PORT-A-CATH IN PLACE: ICD-10-CM

## 2021-12-15 DIAGNOSIS — Z90.722 S/P BSO (BILATERAL SALPINGO-OOPHORECTOMY): ICD-10-CM

## 2021-12-15 DIAGNOSIS — T45.1X5A CARDIOMYOPATHY DUE TO CHEMOTHERAPY (HCC): ICD-10-CM

## 2021-12-15 DIAGNOSIS — Z15.01 BRCA1 GENE MUTATION POSITIVE IN FEMALE: ICD-10-CM

## 2021-12-15 DIAGNOSIS — I42.7 CARDIOMYOPATHY DUE TO CHEMOTHERAPY (HCC): ICD-10-CM

## 2021-12-15 DIAGNOSIS — T38.6X5A OSTEOPOROSIS DUE TO AROMATASE INHIBITOR: ICD-10-CM

## 2021-12-15 PROCEDURE — 99214 OFFICE O/P EST MOD 30 MIN: CPT | Performed by: INTERNAL MEDICINE

## 2021-12-17 ENCOUNTER — TELEPHONE (OUTPATIENT)
Dept: HEMATOLOGY ONCOLOGY | Facility: CLINIC | Age: 56
End: 2021-12-17

## 2021-12-17 ENCOUNTER — HOSPITAL ENCOUNTER (OUTPATIENT)
Dept: INFUSION CENTER | Facility: CLINIC | Age: 56
Discharge: HOME | End: 2021-12-17
Payer: COMMERCIAL

## 2021-12-17 VITALS
BODY MASS INDEX: 35.59 KG/M2 | HEART RATE: 69 BPM | DIASTOLIC BLOOD PRESSURE: 77 MMHG | WEIGHT: 221.45 LBS | RESPIRATION RATE: 18 BRPM | SYSTOLIC BLOOD PRESSURE: 146 MMHG | TEMPERATURE: 97.9 F | HEIGHT: 66 IN

## 2021-12-17 DIAGNOSIS — C78.7 LIVER METASTASES (HCC): ICD-10-CM

## 2021-12-17 DIAGNOSIS — Z17.0 MALIGNANT NEOPLASM OF UPPER-INNER QUADRANT OF LEFT BREAST IN FEMALE, ESTROGEN RECEPTOR POSITIVE (HCC): ICD-10-CM

## 2021-12-17 DIAGNOSIS — C79.51 BONE METASTASIS (HCC): Primary | ICD-10-CM

## 2021-12-17 DIAGNOSIS — C50.212 MALIGNANT NEOPLASM OF UPPER-INNER QUADRANT OF LEFT BREAST IN FEMALE, ESTROGEN RECEPTOR POSITIVE (HCC): ICD-10-CM

## 2021-12-17 DIAGNOSIS — Z90.722 HISTORY OF BILATERAL SALPINGO-OOPHORECTOMY (BSO): ICD-10-CM

## 2021-12-17 DIAGNOSIS — Z90.79 HISTORY OF BILATERAL SALPINGO-OOPHORECTOMY (BSO): ICD-10-CM

## 2021-12-17 LAB
ALBUMIN SERPL BCP-MCNC: 3.3 G/DL (ref 3.5–5)
ALP SERPL-CCNC: 110 U/L (ref 46–116)
ALT SERPL W P-5'-P-CCNC: 51 U/L (ref 12–78)
ANION GAP SERPL CALCULATED.3IONS-SCNC: 8 MMOL/L (ref 4–13)
AST SERPL W P-5'-P-CCNC: 26 U/L (ref 5–45)
BASOPHILS # BLD AUTO: 0.05 THOUSANDS/ΜL (ref 0–0.1)
BASOPHILS NFR BLD AUTO: 1 % (ref 0–1)
BILIRUB SERPL-MCNC: 0.31 MG/DL (ref 0.2–1)
BUN SERPL-MCNC: 17 MG/DL (ref 5–25)
CALCIUM ALBUM COR SERPL-MCNC: 9.5 MG/DL (ref 8.3–10.1)
CALCIUM SERPL-MCNC: 8.9 MG/DL (ref 8.3–10.1)
CHLORIDE SERPL-SCNC: 105 MMOL/L (ref 100–108)
CO2 SERPL-SCNC: 28 MMOL/L (ref 21–32)
CREAT SERPL-MCNC: 0.9 MG/DL (ref 0.6–1.3)
EOSINOPHIL # BLD AUTO: 0.38 THOUSAND/ΜL (ref 0–0.61)
EOSINOPHIL NFR BLD AUTO: 4 % (ref 0–6)
ERYTHROCYTE [DISTWIDTH] IN BLOOD BY AUTOMATED COUNT: 14.6 % (ref 11.6–15.1)
GFR SERPL CREATININE-BSD FRML MDRD: 72 ML/MIN/1.73SQ M
GLUCOSE SERPL-MCNC: 146 MG/DL (ref 65–140)
HCT VFR BLD AUTO: 41.2 % (ref 34.8–46.1)
HGB BLD-MCNC: 13 G/DL (ref 11.5–15.4)
IMM GRANULOCYTES # BLD AUTO: 0.04 THOUSAND/UL (ref 0–0.2)
IMM GRANULOCYTES NFR BLD AUTO: 1 % (ref 0–2)
LYMPHOCYTES # BLD AUTO: 1.91 THOUSANDS/ΜL (ref 0.6–4.47)
LYMPHOCYTES NFR BLD AUTO: 22 % (ref 14–44)
MCH RBC QN AUTO: 27.2 PG (ref 26.8–34.3)
MCHC RBC AUTO-ENTMCNC: 31.6 G/DL (ref 31.4–37.4)
MCV RBC AUTO: 86 FL (ref 82–98)
MONOCYTES # BLD AUTO: 0.59 THOUSAND/ΜL (ref 0.17–1.22)
MONOCYTES NFR BLD AUTO: 7 % (ref 4–12)
NEUTROPHILS # BLD AUTO: 5.65 THOUSANDS/ΜL (ref 1.85–7.62)
NEUTS SEG NFR BLD AUTO: 65 % (ref 43–75)
NRBC BLD AUTO-RTO: 0 /100 WBCS
PLATELET # BLD AUTO: 299 THOUSANDS/UL (ref 149–390)
PMV BLD AUTO: 11.8 FL (ref 8.9–12.7)
POTASSIUM SERPL-SCNC: 3.9 MMOL/L (ref 3.5–5.3)
PROT SERPL-MCNC: 7.3 G/DL (ref 6.4–8.2)
RBC # BLD AUTO: 4.78 MILLION/UL (ref 3.81–5.12)
SODIUM SERPL-SCNC: 141 MMOL/L (ref 136–145)
WBC # BLD AUTO: 8.62 THOUSAND/UL (ref 4.31–10.16)

## 2021-12-17 PROCEDURE — 85025 COMPLETE CBC W/AUTO DIFF WBC: CPT

## 2021-12-17 PROCEDURE — 96417 CHEMO IV INFUS EACH ADDL SEQ: CPT

## 2021-12-17 PROCEDURE — 96413 CHEMO IV INFUSION 1 HR: CPT

## 2021-12-17 PROCEDURE — 80053 COMPREHEN METABOLIC PANEL: CPT

## 2021-12-17 RX ORDER — SODIUM CHLORIDE 9 MG/ML
20 INJECTION, SOLUTION INTRAVENOUS ONCE
Status: COMPLETED | OUTPATIENT
Start: 2021-12-17 | End: 2021-12-17

## 2021-12-17 RX ADMIN — PERTUZUMAB 420 MG: 30 INJECTION, SOLUTION, CONCENTRATE INTRAVENOUS at 15:08

## 2021-12-17 RX ADMIN — SODIUM CHLORIDE 20 ML/HR: 0.9 INJECTION, SOLUTION INTRAVENOUS at 14:45

## 2021-12-17 RX ADMIN — TRASTUZUMAB 600 MG: 150 INJECTION, POWDER, LYOPHILIZED, FOR SOLUTION INTRAVENOUS at 15:40

## 2022-01-05 RX ORDER — SODIUM CHLORIDE 9 MG/ML
20 INJECTION, SOLUTION INTRAVENOUS ONCE
Status: CANCELLED | OUTPATIENT
Start: 2022-01-07

## 2022-01-07 ENCOUNTER — HOSPITAL ENCOUNTER (OUTPATIENT)
Dept: INFUSION CENTER | Facility: CLINIC | Age: 57
Discharge: HOME | End: 2022-01-07
Payer: COMMERCIAL

## 2022-01-07 VITALS
WEIGHT: 220.24 LBS | HEART RATE: 79 BPM | DIASTOLIC BLOOD PRESSURE: 89 MMHG | RESPIRATION RATE: 18 BRPM | SYSTOLIC BLOOD PRESSURE: 162 MMHG | HEIGHT: 66 IN | BODY MASS INDEX: 35.4 KG/M2 | TEMPERATURE: 98.4 F

## 2022-01-07 DIAGNOSIS — Z90.722 HISTORY OF BILATERAL SALPINGO-OOPHORECTOMY (BSO): ICD-10-CM

## 2022-01-07 DIAGNOSIS — Z17.0 MALIGNANT NEOPLASM OF UPPER-INNER QUADRANT OF LEFT BREAST IN FEMALE, ESTROGEN RECEPTOR POSITIVE (HCC): ICD-10-CM

## 2022-01-07 DIAGNOSIS — Z90.79 HISTORY OF BILATERAL SALPINGO-OOPHORECTOMY (BSO): ICD-10-CM

## 2022-01-07 DIAGNOSIS — C50.212 MALIGNANT NEOPLASM OF UPPER-INNER QUADRANT OF LEFT BREAST IN FEMALE, ESTROGEN RECEPTOR POSITIVE (HCC): ICD-10-CM

## 2022-01-07 DIAGNOSIS — C78.7 LIVER METASTASES (HCC): ICD-10-CM

## 2022-01-07 DIAGNOSIS — C79.51 BONE METASTASIS (HCC): Primary | ICD-10-CM

## 2022-01-07 PROCEDURE — 96417 CHEMO IV INFUS EACH ADDL SEQ: CPT

## 2022-01-07 PROCEDURE — 96413 CHEMO IV INFUSION 1 HR: CPT

## 2022-01-07 RX ORDER — SODIUM CHLORIDE 9 MG/ML
20 INJECTION, SOLUTION INTRAVENOUS ONCE
Status: COMPLETED | OUTPATIENT
Start: 2022-01-07 | End: 2022-01-07

## 2022-01-07 RX ADMIN — PERTUZUMAB 420 MG: 30 INJECTION, SOLUTION, CONCENTRATE INTRAVENOUS at 14:53

## 2022-01-07 RX ADMIN — TRASTUZUMAB 600 MG: 150 INJECTION, POWDER, LYOPHILIZED, FOR SOLUTION INTRAVENOUS at 15:30

## 2022-01-07 RX ADMIN — SODIUM CHLORIDE 20 ML/HR: 0.9 INJECTION, SOLUTION INTRAVENOUS at 14:53

## 2022-01-07 NOTE — PROGRESS NOTES
Patient arrived to unit without complaint  Patient tolerated chemotherapy without incident  AVS declined and patient aware of next appointment  Patient left in stable condition

## 2022-01-28 ENCOUNTER — HOSPITAL ENCOUNTER (OUTPATIENT)
Dept: INFUSION CENTER | Facility: CLINIC | Age: 57
Discharge: HOME | End: 2022-01-28
Payer: COMMERCIAL

## 2022-01-28 VITALS
RESPIRATION RATE: 18 BRPM | BODY MASS INDEX: 35.75 KG/M2 | HEART RATE: 66 BPM | HEIGHT: 66 IN | DIASTOLIC BLOOD PRESSURE: 89 MMHG | SYSTOLIC BLOOD PRESSURE: 163 MMHG | WEIGHT: 222.44 LBS | TEMPERATURE: 97.8 F

## 2022-01-28 DIAGNOSIS — C50.212 MALIGNANT NEOPLASM OF UPPER-INNER QUADRANT OF LEFT BREAST IN FEMALE, ESTROGEN RECEPTOR POSITIVE (HCC): ICD-10-CM

## 2022-01-28 DIAGNOSIS — C78.7 LIVER METASTASES (HCC): ICD-10-CM

## 2022-01-28 DIAGNOSIS — Z90.722 HISTORY OF BILATERAL SALPINGO-OOPHORECTOMY (BSO): ICD-10-CM

## 2022-01-28 DIAGNOSIS — Z90.79 HISTORY OF BILATERAL SALPINGO-OOPHORECTOMY (BSO): ICD-10-CM

## 2022-01-28 DIAGNOSIS — Z17.0 MALIGNANT NEOPLASM OF UPPER-INNER QUADRANT OF LEFT BREAST IN FEMALE, ESTROGEN RECEPTOR POSITIVE (HCC): ICD-10-CM

## 2022-01-28 DIAGNOSIS — C79.51 BONE METASTASIS (HCC): Primary | ICD-10-CM

## 2022-01-28 PROCEDURE — 96417 CHEMO IV INFUS EACH ADDL SEQ: CPT

## 2022-01-28 PROCEDURE — 96413 CHEMO IV INFUSION 1 HR: CPT

## 2022-01-28 RX ORDER — SODIUM CHLORIDE 9 MG/ML
20 INJECTION, SOLUTION INTRAVENOUS ONCE
Status: COMPLETED | OUTPATIENT
Start: 2022-01-28 | End: 2022-01-28

## 2022-01-28 RX ADMIN — TRASTUZUMAB 600 MG: 150 INJECTION, POWDER, LYOPHILIZED, FOR SOLUTION INTRAVENOUS at 15:09

## 2022-01-28 RX ADMIN — PERTUZUMAB 420 MG: 30 INJECTION, SOLUTION, CONCENTRATE INTRAVENOUS at 14:37

## 2022-01-28 RX ADMIN — SODIUM CHLORIDE 20 ML/HR: 0.9 INJECTION, SOLUTION INTRAVENOUS at 14:15

## 2022-02-16 RX ORDER — SODIUM CHLORIDE 9 MG/ML
20 INJECTION, SOLUTION INTRAVENOUS ONCE
Status: CANCELLED | OUTPATIENT
Start: 2022-02-18

## 2022-02-18 ENCOUNTER — HOSPITAL ENCOUNTER (OUTPATIENT)
Dept: INFUSION CENTER | Facility: CLINIC | Age: 57
Discharge: HOME | End: 2022-02-18
Payer: COMMERCIAL

## 2022-02-18 VITALS
HEIGHT: 66 IN | WEIGHT: 219.8 LBS | RESPIRATION RATE: 18 BRPM | DIASTOLIC BLOOD PRESSURE: 96 MMHG | BODY MASS INDEX: 35.32 KG/M2 | TEMPERATURE: 97.2 F | SYSTOLIC BLOOD PRESSURE: 160 MMHG | HEART RATE: 60 BPM

## 2022-02-18 DIAGNOSIS — Z17.0 MALIGNANT NEOPLASM OF UPPER-INNER QUADRANT OF LEFT BREAST IN FEMALE, ESTROGEN RECEPTOR POSITIVE (HCC): ICD-10-CM

## 2022-02-18 DIAGNOSIS — Z90.722 HISTORY OF BILATERAL SALPINGO-OOPHORECTOMY (BSO): ICD-10-CM

## 2022-02-18 DIAGNOSIS — Z90.79 HISTORY OF BILATERAL SALPINGO-OOPHORECTOMY (BSO): ICD-10-CM

## 2022-02-18 DIAGNOSIS — C78.7 LIVER METASTASES (HCC): ICD-10-CM

## 2022-02-18 DIAGNOSIS — C79.51 BONE METASTASIS (HCC): Primary | ICD-10-CM

## 2022-02-18 DIAGNOSIS — C50.212 MALIGNANT NEOPLASM OF UPPER-INNER QUADRANT OF LEFT BREAST IN FEMALE, ESTROGEN RECEPTOR POSITIVE (HCC): ICD-10-CM

## 2022-02-18 PROCEDURE — 96417 CHEMO IV INFUS EACH ADDL SEQ: CPT

## 2022-02-18 PROCEDURE — 96413 CHEMO IV INFUSION 1 HR: CPT

## 2022-02-18 RX ORDER — SODIUM CHLORIDE 9 MG/ML
20 INJECTION, SOLUTION INTRAVENOUS ONCE
Status: COMPLETED | OUTPATIENT
Start: 2022-02-18 | End: 2022-02-18

## 2022-02-18 RX ADMIN — PERTUZUMAB 420 MG: 30 INJECTION, SOLUTION, CONCENTRATE INTRAVENOUS at 14:30

## 2022-02-18 RX ADMIN — SODIUM CHLORIDE 20 ML/HR: 0.9 INJECTION, SOLUTION INTRAVENOUS at 14:30

## 2022-02-18 RX ADMIN — TRASTUZUMAB 600 MG: 150 INJECTION, POWDER, LYOPHILIZED, FOR SOLUTION INTRAVENOUS at 15:07

## 2022-03-08 ENCOUNTER — HOSPITAL ENCOUNTER (OUTPATIENT)
Dept: BONE DENSITY | Facility: MEDICAL CENTER | Age: 57
Discharge: HOME | End: 2022-03-08
Payer: COMMERCIAL

## 2022-03-08 DIAGNOSIS — M81.8 OSTEOPOROSIS DUE TO AROMATASE INHIBITOR: ICD-10-CM

## 2022-03-08 DIAGNOSIS — T38.6X5A OSTEOPOROSIS DUE TO AROMATASE INHIBITOR: ICD-10-CM

## 2022-03-08 PROCEDURE — 77080 DXA BONE DENSITY AXIAL: CPT

## 2022-03-11 ENCOUNTER — HOSPITAL ENCOUNTER (OUTPATIENT)
Dept: INFUSION CENTER | Facility: CLINIC | Age: 57
Discharge: HOME | End: 2022-03-11
Payer: COMMERCIAL

## 2022-03-11 VITALS
WEIGHT: 218 LBS | RESPIRATION RATE: 16 BRPM | HEART RATE: 69 BPM | DIASTOLIC BLOOD PRESSURE: 78 MMHG | BODY MASS INDEX: 35.03 KG/M2 | SYSTOLIC BLOOD PRESSURE: 158 MMHG | HEIGHT: 66 IN | TEMPERATURE: 97.3 F

## 2022-03-11 DIAGNOSIS — C50.212 MALIGNANT NEOPLASM OF UPPER-INNER QUADRANT OF LEFT BREAST IN FEMALE, ESTROGEN RECEPTOR POSITIVE (HCC): ICD-10-CM

## 2022-03-11 DIAGNOSIS — Z90.79 HISTORY OF BILATERAL SALPINGO-OOPHORECTOMY (BSO): ICD-10-CM

## 2022-03-11 DIAGNOSIS — C79.51 BONE METASTASIS (HCC): Primary | ICD-10-CM

## 2022-03-11 DIAGNOSIS — Z90.722 HISTORY OF BILATERAL SALPINGO-OOPHORECTOMY (BSO): ICD-10-CM

## 2022-03-11 DIAGNOSIS — C78.7 LIVER METASTASES (HCC): ICD-10-CM

## 2022-03-11 DIAGNOSIS — Z17.0 MALIGNANT NEOPLASM OF UPPER-INNER QUADRANT OF LEFT BREAST IN FEMALE, ESTROGEN RECEPTOR POSITIVE (HCC): ICD-10-CM

## 2022-03-11 PROCEDURE — 96417 CHEMO IV INFUS EACH ADDL SEQ: CPT

## 2022-03-11 PROCEDURE — 96413 CHEMO IV INFUSION 1 HR: CPT

## 2022-03-11 RX ORDER — SODIUM CHLORIDE 9 MG/ML
20 INJECTION, SOLUTION INTRAVENOUS ONCE
Status: COMPLETED | OUTPATIENT
Start: 2022-03-11 | End: 2022-03-11

## 2022-03-11 RX ADMIN — PERTUZUMAB 420 MG: 30 INJECTION, SOLUTION, CONCENTRATE INTRAVENOUS at 14:49

## 2022-03-11 RX ADMIN — TRASTUZUMAB 600 MG: 150 INJECTION, POWDER, LYOPHILIZED, FOR SOLUTION INTRAVENOUS at 15:23

## 2022-03-11 RX ADMIN — SODIUM CHLORIDE 20 ML/HR: 0.9 INJECTION, SOLUTION INTRAVENOUS at 14:40

## 2022-03-11 NOTE — PROGRESS NOTES
Pt presents for perjeta and herceptin infusion  Port a cath accessed with excellent blood return  No new meds or concerns  Pt tolerated infusion without adverse reaction  AVS declined

## 2022-03-11 NOTE — PLAN OF CARE
Problem: Knowledge Deficit  Goal: Patient/family/caregiver demonstrates understanding of disease process, treatment plan, medications, and discharge instructions  Description: Complete learning assessment and assess knowledge base    Interventions:  - Provide teaching at level of understanding  - Provide teaching via preferred learning methods  3/11/2022 1448 by Eula Lilly RN  Outcome: Progressing  3/11/2022 1430 by Eula Lilly RN  Outcome: Progressing

## 2022-03-17 ENCOUNTER — HOSPITAL ENCOUNTER (OUTPATIENT)
Dept: NON INVASIVE DIAGNOSTICS | Facility: HOSPITAL | Age: 57
Discharge: HOME | End: 2022-03-17
Attending: INTERNAL MEDICINE
Payer: COMMERCIAL

## 2022-03-17 ENCOUNTER — HOSPITAL ENCOUNTER (OUTPATIENT)
Dept: CT IMAGING | Facility: HOSPITAL | Age: 57
Discharge: HOME | End: 2022-03-17
Attending: INTERNAL MEDICINE
Payer: COMMERCIAL

## 2022-03-17 VITALS
WEIGHT: 218 LBS | BODY MASS INDEX: 35.03 KG/M2 | DIASTOLIC BLOOD PRESSURE: 78 MMHG | HEART RATE: 73 BPM | HEIGHT: 66 IN | SYSTOLIC BLOOD PRESSURE: 158 MMHG

## 2022-03-17 DIAGNOSIS — C50.212 MALIGNANT NEOPLASM OF UPPER-INNER QUADRANT OF LEFT BREAST IN FEMALE, ESTROGEN RECEPTOR POSITIVE (HCC): ICD-10-CM

## 2022-03-17 DIAGNOSIS — Z17.0 MALIGNANT NEOPLASM OF UPPER-INNER QUADRANT OF LEFT BREAST IN FEMALE, ESTROGEN RECEPTOR POSITIVE (HCC): ICD-10-CM

## 2022-03-17 DIAGNOSIS — Z14.8 CARRIER OF GENE MUTATION FOR HIGH RISK OF CANCER: ICD-10-CM

## 2022-03-17 DIAGNOSIS — Z15.09 BRCA1 GENE MUTATION POSITIVE IN FEMALE: ICD-10-CM

## 2022-03-17 DIAGNOSIS — Z15.01 BRCA1 GENE MUTATION POSITIVE IN FEMALE: ICD-10-CM

## 2022-03-17 DIAGNOSIS — T45.1X5A CARDIOMYOPATHY DUE TO CHEMOTHERAPY (HCC): ICD-10-CM

## 2022-03-17 DIAGNOSIS — I42.7 CARDIOMYOPATHY DUE TO CHEMOTHERAPY (HCC): ICD-10-CM

## 2022-03-17 DIAGNOSIS — C78.7 LIVER METASTASES (HCC): ICD-10-CM

## 2022-03-17 DIAGNOSIS — Z15.02 BRCA1 GENE MUTATION POSITIVE IN FEMALE: ICD-10-CM

## 2022-03-17 LAB
AORTIC ROOT: 3.5 CM
AORTIC VALVE MEAN VELOCITY: 9.3 M/S
APICAL FOUR CHAMBER EJECTION FRACTION: 72 %
ASCENDING AORTA: 3.3 CM (ref 2.12–3.18)
AV LVOT MEAN GRADIENT: 4 MMHG
AV LVOT PEAK GRADIENT: 8 MMHG
AV MEAN GRADIENT: 4 MMHG
AV PEAK GRADIENT: 9 MMHG
AV VELOCITY RATIO: 0.95
DOP CALC AO PEAK VEL: 1.47 M/S
DOP CALC AO VTI: 30.26 CM
DOP CALC LVOT PEAK VEL VTI: 28.48 CM
DOP CALC LVOT PEAK VEL: 1.4 M/S
E WAVE DECELERATION TIME: 324 MS
FRACTIONAL SHORTENING: 39 % (ref 28–44)
GLOBAL LONGITUIDAL STRAIN: -18 %
INTERVENTRICULAR SEPTUM IN DIASTOLE (PARASTERNAL SHORT AXIS VIEW): 1.1 CM
INTERVENTRICULAR SEPTUM: 1.1 CM (ref 0.55–1.03)
LAAS-AP4: 22.7 CM2
LEFT ATRIUM SIZE: 3.8 CM
LEFT INTERNAL DIMENSION IN SYSTOLE: 2.7 CM (ref 3.66–5.54)
LEFT VENTRICLE DIASTOLIC VOLUME (MOD BIPLANE): 69 ML (ref 105.68–237.99)
LEFT VENTRICLE SYSTOLIC VOLUME (MOD BIPLANE): 20 ML
LEFT VENTRICULAR INTERNAL DIMENSION IN DIASTOLE: 4.4 CM (ref 6.06–9.03)
LEFT VENTRICULAR POSTERIOR WALL IN END DIASTOLE: 1.1 CM (ref 0.54–1.02)
LEFT VENTRICULAR STROKE VOLUME: 62 ML
LV EF: 71 %
LVSV (TEICH): 62 ML
MV E'TISSUE VEL-LAT: 10 CM/S
MV E'TISSUE VEL-SEP: 10 CM/S
MV PEAK A VEL: 0.98 M/S
MV PEAK E VEL: 68 CM/S
MV STENOSIS PRESSURE HALF TIME: 94 MS
MV VALVE AREA P 1/2 METHOD: 2.34 CM2
RIGHT ATRIAL 2D VOLUME: 44 ML
RIGHT ATRIUM AREA SYSTOLE A4C: 16.4 CM2
RIGHT VENTRICLE ID DIMENSION: 3.4 CM
SL CV LV DIAS VOL ENDO Z SCORE: -3.11
SL CV LV EF: 72
SL CV PED ECHO LEFT VENTRICLE DIASTOLIC VOLUME (MOD BIPLANE) 2D: 89 ML
SL CV PED ECHO LEFT VENTRICLE SYSTOLIC VOLUME (MOD BIPLANE) 2D: 26 ML
Z-SCORE OF ASCENDING AORTA: 2.44
Z-SCORE OF INTERVENTRICULAR SEPTUM IN END DIASTOLE: 2.49
Z-SCORE OF LEFT VENTRICULAR DIMENSION IN END DIASTOLE: -5.2
Z-SCORE OF LEFT VENTRICULAR DIMENSION IN END SYSTOLE: -4.03
Z-SCORE OF LEFT VENTRICULAR POSTERIOR WALL IN END DIASTOLE: 2.6

## 2022-03-17 PROCEDURE — 74177 CT ABD & PELVIS W/CONTRAST: CPT

## 2022-03-17 PROCEDURE — 93356 MYOCRD STRAIN IMG SPCKL TRCK: CPT | Performed by: INTERNAL MEDICINE

## 2022-03-17 PROCEDURE — 93308 TTE F-UP OR LMTD: CPT

## 2022-03-17 PROCEDURE — 93325 DOPPLER ECHO COLOR FLOW MAPG: CPT | Performed by: INTERNAL MEDICINE

## 2022-03-17 PROCEDURE — 71260 CT THORAX DX C+: CPT

## 2022-03-17 PROCEDURE — 93356 MYOCRD STRAIN IMG SPCKL TRCK: CPT

## 2022-03-17 PROCEDURE — 93321 DOPPLER ECHO F-UP/LMTD STD: CPT | Performed by: INTERNAL MEDICINE

## 2022-03-17 PROCEDURE — 93308 TTE F-UP OR LMTD: CPT | Performed by: INTERNAL MEDICINE

## 2022-03-17 PROCEDURE — G1004 CDSM NDSC: HCPCS

## 2022-03-17 RX ADMIN — IOHEXOL 100 ML: 350 INJECTION, SOLUTION INTRAVENOUS at 13:46

## 2022-03-24 ENCOUNTER — TELEPHONE (OUTPATIENT)
Dept: HEMATOLOGY ONCOLOGY | Facility: CLINIC | Age: 57
End: 2022-03-24

## 2022-03-24 ENCOUNTER — DOCUMENTATION (OUTPATIENT)
Dept: SURGICAL ONCOLOGY | Facility: CLINIC | Age: 57
End: 2022-03-24

## 2022-03-24 NOTE — TELEPHONE ENCOUNTER
Radiology calling with significant findings  Ct scan done on 3/17/22  I will review results with Dr Anderson Heading

## 2022-03-24 NOTE — PROGRESS NOTES
Reviewed CT scan findings with patient and ordered MRI scan of abdomen is with special attention to liver  She will need blood work  Ordered blood counts, chemistry and tumor marker

## 2022-03-25 ENCOUNTER — HOSPITAL ENCOUNTER (OUTPATIENT)
Dept: INFUSION CENTER | Facility: CLINIC | Age: 57
Discharge: HOME | End: 2022-03-25
Payer: COMMERCIAL

## 2022-03-25 DIAGNOSIS — C78.7 LIVER METASTASES (HCC): ICD-10-CM

## 2022-03-25 DIAGNOSIS — Z17.0 MALIGNANT NEOPLASM OF UPPER-INNER QUADRANT OF LEFT BREAST IN FEMALE, ESTROGEN RECEPTOR POSITIVE (HCC): Primary | ICD-10-CM

## 2022-03-25 DIAGNOSIS — Z45.2 ENCOUNTER FOR CARE RELATED TO VASCULAR ACCESS PORT: ICD-10-CM

## 2022-03-25 DIAGNOSIS — C50.212 MALIGNANT NEOPLASM OF UPPER-INNER QUADRANT OF LEFT BREAST IN FEMALE, ESTROGEN RECEPTOR POSITIVE (HCC): Primary | ICD-10-CM

## 2022-03-25 DIAGNOSIS — Z95.828 PORT-A-CATH IN PLACE: ICD-10-CM

## 2022-03-25 LAB
ALBUMIN SERPL BCP-MCNC: 3.4 G/DL (ref 3.5–5)
ALP SERPL-CCNC: 106 U/L (ref 46–116)
ALT SERPL W P-5'-P-CCNC: 48 U/L (ref 12–78)
ANION GAP SERPL CALCULATED.3IONS-SCNC: 6 MMOL/L (ref 4–13)
AST SERPL W P-5'-P-CCNC: 23 U/L (ref 5–45)
BASOPHILS # BLD AUTO: 0.04 THOUSANDS/ΜL (ref 0–0.1)
BASOPHILS NFR BLD AUTO: 1 % (ref 0–1)
BILIRUB SERPL-MCNC: 0.3 MG/DL (ref 0.2–1)
BUN SERPL-MCNC: 19 MG/DL (ref 5–25)
CALCIUM ALBUM COR SERPL-MCNC: 9.4 MG/DL (ref 8.3–10.1)
CALCIUM SERPL-MCNC: 8.9 MG/DL (ref 8.3–10.1)
CANCER AG27-29 SERPL-ACNC: 22.5 U/ML (ref 0–42.3)
CHLORIDE SERPL-SCNC: 104 MMOL/L (ref 100–108)
CO2 SERPL-SCNC: 30 MMOL/L (ref 21–32)
CREAT SERPL-MCNC: 0.97 MG/DL (ref 0.6–1.3)
EOSINOPHIL # BLD AUTO: 0.34 THOUSAND/ΜL (ref 0–0.61)
EOSINOPHIL NFR BLD AUTO: 4 % (ref 0–6)
ERYTHROCYTE [DISTWIDTH] IN BLOOD BY AUTOMATED COUNT: 14.6 % (ref 11.6–15.1)
GFR SERPL CREATININE-BSD FRML MDRD: 66 ML/MIN/1.73SQ M
GLUCOSE SERPL-MCNC: 89 MG/DL (ref 65–140)
HCT VFR BLD AUTO: 42.1 % (ref 34.8–46.1)
HGB BLD-MCNC: 13.3 G/DL (ref 11.5–15.4)
IMM GRANULOCYTES # BLD AUTO: 0.03 THOUSAND/UL (ref 0–0.2)
IMM GRANULOCYTES NFR BLD AUTO: 0 % (ref 0–2)
LYMPHOCYTES # BLD AUTO: 2.55 THOUSANDS/ΜL (ref 0.6–4.47)
LYMPHOCYTES NFR BLD AUTO: 30 % (ref 14–44)
MCH RBC QN AUTO: 26.5 PG (ref 26.8–34.3)
MCHC RBC AUTO-ENTMCNC: 31.6 G/DL (ref 31.4–37.4)
MCV RBC AUTO: 84 FL (ref 82–98)
MONOCYTES # BLD AUTO: 0.61 THOUSAND/ΜL (ref 0.17–1.22)
MONOCYTES NFR BLD AUTO: 7 % (ref 4–12)
NEUTROPHILS # BLD AUTO: 4.84 THOUSANDS/ΜL (ref 1.85–7.62)
NEUTS SEG NFR BLD AUTO: 58 % (ref 43–75)
NRBC BLD AUTO-RTO: 0 /100 WBCS
PLATELET # BLD AUTO: 326 THOUSANDS/UL (ref 149–390)
PMV BLD AUTO: 11.2 FL (ref 8.9–12.7)
POTASSIUM SERPL-SCNC: 4 MMOL/L (ref 3.5–5.3)
PROT SERPL-MCNC: 7.6 G/DL (ref 6.4–8.2)
RBC # BLD AUTO: 5.02 MILLION/UL (ref 3.81–5.12)
SODIUM SERPL-SCNC: 140 MMOL/L (ref 136–145)
WBC # BLD AUTO: 8.41 THOUSAND/UL (ref 4.31–10.16)

## 2022-03-25 PROCEDURE — 85025 COMPLETE CBC W/AUTO DIFF WBC: CPT

## 2022-03-25 PROCEDURE — 86300 IMMUNOASSAY TUMOR CA 15-3: CPT

## 2022-03-25 PROCEDURE — 80053 COMPREHEN METABOLIC PANEL: CPT

## 2022-03-25 NOTE — PROGRESS NOTES
Pt  Denies new symptoms or concerns today  Labs obtained as ordered  Future appointments reviewed  AVS declined

## 2022-03-28 ENCOUNTER — PATIENT OUTREACH (OUTPATIENT)
Dept: CASE MANAGEMENT | Facility: OTHER | Age: 57
End: 2022-03-28

## 2022-03-28 NOTE — PROGRESS NOTES
OSW received large envelope with pt's financial hardship application with tax, bank, and personal information attached  Per , pt stated OSW will "know what to do " Phoned [facility] Counselor line  Directed to place pt's packet in an inter-office envelope and send to [facility] with attention to [facility] and the [facility]  OSW placed in outgoing mail for  today

## 2022-03-30 RX ORDER — SODIUM CHLORIDE 9 MG/ML
20 INJECTION, SOLUTION INTRAVENOUS ONCE
Status: CANCELLED | OUTPATIENT
Start: 2022-04-01

## 2022-04-01 ENCOUNTER — HOSPITAL ENCOUNTER (OUTPATIENT)
Dept: INFUSION CENTER | Facility: CLINIC | Age: 57
Discharge: HOME | End: 2022-04-01
Payer: COMMERCIAL

## 2022-04-01 VITALS
TEMPERATURE: 97 F | HEART RATE: 66 BPM | RESPIRATION RATE: 16 BRPM | BODY MASS INDEX: 35.08 KG/M2 | WEIGHT: 218.26 LBS | SYSTOLIC BLOOD PRESSURE: 164 MMHG | HEIGHT: 66 IN | DIASTOLIC BLOOD PRESSURE: 77 MMHG

## 2022-04-01 DIAGNOSIS — C78.7 LIVER METASTASES (HCC): ICD-10-CM

## 2022-04-01 DIAGNOSIS — C79.51 BONE METASTASIS (HCC): ICD-10-CM

## 2022-04-01 DIAGNOSIS — Z17.0 MALIGNANT NEOPLASM OF UPPER-INNER QUADRANT OF LEFT BREAST IN FEMALE, ESTROGEN RECEPTOR POSITIVE (HCC): ICD-10-CM

## 2022-04-01 DIAGNOSIS — Z90.79 HISTORY OF BILATERAL SALPINGO-OOPHORECTOMY (BSO): Primary | ICD-10-CM

## 2022-04-01 DIAGNOSIS — C79.51 BONE METASTASIS (HCC): Primary | ICD-10-CM

## 2022-04-01 DIAGNOSIS — C50.212 MALIGNANT NEOPLASM OF UPPER-INNER QUADRANT OF LEFT BREAST IN FEMALE, ESTROGEN RECEPTOR POSITIVE (HCC): ICD-10-CM

## 2022-04-01 DIAGNOSIS — Z90.79 HISTORY OF BILATERAL SALPINGO-OOPHORECTOMY (BSO): ICD-10-CM

## 2022-04-01 DIAGNOSIS — Z90.722 HISTORY OF BILATERAL SALPINGO-OOPHORECTOMY (BSO): ICD-10-CM

## 2022-04-01 DIAGNOSIS — Z90.722 HISTORY OF BILATERAL SALPINGO-OOPHORECTOMY (BSO): Primary | ICD-10-CM

## 2022-04-01 PROCEDURE — 96413 CHEMO IV INFUSION 1 HR: CPT

## 2022-04-01 PROCEDURE — 96417 CHEMO IV INFUS EACH ADDL SEQ: CPT

## 2022-04-01 RX ORDER — SODIUM CHLORIDE 9 MG/ML
20 INJECTION, SOLUTION INTRAVENOUS ONCE
Status: COMPLETED | OUTPATIENT
Start: 2022-04-01 | End: 2022-04-01

## 2022-04-01 RX ADMIN — TRASTUZUMAB 600 MG: 150 INJECTION, POWDER, LYOPHILIZED, FOR SOLUTION INTRAVENOUS at 15:35

## 2022-04-01 RX ADMIN — SODIUM CHLORIDE 20 ML/HR: 0.9 INJECTION, SOLUTION INTRAVENOUS at 14:54

## 2022-04-01 RX ADMIN — PERTUZUMAB 420 MG: 30 INJECTION, SOLUTION, CONCENTRATE INTRAVENOUS at 15:00

## 2022-04-01 NOTE — PROGRESS NOTES
Pt presents for perjeta and herceptin  No new meds or concerns  Pt tolerated infusion without adverse reaction  Future visits discussed  AVS declined

## 2022-04-20 ENCOUNTER — TELEPHONE (OUTPATIENT)
Dept: HEMATOLOGY ONCOLOGY | Facility: CLINIC | Age: 57
End: 2022-04-20

## 2022-04-20 ENCOUNTER — OFFICE VISIT (OUTPATIENT)
Dept: HEMATOLOGY ONCOLOGY | Facility: CLINIC | Age: 57
End: 2022-04-20
Payer: COMMERCIAL

## 2022-04-20 VITALS
TEMPERATURE: 97.6 F | SYSTOLIC BLOOD PRESSURE: 120 MMHG | HEART RATE: 84 BPM | OXYGEN SATURATION: 97 % | WEIGHT: 218 LBS | BODY MASS INDEX: 35.03 KG/M2 | RESPIRATION RATE: 16 BRPM | HEIGHT: 66 IN | DIASTOLIC BLOOD PRESSURE: 74 MMHG

## 2022-04-20 DIAGNOSIS — C78.7 LIVER METASTASES (HCC): Primary | ICD-10-CM

## 2022-04-20 DIAGNOSIS — Z90.722 HISTORY OF BILATERAL SALPINGO-OOPHORECTOMY (BSO): ICD-10-CM

## 2022-04-20 DIAGNOSIS — Z15.02 BRCA1 GENE MUTATION POSITIVE IN FEMALE: ICD-10-CM

## 2022-04-20 DIAGNOSIS — T45.1X5A CARDIOMYOPATHY DUE TO CHEMOTHERAPY (HCC): ICD-10-CM

## 2022-04-20 DIAGNOSIS — Z90.79 HISTORY OF BILATERAL SALPINGO-OOPHORECTOMY (BSO): ICD-10-CM

## 2022-04-20 DIAGNOSIS — C50.212 MALIGNANT NEOPLASM OF UPPER-INNER QUADRANT OF LEFT BREAST IN FEMALE, ESTROGEN RECEPTOR POSITIVE (HCC): ICD-10-CM

## 2022-04-20 DIAGNOSIS — Z15.09 BRCA1 GENE MUTATION POSITIVE IN FEMALE: ICD-10-CM

## 2022-04-20 DIAGNOSIS — Z95.828 PORT-A-CATH IN PLACE: ICD-10-CM

## 2022-04-20 DIAGNOSIS — C79.51 BONE METASTASIS (HCC): ICD-10-CM

## 2022-04-20 DIAGNOSIS — Z15.01 BRCA1 GENE MUTATION POSITIVE IN FEMALE: ICD-10-CM

## 2022-04-20 DIAGNOSIS — Z17.0 MALIGNANT NEOPLASM OF UPPER-INNER QUADRANT OF LEFT BREAST IN FEMALE, ESTROGEN RECEPTOR POSITIVE (HCC): ICD-10-CM

## 2022-04-20 DIAGNOSIS — I42.7 CARDIOMYOPATHY DUE TO CHEMOTHERAPY (HCC): ICD-10-CM

## 2022-04-20 DIAGNOSIS — C50.919 MALIGNANT NEOPLASM OF FEMALE BREAST, UNSPECIFIED ESTROGEN RECEPTOR STATUS, UNSPECIFIED LATERALITY, UNSPECIFIED SITE OF BREAST (HCC): ICD-10-CM

## 2022-04-20 DIAGNOSIS — Z14.8 CARRIER OF GENE MUTATION FOR HIGH RISK OF CANCER: ICD-10-CM

## 2022-04-20 PROCEDURE — 99214 OFFICE O/P EST MOD 30 MIN: CPT | Performed by: INTERNAL MEDICINE

## 2022-04-20 RX ORDER — LETROZOLE 2.5 MG/1
2.5 TABLET, FILM COATED ORAL DAILY
Qty: 30 TABLET | Refills: 6 | Status: SHIPPED | OUTPATIENT
Start: 2022-04-20

## 2022-04-20 NOTE — PROGRESS NOTES
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 and she had metastatic disease to liver and bones     She tested positive for BRCA 1  She has done well all these years and  had very good response but now there is a new lesion in the liver on CT scan  She will have MRI scan and most likely biopsy liver lesion  In the meantime she is being continued on letrozole  Herceptin and Perjeta will be on hold and most likely her treatment will be changed and she knows that  She was going to have MRI scan of the liver last week but could not go  I have reordered MRI of abdomen/liver to be done as soon as possible  No bone pains  Has good appetite  And she is maintaining her weight  Patient was started on a combination of   Taxotere plus Herceptin and Perjeta and she had very good response and after 6 cycles Taxotere was discontinued and tamoxifen was added    Herceptin and Perjeta were continued  Fantasma Tobias radiation to left hip for palliation    On 08/16/2019 she underwent BSO    Tamoxifen was changed to letrozole     Presently she is on Herceptin, Perjeta and letrozole  Miguel Novoa has been taking vitamin-D and calcium and is staying active    She has been tolerating treatments without much problem   She goes for blood tests and echocardiogram on regular basis    In September 2016 patient had lumpectomy of left breast followed by radiation therapy   At the time of lumpectomy there was minimal residual disease, 1 4 cm   Lymph nodes were not sampled     Patient knows that she is at high risk for breast and other cancers like  ovarian cancer, pancreatic cancer, peritoneal cancer, melanoma and other cancers  Patient goes to breast surgeon for evaluation and imaging studies  She gets CT scan and that also checks the pancreas and peritoneum  She had BSO    She has agreed to see a dermatologist in few months and that will be melanoma mapping  She does not want to go to ophthalmologist for melanoma in the eye  Also she does not want to have colonoscopy and not even Cologuard or stool test for blood       She has grade 1 peripheral neuropathy    Has some tiredness and arthritic symptoms  Patient has history of anxiety and insomnia  For leg cramps at night and she is taking one baby aspirin daily with food in the evening and also one magnesium tablet daily and that is helping          She is not on Xgeva anymore at this time  She had Xgeva for 2 years  Chaka Khoury continues to take    calcium and vitamin-D                              Current Outpatient Medications:     letrozole (FEMARA) 2 5 mg tablet, Take 1 tablet (2 5 mg total) by mouth daily, Disp: 30 tablet, Rfl: 6    multivitamin (THERAGRAN) TABS, Take 1 tablet by mouth 3 (three) times a week , Disp: , Rfl:     pertuzumab (PERJETA) 420 mg/14 mL SOLN, Infuse into a venous catheter every 21 days  At infusion  center, Disp: , Rfl:     Trastuzumab (HERCEPTIN IV), Infuse into a venous catheter every 21 days  At infusion center, Disp: , Rfl:     VITAMIN D, CHOLECALCIFEROL, PO, Take 1,000 Units by mouth 3 (three) times a week , Disp: , Rfl:     No Known Allergies    Oncology History   Malignant neoplasm of upper-inner quadrant of left female breast ([facility] [address] )   12/2015 Initial Diagnosis    Malignant neoplasm of upper-outer quadrant of left female breast ([facility] [address] )     2016 -  Hormone Therapy    Tamoxifen  Ended 8/2019  Dr Kimani Coronado  Letrozole 9/2019 1/27/2016 Surgery    Port placement     2/19/2016 - 6/2/2018 Chemotherapy    Taxotere,  herceptin, perjeta, xgeva  After six cycles, taxotere was discontinued and tamoxifen added     Continues with Perjeta and Herceptin every 3 weeks     - Dr Kimani Coronado       9/16/2016 Surgery    Left breast partial mastectomy     11/7/2016 - 12/23/2016 Radiation    Plan ID Energy Fractions Dose per Fraction (cGy) Total Dose Delivered (cGy) Elapsed Days   Lt Breast 6X 25 / 25 200 5,000 36   Lt Brst Boost 6X 8 / 8 200 1,600 9   Lt Femur 10X 10 / 10 300 3,000 11   Lt PAB 6X 25 / 25 60 1,500 36   Lt Sclav 6X 25 / 25 200 5,000 36                                   Total dose to left breast lumpectomy cavity: 6600 cGy                   Total dose to the supraclavicular and axillary regions: 5000 cGy       4/17/2019 -  Chemotherapy    pertuzumab (PERJETA) IVPB, 840 mg, Intravenous, Once, 52 of 56 cycles  Administration: 420 mg (5/17/2019), 420 mg (6/7/2019), 420 mg (6/28/2019), 420 mg (7/19/2019), 420 mg (8/30/2019), 420 mg (9/20/2019), 420 mg (8/9/2019), 420 mg (10/9/2019), 420 mg (11/1/2019), 420 mg (11/22/2019), 420 mg (12/13/2019), 420 mg (1/3/2020), 420 mg (1/24/2020), 420 mg (2/14/2020), 420 mg (3/6/2020), 420 mg (3/27/2020), 420 mg (4/17/2020), 420 mg (5/8/2020), 420 mg (5/29/2020), 420 mg (6/19/2020), 420 mg (7/10/2020), 420 mg (7/31/2020), 420 mg (8/21/2020), 420 mg (9/11/2020), 420 mg (10/2/2020), 420 mg (10/23/2020), 420 mg (11/13/2020), 420 mg (12/4/2020), 420 mg (12/24/2020), 420 mg (1/15/2021), 420 mg (2/5/2021), 420 mg (2/26/2021), 420 mg (3/19/2021), 420 mg (4/9/2021), 420 mg (4/30/2021), 420 mg (5/21/2021), 420 mg (6/11/2021), 420 mg (7/2/2021), 420 mg (7/23/2021), 420 mg (8/13/2021), 420 mg (9/3/2021), 420 mg (9/24/2021), 420 mg (10/15/2021), 420 mg (11/5/2021), 420 mg (11/26/2021), 420 mg (12/17/2021), 420 mg (1/7/2022), 420 mg (1/28/2022), 420 mg (2/18/2022), 420 mg (3/11/2022), 420 mg (4/1/2022)  trastuzumab (HERCEPTIN) chemo infusion, 6 mg/kg = 589 mg (75 % of original dose 8 mg/kg), Intravenous, Once, 52 of 56 cycles  Dose modification: 6 mg/kg (original dose 8 mg/kg, Cycle 1, Reason: Other (Must fill in a comment))  Administration: 589 mg (5/17/2019), 589 mg (6/7/2019), 600 mg (6/28/2019), 600 mg (7/19/2019), 600 mg (8/30/2019), 600 mg (9/20/2019), 600 mg (8/9/2019), 600 mg (10/9/2019), 600 mg (11/1/2019), 600 mg (11/22/2019), 600 mg (12/13/2019), 600 mg (1/3/2020), 600 mg (1/24/2020), 600 mg (2/14/2020), 600 mg (3/6/2020), 600 mg (3/27/2020), 600 mg (4/17/2020), 600 mg (5/8/2020), 600 mg (5/29/2020), 600 mg (6/19/2020), 600 mg (7/10/2020), 600 mg (7/31/2020), 600 mg (8/21/2020), 600 mg (9/11/2020), 600 mg (10/2/2020), 600 mg (10/23/2020), 600 mg (11/13/2020), 600 mg (12/4/2020), 600 mg (12/24/2020), 600 mg (1/15/2021), 600 mg (2/5/2021), 600 mg (2/26/2021), 600 mg (3/19/2021), 600 mg (4/9/2021), 600 mg (4/30/2021), 600 mg (5/21/2021), 600 mg (6/11/2021), 600 mg (7/2/2021), 600 mg (7/23/2021), 600 mg (8/13/2021), 600 mg (9/3/2021), 600 mg (9/24/2021), 600 mg (10/15/2021), 600 mg (11/5/2021), 600 mg (11/26/2021), 600 mg (12/17/2021), 600 mg (1/7/2022), 600 mg (1/28/2022), 600 mg (2/18/2022), 600 mg (3/11/2022), 600 mg (4/1/2022)     8/16/2019 Surgery    she underwent BSO       Liver metastases ([facility] [address] )   4/27/2018 Initial Diagnosis    Liver metastases ([facility] [address] )     4/17/2019 -  Chemotherapy    pertuzumab (PERJETA) IVPB, 840 mg, Intravenous, Once, 52 of 56 cycles  Administration: 420 mg (5/17/2019), 420 mg (6/7/2019), 420 mg (6/28/2019), 420 mg (7/19/2019), 420 mg (8/30/2019), 420 mg (9/20/2019), 420 mg (8/9/2019), 420 mg (10/9/2019), 420 mg (11/1/2019), 420 mg (11/22/2019), 420 mg (12/13/2019), 420 mg (1/3/2020), 420 mg (1/24/2020), 420 mg (2/14/2020), 420 mg (3/6/2020), 420 mg (3/27/2020), 420 mg (4/17/2020), 420 mg (5/8/2020), 420 mg (5/29/2020), 420 mg (6/19/2020), 420 mg (7/10/2020), 420 mg (7/31/2020), 420 mg (8/21/2020), 420 mg (9/11/2020), 420 mg (10/2/2020), 420 mg (10/23/2020), 420 mg (11/13/2020), 420 mg (12/4/2020), 420 mg (12/24/2020), 420 mg (1/15/2021), 420 mg (2/5/2021), 420 mg (2/26/2021), 420 mg (3/19/2021), 420 mg (4/9/2021), 420 mg (4/30/2021), 420 mg (5/21/2021), 420 mg (6/11/2021), 420 mg (7/2/2021), 420 mg (7/23/2021), 420 mg (8/13/2021), 420 mg (9/3/2021), 420 mg (9/24/2021), 420 mg (10/15/2021), 420 mg (11/5/2021), 420 mg (11/26/2021), 420 mg (12/17/2021), 420 mg (1/7/2022), 420 mg (1/28/2022), 420 mg (2/18/2022), 420 mg (3/11/2022), 420 mg (4/1/2022)  trastuzumab (HERCEPTIN) chemo infusion, 6 mg/kg = 589 mg (75 % of original dose 8 mg/kg), Intravenous, Once, 52 of 56 cycles  Dose modification: 6 mg/kg (original dose 8 mg/kg, Cycle 1, Reason: Other (Must fill in a comment))  Administration: 589 mg (5/17/2019), 589 mg (6/7/2019), 600 mg (6/28/2019), 600 mg (7/19/2019), 600 mg (8/30/2019), 600 mg (9/20/2019), 600 mg (8/9/2019), 600 mg (10/9/2019), 600 mg (11/1/2019), 600 mg (11/22/2019), 600 mg (12/13/2019), 600 mg (1/3/2020), 600 mg (1/24/2020), 600 mg (2/14/2020), 600 mg (3/6/2020), 600 mg (3/27/2020), 600 mg (4/17/2020), 600 mg (5/8/2020), 600 mg (5/29/2020), 600 mg (6/19/2020), 600 mg (7/10/2020), 600 mg (7/31/2020), 600 mg (8/21/2020), 600 mg (9/11/2020), 600 mg (10/2/2020), 600 mg (10/23/2020), 600 mg (11/13/2020), 600 mg (12/4/2020), 600 mg (12/24/2020), 600 mg (1/15/2021), 600 mg (2/5/2021), 600 mg (2/26/2021), 600 mg (3/19/2021), 600 mg (4/9/2021), 600 mg (4/30/2021), 600 mg (5/21/2021), 600 mg (6/11/2021), 600 mg (7/2/2021), 600 mg (7/23/2021), 600 mg (8/13/2021), 600 mg (9/3/2021), 600 mg (9/24/2021), 600 mg (10/15/2021), 600 mg (11/5/2021), 600 mg (11/26/2021), 600 mg (12/17/2021), 600 mg (1/7/2022), 600 mg (1/28/2022), 600 mg (2/18/2022), 600 mg (3/11/2022), 600 mg (4/1/2022)     Bone metastasis ([facility] [address] )   4/27/2018 Initial Diagnosis    Bone metastasis ([facility] [address] )     4/17/2019 -  Chemotherapy    pertuzumab (PERJETA) IVPB, 840 mg, Intravenous, Once, 52 of 56 cycles  Administration: 420 mg (5/17/2019), 420 mg (6/7/2019), 420 mg (6/28/2019), 420 mg (7/19/2019), 420 mg (8/30/2019), 420 mg (9/20/2019), 420 mg (8/9/2019), 420 mg (10/9/2019), 420 mg (11/1/2019), 420 mg (11/22/2019), 420 mg (12/13/2019), 420 mg (1/3/2020), 420 mg (1/24/2020), 420 mg (2/14/2020), 420 mg (3/6/2020), 420 mg (3/27/2020), 420 mg (4/17/2020), 420 mg (5/8/2020), 420 mg (5/29/2020), 420 mg (6/19/2020), 420 mg (7/10/2020), 420 mg (7/31/2020), 420 mg (8/21/2020), 420 mg (9/11/2020), 420 mg (10/2/2020), 420 mg (10/23/2020), 420 mg (11/13/2020), 420 mg (12/4/2020), 420 mg (12/24/2020), 420 mg (1/15/2021), 420 mg (2/5/2021), 420 mg (2/26/2021), 420 mg (3/19/2021), 420 mg (4/9/2021), 420 mg (4/30/2021), 420 mg (5/21/2021), 420 mg (6/11/2021), 420 mg (7/2/2021), 420 mg (7/23/2021), 420 mg (8/13/2021), 420 mg (9/3/2021), 420 mg (9/24/2021), 420 mg (10/15/2021), 420 mg (11/5/2021), 420 mg (11/26/2021), 420 mg (12/17/2021), 420 mg (1/7/2022), 420 mg (1/28/2022), 420 mg (2/18/2022), 420 mg (3/11/2022), 420 mg (4/1/2022)  trastuzumab (HERCEPTIN) chemo infusion, 6 mg/kg = 589 mg (75 % of original dose 8 mg/kg), Intravenous, Once, 52 of 56 cycles  Dose modification: 6 mg/kg (original dose 8 mg/kg, Cycle 1, Reason: Other (Must fill in a comment))  Administration: 589 mg (5/17/2019), 589 mg (6/7/2019), 600 mg (6/28/2019), 600 mg (7/19/2019), 600 mg (8/30/2019), 600 mg (9/20/2019), 600 mg (8/9/2019), 600 mg (10/9/2019), 600 mg (11/1/2019), 600 mg (11/22/2019), 600 mg (12/13/2019), 600 mg (1/3/2020), 600 mg (1/24/2020), 600 mg (2/14/2020), 600 mg (3/6/2020), 600 mg (3/27/2020), 600 mg (4/17/2020), 600 mg (5/8/2020), 600 mg (5/29/2020), 600 mg (6/19/2020), 600 mg (7/10/2020), 600 mg (7/31/2020), 600 mg (8/21/2020), 600 mg (9/11/2020), 600 mg (10/2/2020), 600 mg (10/23/2020), 600 mg (11/13/2020), 600 mg (12/4/2020), 600 mg (12/24/2020), 600 mg (1/15/2021), 600 mg (2/5/2021), 600 mg (2/26/2021), 600 mg (3/19/2021), 600 mg (4/9/2021), 600 mg (4/30/2021), 600 mg (5/21/2021), 600 mg (6/11/2021), 600 mg (7/2/2021), 600 mg (7/23/2021), 600 mg (8/13/2021), 600 mg (9/3/2021), 600 mg (9/24/2021), 600 mg (10/15/2021), 600 mg (11/5/2021), 600 mg (11/26/2021), 600 mg (12/17/2021), 600 mg (1/7/2022), 600 mg (1/28/2022), 600 mg (2/18/2022), 600 mg (3/11/2022), 600 mg (4/1/2022)         ROS:  04/20/22 Reviewed 12 systems:     See symptoms in HPI  Presently no other neurological, cardiac, pulmonary, GI and  symptoms other than mentioned   in HPI     Other symptoms are in HPI  No   fever, chills, bleeding, bone pains, skin rash, weight loss, weakness,  claudication and gait problem  No frequent infections  Not unusually sensitive to heat or cold  No swelling of the ankles  No swollen glands  No new symptoms since last visit  /74 (BP Location: Right arm, Patient Position: Sitting, Cuff Size: Adult)   Pulse 84   Temp 97 6 °F (36 4 °C) (Temporal)   Resp 16   Ht 5' 5 98" (1 676 m)   Wt 98 9 kg (218 lb)   SpO2 97%   BMI 35 21 kg/m²     Physical Exam:     Patient is alert and oriented  Patient is not in distress  She is anxious  Vital signs are stable  There is no  icterus , no oral thrush, no palpable neck mass,  lung fields clear to percussion and auscultation, regular heart rate,   soft and non tender abdomen, no palpable abdominal mass, no ascites, no edema of ankles, no calf tenderness, no objective focal neurological deficit, no skin rash, no palpable lymphadenopathy in the neck and axillary areas, no clubbing, Patient is less anxious  Performance status 1  No lymphedema      IMAGING:  IMPRESSION:     1  No scintigraphic evidence of osseous metastasis           Workstation performed: [id number]      Imaging     Ejection fraction 65% on 12/01/2021    IMPRESSION:     1  Area of subtle ill-defined hypodensity in the caudate lobe measuring approximately 2 0 x 1 5 cm new since the prior study 2/24/2021 may be due to metastasis  Recommend characterization with contrast-enhanced MR abdomen      2    Treated metastatic lesion in the left proximal femur is unchanged           The study was marked in EPIC for significant notification            Workstation performed: [id number]          Imaging    CT chest abdomen pelvis w contrast (Order: [id number]) - 3/17/2022  Study Result    Narrative & Impression   CENTRAL DXA SCAN on 03/08/2022     CLINICAL HISTORY:   49year old post-menopausal  female risk factors include osteoporosis screening  Additional medical history: Breast cancer with mets to Hip , right Hip scanned      TECHNIQUE: Bone densitometry was performed using a [employer] W bone densitometer  Regions of interest appear properly placed  There are   other confounding variables which could limit the study        Her elevated  BMI may influence the T score result      Degenerative or osteoarthritic changes are noted on the spine image eliminating L4 from evaluation      COMPARISON:  Follow-up     RESULTS:   LUMBAR SPINE:  L1-L3:  BMD 1 021 gm/cm2  T-score 0 0 and unchanged from 2019  Z-score 1 0     RIGHT TOTAL HIP:  BMD 1 122 gm/cm2  T-score 1 5  Z-score 2 1     RIGHT FEMORAL NECK:  BMD 0 842 gm/cm2  T-score -0 1 and unchanged from 2019  Z-score 0 9           IMPRESSION:  1  Based on the [facility] classification, this study is normal and the patient is considered at low risk for fracture             LABS:    Results for orders placed or performed during the hospital encounter of 03/25/22   Cancer antigen 27 29   Result Value Ref Range    CA 27 29 22 5 0 0 - 42 3 U/mL   CBC and differential   Result Value Ref Range    WBC 8 41 4 31 - 10 16 Thousand/uL    RBC 5 02 3 81 - 5 12 Million/uL    Hemoglobin 13 3 11 5 - 15 4 g/dL    Hematocrit 42 1 34 8 - 46 1 %    MCV 84 82 - 98 fL    MCH 26 5 (L) 26 8 - 34 3 pg    MCHC 31 6 31 4 - 37 4 g/dL    RDW 14 6 11 6 - 15 1 %    MPV 11 2 8 9 - 12 7 fL    Platelets 078 114 - 237 Thousands/uL    nRBC 0 /100 WBCs    Neutrophils Relative 58 43 - 75 %    Immat GRANS % 0 0 - 2 %    Lymphocytes Relative 30 14 - 44 %    Monocytes Relative 7 4 - 12 %    Eosinophils Relative 4 0 - 6 %    Basophils Relative 1 0 - 1 %    Neutrophils Absolute 4 84 1 85 - 7 62 Thousands/µL    Immature Grans Absolute 0 03 0 00 - 0 20 Thousand/uL    Lymphocytes Absolute 2 55 0 60 - 4 47 Thousands/µL Monocytes Absolute 0 61 0 17 - 1 22 Thousand/µL    Eosinophils Absolute 0 34 0 00 - 0 61 Thousand/µL    Basophils Absolute 0 04 0 00 - 0 10 Thousands/µL   Comprehensive metabolic panel   Result Value Ref Range    Sodium 140 136 - 145 mmol/L    Potassium 4 0 3 5 - 5 3 mmol/L    Chloride 104 100 - 108 mmol/L    CO2 30 21 - 32 mmol/L    ANION GAP 6 4 - 13 mmol/L    BUN 19 5 - 25 mg/dL    Creatinine 0 97 0 60 - 1 30 mg/dL    Glucose 89 65 - 140 mg/dL    Calcium 8 9 8 3 - 10 1 mg/dL    Corrected Calcium 9 4 8 3 - 10 1 mg/dL    AST 23 5 - 45 U/L    ALT 48 12 - 78 U/L    Alkaline Phosphatase 106 46 - 116 U/L    Total Protein 7 6 6 4 - 8 2 g/dL    Albumin 3 4 (L) 3 5 - 5 0 g/dL    Total Bilirubin 0 30 0 20 - 1 00 mg/dL    eGFR 66 ml/min/1 73sq m     Labs, Imaging, & Other studies:   All pertinent labs and imaging studies were personally reviewed    Lab Results   Component Value Date    K 4 0 03/25/2022     03/25/2022    CO2 30 03/25/2022    BUN 19 03/25/2022    CREATININE 0 97 03/25/2022    GLUF 162 (H) 02/12/2020    CALCIUM 8 9 03/25/2022    CORRECTEDCA 9 4 03/25/2022    AST 23 03/25/2022    ALT 48 03/25/2022    ALKPHOS 106 03/25/2022    EGFR 66 03/25/2022     Lab Results   Component Value Date    WBC 8 41 03/25/2022    HGB 13 3 03/25/2022    HCT 42 1 03/25/2022    MCV 84 03/25/2022     03/25/2022      Normal differential   Normal tumor marker CA 27-29  23    Reviewed  test results and discussed with patient and explained  Assessment and plan:      Patient has been on Herceptin, Perjeta and letrozole for 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 and she had metastatic disease to liver and bones     She tested positive for BRCA 1  She has done well all these years and  had very good response but now there is a new lesion in the liver on CT scan  She will have MRI scan and most likely biopsy liver lesion  In the meantime she is being continued on letrozole    Herceptin and Perjeta will be on hold and most likely her treatment will be changed and she knows that  She was going to have MRI scan of the liver last week but could not go  I have reordered MRI of abdomen/liver to be done as soon as possible  No bone pains  Has good appetite  And she is maintaining her weight  Patient was started on a combination of   Taxotere plus Herceptin and Perjeta and she had very good response and after 6 cycles Taxotere was discontinued and tamoxifen was added    Herceptin and Perjeta were continued  Kimmy Foil radiation to left hip for palliation    On 08/16/2019 she underwent BSO    Tamoxifen was changed to letrozole     Presently she is on Herceptin, Perjeta and letrozole  [facility] has been taking vitamin-D and calcium and is staying active    She has been tolerating treatments without much problem   She goes for blood tests and echocardiogram on regular basis    In September 2016 patient had lumpectomy of left breast followed by radiation therapy   At the time of lumpectomy there was minimal residual disease, 1 4 cm   Lymph nodes were not sampled     Patient knows that she is at high risk for breast and other cancers like  ovarian cancer, pancreatic cancer, peritoneal cancer, melanoma and other cancers  Patient goes to breast surgeon for evaluation and imaging studies  She gets CT scan and that also checks the pancreas and peritoneum  She had BSO  She has agreed to see a dermatologist in few months and that will be melanoma mapping  She does not want to go to ophthalmologist for melanoma in the eye  Also she does not want to have colonoscopy and not even Cologuard or stool test for blood       She has grade 1 peripheral neuropathy    Has some tiredness and arthritic symptoms  Patient has history of anxiety and insomnia  For leg cramps at night and she is taking one baby aspirin daily with food in the evening and also one magnesium tablet daily and that is helping           She is not on Xgeva anymore at this time  She had Xgeva for 2 years  Junette Homans continues to take    calcium and vitamin-D            Physical examination and test results are as recorded and discussed  Suspecting new metastatic lesion in the liver and she will have MRI scan and most likely liver lesion biopsy  In the meantime she is being continued on letrozole  Herceptin and Perjeta will be on hold and most likely her treatment will be changed and she knows that  See above  She gets an echocardiogram on regular basis   Condition discussed and explained   Questions answered  Hortensia Villarreal discussed the importance of self-breast examination, eating healthy foods, staying active and health screening tests   Patient goes to her breast surgeon for examination and  imaging studies  Cyndi Cuello is capable of self-care   Goal is prolonged survival from stage IV breast cancer  Patient will continue to follow with her primary physician and other consultants  Discussed precautions against coronavirus     See diagnoses, orders and instructions below      1  Liver metastases ([address] )    - MRI abdomen w wo contrast; Future    2  Malignant neoplasm of upper-inner quadrant of left breast in female, estrogen receptor positive ([address] )    - MRI abdomen w wo contrast; Future    3  Bone metastasis ([address] )      4  Cardiomyopathy due to chemotherapy ([address] )      5  BRCA1 gene mutation positive in female    - MRI abdomen w wo contrast; Future    6  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    - MRI abdomen w wo contrast; Future    7  History of bilateral salpingo-oophorectomy (BSO)      8  Port-A-Cath in place          Skipping treatment this Friday  Please try to get MRI of the abdomen as soon as possible  Patient will be called with results and to decide about needle biopsy of liver lesion  Tentative follow-up in 4 weeks  Continuing letrozole                                        Patient voiced understanding and agreed       Counseling / Coordination of Care       Provided counseling and support

## 2022-04-20 NOTE — TELEPHONE ENCOUNTER
Left message for patient about date, time, and location of scheduled MRI  Was not able to get a sooner appointment scheduled

## 2022-04-20 NOTE — PATIENT INSTRUCTIONS
Skipping treatment this Friday  Please try to get MRI of the abdomen as soon as possible  Patient will be called with results and to decide about needle biopsy of liver lesion  Tentative follow-up in 4 weeks  Continuing letrozole

## 2022-04-22 ENCOUNTER — TELEPHONE (OUTPATIENT)
Dept: HEMATOLOGY ONCOLOGY | Facility: CLINIC | Age: 57
End: 2022-04-22

## 2022-04-22 ENCOUNTER — HOSPITAL ENCOUNTER (OUTPATIENT)
Dept: INFUSION CENTER | Facility: CLINIC | Age: 57
Discharge: HOME | End: 2022-04-22

## 2022-04-22 NOTE — TELEPHONE ENCOUNTER
Patients MRI moved up from 5/17/22 to 5/3/22  The MRI is scheduled at [facility]    I provided patient with the address  She was agreeable and verbalized understanding

## 2022-04-25 ENCOUNTER — TELEPHONE (OUTPATIENT)
Dept: HEMATOLOGY ONCOLOGY | Facility: CLINIC | Age: 57
End: 2022-04-25

## 2022-04-25 NOTE — TELEPHONE ENCOUNTER
I spoke with patient on 4/22/22  She explained that she needs open air MRI  I scheduled the MRI abdomen at [address] [place] [address] - telephone # [phone]    Appointment is 5/2/22 @ 1:30  Appointments scheduled at [facility] ([facility] canceled  Patient aware and verbalized understanding

## 2022-05-11 ENCOUNTER — DOCUMENTATION (OUTPATIENT)
Dept: HEMATOLOGY ONCOLOGY | Facility: CLINIC | Age: 57
End: 2022-05-11

## 2022-05-11 ENCOUNTER — TELEPHONE (OUTPATIENT)
Dept: HEMATOLOGY ONCOLOGY | Facility: CLINIC | Age: 57
End: 2022-05-11

## 2022-05-11 NOTE — PROGRESS NOTES
I noted report of MRI scan of abdomen that came from [facility] diagnostic imaging and patient has 19 mm new liver lesion in the caudate lobe, fatty liver and left adrenal adenoma  Liver lesion is suspicious for metastatic disease    Patient has office appointment on 05/18/22

## 2022-05-13 ENCOUNTER — HOSPITAL ENCOUNTER (OUTPATIENT)
Dept: INFUSION CENTER | Facility: CLINIC | Age: 57
Discharge: HOME | End: 2022-05-13
Payer: COMMERCIAL

## 2022-05-13 VITALS
RESPIRATION RATE: 18 BRPM | TEMPERATURE: 97.9 F | HEART RATE: 67 BPM | BODY MASS INDEX: 34.86 KG/M2 | WEIGHT: 216.93 LBS | HEIGHT: 66 IN | DIASTOLIC BLOOD PRESSURE: 101 MMHG | SYSTOLIC BLOOD PRESSURE: 170 MMHG

## 2022-05-13 DIAGNOSIS — Z90.722 HISTORY OF BILATERAL SALPINGO-OOPHORECTOMY (BSO): Primary | ICD-10-CM

## 2022-05-13 DIAGNOSIS — C79.51 BONE METASTASIS (HCC): ICD-10-CM

## 2022-05-13 DIAGNOSIS — Z17.0 MALIGNANT NEOPLASM OF UPPER-INNER QUADRANT OF LEFT BREAST IN FEMALE, ESTROGEN RECEPTOR POSITIVE (HCC): ICD-10-CM

## 2022-05-13 DIAGNOSIS — C78.7 LIVER METASTASES (HCC): ICD-10-CM

## 2022-05-13 DIAGNOSIS — C50.212 MALIGNANT NEOPLASM OF UPPER-INNER QUADRANT OF LEFT BREAST IN FEMALE, ESTROGEN RECEPTOR POSITIVE (HCC): ICD-10-CM

## 2022-05-13 DIAGNOSIS — Z90.79 HISTORY OF BILATERAL SALPINGO-OOPHORECTOMY (BSO): Primary | ICD-10-CM

## 2022-05-13 PROCEDURE — 96417 CHEMO IV INFUS EACH ADDL SEQ: CPT

## 2022-05-13 PROCEDURE — 96413 CHEMO IV INFUSION 1 HR: CPT

## 2022-05-13 RX ORDER — SODIUM CHLORIDE 9 MG/ML
20 INJECTION, SOLUTION INTRAVENOUS ONCE
Status: COMPLETED | OUTPATIENT
Start: 2022-05-13 | End: 2022-05-13

## 2022-05-13 RX ADMIN — PERTUZUMAB 420 MG: 30 INJECTION, SOLUTION, CONCENTRATE INTRAVENOUS at 14:56

## 2022-05-13 RX ADMIN — TRASTUZUMAB 600 MG: 150 INJECTION, POWDER, LYOPHILIZED, FOR SOLUTION INTRAVENOUS at 15:28

## 2022-05-13 RX ADMIN — SODIUM CHLORIDE 20 ML/HR: 0.9 INJECTION, SOLUTION INTRAVENOUS at 14:56

## 2022-05-18 ENCOUNTER — OFFICE VISIT (OUTPATIENT)
Dept: HEMATOLOGY ONCOLOGY | Facility: CLINIC | Age: 57
End: 2022-05-18
Payer: COMMERCIAL

## 2022-05-18 VITALS
RESPIRATION RATE: 16 BRPM | HEIGHT: 66 IN | DIASTOLIC BLOOD PRESSURE: 76 MMHG | HEART RATE: 74 BPM | OXYGEN SATURATION: 97 % | SYSTOLIC BLOOD PRESSURE: 120 MMHG | BODY MASS INDEX: 35.2 KG/M2 | WEIGHT: 219 LBS | TEMPERATURE: 97.4 F

## 2022-05-18 DIAGNOSIS — T45.1X5A CHEMOTHERAPY INDUCED CARDIOMYOPATHY (HCC): ICD-10-CM

## 2022-05-18 DIAGNOSIS — T38.6X5A OSTEOPOROSIS DUE TO AROMATASE INHIBITOR: ICD-10-CM

## 2022-05-18 DIAGNOSIS — C78.7 LIVER METASTASES (HCC): ICD-10-CM

## 2022-05-18 DIAGNOSIS — Z79.811 AROMATASE INHIBITOR USE: ICD-10-CM

## 2022-05-18 DIAGNOSIS — C79.51 BONE METASTASIS (HCC): ICD-10-CM

## 2022-05-18 DIAGNOSIS — Z90.722 S/P BSO (BILATERAL SALPINGO-OOPHORECTOMY): ICD-10-CM

## 2022-05-18 DIAGNOSIS — Z17.0 MALIGNANT NEOPLASM OF UPPER-INNER QUADRANT OF LEFT BREAST IN FEMALE, ESTROGEN RECEPTOR POSITIVE (HCC): Primary | ICD-10-CM

## 2022-05-18 DIAGNOSIS — Z95.828 PORT-A-CATH IN PLACE: ICD-10-CM

## 2022-05-18 DIAGNOSIS — I42.7 CHEMOTHERAPY INDUCED CARDIOMYOPATHY (HCC): ICD-10-CM

## 2022-05-18 DIAGNOSIS — Z15.09 BRCA1 GENE MUTATION POSITIVE IN FEMALE: ICD-10-CM

## 2022-05-18 DIAGNOSIS — Z14.8 CARRIER OF GENE MUTATION FOR HIGH RISK OF CANCER: ICD-10-CM

## 2022-05-18 DIAGNOSIS — M81.8 OSTEOPOROSIS DUE TO AROMATASE INHIBITOR: ICD-10-CM

## 2022-05-18 DIAGNOSIS — Z15.02 BRCA1 GENE MUTATION POSITIVE IN FEMALE: ICD-10-CM

## 2022-05-18 DIAGNOSIS — Z15.01 BRCA1 GENE MUTATION POSITIVE IN FEMALE: ICD-10-CM

## 2022-05-18 DIAGNOSIS — C50.212 MALIGNANT NEOPLASM OF UPPER-INNER QUADRANT OF LEFT BREAST IN FEMALE, ESTROGEN RECEPTOR POSITIVE (HCC): Primary | ICD-10-CM

## 2022-05-18 PROCEDURE — 99215 OFFICE O/P EST HI 40 MIN: CPT | Performed by: INTERNAL MEDICINE

## 2022-05-18 NOTE — PROGRESS NOTES
HPI: Follow-up visit for triple positive stage IV metastatic breast cancer since 2016  She had de Tricia stage IV disease , metastatic to liver and bones  In September 2016 patient had lumpectomy of left breast followed by radiation therapy   At the time of lumpectomy there was minimal residual disease, 1 4 cm   Lymph nodes were not sampled  Patient tested positive for BRCA 1  Patient was started on a combination of   Taxotere plus Herceptin and Perjeta and she had very good response and after 6 cycles Taxotere was discontinued and tamoxifen was added    Herceptin and Perjeta were continued  Dixon Lundborg radiation to left hip for palliation    On 08/16/2019 she underwent BSO    Tamoxifen was changed to letrozole     Presently she is on Herceptin, Perjeta and letrozole  Will Simmering has been taking vitamin-D and calcium and is staying active    She has been tolerating treatments without much problem   She goes for blood tests and echocardiogram on regular basis  She has done well all these years and  had very good response but now there is a new lesion in the liver on CT scan and MRI scan  She will have bone scan and if that did not show active metastatic disease she could have biopsy and ablation of the liver lesion and stay on present medications but if she has active metastatic disease other than just in the liver, a single lesion in the liver her systemic therapy will change  MRI scan was done outside [address] [place] [address] and she will bring the disc to upload for our radiologist to review  In the meantime she is being continued on letrozole  Herceptin and Perjeta   No bone pains  Has good appetite  And she is maintaining her weight         Patient knows that she is at high risk for breast and other cancers like  ovarian cancer, pancreatic cancer, peritoneal cancer, melanoma and other cancers  Patient goes to breast surgeon for evaluation and imaging studies    She gets CT scan and that also checks the pancreas and peritoneum  She had BSO  She has agreed to see a dermatologist in few months and that will be melanoma mapping  She does not want to go to ophthalmologist for melanoma in the eye  Also she does not want to have colonoscopy and not even Cologuard or stool test for blood       She has grade 1 peripheral neuropathy    Has some tiredness and arthritic symptoms  Patient has history of anxiety and insomnia  For leg cramps at night and she is taking one baby aspirin daily with food in the evening and also one magnesium tablet daily and that is helping          She is not on Xgeva anymore at this time  She had Xgeva for 2 years  Sasha Lemon continues to take  calcium and vitamin-D                              Current Outpatient Medications:     letrozole (FEMARA) 2 5 mg tablet, Take 1 tablet (2 5 mg total) by mouth daily, Disp: 30 tablet, Rfl: 6    multivitamin (THERAGRAN) TABS, Take 1 tablet by mouth 3 (three) times a week , Disp: , Rfl:     pertuzumab (PERJETA) 420 mg/14 mL SOLN, Infuse into a venous catheter every 21 days At infusion  center, Disp: , Rfl:     Trastuzumab (HERCEPTIN IV), Infuse into a venous catheter every 21 days At infusion center, Disp: , Rfl:     VITAMIN D, CHOLECALCIFEROL, PO, Take 1,000 Units by mouth 3 (three) times a week , Disp: , Rfl:     No Known Allergies    Oncology History   Malignant neoplasm of upper-inner quadrant of left female breast ([facility] [address] )   12/2015 Initial Diagnosis    Malignant neoplasm of upper-outer quadrant of left female breast ([facility] [address] )     2016 -  Hormone Therapy    Tamoxifen  Ended 8/2019  Dr Israel [facility]  Letrozole 9/2019 1/27/2016 Surgery    Port placement     2/19/2016 - 6/2/2018 Chemotherapy    Taxotere,  herceptin, perjeta, xgeva  After six cycles, taxotere was discontinued and tamoxifen added     Continues with Perjeta and Herceptin every 3 weeks     - Dr Jhonatan Rice       9/16/2016 Surgery    Left breast partial mastectomy     11/7/2016 - 12/23/2016 Radiation Plan ID Energy Fractions Dose per Fraction (cGy) Total Dose Delivered (cGy) Elapsed Days   Lt Breast 6X 25 / 25 200 5,000 36   Lt Brst Boost 6X 8 / 8 200 1,600 9   Lt Femur 10X 10 / 10 300 3,000 11   Lt PAB 6X 25 / 25 60 1,500 36   Lt Sclav 6X 25 / 25 200 5,000 36                                   Total dose to left breast lumpectomy cavity: 6600 cGy                   Total dose to the supraclavicular and axillary regions: 5000 cGy       4/17/2019 -  Chemotherapy    pertuzumab (PERJETA) IVPB, 840 mg, Intravenous, Once, 53 of 56 cycles  Administration: 420 mg (5/17/2019), 420 mg (6/7/2019), 420 mg (6/28/2019), 420 mg (7/19/2019), 420 mg (8/30/2019), 420 mg (9/20/2019), 420 mg (8/9/2019), 420 mg (10/9/2019), 420 mg (11/1/2019), 420 mg (11/22/2019), 420 mg (12/13/2019), 420 mg (1/3/2020), 420 mg (1/24/2020), 420 mg (2/14/2020), 420 mg (3/6/2020), 420 mg (3/27/2020), 420 mg (4/17/2020), 420 mg (5/8/2020), 420 mg (5/29/2020), 420 mg (6/19/2020), 420 mg (7/10/2020), 420 mg (7/31/2020), 420 mg (8/21/2020), 420 mg (9/11/2020), 420 mg (10/2/2020), 420 mg (10/23/2020), 420 mg (11/13/2020), 420 mg (12/4/2020), 420 mg (12/24/2020), 420 mg (1/15/2021), 420 mg (2/5/2021), 420 mg (2/26/2021), 420 mg (3/19/2021), 420 mg (4/9/2021), 420 mg (4/30/2021), 420 mg (5/21/2021), 420 mg (6/11/2021), 420 mg (7/2/2021), 420 mg (7/23/2021), 420 mg (8/13/2021), 420 mg (9/3/2021), 420 mg (9/24/2021), 420 mg (10/15/2021), 420 mg (11/5/2021), 420 mg (11/26/2021), 420 mg (12/17/2021), 420 mg (1/7/2022), 420 mg (1/28/2022), 420 mg (2/18/2022), 420 mg (3/11/2022), 420 mg (4/1/2022), 420 mg (5/13/2022)  trastuzumab (HERCEPTIN) chemo infusion, 6 mg/kg = 589 mg (75 % of original dose 8 mg/kg), Intravenous, Once, 53 of 56 cycles  Dose modification: 6 mg/kg (original dose 8 mg/kg, Cycle 1, Reason: Other (Must fill in a comment))  Administration: 589 mg (5/17/2019), 589 mg (6/7/2019), 600 mg (6/28/2019), 600 mg (7/19/2019), 600 mg (8/30/2019), 600 mg (9/20/2019), 600 mg (8/9/2019), 600 mg (10/9/2019), 600 mg (11/1/2019), 600 mg (11/22/2019), 600 mg (12/13/2019), 600 mg (1/3/2020), 600 mg (1/24/2020), 600 mg (2/14/2020), 600 mg (3/6/2020), 600 mg (3/27/2020), 600 mg (4/17/2020), 600 mg (5/8/2020), 600 mg (5/29/2020), 600 mg (6/19/2020), 600 mg (7/10/2020), 600 mg (7/31/2020), 600 mg (8/21/2020), 600 mg (9/11/2020), 600 mg (10/2/2020), 600 mg (10/23/2020), 600 mg (11/13/2020), 600 mg (12/4/2020), 600 mg (12/24/2020), 600 mg (1/15/2021), 600 mg (2/5/2021), 600 mg (2/26/2021), 600 mg (3/19/2021), 600 mg (4/9/2021), 600 mg (4/30/2021), 600 mg (5/21/2021), 600 mg (6/11/2021), 600 mg (7/2/2021), 600 mg (7/23/2021), 600 mg (8/13/2021), 600 mg (9/3/2021), 600 mg (9/24/2021), 600 mg (10/15/2021), 600 mg (11/5/2021), 600 mg (11/26/2021), 600 mg (12/17/2021), 600 mg (1/7/2022), 600 mg (1/28/2022), 600 mg (2/18/2022), 600 mg (3/11/2022), 600 mg (4/1/2022), 600 mg (5/13/2022)     8/16/2019 Surgery    she underwent BSO       Liver metastases ([address] )   4/27/2018 Initial Diagnosis    Liver metastases ([facility] [address] )     4/17/2019 -  Chemotherapy    pertuzumab (PERJETA) IVPB, 840 mg, Intravenous, Once, 53 of 56 cycles  Administration: 420 mg (5/17/2019), 420 mg (6/7/2019), 420 mg (6/28/2019), 420 mg (7/19/2019), 420 mg (8/30/2019), 420 mg (9/20/2019), 420 mg (8/9/2019), 420 mg (10/9/2019), 420 mg (11/1/2019), 420 mg (11/22/2019), 420 mg (12/13/2019), 420 mg (1/3/2020), 420 mg (1/24/2020), 420 mg (2/14/2020), 420 mg (3/6/2020), 420 mg (3/27/2020), 420 mg (4/17/2020), 420 mg (5/8/2020), 420 mg (5/29/2020), 420 mg (6/19/2020), 420 mg (7/10/2020), 420 mg (7/31/2020), 420 mg (8/21/2020), 420 mg (9/11/2020), 420 mg (10/2/2020), 420 mg (10/23/2020), 420 mg (11/13/2020), 420 mg (12/4/2020), 420 mg (12/24/2020), 420 mg (1/15/2021), 420 mg (2/5/2021), 420 mg (2/26/2021), 420 mg (3/19/2021), 420 mg (4/9/2021), 420 mg (4/30/2021), 420 mg (5/21/2021), 420 mg (6/11/2021), 420 mg (7/2/2021), 420 mg (7/23/2021), 420 mg (8/13/2021), 420 mg (9/3/2021), 420 mg (9/24/2021), 420 mg (10/15/2021), 420 mg (11/5/2021), 420 mg (11/26/2021), 420 mg (12/17/2021), 420 mg (1/7/2022), 420 mg (1/28/2022), 420 mg (2/18/2022), 420 mg (3/11/2022), 420 mg (4/1/2022), 420 mg (5/13/2022)  trastuzumab (HERCEPTIN) chemo infusion, 6 mg/kg = 589 mg (75 % of original dose 8 mg/kg), Intravenous, Once, 53 of 56 cycles  Dose modification: 6 mg/kg (original dose 8 mg/kg, Cycle 1, Reason: Other (Must fill in a comment))  Administration: 589 mg (5/17/2019), 589 mg (6/7/2019), 600 mg (6/28/2019), 600 mg (7/19/2019), 600 mg (8/30/2019), 600 mg (9/20/2019), 600 mg (8/9/2019), 600 mg (10/9/2019), 600 mg (11/1/2019), 600 mg (11/22/2019), 600 mg (12/13/2019), 600 mg (1/3/2020), 600 mg (1/24/2020), 600 mg (2/14/2020), 600 mg (3/6/2020), 600 mg (3/27/2020), 600 mg (4/17/2020), 600 mg (5/8/2020), 600 mg (5/29/2020), 600 mg (6/19/2020), 600 mg (7/10/2020), 600 mg (7/31/2020), 600 mg (8/21/2020), 600 mg (9/11/2020), 600 mg (10/2/2020), 600 mg (10/23/2020), 600 mg (11/13/2020), 600 mg (12/4/2020), 600 mg (12/24/2020), 600 mg (1/15/2021), 600 mg (2/5/2021), 600 mg (2/26/2021), 600 mg (3/19/2021), 600 mg (4/9/2021), 600 mg (4/30/2021), 600 mg (5/21/2021), 600 mg (6/11/2021), 600 mg (7/2/2021), 600 mg (7/23/2021), 600 mg (8/13/2021), 600 mg (9/3/2021), 600 mg (9/24/2021), 600 mg (10/15/2021), 600 mg (11/5/2021), 600 mg (11/26/2021), 600 mg (12/17/2021), 600 mg (1/7/2022), 600 mg (1/28/2022), 600 mg (2/18/2022), 600 mg (3/11/2022), 600 mg (4/1/2022), 600 mg (5/13/2022)     Bone metastasis ([facility] [address] )   4/27/2018 Initial Diagnosis    Bone metastasis ([facility] [address] )     4/17/2019 -  Chemotherapy    pertuzumab (PERJETA) IVPB, 840 mg, Intravenous, Once, 53 of 56 cycles  Administration: 420 mg (5/17/2019), 420 mg (6/7/2019), 420 mg (6/28/2019), 420 mg (7/19/2019), 420 mg (8/30/2019), 420 mg (9/20/2019), 420 mg (8/9/2019), 420 mg (10/9/2019), 420 mg (11/1/2019), 420 mg (11/22/2019), 420 mg (12/13/2019), 420 mg (1/3/2020), 420 mg (1/24/2020), 420 mg (2/14/2020), 420 mg (3/6/2020), 420 mg (3/27/2020), 420 mg (4/17/2020), 420 mg (5/8/2020), 420 mg (5/29/2020), 420 mg (6/19/2020), 420 mg (7/10/2020), 420 mg (7/31/2020), 420 mg (8/21/2020), 420 mg (9/11/2020), 420 mg (10/2/2020), 420 mg (10/23/2020), 420 mg (11/13/2020), 420 mg (12/4/2020), 420 mg (12/24/2020), 420 mg (1/15/2021), 420 mg (2/5/2021), 420 mg (2/26/2021), 420 mg (3/19/2021), 420 mg (4/9/2021), 420 mg (4/30/2021), 420 mg (5/21/2021), 420 mg (6/11/2021), 420 mg (7/2/2021), 420 mg (7/23/2021), 420 mg (8/13/2021), 420 mg (9/3/2021), 420 mg (9/24/2021), 420 mg (10/15/2021), 420 mg (11/5/2021), 420 mg (11/26/2021), 420 mg (12/17/2021), 420 mg (1/7/2022), 420 mg (1/28/2022), 420 mg (2/18/2022), 420 mg (3/11/2022), 420 mg (4/1/2022), 420 mg (5/13/2022)  trastuzumab (HERCEPTIN) chemo infusion, 6 mg/kg = 589 mg (75 % of original dose 8 mg/kg), Intravenous, Once, 53 of 56 cycles  Dose modification: 6 mg/kg (original dose 8 mg/kg, Cycle 1, Reason: Other (Must fill in a comment))  Administration: 589 mg (5/17/2019), 589 mg (6/7/2019), 600 mg (6/28/2019), 600 mg (7/19/2019), 600 mg (8/30/2019), 600 mg (9/20/2019), 600 mg (8/9/2019), 600 mg (10/9/2019), 600 mg (11/1/2019), 600 mg (11/22/2019), 600 mg (12/13/2019), 600 mg (1/3/2020), 600 mg (1/24/2020), 600 mg (2/14/2020), 600 mg (3/6/2020), 600 mg (3/27/2020), 600 mg (4/17/2020), 600 mg (5/8/2020), 600 mg (5/29/2020), 600 mg (6/19/2020), 600 mg (7/10/2020), 600 mg (7/31/2020), 600 mg (8/21/2020), 600 mg (9/11/2020), 600 mg (10/2/2020), 600 mg (10/23/2020), 600 mg (11/13/2020), 600 mg (12/4/2020), 600 mg (12/24/2020), 600 mg (1/15/2021), 600 mg (2/5/2021), 600 mg (2/26/2021), 600 mg (3/19/2021), 600 mg (4/9/2021), 600 mg (4/30/2021), 600 mg (5/21/2021), 600 mg (6/11/2021), 600 mg (7/2/2021), 600 mg (7/23/2021), 600 mg (8/13/2021), 600 mg (9/3/2021), 600 mg (9/24/2021), 600 mg (10/15/2021), 600 mg (11/5/2021), 600 mg (11/26/2021), 600 mg (12/17/2021), 600 mg (1/7/2022), 600 mg (1/28/2022), 600 mg (2/18/2022), 600 mg (3/11/2022), 600 mg (4/1/2022), 600 mg (5/13/2022)         ROS:  05/18/22 Reviewed 12 systems:    See symptoms in HPI  Presently no other neurological, cardiac, pulmonary, GI and  symptoms other than mentioned   in HPI     Other symptoms are in HPI  No   Symptoms like  fever, chills, bleeding, bone pains, skin rash, weight loss, weakness,  claudication and gait problem  No frequent infections  Not unusually sensitive to heat or cold  No swelling of the ankles  No swollen glands  Overall she has been feeling reasonably well  /76 (BP Location: Left arm, Patient Position: Sitting, Cuff Size: Adult)   Pulse 74   Temp (!) 97 4 °F (36 3 °C) (Temporal)   Resp 16   Ht 5' 5 98" (1 676 m)   Wt 99 3 kg (219 lb)   SpO2 97%   BMI 35 37 kg/m²     Physical Exam:     Alert and oriented and not in distress  Vital signs are above  No icterus   , no oral thrush, no palpable neck mass, clear  lung fields , regular heart rate,    Abdomen soft and non tender , no palpable abdominal mass, no ascites, no edema of ankles, no calf tenderness, no objective focal neurological deficit, no skin rash, no palpable lymphadenopathy in the neck and axillary areas, no clubbing, Patient is  anxious  Performance status 1  No lymphedema      IMAGING:  IMPRESSION: 02/24/2021     1  No scintigraphic evidence of osseous metastasis           Workstation performed: [id number]      Imaging     Ejection fraction 65% on 12/01/2021    IMPRESSION:     1  Area of subtle ill-defined hypodensity in the caudate lobe measuring approximately 2 0 x 1 5 cm new since the prior study 2/24/2021 may be due to metastasis  Recommend characterization with contrast-enhanced MR abdomen      2    Treated metastatic lesion in the left proximal femur is unchanged           The study was marked in EPIC for significant notification            Workstation performed: [id number]          Imaging    CT chest abdomen pelvis w contrast (Order: [id number]) - 3/17/2022  Study Result    Narrative & Impression   CENTRAL  DXA SCAN on 03/08/2022     CLINICAL HISTORY:   49year old post-menopausal  female risk factors include osteoporosis screening  Additional medical history: Breast cancer with mets to Hip , right Hip scanned      TECHNIQUE: Bone densitometry was performed using a [employer] W bone densitometer  Regions of interest appear properly placed  There are   other confounding variables which could limit the study        Her elevated  BMI may influence the T score result      Degenerative or osteoarthritic changes are noted on the spine image eliminating L4 from evaluation      COMPARISON:  Follow-up     RESULTS:   LUMBAR SPINE:  L1-L3:  BMD 1 021 gm/cm2  T-score 0 0 and unchanged from 2019  Z-score 1 0     RIGHT TOTAL HIP:  BMD 1 122 gm/cm2  T-score 1 5  Z-score 2 1     RIGHT FEMORAL NECK:  BMD 0 842 gm/cm2  T-score -0 1 and unchanged from 2019  Z-score 0 9           IMPRESSION:  1  Based on the [facility] classification, this study is normal and the patient is considered at low risk for fracture             LABS:    Results for orders placed or performed during the hospital encounter of 03/25/22   Cancer antigen 27 29   Result Value Ref Range    CA 27 29 22 5 0 0 - 42 3 U/mL   CBC and differential   Result Value Ref Range    WBC 8 41 4 31 - 10 16 Thousand/uL    RBC 5 02 3 81 - 5 12 Million/uL    Hemoglobin 13 3 11 5 - 15 4 g/dL    Hematocrit 42 1 34 8 - 46 1 %    MCV 84 82 - 98 fL    MCH 26 5 (L) 26 8 - 34 3 pg    MCHC 31 6 31 4 - 37 4 g/dL    RDW 14 6 11 6 - 15 1 %    MPV 11 2 8 9 - 12 7 fL    Platelets 481 648 - 201 Thousands/uL    nRBC 0 /100 WBCs    Neutrophils Relative 58 43 - 75 %    Immat GRANS % 0 0 - 2 %    Lymphocytes Relative 30 14 - 44 %    Monocytes Relative 7 4 - 12 %    Eosinophils Relative 4 0 - 6 %    Basophils Relative 1 0 - 1 %    Neutrophils Absolute 4 84 1 85 - 7 62 Thousands/µL    Immature Grans Absolute 0 03 0 00 - 0 20 Thousand/uL    Lymphocytes Absolute 2 55 0 60 - 4 47 Thousands/µL    Monocytes Absolute 0 61 0 17 - 1 22 Thousand/µL    Eosinophils Absolute 0 34 0 00 - 0 61 Thousand/µL    Basophils Absolute 0 04 0 00 - 0 10 Thousands/µL   Comprehensive metabolic panel   Result Value Ref Range    Sodium 140 136 - 145 mmol/L    Potassium 4 0 3 5 - 5 3 mmol/L    Chloride 104 100 - 108 mmol/L    CO2 30 21 - 32 mmol/L    ANION GAP 6 4 - 13 mmol/L    BUN 19 5 - 25 mg/dL    Creatinine 0 97 0 60 - 1 30 mg/dL    Glucose 89 65 - 140 mg/dL    Calcium 8 9 8 3 - 10 1 mg/dL    Corrected Calcium 9 4 8 3 - 10 1 mg/dL    AST 23 5 - 45 U/L    ALT 48 12 - 78 U/L    Alkaline Phosphatase 106 46 - 116 U/L    Total Protein 7 6 6 4 - 8 2 g/dL    Albumin 3 4 (L) 3 5 - 5 0 g/dL    Total Bilirubin 0 30 0 20 - 1 00 mg/dL    eGFR 66 ml/min/1 73sq m     Labs, Imaging, & Other studies:   All pertinent labs and imaging studies were personally reviewed    Lab Results   Component Value Date    K 4 0 03/25/2022     03/25/2022    CO2 30 03/25/2022    BUN 19 03/25/2022    CREATININE 0 97 03/25/2022    GLUF 162 (H) 02/12/2020    CALCIUM 8 9 03/25/2022    CORRECTEDCA 9 4 03/25/2022    AST 23 03/25/2022    ALT 48 03/25/2022    ALKPHOS 106 03/25/2022    EGFR 66 03/25/2022     Lab Results   Component Value Date    WBC 8 41 03/25/2022    HGB 13 3 03/25/2022    HCT 42 1 03/25/2022    MCV 84 03/25/2022     03/25/2022      Normal differential   Normal tumor marker CA 27-29  23    Reviewed  test results and discussed with patient and explained  Assessment and plan: Follow-up visit for triple positive stage IV metastatic breast cancer since 2016  She had de Tricia stage IV disease , metastatic to liver and bones    In September 2016 patient had lumpectomy of left breast followed by radiation therapy   At the time of lumpectomy there was minimal residual disease, 1 4 cm   Lymph nodes were not sampled  Patient tested positive for BRCA 1  Patient was started on a combination of   Taxotere plus Herceptin and Perjeta and she had very good response and after 6 cycles Taxotere was discontinued and tamoxifen was added    Herceptin and Perjeta were continued  Indra Dale radiation to left hip for palliation    On 08/16/2019 she underwent BSO    Tamoxifen was changed to letrozole     Presently she is on Herceptin, Perjeta and letrozole  Otilio Garsia has been taking vitamin-D and calcium and is staying active    She has been tolerating treatments without much problem   She goes for blood tests and echocardiogram on regular basis  She has done well all these years and  had very good response but now there is a new lesion in the liver on CT scan and MRI scan  She will have bone scan and if that did not show active metastatic disease she could have biopsy and ablation of the liver lesion and stay on present medications but if she has active metastatic disease other than just in the liver, a single lesion in the liver her systemic therapy will change  MRI scan was done outside [address] [place] [address] and she will bring the disc to upload for our radiologist to review  In the meantime she is being continued on letrozole  Herceptin and Perjeta   No bone pains  Has good appetite  And she is maintaining her weight         Patient knows that she is at high risk for breast and other cancers like  ovarian cancer, pancreatic cancer, peritoneal cancer, melanoma and other cancers  Patient goes to breast surgeon for evaluation and imaging studies  She gets CT scan and that also checks the pancreas and peritoneum  She had BSO  She has agreed to see a dermatologist in few months and that will be melanoma mapping  She does not want to go to ophthalmologist for melanoma in the eye    Also she does not want to have colonoscopy and not even Cologuard or stool test for blood       She has grade 1 peripheral neuropathy    Has some tiredness and arthritic symptoms  Patient has history of anxiety and insomnia  For leg cramps at night and she is taking one baby aspirin daily with food in the evening and also one magnesium tablet daily and that is helping          She is not on Xgeva anymore at this time  She had Xgeva for 2 years  Adele Méndez continues to take  calcium and vitamin-D                           Physical examination and test results are as recorded and discussed  Suspecting new metastatic lesion in the liver  Plan is as above   She gets  echocardiogram on regular basis   Condition discussed and explained   Questions answered  Dawson Palacios discussed the importance of self-breast examination, eating healthy foods, staying active and health screening tests   Patient goes to her breast surgeon for examination and  imaging studies  Bekah Rodriguez is capable of self-care   Goal is prolonged survival from stage IV breast cancer  Patient will continue to follow with her primary physician and other consultants  Discussed precautions against coronavirus     See diagnoses, orders and instructions below      1  Malignant neoplasm of upper-inner quadrant of left breast in female, estrogen receptor positive ([address] )    - NM bone scan whole body; Future    2  Liver metastases ([address] )      3  Bone metastasis ([address] )    - NM bone scan whole body; Future    4  Osteoporosis due to aromatase inhibitor      5  Aromatase inhibitor use      6  BRCA1 gene mutation positive in female      9  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      8  Port-A-Cath in place      9  S/P BSO (bilateral salpingo-oophorectomy)      10  Chemotherapy induced cardiomyopathy ([address] )  - Echo follow up/limited w/ contrast if indicated; Future    Ordered bone scan and echo cardiogram   No change in Herceptin, Perjeta and letrozole    Follow-up in 4 weeks                                           Patient voiced understanding and agreed       Counseling / Coordination of Care       Provided counseling and support

## 2022-05-18 NOTE — PATIENT INSTRUCTIONS
Ordered bone scan and echo cardiogram   No change in Herceptin, Perjeta and letrozole    Follow-up in 4 weeks

## 2022-05-25 DIAGNOSIS — C78.7 LIVER METASTASES (HCC): Primary | ICD-10-CM

## 2022-05-26 ENCOUNTER — HOSPITAL ENCOUNTER (OUTPATIENT)
Dept: NUCLEAR MEDICINE | Facility: HOSPITAL | Age: 57
Discharge: HOME | End: 2022-05-26
Attending: INTERNAL MEDICINE
Payer: COMMERCIAL

## 2022-05-26 ENCOUNTER — PREP FOR PROCEDURE (OUTPATIENT)
Dept: INTERVENTIONAL RADIOLOGY/VASCULAR | Facility: CLINIC | Age: 57
End: 2022-05-26

## 2022-05-26 DIAGNOSIS — C78.7 LIVER METASTASES (HCC): Primary | ICD-10-CM

## 2022-05-26 DIAGNOSIS — Z17.0 MALIGNANT NEOPLASM OF UPPER-INNER QUADRANT OF LEFT BREAST IN FEMALE, ESTROGEN RECEPTOR POSITIVE (HCC): ICD-10-CM

## 2022-05-26 DIAGNOSIS — C79.51 BONE METASTASIS (HCC): ICD-10-CM

## 2022-05-26 DIAGNOSIS — C50.212 MALIGNANT NEOPLASM OF UPPER-INNER QUADRANT OF LEFT BREAST IN FEMALE, ESTROGEN RECEPTOR POSITIVE (HCC): ICD-10-CM

## 2022-05-26 PROCEDURE — G1004 CDSM NDSC: HCPCS

## 2022-05-26 PROCEDURE — A9503 TC99M MEDRONATE: HCPCS

## 2022-05-26 PROCEDURE — 78306 BONE IMAGING WHOLE BODY: CPT

## 2022-05-27 DIAGNOSIS — C78.7 LIVER METASTASES (HCC): Primary | ICD-10-CM

## 2022-06-01 DIAGNOSIS — Z90.79 HISTORY OF BILATERAL SALPINGO-OOPHORECTOMY (BSO): Primary | ICD-10-CM

## 2022-06-01 DIAGNOSIS — C79.51 BONE METASTASIS (HCC): ICD-10-CM

## 2022-06-01 DIAGNOSIS — Z17.0 MALIGNANT NEOPLASM OF UPPER-INNER QUADRANT OF LEFT BREAST IN FEMALE, ESTROGEN RECEPTOR POSITIVE (HCC): ICD-10-CM

## 2022-06-01 DIAGNOSIS — Z90.722 HISTORY OF BILATERAL SALPINGO-OOPHORECTOMY (BSO): Primary | ICD-10-CM

## 2022-06-01 DIAGNOSIS — C78.7 LIVER METASTASES (HCC): ICD-10-CM

## 2022-06-01 DIAGNOSIS — C50.212 MALIGNANT NEOPLASM OF UPPER-INNER QUADRANT OF LEFT BREAST IN FEMALE, ESTROGEN RECEPTOR POSITIVE (HCC): ICD-10-CM

## 2022-06-01 RX ORDER — SODIUM CHLORIDE 9 MG/ML
20 INJECTION, SOLUTION INTRAVENOUS ONCE
Status: CANCELLED | OUTPATIENT
Start: 2022-06-28

## 2022-06-01 RX ORDER — SODIUM CHLORIDE 9 MG/ML
20 INJECTION, SOLUTION INTRAVENOUS ONCE
Status: CANCELLED | OUTPATIENT
Start: 2022-06-03

## 2022-06-03 ENCOUNTER — HOSPITAL ENCOUNTER (OUTPATIENT)
Dept: INFUSION CENTER | Facility: CLINIC | Age: 57
Discharge: HOME | End: 2022-06-03
Payer: COMMERCIAL

## 2022-06-03 VITALS
WEIGHT: 216.05 LBS | TEMPERATURE: 97.8 F | RESPIRATION RATE: 18 BRPM | BODY MASS INDEX: 34.89 KG/M2 | HEART RATE: 60 BPM | SYSTOLIC BLOOD PRESSURE: 151 MMHG | DIASTOLIC BLOOD PRESSURE: 86 MMHG

## 2022-06-03 DIAGNOSIS — C50.212 MALIGNANT NEOPLASM OF UPPER-INNER QUADRANT OF LEFT BREAST IN FEMALE, ESTROGEN RECEPTOR POSITIVE (HCC): ICD-10-CM

## 2022-06-03 DIAGNOSIS — Z17.0 MALIGNANT NEOPLASM OF UPPER-INNER QUADRANT OF LEFT BREAST IN FEMALE, ESTROGEN RECEPTOR POSITIVE (HCC): ICD-10-CM

## 2022-06-03 DIAGNOSIS — Z90.722 HISTORY OF BILATERAL SALPINGO-OOPHORECTOMY (BSO): Primary | ICD-10-CM

## 2022-06-03 DIAGNOSIS — Z90.79 HISTORY OF BILATERAL SALPINGO-OOPHORECTOMY (BSO): Primary | ICD-10-CM

## 2022-06-03 DIAGNOSIS — C78.7 LIVER METASTASES (HCC): ICD-10-CM

## 2022-06-03 DIAGNOSIS — C79.51 BONE METASTASIS (HCC): ICD-10-CM

## 2022-06-03 PROCEDURE — 96413 CHEMO IV INFUSION 1 HR: CPT

## 2022-06-03 PROCEDURE — 96417 CHEMO IV INFUS EACH ADDL SEQ: CPT

## 2022-06-03 RX ORDER — SODIUM CHLORIDE 9 MG/ML
20 INJECTION, SOLUTION INTRAVENOUS ONCE
Status: COMPLETED | OUTPATIENT
Start: 2022-06-03 | End: 2022-06-03

## 2022-06-03 RX ADMIN — PERTUZUMAB 420 MG: 30 INJECTION, SOLUTION, CONCENTRATE INTRAVENOUS at 14:48

## 2022-06-03 RX ADMIN — TRASTUZUMAB 600 MG: 150 INJECTION, POWDER, LYOPHILIZED, FOR SOLUTION INTRAVENOUS at 15:18

## 2022-06-03 RX ADMIN — SODIUM CHLORIDE 20 ML/HR: 0.9 INJECTION, SOLUTION INTRAVENOUS at 14:48

## 2022-06-08 ENCOUNTER — TELEMEDICINE (OUTPATIENT)
Dept: INTERVENTIONAL RADIOLOGY/VASCULAR | Facility: CLINIC | Age: 57
End: 2022-06-08
Payer: COMMERCIAL

## 2022-06-08 DIAGNOSIS — Z17.0 MALIGNANT NEOPLASM OF UPPER-INNER QUADRANT OF LEFT BREAST IN FEMALE, ESTROGEN RECEPTOR POSITIVE (HCC): Primary | ICD-10-CM

## 2022-06-08 DIAGNOSIS — C50.212 MALIGNANT NEOPLASM OF UPPER-INNER QUADRANT OF LEFT BREAST IN FEMALE, ESTROGEN RECEPTOR POSITIVE (HCC): Primary | ICD-10-CM

## 2022-06-08 PROCEDURE — 99205 OFFICE O/P NEW HI 60 MIN: CPT | Performed by: RADIOLOGY

## 2022-06-08 NOTE — PROGRESS NOTES
Virtual Regular Visit    Verification of patient location:    Patient is located in the following state in which I hold an active license [place]          Assessment/Plan:    56F with history of metastatic breast cancer since 2016, initially presenting at stage IV  She underwent lumpectomy, breast radiation and chemotherapy  This has controlled her metastatic disease to date  She has now on surveillance imaging been found to have a new lesion in the liver  I personally reviewed the imaging with the patient  This is a mass approximately 2 cm in the caudate lobe  MRI confirms these findings  She is referred by Dr Jhonatan Rice to discuss management options for the mass    Formally the mainstay of metastatic breast cancer is chemotherapy  However in patients with slow arrival of oligometastatic disease and favorable biology it may be reasonable to offer local therapy to selected areas  I reviewed possible interventional strategies for management of this lesion, including embolization and ablation  Unfortunately it is a very central location and I believe ablation may place adjacent vascular structures at risk  Embolization such as radiation segmentectomy is often an option for liver metastases but the caudate lobe has notoriously difficult and variable arterial supply  I discussed with my colleagues from the radiation oncology service who believe she may be a candidate for external beam radiation  However given the location fiducial marker placement may be appropriate  Similarly she would also benefit from biopsy to confirm the lesion  We have arranged for biopsy and fiducial marker placement to be scheduled  I discussed the technique for biopsy and the technique for marker placement  I discussed that there specific risks for marker placement including metallic bead dislodgement or embolization within a vascular structure    Embolization can occur within the liver or to the heart and lungs or elsewhere  We discussed that we can perform biopsy and marker placement at the same time if the pathology department believes that we are suspicious for malignancy on rapid pathology evaluation  If they do not find evidence of malignancy we can await final pathology  It would also be reasonable to split the appointments but I believe it will decrease risk somewhat to do all at once in the same session  She has upcoming appointment with radiation oncology service Dr Severa Higashi to discuss radiation treatment in more detail and will think about this further  We will keep date for previously scheduled biopsy and fiducial marker placement  This is elective and we can modify the plan as appropriate        Problem List Items Addressed This Visit        Other    Malignant neoplasm of upper-inner quadrant of left female breast [facility]) - Primary               Reason for visit is liver lesion, breast cancer     Encounter provider Tanis Koyanagi, MD    Provider located at [address] [place] [address] [id number] [address]  [phone]      Recent Visits  No visits were found meeting these conditions  Showing recent visits within past 7 days and meeting all other requirements  Today's Visits  Date Type Provider Dept   06/08/22 Telemedicine Tanis Koyanagi, MD [address] today's visits and meeting all other requirements  Future Appointments  No visits were found meeting these conditions  Showing future appointments within next 150 days and meeting all other requirements       The patient was identified by name and date of birth  Naveed Mcgowan was informed that this is a telemedicine visit and that the visit is being conducted through [facility] and patient was informed this is a secure, HIPAA-complaint platform  She agrees to proceed     My office door was closed  No one else was in the room    She acknowledged consent and understanding of privacy and security of the video platform  The patient has agreed to participate and understands they can discontinue the visit at any time  Patient is aware this is a billable service  Subjective  Nadir Grullon is a 49 y o  female with metastatic breast cancer and a liver lesion      Works from home human [employer]  No blood thinners, no allergies, no issues with sedation anesthesia  HPI     Past Medical History:   Diagnosis Date    Hx of radiation therapy     breast and left  hip    Liver metastases ([address] )     Port-A-Cath in place     right chest    Wears glasses        Past Surgical History:   Procedure Laterality Date    BREAST BIOPSY Left 12/30/2015    BREAST LUMPECTOMY Left 09/16/2016    BREAST SURGERY Left     biopsy    CHOLECYSTECTOMY      CHOLECYSTECTOMY      PORTACATH PLACEMENT Right     SC LAP,RMV  ADNEXAL STRUCTURE Bilateral 8/16/2019    Procedure: LAPAROSCOPIC SALPINGO-OOPHORECTOMY;  Surgeon: Mariella Jewell MD;  [facility];  Service: Gynecology Oncology    SIMPLE MASTECTOMY Left 9/16/2016    Procedure: BREAST PARTIAL MASTECTOMY ;  Surgeon: Sofy Buenrostro MD;  [facility];  Service:        Current Outpatient Medications   Medication Sig Dispense Refill    letrozole ([facility]) 2 5 mg tablet Take 1 tablet (2 5 mg total) by mouth daily 30 tablet 6    multivitamin (THERAGRAN) TABS Take 1 tablet by mouth 3 (three) times a week       pertuzumab (PERJETA) 420 mg/14 mL SOLN Infuse into a venous catheter every 21 days At infusion  center      Trastuzumab (HERCEPTIN IV) Infuse into a venous catheter every 21 days At infusion center      VITAMIN D, CHOLECALCIFEROL, PO Take 1,000 Units by mouth 3 (three) times a week        No current facility-administered medications for this visit  No Known Allergies    Review of Systems    Video Exam    There were no vitals filed for this visit      Physical Exam     I spent 36 minutes directly with the patient during this visit and coordinating care    VIRTUAL VISIT DISCLAIMER      Ok Puneet verbally agrees to participate in [employer]  Pt is aware that [employer] could be limited without vital signs or the ability to perform a full hands-on physical Mirna Luna understands she or the provider may request at any time to terminate the video visit and request the patient to seek care or treatment in person

## 2022-06-08 NOTE — LETTER
June 8, 2022     [facility]  [address] [facility]  [address] [address]    Patient: Therese Holland   YOB: 1965   Date of Visit: 6/8/2022       Dear Dr Veronica Julian: Thank you for referring Therese Holland to me for evaluation  Below are my notes for this consultation  The patient has an upcoming appointment with Dr Jh Parrish to discuss radiation options    If you have questions, please do not hesitate to call me  I look forward to following your patient along with you  Sincerely,        Vida Oconnor MD        CC: MD Vida Quiroz MD  6/8/2022 11:43 PM  Incomplete  Virtual Regular Visit    Verification of patient location:    Patient is located in the following state in which I hold an active license [place]          Assessment/Plan:    56F with history of metastatic breast cancer since 2016, initially presenting at stage IV  She underwent lumpectomy, breast radiation and chemotherapy  This has controlled her metastatic disease to date  She has now on surveillance imaging been found to have a new lesion in the liver  I personally reviewed the imaging with the patient  This is a mass approximately 2 cm in the caudate lobe  MRI confirms these findings  She is referred by Dr Mckayla Tai to discuss management options for the mass    Formally the mainstay of metastatic breast cancer is chemotherapy  However in patients with slow arrival of oligometastatic disease and favorable biology it may be reasonable to offer local therapy to selected areas  I reviewed possible interventional strategies for management of this lesion, including embolization and ablation  Unfortunately it is a very central location and I believe ablation may place adjacent vascular structures at risk  Embolization such as radiation segmentectomy is often an option for liver metastases but the caudate lobe has notoriously difficult and variable arterial supply      I discussed with my colleagues from the radiation oncology service who believe she may be a candidate for external beam radiation  However given the location fiducial marker placement may be appropriate  Similarly she would also benefit from biopsy to confirm the lesion  We have arranged for biopsy and fiducial marker placement to be scheduled  I discussed the technique for biopsy and the technique for marker placement  I discussed that there specific risks for marker placement including metallic bead dislodgement or embolization within a vascular structure  Embolization can occur within the liver or to the heart and lungs or elsewhere  We discussed that we can perform biopsy and marker placement at the same time if the pathology department believes that we are suspicious for malignancy on rapid pathology evaluation  If they do not find evidence of malignancy we can await final pathology  It would also be reasonable to split the appointments but I believe it will decrease risk somewhat to do all at once in the same session  She has upcoming appointment with radiation oncology service Dr Moe Barnett to discuss radiation treatment in more detail and will think about this further  We will keep date for previously scheduled biopsy and fiducial marker placement  This is elective and we can modify the plan as appropriate        Problem List Items Addressed This Visit        Other    Malignant neoplasm of upper-inner quadrant of left female breast [facility]) - Primary               Reason for visit is liver lesion, breast cancer     Encounter provider Ariella Werner MD    Provider located at [address] [place] [address] Pky  SRE [id number]  [phone]      Recent Visits  No visits were found meeting these conditions    Showing recent visits within past 7 days and meeting all other requirements  Today's Visits  Date Type Provider Dept   06/08/22 Telemedicine Ariella Werner MD [facility] [place]   Showing today's visits and meeting all other requirements  Future Appointments  No visits were found meeting these conditions  Showing future appointments within next 150 days and meeting all other requirements       The patient was identified by name and date of birth  Allie Alejandra was informed that this is a telemedicine visit and that the visit is being conducted through [facility] and patient was informed this is a secure, HIPAA-complaint platform  She agrees to proceed     My office door was closed  No one else was in the room  She acknowledged consent and understanding of privacy and security of the video platform  The patient has agreed to participate and understands they can discontinue the visit at any time  Patient is aware this is a billable service  Subjective  Allie Alejandra is a 49 y o  female with metastatic breast cancer and a liver lesion      Works from home [employer]  No blood thinners, no allergies, no issues with sedation anesthesia     HPI     Past Medical History:   Diagnosis Date    Hx of radiation therapy     breast and left  hip    [address] [address]     Port-A-Cath in place     right chest    Wears glasses        Past Surgical History:   Procedure Laterality Date    BREAST BIOPSY Left 12/30/2015    BREAST LUMPECTOMY Left 09/16/2016    BREAST SURGERY Left     biopsy    CHOLECYSTECTOMY      CHOLECYSTECTOMY      PORTACATH PLACEMENT Right     MD LAP,RMV  ADNEXAL STRUCTURE Bilateral 8/16/2019    Procedure: LAPAROSCOPIC SALPINGO-OOPHORECTOMY;  Surgeon: Forest Celestin MD;  [facility];  Service: Gynecology Oncology    SIMPLE MASTECTOMY Left 9/16/2016    Procedure: BREAST PARTIAL MASTECTOMY ;  Surgeon: Marylin Andrea MD;  [facility];  Service:        Current Outpatient Medications   Medication Sig Dispense Refill    letrozole (FEMARA) 2 5 mg tablet Take 1 tablet (2 5 mg total) by mouth daily 30 tablet 6    multivitamin [facility]) TABS Take 1 tablet by mouth 3 (three) times a week       pertuzumab (PERJETA) 420 mg/14 mL SOLN Infuse into a venous catheter every 21 days At infusion  center      Trastuzumab (HERCEPTIN IV) Infuse into a venous catheter every 21 days At infusion center      VITAMIN D, CHOLECALCIFEROL, PO Take 1,000 Units by mouth 3 (three) times a week        No current facility-administered medications for this visit  No Known Allergies    Review of Systems    Video Exam    There were no vitals filed for this visit  Physical Exam     I spent 36 minutes directly with the patient during this visit and coordinating care    VIRTUAL VISIT DISCLAIMER      Christopher Cota verbally agrees to participate in [employer]  Pt is aware that [employer] could be limited without vital signs or the ability to perform a full hands-on physical Geena Timmons understands she or the provider may request at any time to terminate the video visit and request the patient to seek care or treatment in person  Samuel Fitzgerald MD  6/8/2022 11:42 PM  Sign when Signing Visit  Virtual Regular Visit    Verification of patient location:    Patient is located in the following state in which I hold an active license [place]          Assessment/Plan:    56F with history of metastatic breast cancer since 2016, initially presenting at stage IV  She underwent lumpectomy, breast radiation and chemotherapy  This has controlled her metastatic disease to date  She has now on surveillance imaging been found to have a new lesion in the liver  I personally reviewed the imaging with the patient  This is a mass approximately 2 cm in the caudate lobe  MRI confirms these findings  Formally the mainstay of metastatic breast cancer is chemotherapy  However in patients with slow arrival of oligometastatic disease and favorable biology it may be reasonable to offer local therapy to selected areas    I reviewed possible interventional strategies for management of this lesion, including embolization and ablation  Unfortunately it is a very central location and I believe ablation may place adjacent vascular structures at risk  Embolization such as radiation segmentectomy is often an option for liver metastases but the caudate lobe has notoriously difficult and variable arterial supply  I discussed with my colleagues from the radiation oncology service who believe she may be a candidate for external beam radiation  However given the location fiducial marker placement may be appropriate  Similarly she would also benefit from biopsy to confirm the lesion  We have arranged for biopsy and fiducial marker placement to be scheduled  I discussed the technique for biopsy and the technique for marker placement  I discussed that there specific risks for marker placement including metallic bead dislodgement or embolization within a vascular structure  Embolization can occur within the liver or to the heart and lungs or elsewhere  We discussed that we can perform biopsy and marker placement at the same time if the pathology department believes that we are suspicious for malignancy on rapid pathology evaluation  If they do not find evidence of malignancy we can await final pathology  It would also be reasonable to split the appointments but I believe it will decrease risk somewhat to do all at once in the same session  She has upcoming appointment with radiation oncology service Dr Fernandez to discuss radiation treatment in more detail and will think about this further  We will keep date for previously scheduled biopsy and fiducial marker placement    This is elective and we can modify the plan as appropriate        Problem List Items Addressed This Visit        Other    Malignant neoplasm of upper-inner quadrant of left female breast ([facility] [address] ) - Primary               Reason for visit is liver lesion, breast cancer Encounter provider Cindy Huff MD    Provider located at [address] [place] [address] [address]kw[address]  SRE [id number] [place] [address] [facility]  [phone]      Recent Visits  No visits were found meeting these conditions  Showing recent visits within past 7 days and meeting all other requirements  Today's Visits  Date Type Provider Dept   06/08/22 Telemedicine Cindy Huff MD [facility] [address] today's visits and meeting all other requirements  Future Appointments  No visits were found meeting these conditions  Showing future appointments within next 150 days and meeting all other requirements       The patient was identified by name and date of birth  Marques Tony was informed that this is a telemedicine visit and that the visit is being conducted through [address] and patient was informed this is a secure, HIPAA-complaint platform  She agrees to proceed     My office door was closed  No one else was in the room  She acknowledged consent and understanding of privacy and security of the video platform  The patient has agreed to participate and understands they can discontinue the visit at any time  Patient is aware this is a billable service  Subjective  Marques Tony is a 49 y o  female with metastatic breast cancer and a liver lesion      Works from home [employer]  No blood thinners, no allergies, no issues with sedation anesthesia     HPI     Past Medical History:   Diagnosis Date    Hx of radiation therapy     breast and left  hip    Liver metastases ([address] )     Port-A-Cath in place     right chest    Wears glasses        Past Surgical History:   Procedure Laterality Date    BREAST BIOPSY Left 12/30/2015    BREAST LUMPECTOMY Left 09/16/2016    BREAST SURGERY Left     biopsy    CHOLECYSTECTOMY      CHOLECYSTECTOMY      PORTACATH PLACEMENT Right     NH LAP,RMV  ADNEXAL STRUCTURE Bilateral 8/16/2019    Procedure: LAPAROSCOPIC SALPINGO-OOPHORECTOMY; Surgeon: Jamie Moody MD;  [facility];  Service: Gynecology Oncology    SIMPLE MASTECTOMY Left 9/16/2016    Procedure: BREAST PARTIAL MASTECTOMY ;  Surgeon: Ragini Carr MD;  [facility];  Service:        Current Outpatient Medications   Medication Sig Dispense Refill    letrozole (FEMARA) 2 5 mg tablet Take 1 tablet (2 5 mg total) by mouth daily 30 tablet 6    multivitamin (THERAGRAN) TABS Take 1 tablet by mouth 3 (three) times a week       pertuzumab (PERJETA) 420 mg/14 mL SOLN Infuse into a venous catheter every 21 days At infusion  center      Trastuzumab (HERCEPTIN IV) Infuse into a venous catheter every 21 days At infusion center      VITAMIN D, CHOLECALCIFEROL, PO Take 1,000 Units by mouth 3 (three) times a week        No current facility-administered medications for this visit  No Known Allergies    Review of Systems    Video Exam    There were no vitals filed for this visit  Physical Exam     I spent 36 minutes directly with the patient during this visit and coordinating care    VIRTUAL VISIT DISCLAIMER      Jami Baldwin verbally agrees to participate in [employer]  Pt is aware that [employer] could be limited without vital signs or the ability to perform a full hands-on physical Oswaldo Alvarez understands she or the provider may request at any time to terminate the video visit and request the patient to seek care or treatment in person

## 2022-06-16 ENCOUNTER — RADIATION ONCOLOGY CONSULT (OUTPATIENT)
Dept: RADIATION ONCOLOGY | Facility: HOSPITAL | Age: 57
End: 2022-06-16
Attending: STUDENT IN AN ORGANIZED HEALTH CARE EDUCATION/TRAINING PROGRAM
Payer: COMMERCIAL

## 2022-06-16 VITALS
HEIGHT: 65 IN | OXYGEN SATURATION: 94 % | SYSTOLIC BLOOD PRESSURE: 142 MMHG | BODY MASS INDEX: 35.75 KG/M2 | WEIGHT: 214.6 LBS | RESPIRATION RATE: 18 BRPM | TEMPERATURE: 98.4 F | HEART RATE: 79 BPM | DIASTOLIC BLOOD PRESSURE: 90 MMHG

## 2022-06-16 DIAGNOSIS — C78.7 LIVER METASTASES (HCC): Primary | ICD-10-CM

## 2022-06-16 DIAGNOSIS — C78.7 LIVER METASTASES (HCC): ICD-10-CM

## 2022-06-16 PROCEDURE — 99211 OFF/OP EST MAY X REQ PHY/QHP: CPT | Performed by: STUDENT IN AN ORGANIZED HEALTH CARE EDUCATION/TRAINING PROGRAM

## 2022-06-16 PROCEDURE — G0463 HOSPITAL OUTPT CLINIC VISIT: HCPCS | Performed by: STUDENT IN AN ORGANIZED HEALTH CARE EDUCATION/TRAINING PROGRAM

## 2022-06-16 PROCEDURE — 99204 OFFICE O/P NEW MOD 45 MIN: CPT | Performed by: STUDENT IN AN ORGANIZED HEALTH CARE EDUCATION/TRAINING PROGRAM

## 2022-06-16 NOTE — PROGRESS NOTES
Lidia Celeste 1965 is a 49 y o  female  Here to discuss SBRT for her recently diagnosed liver metastasis  Referred by Dr Niki Segal  History of triple positive stage IV metastatic breast cancer since 2016  Positive BRCA 1  Completed left breast lumpectomy followed by RT 2016  Also completed palliative RT to left femur in 2016  Had BSO 2019  Current chemotherapy regime is Herceptin + Perjeta every 21 days, and letrazole  New liver lesion recently found on scans  3/17/22 CT chest abdomen pelvis w contrast  IMPRESSION:  1  Area of subtle ill-defined hypodensity in the caudate lobe measuring approximately 2 0 x 1 5 cm new since the prior study 2/24/2021 may be due to metastasis  Recommend characterization with contrast-enhanced MR abdomen  2   Treated metastatic lesion in the left proximal femur is unchanged  Component      Latest Ref Rng & Units 3/25/2022   Sodium      136 - 145 mmol/L 140   Potassium      3 5 - 5 3 mmol/L 4 0   Chloride      100 - 108 mmol/L 104   CO2      21 - 32 mmol/L 30   Anion Gap      4 - 13 mmol/L 6   BUN      5 - 25 mg/dL 19   Creatinine      0 60 - 1 30 mg/dL 0 97   Glucose, Random      65 - 140 mg/dL 89   Calcium      8 3 - 10 1 mg/dL 8 9   CORRECTED CALCIUM      8 3 - 10 1 mg/dL 9 4   AST      5 - 45 U/L 23   ALT      12 - 78 U/L 48   Alkaline Phosphatase      46 - 116 U/L 106   Total Protein      6 4 - 8 2 g/dL 7 6   Albumin      3 5 - 5 0 g/dL 3 4 (L)   TOTAL BILIRUBIN      0 20 - 1 00 mg/dL 0 30   eGFR      ml/min/1 73sq m 66     Component CA 27 29   Latest Ref Rng & Units 0 0 - 42 3 U/mL   1/15/2021 29 9   6/15/2021 21 1   9/15/2021 23 0   3/25/2022 22 5     5/18/22  Dr Obdulio Mckay liver lesion noted  Will complete bone scan  If no active metastatic lesion noted, could have biopsy and ablation and stay on current regime  If metastatic disease outside of liver noted, will need change to current regime      5/26/22  Bone Scan  IMPRESSION:  No significant interval change since prior bone scan  No scintigraphic evidence for osseous metastatic disease  22  Dr Dougie Vasquez (IR)  Biopsy and fiducial marker placement to be scheduled  Has appointment with radiation oncology to discuss RT in more detail    [id number]  IR/Fiducial marker placement  22  Infusion (Perjeta and Herceptin)  22  Dr Jhonatan Rice          Oncology History   Malignant neoplasm of upper-inner quadrant of left female breast ([facility] [address] )   2015 Initial Diagnosis    Malignant neoplasm of upper-outer quadrant of left female breast ([facility] [address] )      -  Hormone Therapy    Tamoxifen  Ended 2019  Dr Jhonatan Rice  Letrozole 2016 Surgery    Port placement     2016 - 2018 Chemotherapy    Taxotere,  herceptin, perjeta, xgeva  After six cycles, taxotere was discontinued and tamoxifen added     Continues with Perjeta and Herceptin every 3 weeks     - Dr Jhonatan Rice       2016 Surgery    Left breast partial mastectomy     2016 - 2016 Radiation    Plan ID Energy Fractions Dose per Fraction (cGy) Total Dose Delivered (cGy) Elapsed Days   Lt Breast 6X 25 / 25 200 5,000 36   Lt Brst Boost 6X 8 / 8 200 1,600 9   Lt Femur 10X 10 / 10 300 3,000 11   Lt PAB 6X 25 / 25 60 1,500 36   Lt Sclav 6X 25 / 25 200 5,000 36                                   Total dose to left breast lumpectomy cavity: 6600 cGy                   Total dose to the supraclavicular and axillary regions: 5000 cGy       2019 -  Chemotherapy    pertuzumab (PERJETA) IVPB, 840 mg, Intravenous, Once, 54 of 56 cycles  Administration: 420 mg (2019), 420 mg (2019), 420 mg (2019), 420 mg (2019), 420 mg (2019), 420 mg (2019), 420 mg (2019), 420 mg (10/9/2019), 420 mg (2019), 420 mg (2019), 420 mg (2019), 420 mg (1/3/2020), 420 mg (2020), 420 mg (2020), 420 mg (3/6/2020), 420 mg (3/27/2020), 420 mg (2020), 420 mg (2020), 420 mg (2020), 420 mg (6/19/2020), 420 mg (7/10/2020), 420 mg (7/31/2020), 420 mg (8/21/2020), 420 mg (9/11/2020), 420 mg (10/2/2020), 420 mg (10/23/2020), 420 mg (11/13/2020), 420 mg (12/4/2020), 420 mg (12/24/2020), 420 mg (1/15/2021), 420 mg (2/5/2021), 420 mg (2/26/2021), 420 mg (3/19/2021), 420 mg (4/9/2021), 420 mg (4/30/2021), 420 mg (5/21/2021), 420 mg (6/11/2021), 420 mg (7/2/2021), 420 mg (7/23/2021), 420 mg (8/13/2021), 420 mg (9/3/2021), 420 mg (9/24/2021), 420 mg (10/15/2021), 420 mg (11/5/2021), 420 mg (11/26/2021), 420 mg (12/17/2021), 420 mg (1/7/2022), 420 mg (1/28/2022), 420 mg (2/18/2022), 420 mg (3/11/2022), 420 mg (4/1/2022), 420 mg (5/13/2022), 420 mg (6/3/2022)  trastuzumab (HERCEPTIN) chemo infusion, 6 mg/kg = 589 mg (75 % of original dose 8 mg/kg), Intravenous, Once, 54 of 56 cycles  Dose modification: 6 mg/kg (original dose 8 mg/kg, Cycle 1, Reason: Other (Must fill in a comment))  Administration: 589 mg (5/17/2019), 589 mg (6/7/2019), 600 mg (6/28/2019), 600 mg (7/19/2019), 600 mg (8/30/2019), 600 mg (9/20/2019), 600 mg (8/9/2019), 600 mg (10/9/2019), 600 mg (11/1/2019), 600 mg (11/22/2019), 600 mg (12/13/2019), 600 mg (1/3/2020), 600 mg (1/24/2020), 600 mg (2/14/2020), 600 mg (3/6/2020), 600 mg (3/27/2020), 600 mg (4/17/2020), 600 mg (5/8/2020), 600 mg (5/29/2020), 600 mg (6/19/2020), 600 mg (7/10/2020), 600 mg (7/31/2020), 600 mg (8/21/2020), 600 mg (9/11/2020), 600 mg (10/2/2020), 600 mg (10/23/2020), 600 mg (11/13/2020), 600 mg (12/4/2020), 600 mg (12/24/2020), 600 mg (1/15/2021), 600 mg (2/5/2021), 600 mg (2/26/2021), 600 mg (3/19/2021), 600 mg (4/9/2021), 600 mg (4/30/2021), 600 mg (5/21/2021), 600 mg (6/11/2021), 600 mg (7/2/2021), 600 mg (7/23/2021), 600 mg (8/13/2021), 600 mg (9/3/2021), 600 mg (9/24/2021), 600 mg (10/15/2021), 600 mg (11/5/2021), 600 mg (11/26/2021), 600 mg (12/17/2021), 600 mg (1/7/2022), 600 mg (1/28/2022), 600 mg (2/18/2022), 600 mg (3/11/2022), 600 mg (4/1/2022), 600 mg (5/13/2022), 600 mg (6/3/2022)     8/16/2019 Surgery    she underwent BSO       Liver metastases ([address] )   4/27/2018 Initial Diagnosis    Liver metastases ([facility] [address] )     4/17/2019 -  Chemotherapy    pertuzumab (PERJETA) IVPB, 840 mg, Intravenous, Once, 54 of 56 cycles  Administration: 420 mg (5/17/2019), 420 mg (6/7/2019), 420 mg (6/28/2019), 420 mg (7/19/2019), 420 mg (8/30/2019), 420 mg (9/20/2019), 420 mg (8/9/2019), 420 mg (10/9/2019), 420 mg (11/1/2019), 420 mg (11/22/2019), 420 mg (12/13/2019), 420 mg (1/3/2020), 420 mg (1/24/2020), 420 mg (2/14/2020), 420 mg (3/6/2020), 420 mg (3/27/2020), 420 mg (4/17/2020), 420 mg (5/8/2020), 420 mg (5/29/2020), 420 mg (6/19/2020), 420 mg (7/10/2020), 420 mg (7/31/2020), 420 mg (8/21/2020), 420 mg (9/11/2020), 420 mg (10/2/2020), 420 mg (10/23/2020), 420 mg (11/13/2020), 420 mg (12/4/2020), 420 mg (12/24/2020), 420 mg (1/15/2021), 420 mg (2/5/2021), 420 mg (2/26/2021), 420 mg (3/19/2021), 420 mg (4/9/2021), 420 mg (4/30/2021), 420 mg (5/21/2021), 420 mg (6/11/2021), 420 mg (7/2/2021), 420 mg (7/23/2021), 420 mg (8/13/2021), 420 mg (9/3/2021), 420 mg (9/24/2021), 420 mg (10/15/2021), 420 mg (11/5/2021), 420 mg (11/26/2021), 420 mg (12/17/2021), 420 mg (1/7/2022), 420 mg (1/28/2022), 420 mg (2/18/2022), 420 mg (3/11/2022), 420 mg (4/1/2022), 420 mg (5/13/2022), 420 mg (6/3/2022)  trastuzumab (HERCEPTIN) chemo infusion, 6 mg/kg = 589 mg (75 % of original dose 8 mg/kg), Intravenous, Once, 54 of 56 cycles  Dose modification: 6 mg/kg (original dose 8 mg/kg, Cycle 1, Reason: Other (Must fill in a comment))  Administration: 589 mg (5/17/2019), 589 mg (6/7/2019), 600 mg (6/28/2019), 600 mg (7/19/2019), 600 mg (8/30/2019), 600 mg (9/20/2019), 600 mg (8/9/2019), 600 mg (10/9/2019), 600 mg (11/1/2019), 600 mg (11/22/2019), 600 mg (12/13/2019), 600 mg (1/3/2020), 600 mg (1/24/2020), 600 mg (2/14/2020), 600 mg (3/6/2020), 600 mg (3/27/2020), 600 mg (4/17/2020), 600 mg (5/8/2020), 600 mg (5/29/2020), 600 mg (6/19/2020), 600 mg (7/10/2020), 600 mg (7/31/2020), 600 mg (8/21/2020), 600 mg (9/11/2020), 600 mg (10/2/2020), 600 mg (10/23/2020), 600 mg (11/13/2020), 600 mg (12/4/2020), 600 mg (12/24/2020), 600 mg (1/15/2021), 600 mg (2/5/2021), 600 mg (2/26/2021), 600 mg (3/19/2021), 600 mg (4/9/2021), 600 mg (4/30/2021), 600 mg (5/21/2021), 600 mg (6/11/2021), 600 mg (7/2/2021), 600 mg (7/23/2021), 600 mg (8/13/2021), 600 mg (9/3/2021), 600 mg (9/24/2021), 600 mg (10/15/2021), 600 mg (11/5/2021), 600 mg (11/26/2021), 600 mg (12/17/2021), 600 mg (1/7/2022), 600 mg (1/28/2022), 600 mg (2/18/2022), 600 mg (3/11/2022), 600 mg (4/1/2022), 600 mg (5/13/2022), 600 mg (6/3/2022)     Bone metastasis ([address] )   4/27/2018 Initial Diagnosis    Bone metastasis ([facility] [address] )     4/17/2019 -  Chemotherapy    pertuzumab (PERJETA) IVPB, 840 mg, Intravenous, Once, 54 of 56 cycles  Administration: 420 mg (5/17/2019), 420 mg (6/7/2019), 420 mg (6/28/2019), 420 mg (7/19/2019), 420 mg (8/30/2019), 420 mg (9/20/2019), 420 mg (8/9/2019), 420 mg (10/9/2019), 420 mg (11/1/2019), 420 mg (11/22/2019), 420 mg (12/13/2019), 420 mg (1/3/2020), 420 mg (1/24/2020), 420 mg (2/14/2020), 420 mg (3/6/2020), 420 mg (3/27/2020), 420 mg (4/17/2020), 420 mg (5/8/2020), 420 mg (5/29/2020), 420 mg (6/19/2020), 420 mg (7/10/2020), 420 mg (7/31/2020), 420 mg (8/21/2020), 420 mg (9/11/2020), 420 mg (10/2/2020), 420 mg (10/23/2020), 420 mg (11/13/2020), 420 mg (12/4/2020), 420 mg (12/24/2020), 420 mg (1/15/2021), 420 mg (2/5/2021), 420 mg (2/26/2021), 420 mg (3/19/2021), 420 mg (4/9/2021), 420 mg (4/30/2021), 420 mg (5/21/2021), 420 mg (6/11/2021), 420 mg (7/2/2021), 420 mg (7/23/2021), 420 mg (8/13/2021), 420 mg (9/3/2021), 420 mg (9/24/2021), 420 mg (10/15/2021), 420 mg (11/5/2021), 420 mg (11/26/2021), 420 mg (12/17/2021), 420 mg (1/7/2022), 420 mg (1/28/2022), 420 mg (2/18/2022), 420 mg (3/11/2022), 420 mg (4/1/2022), 420 mg (5/13/2022), 420 mg (6/3/2022)  trastuzumab (HERCEPTIN) chemo infusion, 6 mg/kg = 589 mg (75 % of original dose 8 mg/kg), Intravenous, Once, 54 of 56 cycles  Dose modification: 6 mg/kg (original dose 8 mg/kg, Cycle 1, Reason: Other (Must fill in a comment))  Administration: 589 mg (5/17/2019), 589 mg (6/7/2019), 600 mg (6/28/2019), 600 mg (7/19/2019), 600 mg (8/30/2019), 600 mg (9/20/2019), 600 mg (8/9/2019), 600 mg (10/9/2019), 600 mg (11/1/2019), 600 mg (11/22/2019), 600 mg (12/13/2019), 600 mg (1/3/2020), 600 mg (1/24/2020), 600 mg (2/14/2020), 600 mg (3/6/2020), 600 mg (3/27/2020), 600 mg (4/17/2020), 600 mg (5/8/2020), 600 mg (5/29/2020), 600 mg (6/19/2020), 600 mg (7/10/2020), 600 mg (7/31/2020), 600 mg (8/21/2020), 600 mg (9/11/2020), 600 mg (10/2/2020), 600 mg (10/23/2020), 600 mg (11/13/2020), 600 mg (12/4/2020), 600 mg (12/24/2020), 600 mg (1/15/2021), 600 mg (2/5/2021), 600 mg (2/26/2021), 600 mg (3/19/2021), 600 mg (4/9/2021), 600 mg (4/30/2021), 600 mg (5/21/2021), 600 mg (6/11/2021), 600 mg (7/2/2021), 600 mg (7/23/2021), 600 mg (8/13/2021), 600 mg (9/3/2021), 600 mg (9/24/2021), 600 mg (10/15/2021), 600 mg (11/5/2021), 600 mg (11/26/2021), 600 mg (12/17/2021), 600 mg (1/7/2022), 600 mg (1/28/2022), 600 mg (2/18/2022), 600 mg (3/11/2022), 600 mg (4/1/2022), 600 mg (5/13/2022), 600 mg (6/3/2022)         Review of Systems:  Review of Systems   Constitutional: Positive for fatigue (occasional moderate)  HENT: Negative  Eyes: Negative  Respiratory: Negative  Cardiovascular: Negative  Gastrointestinal: Negative  Negative for abdominal pain, blood in stool, constipation, diarrhea, nausea and vomiting  Endocrine: Negative  Genitourinary: Negative  Musculoskeletal: Negative  Skin: Negative  Allergic/Immunologic: Negative  Neurological: Negative  Hematological: Negative  Psychiatric/Behavioral: The patient is nervous/anxious          Clinical Trial: no  Pregnancy test needed:  No BSO    ONCOTYPE/MAMMOPRINT results: n/a    PFT: n/a    Prior Radiation: left breast 2016/left femur 2016    Teaching : Port Tiny, SBRT    MST: complete    Implantable Devices (Port, Pacemaker, pain stimulator): RCW PAC    Hip Replacement: no    Covid Vaccine Status: Vaccinated     [unfilled]  Health Maintenance   Topic Date Due    Hepatitis C Screening  Never done    Pneumococcal Vaccine: Pediatrics (0 to 5 Years) and At-Risk Patients (6 to 59 Years) (1 - PCV) Never done    HIV Screening  Never done    BMI: Followup Plan  Never done    Annual Physical  Never done    DTaP,Tdap,and Td Vaccines (1 - Tdap) Never done    Cervical Cancer Screening  Never done    Colorectal Cancer Screening  Never done    Depression Screening  11/13/2020    Breast Cancer Screening: Mammogram  06/12/2021    MAMMOGRAPHY BRCA POSITIVE  06/12/2021    COVID-19 Vaccine (3 - Pfizer risk series) 09/15/2021    Influenza Vaccine (Season Ended) 09/01/2022    BMI: Adult  06/03/2023    Osteoporosis Screening  Completed    HIB Vaccine  Aged Out    Hepatitis B Vaccine  Aged Out    IPV Vaccine  Aged Out    Hepatitis A Vaccine  Aged Out    Meningococcal ACWY Vaccine  Aged Out    HPV Vaccine  Aged Out     Past Medical History:   Diagnosis Date    Hx of radiation therapy     breast and left  hip    Liver metastases ([address] )     Port-A-Cath in place     right chest    Wears glasses      Past Surgical History:   Procedure Laterality Date    BREAST BIOPSY Left 12/30/2015    BREAST LUMPECTOMY Left 09/16/2016    BREAST SURGERY Left     biopsy    CHOLECYSTECTOMY      CHOLECYSTECTOMY      PORTACATH PLACEMENT Right     OR LAP,RMV  ADNEXAL STRUCTURE Bilateral 8/16/2019    Procedure: LAPAROSCOPIC SALPINGO-OOPHORECTOMY;  Surgeon: Neelam Salazar MD;  Location: [facility];  Service: Gynecology Oncology    SIMPLE MASTECTOMY Left 9/16/2016    Procedure: BREAST PARTIAL MASTECTOMY ;  Surgeon: Andrade Wilson MD;  Location: [facility];  Service:      Family History   Problem Relation Age of Onset    No Known Problems Mother     Heart attack Father     No Known Problems Daughter     No Known Problems Maternal Grandmother     No Known Problems Maternal Grandfather     No Known Problems Paternal Aunt     No Known Problems Paternal Aunt     Lung cancer Maternal Uncle 79     Social History     Tobacco Use    Smoking status: Former Smoker     Quit date: 2009     Years since quittin 8    Smokeless tobacco: Never Used   Vaping Use    Vaping Use: Never used   Substance Use Topics    Alcohol use: No    Drug use: No        Current Outpatient Medications:     letrozole (FEMARA) 2 5 mg tablet, Take 1 tablet (2 5 mg total) by mouth daily, Disp: 30 tablet, Rfl: 6    multivitamin (THERAGRAN) TABS, Take 1 tablet by mouth 3 (three) times a week , Disp: , Rfl:     pertuzumab (PERJETA) 420 mg/14 mL SOLN, Infuse into a venous catheter every 21 days At infusion  center, Disp: , Rfl:     Trastuzumab (HERCEPTIN IV), Infuse into a venous catheter every 21 days At infusion center, Disp: , Rfl:     VITAMIN D, CHOLECALCIFEROL, PO, Take 1,000 Units by mouth 3 (three) times a week , Disp: , Rfl:   No Known Allergies   There were no vitals filed for this visit

## 2022-06-16 NOTE — H&P (VIEW-ONLY)
Consultation - Radiation Oncology      VIP:[id number] : 1965  Encounter: [id number]  Patient Information: [address]  Chief Complaint   Patient presents with    Consult     Cancer Staging  No matching staging information was found for the patient  Assessment and Plan:  Ms Sera Caro is a 49year old woman initially diagnosed with stage IV inflammatory breast cancer in , now s/p chemotherapy and letrozole/herceptin/perjeta with good response to treatment  She has been monitored with TARUN on surveillance imaging until more recently being found to have an enhancing lesion in the caudate lobe of the liver compatible with a new site of metastatic disease  We discussed options for management of this patient's oligoprogressive breast cancer  Given her overall good control on systemic therapy, I believe local therapy to this site is reasonable to prevent or delay further progression  Given the location and size of her lesion, I believe it to be well suited for SBRT  We discussed the risks and benefits of this approach including the potential side effects; these include fatigue, nausea, anorexia, abdominal discomfort, and diarrhea  Late effects would include the low risk for luminal organ injury or RILD  At the conclusion of our conversation the patient was hesitantly agreeable to moving forward  She would like to ensure insurance will cover her treatment and ensure the lesion is cancerous via biopsy (as planned) prior to proceeding  She was also concerned about the use of fiducials; given the location of her tumor, fiducial are not strictly necessary but would help to ensure accurate treatment delivery during SBRT  I will proceed with obtaining insurance clearance  Following further discussion I will plan to bring her back for CT simulation and subsequent SBRT if the patient is agreeable       Summary  - Recommend SBRT to caudate lesion pending biopsy  - Scheduled for biopsy and fiducial marker placement next week  History of Present Illness   Ms Ben Blanco is a 49year old woman with a who was initially diagnosed with stage IV left breast inflammatory carcinoma, ER+/TX-/Her2+ in 12/2015, at that time with both liver and bone metastases  She underwent initial therapy with tamoxifen as well as Taxotere/Herceptin/Perjeta under the care of Dr Juliet Valdovinos (2/2016) x6 cycles followed by H/P alone with a good response to therapy  She underwent left breast partial mastectomy as well as post-operative/palliative RT to the left breast (6600cGy/33fx) and femur (30Gy/10fx) under the care of Dr Mohit Mack  She continued to do well with TARUN on interval scans/imaging until she was found to have a new liver lesion on MRI  Radiology report is not available for review, but on review there is an approximate 2-3 cm arterial enhancing lesion in the caudate lobe without washout consistent with metastatic disease  She was referred to IR for consideration of management with LRT, but given the location it was felt that ablation was not ideal  She is pending biopsy on 6/24/22 and is seen today in consideration of SBRT  Currently the patient is doing well overall  She has occasional fatigue but is otherwise well without significant side effects or symptoms  She has no abdominal pain, no diarrhea, no loose stool  No weight loss  Historical Information   Oncology History   Malignant neoplasm of upper-inner quadrant of left female breast ([facility] [address] )   12/2015 Initial Diagnosis    Malignant neoplasm of upper-outer quadrant of left female breast ([facility] [address] )     2016 -  Hormone Therapy    Tamoxifen  Ended 8/2019  Dr Juliet Valdovinos  Letrozole 9/2019 1/27/2016 Surgery    Port placement     2/19/2016 - 6/2/2018 Chemotherapy    Taxotere,  herceptin, perjeta, xgeva  After six cycles, taxotere was discontinued and tamoxifen added     Continues with Perjeta and Herceptin every 3 weeks     -  Proothi       9/16/2016 Surgery    Left breast partial mastectomy     11/7/2016 - 12/23/2016 Radiation    Plan ID Energy Fractions Dose per Fraction (cGy) Total Dose Delivered (cGy) Elapsed Days   Lt Breast 6X 25 / 25 200 5,000 36   Lt Brst Boost 6X 8 / 8 200 1,600 9   Lt Femur 10X 10 / 10 300 3,000 11   Lt PAB 6X 25 / 25 60 1,500 36   Lt Sclav 6X 25 / 25 200 5,000 36                                   Total dose to left breast lumpectomy cavity: 6600 cGy                   Total dose to the supraclavicular and axillary regions: 5000 cGy       4/17/2019 -  Chemotherapy    pertuzumab (PERJETA) IVPB, 840 mg, Intravenous, Once, 54 of 56 cycles  Administration: 420 mg (5/17/2019), 420 mg (6/7/2019), 420 mg (6/28/2019), 420 mg (7/19/2019), 420 mg (8/30/2019), 420 mg (9/20/2019), 420 mg (8/9/2019), 420 mg (10/9/2019), 420 mg (11/1/2019), 420 mg (11/22/2019), 420 mg (12/13/2019), 420 mg (1/3/2020), 420 mg (1/24/2020), 420 mg (2/14/2020), 420 mg (3/6/2020), 420 mg (3/27/2020), 420 mg (4/17/2020), 420 mg (5/8/2020), 420 mg (5/29/2020), 420 mg (6/19/2020), 420 mg (7/10/2020), 420 mg (7/31/2020), 420 mg (8/21/2020), 420 mg (9/11/2020), 420 mg (10/2/2020), 420 mg (10/23/2020), 420 mg (11/13/2020), 420 mg (12/4/2020), 420 mg (12/24/2020), 420 mg (1/15/2021), 420 mg (2/5/2021), 420 mg (2/26/2021), 420 mg (3/19/2021), 420 mg (4/9/2021), 420 mg (4/30/2021), 420 mg (5/21/2021), 420 mg (6/11/2021), 420 mg (7/2/2021), 420 mg (7/23/2021), 420 mg (8/13/2021), 420 mg (9/3/2021), 420 mg (9/24/2021), 420 mg (10/15/2021), 420 mg (11/5/2021), 420 mg (11/26/2021), 420 mg (12/17/2021), 420 mg (1/7/2022), 420 mg (1/28/2022), 420 mg (2/18/2022), 420 mg (3/11/2022), 420 mg (4/1/2022), 420 mg (5/13/2022), 420 mg (6/3/2022)  trastuzumab (HERCEPTIN) chemo infusion, 6 mg/kg = 589 mg (75 % of original dose 8 mg/kg), Intravenous, Once, 54 of 56 cycles  Dose modification: 6 mg/kg (original dose 8 mg/kg, Cycle 1, Reason: Other (Must fill in a comment))  Administration: 589 mg (5/17/2019), 589 mg (6/7/2019), 600 mg (6/28/2019), 600 mg (7/19/2019), 600 mg (8/30/2019), 600 mg (9/20/2019), 600 mg (8/9/2019), 600 mg (10/9/2019), 600 mg (11/1/2019), 600 mg (11/22/2019), 600 mg (12/13/2019), 600 mg (1/3/2020), 600 mg (1/24/2020), 600 mg (2/14/2020), 600 mg (3/6/2020), 600 mg (3/27/2020), 600 mg (4/17/2020), 600 mg (5/8/2020), 600 mg (5/29/2020), 600 mg (6/19/2020), 600 mg (7/10/2020), 600 mg (7/31/2020), 600 mg (8/21/2020), 600 mg (9/11/2020), 600 mg (10/2/2020), 600 mg (10/23/2020), 600 mg (11/13/2020), 600 mg (12/4/2020), 600 mg (12/24/2020), 600 mg (1/15/2021), 600 mg (2/5/2021), 600 mg (2/26/2021), 600 mg (3/19/2021), 600 mg (4/9/2021), 600 mg (4/30/2021), 600 mg (5/21/2021), 600 mg (6/11/2021), 600 mg (7/2/2021), 600 mg (7/23/2021), 600 mg (8/13/2021), 600 mg (9/3/2021), 600 mg (9/24/2021), 600 mg (10/15/2021), 600 mg (11/5/2021), 600 mg (11/26/2021), 600 mg (12/17/2021), 600 mg (1/7/2022), 600 mg (1/28/2022), 600 mg (2/18/2022), 600 mg (3/11/2022), 600 mg (4/1/2022), 600 mg (5/13/2022), 600 mg (6/3/2022)     8/16/2019 Surgery    she underwent BSO       Liver metastases ([facility] [address] )   4/27/2018 Initial Diagnosis    Liver metastases ([facility] [address] )     4/17/2019 -  Chemotherapy    pertuzumab (PERJETA) IVPB, 840 mg, Intravenous, Once, 54 of 56 cycles  Administration: 420 mg (5/17/2019), 420 mg (6/7/2019), 420 mg (6/28/2019), 420 mg (7/19/2019), 420 mg (8/30/2019), 420 mg (9/20/2019), 420 mg (8/9/2019), 420 mg (10/9/2019), 420 mg (11/1/2019), 420 mg (11/22/2019), 420 mg (12/13/2019), 420 mg (1/3/2020), 420 mg (1/24/2020), 420 mg (2/14/2020), 420 mg (3/6/2020), 420 mg (3/27/2020), 420 mg (4/17/2020), 420 mg (5/8/2020), 420 mg (5/29/2020), 420 mg (6/19/2020), 420 mg (7/10/2020), 420 mg (7/31/2020), 420 mg (8/21/2020), 420 mg (9/11/2020), 420 mg (10/2/2020), 420 mg (10/23/2020), 420 mg (11/13/2020), 420 mg (12/4/2020), 420 mg (12/24/2020), 420 mg (1/15/2021), 420 mg (2/5/2021), 420 mg (2/26/2021), 420 mg (3/19/2021), 420 mg (4/9/2021), 420 mg (4/30/2021), 420 mg (5/21/2021), 420 mg (6/11/2021), 420 mg (7/2/2021), 420 mg (7/23/2021), 420 mg (8/13/2021), 420 mg (9/3/2021), 420 mg (9/24/2021), 420 mg (10/15/2021), 420 mg (11/5/2021), 420 mg (11/26/2021), 420 mg (12/17/2021), 420 mg (1/7/2022), 420 mg (1/28/2022), 420 mg (2/18/2022), 420 mg (3/11/2022), 420 mg (4/1/2022), 420 mg (5/13/2022), 420 mg (6/3/2022)  trastuzumab (HERCEPTIN) chemo infusion, 6 mg/kg = 589 mg (75 % of original dose 8 mg/kg), Intravenous, Once, 54 of 56 cycles  Dose modification: 6 mg/kg (original dose 8 mg/kg, Cycle 1, Reason: Other (Must fill in a comment))  Administration: 589 mg (5/17/2019), 589 mg (6/7/2019), 600 mg (6/28/2019), 600 mg (7/19/2019), 600 mg (8/30/2019), 600 mg (9/20/2019), 600 mg (8/9/2019), 600 mg (10/9/2019), 600 mg (11/1/2019), 600 mg (11/22/2019), 600 mg (12/13/2019), 600 mg (1/3/2020), 600 mg (1/24/2020), 600 mg (2/14/2020), 600 mg (3/6/2020), 600 mg (3/27/2020), 600 mg (4/17/2020), 600 mg (5/8/2020), 600 mg (5/29/2020), 600 mg (6/19/2020), 600 mg (7/10/2020), 600 mg (7/31/2020), 600 mg (8/21/2020), 600 mg (9/11/2020), 600 mg (10/2/2020), 600 mg (10/23/2020), 600 mg (11/13/2020), 600 mg (12/4/2020), 600 mg (12/24/2020), 600 mg (1/15/2021), 600 mg (2/5/2021), 600 mg (2/26/2021), 600 mg (3/19/2021), 600 mg (4/9/2021), 600 mg (4/30/2021), 600 mg (5/21/2021), 600 mg (6/11/2021), 600 mg (7/2/2021), 600 mg (7/23/2021), 600 mg (8/13/2021), 600 mg (9/3/2021), 600 mg (9/24/2021), 600 mg (10/15/2021), 600 mg (11/5/2021), 600 mg (11/26/2021), 600 mg (12/17/2021), 600 mg (1/7/2022), 600 mg (1/28/2022), 600 mg (2/18/2022), 600 mg (3/11/2022), 600 mg (4/1/2022), 600 mg (5/13/2022), 600 mg (6/3/2022)     Bone metastasis ([facility] [address] )   4/27/2018 Initial Diagnosis    Bone metastasis ([facility] [address] )     4/17/2019 -  Chemotherapy    pertuzumab (PERJETA) IVPB, 840 mg, Intravenous, Once, 54 of 56 cycles  Administration: 420 mg (5/17/2019), 420 mg (6/7/2019), 420 mg (6/28/2019), 420 mg (7/19/2019), 420 mg (8/30/2019), 420 mg (9/20/2019), 420 mg (8/9/2019), 420 mg (10/9/2019), 420 mg (11/1/2019), 420 mg (11/22/2019), 420 mg (12/13/2019), 420 mg (1/3/2020), 420 mg (1/24/2020), 420 mg (2/14/2020), 420 mg (3/6/2020), 420 mg (3/27/2020), 420 mg (4/17/2020), 420 mg (5/8/2020), 420 mg (5/29/2020), 420 mg (6/19/2020), 420 mg (7/10/2020), 420 mg (7/31/2020), 420 mg (8/21/2020), 420 mg (9/11/2020), 420 mg (10/2/2020), 420 mg (10/23/2020), 420 mg (11/13/2020), 420 mg (12/4/2020), 420 mg (12/24/2020), 420 mg (1/15/2021), 420 mg (2/5/2021), 420 mg (2/26/2021), 420 mg (3/19/2021), 420 mg (4/9/2021), 420 mg (4/30/2021), 420 mg (5/21/2021), 420 mg (6/11/2021), 420 mg (7/2/2021), 420 mg (7/23/2021), 420 mg (8/13/2021), 420 mg (9/3/2021), 420 mg (9/24/2021), 420 mg (10/15/2021), 420 mg (11/5/2021), 420 mg (11/26/2021), 420 mg (12/17/2021), 420 mg (1/7/2022), 420 mg (1/28/2022), 420 mg (2/18/2022), 420 mg (3/11/2022), 420 mg (4/1/2022), 420 mg (5/13/2022), 420 mg (6/3/2022)  trastuzumab (HERCEPTIN) chemo infusion, 6 mg/kg = 589 mg (75 % of original dose 8 mg/kg), Intravenous, Once, 54 of 56 cycles  Dose modification: 6 mg/kg (original dose 8 mg/kg, Cycle 1, Reason: Other (Must fill in a comment))  Administration: 589 mg (5/17/2019), 589 mg (6/7/2019), 600 mg (6/28/2019), 600 mg (7/19/2019), 600 mg (8/30/2019), 600 mg (9/20/2019), 600 mg (8/9/2019), 600 mg (10/9/2019), 600 mg (11/1/2019), 600 mg (11/22/2019), 600 mg (12/13/2019), 600 mg (1/3/2020), 600 mg (1/24/2020), 600 mg (2/14/2020), 600 mg (3/6/2020), 600 mg (3/27/2020), 600 mg (4/17/2020), 600 mg (5/8/2020), 600 mg (5/29/2020), 600 mg (6/19/2020), 600 mg (7/10/2020), 600 mg (7/31/2020), 600 mg (8/21/2020), 600 mg (9/11/2020), 600 mg (10/2/2020), 600 mg (10/23/2020), 600 mg (11/13/2020), 600 mg (12/4/2020), 600 mg (12/24/2020), 600 mg (1/15/2021), 600 mg (2/5/2021), 600 mg (2/26/2021), 600 mg (3/19/2021), 600 mg (2021), 600 mg (2021), 600 mg (2021), 600 mg (2021), 600 mg (2021), 600 mg (2021), 600 mg (2021), 600 mg (9/3/2021), 600 mg (2021), 600 mg (10/15/2021), 600 mg (2021), 600 mg (2021), 600 mg (2021), 600 mg (2022), 600 mg (2022), 600 mg (2022), 600 mg (3/11/2022), 600 mg (2022), 600 mg (2022), 600 mg (6/3/2022)           Past Medical History:   Diagnosis Date    Breast cancer ([facility] [address] )     Hx of radiation therapy     breast and left  hip    Liver metastases ([facility] [address] )     Port-A-Cath in place     right chest    Wears glasses      Past Surgical History:   Procedure Laterality Date    BREAST BIOPSY Left 2015    BREAST LUMPECTOMY Left 2016    BREAST SURGERY Left     biopsy    CHOLECYSTECTOMY      CHOLECYSTECTOMY      OOPHORECTOMY      PORTACATH PLACEMENT Right     NM LAP,RMV  ADNEXAL STRUCTURE Bilateral 2019    Procedure: LAPAROSCOPIC SALPINGO-OOPHORECTOMY;  Surgeon: Hyacinth Sotomayor MD;  Location: [facility];  Service: Gynecology Oncology    SIMPLE MASTECTOMY Left 2016    Procedure: BREAST PARTIAL MASTECTOMY ;  Surgeon: Lawyer Jie MD;  Location: [facility];  Service:        Family History   Problem Relation Age of Onset    No Known Problems Mother     Heart attack Father     No Known Problems Daughter     No Known Problems Maternal Grandmother     No Known Problems Maternal Grandfather     No Known Problems Paternal Aunt     No Known Problems Paternal Aunt     Lung cancer Maternal Uncle 79       Social History   Social History     Substance and Sexual Activity   Alcohol Use No     Social History     Substance and Sexual Activity   Drug Use No     Social History     Tobacco Use   Smoking Status Former Smoker    Quit date: 2009    Years since quittin 8   Smokeless Tobacco Never Used         Meds/Allergies     Current Outpatient Medications:     letrozole (FEMARA) 2 5 mg tablet, Take 1 tablet (2 5 mg total) by mouth daily, Disp: 30 tablet, Rfl: 6    multivitamin (THERAGRAN) TABS, Take 1 tablet by mouth 3 (three) times a week , Disp: , Rfl:     pertuzumab (PERJETA) 420 mg/14 mL SOLN, Infuse into a venous catheter every 21 days At infusion  center, Disp: , Rfl:     Trastuzumab (HERCEPTIN IV), Infuse into a venous catheter every 21 days At infusion center, Disp: , Rfl:     VITAMIN D, CHOLECALCIFEROL, PO, Take 1,000 Units by mouth 3 (three) times a week , Disp: , Rfl:   No Known Allergies    Review of Systems   Constitutional: Positive for fatigue (occasional moderate)  HENT: Negative  Eyes: Negative  Respiratory: Negative  Cardiovascular: Negative  Gastrointestinal: Negative  Negative for abdominal pain, blood in stool, constipation, diarrhea, nausea and vomiting  Endocrine: Negative  Genitourinary: Negative  Musculoskeletal: Negative  Skin: Negative  Allergic/Immunologic: Negative  Neurological: Negative  Hematological: Negative  Psychiatric/Behavioral: The patient is nervous/anxious          OBJECTIVE:   /90 (BP Location: Left arm, Patient Position: Sitting, Cuff Size: Large)   Pulse 79   Temp 98 4 °F (36 9 °C) (Temporal)   Resp 18   Ht 5' 5" (1 651 m)   Wt 97 3 kg (214 lb 9 6 oz)   SpO2 94%   BMI 35 71 kg/m²   Pain Assessment:  0  Performance Status: ECOG/Zubrod/WHO: 0 - Asymptomatic    Physical Exam   Well appearing  NAD  No increased work of breathing  Extremities warm and well perfused  No LE edema  No rashes on visible skin        RESULTS  Lab Results    Chemistry        Component Value Date/Time    K 4 0 03/25/2022 1510     03/25/2022 1510    CO2 30 03/25/2022 1510    BUN 19 03/25/2022 1510    CREATININE 0 97 03/25/2022 1510        Component Value Date/Time    CALCIUM 8 9 03/25/2022 1510    ALKPHOS 106 03/25/2022 1510    AST 23 03/25/2022 1510    ALT 48 03/25/2022 1510            Lab Results   Component Value Date WBC 8 41 03/25/2022    HGB 13 3 03/25/2022    HCT 42 1 03/25/2022    MCV 84 03/25/2022     03/25/2022         Imaging Studies  NM bone scan whole body    Result Date: 5/27/2022  Narrative: BONE SCAN  WHOLE BODY INDICATION: C50 212: Malignant neoplasm of upper-inner quadrant of left female breast Z17 0: Estrogen receptor positive status (ER+) C79 51: Secondary malignant neoplasm of bone PREVIOUS FILM CORRELATION:    Bone scan 2/24/2021  Comparison is made to the skeletal structures of CT of chest, abdomen, pelvis dated 3/17/2022 TECHNIQUE:   This study was performed following the intravenous administration of 25 1 mCi Tc-99m labeled MDP  Delayed, anterior and posterior whole body images were acquired, 2-3 hours after radiopharmaceutical administration  FINDINGS:  No focal tracer activity characteristic of osseous metastatic disease  Mild nonspecific multifocal tracer activity in a pattern most consistent with degenerative changes involving bilateral shoulders, bilateral sternoclavicular articulations, likely spine, and possibly bilateral hips  Both kidneys are visualized  Impression: No significant interval change since prior bone scan  No scintigraphic evidence for osseous metastatic disease  Workstation performed: [id number]         Pathology: As above  Biopsy of liver lesion pending  ASSESSMENT  1  Liver metastases [facility])  Ambulatory referral to Radiation Oncology    Ambulatory Referral to Radiation Oncology    Radiation Simulation Treatment     Cancer Staging  No matching staging information was found for the patient  PLAN/DISCUSSION  Orders Placed This Encounter   Procedures   Neto Beach MD  8/29/0430,2:72 PM      Portions of the record may have been created with voice recognition software  Occasional wrong word or "sound a like" substitutions may have occurred due to the inherent limitations of voice recognition software    Read the chart carefully and recognize, using context, where substitutions have occurred

## 2022-06-16 NOTE — PROGRESS NOTES
Consultation - Radiation Oncology      ECI:[id number] : 1965  Encounter: [id number]  Patient Information: [address]  Chief Complaint   Patient presents with    Consult     Cancer Staging  No matching staging information was found for the patient  Assessment and Plan:  Ms Annabelle Acosta is a 49year old woman initially diagnosed with stage IV inflammatory breast cancer in , now s/p chemotherapy and letrozole/herceptin/perjeta with good response to treatment  She has been monitored with TARUN on surveillance imaging until more recently being found to have an enhancing lesion in the caudate lobe of the liver compatible with a new site of metastatic disease  We discussed options for management of this patient's oligoprogressive breast cancer  Given her overall good control on systemic therapy, I believe local therapy to this site is reasonable to prevent or delay further progression  Given the location and size of her lesion, I believe it to be well suited for SBRT  We discussed the risks and benefits of this approach including the potential side effects; these include fatigue, nausea, anorexia, abdominal discomfort, and diarrhea  Late effects would include the low risk for luminal organ injury or RILD  At the conclusion of our conversation the patient was hesitantly agreeable to moving forward  She would like to ensure insurance will cover her treatment and ensure the lesion is cancerous via biopsy (as planned) prior to proceeding  She was also concerned about the use of fiducials; given the location of her tumor, fiducial are not strictly necessary but would help to ensure accurate treatment delivery during SBRT  I will proceed with obtaining insurance clearance  Following further discussion I will plan to bring her back for CT simulation and subsequent SBRT if the patient is agreeable       Summary  - Recommend SBRT to caudate lesion pending biopsy  - Scheduled for biopsy and fiducial marker placement next week  History of Present Illness   Ms James Harris is a 49year old woman with a who was initially diagnosed with stage IV left breast inflammatory carcinoma, ER+/HI-/Her2+ in 12/2015, at that time with both liver and bone metastases  She underwent initial therapy with tamoxifen as well as Taxotere/Herceptin/Perjeta under the care of Dr Antonieta Garcia (2/2016) x6 cycles followed by H/P alone with a good response to therapy  She underwent left breast partial mastectomy as well as post-operative/palliative RT to the left breast (6600cGy/33fx) and femur (30Gy/10fx) under the care of Dr Mikey Solitario  She continued to do well with TARUN on interval scans/imaging until she was found to have a new liver lesion on MRI  Radiology report is not available for review, but on review there is an approximate 2-3 cm arterial enhancing lesion in the caudate lobe without washout consistent with metastatic disease  She was referred to IR for consideration of management with LRT, but given the location it was felt that ablation was not ideal  She is pending biopsy on 6/24/22 and is seen today in consideration of SBRT  Currently the patient is doing well overall  She has occasional fatigue but is otherwise well without significant side effects or symptoms  She has no abdominal pain, no diarrhea, no loose stool  No weight loss  Historical Information   Oncology History   Malignant neoplasm of upper-inner quadrant of left female breast ([facility] [address] )   12/2015 Initial Diagnosis    Malignant neoplasm of upper-outer quadrant of left female breast ([facility] [address] )     2016 -  Hormone Therapy    Tamoxifen  Ended 8/2019  Dr Antonieta Garcia  Letrozole 9/2019 1/27/2016 Surgery    Port placement     2/19/2016 - 6/2/2018 Chemotherapy    Taxotere,  herceptin, perjeta, xgeva  After six cycles, taxotere was discontinued and tamoxifen added     Continues with Perjeta and Herceptin every 3 weeks     -  Proothi       9/16/2016 Surgery    Left breast partial mastectomy     11/7/2016 - 12/23/2016 Radiation    Plan ID Energy Fractions Dose per Fraction (cGy) Total Dose Delivered (cGy) Elapsed Days   Lt Breast 6X 25 / 25 200 5,000 36   Lt Brst Boost 6X 8 / 8 200 1,600 9   Lt Femur 10X 10 / 10 300 3,000 11   Lt PAB 6X 25 / 25 60 1,500 36   Lt Sclav 6X 25 / 25 200 5,000 36                                   Total dose to left breast lumpectomy cavity: 6600 cGy                   Total dose to the supraclavicular and axillary regions: 5000 cGy       4/17/2019 -  Chemotherapy    pertuzumab (PERJETA) IVPB, 840 mg, Intravenous, Once, 54 of 56 cycles  Administration: 420 mg (5/17/2019), 420 mg (6/7/2019), 420 mg (6/28/2019), 420 mg (7/19/2019), 420 mg (8/30/2019), 420 mg (9/20/2019), 420 mg (8/9/2019), 420 mg (10/9/2019), 420 mg (11/1/2019), 420 mg (11/22/2019), 420 mg (12/13/2019), 420 mg (1/3/2020), 420 mg (1/24/2020), 420 mg (2/14/2020), 420 mg (3/6/2020), 420 mg (3/27/2020), 420 mg (4/17/2020), 420 mg (5/8/2020), 420 mg (5/29/2020), 420 mg (6/19/2020), 420 mg (7/10/2020), 420 mg (7/31/2020), 420 mg (8/21/2020), 420 mg (9/11/2020), 420 mg (10/2/2020), 420 mg (10/23/2020), 420 mg (11/13/2020), 420 mg (12/4/2020), 420 mg (12/24/2020), 420 mg (1/15/2021), 420 mg (2/5/2021), 420 mg (2/26/2021), 420 mg (3/19/2021), 420 mg (4/9/2021), 420 mg (4/30/2021), 420 mg (5/21/2021), 420 mg (6/11/2021), 420 mg (7/2/2021), 420 mg (7/23/2021), 420 mg (8/13/2021), 420 mg (9/3/2021), 420 mg (9/24/2021), 420 mg (10/15/2021), 420 mg (11/5/2021), 420 mg (11/26/2021), 420 mg (12/17/2021), 420 mg (1/7/2022), 420 mg (1/28/2022), 420 mg (2/18/2022), 420 mg (3/11/2022), 420 mg (4/1/2022), 420 mg (5/13/2022), 420 mg (6/3/2022)  trastuzumab (HERCEPTIN) chemo infusion, 6 mg/kg = 589 mg (75 % of original dose 8 mg/kg), Intravenous, Once, 54 of 56 cycles  Dose modification: 6 mg/kg (original dose 8 mg/kg, Cycle 1, Reason: Other (Must fill in a comment))  Administration: 589 mg (5/17/2019), 589 mg (6/7/2019), 600 mg (6/28/2019), 600 mg (7/19/2019), 600 mg (8/30/2019), 600 mg (9/20/2019), 600 mg (8/9/2019), 600 mg (10/9/2019), 600 mg (11/1/2019), 600 mg (11/22/2019), 600 mg (12/13/2019), 600 mg (1/3/2020), 600 mg (1/24/2020), 600 mg (2/14/2020), 600 mg (3/6/2020), 600 mg (3/27/2020), 600 mg (4/17/2020), 600 mg (5/8/2020), 600 mg (5/29/2020), 600 mg (6/19/2020), 600 mg (7/10/2020), 600 mg (7/31/2020), 600 mg (8/21/2020), 600 mg (9/11/2020), 600 mg (10/2/2020), 600 mg (10/23/2020), 600 mg (11/13/2020), 600 mg (12/4/2020), 600 mg (12/24/2020), 600 mg (1/15/2021), 600 mg (2/5/2021), 600 mg (2/26/2021), 600 mg (3/19/2021), 600 mg (4/9/2021), 600 mg (4/30/2021), 600 mg (5/21/2021), 600 mg (6/11/2021), 600 mg (7/2/2021), 600 mg (7/23/2021), 600 mg (8/13/2021), 600 mg (9/3/2021), 600 mg (9/24/2021), 600 mg (10/15/2021), 600 mg (11/5/2021), 600 mg (11/26/2021), 600 mg (12/17/2021), 600 mg (1/7/2022), 600 mg (1/28/2022), 600 mg (2/18/2022), 600 mg (3/11/2022), 600 mg (4/1/2022), 600 mg (5/13/2022), 600 mg (6/3/2022)     8/16/2019 Surgery    she underwent BSO       Liver metastases ([facility] [address] )   4/27/2018 Initial Diagnosis    Liver metastases ([facility] [address] )     4/17/2019 -  Chemotherapy    pertuzumab (PERJETA) IVPB, 840 mg, Intravenous, Once, 54 of 56 cycles  Administration: 420 mg (5/17/2019), 420 mg (6/7/2019), 420 mg (6/28/2019), 420 mg (7/19/2019), 420 mg (8/30/2019), 420 mg (9/20/2019), 420 mg (8/9/2019), 420 mg (10/9/2019), 420 mg (11/1/2019), 420 mg (11/22/2019), 420 mg (12/13/2019), 420 mg (1/3/2020), 420 mg (1/24/2020), 420 mg (2/14/2020), 420 mg (3/6/2020), 420 mg (3/27/2020), 420 mg (4/17/2020), 420 mg (5/8/2020), 420 mg (5/29/2020), 420 mg (6/19/2020), 420 mg (7/10/2020), 420 mg (7/31/2020), 420 mg (8/21/2020), 420 mg (9/11/2020), 420 mg (10/2/2020), 420 mg (10/23/2020), 420 mg (11/13/2020), 420 mg (12/4/2020), 420 mg (12/24/2020), 420 mg (1/15/2021), 420 mg (2/5/2021), 420 mg (2/26/2021), 420 mg (3/19/2021), 420 mg (4/9/2021), 420 mg (4/30/2021), 420 mg (5/21/2021), 420 mg (6/11/2021), 420 mg (7/2/2021), 420 mg (7/23/2021), 420 mg (8/13/2021), 420 mg (9/3/2021), 420 mg (9/24/2021), 420 mg (10/15/2021), 420 mg (11/5/2021), 420 mg (11/26/2021), 420 mg (12/17/2021), 420 mg (1/7/2022), 420 mg (1/28/2022), 420 mg (2/18/2022), 420 mg (3/11/2022), 420 mg (4/1/2022), 420 mg (5/13/2022), 420 mg (6/3/2022)  trastuzumab (HERCEPTIN) chemo infusion, 6 mg/kg = 589 mg (75 % of original dose 8 mg/kg), Intravenous, Once, 54 of 56 cycles  Dose modification: 6 mg/kg (original dose 8 mg/kg, Cycle 1, Reason: Other (Must fill in a comment))  Administration: 589 mg (5/17/2019), 589 mg (6/7/2019), 600 mg (6/28/2019), 600 mg (7/19/2019), 600 mg (8/30/2019), 600 mg (9/20/2019), 600 mg (8/9/2019), 600 mg (10/9/2019), 600 mg (11/1/2019), 600 mg (11/22/2019), 600 mg (12/13/2019), 600 mg (1/3/2020), 600 mg (1/24/2020), 600 mg (2/14/2020), 600 mg (3/6/2020), 600 mg (3/27/2020), 600 mg (4/17/2020), 600 mg (5/8/2020), 600 mg (5/29/2020), 600 mg (6/19/2020), 600 mg (7/10/2020), 600 mg (7/31/2020), 600 mg (8/21/2020), 600 mg (9/11/2020), 600 mg (10/2/2020), 600 mg (10/23/2020), 600 mg (11/13/2020), 600 mg (12/4/2020), 600 mg (12/24/2020), 600 mg (1/15/2021), 600 mg (2/5/2021), 600 mg (2/26/2021), 600 mg (3/19/2021), 600 mg (4/9/2021), 600 mg (4/30/2021), 600 mg (5/21/2021), 600 mg (6/11/2021), 600 mg (7/2/2021), 600 mg (7/23/2021), 600 mg (8/13/2021), 600 mg (9/3/2021), 600 mg (9/24/2021), 600 mg (10/15/2021), 600 mg (11/5/2021), 600 mg (11/26/2021), 600 mg (12/17/2021), 600 mg (1/7/2022), 600 mg (1/28/2022), 600 mg (2/18/2022), 600 mg (3/11/2022), 600 mg (4/1/2022), 600 mg (5/13/2022), 600 mg (6/3/2022)     Bone metastasis ([facility] [address] )   4/27/2018 Initial Diagnosis    Bone metastasis ([facility] [address] )     4/17/2019 -  Chemotherapy    pertuzumab (PERJETA) IVPB, 840 mg, Intravenous, Once, 54 of 56 cycles  Administration: 420 mg (5/17/2019), 420 mg (6/7/2019), 420 mg (6/28/2019), 420 mg (7/19/2019), 420 mg (8/30/2019), 420 mg (9/20/2019), 420 mg (8/9/2019), 420 mg (10/9/2019), 420 mg (11/1/2019), 420 mg (11/22/2019), 420 mg (12/13/2019), 420 mg (1/3/2020), 420 mg (1/24/2020), 420 mg (2/14/2020), 420 mg (3/6/2020), 420 mg (3/27/2020), 420 mg (4/17/2020), 420 mg (5/8/2020), 420 mg (5/29/2020), 420 mg (6/19/2020), 420 mg (7/10/2020), 420 mg (7/31/2020), 420 mg (8/21/2020), 420 mg (9/11/2020), 420 mg (10/2/2020), 420 mg (10/23/2020), 420 mg (11/13/2020), 420 mg (12/4/2020), 420 mg (12/24/2020), 420 mg (1/15/2021), 420 mg (2/5/2021), 420 mg (2/26/2021), 420 mg (3/19/2021), 420 mg (4/9/2021), 420 mg (4/30/2021), 420 mg (5/21/2021), 420 mg (6/11/2021), 420 mg (7/2/2021), 420 mg (7/23/2021), 420 mg (8/13/2021), 420 mg (9/3/2021), 420 mg (9/24/2021), 420 mg (10/15/2021), 420 mg (11/5/2021), 420 mg (11/26/2021), 420 mg (12/17/2021), 420 mg (1/7/2022), 420 mg (1/28/2022), 420 mg (2/18/2022), 420 mg (3/11/2022), 420 mg (4/1/2022), 420 mg (5/13/2022), 420 mg (6/3/2022)  trastuzumab (HERCEPTIN) chemo infusion, 6 mg/kg = 589 mg (75 % of original dose 8 mg/kg), Intravenous, Once, 54 of 56 cycles  Dose modification: 6 mg/kg (original dose 8 mg/kg, Cycle 1, Reason: Other (Must fill in a comment))  Administration: 589 mg (5/17/2019), 589 mg (6/7/2019), 600 mg (6/28/2019), 600 mg (7/19/2019), 600 mg (8/30/2019), 600 mg (9/20/2019), 600 mg (8/9/2019), 600 mg (10/9/2019), 600 mg (11/1/2019), 600 mg (11/22/2019), 600 mg (12/13/2019), 600 mg (1/3/2020), 600 mg (1/24/2020), 600 mg (2/14/2020), 600 mg (3/6/2020), 600 mg (3/27/2020), 600 mg (4/17/2020), 600 mg (5/8/2020), 600 mg (5/29/2020), 600 mg (6/19/2020), 600 mg (7/10/2020), 600 mg (7/31/2020), 600 mg (8/21/2020), 600 mg (9/11/2020), 600 mg (10/2/2020), 600 mg (10/23/2020), 600 mg (11/13/2020), 600 mg (12/4/2020), 600 mg (12/24/2020), 600 mg (1/15/2021), 600 mg (2/5/2021), 600 mg (2/26/2021), 600 mg (3/19/2021), 600 mg (2021), 600 mg (2021), 600 mg (2021), 600 mg (2021), 600 mg (2021), 600 mg (2021), 600 mg (2021), 600 mg (9/3/2021), 600 mg (2021), 600 mg (10/15/2021), 600 mg (2021), 600 mg (2021), 600 mg (2021), 600 mg (2022), 600 mg (2022), 600 mg (2022), 600 mg (3/11/2022), 600 mg (2022), 600 mg (2022), 600 mg (6/3/2022)           Past Medical History:   Diagnosis Date    Breast cancer ([facility] [address] )     Hx of radiation therapy     breast and left  hip    Liver metastases ([facility] [address] )     Port-A-Cath in place     right chest    Wears glasses      Past Surgical History:   Procedure Laterality Date    BREAST BIOPSY Left 2015    BREAST LUMPECTOMY Left 2016    BREAST SURGERY Left     biopsy    CHOLECYSTECTOMY      CHOLECYSTECTOMY      OOPHORECTOMY      PORTACATH PLACEMENT Right     CA LAP,RMV  ADNEXAL STRUCTURE Bilateral 2019    Procedure: LAPAROSCOPIC SALPINGO-OOPHORECTOMY;  Surgeon: Nicholas Rogers MD;  Location: [facility];  Service: Gynecology Oncology    SIMPLE MASTECTOMY Left 2016    Procedure: BREAST PARTIAL MASTECTOMY ;  Surgeon: Liliya Mason MD;  Location: [facility];  Service:        Family History   Problem Relation Age of Onset    No Known Problems Mother     Heart attack Father     No Known Problems Daughter     No Known Problems Maternal Grandmother     No Known Problems Maternal Grandfather     No Known Problems Paternal Aunt     No Known Problems Paternal Aunt     Lung cancer Maternal Uncle 79       Social History   Social History     Substance and Sexual Activity   Alcohol Use No     Social History     Substance and Sexual Activity   Drug Use No     Social History     Tobacco Use   Smoking Status Former Smoker    Quit date: 2009    Years since quittin 8   Smokeless Tobacco Never Used         Meds/Allergies     Current Outpatient Medications:     letrozole (FEMARA) 2 5 mg tablet, Take 1 tablet (2 5 mg total) by mouth daily, Disp: 30 tablet, Rfl: 6    multivitamin (THERAGRAN) TABS, Take 1 tablet by mouth 3 (three) times a week , Disp: , Rfl:     pertuzumab (PERJETA) 420 mg/14 mL SOLN, Infuse into a venous catheter every 21 days At infusion  center, Disp: , Rfl:     Trastuzumab (HERCEPTIN IV), Infuse into a venous catheter every 21 days At infusion center, Disp: , Rfl:     VITAMIN D, CHOLECALCIFEROL, PO, Take 1,000 Units by mouth 3 (three) times a week , Disp: , Rfl:   No Known Allergies    Review of Systems   Constitutional: Positive for fatigue (occasional moderate)  HENT: Negative  Eyes: Negative  Respiratory: Negative  Cardiovascular: Negative  Gastrointestinal: Negative  Negative for abdominal pain, blood in stool, constipation, diarrhea, nausea and vomiting  Endocrine: Negative  Genitourinary: Negative  Musculoskeletal: Negative  Skin: Negative  Allergic/Immunologic: Negative  Neurological: Negative  Hematological: Negative  Psychiatric/Behavioral: The patient is nervous/anxious          OBJECTIVE:   /90 (BP Location: Left arm, Patient Position: Sitting, Cuff Size: Large)   Pulse 79   Temp 98 4 °F (36 9 °C) (Temporal)   Resp 18   Ht 5' 5" (1 651 m)   Wt 97 3 kg (214 lb 9 6 oz)   SpO2 94%   BMI 35 71 kg/m²   Pain Assessment:  0  Performance Status: ECOG/Zubrod/WHO: 0 - Asymptomatic    Physical Exam   Well appearing  NAD  No increased work of breathing  Extremities warm and well perfused  No LE edema  No rashes on visible skin        RESULTS  Lab Results    Chemistry        Component Value Date/Time    K 4 0 03/25/2022 1510     03/25/2022 1510    CO2 30 03/25/2022 1510    BUN 19 03/25/2022 1510    CREATININE 0 97 03/25/2022 1510        Component Value Date/Time    CALCIUM 8 9 03/25/2022 1510    ALKPHOS 106 03/25/2022 1510    AST 23 03/25/2022 1510    ALT 48 03/25/2022 1510            Lab Results   Component Value Date WBC 8 41 03/25/2022    HGB 13 3 03/25/2022    HCT 42 1 03/25/2022    MCV 84 03/25/2022     03/25/2022         Imaging Studies  NM bone scan whole body    Result Date: 5/27/2022  Narrative: BONE SCAN  WHOLE BODY INDICATION: C50 212: Malignant neoplasm of upper-inner quadrant of left female breast Z17 0: Estrogen receptor positive status (ER+) C79 51: Secondary malignant neoplasm of bone PREVIOUS FILM CORRELATION:    Bone scan 2/24/2021  Comparison is made to the skeletal structures of CT of chest, abdomen, pelvis dated 3/17/2022 TECHNIQUE:   This study was performed following the intravenous administration of 25 1 mCi Tc-99m labeled MDP  Delayed, anterior and posterior whole body images were acquired, 2-3 hours after radiopharmaceutical administration  FINDINGS:  No focal tracer activity characteristic of osseous metastatic disease  Mild nonspecific multifocal tracer activity in a pattern most consistent with degenerative changes involving bilateral shoulders, bilateral sternoclavicular articulations, likely spine, and possibly bilateral hips  Both kidneys are visualized  Impression: No significant interval change since prior bone scan  No scintigraphic evidence for osseous metastatic disease  Workstation performed: [id number]         Pathology: As above  Biopsy of liver lesion pending  ASSESSMENT  1  Liver metastases [facility])  Ambulatory referral to Radiation Oncology    Ambulatory Referral to Radiation Oncology    Radiation Simulation Treatment     Cancer Staging  No matching staging information was found for the patient  PLAN/DISCUSSION  Orders Placed This Encounter   Procedures   Margarita Ruggiero MD  1/50/8885,1:42 PM      Portions of the record may have been created with voice recognition software  Occasional wrong word or "sound a like" substitutions may have occurred due to the inherent limitations of voice recognition software    Read the chart carefully and recognize, using context, where substitutions have occurred

## 2022-06-17 NOTE — PRE-PROCEDURE INSTRUCTIONS
Pre-procedure Instructions for Interventional Radiology  [address] [place] [address] [address] [phone]    You are scheduled for a/an Liver Mass Biopsy and placement of Fiducial markers  On Friday 6/24/22  Your tentative arrival time is 0815  Short stay will notify you the day before your procedure with the exact arrival time and the location to arrive  To prepare for your procedure:  1  Please arrange for someone to drive you home after the procedure and stay with you until the next morning if you are instructed to do so  This is typically for patients receiving some type of sedative or anesthetic for the procedure  2  DO NOT EAT OR DRINK ANYTHING after midnight on the evening before your procedure including candy & gum   3  ONLY SIPS OF WATER with your medications are allowed on the morning of your procedure  4  TAKE ALL OF YOUR REGULAR MEDICATIONS THE MORNING OF YOUR PROCEDURE with sips of water! We may call you to stop some of your blood sugar, blood pressure and blood thinning medications depending on the procedure  Please take all of these medications unless we instruct you to stop them  5  If you have an allergy to x-ray dye, please contact Interventional Radiology for an x-ray dye preparation which usually consists of an oral steroid and Benadryl  The day of your procedure:  1  Bring a list of the medications you take at home  2  Bring medications you take for breathing problems (such as inhalers), medications for chest pain, or both  3  Bring a case for your glasses or contacts  4  Bring your insurance card and a form of photo ID   5  Please leave all valuables such as credit cards and jewelry at home  6  Report to the registration desk in the main lobby at the [facility] ([place]  Ask to be directed to [facility]    7  While your procedure is being performed, your family may wait in the Radiology Waiting Room on the 1st floor in Radiology  if they need to leave, they may provide a number to be called following the procedure  8  Be prepared to stay overnight just in case  Sometimes procedures will indicate the need for further observation or treatment  9  If you are scheduled for a follow-up visit with the Interventional Radiologist after your procedure, you will be called with a date and time  10  Covid vaccine completed      Special Instructions (Medications to stop taking before your procedure etc ):

## 2022-06-22 ENCOUNTER — TELEPHONE (OUTPATIENT)
Dept: HEMATOLOGY ONCOLOGY | Facility: CLINIC | Age: 57
End: 2022-06-22

## 2022-06-22 NOTE — TELEPHONE ENCOUNTER
Returned telephone call spoke with Pt  Her next infusion appt conflicts with her IR procedure  Margy Martinez at infusion rescheduled her tx for the same day she sees Dr Ryan Greer  After she is done with Dr Ryan Greer office visit she can go to infusion  Pt states she understands

## 2022-06-22 NOTE — TELEPHONE ENCOUNTER
CALL RETURN FORM   Reason for patient call? Patient is calling about an infusion conflict with another event   Patient's primary oncologist?  Damaris Stout   Name of person the patient was calling for? Proothi   Any additional information to add, if applicable? Please call [phone]   Informed patient that the message will be forwarded to the team and someone will get back to them as soon as possible    Did you relay this information to the patient?   yes

## 2022-06-24 ENCOUNTER — HOSPITAL ENCOUNTER (OUTPATIENT)
Dept: INFUSION CENTER | Facility: CLINIC | Age: 57
End: 2022-06-24

## 2022-06-24 ENCOUNTER — HOSPITAL ENCOUNTER (OUTPATIENT)
Dept: RADIOLOGY | Facility: HOSPITAL | Age: 57
Discharge: HOME | End: 2022-06-24
Attending: RADIOLOGY
Payer: COMMERCIAL

## 2022-06-24 VITALS
HEIGHT: 66 IN | DIASTOLIC BLOOD PRESSURE: 74 MMHG | TEMPERATURE: 98.8 F | WEIGHT: 219 LBS | OXYGEN SATURATION: 96 % | SYSTOLIC BLOOD PRESSURE: 144 MMHG | RESPIRATION RATE: 17 BRPM | BODY MASS INDEX: 35.2 KG/M2 | HEART RATE: 74 BPM

## 2022-06-24 DIAGNOSIS — C78.7 LIVER METASTASES (HCC): ICD-10-CM

## 2022-06-24 LAB
ALBUMIN SERPL BCP-MCNC: 3.3 G/DL (ref 3.5–5)
ALP SERPL-CCNC: 96 U/L (ref 46–116)
ALT SERPL W P-5'-P-CCNC: 48 U/L (ref 12–78)
ANION GAP SERPL CALCULATED.3IONS-SCNC: 2 MMOL/L (ref 4–13)
AST SERPL W P-5'-P-CCNC: 20 U/L (ref 5–45)
BASOPHILS # BLD AUTO: 0.04 THOUSANDS/ΜL (ref 0–0.1)
BASOPHILS NFR BLD AUTO: 1 % (ref 0–1)
BILIRUB SERPL-MCNC: 0.43 MG/DL (ref 0.2–1)
BUN SERPL-MCNC: 18 MG/DL (ref 5–25)
CALCIUM ALBUM COR SERPL-MCNC: 9.9 MG/DL (ref 8.3–10.1)
CALCIUM SERPL-MCNC: 9.3 MG/DL (ref 8.3–10.1)
CHLORIDE SERPL-SCNC: 108 MMOL/L (ref 100–108)
CO2 SERPL-SCNC: 29 MMOL/L (ref 21–32)
CREAT SERPL-MCNC: 0.94 MG/DL (ref 0.6–1.3)
EOSINOPHIL # BLD AUTO: 0.28 THOUSAND/ΜL (ref 0–0.61)
EOSINOPHIL NFR BLD AUTO: 3 % (ref 0–6)
ERYTHROCYTE [DISTWIDTH] IN BLOOD BY AUTOMATED COUNT: 14.4 % (ref 11.6–15.1)
GFR SERPL CREATININE-BSD FRML MDRD: 68 ML/MIN/1.73SQ M
GLUCOSE P FAST SERPL-MCNC: 127 MG/DL (ref 65–99)
GLUCOSE SERPL-MCNC: 127 MG/DL (ref 65–140)
HCT VFR BLD AUTO: 42 % (ref 34.8–46.1)
HGB BLD-MCNC: 13.1 G/DL (ref 11.5–15.4)
IMM GRANULOCYTES # BLD AUTO: 0.03 THOUSAND/UL (ref 0–0.2)
IMM GRANULOCYTES NFR BLD AUTO: 0 % (ref 0–2)
INR PPP: 0.95 (ref 0.84–1.19)
LYMPHOCYTES # BLD AUTO: 2.27 THOUSANDS/ΜL (ref 0.6–4.47)
LYMPHOCYTES NFR BLD AUTO: 26 % (ref 14–44)
MCH RBC QN AUTO: 26.5 PG (ref 26.8–34.3)
MCHC RBC AUTO-ENTMCNC: 31.2 G/DL (ref 31.4–37.4)
MCV RBC AUTO: 85 FL (ref 82–98)
MONOCYTES # BLD AUTO: 0.62 THOUSAND/ΜL (ref 0.17–1.22)
MONOCYTES NFR BLD AUTO: 7 % (ref 4–12)
NEUTROPHILS # BLD AUTO: 5.6 THOUSANDS/ΜL (ref 1.85–7.62)
NEUTS SEG NFR BLD AUTO: 63 % (ref 43–75)
NRBC BLD AUTO-RTO: 0 /100 WBCS
PLATELET # BLD AUTO: 309 THOUSANDS/UL (ref 149–390)
PMV BLD AUTO: 10.9 FL (ref 8.9–12.7)
POTASSIUM SERPL-SCNC: 4 MMOL/L (ref 3.5–5.3)
PROT SERPL-MCNC: 7.7 G/DL (ref 6.4–8.2)
PROTHROMBIN TIME: 12.3 SECONDS (ref 11.6–14.5)
RBC # BLD AUTO: 4.95 MILLION/UL (ref 3.81–5.12)
SODIUM SERPL-SCNC: 139 MMOL/L (ref 136–145)
WBC # BLD AUTO: 8.84 THOUSAND/UL (ref 4.31–10.16)

## 2022-06-24 PROCEDURE — 99152 MOD SED SAME PHYS/QHP 5/>YRS: CPT | Performed by: RADIOLOGY

## 2022-06-24 PROCEDURE — 88341 IMHCHEM/IMCYTCHM EA ADD ANTB: CPT | Performed by: PATHOLOGY

## 2022-06-24 PROCEDURE — 88342 IMHCHEM/IMCYTCHM 1ST ANTB: CPT | Performed by: PATHOLOGY

## 2022-06-24 PROCEDURE — 88360 TUMOR IMMUNOHISTOCHEM/MANUAL: CPT | Performed by: PATHOLOGY

## 2022-06-24 PROCEDURE — 88333 PATH CONSLTJ SURG CYTO XM 1: CPT | Performed by: PATHOLOGY

## 2022-06-24 PROCEDURE — 88307 TISSUE EXAM BY PATHOLOGIST: CPT | Performed by: PATHOLOGY

## 2022-06-24 PROCEDURE — 47000 NEEDLE BIOPSY OF LIVER PERQ: CPT | Performed by: RADIOLOGY

## 2022-06-24 PROCEDURE — 85025 COMPLETE CBC W/AUTO DIFF WBC: CPT | Performed by: RADIOLOGY

## 2022-06-24 PROCEDURE — 85610 PROTHROMBIN TIME: CPT | Performed by: RADIOLOGY

## 2022-06-24 PROCEDURE — 47000 NEEDLE BIOPSY OF LIVER PERQ: CPT

## 2022-06-24 PROCEDURE — 80053 COMPREHEN METABOLIC PANEL: CPT | Performed by: RADIOLOGY

## 2022-06-24 PROCEDURE — 76942 ECHO GUIDE FOR BIOPSY: CPT

## 2022-06-24 PROCEDURE — 77012 CT SCAN FOR NEEDLE BIOPSY: CPT | Performed by: RADIOLOGY

## 2022-06-24 RX ORDER — OXYCODONE HYDROCHLORIDE 5 MG/1
5 TABLET ORAL EVERY 4 HOURS PRN
Status: DISCONTINUED | OUTPATIENT
Start: 2022-06-24 | End: 2022-06-25 | Stop reason: HOSPADM

## 2022-06-24 RX ORDER — HYDRALAZINE HYDROCHLORIDE 20 MG/ML
INJECTION INTRAMUSCULAR; INTRAVENOUS CODE/TRAUMA/SEDATION MEDICATION
Status: COMPLETED | OUTPATIENT
Start: 2022-06-24 | End: 2022-06-24

## 2022-06-24 RX ORDER — HYDROMORPHONE HCL/PF 1 MG/ML
0.5 SYRINGE (ML) INJECTION EVERY 4 HOURS PRN
Status: DISCONTINUED | OUTPATIENT
Start: 2022-06-24 | End: 2022-06-25 | Stop reason: HOSPADM

## 2022-06-24 RX ORDER — ACETAMINOPHEN 325 MG/1
650 TABLET ORAL EVERY 4 HOURS PRN
Status: DISCONTINUED | OUTPATIENT
Start: 2022-06-24 | End: 2022-06-25 | Stop reason: HOSPADM

## 2022-06-24 RX ORDER — SODIUM CHLORIDE 9 MG/ML
75 INJECTION, SOLUTION INTRAVENOUS CONTINUOUS
Status: DISCONTINUED | OUTPATIENT
Start: 2022-06-24 | End: 2022-06-25 | Stop reason: HOSPADM

## 2022-06-24 RX ORDER — LIDOCAINE WITH 8.4% SOD BICARB 0.9%(10ML)
SYRINGE (ML) INJECTION CODE/TRAUMA/SEDATION MEDICATION
Status: COMPLETED | OUTPATIENT
Start: 2022-06-24 | End: 2022-06-24

## 2022-06-24 RX ORDER — LABETALOL HYDROCHLORIDE 5 MG/ML
INJECTION, SOLUTION INTRAVENOUS CODE/TRAUMA/SEDATION MEDICATION
Status: COMPLETED | OUTPATIENT
Start: 2022-06-24 | End: 2022-06-24

## 2022-06-24 RX ORDER — FENTANYL CITRATE 50 UG/ML
INJECTION, SOLUTION INTRAMUSCULAR; INTRAVENOUS CODE/TRAUMA/SEDATION MEDICATION
Status: COMPLETED | OUTPATIENT
Start: 2022-06-24 | End: 2022-06-24

## 2022-06-24 RX ORDER — LABETALOL HYDROCHLORIDE 5 MG/ML
10 INJECTION, SOLUTION INTRAVENOUS EVERY 4 HOURS
Status: DISCONTINUED | OUTPATIENT
Start: 2022-06-24 | End: 2022-06-25 | Stop reason: HOSPADM

## 2022-06-24 RX ORDER — MIDAZOLAM HYDROCHLORIDE 2 MG/2ML
INJECTION, SOLUTION INTRAMUSCULAR; INTRAVENOUS CODE/TRAUMA/SEDATION MEDICATION
Status: COMPLETED | OUTPATIENT
Start: 2022-06-24 | End: 2022-06-24

## 2022-06-24 RX ADMIN — Medication 10 ML: at 11:06

## 2022-06-24 RX ADMIN — MIDAZOLAM 1 MG: 1 INJECTION INTRAMUSCULAR; INTRAVENOUS at 10:39

## 2022-06-24 RX ADMIN — MIDAZOLAM 0.5 MG: 1 INJECTION INTRAMUSCULAR; INTRAVENOUS at 11:44

## 2022-06-24 RX ADMIN — FENTANYL CITRATE 25 MCG: 50 INJECTION INTRAMUSCULAR; INTRAVENOUS at 11:24

## 2022-06-24 RX ADMIN — FENTANYL CITRATE 50 MCG: 50 INJECTION INTRAMUSCULAR; INTRAVENOUS at 10:57

## 2022-06-24 RX ADMIN — MIDAZOLAM 1 MG: 1 INJECTION INTRAMUSCULAR; INTRAVENOUS at 10:57

## 2022-06-24 RX ADMIN — SODIUM CHLORIDE 75 ML/HR: 0.9 INJECTION, SOLUTION INTRAVENOUS at 08:51

## 2022-06-24 RX ADMIN — ACETAMINOPHEN 650 MG: 325 TABLET ORAL at 13:01

## 2022-06-24 RX ADMIN — FENTANYL CITRATE 25 MCG: 50 INJECTION INTRAMUSCULAR; INTRAVENOUS at 11:08

## 2022-06-24 RX ADMIN — MIDAZOLAM 0.5 MG: 1 INJECTION INTRAMUSCULAR; INTRAVENOUS at 11:24

## 2022-06-24 RX ADMIN — MIDAZOLAM 0.5 MG: 1 INJECTION INTRAMUSCULAR; INTRAVENOUS at 11:05

## 2022-06-24 RX ADMIN — FENTANYL CITRATE 25 MCG: 50 INJECTION INTRAMUSCULAR; INTRAVENOUS at 11:06

## 2022-06-24 RX ADMIN — MIDAZOLAM 0.5 MG: 1 INJECTION INTRAMUSCULAR; INTRAVENOUS at 11:14

## 2022-06-24 RX ADMIN — MIDAZOLAM 0.5 MG: 1 INJECTION INTRAMUSCULAR; INTRAVENOUS at 11:08

## 2022-06-24 RX ADMIN — FENTANYL CITRATE 50 MCG: 50 INJECTION INTRAMUSCULAR; INTRAVENOUS at 10:39

## 2022-06-24 RX ADMIN — MIDAZOLAM 0.5 MG: 1 INJECTION INTRAMUSCULAR; INTRAVENOUS at 12:01

## 2022-06-24 RX ADMIN — MIDAZOLAM 0.5 MG: 1 INJECTION INTRAMUSCULAR; INTRAVENOUS at 11:20

## 2022-06-24 RX ADMIN — HYDRALAZINE HYDROCHLORIDE 5 MG: 20 INJECTION, SOLUTION INTRAMUSCULAR; INTRAVENOUS at 11:58

## 2022-06-24 RX ADMIN — HYDRALAZINE HYDROCHLORIDE 5 MG: 20 INJECTION, SOLUTION INTRAMUSCULAR; INTRAVENOUS at 11:47

## 2022-06-24 RX ADMIN — MIDAZOLAM 0.5 MG: 1 INJECTION INTRAMUSCULAR; INTRAVENOUS at 11:38

## 2022-06-24 RX ADMIN — FENTANYL CITRATE 25 MCG: 50 INJECTION INTRAMUSCULAR; INTRAVENOUS at 11:14

## 2022-06-24 RX ADMIN — LABETALOL HYDROCHLORIDE 10 MG: 5 INJECTION, SOLUTION INTRAVENOUS at 12:13

## 2022-06-24 NOTE — BRIEF OP NOTE (RAD/CATH)
INTERVENTIONAL RADIOLOGY PROCEDURE NOTE    Date: 6/24/2022    Procedure: IR LIVER BIOPSY    Preoperative diagnosis:   1  Liver metastases ([address] )         Postoperative diagnosis: Same  Surgeon: Eduardo Cranker, MD     Assistant: None  No qualified resident was available  Blood loss: 0    Specimens: 18g core x5 touch preps     Findings:     Deep caudate liver lesion  Multiple biopsies - small cluster of atypical cells on touch prep, based on discussions with patient we did not proceed with fiducial placement    D stat tract hemostasis    Complications: None immediate      Anesthesia: conscious sedation

## 2022-06-24 NOTE — DISCHARGE INSTRUCTIONS
Procedural Sedation   WHAT YOU NEED TO KNOW:   Procedural sedation is medicine used during procedures to help you feel relaxed and calm  You will remember little to none of the procedure  After sedation you may feel tired, weak, or unsteady on your feet  You may also have trouble concentrating or short-term memory loss  These symptoms should go away in 24 hours or less  DISCHARGE INSTRUCTIONS:   Call 911 or have someone else call for any of the following: You have sudden trouble breathing  You cannot be woken  Contact Interventional Radiology at [phone] PATIENTS: Contact Interventional Radiology at [phone] [facility] PATIENTS: Contact Interventional Radiology at [phone]) if any of the following occur: You have a severe headache or dizziness  Your heart is beating faster than usual     You have a fever or chills  Your skin is itchy, swollen, or you have a rash  You have nausea or are vomiting for more than 8 hours after the procedure  You have questions or concerns about your condition or care  Self-care:   Have someone stay with you for 24 hours  This person can drive you to errands and help you do things around the house  This person can also watch for problems  Rest and do quiet activities for 24 hours  Do not exercise, ride a bike, or play sports  Stand up slowly to prevent dizziness and falls  Take short walks around the house with another person  Slowly return to your usual activities the next day  Do not drive or use dangerous machines or tools for 24 hours  You may injure yourself or others  Examples include a lawnmower, saw, or drill  Do not return to work for 24 hours if you use dangerous machines or tools for work  Do not make important decisions for 24 hours  For example, do not sign important papers or invest money  Drink liquids as directed  Liquids help flush the sedation medicine out of your body   Ask how much liquid to drink each day and which liquids are best for you  Eat small, frequent meals to prevent nausea and vomiting  Start with clear liquids such as juice or broth  If you do not vomit after clear liquids, you can eat your usual foods  Do not drink alcohol or take medicines that make you drowsy  This includes medicines that help you sleep and anxiety medicines  Ask your healthcare provider if it is safe for you to take pain medicine  Follow up with your healthcare provider as directed: Write down your questions so you remember to ask them during your visits  Percutaneous Liver Biopsy   WHAT YOU NEED TO KNOW:   A PLB is a procedure to remove a sample of tissue from your liver  The sample can be sent to a lab and tested for liver disease, cancer, or infection  After the procedure you may have pain and bruising at the biopsy site  You may also have pain in your right shoulder  These symptoms should get better in 48 to 72 hours  DISCHARGE INSTRUCTIONS:     [facility] [facility] patients,  Contact Interventional Radiology at [phone] PATIENTS: Contact Interventional Radiology at [phone]   [facility] PATIENTS: Contact Interventional Radiology at [phone] if:    Fever greater than 101 or chills  You have severe pain in your abdomen  Your abdomen is larger than usual and feels hard  Your neck is more swollen and you have trouble swallowing  You feel weak or dizzy  Your heart is beating faster than usual    Your pain does not get better after you take pain medicine  Your wound is red, swollen, or draining pus  You have nausea or are vomiting  Your skin is itchy, swollen, or you have a rash  You have questions or concerns about your condition or care  Medicines:   Acetaminophen decreases pain and fever  It is available without a doctor's order  Acetaminophen can cause liver damage if not taken correctly  Take your home medicine as directed    Resume your normal diet  Small sips of flat soda will help with mild nausea  Self-care:   Rest as directed  Do not play sports, exercise, or lift anything heavier than 5 pounds for up to 1 week  Apply firm, steady pressure if bleeding occurs  A small amount of bleeding from your wound is possible  Apply pressure with a clean gauze or towel for 5 to 10 minutes  Call 911 if bleeding becomes heavy or does not stop  Ask your healthcare provider when to take your blood thinner or antiplatelet medicine  You may need to wait 24 to 72 hours to take your medicine  This will prevent bleeding  Follow up with your healthcare provider as directed: Write down your questions so you remember to ask them during your visits

## 2022-06-24 NOTE — INTERVAL H&P NOTE
Update: (This section must be completed if the H&P was completed greater than 24 hrs to procedure or admission)    H&P reviewed  After examining the patient, I find no changed to the H&P since it had been written  56F with metastatic breast cancer and a liver lesion that is growing  She was referred for local regional therapy and has seen Dr Jayden Shea of  radiation oncology  We reviewed the treatment plan    First she requires tissue diagnosis we will have pathology on site    If she wishes to pursue therapy fiducial marker placement will be beneficial   While we can separate these in time if we are ready have a needle in the lesion it may make the most sense to place a marker at that time    Risks of biopsy including bleeding discussed this is increased due to depth of the lesion and the fact that is adjacent to portal vein and inferior vena cava    Risks of fiducial marker placement include nontarget embolization into the liver lungs heart or even distal arterial structures such as a stroke    She wishes to proceed    We will sedate no blood thinners  Mp2 asa3    Patient re-evaluated   Accept as history and physical     Luisa Alves MD/June 24, 2022/10:33 AM

## 2022-06-24 NOTE — SEDATION DOCUMENTATION
Liver biopsy done by Dr Isha Thibodeaux  Patient tolerated well and rates pain 3/10  Four hour bedrest per Dr Warren Spear and PRN pain medicine to be ordered  Report given to Radha Foster and patient to return to room

## 2022-06-28 ENCOUNTER — OFFICE VISIT (OUTPATIENT)
Dept: HEMATOLOGY ONCOLOGY | Facility: CLINIC | Age: 57
End: 2022-06-28
Payer: COMMERCIAL

## 2022-06-28 ENCOUNTER — HOSPITAL ENCOUNTER (OUTPATIENT)
Dept: INFUSION CENTER | Facility: CLINIC | Age: 57
Discharge: HOME | End: 2022-06-28
Payer: COMMERCIAL

## 2022-06-28 VITALS
OXYGEN SATURATION: 97 % | WEIGHT: 217 LBS | SYSTOLIC BLOOD PRESSURE: 134 MMHG | HEIGHT: 66 IN | HEART RATE: 66 BPM | TEMPERATURE: 96.9 F | RESPIRATION RATE: 18 BRPM | DIASTOLIC BLOOD PRESSURE: 82 MMHG | BODY MASS INDEX: 34.87 KG/M2

## 2022-06-28 VITALS
RESPIRATION RATE: 18 BRPM | WEIGHT: 216.6 LBS | HEIGHT: 66 IN | HEART RATE: 59 BPM | DIASTOLIC BLOOD PRESSURE: 75 MMHG | BODY MASS INDEX: 34.81 KG/M2 | TEMPERATURE: 97.8 F | SYSTOLIC BLOOD PRESSURE: 138 MMHG

## 2022-06-28 DIAGNOSIS — C78.7 LIVER METASTASES (HCC): ICD-10-CM

## 2022-06-28 DIAGNOSIS — Z95.828 PORT-A-CATH IN PLACE: ICD-10-CM

## 2022-06-28 DIAGNOSIS — C50.212 MALIGNANT NEOPLASM OF UPPER-INNER QUADRANT OF LEFT BREAST IN FEMALE, ESTROGEN RECEPTOR POSITIVE (HCC): ICD-10-CM

## 2022-06-28 DIAGNOSIS — Z15.09 BRCA1 GENE MUTATION POSITIVE IN FEMALE: ICD-10-CM

## 2022-06-28 DIAGNOSIS — Z17.0 MALIGNANT NEOPLASM OF UPPER-INNER QUADRANT OF LEFT BREAST IN FEMALE, ESTROGEN RECEPTOR POSITIVE (HCC): ICD-10-CM

## 2022-06-28 DIAGNOSIS — Z90.79 HISTORY OF BILATERAL SALPINGO-OOPHORECTOMY (BSO): Primary | ICD-10-CM

## 2022-06-28 DIAGNOSIS — Z90.79 HISTORY OF BILATERAL SALPINGO-OOPHORECTOMY (BSO): ICD-10-CM

## 2022-06-28 DIAGNOSIS — C79.51 BONE METASTASIS (HCC): Primary | ICD-10-CM

## 2022-06-28 DIAGNOSIS — C50.212 MALIGNANT NEOPLASM OF UPPER-INNER QUADRANT OF LEFT BREAST IN FEMALE, ESTROGEN RECEPTOR POSITIVE (HCC): Primary | ICD-10-CM

## 2022-06-28 DIAGNOSIS — Z90.722 HISTORY OF BILATERAL SALPINGO-OOPHORECTOMY (BSO): ICD-10-CM

## 2022-06-28 DIAGNOSIS — Z15.02 BRCA1 GENE MUTATION POSITIVE IN FEMALE: ICD-10-CM

## 2022-06-28 DIAGNOSIS — Z17.0 MALIGNANT NEOPLASM OF UPPER-INNER QUADRANT OF LEFT BREAST IN FEMALE, ESTROGEN RECEPTOR POSITIVE (HCC): Primary | ICD-10-CM

## 2022-06-28 DIAGNOSIS — Z14.8 CARRIER OF GENE MUTATION FOR HIGH RISK OF CANCER: ICD-10-CM

## 2022-06-28 DIAGNOSIS — C79.51 BONE METASTASIS (HCC): ICD-10-CM

## 2022-06-28 DIAGNOSIS — Z15.01 BRCA1 GENE MUTATION POSITIVE IN FEMALE: ICD-10-CM

## 2022-06-28 DIAGNOSIS — Z90.722 HISTORY OF BILATERAL SALPINGO-OOPHORECTOMY (BSO): Primary | ICD-10-CM

## 2022-06-28 PROCEDURE — 96417 CHEMO IV INFUS EACH ADDL SEQ: CPT

## 2022-06-28 PROCEDURE — 99214 OFFICE O/P EST MOD 30 MIN: CPT | Performed by: INTERNAL MEDICINE

## 2022-06-28 PROCEDURE — 96413 CHEMO IV INFUSION 1 HR: CPT

## 2022-06-28 RX ORDER — SODIUM CHLORIDE 9 MG/ML
20 INJECTION, SOLUTION INTRAVENOUS ONCE
Status: COMPLETED | OUTPATIENT
Start: 2022-06-28 | End: 2022-06-28

## 2022-06-28 RX ADMIN — SODIUM CHLORIDE 20 ML/HR: 0.9 INJECTION, SOLUTION INTRAVENOUS at 10:31

## 2022-06-28 RX ADMIN — TRASTUZUMAB 600 MG: 150 INJECTION, POWDER, LYOPHILIZED, FOR SOLUTION INTRAVENOUS at 11:29

## 2022-06-28 RX ADMIN — PERTUZUMAB 420 MG: 30 INJECTION, SOLUTION, CONCENTRATE INTRAVENOUS at 10:51

## 2022-06-28 NOTE — PROGRESS NOTES
HPI:  Patient had biopsy of liver lesion on 06/24/22 and report is pending  If metastatic she will have local liver directed therapy  She is being continued on Herceptin, Perjeta and letrozole  Follow-up visit for triple positive stage IV metastatic breast cancer since 2016  She had de Tricia stage IV disease , metastatic to liver and bones  In September 2016 patient had lumpectomy of left breast followed by radiation therapy   At the time of lumpectomy there was minimal residual disease, 1 4 cm   Lymph nodes were not sampled  Patient tested positive for BRCA 1  Patient was started on a combination of   Taxotere plus Herceptin and Perjeta and she had very good response and after 6 cycles Taxotere was discontinued and tamoxifen was added    Herceptin and Perjeta were continued  [facility] radiation to left hip for palliation    On 08/16/2019 she underwent BSO    Tamoxifen was changed to letrozole     Presently she is on Herceptin, Perjeta and letrozole  Modi Po has been taking vitamin-D and calcium and is staying active    She has been tolerating treatments without much problem   She goes for blood tests and echocardiogram on regular basis  She has done well all these years and  had very good response but recently there was a new lesion in the liver on CT scan and MRI scan when she had biopsy of that lesion as above  In the meantime she is being continued on letrozole  Herceptin and Perjeta   No bone pains  Has good appetite  And she is maintaining her weight         Patient knows that she is at high risk for breast and other cancers like  ovarian cancer, pancreatic cancer, peritoneal cancer, melanoma and other cancers  Patient goes to breast surgeon for evaluation and imaging studies  She gets CT scan and that also checks the pancreas and peritoneum  She had BSO    She has agreed to see a dermatologist in few months and that will be melanoma mapping  She does not want to go to ophthalmologist for melanoma in the eye  Also she does not want to have colonoscopy and not even Cologuard or stool test for blood       She has grade 1 peripheral neuropathy    Has some tiredness and arthritic symptoms  Patient has history of anxiety and insomnia  For leg cramps at night and she is taking one baby aspirin daily with food in the evening and also one magnesium tablet daily and that is helping          She is not on Xgeva anymore at this time  She had Xgeva for 2 years  Ana Paula Sweeney continues to take  calcium and vitamin-D                              Current Outpatient Medications:     letrozole (FEMARA) 2 5 mg tablet, Take 1 tablet (2 5 mg total) by mouth daily, Disp: 30 tablet, Rfl: 6    multivitamin (THERAGRAN) TABS, Take 1 tablet by mouth 3 (three) times a week , Disp: , Rfl:     pertuzumab (PERJETA) 420 mg/14 mL SOLN, Infuse into a venous catheter every 21 days At infusion  center, Disp: , Rfl:     Trastuzumab (HERCEPTIN IV), Infuse into a venous catheter every 21 days At infusion center, Disp: , Rfl:     VITAMIN D, CHOLECALCIFEROL, PO, Take 1,000 Units by mouth 3 (three) times a week , Disp: , Rfl:     No Known Allergies    Oncology History   Malignant neoplasm of upper-inner quadrant of left female breast ([address] )   12/2015 Initial Diagnosis    Malignant neoplasm of upper-outer quadrant of left female breast ([address] )     2016 -  Hormone Therapy    Tamoxifen  Ended 8/2019  Dr HIRO Dyer [address]  Letrozole 9/2019 1/27/2016 Surgery    Port placement     2/19/2016 - 6/2/2018 Chemotherapy    Taxotere,  herceptin, perjeta, xgeva  After six cycles, taxotere was discontinued and tamoxifen added     Continues with Perjeta and Herceptin every 3 weeks     - Dr HIRO Dyer [address]       9/16/2016 Surgery    Left breast partial mastectomy     11/7/2016 - 12/23/2016 Radiation    Plan ID Energy Fractions Dose per Fraction (cGy) Total Dose Delivered (cGy) Elapsed Days   Lt Breast 6X 25 / 25 200 5,000 36   Lt Brst Boost 6X 8 / 8 200 1,600 9   Lt Femur 10X 10 / 10 300 3,000 11   Lt PAB 6X 25 / 25 60 1,500 36   Lt Sclav 6X 25 / 25 200 5,000 36                                   Total dose to left breast lumpectomy cavity: 6600 cGy                   Total dose to the supraclavicular and axillary regions: 5000 cGy       4/17/2019 -  Chemotherapy    pertuzumab (PERJETA) IVPB, 840 mg, Intravenous, Once, 54 of 56 cycles  Administration: 420 mg (5/17/2019), 420 mg (6/7/2019), 420 mg (6/28/2019), 420 mg (7/19/2019), 420 mg (8/30/2019), 420 mg (9/20/2019), 420 mg (8/9/2019), 420 mg (10/9/2019), 420 mg (11/1/2019), 420 mg (11/22/2019), 420 mg (12/13/2019), 420 mg (1/3/2020), 420 mg (1/24/2020), 420 mg (2/14/2020), 420 mg (3/6/2020), 420 mg (3/27/2020), 420 mg (4/17/2020), 420 mg (5/8/2020), 420 mg (5/29/2020), 420 mg (6/19/2020), 420 mg (7/10/2020), 420 mg (7/31/2020), 420 mg (8/21/2020), 420 mg (9/11/2020), 420 mg (10/2/2020), 420 mg (10/23/2020), 420 mg (11/13/2020), 420 mg (12/4/2020), 420 mg (12/24/2020), 420 mg (1/15/2021), 420 mg (2/5/2021), 420 mg (2/26/2021), 420 mg (3/19/2021), 420 mg (4/9/2021), 420 mg (4/30/2021), 420 mg (5/21/2021), 420 mg (6/11/2021), 420 mg (7/2/2021), 420 mg (7/23/2021), 420 mg (8/13/2021), 420 mg (9/3/2021), 420 mg (9/24/2021), 420 mg (10/15/2021), 420 mg (11/5/2021), 420 mg (11/26/2021), 420 mg (12/17/2021), 420 mg (1/7/2022), 420 mg (1/28/2022), 420 mg (2/18/2022), 420 mg (3/11/2022), 420 mg (4/1/2022), 420 mg (5/13/2022), 420 mg (6/3/2022)  trastuzumab (HERCEPTIN) chemo infusion, 6 mg/kg = 589 mg (75 % of original dose 8 mg/kg), Intravenous, Once, 54 of 56 cycles  Dose modification: 6 mg/kg (original dose 8 mg/kg, Cycle 1, Reason: Other (Must fill in a comment))  Administration: 589 mg (5/17/2019), 589 mg (6/7/2019), 600 mg (6/28/2019), 600 mg (7/19/2019), 600 mg (8/30/2019), 600 mg (9/20/2019), 600 mg (8/9/2019), 600 mg (10/9/2019), 600 mg (11/1/2019), 600 mg (11/22/2019), 600 mg (12/13/2019), 600 mg (1/3/2020), 600 mg (1/24/2020), 600 mg (2/14/2020), 600 mg (3/6/2020), 600 mg (3/27/2020), 600 mg (4/17/2020), 600 mg (5/8/2020), 600 mg (5/29/2020), 600 mg (6/19/2020), 600 mg (7/10/2020), 600 mg (7/31/2020), 600 mg (8/21/2020), 600 mg (9/11/2020), 600 mg (10/2/2020), 600 mg (10/23/2020), 600 mg (11/13/2020), 600 mg (12/4/2020), 600 mg (12/24/2020), 600 mg (1/15/2021), 600 mg (2/5/2021), 600 mg (2/26/2021), 600 mg (3/19/2021), 600 mg (4/9/2021), 600 mg (4/30/2021), 600 mg (5/21/2021), 600 mg (6/11/2021), 600 mg (7/2/2021), 600 mg (7/23/2021), 600 mg (8/13/2021), 600 mg (9/3/2021), 600 mg (9/24/2021), 600 mg (10/15/2021), 600 mg (11/5/2021), 600 mg (11/26/2021), 600 mg (12/17/2021), 600 mg (1/7/2022), 600 mg (1/28/2022), 600 mg (2/18/2022), 600 mg (3/11/2022), 600 mg (4/1/2022), 600 mg (5/13/2022), 600 mg (6/3/2022)     8/16/2019 Surgery    she underwent BSO       Liver metastases ([facility] )   4/27/2018 Initial Diagnosis    Liver metastases ([facility] )     4/17/2019 -  Chemotherapy    pertuzumab (PERJETA) IVPB, 840 mg, Intravenous, Once, 54 of 56 cycles  Administration: 420 mg (5/17/2019), 420 mg (6/7/2019), 420 mg (6/28/2019), 420 mg (7/19/2019), 420 mg (8/30/2019), 420 mg (9/20/2019), 420 mg (8/9/2019), 420 mg (10/9/2019), 420 mg (11/1/2019), 420 mg (11/22/2019), 420 mg (12/13/2019), 420 mg (1/3/2020), 420 mg (1/24/2020), 420 mg (2/14/2020), 420 mg (3/6/2020), 420 mg (3/27/2020), 420 mg (4/17/2020), 420 mg (5/8/2020), 420 mg (5/29/2020), 420 mg (6/19/2020), 420 mg (7/10/2020), 420 mg (7/31/2020), 420 mg (8/21/2020), 420 mg (9/11/2020), 420 mg (10/2/2020), 420 mg (10/23/2020), 420 mg (11/13/2020), 420 mg (12/4/2020), 420 mg (12/24/2020), 420 mg (1/15/2021), 420 mg (2/5/2021), 420 mg (2/26/2021), 420 mg (3/19/2021), 420 mg (4/9/2021), 420 mg (4/30/2021), 420 mg (5/21/2021), 420 mg (6/11/2021), 420 mg (7/2/2021), 420 mg (7/23/2021), 420 mg (8/13/2021), 420 mg (9/3/2021), 420 mg (9/24/2021), 420 mg (10/15/2021), 420 mg (11/5/2021), 420 mg (11/26/2021), 420 mg (12/17/2021), 420 mg (1/7/2022), 420 mg (1/28/2022), 420 mg (2/18/2022), 420 mg (3/11/2022), 420 mg (4/1/2022), 420 mg (5/13/2022), 420 mg (6/3/2022)  trastuzumab (HERCEPTIN) chemo infusion, 6 mg/kg = 589 mg (75 % of original dose 8 mg/kg), Intravenous, Once, 54 of 56 cycles  Dose modification: 6 mg/kg (original dose 8 mg/kg, Cycle 1, Reason: Other (Must fill in a comment))  Administration: 589 mg (5/17/2019), 589 mg (6/7/2019), 600 mg (6/28/2019), 600 mg (7/19/2019), 600 mg (8/30/2019), 600 mg (9/20/2019), 600 mg (8/9/2019), 600 mg (10/9/2019), 600 mg (11/1/2019), 600 mg (11/22/2019), 600 mg (12/13/2019), 600 mg (1/3/2020), 600 mg (1/24/2020), 600 mg (2/14/2020), 600 mg (3/6/2020), 600 mg (3/27/2020), 600 mg (4/17/2020), 600 mg (5/8/2020), 600 mg (5/29/2020), 600 mg (6/19/2020), 600 mg (7/10/2020), 600 mg (7/31/2020), 600 mg (8/21/2020), 600 mg (9/11/2020), 600 mg (10/2/2020), 600 mg (10/23/2020), 600 mg (11/13/2020), 600 mg (12/4/2020), 600 mg (12/24/2020), 600 mg (1/15/2021), 600 mg (2/5/2021), 600 mg (2/26/2021), 600 mg (3/19/2021), 600 mg (4/9/2021), 600 mg (4/30/2021), 600 mg (5/21/2021), 600 mg (6/11/2021), 600 mg (7/2/2021), 600 mg (7/23/2021), 600 mg (8/13/2021), 600 mg (9/3/2021), 600 mg (9/24/2021), 600 mg (10/15/2021), 600 mg (11/5/2021), 600 mg (11/26/2021), 600 mg (12/17/2021), 600 mg (1/7/2022), 600 mg (1/28/2022), 600 mg (2/18/2022), 600 mg (3/11/2022), 600 mg (4/1/2022), 600 mg (5/13/2022), 600 mg (6/3/2022)     Bone metastasis ([facility] )   4/27/2018 Initial Diagnosis    Bone metastasis ([facility] [address] )     4/17/2019 -  Chemotherapy    pertuzumab (PERJETA) IVPB, 840 mg, Intravenous, Once, 54 of 56 cycles  Administration: 420 mg (5/17/2019), 420 mg (6/7/2019), 420 mg (6/28/2019), 420 mg (7/19/2019), 420 mg (8/30/2019), 420 mg (9/20/2019), 420 mg (8/9/2019), 420 mg (10/9/2019), 420 mg (11/1/2019), 420 mg (11/22/2019), 420 mg (12/13/2019), 420 mg (1/3/2020), 420 mg (1/24/2020), 420 mg (2/14/2020), 420 mg (3/6/2020), 420 mg (3/27/2020), 420 mg (4/17/2020), 420 mg (5/8/2020), 420 mg (5/29/2020), 420 mg (6/19/2020), 420 mg (7/10/2020), 420 mg (7/31/2020), 420 mg (8/21/2020), 420 mg (9/11/2020), 420 mg (10/2/2020), 420 mg (10/23/2020), 420 mg (11/13/2020), 420 mg (12/4/2020), 420 mg (12/24/2020), 420 mg (1/15/2021), 420 mg (2/5/2021), 420 mg (2/26/2021), 420 mg (3/19/2021), 420 mg (4/9/2021), 420 mg (4/30/2021), 420 mg (5/21/2021), 420 mg (6/11/2021), 420 mg (7/2/2021), 420 mg (7/23/2021), 420 mg (8/13/2021), 420 mg (9/3/2021), 420 mg (9/24/2021), 420 mg (10/15/2021), 420 mg (11/5/2021), 420 mg (11/26/2021), 420 mg (12/17/2021), 420 mg (1/7/2022), 420 mg (1/28/2022), 420 mg (2/18/2022), 420 mg (3/11/2022), 420 mg (4/1/2022), 420 mg (5/13/2022), 420 mg (6/3/2022)  trastuzumab (HERCEPTIN) chemo infusion, 6 mg/kg = 589 mg (75 % of original dose 8 mg/kg), Intravenous, Once, 54 of 56 cycles  Dose modification: 6 mg/kg (original dose 8 mg/kg, Cycle 1, Reason: Other (Must fill in a comment))  Administration: 589 mg (5/17/2019), 589 mg (6/7/2019), 600 mg (6/28/2019), 600 mg (7/19/2019), 600 mg (8/30/2019), 600 mg (9/20/2019), 600 mg (8/9/2019), 600 mg (10/9/2019), 600 mg (11/1/2019), 600 mg (11/22/2019), 600 mg (12/13/2019), 600 mg (1/3/2020), 600 mg (1/24/2020), 600 mg (2/14/2020), 600 mg (3/6/2020), 600 mg (3/27/2020), 600 mg (4/17/2020), 600 mg (5/8/2020), 600 mg (5/29/2020), 600 mg (6/19/2020), 600 mg (7/10/2020), 600 mg (7/31/2020), 600 mg (8/21/2020), 600 mg (9/11/2020), 600 mg (10/2/2020), 600 mg (10/23/2020), 600 mg (11/13/2020), 600 mg (12/4/2020), 600 mg (12/24/2020), 600 mg (1/15/2021), 600 mg (2/5/2021), 600 mg (2/26/2021), 600 mg (3/19/2021), 600 mg (4/9/2021), 600 mg (4/30/2021), 600 mg (5/21/2021), 600 mg (6/11/2021), 600 mg (7/2/2021), 600 mg (7/23/2021), 600 mg (8/13/2021), 600 mg (9/3/2021), 600 mg (9/24/2021), 600 mg (10/15/2021), 600 mg (11/5/2021), 600 mg (11/26/2021), 600 mg (12/17/2021), 600 mg (1/7/2022), 600 mg (1/28/2022), 600 mg (2/18/2022), 600 mg (3/11/2022), 600 mg (4/1/2022), 600 mg (5/13/2022), 600 mg (6/3/2022)         ROS:  06/28/22 Reviewed 12 systems:    See symptoms in HPI  Presently no other neurological, cardiac, pulmonary, GI and  symptoms other than mentioned   in HPI     Other symptoms are in HPI  No  fever, chills, bleeding, bone pains, skin rash, weight loss, weakness,  claudication and gait problem  No frequent infections  Not unusually sensitive to heat or cold  No swelling of the ankles  No swollen glands  Overall she has been feeling reasonably well  No new symptoms since last visit    /82 (BP Location: Left arm)   Pulse 66   Temp (!) 96 9 °F (36 1 °C) (Tympanic)   Resp 18   Ht 5' 5 98" (1 676 m)   Wt 98 4 kg (217 lb)   SpO2 97%   BMI 35 05 kg/m²     Physical Exam:   Patient is alert and oriented  Patient is not in distress  Vitals as above  There is no icterus, no oral thrush, no palpable neck mass,   lung fields clear to percussion and auscultation , regular heart rate,     soft and non tender abdomen , no palpable abdominal mass, no ascites, no edema of ankles, no calf tenderness, no objective focal neurological deficit, no skin rash, no palpable lymphadenopathy in the neck and axillary areas, no clubbing, Patient is  anxious  Performance status 1  No lymphedema      IMAGING:  IMPRESSION: 02/24/2021     1  No scintigraphic evidence of osseous metastasis           Workstation performed: [id number]      Imaging     Ejection fraction 65% on 12/01/2021    IMPRESSION:     1  Area of subtle ill-defined hypodensity in the caudate lobe measuring approximately 2 0 x 1 5 cm new since the prior study 2/24/2021 may be due to metastasis  Recommend characterization with contrast-enhanced MR abdomen      2    Treated metastatic lesion in the left proximal femur is unchanged           The study was marked in EPIC for significant notification            Workstation performed: [id number]          Imaging    CT chest abdomen pelvis w contrast (Order: [id number]) - 3/17/2022  Study Result    Narrative & Impression   CENTRAL  DXA SCAN on 03/08/2022     CLINICAL HISTORY:   49year old post-menopausal  female risk factors include osteoporosis screening  Additional medical history: Breast cancer with mets to Hip , right Hip scanned      TECHNIQUE: Bone densitometry was performed using a [employer]'s W bone densitometer  Regions of interest appear properly placed  There are   other confounding variables which could limit the study        Her elevated  BMI may influence the T score result      Degenerative or osteoarthritic changes are noted on the spine image eliminating L4 from evaluation      COMPARISON:  Follow-up     RESULTS:   LUMBAR SPINE:  L1-L3:  BMD 1 021 gm/cm2  T-score 0 0 and unchanged from 2019  Z-score 1 0     RIGHT TOTAL HIP:  BMD 1 122 gm/cm2  T-score 1 5  Z-score 2 1     RIGHT FEMORAL NECK:  BMD 0 842 gm/cm2  T-score -0 1 and unchanged from 2019  Z-score 0 9           IMPRESSION:  1  Based on the [facility] classification, this study is normal and the patient is considered at low risk for fracture             LABS:    Results for orders placed or performed during the hospital encounter of 06/24/22   Protime-INR   Result Value Ref Range    Protime 12 3 11 6 - 14 5 seconds    INR 0 95 0 84 - 1 19   CBC and differential   Result Value Ref Range    WBC 8 84 4 31 - 10 16 Thousand/uL    RBC 4 95 3 81 - 5 12 Million/uL    Hemoglobin 13 1 11 5 - 15 4 g/dL    Hematocrit 42 0 34 8 - 46 1 %    MCV 85 82 - 98 fL    MCH 26 5 (L) 26 8 - 34 3 pg    MCHC 31 2 (L) 31 4 - 37 4 g/dL    RDW 14 4 11 6 - 15 1 %    MPV 10 9 8 9 - 12 7 fL    Platelets 617 728 - 195 Thousands/uL    nRBC 0 /100 WBCs    Neutrophils Relative 63 43 - 75 %    Immat GRANS % 0 0 - 2 %    Lymphocytes Relative 26 14 - 44 %    Monocytes Relative 7 4 - 12 %    Eosinophils Relative 3 0 - 6 %    Basophils Relative 1 0 - 1 % Neutrophils Absolute 5 60 1 85 - 7 62 Thousands/µL    Immature Grans Absolute 0 03 0 00 - 0 20 Thousand/uL    Lymphocytes Absolute 2 27 0 60 - 4 47 Thousands/µL    Monocytes Absolute 0 62 0 17 - 1 22 Thousand/µL    Eosinophils Absolute 0 28 0 00 - 0 61 Thousand/µL    Basophils Absolute 0 04 0 00 - 0 10 Thousands/µL   Comprehensive metabolic panel   Result Value Ref Range    Sodium 139 136 - 145 mmol/L    Potassium 4 0 3 5 - 5 3 mmol/L    Chloride 108 100 - 108 mmol/L    CO2 29 21 - 32 mmol/L    ANION GAP 2 (L) 4 - 13 mmol/L    BUN 18 5 - 25 mg/dL    Creatinine 0 94 0 60 - 1 30 mg/dL    Glucose 127 65 - 140 mg/dL    Glucose, Fasting 127 (H) 65 - 99 mg/dL    Calcium 9 3 8 3 - 10 1 mg/dL    Corrected Calcium 9 9 8 3 - 10 1 mg/dL    AST 20 5 - 45 U/L    ALT 48 12 - 78 U/L    Alkaline Phosphatase 96 46 - 116 U/L    Total Protein 7 7 6 4 - 8 2 g/dL    Albumin 3 3 (L) 3 5 - 5 0 g/dL    Total Bilirubin 0 43 0 20 - 1 00 mg/dL    eGFR 68 ml/min/1 73sq m     Labs, Imaging, & Other studies:   All pertinent labs and imaging studies were personally reviewed    Lab Results   Component Value Date    K 4 0 06/24/2022     06/24/2022    CO2 29 06/24/2022    BUN 18 06/24/2022    CREATININE 0 94 06/24/2022    GLUF 127 (H) 06/24/2022    CALCIUM 9 3 06/24/2022    CORRECTEDCA 9 9 06/24/2022    AST 20 06/24/2022    ALT 48 06/24/2022    ALKPHOS 96 06/24/2022    EGFR 68 06/24/2022     Lab Results   Component Value Date    WBC 8 84 06/24/2022    HGB 13 1 06/24/2022    HCT 42 0 06/24/2022    MCV 85 06/24/2022     06/24/2022      Normal differential   Normal tumor marker CA 27-29  22 5    Reviewed  test results and discussed with patient and explained  Assessment and plan:    Florecita Germain had biopsy of liver lesion on 06/24/22 and report is pending  If metastatic she will have local liver directed therapy  She is being continued on Herceptin, Perjeta and letrozole      Follow-up visit for triple positive stage IV metastatic breast cancer since 2016  She had de Tricia stage IV disease , metastatic to liver and bones  In September 2016 patient had lumpectomy of left breast followed by radiation therapy   At the time of lumpectomy there was minimal residual disease, 1 4 cm   Lymph nodes were not sampled  Patient tested positive for BRCA 1  Patient was started on a combination of   Taxotere plus Herceptin and Perjeta and she had very good response and after 6 cycles Taxotere was discontinued and tamoxifen was added    Herceptin and Perjeta were continued  [employer] radiation to left hip for palliation    On 08/16/2019 she underwent BSO    Tamoxifen was changed to letrozole     Presently she is on Herceptin, Perjeta and letrozole  Ana Paula Sweeney has been taking vitamin-D and calcium and is staying active    She has been tolerating treatments without much problem   She goes for blood tests and echocardiogram on regular basis  She has done well all these years and  had very good response but recently there was a new lesion in the liver on CT scan and MRI scan when she had biopsy of that lesion as above  In the meantime she is being continued on letrozole  Herceptin and Perjeta   No bone pains  Has good appetite  And she is maintaining her weight         Patient knows that she is at high risk for breast and other cancers like  ovarian cancer, pancreatic cancer, peritoneal cancer, melanoma and other cancers  Patient goes to breast surgeon for evaluation and imaging studies  She gets CT scan and that also checks the pancreas and peritoneum  She had BSO  She has agreed to see a dermatologist in few months and that will be melanoma mapping  She does not want to go to ophthalmologist for melanoma in the eye  Also she does not want to have colonoscopy and not even Cologuard or stool test for blood       She has grade 1 peripheral neuropathy    Has some tiredness and arthritic symptoms  Patient has history of anxiety and insomnia  For leg cramps at night and she is taking one baby aspirin daily with food in the evening and also one magnesium tablet daily and that is helping          She is not on Xgeva anymore at this time  She had Xgeva for 2 years  Tino Garcia continues to take  calcium and vitamin-D        Physical examination and test results are as recorded and discussed  No change in therapy pending liver biopsy report  She gets  echocardiogram on regular basis   Condition discussed and explained   Questions answered  Elicia Abbott discussed the importance of self-breast examination, eating healthy foods, staying active and health screening tests   Patient goes to her breast surgeon for examination and  imaging studies  Imelda Pack is capable of self-care   Goal is prolonged survival from stage IV breast cancer  Patient will continue to follow with her primary physician and other consultants  Discussed precautions against coronavirus     See diagnoses, orders and instructions below    1  Malignant neoplasm of upper-inner quadrant of left breast in female, estrogen receptor positive ([facility] [address] )      2  Liver metastases ([facility] [address] )      3  Bone metastasis ([facility] [address] )      4  History of bilateral salpingo-oophorectomy (BSO)      5  BRCA1 gene mutation positive in female      10  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      7  Port-A-Cath in place          No change in therapy pending liver biopsy report  She has standing orders for blood work  She is scheduled to have follow-up echocardiogram on 07/25/2022  Liver biopsy report is pending  Follow-up in 2 months                                               Patient voiced understanding and agreed       Counseling / Coordination of Care       Provided counseling and support

## 2022-06-28 NOTE — PROGRESS NOTES
Pt tolerated herceptin, perjeta without incident  Pt declined AVS   Dr Anthony Shen office contacted regarding adding more appt requests  Pt told to call infusion center in a few days if she does not see more appts in My Chart

## 2022-06-28 NOTE — PATIENT INSTRUCTIONS
No change in therapy pending liver biopsy report  She has standing orders for blood work  She is scheduled to have follow-up echocardiogram on 07/25/2022  Liver biopsy report is pending    Follow-up in 2 months

## 2022-06-30 ENCOUNTER — TELEPHONE (OUTPATIENT)
Dept: HEMATOLOGY ONCOLOGY | Facility: CLINIC | Age: 57
End: 2022-06-30

## 2022-06-30 NOTE — TELEPHONE ENCOUNTER
I gave liver biopsy report to the patient and she is going to call Radiation oncologist Dr Severa Higashi  Also I sent message to Dr Severa Higashi

## 2022-07-06 ENCOUNTER — APPOINTMENT (OUTPATIENT)
Dept: RADIATION ONCOLOGY | Facility: HOSPITAL | Age: 57
End: 2022-07-06
Payer: COMMERCIAL

## 2022-07-11 ENCOUNTER — RADIATION ONCOLOGY CONSULT (OUTPATIENT)
Dept: RADIATION ONCOLOGY | Facility: HOSPITAL | Age: 57
End: 2022-07-11
Attending: STUDENT IN AN ORGANIZED HEALTH CARE EDUCATION/TRAINING PROGRAM
Payer: COMMERCIAL

## 2022-07-11 VITALS
HEART RATE: 60 BPM | DIASTOLIC BLOOD PRESSURE: 80 MMHG | HEIGHT: 65 IN | BODY MASS INDEX: 35.45 KG/M2 | RESPIRATION RATE: 18 BRPM | WEIGHT: 212.8 LBS | TEMPERATURE: 97.4 F | SYSTOLIC BLOOD PRESSURE: 122 MMHG | OXYGEN SATURATION: 97 %

## 2022-07-11 DIAGNOSIS — C78.7 LIVER METASTASES (HCC): Primary | ICD-10-CM

## 2022-07-11 PROCEDURE — 99211 OFF/OP EST MAY X REQ PHY/QHP: CPT | Performed by: STUDENT IN AN ORGANIZED HEALTH CARE EDUCATION/TRAINING PROGRAM

## 2022-07-11 PROCEDURE — 99214 OFFICE O/P EST MOD 30 MIN: CPT | Performed by: STUDENT IN AN ORGANIZED HEALTH CARE EDUCATION/TRAINING PROGRAM

## 2022-07-11 PROCEDURE — G0463 HOSPITAL OUTPT CLINIC VISIT: HCPCS | Performed by: STUDENT IN AN ORGANIZED HEALTH CARE EDUCATION/TRAINING PROGRAM

## 2022-07-11 NOTE — PROGRESS NOTES
Alejandrina Barba 1965 is a 49 y o  female     Follow up visit   Ms Sabra Irwin is a 49year old woman initially diagnosed with stage IV inflammatory breast cancer in 2015, now s/p chemotherapy and letrozole/herceptin/perjeta with good response to treatment  She has been monitored with TARUN on surveillance imaging until more recently being found to have an enhancing lesion in the caudate lobe of the liver compatible with a new site of metastatic disease  She was last seen 6/16/22 to discussed SBRT to the liver  She presents today for limited consult prior to simulation  6/24/22 IR liver biopsy  Final Diagnosis   A  Liver, core biopsies:     - Metastatic carcinoma compatible with a breast primary  6/28/22 Dr Carol Cm- Continue letrozole, herceptin, perjeta  Liver biopsy pending  F/u 2 months    7/22/22 infusion  9/7/22 Dr Carol Cm    Oncology History   Malignant neoplasm of upper-inner quadrant of left female breast ([facility] [address] )   12/2015 Initial Diagnosis    Malignant neoplasm of upper-outer quadrant of left female breast ([facility] [address] )     2016 -  Hormone Therapy    Tamoxifen  Ended 8/2019  Dr Carol Cm  Letrozole 9/2019 1/27/2016 Surgery    Port placement     2/19/2016 - 6/2/2018 Chemotherapy    Taxotere,  herceptin, perjeta, xgeva  After six cycles, taxotere was discontinued and tamoxifen added     Continues with Perjeta and Herceptin every 3 weeks     - Dr Carol Cm       9/16/2016 Surgery    Left breast partial mastectomy     11/7/2016 - 12/23/2016 Radiation    Plan ID Energy Fractions Dose per Fraction (cGy) Total Dose Delivered (cGy) Elapsed Days   Lt Breast 6X 25 / 25 200 5,000 36   Lt Brst Boost 6X 8 / 8 200 1,600 9   Lt Femur 10X 10 / 10 300 3,000 11   Lt PAB 6X 25 / 25 60 1,500 36   Lt Sclav 6X 25 / 25 200 5,000 36                                   Total dose to left breast lumpectomy cavity: 6600 cGy                   Total dose to the supraclavicular and axillary regions: 5000 cGy       4/17/2019 -  Chemotherapy    pertuzumab (PERJETA) IVPB, 840 mg, Intravenous, Once, 55 of 59 cycles  Administration: 420 mg (5/17/2019), 420 mg (6/7/2019), 420 mg (6/28/2019), 420 mg (7/19/2019), 420 mg (8/30/2019), 420 mg (9/20/2019), 420 mg (8/9/2019), 420 mg (10/9/2019), 420 mg (11/1/2019), 420 mg (11/22/2019), 420 mg (12/13/2019), 420 mg (1/3/2020), 420 mg (1/24/2020), 420 mg (2/14/2020), 420 mg (3/6/2020), 420 mg (3/27/2020), 420 mg (4/17/2020), 420 mg (5/8/2020), 420 mg (5/29/2020), 420 mg (6/19/2020), 420 mg (7/10/2020), 420 mg (7/31/2020), 420 mg (8/21/2020), 420 mg (9/11/2020), 420 mg (10/2/2020), 420 mg (10/23/2020), 420 mg (11/13/2020), 420 mg (12/4/2020), 420 mg (12/24/2020), 420 mg (1/15/2021), 420 mg (2/5/2021), 420 mg (2/26/2021), 420 mg (3/19/2021), 420 mg (4/9/2021), 420 mg (4/30/2021), 420 mg (5/21/2021), 420 mg (6/11/2021), 420 mg (7/2/2021), 420 mg (7/23/2021), 420 mg (8/13/2021), 420 mg (9/3/2021), 420 mg (9/24/2021), 420 mg (10/15/2021), 420 mg (11/5/2021), 420 mg (11/26/2021), 420 mg (12/17/2021), 420 mg (1/7/2022), 420 mg (1/28/2022), 420 mg (2/18/2022), 420 mg (3/11/2022), 420 mg (4/1/2022), 420 mg (5/13/2022), 420 mg (6/3/2022), 420 mg (6/28/2022)  trastuzumab (HERCEPTIN) chemo infusion, 6 mg/kg = 589 mg (75 % of original dose 8 mg/kg), Intravenous, Once, 55 of 59 cycles  Dose modification: 6 mg/kg (original dose 8 mg/kg, Cycle 1, Reason: Other (Must fill in a comment))  Administration: 589 mg (5/17/2019), 589 mg (6/7/2019), 600 mg (6/28/2019), 600 mg (7/19/2019), 600 mg (8/30/2019), 600 mg (9/20/2019), 600 mg (8/9/2019), 600 mg (10/9/2019), 600 mg (11/1/2019), 600 mg (11/22/2019), 600 mg (12/13/2019), 600 mg (1/3/2020), 600 mg (1/24/2020), 600 mg (2/14/2020), 600 mg (3/6/2020), 600 mg (3/27/2020), 600 mg (4/17/2020), 600 mg (5/8/2020), 600 mg (5/29/2020), 600 mg (6/19/2020), 600 mg (7/10/2020), 600 mg (7/31/2020), 600 mg (8/21/2020), 600 mg (9/11/2020), 600 mg (10/2/2020), 600 mg (10/23/2020), 600 mg (11/13/2020), 600 mg (12/4/2020), 600 mg (12/24/2020), 600 mg (1/15/2021), 600 mg (2/5/2021), 600 mg (2/26/2021), 600 mg (3/19/2021), 600 mg (4/9/2021), 600 mg (4/30/2021), 600 mg (5/21/2021), 600 mg (6/11/2021), 600 mg (7/2/2021), 600 mg (7/23/2021), 600 mg (8/13/2021), 600 mg (9/3/2021), 600 mg (9/24/2021), 600 mg (10/15/2021), 600 mg (11/5/2021), 600 mg (11/26/2021), 600 mg (12/17/2021), 600 mg (1/7/2022), 600 mg (1/28/2022), 600 mg (2/18/2022), 600 mg (3/11/2022), 600 mg (4/1/2022), 600 mg (5/13/2022), 600 mg (6/3/2022), 600 mg (6/28/2022)     8/16/2019 Surgery    she underwent BSO      7/5/2022 -  Cancer Staged    Staging form: Breast, AJCC 8th Edition  - Clinical: Stage IV (rcTX, cNX, pM1) - Signed by Chantel Murrell MD on 7/5/2022  Stage prefix: Recurrence       Liver metastases ([facility] [address] )   4/27/2018 Initial Diagnosis    Liver metastases ([facility] [address] )     4/17/2019 -  Chemotherapy    pertuzumab (PERJETA) IVPB, 840 mg, Intravenous, Once, 55 of 59 cycles  Administration: 420 mg (5/17/2019), 420 mg (6/7/2019), 420 mg (6/28/2019), 420 mg (7/19/2019), 420 mg (8/30/2019), 420 mg (9/20/2019), 420 mg (8/9/2019), 420 mg (10/9/2019), 420 mg (11/1/2019), 420 mg (11/22/2019), 420 mg (12/13/2019), 420 mg (1/3/2020), 420 mg (1/24/2020), 420 mg (2/14/2020), 420 mg (3/6/2020), 420 mg (3/27/2020), 420 mg (4/17/2020), 420 mg (5/8/2020), 420 mg (5/29/2020), 420 mg (6/19/2020), 420 mg (7/10/2020), 420 mg (7/31/2020), 420 mg (8/21/2020), 420 mg (9/11/2020), 420 mg (10/2/2020), 420 mg (10/23/2020), 420 mg (11/13/2020), 420 mg (12/4/2020), 420 mg (12/24/2020), 420 mg (1/15/2021), 420 mg (2/5/2021), 420 mg (2/26/2021), 420 mg (3/19/2021), 420 mg (4/9/2021), 420 mg (4/30/2021), 420 mg (5/21/2021), 420 mg (6/11/2021), 420 mg (7/2/2021), 420 mg (7/23/2021), 420 mg (8/13/2021), 420 mg (9/3/2021), 420 mg (9/24/2021), 420 mg (10/15/2021), 420 mg (11/5/2021), 420 mg (11/26/2021), 420 mg (12/17/2021), 420 mg (1/7/2022), 420 mg (1/28/2022), 420 mg (2/18/2022), 420 mg (3/11/2022), 420 mg (4/1/2022), 420 mg (5/13/2022), 420 mg (6/3/2022), 420 mg (6/28/2022)  trastuzumab (HERCEPTIN) chemo infusion, 6 mg/kg = 589 mg (75 % of original dose 8 mg/kg), Intravenous, Once, 55 of 59 cycles  Dose modification: 6 mg/kg (original dose 8 mg/kg, Cycle 1, Reason: Other (Must fill in a comment))  Administration: 589 mg (5/17/2019), 589 mg (6/7/2019), 600 mg (6/28/2019), 600 mg (7/19/2019), 600 mg (8/30/2019), 600 mg (9/20/2019), 600 mg (8/9/2019), 600 mg (10/9/2019), 600 mg (11/1/2019), 600 mg (11/22/2019), 600 mg (12/13/2019), 600 mg (1/3/2020), 600 mg (1/24/2020), 600 mg (2/14/2020), 600 mg (3/6/2020), 600 mg (3/27/2020), 600 mg (4/17/2020), 600 mg (5/8/2020), 600 mg (5/29/2020), 600 mg (6/19/2020), 600 mg (7/10/2020), 600 mg (7/31/2020), 600 mg (8/21/2020), 600 mg (9/11/2020), 600 mg (10/2/2020), 600 mg (10/23/2020), 600 mg (11/13/2020), 600 mg (12/4/2020), 600 mg (12/24/2020), 600 mg (1/15/2021), 600 mg (2/5/2021), 600 mg (2/26/2021), 600 mg (3/19/2021), 600 mg (4/9/2021), 600 mg (4/30/2021), 600 mg (5/21/2021), 600 mg (6/11/2021), 600 mg (7/2/2021), 600 mg (7/23/2021), 600 mg (8/13/2021), 600 mg (9/3/2021), 600 mg (9/24/2021), 600 mg (10/15/2021), 600 mg (11/5/2021), 600 mg (11/26/2021), 600 mg (12/17/2021), 600 mg (1/7/2022), 600 mg (1/28/2022), 600 mg (2/18/2022), 600 mg (3/11/2022), 600 mg (4/1/2022), 600 mg (5/13/2022), 600 mg (6/3/2022), 600 mg (6/28/2022)     6/24/2022 Biopsy    Final Diagnosis   A  Liver, core biopsies:     - Metastatic carcinoma compatible with a breast primary          Bone metastasis ([facility] [address] )   4/27/2018 Initial Diagnosis    Bone metastasis ([facility] [address] )     4/17/2019 -  Chemotherapy    pertuzumab (PERJETA) IVPB, 840 mg, Intravenous, Once, 55 of 59 cycles  Administration: 420 mg (5/17/2019), 420 mg (6/7/2019), 420 mg (6/28/2019), 420 mg (7/19/2019), 420 mg (8/30/2019), 420 mg (9/20/2019), 420 mg (8/9/2019), 420 mg (10/9/2019), 420 mg (11/1/2019), 420 mg (11/22/2019), 420 mg (12/13/2019), 420 mg (1/3/2020), 420 mg (1/24/2020), 420 mg (2/14/2020), 420 mg (3/6/2020), 420 mg (3/27/2020), 420 mg (4/17/2020), 420 mg (5/8/2020), 420 mg (5/29/2020), 420 mg (6/19/2020), 420 mg (7/10/2020), 420 mg (7/31/2020), 420 mg (8/21/2020), 420 mg (9/11/2020), 420 mg (10/2/2020), 420 mg (10/23/2020), 420 mg (11/13/2020), 420 mg (12/4/2020), 420 mg (12/24/2020), 420 mg (1/15/2021), 420 mg (2/5/2021), 420 mg (2/26/2021), 420 mg (3/19/2021), 420 mg (4/9/2021), 420 mg (4/30/2021), 420 mg (5/21/2021), 420 mg (6/11/2021), 420 mg (7/2/2021), 420 mg (7/23/2021), 420 mg (8/13/2021), 420 mg (9/3/2021), 420 mg (9/24/2021), 420 mg (10/15/2021), 420 mg (11/5/2021), 420 mg (11/26/2021), 420 mg (12/17/2021), 420 mg (1/7/2022), 420 mg (1/28/2022), 420 mg (2/18/2022), 420 mg (3/11/2022), 420 mg (4/1/2022), 420 mg (5/13/2022), 420 mg (6/3/2022), 420 mg (6/28/2022)  trastuzumab (HERCEPTIN) chemo infusion, 6 mg/kg = 589 mg (75 % of original dose 8 mg/kg), Intravenous, Once, 55 of 59 cycles  Dose modification: 6 mg/kg (original dose 8 mg/kg, Cycle 1, Reason: Other (Must fill in a comment))  Administration: 589 mg (5/17/2019), 589 mg (6/7/2019), 600 mg (6/28/2019), 600 mg (7/19/2019), 600 mg (8/30/2019), 600 mg (9/20/2019), 600 mg (8/9/2019), 600 mg (10/9/2019), 600 mg (11/1/2019), 600 mg (11/22/2019), 600 mg (12/13/2019), 600 mg (1/3/2020), 600 mg (1/24/2020), 600 mg (2/14/2020), 600 mg (3/6/2020), 600 mg (3/27/2020), 600 mg (4/17/2020), 600 mg (5/8/2020), 600 mg (5/29/2020), 600 mg (6/19/2020), 600 mg (7/10/2020), 600 mg (7/31/2020), 600 mg (8/21/2020), 600 mg (9/11/2020), 600 mg (10/2/2020), 600 mg (10/23/2020), 600 mg (11/13/2020), 600 mg (12/4/2020), 600 mg (12/24/2020), 600 mg (1/15/2021), 600 mg (2/5/2021), 600 mg (2/26/2021), 600 mg (3/19/2021), 600 mg (4/9/2021), 600 mg (4/30/2021), 600 mg (5/21/2021), 600 mg (6/11/2021), 600 mg (7/2/2021), 600 mg (7/23/2021), 600 mg (8/13/2021), 600 mg (9/3/2021), 600 mg (9/24/2021), 600 mg (10/15/2021), 600 mg (11/5/2021), 600 mg (11/26/2021), 600 mg (12/17/2021), 600 mg (1/7/2022), 600 mg (1/28/2022), 600 mg (2/18/2022), 600 mg (3/11/2022), 600 mg (4/1/2022), 600 mg (5/13/2022), 600 mg (6/3/2022), 600 mg (6/28/2022)     6/24/2022 Biopsy    Final Diagnosis   A  Liver, core biopsies:     - Metastatic carcinoma compatible with a breast primary  Review of Systems:  Review of Systems   Constitutional: Positive for fatigue (occasional moderate)  HENT: Negative  Eyes: Negative  Respiratory: Negative  Cardiovascular: Negative  Gastrointestinal: Negative  Negative for abdominal pain, blood in stool, constipation, diarrhea, nausea and vomiting  Endocrine: Negative  Genitourinary: Negative  Musculoskeletal: Negative  Skin: Negative  Allergic/Immunologic: Negative  Neurological: Negative  Hematological: Negative  Psychiatric/Behavioral: The patient is nervous/anxious          Clinical Trial: no    Teaching: Received [facility] radiation packet, SBRT information on 6/16/22     Covid Vaccine Status: vaccinated     Health Maintenance   Topic Date Due    Hepatitis C Screening  Never done    Pneumococcal Vaccine: Pediatrics (0 to 5 Years) and At-Risk Patients (6 to 59 Years) (1 - PCV) Never done    HIV Screening  Never done    BMI: Followup Plan  Never done    Annual Physical  Never done    DTaP,Tdap,and Td Vaccines (1 - Tdap) Never done    Cervical Cancer Screening  Never done    Colorectal Cancer Screening  Never done    Breast Cancer Screening: Mammogram  06/12/2021    MAMMOGRAPHY BRCA POSITIVE  06/12/2021    COVID-19 Vaccine (3 - Pfizer risk series) 09/15/2021    Influenza Vaccine (1) 09/01/2022    Depression Screening  06/16/2023    BMI: Adult  06/28/2023    Osteoporosis Screening  Completed    HIB Vaccine  Aged Out    Hepatitis B Vaccine  Aged Out    IPV Vaccine  Aged Out    Hepatitis A Vaccine  Aged Out    Meningococcal ACWY Vaccine  Aged Out    HPV Vaccine  Aged Out     Patient Active Problem List   Diagnosis    Malignant neoplasm of upper-inner quadrant of left female breast ([facility] [address] )    [facility]    [facility] ([facility] )    Port-A-Cath in place    Long term use of drug    BRCA1 gene mutation positive in female    History of bilateral salpingo-oophorectomy (BSO)    S/P BSO (bilateral salpingo-oophorectomy)    Aromatase inhibitor use    Dense breast tissue    Cardiomyopathy due to chemotherapy ([facility] )    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    Encounter for care related to vascular access port    Osteoporosis due to aromatase inhibitor     Past Medical History:   Diagnosis Date    Breast cancer ([facility] )     Hx of radiation therapy     breast and left  hip    Liver metastases ([facility] )     Port-A-Cath in place     right chest    Wears glasses      Past Surgical History:   Procedure Laterality Date    BREAST BIOPSY Left 12/30/2015    BREAST LUMPECTOMY Left 09/16/2016    BREAST SURGERY Left     biopsy    CHOLECYSTECTOMY      CHOLECYSTECTOMY      IR PLACEMENT FIDUCIAL MARKER  6/24/2022    OOPHORECTOMY      PORTACATH PLACEMENT Right     OK LAP,RMV  ADNEXAL STRUCTURE Bilateral 08/16/2019    Procedure: LAPAROSCOPIC SALPINGO-OOPHORECTOMY;  Surgeon: Hayder Vital MD;  Location: [facility];  Service: Gynecology Oncology    SIMPLE MASTECTOMY Left 09/16/2016    Procedure: BREAST PARTIAL MASTECTOMY ;  Surgeon: Nicolás Vega MD;  Location: [facility];  Service:      Family History   Problem Relation Age of Onset    No Known Problems Mother     Heart attack Father     No Known Problems Daughter     No Known Problems Maternal Grandmother     No Known Problems Maternal Grandfather     No Known Problems Paternal Aunt     No Known Problems Paternal Aunt     Lung cancer Maternal Uncle 79     Social History     Socioeconomic History    Marital status: /Civil Union     Spouse name: Not on file    Number of children: Not on file    Years of education: Not on file    Highest education level: Not on file   Occupational History    Not on file   Tobacco Use    Smoking status: Former Smoker     Quit date: 2009     Years since quittin 9    Smokeless tobacco: Never Used   Vaping Use    Vaping Use: Never used   Substance and Sexual Activity    Alcohol use: No    Drug use: No    Sexual activity: Not on file   Other Topics Concern    Not on file   Social History Narrative    Not on file     Social Determinants of Health     Financial Resource Strain: Not on file   Food Insecurity: Not on file   Transportation Needs: Not on file   Physical Activity: Not on file   Stress: Not on file   Social Connections: Not on file   Intimate Partner Violence: Not on file   Housing Stability: Not on file       Current Outpatient Medications:     letrozole ([address] [facility]) 2 5 mg tablet, Take 1 tablet (2 5 mg total) by mouth daily, Disp: 30 tablet, Rfl: 6    multivitamin (THERAGRAN) TABS, Take 1 tablet by mouth 3 (three) times a week , Disp: , Rfl:     pertuzumab (PERJETA) 420 mg/14 mL SOLN, Infuse into a venous catheter every 21 days At infusion  center, Disp: , Rfl:     Trastuzumab (HERCEPTIN IV), Infuse into a venous catheter every 21 days At infusion center, Disp: , Rfl:     VITAMIN D, CHOLECALCIFEROL, PO, Take 1,000 Units by mouth 3 (three) times a week , Disp: , Rfl:   No Known Allergies  There were no vitals filed for this visit

## 2022-07-12 NOTE — PROGRESS NOTES
Follow-up - Radiation Oncology   Alejandrina Barba 1965 49 y o  female [id number]      History of Present Illness   Cancer Staging  Malignant neoplasm of upper-inner quadrant of left female breast ([facility] [address] [address] )  Staging form: Breast, AJCC 8th Edition  - Clinical: Stage IV (rcTX, cNX, pM1) - Signed by Maria G Patton MD on 7/5/2022  Stage prefix: Recurrence    Follow Up  Ms Federico Govea is a 49year old woman initially diagnosed with Stage IV inflammatory breast cancer in 2015 now s/p chemotherapy with good response, on maintenance letrozole/herceptin/perjeta  She has with [facility] on imaging until recently when she developed an enhancing lesion in the caudate lobe of the liver concerning for metastasis  She was seen in initial consult on 6/16/22 for consideration of SBRT  She returns today for follow-up  Interval History:  Since the time of initial consult the patient has undergone IR guided biopsy (6/24/22) of the caudate lobe lesion  Due to preliminary pathology at the time of procedure suggestive of no cancer, no fiducial markers were placed  Nonetheless, final pathology did ultimately show metastatic carcinoma compatible with breast primary  Currently the patient is doing well overall  She continues treatment with letrozole/herceptin/perjeta and remains asymptomatic of her liver lesion  She has no abdominal pain, no nausea, no vomiting, and no weight loss  She has since seen Dr Lucas Pittman with plan to continue systemic therapy at this time  Historical Information   Oncology History   Malignant neoplasm of upper-inner quadrant of left female breast ([facility] [address] [address] )   12/2015 Initial Diagnosis    Malignant neoplasm of upper-outer quadrant of left female breast ([facility] [address] )     2016 -  Hormone Therapy    Tamoxifen  Ended 8/2019  Dr Lucas Pittman  Letrozole 9/2019 1/27/2016 Surgery    Port placement     2/19/2016 - 6/2/2018 Chemotherapy    Taxotere,  herceptin, perjeta, xgeva     After six cycles, taxotere was discontinued and tamoxifen added     Continues with Perjeta and Herceptin every 3 weeks     - Dr Andrey Rasmussen       9/16/2016 Surgery    Left breast partial mastectomy     11/7/2016 - 12/23/2016 Radiation    Plan ID Energy Fractions Dose per Fraction (cGy) Total Dose Delivered (cGy) Elapsed Days   Lt Breast 6X 25 / 25 200 5,000 36   Lt Brst Boost 6X 8 / 8 200 1,600 9   Lt Femur 10X 10 / 10 300 3,000 11   Lt PAB 6X 25 / 25 60 1,500 36   Lt Sclav 6X 25 / 25 200 5,000 36                                   Total dose to left breast lumpectomy cavity: 6600 cGy                   Total dose to the supraclavicular and axillary regions: 5000 cGy       4/17/2019 -  Chemotherapy    pertuzumab (PERJETA) IVPB, 840 mg, Intravenous, Once, 55 of 59 cycles  Administration: 420 mg (5/17/2019), 420 mg (6/7/2019), 420 mg (6/28/2019), 420 mg (7/19/2019), 420 mg (8/30/2019), 420 mg (9/20/2019), 420 mg (8/9/2019), 420 mg (10/9/2019), 420 mg (11/1/2019), 420 mg (11/22/2019), 420 mg (12/13/2019), 420 mg (1/3/2020), 420 mg (1/24/2020), 420 mg (2/14/2020), 420 mg (3/6/2020), 420 mg (3/27/2020), 420 mg (4/17/2020), 420 mg (5/8/2020), 420 mg (5/29/2020), 420 mg (6/19/2020), 420 mg (7/10/2020), 420 mg (7/31/2020), 420 mg (8/21/2020), 420 mg (9/11/2020), 420 mg (10/2/2020), 420 mg (10/23/2020), 420 mg (11/13/2020), 420 mg (12/4/2020), 420 mg (12/24/2020), 420 mg (1/15/2021), 420 mg (2/5/2021), 420 mg (2/26/2021), 420 mg (3/19/2021), 420 mg (4/9/2021), 420 mg (4/30/2021), 420 mg (5/21/2021), 420 mg (6/11/2021), 420 mg (7/2/2021), 420 mg (7/23/2021), 420 mg (8/13/2021), 420 mg (9/3/2021), 420 mg (9/24/2021), 420 mg (10/15/2021), 420 mg (11/5/2021), 420 mg (11/26/2021), 420 mg (12/17/2021), 420 mg (1/7/2022), 420 mg (1/28/2022), 420 mg (2/18/2022), 420 mg (3/11/2022), 420 mg (4/1/2022), 420 mg (5/13/2022), 420 mg (6/3/2022), 420 mg (6/28/2022)  trastuzumab (HERCEPTIN) chemo infusion, 6 mg/kg = 589 mg (75 % of original dose 8 mg/kg), Intravenous, Once, 55 of 59 cycles  Dose modification: 6 mg/kg (original dose 8 mg/kg, Cycle 1, Reason: Other (Must fill in a comment))  Administration: 589 mg (5/17/2019), 589 mg (6/7/2019), 600 mg (6/28/2019), 600 mg (7/19/2019), 600 mg (8/30/2019), 600 mg (9/20/2019), 600 mg (8/9/2019), 600 mg (10/9/2019), 600 mg (11/1/2019), 600 mg (11/22/2019), 600 mg (12/13/2019), 600 mg (1/3/2020), 600 mg (1/24/2020), 600 mg (2/14/2020), 600 mg (3/6/2020), 600 mg (3/27/2020), 600 mg (4/17/2020), 600 mg (5/8/2020), 600 mg (5/29/2020), 600 mg (6/19/2020), 600 mg (7/10/2020), 600 mg (7/31/2020), 600 mg (8/21/2020), 600 mg (9/11/2020), 600 mg (10/2/2020), 600 mg (10/23/2020), 600 mg (11/13/2020), 600 mg (12/4/2020), 600 mg (12/24/2020), 600 mg (1/15/2021), 600 mg (2/5/2021), 600 mg (2/26/2021), 600 mg (3/19/2021), 600 mg (4/9/2021), 600 mg (4/30/2021), 600 mg (5/21/2021), 600 mg (6/11/2021), 600 mg (7/2/2021), 600 mg (7/23/2021), 600 mg (8/13/2021), 600 mg (9/3/2021), 600 mg (9/24/2021), 600 mg (10/15/2021), 600 mg (11/5/2021), 600 mg (11/26/2021), 600 mg (12/17/2021), 600 mg (1/7/2022), 600 mg (1/28/2022), 600 mg (2/18/2022), 600 mg (3/11/2022), 600 mg (4/1/2022), 600 mg (5/13/2022), 600 mg (6/3/2022), 600 mg (6/28/2022)     8/16/2019 Surgery    she underwent BSO      7/5/2022 -  Cancer Staged    Staging form: Breast, AJCC 8th Edition  - Clinical: Stage IV (rcTX, cNX, pM1) - Signed by Danielle Ceja MD on 7/5/2022  Stage prefix: Recurrence       Liver metastases ([facility] [address] )   4/27/2018 Initial Diagnosis    Liver metastases ([facility] [address] )     4/17/2019 -  Chemotherapy    pertuzumab (PERJETA) IVPB, 840 mg, Intravenous, Once, 55 of 59 cycles  Administration: 420 mg (5/17/2019), 420 mg (6/7/2019), 420 mg (6/28/2019), 420 mg (7/19/2019), 420 mg (8/30/2019), 420 mg (9/20/2019), 420 mg (8/9/2019), 420 mg (10/9/2019), 420 mg (11/1/2019), 420 mg (11/22/2019), 420 mg (12/13/2019), 420 mg (1/3/2020), 420 mg (1/24/2020), 420 mg (2/14/2020), 420 mg (3/6/2020), 420 mg (3/27/2020), 420 mg (4/17/2020), 420 mg (5/8/2020), 420 mg (5/29/2020), 420 mg (6/19/2020), 420 mg (7/10/2020), 420 mg (7/31/2020), 420 mg (8/21/2020), 420 mg (9/11/2020), 420 mg (10/2/2020), 420 mg (10/23/2020), 420 mg (11/13/2020), 420 mg (12/4/2020), 420 mg (12/24/2020), 420 mg (1/15/2021), 420 mg (2/5/2021), 420 mg (2/26/2021), 420 mg (3/19/2021), 420 mg (4/9/2021), 420 mg (4/30/2021), 420 mg (5/21/2021), 420 mg (6/11/2021), 420 mg (7/2/2021), 420 mg (7/23/2021), 420 mg (8/13/2021), 420 mg (9/3/2021), 420 mg (9/24/2021), 420 mg (10/15/2021), 420 mg (11/5/2021), 420 mg (11/26/2021), 420 mg (12/17/2021), 420 mg (1/7/2022), 420 mg (1/28/2022), 420 mg (2/18/2022), 420 mg (3/11/2022), 420 mg (4/1/2022), 420 mg (5/13/2022), 420 mg (6/3/2022), 420 mg (6/28/2022)  trastuzumab (HERCEPTIN) chemo infusion, 6 mg/kg = 589 mg (75 % of original dose 8 mg/kg), Intravenous, Once, 55 of 59 cycles  Dose modification: 6 mg/kg (original dose 8 mg/kg, Cycle 1, Reason: Other (Must fill in a comment))  Administration: 589 mg (5/17/2019), 589 mg (6/7/2019), 600 mg (6/28/2019), 600 mg (7/19/2019), 600 mg (8/30/2019), 600 mg (9/20/2019), 600 mg (8/9/2019), 600 mg (10/9/2019), 600 mg (11/1/2019), 600 mg (11/22/2019), 600 mg (12/13/2019), 600 mg (1/3/2020), 600 mg (1/24/2020), 600 mg (2/14/2020), 600 mg (3/6/2020), 600 mg (3/27/2020), 600 mg (4/17/2020), 600 mg (5/8/2020), 600 mg (5/29/2020), 600 mg (6/19/2020), 600 mg (7/10/2020), 600 mg (7/31/2020), 600 mg (8/21/2020), 600 mg (9/11/2020), 600 mg (10/2/2020), 600 mg (10/23/2020), 600 mg (11/13/2020), 600 mg (12/4/2020), 600 mg (12/24/2020), 600 mg (1/15/2021), 600 mg (2/5/2021), 600 mg (2/26/2021), 600 mg (3/19/2021), 600 mg (4/9/2021), 600 mg (4/30/2021), 600 mg (5/21/2021), 600 mg (6/11/2021), 600 mg (7/2/2021), 600 mg (7/23/2021), 600 mg (8/13/2021), 600 mg (9/3/2021), 600 mg (9/24/2021), 600 mg (10/15/2021), 600 mg (11/5/2021), 600 mg (11/26/2021), 600 mg (12/17/2021), 600 mg (1/7/2022), 600 mg (1/28/2022), 600 mg (2/18/2022), 600 mg (3/11/2022), 600 mg (4/1/2022), 600 mg (5/13/2022), 600 mg (6/3/2022), 600 mg (6/28/2022)     6/24/2022 Biopsy    Final Diagnosis   A  Liver, core biopsies:     - Metastatic carcinoma compatible with a breast primary          Bone metastasis ([facility] )   4/27/2018 Initial Diagnosis    Bone metastasis ([facility] [address] )     4/17/2019 -  Chemotherapy    pertuzumab (PERJETA) IVPB, 840 mg, Intravenous, Once, 55 of 59 cycles  Administration: 420 mg (5/17/2019), 420 mg (6/7/2019), 420 mg (6/28/2019), 420 mg (7/19/2019), 420 mg (8/30/2019), 420 mg (9/20/2019), 420 mg (8/9/2019), 420 mg (10/9/2019), 420 mg (11/1/2019), 420 mg (11/22/2019), 420 mg (12/13/2019), 420 mg (1/3/2020), 420 mg (1/24/2020), 420 mg (2/14/2020), 420 mg (3/6/2020), 420 mg (3/27/2020), 420 mg (4/17/2020), 420 mg (5/8/2020), 420 mg (5/29/2020), 420 mg (6/19/2020), 420 mg (7/10/2020), 420 mg (7/31/2020), 420 mg (8/21/2020), 420 mg (9/11/2020), 420 mg (10/2/2020), 420 mg (10/23/2020), 420 mg (11/13/2020), 420 mg (12/4/2020), 420 mg (12/24/2020), 420 mg (1/15/2021), 420 mg (2/5/2021), 420 mg (2/26/2021), 420 mg (3/19/2021), 420 mg (4/9/2021), 420 mg (4/30/2021), 420 mg (5/21/2021), 420 mg (6/11/2021), 420 mg (7/2/2021), 420 mg (7/23/2021), 420 mg (8/13/2021), 420 mg (9/3/2021), 420 mg (9/24/2021), 420 mg (10/15/2021), 420 mg (11/5/2021), 420 mg (11/26/2021), 420 mg (12/17/2021), 420 mg (1/7/2022), 420 mg (1/28/2022), 420 mg (2/18/2022), 420 mg (3/11/2022), 420 mg (4/1/2022), 420 mg (5/13/2022), 420 mg (6/3/2022), 420 mg (6/28/2022)  trastuzumab (HERCEPTIN) chemo infusion, 6 mg/kg = 589 mg (75 % of original dose 8 mg/kg), Intravenous, Once, 55 of 59 cycles  Dose modification: 6 mg/kg (original dose 8 mg/kg, Cycle 1, Reason: Other (Must fill in a comment))  Administration: 589 mg (5/17/2019), 589 mg (6/7/2019), 600 mg (6/28/2019), 600 mg (7/19/2019), 600 mg (8/30/2019), 600 mg (9/20/2019), 600 mg (8/9/2019), 600 mg (10/9/2019), 600 mg (11/1/2019), 600 mg (11/22/2019), 600 mg (12/13/2019), 600 mg (1/3/2020), 600 mg (1/24/2020), 600 mg (2/14/2020), 600 mg (3/6/2020), 600 mg (3/27/2020), 600 mg (4/17/2020), 600 mg (5/8/2020), 600 mg (5/29/2020), 600 mg (6/19/2020), 600 mg (7/10/2020), 600 mg (7/31/2020), 600 mg (8/21/2020), 600 mg (9/11/2020), 600 mg (10/2/2020), 600 mg (10/23/2020), 600 mg (11/13/2020), 600 mg (12/4/2020), 600 mg (12/24/2020), 600 mg (1/15/2021), 600 mg (2/5/2021), 600 mg (2/26/2021), 600 mg (3/19/2021), 600 mg (4/9/2021), 600 mg (4/30/2021), 600 mg (5/21/2021), 600 mg (6/11/2021), 600 mg (7/2/2021), 600 mg (7/23/2021), 600 mg (8/13/2021), 600 mg (9/3/2021), 600 mg (9/24/2021), 600 mg (10/15/2021), 600 mg (11/5/2021), 600 mg (11/26/2021), 600 mg (12/17/2021), 600 mg (1/7/2022), 600 mg (1/28/2022), 600 mg (2/18/2022), 600 mg (3/11/2022), 600 mg (4/1/2022), 600 mg (5/13/2022), 600 mg (6/3/2022), 600 mg (6/28/2022)     6/24/2022 Biopsy    Final Diagnosis   A  Liver, core biopsies:     - Metastatic carcinoma compatible with a breast primary              Past Medical History:   Diagnosis Date    Breast cancer ([facility] )     Hx of radiation therapy     breast and left  hip    Liver metastases ([facility] [address] )     Port-A-Cath in place     right chest    Wears glasses      Past Surgical History:   Procedure Laterality Date    BREAST BIOPSY Left 12/30/2015    BREAST LUMPECTOMY Left 09/16/2016    BREAST SURGERY Left     biopsy    CHOLECYSTECTOMY      CHOLECYSTECTOMY      IR PLACEMENT FIDUCIAL MARKER  6/24/2022    OOPHORECTOMY      PORTACATH PLACEMENT Right     KY LAP,RMV  ADNEXAL STRUCTURE Bilateral 08/16/2019    Procedure: LAPAROSCOPIC SALPINGO-OOPHORECTOMY;  Surgeon: Sam Borjas MD;  Location: [facility];  Service: Gynecology Oncology    SIMPLE MASTECTOMY Left 09/16/2016    Procedure: BREAST PARTIAL MASTECTOMY ;  Surgeon: Jazz Fairbanks MD;  Location: [facility];  Service:        Social History   Social History Substance and Sexual Activity   Alcohol Use No     Social History     Substance and Sexual Activity   Drug Use No     Social History     Tobacco Use   Smoking Status Former Smoker    Quit date: 2009    Years since quittin 9   Smokeless Tobacco Never Used         Meds/Allergies     Current Outpatient Medications:     letrozole (FEMARA) 2 5 mg tablet, Take 1 tablet (2 5 mg total) by mouth daily, Disp: 30 tablet, Rfl: 6    multivitamin (THERAGRAN) TABS, Take 1 tablet by mouth 3 (three) times a week , Disp: , Rfl:     pertuzumab (PERJETA) 420 mg/14 mL SOLN, Infuse into a venous catheter every 21 days At infusion  center, Disp: , Rfl:     Trastuzumab (HERCEPTIN IV), Infuse into a venous catheter every 21 days At infusion center, Disp: , Rfl:     VITAMIN D, CHOLECALCIFEROL, PO, Take 1,000 Units by mouth 3 (three) times a week , Disp: , Rfl:   No Known Allergies    Review of Systems   Constitutional: Positive for fatigue (occasional moderate)  HENT: Negative  Eyes: Negative  Respiratory: Negative  Cardiovascular: Negative  Gastrointestinal: Negative  Negative for abdominal pain, blood in stool, constipation, diarrhea, nausea and vomiting  Endocrine: Negative  Genitourinary: Negative  Musculoskeletal: Negative  Skin: Negative  Allergic/Immunologic: Negative  Neurological: Negative  Hematological: Negative  Psychiatric/Behavioral: The patient is nervous/anxious          OBJECTIVE:   /80 (BP Location: Left arm, Patient Position: Sitting, Cuff Size: Large)   Pulse 60   Temp (!) 97 4 °F (36 3 °C) (Temporal)   Resp 18   Ht 5' 5" (1 651 m)   Wt 96 5 kg (212 lb 12 8 oz)   SpO2 97%   BMI 35 41 kg/m²   Pain Assessment:  0  ECOG/Zubrod/WHO: 0 - Asymptomatic    Physical Exam   Well appearing  NAD  No increased work of breathing  Extremities warm and well perfused  No LE edema  No rashes on visible skin          RESULTS    Lab Results:   Recent Results (from the past 672 hour(s))   Protime-INR    Collection Time: 06/24/22  8:51 AM   Result Value Ref Range    Protime 12 3 11 6 - 14 5 seconds    INR 0 95 0 84 - 1 19   CBC and differential    Collection Time: 06/24/22  8:51 AM   Result Value Ref Range    WBC 8 84 4 31 - 10 16 Thousand/uL    RBC 4 95 3 81 - 5 12 Million/uL    Hemoglobin 13 1 11 5 - 15 4 g/dL    Hematocrit 42 0 34 8 - 46 1 %    MCV 85 82 - 98 fL    MCH 26 5 (L) 26 8 - 34 3 pg    MCHC 31 2 (L) 31 4 - 37 4 g/dL    RDW 14 4 11 6 - 15 1 %    MPV 10 9 8 9 - 12 7 fL    Platelets 166 934 - 933 Thousands/uL    nRBC 0 /100 WBCs    Neutrophils Relative 63 43 - 75 %    Immat GRANS % 0 0 - 2 %    Lymphocytes Relative 26 14 - 44 %    Monocytes Relative 7 4 - 12 %    Eosinophils Relative 3 0 - 6 %    Basophils Relative 1 0 - 1 %    Neutrophils Absolute 5 60 1 85 - 7 62 Thousands/µL    Immature Grans Absolute 0 03 0 00 - 0 20 Thousand/uL    Lymphocytes Absolute 2 27 0 60 - 4 47 Thousands/µL    Monocytes Absolute 0 62 0 17 - 1 22 Thousand/µL    Eosinophils Absolute 0 28 0 00 - 0 61 Thousand/µL    Basophils Absolute 0 04 0 00 - 0 10 Thousands/µL   Comprehensive metabolic panel    Collection Time: 06/24/22  8:51 AM   Result Value Ref Range    Sodium 139 136 - 145 mmol/L    Potassium 4 0 3 5 - 5 3 mmol/L    Chloride 108 100 - 108 mmol/L    CO2 29 21 - 32 mmol/L    ANION GAP 2 (L) 4 - 13 mmol/L    BUN 18 5 - 25 mg/dL    Creatinine 0 94 0 60 - 1 30 mg/dL    Glucose 127 65 - 140 mg/dL    Glucose, Fasting 127 (H) 65 - 99 mg/dL    Calcium 9 3 8 3 - 10 1 mg/dL    Corrected Calcium 9 9 8 3 - 10 1 mg/dL    AST 20 5 - 45 U/L    ALT 48 12 - 78 U/L    Alkaline Phosphatase 96 46 - 116 U/L    Total Protein 7 7 6 4 - 8 2 g/dL    Albumin 3 3 (L) 3 5 - 5 0 g/dL    Total Bilirubin 0 43 0 20 - 1 00 mg/dL    eGFR 68 ml/min/1 73sq m   Tissue Exam    Collection Time: 06/24/22 11:30 AM   Result Value Ref Range    Case Report       Surgical Pathology Report                         Case: [id number] Authorizing Provider:  Basia Ramirez MD   Collected:           06/24/2022 1130              Ordering Location:     [address] [place] [address]      Received:            06/24/2022 [facility]                                                            Pathologist:           Aron Villarreal MD                                                         Specimen:    Liver                                                                                      Final Diagnosis       A  Liver, core biopsies:     - Metastatic carcinoma compatible with a breast primary  Note       On immunostaining, the tumor cells are positive for pankeratin (AE1/AE3), LEXY-3, ER, and GCDFP15 (weak)  They are negative for CK7, CK20 and Mammaglobin  Nonspecific staining for Glyp[place] 3 is noted in some of the tumor  Histologically, it is similar to the patient's previously diagnosed breast carcinoma, [id number]  Dr Norma Blum notified electronically ([employer][email or web address] [email or web address])) on 6/30/22 at 0950 hours  Additional Information       All reported additional testing was performed with appropriately reactive controls  These tests were developed and their performance characteristics determined by [facility] or appropriate performing facility, though some tests may be performed on tissues which have not been validated for performance characteristics (such as staining performed on alcohol exposed cell blocks and decalcified tissues)  Results should be interpreted with caution and in the context of the patients clinical condition  These tests may not be cleared or approved by the [place]  Food and Drug Administration, though the FDA has determined that such clearance or approval is not necessary  These tests are used for clinical purposes and they should not be regarded as investigational or for research   This laboratory has been approved by CLIA 88, designated as a high-complexity laboratory and is qualified to perform these tests     - Interpretation performed at [facility], [address]       Intraoperative Consultation          TPDx:  Rare group of atypical epithelial cells present  Hepatocytes and ductal cells also present  Reviewed by JOSE ALBERTO Busby   - Interpretation performed at [facility], [address]         Gross Description          A  The specimen is received in formalin, labeled with the patient's name and hospital number, and is designated "liver cores x5  The specimen consists of 5 cores of tan, focally red and focally fragmented friable tissue having a diameter of less than 0 1-0 1 cm and ranging in length from 0 8 to 1 2 cm  Entirely submitted  Five cassettes, between sponges  Note: The estimated total formalin fixation time based upon information provided by the submitting clinician and the standard processing schedule is under 72 hours  LCrispina               Imaging Studies:IR placement fiducial marker    Result Date: 6/26/2022  Narrative: Image guided liver biopsy Planned fiducial marker placement, aborted Clinical History: Metastatic breast cancer  Caudate lobe lesion suspicious for metastatic disease with plans for local regional therapy, SBRT  Tissue sampling is required  Fiducial marker placement requested for planning  After discussion with the patient she has elected to proceed with simultaneous biopsy and fiducial marker placement only if preliminary pathology reports suspicious material  Moderate sedation time: 90 minutes Procedure: After explaining the risks and benefits of the procedure to the patient, informed consent was obtained  Survey ulrasound was performed  CT was used to localize the liver mass   Radiation dose length product (DLP) for this visit:  4037 56 mGy      This examination, like all CT scans performed in the [facility], was performed utilizing techniques to minimize radiation dose exposure, including the use of iterative reconstruction and automated exposure control  The overlying skin was prepped and draped in the usual sterile fashion  Local anesthesia was obtained with a 1% lidocaine solution  Using live ultrasound and serial CT guidance, a 17-gauge coaxial needle was advanced through the left hepatic lobe towards  the mass  Ultrasound was used to attempt to avoid large portal and hepatic veins in the route to the mass  The needle was brought just up to the caudate capsule and a biopsy pass fired  The pathology team examined the specimen and no suspicious ventricles identified  I repositioned the needle through the caudate and performed additional passes  Multiple passes reported hepatocytes  On pass 3 a small cluster of atypical cells was identified but after 5 passes there was insufficient diagnostic  certainty to warrant fiducial marker placement  D stat was removed during needle withdrawal   The needle was removed and final imaging performed  Findings: Subtle ill-defined 1 7 cm lesion in the caudate lobe  Needle traversing the left hepatic lobe  Multiple images with postbiopsy changes including D stat material in and around the liver  No hemorrhage  Specimens: 18-gauge core x5 Touch prep x5 The patient tolerated the procedure well without immediate complication     Impression: Impression: Image guided biopsy of the caudate lobe mass  Preliminary rapid on-site pathology evaluation with some atypical cells but insufficient diagnostic certainty to proceed with fiducial marker placement at this time based on prior discussions with the patient   Workstation performed: [id number]           Assessment/Plan:  Orders Placed This Encounter   Procedures    CT chest abdomen pelvis w contrast      We reviewed the results from the patient's recent IR guided biopsy which did confirm the presence of metastatic carcinoma consistent with breast origin  The patient did not undergo fiducial markers based on preliminary pathology per her wishes, but based on the location of her lesion these will not be necessary for accurate targeting and localization with CBCT  We again discussed the risks and benefits of SBRT in the oligometastatic setting of breast cancer including the uncertainties introduced by the reported results of [address]  While these did not show a clear benefit to consolidative SBRT among patients with oligometastatic breast cancer, Her2+ patients were under represented in this trial and additionally this trial allowed for multiple sites of metastatic disease (up to 4)  As such, it may not have adequately represented the particular circumstances of this patient's case  As her last systemic restaging was in 3/2022, I will obtain repeat CT C/A/P with contrast to confirm no other sites of disease before embarking on SBRT to the biopsy proven caudate lobe primary  The patient will return following repeat imaging to reconsider SBRT in early 8/2022  Assuming no other sites of disease are present, I would tentatively recommend SBRT to a dose of 5000cGy in 5 fractions  I will remain available should any questions arise in the interim  Wilian Blevins MD  7/11/2022,9:19 PM    Portions of the record may have been created with voice recognition software   Occasional wrong word or "sound a like" substitutions may have occurred due to the inherent limitations of voice recognition software   Read the chart carefully and recognize, using context, where substitutions have occurred

## 2022-07-22 ENCOUNTER — HOSPITAL ENCOUNTER (OUTPATIENT)
Dept: INFUSION CENTER | Facility: CLINIC | Age: 57
Discharge: HOME | End: 2022-07-22
Payer: COMMERCIAL

## 2022-07-22 VITALS
HEART RATE: 76 BPM | DIASTOLIC BLOOD PRESSURE: 92 MMHG | BODY MASS INDEX: 35.67 KG/M2 | RESPIRATION RATE: 18 BRPM | HEIGHT: 65 IN | TEMPERATURE: 98.7 F | SYSTOLIC BLOOD PRESSURE: 147 MMHG | WEIGHT: 214.07 LBS

## 2022-07-22 DIAGNOSIS — Z90.722 HISTORY OF BILATERAL SALPINGO-OOPHORECTOMY (BSO): ICD-10-CM

## 2022-07-22 DIAGNOSIS — C50.212 MALIGNANT NEOPLASM OF UPPER-INNER QUADRANT OF LEFT BREAST IN FEMALE, ESTROGEN RECEPTOR POSITIVE (HCC): ICD-10-CM

## 2022-07-22 DIAGNOSIS — C79.51 BONE METASTASIS (HCC): Primary | ICD-10-CM

## 2022-07-22 DIAGNOSIS — Z90.79 HISTORY OF BILATERAL SALPINGO-OOPHORECTOMY (BSO): ICD-10-CM

## 2022-07-22 DIAGNOSIS — C78.7 LIVER METASTASES (HCC): ICD-10-CM

## 2022-07-22 DIAGNOSIS — Z17.0 MALIGNANT NEOPLASM OF UPPER-INNER QUADRANT OF LEFT BREAST IN FEMALE, ESTROGEN RECEPTOR POSITIVE (HCC): ICD-10-CM

## 2022-07-22 PROCEDURE — 96417 CHEMO IV INFUS EACH ADDL SEQ: CPT

## 2022-07-22 PROCEDURE — 96413 CHEMO IV INFUSION 1 HR: CPT

## 2022-07-22 RX ORDER — SODIUM CHLORIDE 9 MG/ML
20 INJECTION, SOLUTION INTRAVENOUS ONCE
Status: COMPLETED | OUTPATIENT
Start: 2022-07-22 | End: 2022-07-22

## 2022-07-22 RX ORDER — SODIUM CHLORIDE 9 MG/ML
20 INJECTION, SOLUTION INTRAVENOUS ONCE
Status: CANCELLED | OUTPATIENT
Start: 2022-07-22

## 2022-07-22 RX ADMIN — PERTUZUMAB 420 MG: 30 INJECTION, SOLUTION, CONCENTRATE INTRAVENOUS at 14:44

## 2022-07-22 RX ADMIN — SODIUM CHLORIDE 20 ML/HR: 0.9 INJECTION, SOLUTION INTRAVENOUS at 14:43

## 2022-07-22 RX ADMIN — TRASTUZUMAB 600 MG: 150 INJECTION, POWDER, LYOPHILIZED, FOR SOLUTION INTRAVENOUS at 15:18

## 2022-07-22 NOTE — PROGRESS NOTES
Pt tolerated perjeta, herceptin today without incident  Pt declined AVS but is aware of future appts

## 2022-08-02 ENCOUNTER — HOSPITAL ENCOUNTER (OUTPATIENT)
Dept: CT IMAGING | Facility: HOSPITAL | Age: 57
Discharge: HOME | End: 2022-08-02
Attending: STUDENT IN AN ORGANIZED HEALTH CARE EDUCATION/TRAINING PROGRAM
Payer: COMMERCIAL

## 2022-08-02 DIAGNOSIS — C78.7 LIVER METASTASES (HCC): ICD-10-CM

## 2022-08-02 PROCEDURE — 74177 CT ABD & PELVIS W/CONTRAST: CPT

## 2022-08-02 PROCEDURE — G1004 CDSM NDSC: HCPCS

## 2022-08-02 PROCEDURE — 71260 CT THORAX DX C+: CPT

## 2022-08-02 RX ADMIN — IOHEXOL 60 ML: 350 INJECTION, SOLUTION INTRAVENOUS at 14:31

## 2022-08-04 ENCOUNTER — CLINICAL SUPPORT (OUTPATIENT)
Dept: RADIATION ONCOLOGY | Facility: HOSPITAL | Age: 57
End: 2022-08-04
Attending: STUDENT IN AN ORGANIZED HEALTH CARE EDUCATION/TRAINING PROGRAM
Payer: COMMERCIAL

## 2022-08-04 ENCOUNTER — TELEMEDICINE (OUTPATIENT)
Dept: RADIATION ONCOLOGY | Facility: HOSPITAL | Age: 57
End: 2022-08-04
Attending: STUDENT IN AN ORGANIZED HEALTH CARE EDUCATION/TRAINING PROGRAM

## 2022-08-04 DIAGNOSIS — C78.7 LIVER METASTASES (HCC): Primary | ICD-10-CM

## 2022-08-04 PROCEDURE — 99443 PR PHYS/QHP TELEPHONE EVALUATION 21-30 MIN: CPT | Performed by: STUDENT IN AN ORGANIZED HEALTH CARE EDUCATION/TRAINING PROGRAM

## 2022-08-04 NOTE — PROGRESS NOTES
Virtual Brief Visit    Problem List Items Addressed This Visit    None               Reason for visit is follow up  Encounter provider Ko Sharif RN    Provider located at [address] [id number]      Recent Visits  No visits were found meeting these conditions  Showing recent visits within past 7 days and meeting all other requirements  Future Appointments  No visits were found meeting these conditions  Showing future appointments within next 150 days and meeting all other requirements       After connecting through Telephone, the patient was identified by name and date of birth  Sola Keith was informed that this is a telemedicine visit and that the visit is being conducted through Telephone  My office door was closed  The patient was notified the following individuals were present in the room Carol Molina RN  She acknowledged consent and understanding of privacy and security of the video platform  The patient has agreed to participate and understands they can discontinue the visit at any time  Patient is aware this is a billable service  Subjective  Sola Keith is a 49 y o  female initially diagnosed with stage IV inflammatory breast cancer in 2015, now s/p chemotherapy and letrozole/herceptin/perjeta with good response to treatment  She has been monitored with TARUN on surveillance imaging until more recently being found to have an enhancing lesion in the caudate lobe of the liver compatible with a new site of metastatic disease  Pt was here on last on 07/11/22 for her limited consult  She has a telemedicine visit to discuss the result of her CT scan  08/02/22 - CT chest abdomen pelvis w contrast  Known metastatic poorly marginated caudate lobe lesion which measures 2 4 x 2 0 cm compared to 2 7 x 2 0 cm  No other lesion appreciated      Unchanged known left adrenal adenoma  Unchanged / less prominent posterior right lower lobe 2 mm nodules with no new nodule  Upcomin22 - Hem Onc, Proothi        Past Medical History:   Diagnosis Date    [facility] [address] [facility]     Hx of radiation therapy     breast and left  hip    Liver metastases ([facility] [address] [facility]     Port-A-Cath in place     right chest    Wears glasses        Past Surgical History:   Procedure Laterality Date    BREAST BIOPSY Left 2015    BREAST LUMPECTOMY Left 2016    BREAST SURGERY Left     biopsy    CHOLECYSTECTOMY      CHOLECYSTECTOMY      IR PLACEMENT FIDUCIAL MARKER  2022    OOPHORECTOMY      PORTACATH PLACEMENT Right     DE LAP,RMV  ADNEXAL STRUCTURE Bilateral 2019    Procedure: LAPAROSCOPIC SALPINGO-OOPHORECTOMY;  Surgeon: Maud Carrel, MD;  [facility];  Service: Gynecology Oncology    SIMPLE MASTECTOMY Left 2016    Procedure: BREAST PARTIAL MASTECTOMY ;  Surgeon: Abigail Mancia MD;  [facility];  Service:        Current Outpatient Medications   Medication Sig Dispense Refill    letrozole ([address] [facility]) 2 5 mg tablet Take 1 tablet (2 5 mg total) by mouth daily 30 tablet 6    multivitamin (THERAGRAN) TABS Take 1 tablet by mouth 3 (three) times a week       pertuzumab (PERJETA) 420 mg/14 mL SOLN Infuse into a venous catheter every 21 days At infusion  center      Trastuzumab (HERCEPTIN IV) Infuse into a venous catheter every 21 days At infusion center      VITAMIN D, CHOLECALCIFEROL, PO Take 1,000 Units by mouth 3 (three) times a week        No current facility-administered medications for this visit  No Known Allergies    I spent *** minutes with the patient during this visit  Follow up visit     Oncology History Overview Note   Ms Ladi Collins is a 49year old woman initially diagnosed with stage IV inflammatory breast cancer in , now s/p chemotherapy and letrozole/herceptin/perjeta with good response to treatment   She has been monitored with TARUN on surveillance imaging until more recently being found to have an enhancing lesion in the caudate lobe of the liver compatible with a new site of metastatic disease  Pt was here on last on 22 for her limited consult  She has a telemedicine visit to discuss the result of her CT scan  22 - CT chest abdomen pelvis w contrast  Known metastatic poorly marginated caudate lobe lesion which measures 2 4 x 2 0 cm compared to 2 7 x 2 0 cm  No other lesion appreciated  Unchanged known left adrenal adenoma  Unchanged / less prominent posterior right lower lobe 2 mm nodules with no new nodule  Upcomin22 - Hem Onc, Proothi       Malignant neoplasm of upper-inner quadrant of left female breast ([facility] [address] )   2015 Initial Diagnosis    Malignant neoplasm of upper-outer quadrant of left female breast ([facility] [address] )      -  Hormone Therapy    Tamoxifen  Ended 2019  Dr Halina Pratt  Letrozole 2016 Surgery    Port placement     2016 - 2018 Chemotherapy    Taxotere,  herceptin, perjeta, xgeva  After six cycles, taxotere was discontinued and tamoxifen added     Continues with Perjeta and Herceptin every 3 weeks     - Dr Halina Pratt       2016 Surgery    Left breast partial mastectomy     2016 - 2016 Radiation    Plan ID Energy Fractions Dose per Fraction (cGy) Total Dose Delivered (cGy) Elapsed Days   Lt Breast 6X 25 / 25 200 5,000 36   Lt Brst Boost 6X 8 /  200 1,600 9   Lt Femur 10X 10 / 10 300 3,000 11   Lt PAB 6X 25 / 25 60 1,500 36   Lt Sclav 6X 25 / 25 200 5,000 36                                   Total dose to left breast lumpectomy cavity: 6600 cGy                   Total dose to the supraclavicular and axillary regions: 5000 cGy       2019 -  Chemotherapy    pertuzumab (PERJETA) IVPB, 840 mg, Intravenous, Once, 56 of 59 cycles  Administration: 420 mg (2019), 420 mg (2019), 420 mg (2019), 420 mg (2019), 420 mg (2019), 420 mg (9/20/2019), 420 mg (8/9/2019), 420 mg (10/9/2019), 420 mg (11/1/2019), 420 mg (11/22/2019), 420 mg (12/13/2019), 420 mg (1/3/2020), 420 mg (1/24/2020), 420 mg (2/14/2020), 420 mg (3/6/2020), 420 mg (3/27/2020), 420 mg (4/17/2020), 420 mg (5/8/2020), 420 mg (5/29/2020), 420 mg (6/19/2020), 420 mg (7/10/2020), 420 mg (7/31/2020), 420 mg (8/21/2020), 420 mg (9/11/2020), 420 mg (10/2/2020), 420 mg (10/23/2020), 420 mg (11/13/2020), 420 mg (12/4/2020), 420 mg (12/24/2020), 420 mg (1/15/2021), 420 mg (2/5/2021), 420 mg (2/26/2021), 420 mg (3/19/2021), 420 mg (4/9/2021), 420 mg (4/30/2021), 420 mg (5/21/2021), 420 mg (6/11/2021), 420 mg (7/2/2021), 420 mg (7/23/2021), 420 mg (8/13/2021), 420 mg (9/3/2021), 420 mg (9/24/2021), 420 mg (10/15/2021), 420 mg (11/5/2021), 420 mg (11/26/2021), 420 mg (12/17/2021), 420 mg (1/7/2022), 420 mg (1/28/2022), 420 mg (2/18/2022), 420 mg (3/11/2022), 420 mg (4/1/2022), 420 mg (5/13/2022), 420 mg (6/3/2022), 420 mg (6/28/2022), 420 mg (7/22/2022)  trastuzumab (HERCEPTIN) chemo infusion, 6 mg/kg = 589 mg (75 % of original dose 8 mg/kg), Intravenous, Once, 56 of 59 cycles  Dose modification: 6 mg/kg (original dose 8 mg/kg, Cycle 1, Reason: Other (Must fill in a comment))  Administration: 589 mg (5/17/2019), 589 mg (6/7/2019), 600 mg (6/28/2019), 600 mg (7/19/2019), 600 mg (8/30/2019), 600 mg (9/20/2019), 600 mg (8/9/2019), 600 mg (10/9/2019), 600 mg (11/1/2019), 600 mg (11/22/2019), 600 mg (12/13/2019), 600 mg (1/3/2020), 600 mg (1/24/2020), 600 mg (2/14/2020), 600 mg (3/6/2020), 600 mg (3/27/2020), 600 mg (4/17/2020), 600 mg (5/8/2020), 600 mg (5/29/2020), 600 mg (6/19/2020), 600 mg (7/10/2020), 600 mg (7/31/2020), 600 mg (8/21/2020), 600 mg (9/11/2020), 600 mg (10/2/2020), 600 mg (10/23/2020), 600 mg (11/13/2020), 600 mg (12/4/2020), 600 mg (12/24/2020), 600 mg (1/15/2021), 600 mg (2/5/2021), 600 mg (2/26/2021), 600 mg (3/19/2021), 600 mg (4/9/2021), 600 mg (4/30/2021), 600 mg (5/21/2021), 600 mg (6/11/2021), 600 mg (7/2/2021), 600 mg (7/23/2021), 600 mg (8/13/2021), 600 mg (9/3/2021), 600 mg (9/24/2021), 600 mg (10/15/2021), 600 mg (11/5/2021), 600 mg (11/26/2021), 600 mg (12/17/2021), 600 mg (1/7/2022), 600 mg (1/28/2022), 600 mg (2/18/2022), 600 mg (3/11/2022), 600 mg (4/1/2022), 600 mg (5/13/2022), 600 mg (6/3/2022), 600 mg (6/28/2022), 600 mg (7/22/2022)     8/16/2019 Surgery    she underwent BSO      7/5/2022 -  Cancer Staged    Staging form: Breast, AJCC 8th Edition  - Clinical: Stage IV (rcTX, cNX, pM1) - Signed by Case Ruvalcaba MD on 7/5/2022  Stage prefix: Recurrence       Liver metastases ([facility] [address] )   4/27/2018 Initial Diagnosis    Liver metastases ([facility] [address] )     4/17/2019 -  Chemotherapy    pertuzumab (PERJETA) IVPB, 840 mg, Intravenous, Once, 56 of 59 cycles  Administration: 420 mg (5/17/2019), 420 mg (6/7/2019), 420 mg (6/28/2019), 420 mg (7/19/2019), 420 mg (8/30/2019), 420 mg (9/20/2019), 420 mg (8/9/2019), 420 mg (10/9/2019), 420 mg (11/1/2019), 420 mg (11/22/2019), 420 mg (12/13/2019), 420 mg (1/3/2020), 420 mg (1/24/2020), 420 mg (2/14/2020), 420 mg (3/6/2020), 420 mg (3/27/2020), 420 mg (4/17/2020), 420 mg (5/8/2020), 420 mg (5/29/2020), 420 mg (6/19/2020), 420 mg (7/10/2020), 420 mg (7/31/2020), 420 mg (8/21/2020), 420 mg (9/11/2020), 420 mg (10/2/2020), 420 mg (10/23/2020), 420 mg (11/13/2020), 420 mg (12/4/2020), 420 mg (12/24/2020), 420 mg (1/15/2021), 420 mg (2/5/2021), 420 mg (2/26/2021), 420 mg (3/19/2021), 420 mg (4/9/2021), 420 mg (4/30/2021), 420 mg (5/21/2021), 420 mg (6/11/2021), 420 mg (7/2/2021), 420 mg (7/23/2021), 420 mg (8/13/2021), 420 mg (9/3/2021), 420 mg (9/24/2021), 420 mg (10/15/2021), 420 mg (11/5/2021), 420 mg (11/26/2021), 420 mg (12/17/2021), 420 mg (1/7/2022), 420 mg (1/28/2022), 420 mg (2/18/2022), 420 mg (3/11/2022), 420 mg (4/1/2022), 420 mg (5/13/2022), 420 mg (6/3/2022), 420 mg (6/28/2022), 420 mg (7/22/2022)  trastuzumab (HERCEPTIN) chemo infusion, 6 mg/kg = 589 mg (75 % of original dose 8 mg/kg), Intravenous, Once, 56 of 59 cycles  Dose modification: 6 mg/kg (original dose 8 mg/kg, Cycle 1, Reason: Other (Must fill in a comment))  Administration: 589 mg (5/17/2019), 589 mg (6/7/2019), 600 mg (6/28/2019), 600 mg (7/19/2019), 600 mg (8/30/2019), 600 mg (9/20/2019), 600 mg (8/9/2019), 600 mg (10/9/2019), 600 mg (11/1/2019), 600 mg (11/22/2019), 600 mg (12/13/2019), 600 mg (1/3/2020), 600 mg (1/24/2020), 600 mg (2/14/2020), 600 mg (3/6/2020), 600 mg (3/27/2020), 600 mg (4/17/2020), 600 mg (5/8/2020), 600 mg (5/29/2020), 600 mg (6/19/2020), 600 mg (7/10/2020), 600 mg (7/31/2020), 600 mg (8/21/2020), 600 mg (9/11/2020), 600 mg (10/2/2020), 600 mg (10/23/2020), 600 mg (11/13/2020), 600 mg (12/4/2020), 600 mg (12/24/2020), 600 mg (1/15/2021), 600 mg (2/5/2021), 600 mg (2/26/2021), 600 mg (3/19/2021), 600 mg (4/9/2021), 600 mg (4/30/2021), 600 mg (5/21/2021), 600 mg (6/11/2021), 600 mg (7/2/2021), 600 mg (7/23/2021), 600 mg (8/13/2021), 600 mg (9/3/2021), 600 mg (9/24/2021), 600 mg (10/15/2021), 600 mg (11/5/2021), 600 mg (11/26/2021), 600 mg (12/17/2021), 600 mg (1/7/2022), 600 mg (1/28/2022), 600 mg (2/18/2022), 600 mg (3/11/2022), 600 mg (4/1/2022), 600 mg (5/13/2022), 600 mg (6/3/2022), 600 mg (6/28/2022), 600 mg (7/22/2022)     6/24/2022 Biopsy    Final Diagnosis   A  Liver, core biopsies:     - Metastatic carcinoma compatible with a breast primary          Bone metastasis ([facility] [address] )   4/27/2018 Initial Diagnosis    Bone metastasis ([facility] [address] )     4/17/2019 -  Chemotherapy    pertuzumab (PERJETA) IVPB, 840 mg, Intravenous, Once, 56 of 59 cycles  Administration: 420 mg (5/17/2019), 420 mg (6/7/2019), 420 mg (6/28/2019), 420 mg (7/19/2019), 420 mg (8/30/2019), 420 mg (9/20/2019), 420 mg (8/9/2019), 420 mg (10/9/2019), 420 mg (11/1/2019), 420 mg (11/22/2019), 420 mg (12/13/2019), 420 mg (1/3/2020), 420 mg (1/24/2020), 420 mg (2/14/2020), 420 mg (3/6/2020), 420 mg (3/27/2020), 420 mg (4/17/2020), 420 mg (5/8/2020), 420 mg (5/29/2020), 420 mg (6/19/2020), 420 mg (7/10/2020), 420 mg (7/31/2020), 420 mg (8/21/2020), 420 mg (9/11/2020), 420 mg (10/2/2020), 420 mg (10/23/2020), 420 mg (11/13/2020), 420 mg (12/4/2020), 420 mg (12/24/2020), 420 mg (1/15/2021), 420 mg (2/5/2021), 420 mg (2/26/2021), 420 mg (3/19/2021), 420 mg (4/9/2021), 420 mg (4/30/2021), 420 mg (5/21/2021), 420 mg (6/11/2021), 420 mg (7/2/2021), 420 mg (7/23/2021), 420 mg (8/13/2021), 420 mg (9/3/2021), 420 mg (9/24/2021), 420 mg (10/15/2021), 420 mg (11/5/2021), 420 mg (11/26/2021), 420 mg (12/17/2021), 420 mg (1/7/2022), 420 mg (1/28/2022), 420 mg (2/18/2022), 420 mg (3/11/2022), 420 mg (4/1/2022), 420 mg (5/13/2022), 420 mg (6/3/2022), 420 mg (6/28/2022), 420 mg (7/22/2022)  trastuzumab (HERCEPTIN) chemo infusion, 6 mg/kg = 589 mg (75 % of original dose 8 mg/kg), Intravenous, Once, 56 of 59 cycles  Dose modification: 6 mg/kg (original dose 8 mg/kg, Cycle 1, Reason: Other (Must fill in a comment))  Administration: 589 mg (5/17/2019), 589 mg (6/7/2019), 600 mg (6/28/2019), 600 mg (7/19/2019), 600 mg (8/30/2019), 600 mg (9/20/2019), 600 mg (8/9/2019), 600 mg (10/9/2019), 600 mg (11/1/2019), 600 mg (11/22/2019), 600 mg (12/13/2019), 600 mg (1/3/2020), 600 mg (1/24/2020), 600 mg (2/14/2020), 600 mg (3/6/2020), 600 mg (3/27/2020), 600 mg (4/17/2020), 600 mg (5/8/2020), 600 mg (5/29/2020), 600 mg (6/19/2020), 600 mg (7/10/2020), 600 mg (7/31/2020), 600 mg (8/21/2020), 600 mg (9/11/2020), 600 mg (10/2/2020), 600 mg (10/23/2020), 600 mg (11/13/2020), 600 mg (12/4/2020), 600 mg (12/24/2020), 600 mg (1/15/2021), 600 mg (2/5/2021), 600 mg (2/26/2021), 600 mg (3/19/2021), 600 mg (4/9/2021), 600 mg (4/30/2021), 600 mg (5/21/2021), 600 mg (6/11/2021), 600 mg (7/2/2021), 600 mg (7/23/2021), 600 mg (8/13/2021), 600 mg (9/3/2021), 600 mg (9/24/2021), 600 mg (10/15/2021), 600 mg (11/5/2021), 600 mg (11/26/2021), 600 mg (12/17/2021), 600 mg (1/7/2022), 600 mg (1/28/2022), 600 mg (2/18/2022), 600 mg (3/11/2022), 600 mg (4/1/2022), 600 mg (5/13/2022), 600 mg (6/3/2022), 600 mg (6/28/2022), 600 mg (7/22/2022)     6/24/2022 Biopsy    Final Diagnosis   A  Liver, core biopsies:     - Metastatic carcinoma compatible with a breast primary  Review of Systems:  Review of Systems   Constitutional: Positive for fatigue (occasional moderate)  HENT: Negative  Eyes: Negative  Respiratory: Negative  Cardiovascular: Negative  Gastrointestinal: Negative  Negative for abdominal pain, blood in stool, constipation, diarrhea, nausea and vomiting  Endocrine: Negative  Genitourinary: Negative  Musculoskeletal: Negative  Skin: Negative  Allergic/Immunologic: Negative  Neurological: Positive for numbness (occasional neuropathy hands and feet )  Hematological: Negative  Psychiatric/Behavioral: Negative          Clinical Trial: no      Teaching:     Covid Vaccine Status: Vaccinated    Health Maintenance   Topic Date Due    Hepatitis C Screening  Never done    Pneumococcal Vaccine: Pediatrics (0 to 5 Years) and At-Risk Patients (6 to 59 Years) (1 - PCV) Never done    HIV Screening  Never done    BMI: Followup Plan  Never done    Annual Physical  Never done    DTaP,Tdap,and Td Vaccines (1 - Tdap) Never done    Cervical Cancer Screening  Never done    Colorectal Cancer Screening  Never done    Breast Cancer Screening: Mammogram  06/12/2021    MAMMOGRAPHY BRCA POSITIVE  06/12/2021    COVID-19 Vaccine (3 - Pfizer risk series) 09/15/2021    Influenza Vaccine (1) 09/01/2022    Depression Screening  07/11/2023    BMI: Adult  07/22/2023    Osteoporosis Screening  Completed    HIB Vaccine  Aged Out    Hepatitis B Vaccine  Aged Out    IPV Vaccine  Aged Out    Hepatitis A Vaccine  Aged Out    Meningococcal ACWY Vaccine  Aged Out    HPV Vaccine  Aged Out     Patient Active Problem List   Diagnosis    Malignant neoplasm of upper-inner quadrant of left female breast ([facility] [address]    [facility]    [facility]    Port-A-Cath in place    Long term use of drug    BRCA1 gene mutation positive in female    History of bilateral salpingo-oophorectomy (BSO)    S/P BSO (bilateral salpingo-oophorectomy)    Aromatase inhibitor use    Dense breast tissue    Cardiomyopathy due to chemotherapy [facility]    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    Encounter for care related to vascular access port    Osteoporosis due to aromatase inhibitor     Past Medical History:   Diagnosis Date    [facility]     Hx of radiation therapy     breast and left  hip    Liver metastases ([facility] [address] [facility]     Port-A-Cath in place     right chest    Wears glasses      Past Surgical History:   Procedure Laterality Date    BREAST BIOPSY Left 12/30/2015    BREAST LUMPECTOMY Left 09/16/2016    BREAST SURGERY Left     biopsy    CHOLECYSTECTOMY      CHOLECYSTECTOMY      IR PLACEMENT FIDUCIAL MARKER  6/24/2022    OOPHORECTOMY      PORTACATH PLACEMENT Right     GA LAP,RMV  ADNEXAL STRUCTURE Bilateral 08/16/2019    Procedure: LAPAROSCOPIC SALPINGO-OOPHORECTOMY;  Surgeon: Ritu Perry MD;  [facility];  Service: Gynecology Oncology    SIMPLE MASTECTOMY Left 09/16/2016    Procedure: BREAST PARTIAL MASTECTOMY ;  Surgeon: Tamiko Hoover MD;  [facility];  Service:      Family History   Problem Relation Age of Onset    No Known Problems Mother     Heart attack Father     No Known Problems Daughter     No Known Problems Maternal Grandmother     No Known Problems Maternal Grandfather     No Known Problems Paternal Aunt     No Known Problems Paternal Aunt     Lung cancer Maternal Uncle 79     Social History     Socioeconomic History    Marital status: /Civil Union     Spouse name: Not on file    Number of children: Not on file    Years of education: Not on file   Medstro education level: Not on file   Occupational History    Not on file   Tobacco Use    Smoking status: Former Smoker     Quit date: 2009     Years since quittin 9    Smokeless tobacco: Never Used   Vaping Use    Vaping Use: Never used   Substance and Sexual Activity    Alcohol use: No    Drug use: No    Sexual activity: Not on file   Other Topics Concern    Not on file   Social History Narrative    Not on file     Social Determinants of Health     Financial Resource Strain: Not on file   Food Insecurity: Not on file   Transportation Needs: Not on file   Physical Activity: Not on file   Stress: Not on file   Social Connections: Not on file   Intimate Partner Violence: Not on file   Housing Stability: Not on file       Current Outpatient Medications:     letrozole ([address] [facility]) 2 5 mg tablet, Take 1 tablet (2 5 mg total) by mouth daily, Disp: 30 tablet, Rfl: 6    multivitamin (THERAGRAN) TABS, Take 1 tablet by mouth 3 (three) times a week , Disp: , Rfl:     pertuzumab (PERJETA) 420 mg/14 mL SOLN, Infuse into a venous catheter every 21 days At infusion  center, Disp: , Rfl:     Trastuzumab (HERCEPTIN IV), Infuse into a venous catheter every 21 days At infusion center, Disp: , Rfl:     VITAMIN D, CHOLECALCIFEROL, PO, Take 1,000 Units by mouth 3 (three) times a week , Disp: , Rfl:   No Known Allergies  There were no vitals filed for this visit

## 2022-08-04 NOTE — PROGRESS NOTES
Virtual Brief Visit    Problem List Items Addressed This Visit    None               Reason for visit is follow up  Encounter provider Basia Ramirez MD    Provider located at [address] [address]      Recent Visits  No visits were found meeting these conditions  Showing recent visits within past 7 days and meeting all other requirements  Today's Visits  Date Type Provider Dept   08/04/22 Telemedicine Basia Ramirez MD [facility]   Showing today's visits and meeting all other requirements  Future Appointments  No visits were found meeting these conditions  Showing future appointments within next 150 days and meeting all other requirements       After connecting through Telephone, the patient was identified by name and date of birth  Kian Betancur was informed that this is a telemedicine visit and that the visit is being conducted through Telephone  My office door was closed  The patient was notified the following individuals were present in the room Stacey Watkins RN  She acknowledged consent and understanding of privacy and security of the video platform  The patient has agreed to participate and understands they can discontinue the visit at any time  Patient is aware this is a billable service  Subjective  Kian Betancur is a 49 y o  female  initially diagnosed with stage IV inflammatory breast cancer in 2015, now s/p chemotherapy and letrozole/herceptin/perjeta with good response to treatment  She has been monitored with TARUN on surveillance imaging until more recently being found to have an enhancing lesion in the caudate lobe of the liver compatible with a new site of metastatic disease  Pt was here on last on 07/11/22 for her limited consult   She has a telemedicine visit to discuss the result of her CT scan      08/02/22 - CT chest abdomen pelvis w contrast  Known metastatic poorly marginated caudate lobe lesion which measures 2 4 x 2 0 cm compared to 2 7 x 2 0 cm   No other lesion appreciated     Unchanged known left adrenal adenoma  Unchanged / less prominent posterior right lower lobe 2 mm nodules with no new nodule      Upcomin22 - Hem Onc, Proothi         Past Medical History:   Diagnosis Date    [facility]     Hx of radiation therapy     breast and left  hip    Liver metastases ([facility] )     Port-A-Cath in place     right chest    Wears glasses        Past Surgical History:   Procedure Laterality Date    BREAST BIOPSY Left 2015    BREAST LUMPECTOMY Left 2016    BREAST SURGERY Left     biopsy    CHOLECYSTECTOMY      CHOLECYSTECTOMY      IR PLACEMENT FIDUCIAL MARKER  2022    OOPHORECTOMY      PORTACATH PLACEMENT Right     VA LAP,RMV  ADNEXAL STRUCTURE Bilateral 2019    Procedure: LAPAROSCOPIC SALPINGO-OOPHORECTOMY;  Surgeon: Eric Lyon MD;  [facility];  Service: Gynecology Oncology    SIMPLE MASTECTOMY Left 2016    Procedure: BREAST PARTIAL MASTECTOMY ;  Surgeon: Amy Clemons MD;  [facility];  Service:        Current Outpatient Medications   Medication Sig Dispense Refill    letrozole ([address]) 2 5 mg tablet Take 1 tablet (2 5 mg total) by mouth daily 30 tablet 6    multivitamin (THERAGRAN) TABS Take 1 tablet by mouth 3 (three) times a week       pertuzumab (PERJETA) 420 mg/14 mL SOLN Infuse into a venous catheter every 21 days At infusion  center      Trastuzumab (HERCEPTIN IV) Infuse into a venous catheter every 21 days At infusion center      VITAMIN D, CHOLECALCIFEROL, PO Take 1,000 Units by mouth 3 (three) times a week        No current facility-administered medications for this visit  No Known Allergies    I spent  minutes with the patient during this visit      Follow up visit     Oncology History   Malignant neoplasm of upper-inner quadrant of left female breast ([facility] )   12/2015 Initial Diagnosis    Malignant neoplasm of upper-outer quadrant of left female breast ([facility] )     2016 -  Hormone Therapy    Tamoxifen  Ended 8/2019  Dr Marylene Bottom  Letrozole 9/2019 1/27/2016 Surgery    Port placement     2/19/2016 - 6/2/2018 Chemotherapy    Taxotere,  herceptin, perjeta, xgeva  After six cycles, taxotere was discontinued and tamoxifen added     Continues with Perjeta and Herceptin every 3 weeks     - Dr Marylene Bottom       9/16/2016 Surgery    Left breast partial mastectomy     11/7/2016 - 12/23/2016 Radiation    Plan ID Energy Fractions Dose per Fraction (cGy) Total Dose Delivered (cGy) Elapsed Days   Lt Breast 6X 25 / 25 200 5,000 36   Lt Brst Boost 6X 8 / 8 200 1,600 9   Lt Femur 10X 10 / 10 300 3,000 11   Lt PAB 6X 25 / 25 60 1,500 36   Lt Sclav 6X 25 / 25 200 5,000 36                                   Total dose to left breast lumpectomy cavity: 6600 cGy                   Total dose to the supraclavicular and axillary regions: 5000 cGy       4/17/2019 -  Chemotherapy    pertuzumab (PERJETA) IVPB, 840 mg, Intravenous, Once, 56 of 59 cycles  Administration: 420 mg (5/17/2019), 420 mg (6/7/2019), 420 mg (6/28/2019), 420 mg (7/19/2019), 420 mg (8/30/2019), 420 mg (9/20/2019), 420 mg (8/9/2019), 420 mg (10/9/2019), 420 mg (11/1/2019), 420 mg (11/22/2019), 420 mg (12/13/2019), 420 mg (1/3/2020), 420 mg (1/24/2020), 420 mg (2/14/2020), 420 mg (3/6/2020), 420 mg (3/27/2020), 420 mg (4/17/2020), 420 mg (5/8/2020), 420 mg (5/29/2020), 420 mg (6/19/2020), 420 mg (7/10/2020), 420 mg (7/31/2020), 420 mg (8/21/2020), 420 mg (9/11/2020), 420 mg (10/2/2020), 420 mg (10/23/2020), 420 mg (11/13/2020), 420 mg (12/4/2020), 420 mg (12/24/2020), 420 mg (1/15/2021), 420 mg (2/5/2021), 420 mg (2/26/2021), 420 mg (3/19/2021), 420 mg (4/9/2021), 420 mg (4/30/2021), 420 mg (5/21/2021), 420 mg (6/11/2021), 420 mg (7/2/2021), 420 mg (7/23/2021), 420 mg (8/13/2021), 420 mg (9/3/2021), 420 mg (9/24/2021), 420 mg (10/15/2021), 420 mg (11/5/2021), 420 mg (11/26/2021), 420 mg (12/17/2021), 420 mg (1/7/2022), 420 mg (1/28/2022), 420 mg (2/18/2022), 420 mg (3/11/2022), 420 mg (4/1/2022), 420 mg (5/13/2022), 420 mg (6/3/2022), 420 mg (6/28/2022), 420 mg (7/22/2022)  trastuzumab (HERCEPTIN) chemo infusion, 6 mg/kg = 589 mg (75 % of original dose 8 mg/kg), Intravenous, Once, 56 of 59 cycles  Dose modification: 6 mg/kg (original dose 8 mg/kg, Cycle 1, Reason: Other (Must fill in a comment))  Administration: 589 mg (5/17/2019), 589 mg (6/7/2019), 600 mg (6/28/2019), 600 mg (7/19/2019), 600 mg (8/30/2019), 600 mg (9/20/2019), 600 mg (8/9/2019), 600 mg (10/9/2019), 600 mg (11/1/2019), 600 mg (11/22/2019), 600 mg (12/13/2019), 600 mg (1/3/2020), 600 mg (1/24/2020), 600 mg (2/14/2020), 600 mg (3/6/2020), 600 mg (3/27/2020), 600 mg (4/17/2020), 600 mg (5/8/2020), 600 mg (5/29/2020), 600 mg (6/19/2020), 600 mg (7/10/2020), 600 mg (7/31/2020), 600 mg (8/21/2020), 600 mg (9/11/2020), 600 mg (10/2/2020), 600 mg (10/23/2020), 600 mg (11/13/2020), 600 mg (12/4/2020), 600 mg (12/24/2020), 600 mg (1/15/2021), 600 mg (2/5/2021), 600 mg (2/26/2021), 600 mg (3/19/2021), 600 mg (4/9/2021), 600 mg (4/30/2021), 600 mg (5/21/2021), 600 mg (6/11/2021), 600 mg (7/2/2021), 600 mg (7/23/2021), 600 mg (8/13/2021), 600 mg (9/3/2021), 600 mg (9/24/2021), 600 mg (10/15/2021), 600 mg (11/5/2021), 600 mg (11/26/2021), 600 mg (12/17/2021), 600 mg (1/7/2022), 600 mg (1/28/2022), 600 mg (2/18/2022), 600 mg (3/11/2022), 600 mg (4/1/2022), 600 mg (5/13/2022), 600 mg (6/3/2022), 600 mg (6/28/2022), 600 mg (7/22/2022)     8/16/2019 Surgery    she underwent BSO      7/5/2022 -  Cancer Staged    Staging form: Breast, AJCC 8th Edition  - Clinical: Stage IV (rcTX, cNX, pM1) - Signed by Maria G Patton MD on 7/5/2022  Stage prefix: Recurrence       Liver metastases ([facility] [address] )   4/27/2018 Initial Diagnosis    Liver metastases ([facility] [address] )     4/17/2019 -  Chemotherapy pertuzumab (PERJETA) IVPB, 840 mg, Intravenous, Once, 56 of 59 cycles  Administration: 420 mg (5/17/2019), 420 mg (6/7/2019), 420 mg (6/28/2019), 420 mg (7/19/2019), 420 mg (8/30/2019), 420 mg (9/20/2019), 420 mg (8/9/2019), 420 mg (10/9/2019), 420 mg (11/1/2019), 420 mg (11/22/2019), 420 mg (12/13/2019), 420 mg (1/3/2020), 420 mg (1/24/2020), 420 mg (2/14/2020), 420 mg (3/6/2020), 420 mg (3/27/2020), 420 mg (4/17/2020), 420 mg (5/8/2020), 420 mg (5/29/2020), 420 mg (6/19/2020), 420 mg (7/10/2020), 420 mg (7/31/2020), 420 mg (8/21/2020), 420 mg (9/11/2020), 420 mg (10/2/2020), 420 mg (10/23/2020), 420 mg (11/13/2020), 420 mg (12/4/2020), 420 mg (12/24/2020), 420 mg (1/15/2021), 420 mg (2/5/2021), 420 mg (2/26/2021), 420 mg (3/19/2021), 420 mg (4/9/2021), 420 mg (4/30/2021), 420 mg (5/21/2021), 420 mg (6/11/2021), 420 mg (7/2/2021), 420 mg (7/23/2021), 420 mg (8/13/2021), 420 mg (9/3/2021), 420 mg (9/24/2021), 420 mg (10/15/2021), 420 mg (11/5/2021), 420 mg (11/26/2021), 420 mg (12/17/2021), 420 mg (1/7/2022), 420 mg (1/28/2022), 420 mg (2/18/2022), 420 mg (3/11/2022), 420 mg (4/1/2022), 420 mg (5/13/2022), 420 mg (6/3/2022), 420 mg (6/28/2022), 420 mg (7/22/2022)  trastuzumab (HERCEPTIN) chemo infusion, 6 mg/kg = 589 mg (75 % of original dose 8 mg/kg), Intravenous, Once, 56 of 59 cycles  Dose modification: 6 mg/kg (original dose 8 mg/kg, Cycle 1, Reason: Other (Must fill in a comment))  Administration: 589 mg (5/17/2019), 589 mg (6/7/2019), 600 mg (6/28/2019), 600 mg (7/19/2019), 600 mg (8/30/2019), 600 mg (9/20/2019), 600 mg (8/9/2019), 600 mg (10/9/2019), 600 mg (11/1/2019), 600 mg (11/22/2019), 600 mg (12/13/2019), 600 mg (1/3/2020), 600 mg (1/24/2020), 600 mg (2/14/2020), 600 mg (3/6/2020), 600 mg (3/27/2020), 600 mg (4/17/2020), 600 mg (5/8/2020), 600 mg (5/29/2020), 600 mg (6/19/2020), 600 mg (7/10/2020), 600 mg (7/31/2020), 600 mg (8/21/2020), 600 mg (9/11/2020), 600 mg (10/2/2020), 600 mg (10/23/2020), 600 mg (11/13/2020), 600 mg (12/4/2020), 600 mg (12/24/2020), 600 mg (1/15/2021), 600 mg (2/5/2021), 600 mg (2/26/2021), 600 mg (3/19/2021), 600 mg (4/9/2021), 600 mg (4/30/2021), 600 mg (5/21/2021), 600 mg (6/11/2021), 600 mg (7/2/2021), 600 mg (7/23/2021), 600 mg (8/13/2021), 600 mg (9/3/2021), 600 mg (9/24/2021), 600 mg (10/15/2021), 600 mg (11/5/2021), 600 mg (11/26/2021), 600 mg (12/17/2021), 600 mg (1/7/2022), 600 mg (1/28/2022), 600 mg (2/18/2022), 600 mg (3/11/2022), 600 mg (4/1/2022), 600 mg (5/13/2022), 600 mg (6/3/2022), 600 mg (6/28/2022), 600 mg (7/22/2022)     6/24/2022 Biopsy    Final Diagnosis   A  Liver, core biopsies:     - Metastatic carcinoma compatible with a breast primary          Bone metastasis ([facility] [address] )   4/27/2018 Initial Diagnosis    Bone metastasis ([facility] [address] )     4/17/2019 -  Chemotherapy    pertuzumab (PERJETA) IVPB, 840 mg, Intravenous, Once, 56 of 59 cycles  Administration: 420 mg (5/17/2019), 420 mg (6/7/2019), 420 mg (6/28/2019), 420 mg (7/19/2019), 420 mg (8/30/2019), 420 mg (9/20/2019), 420 mg (8/9/2019), 420 mg (10/9/2019), 420 mg (11/1/2019), 420 mg (11/22/2019), 420 mg (12/13/2019), 420 mg (1/3/2020), 420 mg (1/24/2020), 420 mg (2/14/2020), 420 mg (3/6/2020), 420 mg (3/27/2020), 420 mg (4/17/2020), 420 mg (5/8/2020), 420 mg (5/29/2020), 420 mg (6/19/2020), 420 mg (7/10/2020), 420 mg (7/31/2020), 420 mg (8/21/2020), 420 mg (9/11/2020), 420 mg (10/2/2020), 420 mg (10/23/2020), 420 mg (11/13/2020), 420 mg (12/4/2020), 420 mg (12/24/2020), 420 mg (1/15/2021), 420 mg (2/5/2021), 420 mg (2/26/2021), 420 mg (3/19/2021), 420 mg (4/9/2021), 420 mg (4/30/2021), 420 mg (5/21/2021), 420 mg (6/11/2021), 420 mg (7/2/2021), 420 mg (7/23/2021), 420 mg (8/13/2021), 420 mg (9/3/2021), 420 mg (9/24/2021), 420 mg (10/15/2021), 420 mg (11/5/2021), 420 mg (11/26/2021), 420 mg (12/17/2021), 420 mg (1/7/2022), 420 mg (1/28/2022), 420 mg (2/18/2022), 420 mg (3/11/2022), 420 mg (4/1/2022), 420 mg (5/13/2022), 420 mg (6/3/2022), 420 mg (6/28/2022), 420 mg (7/22/2022)  trastuzumab (HERCEPTIN) chemo infusion, 6 mg/kg = 589 mg (75 % of original dose 8 mg/kg), Intravenous, Once, 56 of 59 cycles  Dose modification: 6 mg/kg (original dose 8 mg/kg, Cycle 1, Reason: Other (Must fill in a comment))  Administration: 589 mg (5/17/2019), 589 mg (6/7/2019), 600 mg (6/28/2019), 600 mg (7/19/2019), 600 mg (8/30/2019), 600 mg (9/20/2019), 600 mg (8/9/2019), 600 mg (10/9/2019), 600 mg (11/1/2019), 600 mg (11/22/2019), 600 mg (12/13/2019), 600 mg (1/3/2020), 600 mg (1/24/2020), 600 mg (2/14/2020), 600 mg (3/6/2020), 600 mg (3/27/2020), 600 mg (4/17/2020), 600 mg (5/8/2020), 600 mg (5/29/2020), 600 mg (6/19/2020), 600 mg (7/10/2020), 600 mg (7/31/2020), 600 mg (8/21/2020), 600 mg (9/11/2020), 600 mg (10/2/2020), 600 mg (10/23/2020), 600 mg (11/13/2020), 600 mg (12/4/2020), 600 mg (12/24/2020), 600 mg (1/15/2021), 600 mg (2/5/2021), 600 mg (2/26/2021), 600 mg (3/19/2021), 600 mg (4/9/2021), 600 mg (4/30/2021), 600 mg (5/21/2021), 600 mg (6/11/2021), 600 mg (7/2/2021), 600 mg (7/23/2021), 600 mg (8/13/2021), 600 mg (9/3/2021), 600 mg (9/24/2021), 600 mg (10/15/2021), 600 mg (11/5/2021), 600 mg (11/26/2021), 600 mg (12/17/2021), 600 mg (1/7/2022), 600 mg (1/28/2022), 600 mg (2/18/2022), 600 mg (3/11/2022), 600 mg (4/1/2022), 600 mg (5/13/2022), 600 mg (6/3/2022), 600 mg (6/28/2022), 600 mg (7/22/2022)     6/24/2022 Biopsy    Final Diagnosis   A  Liver, core biopsies:     - Metastatic carcinoma compatible with a breast primary  Review of Systems:  Review of Systems   Constitutional: Positive for fatigue (occasional moderate)  HENT: Negative  Eyes: Negative  Respiratory: Negative  Cardiovascular: Negative  Gastrointestinal: Negative  Negative for abdominal pain, blood in stool, constipation, diarrhea, nausea and vomiting  Endocrine: Negative  Genitourinary: Negative  Musculoskeletal: Negative  Skin: Negative  Allergic/Immunologic: Negative  Neurological: Positive for numbness (occasional neuropathy hands and feet )  Hematological: Negative  Psychiatric/Behavioral: Negative          Clinical Trial: no      Teaching:     Covid Vaccine Status: vaccinated    Health Maintenance   Topic Date Due    Hepatitis C Screening  Never done    Pneumococcal Vaccine: Pediatrics (0 to 5 Years) and At-Risk Patients (6 to 59 Years) (1 - PCV) Never done    HIV Screening  Never done    BMI: Followup Plan  Never done    Annual Physical  Never done    DTaP,Tdap,and Td Vaccines (1 - Tdap) Never done    Cervical Cancer Screening  Never done    Colorectal Cancer Screening  Never done    Breast Cancer Screening: Mammogram  06/12/2021    MAMMOGRAPHY BRCA POSITIVE  06/12/2021    COVID-19 Vaccine (3 - Pfizer risk series) 09/15/2021    Influenza Vaccine (1) 09/01/2022    BMI: Adult  07/22/2023    Depression Screening  08/04/2023    Osteoporosis Screening  Completed    HIB Vaccine  Aged Out    Hepatitis B Vaccine  Aged Out    IPV Vaccine  Aged Out    Hepatitis A Vaccine  Aged Out    Meningococcal ACWY Vaccine  Aged Out    HPV Vaccine  Aged Out     Patient Active Problem List   Diagnosis    [facility] upper-inner quadrant [facility]    [facility] ([facility] )    Bone metastasis ([facility] )    Port-A-Cath in place    Long term use of drug    BRCA1 gene mutation positive in female    History of bilateral salpingo-oophorectomy (BSO)    S/P BSO (bilateral salpingo-oophorectomy)    Aromatase inhibitor use    Dense breast tissue    Cardiomyopathy due to chemotherapy ([facility] )    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    Encounter for care related to vascular access port    Osteoporosis due to aromatase inhibitor     Past Medical History:   Diagnosis Date    [facility] cancer [facility]     Hx of radiation therapy     breast and left  hip    Liver metastases [facility]     Port-A-Cath in place right chest    Wears glasses      Past Surgical History:   Procedure Laterality Date    BREAST BIOPSY Left 2015    BREAST LUMPECTOMY Left 2016    BREAST SURGERY Left     biopsy    CHOLECYSTECTOMY      CHOLECYSTECTOMY      IR PLACEMENT FIDUCIAL MARKER  2022    OOPHORECTOMY      PORTACATH PLACEMENT Right     LA LAP,RMV  ADNEXAL STRUCTURE Bilateral 2019    Procedure: LAPAROSCOPIC SALPINGO-OOPHORECTOMY;  Surgeon: Caden Anaya MD;  [facility];  Service: Gynecology Oncology    SIMPLE MASTECTOMY Left 2016    Procedure: BREAST PARTIAL MASTECTOMY ;  Surgeon: Tj Machuca MD;  [facility];  Service:      Family History   Problem Relation Age of Onset    No Known Problems Mother     Heart attack Father     No Known Problems Daughter     No Known Problems Maternal Grandmother     No Known Problems Maternal Grandfather     No Known Problems Paternal Aunt     No Known Problems Paternal Aunt     Lung cancer Maternal Uncle 79     Social History     Socioeconomic History    Marital status: /Civil Union     Spouse name: Not on file    Number of children: Not on file    Years of education: Not on file    Highest education level: Not on file   Occupational History    Not on file   Tobacco Use    Smoking status: Former Smoker     Quit date: 2009     Years since quittin 9    Smokeless tobacco: Never Used   Vaping Use    Vaping Use: Never used   Substance and Sexual Activity    Alcohol use: No    Drug use: No    Sexual activity: Not on file   Other Topics Concern    Not on file   Social History Narrative    Not on file     Social Determinants of Health     Financial Resource Strain: Not on file   Food Insecurity: Not on file   Transportation Needs: Not on file   Physical Activity: Not on file   Stress: Not on file   Social Connections: Not on file   Intimate Partner Violence: Not on file   Housing Stability: Not on file       Current Outpatient Medications:     letrozole (FEMARA) 2 5 mg tablet, Take 1 tablet (2 5 mg total) by mouth daily, Disp: 30 tablet, Rfl: 6    multivitamin (THERAGRAN) TABS, Take 1 tablet by mouth 3 (three) times a week , Disp: , Rfl:     pertuzumab (PERJETA) 420 mg/14 mL SOLN, Infuse into a venous catheter every 21 days At infusion  center, Disp: , Rfl:     Trastuzumab (HERCEPTIN IV), Infuse into a venous catheter every 21 days At infusion center, Disp: , Rfl:     VITAMIN D, CHOLECALCIFEROL, PO, Take 1,000 Units by mouth 3 (three) times a week , Disp: , Rfl:   No Known Allergies  There were no vitals filed for this visit     Pain Score: 0-No pain

## 2022-08-04 NOTE — PROGRESS NOTES
Follow-up - Radiation Oncology   Alejandrina Barba 1965 49 y o  female [id number]      History of Present Illness   Cancer Staging  Malignant neoplasm of upper-inner quadrant of left female breast ([facility] [address] )  Staging form: Breast, AJCC 8th Edition  - Clinical: Stage IV (rcTX, cNX, pM1) - Signed by James Currie MD on 7/5/2022  Stage prefix: Recurrence    Virtual Brief Visit     Problem List Items Addressed This Visit    None                   Reason for visit is follow up       Encounter provider James Currie MD     Provider located at [address] [id number]        Recent Visits  No visits were found meeting these conditions  Showing recent visits within past 7 days and meeting all other requirements  Today's Visits  Date Type Provider Dept   08/04/22 Telemedicine James Currie MD An Rad Onc   Showing today's visits and meeting all other requirements  Future Appointments  No visits were found meeting these conditions  Showing future appointments within next 150 days and meeting all other requirements        After connecting through Telephone, the patient was identified by name and date of birth  Ladi Collins was informed that this is a telemedicine visit and that the visit is being conducted through Telephone  My office door was closed  The patient was notified the following individuals were present in the room Darius Ewing RN  She acknowledged consent and understanding of privacy and security of the video platform  The patient has agreed to participate and understands they can discontinue the visit at any time      Patient is aware this is a billable service       Subjective  Ms Ladi Collins is a 49year old woman initially diagnosed with Stage IV inflammatory breast cancer in 2015 now s/p chemotherapy with good response, on maintenance letrozole/herceptin/perjeta   She has with TARUN on imaging until recently when she developed an enhancing lesion in the caudate lobe of the liver concerning for metastasis  She was seen in initial consult on 22 for consideration of SBRT  She returns today for follow-up  Interval History:    22 - CT chest abdomen pelvis w contrast  Known metastatic poorly marginated caudate lobe lesion which measures 2 4 x 2 0 cm compared to 2 7 x 2 0 cm   No other lesion appreciated     Unchanged known left adrenal adenoma  Unchanged / less prominent posterior right lower lobe 2 mm nodules with no new nodule      Currently the patient is doing well overall  He is asymptomatic of her cancer and is tolerating treatment well  She will be traveling to visit her mother in the coming weeks and is hoping to begin a new job in the near future  Upcomin22 - Hem Onc, Proothi             Historical Information   Oncology History   Malignant neoplasm of upper-inner quadrant of left female breast ([facility] [address] )   2015 Initial Diagnosis    Malignant neoplasm of upper-outer quadrant of left female breast ([facility] [address] )      -  Hormone Therapy    Tamoxifen  Ended 2019  Dr Anderson Heading  Letrozole 2016 Surgery    Port placement     2016 - 2018 Chemotherapy    Taxotere,  herceptin, perjeta, xgeva  After six cycles, taxotere was discontinued and tamoxifen added     Continues with Perjeta and Herceptin every 3 weeks     - Dr Anderson Heading       2016 Surgery    Left breast partial mastectomy     2016 - 2016 Radiation    Plan ID Energy Fractions Dose per Fraction (cGy) Total Dose Delivered (cGy) Elapsed Days   Lt Breast 6X 25 / 25 200 5,000 36   Lt Brst Boost 6X 8 /  200 1,600 9   Lt Femur 10X 10 / 10 300 3,000 11   Lt PAB 6X 25 / 25 60 1,500 36   Lt Sclav 6X 25 / 25 200 5,000 36                                   Total dose to left breast lumpectomy cavity: 6600 cGy                   Total dose to the supraclavicular and axillary regions: 5000 cGy       4/17/2019 -  Chemotherapy    pertuzumab (PERJETA) IVPB, 840 mg, Intravenous, Once, 56 of 59 cycles  Administration: 420 mg (5/17/2019), 420 mg (6/7/2019), 420 mg (6/28/2019), 420 mg (7/19/2019), 420 mg (8/30/2019), 420 mg (9/20/2019), 420 mg (8/9/2019), 420 mg (10/9/2019), 420 mg (11/1/2019), 420 mg (11/22/2019), 420 mg (12/13/2019), 420 mg (1/3/2020), 420 mg (1/24/2020), 420 mg (2/14/2020), 420 mg (3/6/2020), 420 mg (3/27/2020), 420 mg (4/17/2020), 420 mg (5/8/2020), 420 mg (5/29/2020), 420 mg (6/19/2020), 420 mg (7/10/2020), 420 mg (7/31/2020), 420 mg (8/21/2020), 420 mg (9/11/2020), 420 mg (10/2/2020), 420 mg (10/23/2020), 420 mg (11/13/2020), 420 mg (12/4/2020), 420 mg (12/24/2020), 420 mg (1/15/2021), 420 mg (2/5/2021), 420 mg (2/26/2021), 420 mg (3/19/2021), 420 mg (4/9/2021), 420 mg (4/30/2021), 420 mg (5/21/2021), 420 mg (6/11/2021), 420 mg (7/2/2021), 420 mg (7/23/2021), 420 mg (8/13/2021), 420 mg (9/3/2021), 420 mg (9/24/2021), 420 mg (10/15/2021), 420 mg (11/5/2021), 420 mg (11/26/2021), 420 mg (12/17/2021), 420 mg (1/7/2022), 420 mg (1/28/2022), 420 mg (2/18/2022), 420 mg (3/11/2022), 420 mg (4/1/2022), 420 mg (5/13/2022), 420 mg (6/3/2022), 420 mg (6/28/2022), 420 mg (7/22/2022)  trastuzumab (HERCEPTIN) chemo infusion, 6 mg/kg = 589 mg (75 % of original dose 8 mg/kg), Intravenous, Once, 56 of 59 cycles  Dose modification: 6 mg/kg (original dose 8 mg/kg, Cycle 1, Reason: Other (Must fill in a comment))  Administration: 589 mg (5/17/2019), 589 mg (6/7/2019), 600 mg (6/28/2019), 600 mg (7/19/2019), 600 mg (8/30/2019), 600 mg (9/20/2019), 600 mg (8/9/2019), 600 mg (10/9/2019), 600 mg (11/1/2019), 600 mg (11/22/2019), 600 mg (12/13/2019), 600 mg (1/3/2020), 600 mg (1/24/2020), 600 mg (2/14/2020), 600 mg (3/6/2020), 600 mg (3/27/2020), 600 mg (4/17/2020), 600 mg (5/8/2020), 600 mg (5/29/2020), 600 mg (6/19/2020), 600 mg (7/10/2020), 600 mg (7/31/2020), 600 mg (8/21/2020), 600 mg (9/11/2020), 600 mg (10/2/2020), 600 mg (10/23/2020), 600 mg (11/13/2020), 600 mg (12/4/2020), 600 mg (12/24/2020), 600 mg (1/15/2021), 600 mg (2/5/2021), 600 mg (2/26/2021), 600 mg (3/19/2021), 600 mg (4/9/2021), 600 mg (4/30/2021), 600 mg (5/21/2021), 600 mg (6/11/2021), 600 mg (7/2/2021), 600 mg (7/23/2021), 600 mg (8/13/2021), 600 mg (9/3/2021), 600 mg (9/24/2021), 600 mg (10/15/2021), 600 mg (11/5/2021), 600 mg (11/26/2021), 600 mg (12/17/2021), 600 mg (1/7/2022), 600 mg (1/28/2022), 600 mg (2/18/2022), 600 mg (3/11/2022), 600 mg (4/1/2022), 600 mg (5/13/2022), 600 mg (6/3/2022), 600 mg (6/28/2022), 600 mg (7/22/2022)     8/16/2019 Surgery    she underwent BSO      7/5/2022 -  Cancer Staged    Staging form: Breast, AJCC 8th Edition  - Clinical: Stage IV (rcTX, cNX, pM1) - Signed by Fide Estrada MD on 7/5/2022  Stage prefix: Recurrence       Liver metastases ([facility] [address] )   4/27/2018 Initial Diagnosis    Liver metastases ([facility] [address] )     4/17/2019 -  Chemotherapy    pertuzumab (PERJETA) IVPB, 840 mg, Intravenous, Once, 56 of 59 cycles  Administration: 420 mg (5/17/2019), 420 mg (6/7/2019), 420 mg (6/28/2019), 420 mg (7/19/2019), 420 mg (8/30/2019), 420 mg (9/20/2019), 420 mg (8/9/2019), 420 mg (10/9/2019), 420 mg (11/1/2019), 420 mg (11/22/2019), 420 mg (12/13/2019), 420 mg (1/3/2020), 420 mg (1/24/2020), 420 mg (2/14/2020), 420 mg (3/6/2020), 420 mg (3/27/2020), 420 mg (4/17/2020), 420 mg (5/8/2020), 420 mg (5/29/2020), 420 mg (6/19/2020), 420 mg (7/10/2020), 420 mg (7/31/2020), 420 mg (8/21/2020), 420 mg (9/11/2020), 420 mg (10/2/2020), 420 mg (10/23/2020), 420 mg (11/13/2020), 420 mg (12/4/2020), 420 mg (12/24/2020), 420 mg (1/15/2021), 420 mg (2/5/2021), 420 mg (2/26/2021), 420 mg (3/19/2021), 420 mg (4/9/2021), 420 mg (4/30/2021), 420 mg (5/21/2021), 420 mg (6/11/2021), 420 mg (7/2/2021), 420 mg (7/23/2021), 420 mg (8/13/2021), 420 mg (9/3/2021), 420 mg (9/24/2021), 420 mg (10/15/2021), 420 mg (11/5/2021), 420 mg (11/26/2021), 420 mg (12/17/2021), 420 mg (1/7/2022), 420 mg (1/28/2022), 420 mg (2/18/2022), 420 mg (3/11/2022), 420 mg (4/1/2022), 420 mg (5/13/2022), 420 mg (6/3/2022), 420 mg (6/28/2022), 420 mg (7/22/2022)  trastuzumab (HERCEPTIN) chemo infusion, 6 mg/kg = 589 mg (75 % of original dose 8 mg/kg), Intravenous, Once, 56 of 59 cycles  Dose modification: 6 mg/kg (original dose 8 mg/kg, Cycle 1, Reason: Other (Must fill in a comment))  Administration: 589 mg (5/17/2019), 589 mg (6/7/2019), 600 mg (6/28/2019), 600 mg (7/19/2019), 600 mg (8/30/2019), 600 mg (9/20/2019), 600 mg (8/9/2019), 600 mg (10/9/2019), 600 mg (11/1/2019), 600 mg (11/22/2019), 600 mg (12/13/2019), 600 mg (1/3/2020), 600 mg (1/24/2020), 600 mg (2/14/2020), 600 mg (3/6/2020), 600 mg (3/27/2020), 600 mg (4/17/2020), 600 mg (5/8/2020), 600 mg (5/29/2020), 600 mg (6/19/2020), 600 mg (7/10/2020), 600 mg (7/31/2020), 600 mg (8/21/2020), 600 mg (9/11/2020), 600 mg (10/2/2020), 600 mg (10/23/2020), 600 mg (11/13/2020), 600 mg (12/4/2020), 600 mg (12/24/2020), 600 mg (1/15/2021), 600 mg (2/5/2021), 600 mg (2/26/2021), 600 mg (3/19/2021), 600 mg (4/9/2021), 600 mg (4/30/2021), 600 mg (5/21/2021), 600 mg (6/11/2021), 600 mg (7/2/2021), 600 mg (7/23/2021), 600 mg (8/13/2021), 600 mg (9/3/2021), 600 mg (9/24/2021), 600 mg (10/15/2021), 600 mg (11/5/2021), 600 mg (11/26/2021), 600 mg (12/17/2021), 600 mg (1/7/2022), 600 mg (1/28/2022), 600 mg (2/18/2022), 600 mg (3/11/2022), 600 mg (4/1/2022), 600 mg (5/13/2022), 600 mg (6/3/2022), 600 mg (6/28/2022), 600 mg (7/22/2022)     6/24/2022 Biopsy    Final Diagnosis   A  Liver, core biopsies:     - Metastatic carcinoma compatible with a breast primary          Bone metastasis ([facility] [address] )   4/27/2018 Initial Diagnosis    Bone metastasis ([facility] [address] )     4/17/2019 -  Chemotherapy    pertuzumab (PERJETA) IVPB, 840 mg, Intravenous, Once, 56 of 59 cycles  Administration: 420 mg (5/17/2019), 420 mg (6/7/2019), 420 mg (6/28/2019), 420 mg (7/19/2019), 420 mg (8/30/2019), 420 mg (9/20/2019), 420 mg (8/9/2019), 420 mg (10/9/2019), 420 mg (11/1/2019), 420 mg (11/22/2019), 420 mg (12/13/2019), 420 mg (1/3/2020), 420 mg (1/24/2020), 420 mg (2/14/2020), 420 mg (3/6/2020), 420 mg (3/27/2020), 420 mg (4/17/2020), 420 mg (5/8/2020), 420 mg (5/29/2020), 420 mg (6/19/2020), 420 mg (7/10/2020), 420 mg (7/31/2020), 420 mg (8/21/2020), 420 mg (9/11/2020), 420 mg (10/2/2020), 420 mg (10/23/2020), 420 mg (11/13/2020), 420 mg (12/4/2020), 420 mg (12/24/2020), 420 mg (1/15/2021), 420 mg (2/5/2021), 420 mg (2/26/2021), 420 mg (3/19/2021), 420 mg (4/9/2021), 420 mg (4/30/2021), 420 mg (5/21/2021), 420 mg (6/11/2021), 420 mg (7/2/2021), 420 mg (7/23/2021), 420 mg (8/13/2021), 420 mg (9/3/2021), 420 mg (9/24/2021), 420 mg (10/15/2021), 420 mg (11/5/2021), 420 mg (11/26/2021), 420 mg (12/17/2021), 420 mg (1/7/2022), 420 mg (1/28/2022), 420 mg (2/18/2022), 420 mg (3/11/2022), 420 mg (4/1/2022), 420 mg (5/13/2022), 420 mg (6/3/2022), 420 mg (6/28/2022), 420 mg (7/22/2022)  trastuzumab (HERCEPTIN) chemo infusion, 6 mg/kg = 589 mg (75 % of original dose 8 mg/kg), Intravenous, Once, 56 of 59 cycles  Dose modification: 6 mg/kg (original dose 8 mg/kg, Cycle 1, Reason: Other (Must fill in a comment))  Administration: 589 mg (5/17/2019), 589 mg (6/7/2019), 600 mg (6/28/2019), 600 mg (7/19/2019), 600 mg (8/30/2019), 600 mg (9/20/2019), 600 mg (8/9/2019), 600 mg (10/9/2019), 600 mg (11/1/2019), 600 mg (11/22/2019), 600 mg (12/13/2019), 600 mg (1/3/2020), 600 mg (1/24/2020), 600 mg (2/14/2020), 600 mg (3/6/2020), 600 mg (3/27/2020), 600 mg (4/17/2020), 600 mg (5/8/2020), 600 mg (5/29/2020), 600 mg (6/19/2020), 600 mg (7/10/2020), 600 mg (7/31/2020), 600 mg (8/21/2020), 600 mg (9/11/2020), 600 mg (10/2/2020), 600 mg (10/23/2020), 600 mg (11/13/2020), 600 mg (12/4/2020), 600 mg (12/24/2020), 600 mg (1/15/2021), 600 mg (2/5/2021), 600 mg (2/26/2021), 600 mg (3/19/2021), 600 mg (2021), 600 mg (2021), 600 mg (2021), 600 mg (2021), 600 mg (2021), 600 mg (2021), 600 mg (2021), 600 mg (9/3/2021), 600 mg (2021), 600 mg (10/15/2021), 600 mg (2021), 600 mg (2021), 600 mg (2021), 600 mg (2022), 600 mg (2022), 600 mg (2022), 600 mg (3/11/2022), 600 mg (2022), 600 mg (2022), 600 mg (6/3/2022), 600 mg (2022), 600 mg (2022)     2022 Biopsy    Final Diagnosis   A  Liver, core biopsies:     - Metastatic carcinoma compatible with a breast primary              Past Medical History:   Diagnosis Date    Breast cancer ([facility] [address] )     Hx of radiation therapy     breast and left  hip    Liver metastases ([facility] [address] )     Port-A-Cath in place     right chest    Wears glasses      Past Surgical History:   Procedure Laterality Date    BREAST BIOPSY Left 2015    BREAST LUMPECTOMY Left 2016    BREAST SURGERY Left     biopsy    CHOLECYSTECTOMY      CHOLECYSTECTOMY      IR PLACEMENT FIDUCIAL MARKER  2022    OOPHORECTOMY      PORTACATH PLACEMENT Right     LA LAP,RMV  ADNEXAL STRUCTURE Bilateral 2019    Procedure: LAPAROSCOPIC SALPINGO-OOPHORECTOMY;  Surgeon: Louis Jaramillo MD;  Location: [facility];  Service: Gynecology Oncology    SIMPLE MASTECTOMY Left 2016    Procedure: BREAST PARTIAL MASTECTOMY ;  Surgeon: Maddi Soriano MD;  Location: [facility];  Service:        Social History   Social History     Substance and Sexual Activity   Alcohol Use No     Social History     Substance and Sexual Activity   Drug Use No     Social History     Tobacco Use   Smoking Status Former Smoker    Quit date: 2009    Years since quittin 9   Smokeless Tobacco Never Used         Meds/Allergies     Current Outpatient Medications:     letrozole (FEMARA) 2 5 mg tablet, Take 1 tablet (2 5 mg total) by mouth daily, Disp: 30 tablet, Rfl: 6    multivitamin (THERAGRAN) TABS, Take 1 tablet by mouth 3 (three) times a week , Disp: , Rfl:     pertuzumab (PERJETA) 420 mg/14 mL SOLN, Infuse into a venous catheter every 21 days At infusion  center, Disp: , Rfl:     Trastuzumab (HERCEPTIN IV), Infuse into a venous catheter every 21 days At infusion center, Disp: , Rfl:     VITAMIN D, CHOLECALCIFEROL, PO, Take 1,000 Units by mouth 3 (three) times a week , Disp: , Rfl:   No Known Allergies    Review of Systems   Constitutional: Positive for fatigue (occasional moderate)  HENT: Negative  Eyes: Negative  Respiratory: Negative  Cardiovascular: Negative  Gastrointestinal: Negative  Negative for abdominal pain, blood in stool, constipation, diarrhea, nausea and vomiting  Endocrine: Negative  Genitourinary: Negative  Musculoskeletal: Negative  Skin: Negative  Allergic/Immunologic: Negative  Neurological: Positive for numbness (occasional neuropathy hands and feet )  Hematological: Negative  Psychiatric/Behavioral: Negative             OBJECTIVE:   There were no vitals taken for this visit  No exam performed for this telephone follow-up  RESULTS    Lab Results: No results found for this or any previous visit (from the past 672 hour(s))  Imaging Studies:CT chest abdomen pelvis w contrast    Result Date: 8/3/2022  Narrative: CT CHEST, ABDOMEN AND PELVIS WITH IV CONTRAST INDICATION:   C78 7: Secondary malignant neoplasm of liver and intrahepatic bile duct  COMPARISON:  Compared with 3/17/2022 TECHNIQUE: CT examination of the chest, abdomen and pelvis was performed  Axial, sagittal, and coronal 2D reformatted images were created from the source data and submitted for interpretation  Radiation dose length product (DLP) for this visit:  1175 mGy-cm   This examination, like all CT scans performed in the [facility], was performed utilizing techniques to minimize radiation dose exposure, including the use of iterative reconstruction and automated exposure control   IV Contrast: 60 mL of iohexol (OMNIPAQUE) Enteric Contrast: Enteric contrast was administered  FINDINGS: CHEST LUNGS: Subtle 2 mm nodules in the right lower lobe in series 3 image 52 and 53 posteriorly are unchanged/less prominent  Lungs are otherwise clear  There is no tracheal or endobronchial lesion  PLEURA:  Unremarkable  HEART/GREAT VESSELS: Heart is unremarkable for patient's age  No thoracic aortic aneurysm  MEDIASTINUM AND INDER:  Unremarkable  CHEST WALL AND LOWER NECK:  Right chest port catheter tip in the right atrium  Left breast fluid collection measuring 2 4 cm is unchanged  ABDOMEN LIVER/BILIARY TREE:  Diffuse fatty infiltration liver  Known metastatic poorly marginated caudate lobe lesion which measures 2 4 x 2 0 cm compared to 2 7 x 2 0 cm  No other lesion appreciated  Portal vein is patent  GALLBLADDER:  Gallbladder is surgically absent  SPLEEN:  Unremarkable  PANCREAS:  Unremarkable  ADRENAL GLANDS:  Previously documented left adrenal adenoma measuring 2 5 x 2 0 cm is unchanged  Right adrenal gland is unremarkable  KIDNEYS/URETERS:  Unremarkable  No hydronephrosis  STOMACH AND BOWEL:  Unremarkable  APPENDIX:  No findings to suggest appendicitis  ABDOMINOPELVIC CAVITY: Unchanged mesenteric haziness with subcentimeter lymph node measuring 6 3 mm  No ascites  No pneumoperitoneum  No lymphadenopathy  VESSELS:  Unremarkable for patient's age  PELVIS REPRODUCTIVE ORGANS:  Uterus is unremarkable  Small fundal exophytic fibroid measuring 7 mm is unchanged URINARY BLADDER:  Unremarkable  ABDOMINAL WALL/INGUINAL REGIONS:  Unremarkable  OSSEOUS STRUCTURES: Sclerotic/lytic lesion in the left proximal femur is unchanged of known treated metastatic lesion  Schmorl's node inferior endplate of E87 unchanged  Disc osteophyte complex at T8-T9 unchanged No acute fracture or destructive osseous lesion       Impression: Known metastatic poorly marginated caudate lobe lesion which measures 2 4 x 2 0 cm compared to 2 7 x 2  0 cm  No other lesion appreciated  Unchanged known left adrenal adenoma  Unchanged / less prominent posterior right lower lobe 2 mm nodules with no new nodule  Workstation performed: [id number]           Assessment/Plan:  No orders of the defined types were placed in this encounter  We reviewed the patient's CT chest/abdomen/pelvis which shows continued good disease control, with TARUN apart from the stable lesion in the caudate lobe of the liver  We again reviewed the data on SBRT for oligometastatic breast cancer including the results of NRG , which did not show an OS benefit for such treatment, acknowledging the limitations of this trial and specifically noting that this trial may not adequately represent the patient's case of a Her2+ breast cancer that remains truly oligometastatic without disease progression elsewhere  At present, as she remains asymptomatic and in fact feels well, she would prefer to defer any local therapy to the liver  She would like to continue systemic therapy under the care of Dr Sreedhar Encarnacion and re-address liver directed therapy with SBRT should her metastasis grow or become more problematic  I believe that this is quite reasonable  She will follow up with Dr Sreedhar Encarnacion next month  I will plan to see her in follow-up again as needed should she decide to proceed with local therapy to her liver metastasis  Stiven Ernandez MD  8/4/2022,2:51 PM    Portions of the record may have been created with voice recognition software   Occasional wrong word or "sound a like" substitutions may have occurred due to the inherent limitations of voice recognition software   Read the chart carefully and recognize, using context, where substitutions have occurred

## 2022-08-06 ENCOUNTER — HOSPITAL ENCOUNTER (OUTPATIENT)
Dept: NON INVASIVE DIAGNOSTICS | Facility: HOSPITAL | Age: 57
Discharge: HOME | End: 2022-08-06
Payer: COMMERCIAL

## 2022-08-06 VITALS
SYSTOLIC BLOOD PRESSURE: 147 MMHG | WEIGHT: 214 LBS | DIASTOLIC BLOOD PRESSURE: 92 MMHG | BODY MASS INDEX: 35.65 KG/M2 | HEIGHT: 65 IN | HEART RATE: 72 BPM

## 2022-08-06 DIAGNOSIS — I42.7 CHEMOTHERAPY INDUCED CARDIOMYOPATHY (HCC): ICD-10-CM

## 2022-08-06 DIAGNOSIS — T45.1X5A CHEMOTHERAPY INDUCED CARDIOMYOPATHY (HCC): ICD-10-CM

## 2022-08-06 PROCEDURE — 93325 DOPPLER ECHO COLOR FLOW MAPG: CPT

## 2022-08-06 PROCEDURE — 93308 TTE F-UP OR LMTD: CPT

## 2022-08-06 PROCEDURE — 93321 DOPPLER ECHO F-UP/LMTD STD: CPT

## 2022-08-07 LAB
APICAL FOUR CHAMBER EJECTION FRACTION: 57 %
AV LVOT PEAK GRADIENT: 6 MMHG
AV PEAK GRADIENT: 8 MMHG
E WAVE DECELERATION TIME: 156 MS
LAAS-AP4: 19.7 CM2
MV E'TISSUE VEL-SEP: 11 CM/S
MV PEAK A VEL: 0.98 M/S
MV PEAK E VEL: 78 CM/S
MV STENOSIS PRESSURE HALF TIME: 45 MS
MV VALVE AREA P 1/2 METHOD: 4.89 CM2
RIGHT ATRIUM AREA SYSTOLE A4C: 16.8 CM2
RIGHT VENTRICLE ID DIMENSION: 3.8 CM

## 2022-08-07 PROCEDURE — 93356 MYOCRD STRAIN IMG SPCKL TRCK: CPT | Performed by: INTERNAL MEDICINE

## 2022-08-07 PROCEDURE — 93308 TTE F-UP OR LMTD: CPT | Performed by: INTERNAL MEDICINE

## 2022-08-07 PROCEDURE — 93321 DOPPLER ECHO F-UP/LMTD STD: CPT | Performed by: INTERNAL MEDICINE

## 2022-08-07 PROCEDURE — 93325 DOPPLER ECHO COLOR FLOW MAPG: CPT | Performed by: INTERNAL MEDICINE

## 2022-08-10 RX ORDER — SODIUM CHLORIDE 9 MG/ML
20 INJECTION, SOLUTION INTRAVENOUS ONCE
Status: CANCELLED | OUTPATIENT
Start: 2022-08-12

## 2022-08-12 ENCOUNTER — HOSPITAL ENCOUNTER (OUTPATIENT)
Dept: INFUSION CENTER | Facility: CLINIC | Age: 57
Discharge: HOME | End: 2022-08-12
Payer: COMMERCIAL

## 2022-08-12 VITALS
RESPIRATION RATE: 18 BRPM | HEIGHT: 65 IN | TEMPERATURE: 98.2 F | DIASTOLIC BLOOD PRESSURE: 88 MMHG | HEART RATE: 62 BPM | SYSTOLIC BLOOD PRESSURE: 145 MMHG | BODY MASS INDEX: 35.48 KG/M2 | WEIGHT: 212.96 LBS

## 2022-08-12 DIAGNOSIS — Z17.0 MALIGNANT NEOPLASM OF UPPER-INNER QUADRANT OF LEFT BREAST IN FEMALE, ESTROGEN RECEPTOR POSITIVE (HCC): ICD-10-CM

## 2022-08-12 DIAGNOSIS — C78.7 LIVER METASTASES (HCC): ICD-10-CM

## 2022-08-12 DIAGNOSIS — C50.212 MALIGNANT NEOPLASM OF UPPER-INNER QUADRANT OF LEFT BREAST IN FEMALE, ESTROGEN RECEPTOR POSITIVE (HCC): ICD-10-CM

## 2022-08-12 DIAGNOSIS — Z90.722 HISTORY OF BILATERAL SALPINGO-OOPHORECTOMY (BSO): ICD-10-CM

## 2022-08-12 DIAGNOSIS — Z90.79 HISTORY OF BILATERAL SALPINGO-OOPHORECTOMY (BSO): ICD-10-CM

## 2022-08-12 DIAGNOSIS — C79.51 BONE METASTASIS (HCC): Primary | ICD-10-CM

## 2022-08-12 PROCEDURE — 96417 CHEMO IV INFUS EACH ADDL SEQ: CPT

## 2022-08-12 PROCEDURE — 96413 CHEMO IV INFUSION 1 HR: CPT

## 2022-08-12 RX ORDER — SODIUM CHLORIDE 9 MG/ML
20 INJECTION, SOLUTION INTRAVENOUS ONCE
Status: COMPLETED | OUTPATIENT
Start: 2022-08-12 | End: 2022-08-12

## 2022-08-12 RX ADMIN — PERTUZUMAB 420 MG: 30 INJECTION, SOLUTION, CONCENTRATE INTRAVENOUS at 15:03

## 2022-08-12 RX ADMIN — TRASTUZUMAB 600 MG: 150 INJECTION, POWDER, LYOPHILIZED, FOR SOLUTION INTRAVENOUS at 15:37

## 2022-08-12 RX ADMIN — SODIUM CHLORIDE 20 ML/HR: 0.9 INJECTION, SOLUTION INTRAVENOUS at 14:46

## 2022-08-30 RX ORDER — SODIUM CHLORIDE 9 MG/ML
20 INJECTION, SOLUTION INTRAVENOUS ONCE
Status: CANCELLED | OUTPATIENT
Start: 2022-09-02

## 2022-09-02 ENCOUNTER — HOSPITAL ENCOUNTER (OUTPATIENT)
Dept: INFUSION CENTER | Facility: CLINIC | Age: 57
End: 2022-09-02
Payer: COMMERCIAL

## 2022-09-02 VITALS
DIASTOLIC BLOOD PRESSURE: 79 MMHG | BODY MASS INDEX: 35.85 KG/M2 | TEMPERATURE: 98 F | HEART RATE: 77 BPM | HEIGHT: 65 IN | RESPIRATION RATE: 18 BRPM | SYSTOLIC BLOOD PRESSURE: 152 MMHG | WEIGHT: 215.17 LBS

## 2022-09-02 DIAGNOSIS — Z17.0 MALIGNANT NEOPLASM OF UPPER-INNER QUADRANT OF LEFT BREAST IN FEMALE, ESTROGEN RECEPTOR POSITIVE (HCC): ICD-10-CM

## 2022-09-02 DIAGNOSIS — Z90.79 HISTORY OF BILATERAL SALPINGO-OOPHORECTOMY (BSO): ICD-10-CM

## 2022-09-02 DIAGNOSIS — C78.7 LIVER METASTASES (HCC): ICD-10-CM

## 2022-09-02 DIAGNOSIS — C79.51 BONE METASTASIS (HCC): Primary | ICD-10-CM

## 2022-09-02 DIAGNOSIS — C50.212 MALIGNANT NEOPLASM OF UPPER-INNER QUADRANT OF LEFT BREAST IN FEMALE, ESTROGEN RECEPTOR POSITIVE (HCC): ICD-10-CM

## 2022-09-02 DIAGNOSIS — Z90.722 HISTORY OF BILATERAL SALPINGO-OOPHORECTOMY (BSO): ICD-10-CM

## 2022-09-02 PROCEDURE — 96417 CHEMO IV INFUS EACH ADDL SEQ: CPT

## 2022-09-02 PROCEDURE — 96413 CHEMO IV INFUSION 1 HR: CPT

## 2022-09-02 RX ORDER — SODIUM CHLORIDE 9 MG/ML
20 INJECTION, SOLUTION INTRAVENOUS ONCE
Status: COMPLETED | OUTPATIENT
Start: 2022-09-02 | End: 2022-09-02

## 2022-09-02 RX ADMIN — PERTUZUMAB 420 MG: 30 INJECTION, SOLUTION, CONCENTRATE INTRAVENOUS at 14:51

## 2022-09-02 RX ADMIN — SODIUM CHLORIDE 20 ML/HR: 0.9 INJECTION, SOLUTION INTRAVENOUS at 14:35

## 2022-09-02 RX ADMIN — TRASTUZUMAB 600 MG: 150 INJECTION, POWDER, LYOPHILIZED, FOR SOLUTION INTRAVENOUS at 15:21

## 2022-09-07 ENCOUNTER — OFFICE VISIT (OUTPATIENT)
Dept: HEMATOLOGY ONCOLOGY | Facility: CLINIC | Age: 57
End: 2022-09-07
Payer: COMMERCIAL

## 2022-09-07 VITALS
SYSTOLIC BLOOD PRESSURE: 116 MMHG | BODY MASS INDEX: 36.07 KG/M2 | WEIGHT: 216.5 LBS | OXYGEN SATURATION: 96 % | HEIGHT: 65 IN | DIASTOLIC BLOOD PRESSURE: 72 MMHG | HEART RATE: 79 BPM | RESPIRATION RATE: 18 BRPM | TEMPERATURE: 98.6 F

## 2022-09-07 DIAGNOSIS — Z15.09 BRCA1 GENE MUTATION POSITIVE IN FEMALE: ICD-10-CM

## 2022-09-07 DIAGNOSIS — Z15.02 BRCA1 GENE MUTATION POSITIVE IN FEMALE: ICD-10-CM

## 2022-09-07 DIAGNOSIS — M81.8 OSTEOPOROSIS DUE TO AROMATASE INHIBITOR: ICD-10-CM

## 2022-09-07 DIAGNOSIS — Z15.01 BRCA1 GENE MUTATION POSITIVE IN FEMALE: ICD-10-CM

## 2022-09-07 DIAGNOSIS — Z90.722 S/P BSO (BILATERAL SALPINGO-OOPHORECTOMY): ICD-10-CM

## 2022-09-07 DIAGNOSIS — I42.7 CARDIOMYOPATHY DUE TO CHEMOTHERAPY (HCC): ICD-10-CM

## 2022-09-07 DIAGNOSIS — Z95.828 PORT-A-CATH IN PLACE: ICD-10-CM

## 2022-09-07 DIAGNOSIS — C79.51 BONE METASTASIS (HCC): ICD-10-CM

## 2022-09-07 DIAGNOSIS — Z17.0 MALIGNANT NEOPLASM OF UPPER-INNER QUADRANT OF LEFT BREAST IN FEMALE, ESTROGEN RECEPTOR POSITIVE (HCC): Primary | ICD-10-CM

## 2022-09-07 DIAGNOSIS — Z14.8 CARRIER OF GENE MUTATION FOR HIGH RISK OF CANCER: ICD-10-CM

## 2022-09-07 DIAGNOSIS — T38.6X5A OSTEOPOROSIS DUE TO AROMATASE INHIBITOR: ICD-10-CM

## 2022-09-07 DIAGNOSIS — C78.7 LIVER METASTASES (HCC): ICD-10-CM

## 2022-09-07 DIAGNOSIS — T45.1X5A CARDIOMYOPATHY DUE TO CHEMOTHERAPY (HCC): ICD-10-CM

## 2022-09-07 DIAGNOSIS — E27.8 ADRENAL NODULE (HCC): ICD-10-CM

## 2022-09-07 DIAGNOSIS — C50.212 MALIGNANT NEOPLASM OF UPPER-INNER QUADRANT OF LEFT BREAST IN FEMALE, ESTROGEN RECEPTOR POSITIVE (HCC): Primary | ICD-10-CM

## 2022-09-07 PROCEDURE — 99215 OFFICE O/P EST HI 40 MIN: CPT | Performed by: INTERNAL MEDICINE

## 2022-09-07 NOTE — PATIENT INSTRUCTIONS
Patient has standing orders for blood work, port flush, Herceptin and Perjeta  She will take information home on [place]  She will discuss with Dr Niki Hernandez about radiation to liver lesion  She will have follow-up CT abdomen prior to next visit in 2 months

## 2022-09-07 NOTE — PROGRESS NOTES
HPI:  Follow-up visit for breast cancer with metastatic disease  This was 1st diagnosed in 2016 and was triple negative  Metastatic disease was de Tricia in the liver and bones from left breast cancer  She was started on Taxotere plus Herceptin and Perjeta and after 6 cycles Taxotere was discontinued and tamoxifen was added  Later tamoxifen was changed to letrozole after she had  BSO  She had Xgeva  She  had radiation to left hip for palliation  She continued to respond and in  September 2016 patient had lumpectomy of left breast followed by radiation therapy   At the time of lumpectomy there was minimal residual disease, 1 4 cm   Lymph nodes were not sampled  Patient tested positive for BRCA 1    Presently she is on Herceptin, Perjeta and letrozole  Paulette Childress has been taking vitamin-D and calcium and is staying active    She has been tolerating treatments without much problem   She goes for blood tests and echocardiogram on regular basis  She has done well all these years and  had very good response but recently there was a new lesion in the liver on CT scan and MRI scan when she had biopsy of that lesion and that came back metastatic from breast, strongly positive for ER, negative for NJ and 2+ for HER 2 that was negative by FISH (low positive HER2)  Patient saw radiation oncologist for local radiation to liver lesion and discussed risks and benefits and decided to observe because subsequent CT scan showed slight decrease in size of liver lesion from 2 7 to 2 4  Letrozole could still continue to work on liver lesion because of strongly positive ER NJ but she may not get benefit from Herceptin plus Perjeta so far as liver lesion is concerned but she could get benefit from [facility]  I discussed this with patient  She would like to continue on present medications until next CT scan in early November 2022   Also she will be rediscuss with radiation oncologist   Also going to give her information on Enhertu to take home but she wants to check that on line  I gave her verbal information on Enhertu and showed her the recent study  In the meantime she is being continued on letrozole  Herceptin and Perjeta   No bone pains  Has good appetite  And she is maintaining her weight         Patient knows that she is at high risk for breast and other cancers like  ovarian cancer, pancreatic cancer, peritoneal cancer, melanoma and other cancers  Patient goes to breast surgeon for evaluation and imaging studies  She gets CT scan and that also checks the pancreas and peritoneum  She had BSO  She has agreed to see a dermatologist in few months and that will be melanoma mapping  She does not want to go to ophthalmologist for melanoma in the eye  Also she does not want to have colonoscopy and not even Cologuard or stool test for blood       She has grade 1 peripheral neuropathy    Has some tiredness and arthritic symptoms  Patient has history of anxiety and insomnia  For leg cramps at night and she is taking one baby aspirin daily with food in the evening and also one magnesium tablet daily and that is helping          She is not on Xgeva anymore at this time    She had Xgeva for 2 years  Shahana Farr continues to take  calcium and vitamin-D                              Current Outpatient Medications:     letrozole (FEMARA) 2 5 mg tablet, Take 1 tablet (2 5 mg total) by mouth daily, Disp: 30 tablet, Rfl: 6    multivitamin (THERAGRAN) TABS, Take 1 tablet by mouth 3 (three) times a week , Disp: , Rfl:     pertuzumab (PERJETA) 420 mg/14 mL SOLN, Infuse into a venous catheter every 21 days At infusion  center, Disp: , Rfl:     Trastuzumab (HERCEPTIN IV), Infuse into a venous catheter every 21 days At infusion center, Disp: , Rfl:     VITAMIN D, CHOLECALCIFEROL, PO, Take 1,000 Units by mouth 3 (three) times a week , Disp: , Rfl:     No Known Allergies    Oncology History   Malignant neoplasm of upper-inner quadrant of left female breast ([facility] [address] )   12/2015 Initial Diagnosis    Malignant neoplasm of upper-outer quadrant of left female breast ([facility] [address] )     2016 -  Hormone Therapy    Tamoxifen  Ended 8/2019  Dr Andrey Rasmussen  Letrozole 9/2019 1/27/2016 Surgery    Port placement     2/19/2016 - 6/2/2018 Chemotherapy    Taxotere,  herceptin, perjeta, xgeva  After six cycles, taxotere was discontinued and tamoxifen added     Continues with Perjeta and Herceptin every 3 weeks     - Dr Andrey Rasmussen       9/16/2016 Surgery    Left breast partial mastectomy     11/7/2016 - 12/23/2016 Radiation    Plan ID Energy Fractions Dose per Fraction (cGy) Total Dose Delivered (cGy) Elapsed Days   Lt Breast 6X 25 / 25 200 5,000 36   Lt Brst Boost 6X 8 / 8 200 1,600 9   Lt Femur 10X 10 / 10 300 3,000 11   Lt PAB 6X 25 / 25 60 1,500 36   Lt Sclav 6X 25 / 25 200 5,000 36                                   Total dose to left breast lumpectomy cavity: 6600 cGy                   Total dose to the supraclavicular and axillary regions: 5000 cGy       4/17/2019 -  Chemotherapy    pertuzumab (PERJETA) IVPB, 840 mg, Intravenous, Once, 58 of 59 cycles  Administration: 420 mg (5/17/2019), 420 mg (6/7/2019), 420 mg (6/28/2019), 420 mg (7/19/2019), 420 mg (8/30/2019), 420 mg (9/20/2019), 420 mg (8/9/2019), 420 mg (10/9/2019), 420 mg (11/1/2019), 420 mg (11/22/2019), 420 mg (12/13/2019), 420 mg (1/3/2020), 420 mg (1/24/2020), 420 mg (2/14/2020), 420 mg (3/6/2020), 420 mg (3/27/2020), 420 mg (4/17/2020), 420 mg (5/8/2020), 420 mg (5/29/2020), 420 mg (6/19/2020), 420 mg (7/10/2020), 420 mg (7/31/2020), 420 mg (8/21/2020), 420 mg (9/11/2020), 420 mg (10/2/2020), 420 mg (10/23/2020), 420 mg (11/13/2020), 420 mg (12/4/2020), 420 mg (12/24/2020), 420 mg (1/15/2021), 420 mg (2/5/2021), 420 mg (2/26/2021), 420 mg (3/19/2021), 420 mg (4/9/2021), 420 mg (4/30/2021), 420 mg (5/21/2021), 420 mg (6/11/2021), 420 mg (7/2/2021), 420 mg (7/23/2021), 420 mg (8/13/2021), 420 mg (9/3/2021), 420 mg (9/24/2021), 420 mg (10/15/2021), 420 mg (11/5/2021), 420 mg (11/26/2021), 420 mg (12/17/2021), 420 mg (1/7/2022), 420 mg (1/28/2022), 420 mg (2/18/2022), 420 mg (3/11/2022), 420 mg (4/1/2022), 420 mg (5/13/2022), 420 mg (6/3/2022), 420 mg (6/28/2022), 420 mg (7/22/2022), 420 mg (9/2/2022), 420 mg (8/12/2022)  trastuzumab (HERCEPTIN) chemo infusion, 6 mg/kg = 589 mg (75 % of original dose 8 mg/kg), Intravenous, Once, 58 of 59 cycles  Dose modification: 6 mg/kg (original dose 8 mg/kg, Cycle 1, Reason: Other (Must fill in a comment))  Administration: 589 mg (5/17/2019), 589 mg (6/7/2019), 600 mg (6/28/2019), 600 mg (7/19/2019), 600 mg (8/30/2019), 600 mg (9/20/2019), 600 mg (8/9/2019), 600 mg (10/9/2019), 600 mg (11/1/2019), 600 mg (11/22/2019), 600 mg (12/13/2019), 600 mg (1/3/2020), 600 mg (1/24/2020), 600 mg (2/14/2020), 600 mg (3/6/2020), 600 mg (3/27/2020), 600 mg (4/17/2020), 600 mg (5/8/2020), 600 mg (5/29/2020), 600 mg (6/19/2020), 600 mg (7/10/2020), 600 mg (7/31/2020), 600 mg (8/21/2020), 600 mg (9/11/2020), 600 mg (10/2/2020), 600 mg (10/23/2020), 600 mg (11/13/2020), 600 mg (12/4/2020), 600 mg (12/24/2020), 600 mg (1/15/2021), 600 mg (2/5/2021), 600 mg (2/26/2021), 600 mg (3/19/2021), 600 mg (4/9/2021), 600 mg (4/30/2021), 600 mg (5/21/2021), 600 mg (6/11/2021), 600 mg (7/2/2021), 600 mg (7/23/2021), 600 mg (8/13/2021), 600 mg (9/3/2021), 600 mg (9/24/2021), 600 mg (10/15/2021), 600 mg (11/5/2021), 600 mg (11/26/2021), 600 mg (12/17/2021), 600 mg (1/7/2022), 600 mg (1/28/2022), 600 mg (2/18/2022), 600 mg (3/11/2022), 600 mg (4/1/2022), 600 mg (5/13/2022), 600 mg (6/3/2022), 600 mg (6/28/2022), 600 mg (7/22/2022), 600 mg (9/2/2022), 600 mg (8/12/2022)     8/16/2019 Surgery    she underwent BSO      7/5/2022 -  Cancer Staged    Staging form: Breast, AJCC 8th Edition  - Clinical: Stage IV (rcTX, cNX, pM1) - Signed by Alejandra Escalante MD on 7/5/2022  Stage prefix: Recurrence       Liver metastases ([facility] [address] )   4/27/2018 Initial Diagnosis    Liver metastases ([facility] [address] )     4/17/2019 -  Chemotherapy    pertuzumab (PERJETA) IVPB, 840 mg, Intravenous, Once, 58 of 59 cycles  Administration: 420 mg (5/17/2019), 420 mg (6/7/2019), 420 mg (6/28/2019), 420 mg (7/19/2019), 420 mg (8/30/2019), 420 mg (9/20/2019), 420 mg (8/9/2019), 420 mg (10/9/2019), 420 mg (11/1/2019), 420 mg (11/22/2019), 420 mg (12/13/2019), 420 mg (1/3/2020), 420 mg (1/24/2020), 420 mg (2/14/2020), 420 mg (3/6/2020), 420 mg (3/27/2020), 420 mg (4/17/2020), 420 mg (5/8/2020), 420 mg (5/29/2020), 420 mg (6/19/2020), 420 mg (7/10/2020), 420 mg (7/31/2020), 420 mg (8/21/2020), 420 mg (9/11/2020), 420 mg (10/2/2020), 420 mg (10/23/2020), 420 mg (11/13/2020), 420 mg (12/4/2020), 420 mg (12/24/2020), 420 mg (1/15/2021), 420 mg (2/5/2021), 420 mg (2/26/2021), 420 mg (3/19/2021), 420 mg (4/9/2021), 420 mg (4/30/2021), 420 mg (5/21/2021), 420 mg (6/11/2021), 420 mg (7/2/2021), 420 mg (7/23/2021), 420 mg (8/13/2021), 420 mg (9/3/2021), 420 mg (9/24/2021), 420 mg (10/15/2021), 420 mg (11/5/2021), 420 mg (11/26/2021), 420 mg (12/17/2021), 420 mg (1/7/2022), 420 mg (1/28/2022), 420 mg (2/18/2022), 420 mg (3/11/2022), 420 mg (4/1/2022), 420 mg (5/13/2022), 420 mg (6/3/2022), 420 mg (6/28/2022), 420 mg (7/22/2022), 420 mg (9/2/2022), 420 mg (8/12/2022)  trastuzumab (HERCEPTIN) chemo infusion, 6 mg/kg = 589 mg (75 % of original dose 8 mg/kg), Intravenous, Once, 58 of 59 cycles  Dose modification: 6 mg/kg (original dose 8 mg/kg, Cycle 1, Reason: Other (Must fill in a comment))  Administration: 589 mg (5/17/2019), 589 mg (6/7/2019), 600 mg (6/28/2019), 600 mg (7/19/2019), 600 mg (8/30/2019), 600 mg (9/20/2019), 600 mg (8/9/2019), 600 mg (10/9/2019), 600 mg (11/1/2019), 600 mg (11/22/2019), 600 mg (12/13/2019), 600 mg (1/3/2020), 600 mg (1/24/2020), 600 mg (2/14/2020), 600 mg (3/6/2020), 600 mg (3/27/2020), 600 mg (4/17/2020), 600 mg (5/8/2020), 600 mg (5/29/2020), 600 mg (6/19/2020), 600 mg (7/10/2020), 600 mg (7/31/2020), 600 mg (8/21/2020), 600 mg (9/11/2020), 600 mg (10/2/2020), 600 mg (10/23/2020), 600 mg (11/13/2020), 600 mg (12/4/2020), 600 mg (12/24/2020), 600 mg (1/15/2021), 600 mg (2/5/2021), 600 mg (2/26/2021), 600 mg (3/19/2021), 600 mg (4/9/2021), 600 mg (4/30/2021), 600 mg (5/21/2021), 600 mg (6/11/2021), 600 mg (7/2/2021), 600 mg (7/23/2021), 600 mg (8/13/2021), 600 mg (9/3/2021), 600 mg (9/24/2021), 600 mg (10/15/2021), 600 mg (11/5/2021), 600 mg (11/26/2021), 600 mg (12/17/2021), 600 mg (1/7/2022), 600 mg (1/28/2022), 600 mg (2/18/2022), 600 mg (3/11/2022), 600 mg (4/1/2022), 600 mg (5/13/2022), 600 mg (6/3/2022), 600 mg (6/28/2022), 600 mg (7/22/2022), 600 mg (9/2/2022), 600 mg (8/12/2022)     6/24/2022 Biopsy    Final Diagnosis   A  Liver, core biopsies:     - Metastatic carcinoma compatible with a breast primary          Bone metastasis ([address] )   4/27/2018 Initial Diagnosis    Bone metastasis ([address] )     4/17/2019 -  Chemotherapy    pertuzumab (PERJETA) IVPB, 840 mg, Intravenous, Once, 58 of 59 cycles  Administration: 420 mg (5/17/2019), 420 mg (6/7/2019), 420 mg (6/28/2019), 420 mg (7/19/2019), 420 mg (8/30/2019), 420 mg (9/20/2019), 420 mg (8/9/2019), 420 mg (10/9/2019), 420 mg (11/1/2019), 420 mg (11/22/2019), 420 mg (12/13/2019), 420 mg (1/3/2020), 420 mg (1/24/2020), 420 mg (2/14/2020), 420 mg (3/6/2020), 420 mg (3/27/2020), 420 mg (4/17/2020), 420 mg (5/8/2020), 420 mg (5/29/2020), 420 mg (6/19/2020), 420 mg (7/10/2020), 420 mg (7/31/2020), 420 mg (8/21/2020), 420 mg (9/11/2020), 420 mg (10/2/2020), 420 mg (10/23/2020), 420 mg (11/13/2020), 420 mg (12/4/2020), 420 mg (12/24/2020), 420 mg (1/15/2021), 420 mg (2/5/2021), 420 mg (2/26/2021), 420 mg (3/19/2021), 420 mg (4/9/2021), 420 mg (4/30/2021), 420 mg (5/21/2021), 420 mg (6/11/2021), 420 mg (7/2/2021), 420 mg (7/23/2021), 420 mg (8/13/2021), 420 mg (9/3/2021), 420 mg (9/24/2021), 420 mg (10/15/2021), 420 mg (11/5/2021), 420 mg (11/26/2021), 420 mg (12/17/2021), 420 mg (1/7/2022), 420 mg (1/28/2022), 420 mg (2/18/2022), 420 mg (3/11/2022), 420 mg (4/1/2022), 420 mg (5/13/2022), 420 mg (6/3/2022), 420 mg (6/28/2022), 420 mg (7/22/2022), 420 mg (9/2/2022), 420 mg (8/12/2022)  trastuzumab (HERCEPTIN) chemo infusion, 6 mg/kg = 589 mg (75 % of original dose 8 mg/kg), Intravenous, Once, 58 of 59 cycles  Dose modification: 6 mg/kg (original dose 8 mg/kg, Cycle 1, Reason: Other (Must fill in a comment))  Administration: 589 mg (5/17/2019), 589 mg (6/7/2019), 600 mg (6/28/2019), 600 mg (7/19/2019), 600 mg (8/30/2019), 600 mg (9/20/2019), 600 mg (8/9/2019), 600 mg (10/9/2019), 600 mg (11/1/2019), 600 mg (11/22/2019), 600 mg (12/13/2019), 600 mg (1/3/2020), 600 mg (1/24/2020), 600 mg (2/14/2020), 600 mg (3/6/2020), 600 mg (3/27/2020), 600 mg (4/17/2020), 600 mg (5/8/2020), 600 mg (5/29/2020), 600 mg (6/19/2020), 600 mg (7/10/2020), 600 mg (7/31/2020), 600 mg (8/21/2020), 600 mg (9/11/2020), 600 mg (10/2/2020), 600 mg (10/23/2020), 600 mg (11/13/2020), 600 mg (12/4/2020), 600 mg (12/24/2020), 600 mg (1/15/2021), 600 mg (2/5/2021), 600 mg (2/26/2021), 600 mg (3/19/2021), 600 mg (4/9/2021), 600 mg (4/30/2021), 600 mg (5/21/2021), 600 mg (6/11/2021), 600 mg (7/2/2021), 600 mg (7/23/2021), 600 mg (8/13/2021), 600 mg (9/3/2021), 600 mg (9/24/2021), 600 mg (10/15/2021), 600 mg (11/5/2021), 600 mg (11/26/2021), 600 mg (12/17/2021), 600 mg (1/7/2022), 600 mg (1/28/2022), 600 mg (2/18/2022), 600 mg (3/11/2022), 600 mg (4/1/2022), 600 mg (5/13/2022), 600 mg (6/3/2022), 600 mg (6/28/2022), 600 mg (7/22/2022), 600 mg (9/2/2022), 600 mg (8/12/2022)     6/24/2022 Biopsy    Final Diagnosis   A  Liver, core biopsies:     - Metastatic carcinoma compatible with a breast primary  ROS:  09/07/22 Reviewed 12 systems:    See symptoms in HPI  Presently no other neurological, cardiac, pulmonary, GI and  symptoms other than mentioned   in HPI     Other symptoms are in HPI  No symptoms like  fever, chills, bleeding, bone pains, skin rash, weight loss, weakness,  claudication and gait problem  No frequent infections  Not unusually sensitive to heat or cold  No swelling of the ankles  No swollen glands  Overall she has been feeling reasonably well  She has a new job    /72 (BP Location: Left arm)   Pulse 79   Temp 98 6 °F (37 °C) (Tympanic)   Resp 18   Ht 5' 5" (1 651 m)   Wt 98 2 kg (216 lb 8 oz)   SpO2 96%   BMI 36 03 kg/m²     Physical Exam:   Alert and oriented and not in distress  Stable vitals  No icterus, no oral thrush, no palpable neck mass, clear  lung fields  to percussion and auscultation , regular heart rate,   abdomen  soft and non tender  , no palpable abdominal mass, no ascites, no edema of ankles, no calf tenderness, no objective focal neurological deficit, no skin rash, no palpable lymphadenopathy in the neck and axillary areas, no clubbing, Patient is  anxious  Performance status 1  No lymphedema      IMAGING:  IMPRESSION: 02/24/2021     1  No scintigraphic evidence of osseous metastasis           Workstation performed: [id number]      Imaging     Ejection fraction 65% on 12/01/2021    IMPRESSION:     1  Area of subtle ill-defined hypodensity in the caudate lobe measuring approximately 2 0 x 1 5 cm new since the prior study 2/24/2021 may be due to metastasis  Recommend characterization with contrast-enhanced MR abdomen      2  Treated metastatic lesion in the left proximal femur is unchanged           The study was marked in EPIC for significant notification            Workstation performed: [id number]          Imaging    CT chest abdomen pelvis w contrast (Order: [id number]) - 3/17/2022  Study Result    Narrative & Impression   CENTRAL  DXA SCAN on 03/08/2022     CLINICAL HISTORY:   49year old post-menopausal  female risk factors include osteoporosis screening  Additional medical history: Breast cancer with mets to Hip , right Hip scanned      TECHNIQUE: Bone densitometry was performed using a [employer] bone densitometer  Regions of interest appear properly placed  There are   other confounding variables which could limit the study        Her elevated  BMI may influence the T score result      Degenerative or osteoarthritic changes are noted on the spine image eliminating L4 from evaluation      COMPARISON:  Follow-up     RESULTS:   LUMBAR SPINE:  L1-L3:  BMD 1 021 gm/cm2  T-score 0 0 and unchanged from 2019  Z-score 1 0     RIGHT TOTAL HIP:  BMD 1 122 gm/cm2  T-score 1 5  Z-score 2 1     RIGHT FEMORAL NECK:  BMD 0 842 gm/cm2  T-score -0 1 and unchanged from 2019  Z-score 0 9           IMPRESSION:  1  Based on the [facility] classification, this study is normal and the patient is considered at low risk for fracture  LABS:    Results for orders placed or performed during the hospital encounter of 08/06/22   Echo follow up/limited w/ contrast if indicated   Result Value Ref Range    Left Atrium Area-systolic Four Chamber 14 5 cm2    MV E' Tissue Velocity Septal 11 cm/s    A4C EF 57 %    RVID d 3 8 cm    AV LVOT peak gradient 6 mmHg    MV valve area p 1/2 method 4 89 cm2    E wave deceleration time 156 ms    AV peak gradient 8 mmHg    MV Peak E David 78 cm/s    MV Peak A David 0 98 m/s    RAA A4C 16 8 cm2    MV stenosis pressure 1/2 time 45 ms     Labs, Imaging, & Other studies:   All pertinent labs and imaging studies were personally reviewed    Lab Results   Component Value Date    K 4 0 06/24/2022     06/24/2022    CO2 29 06/24/2022    BUN 18 06/24/2022    CREATININE 0 94 06/24/2022    GLUF 127 (H) 06/24/2022    CALCIUM 9 3 06/24/2022    CORRECTEDCA 9 9 06/24/2022    AST 20 06/24/2022    ALT 48 06/24/2022    ALKPHOS 96 06/24/2022    EGFR 68 06/24/2022     Lab Results   Component Value Date    WBC 8 84 06/24/2022    HGB 13 1 06/24/2022    HCT 42 0 06/24/2022    MCV 85 06/24/2022     06/24/2022      Normal differential   Normal tumor marker CA 27-29  22 5    Reviewed  test results and discussed with patient and explained  Assessment and plan: Follow-up visit for breast cancer with metastatic disease  This was 1st diagnosed in 2016 and was triple negative  Metastatic disease was de Tricia in the liver and bones from left breast cancer  She was started on Taxotere plus Herceptin and Perjeta and after 6 cycles Taxotere was discontinued and tamoxifen was added  Later tamoxifen was changed to letrozole after she had  BSO  She had Xgeva  She  had radiation to left hip for palliation  She continued to respond and in  September 2016 patient had lumpectomy of left breast followed by radiation therapy   At the time of lumpectomy there was minimal residual disease, 1 4 cm   Lymph nodes were not sampled  Patient tested positive for BRCA 1    Presently she is on Herceptin, Perjeta and letrozole  Kenya Shells has been taking vitamin-D and calcium and is staying active    She has been tolerating treatments without much problem   She goes for blood tests and echocardiogram on regular basis  She has done well all these years and  had very good response but recently there was a new lesion in the liver on CT scan and MRI scan when she had biopsy of that lesion and that came back metastatic from breast, strongly positive for ER, negative for DE and 2+ for HER 2 that was negative by FISH (low positive HER2)  Patient saw radiation oncologist for local radiation to liver lesion and discussed risks and benefits and decided to observe because subsequent CT scan showed slight decrease in size of liver lesion from 2 7 to 2 4  Letrozole could still continue to work on liver lesion because of strongly positive ER DE but she may not get benefit from Herceptin plus Perjeta so far as liver lesion is concerned but she could get benefit from [facility]  I discussed this with patient    She would like to continue on present medications until next CT scan in early November 2022  Also she will be rediscuss with radiation oncologist   Also going to give her information on Enhertu to take home but she wants to check that on line  I gave her verbal information on Enhertu and showed her the recent study  In the meantime she is being continued on letrozole  Herceptin and Perjeta   No bone pains  Has good appetite  And she is maintaining her weight         Patient knows that she is at high risk for breast and other cancers like  ovarian cancer, pancreatic cancer, peritoneal cancer, melanoma and other cancers  Patient goes to breast surgeon for evaluation and imaging studies  She gets CT scan and that also checks the pancreas and peritoneum  She had BSO  She has agreed to see a dermatologist in few months and that will be melanoma mapping  She does not want to go to ophthalmologist for melanoma in the eye  Also she does not want to have colonoscopy and not even Cologuard or stool test for blood       She has grade 1 peripheral neuropathy    Has some tiredness and arthritic symptoms  Patient has history of anxiety and insomnia  For leg cramps at night and she is taking one baby aspirin daily with food in the evening and also one magnesium tablet daily and that is helping          She is not on Xgeva anymore at this time  She had Xgeva for 2 years  Shashi Roberto continues to take  calcium and vitamin-D        Physical examination and test results are as recorded and discussed  Plan is as above  No change in therapy in the meantime      She gets  echocardiogram on regular basis   Condition discussed and explained   Questions answered  Sanjay Go discussed the importance of self-breast examination, eating healthy foods, staying active and health screening tests   Patient goes to her breast surgeon for examination and  imaging studies  Jabari Savanna is capable of self-care   Goal is prolonged survival from stage IV breast cancer  Patient will continue to follow with her primary physician and other consultants  Discussed precautions against coronavirus     See diagnoses, orders and instructions below    1  Malignant neoplasm of upper-inner quadrant of left breast in female, estrogen receptor positive ([facility] [address] )    - CT abdomen w contrast; Future  - CBC and differential; Standing  - Comprehensive metabolic panel; Standing  - CBC and differential  - Comprehensive metabolic panel  - Cancer antigen 27 29; Future    2  Liver metastases ([address] )    - CT abdomen w contrast; Future  - CBC and differential; Standing  - Comprehensive metabolic panel; Standing  - CBC and differential  - Comprehensive metabolic panel  - Cancer antigen 27 29; Future    3  Bone metastasis ([facility] [address] )      4  BRCA1 gene mutation positive in female    - CT abdomen w contrast; Future    5  Cardiomyopathy due to chemotherapy ([facility] [address] )      6  Osteoporosis due to aromatase inhibitor      7  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    - CT abdomen w contrast; Future    8  Port-A-Cath in place      9  S/P BSO (bilateral salpingo-oophorectomy)      10  Adrenal nodule (HCC)    - CT abdomen w contrast; Future          Patient has standing orders for blood work, port flush, Herceptin and Perjeta  She will take information home on [employer]  She will discuss with Dr Carlos Kramer about radiation to liver lesion  She will have follow-up CT abdomen prior to next visit in 2 months                                        Patient voiced understanding and agreed       Counseling / Coordination of Care       Provided counseling and support

## 2022-09-21 RX ORDER — SODIUM CHLORIDE 9 MG/ML
20 INJECTION, SOLUTION INTRAVENOUS ONCE
Status: CANCELLED | OUTPATIENT
Start: 2022-09-23

## 2022-09-23 ENCOUNTER — HOSPITAL ENCOUNTER (OUTPATIENT)
Dept: INFUSION CENTER | Facility: CLINIC | Age: 57
End: 2022-09-23
Payer: COMMERCIAL

## 2022-09-23 VITALS
DIASTOLIC BLOOD PRESSURE: 80 MMHG | HEART RATE: 56 BPM | RESPIRATION RATE: 16 BRPM | HEIGHT: 65 IN | TEMPERATURE: 97.4 F | SYSTOLIC BLOOD PRESSURE: 129 MMHG | BODY MASS INDEX: 36.47 KG/M2 | WEIGHT: 218.92 LBS

## 2022-09-23 DIAGNOSIS — C78.7 LIVER METASTASES (HCC): ICD-10-CM

## 2022-09-23 DIAGNOSIS — C50.212 MALIGNANT NEOPLASM OF UPPER-INNER QUADRANT OF LEFT BREAST IN FEMALE, ESTROGEN RECEPTOR POSITIVE (HCC): ICD-10-CM

## 2022-09-23 DIAGNOSIS — Z90.722 HISTORY OF BILATERAL SALPINGO-OOPHORECTOMY (BSO): ICD-10-CM

## 2022-09-23 DIAGNOSIS — C79.51 BONE METASTASIS (HCC): Primary | ICD-10-CM

## 2022-09-23 DIAGNOSIS — Z90.722 HISTORY OF BILATERAL SALPINGO-OOPHORECTOMY (BSO): Primary | ICD-10-CM

## 2022-09-23 DIAGNOSIS — Z90.79 HISTORY OF BILATERAL SALPINGO-OOPHORECTOMY (BSO): Primary | ICD-10-CM

## 2022-09-23 DIAGNOSIS — Z17.0 MALIGNANT NEOPLASM OF UPPER-INNER QUADRANT OF LEFT BREAST IN FEMALE, ESTROGEN RECEPTOR POSITIVE (HCC): ICD-10-CM

## 2022-09-23 DIAGNOSIS — Z90.79 HISTORY OF BILATERAL SALPINGO-OOPHORECTOMY (BSO): ICD-10-CM

## 2022-09-23 DIAGNOSIS — C79.51 BONE METASTASIS (HCC): ICD-10-CM

## 2022-09-23 PROCEDURE — 96417 CHEMO IV INFUS EACH ADDL SEQ: CPT

## 2022-09-23 PROCEDURE — 96413 CHEMO IV INFUSION 1 HR: CPT

## 2022-09-23 RX ORDER — SODIUM CHLORIDE 9 MG/ML
20 INJECTION, SOLUTION INTRAVENOUS ONCE
Status: COMPLETED | OUTPATIENT
Start: 2022-09-23 | End: 2022-09-23

## 2022-09-23 RX ADMIN — PERTUZUMAB 420 MG: 30 INJECTION, SOLUTION, CONCENTRATE INTRAVENOUS at 14:36

## 2022-09-23 RX ADMIN — SODIUM CHLORIDE 20 ML/HR: 0.9 INJECTION, SOLUTION INTRAVENOUS at 14:15

## 2022-09-23 RX ADMIN — TRASTUZUMAB 600 MG: 150 INJECTION, POWDER, LYOPHILIZED, FOR SOLUTION INTRAVENOUS at 15:06

## 2022-09-23 NOTE — PROGRESS NOTES
Pt  Tolerated Perjeta and Herceptin without adverse event  Future appointment scheduled for 3 weeks, as per Dr Servin Neat note, Pt  Will continue with Perjeta and Herceptin q 3 weeks at this time  AVS declined

## 2022-09-23 NOTE — PROGRESS NOTES
Pt  Denies new symptoms to concerns today  There are no lab parameters  EF 65 %  Perjeta and Herceptin ordered for infusion today

## 2022-10-12 RX ORDER — SODIUM CHLORIDE 9 MG/ML
20 INJECTION, SOLUTION INTRAVENOUS ONCE
Status: CANCELLED | OUTPATIENT
Start: 2022-10-14

## 2022-10-14 ENCOUNTER — HOSPITAL ENCOUNTER (OUTPATIENT)
Dept: INFUSION CENTER | Facility: CLINIC | Age: 57
End: 2022-10-14
Payer: COMMERCIAL

## 2022-10-14 VITALS
SYSTOLIC BLOOD PRESSURE: 160 MMHG | WEIGHT: 218.48 LBS | HEART RATE: 65 BPM | BODY MASS INDEX: 36.4 KG/M2 | RESPIRATION RATE: 18 BRPM | HEIGHT: 65 IN | DIASTOLIC BLOOD PRESSURE: 78 MMHG | TEMPERATURE: 96.6 F

## 2022-10-14 DIAGNOSIS — C50.212 MALIGNANT NEOPLASM OF UPPER-INNER QUADRANT OF LEFT BREAST IN FEMALE, ESTROGEN RECEPTOR POSITIVE (HCC): ICD-10-CM

## 2022-10-14 DIAGNOSIS — Z90.722 HISTORY OF BILATERAL SALPINGO-OOPHORECTOMY (BSO): ICD-10-CM

## 2022-10-14 DIAGNOSIS — Z17.0 MALIGNANT NEOPLASM OF UPPER-INNER QUADRANT OF LEFT BREAST IN FEMALE, ESTROGEN RECEPTOR POSITIVE (HCC): ICD-10-CM

## 2022-10-14 DIAGNOSIS — Z90.79 HISTORY OF BILATERAL SALPINGO-OOPHORECTOMY (BSO): ICD-10-CM

## 2022-10-14 DIAGNOSIS — C78.7 LIVER METASTASES (HCC): ICD-10-CM

## 2022-10-14 DIAGNOSIS — C79.51 BONE METASTASIS (HCC): Primary | ICD-10-CM

## 2022-10-14 PROCEDURE — 96413 CHEMO IV INFUSION 1 HR: CPT

## 2022-10-14 PROCEDURE — 96417 CHEMO IV INFUS EACH ADDL SEQ: CPT

## 2022-10-14 RX ORDER — SODIUM CHLORIDE 9 MG/ML
20 INJECTION, SOLUTION INTRAVENOUS ONCE
Status: COMPLETED | OUTPATIENT
Start: 2022-10-14 | End: 2022-10-14

## 2022-10-14 RX ADMIN — TRASTUZUMAB 600 MG: 150 INJECTION, POWDER, LYOPHILIZED, FOR SOLUTION INTRAVENOUS at 15:31

## 2022-10-14 RX ADMIN — SODIUM CHLORIDE 20 ML/HR: 0.9 INJECTION, SOLUTION INTRAVENOUS at 14:58

## 2022-10-14 RX ADMIN — PERTUZUMAB 420 MG: 30 INJECTION, SOLUTION, CONCENTRATE INTRAVENOUS at 14:58

## 2022-10-14 NOTE — PLAN OF CARE
Problem: INFECTION - ADULT  Goal: Absence or prevention of progression during hospitalization  Description: INTERVENTIONS:  - Assess and monitor for signs and symptoms of infection  - Monitor lab/diagnostic results  - Monitor all insertion sites, i e  indwelling lines, tubes, and drains  - Monitor endotracheal if appropriate and nasal secretions for changes in amount and color  - [place] appropriate cooling/warming therapies per order  - Administer medications as ordered  - Instruct and encourage patient and family to use good hand hygiene technique  - Identify and instruct in appropriate isolation precautions for identified infection/condition  Outcome: Progressing

## 2022-10-14 NOTE — PROGRESS NOTES
Patient arrived to the unit and denied complications, She tolerated her treatment well without adverse reaction

## 2022-10-18 DIAGNOSIS — C50.919 MALIGNANT NEOPLASM OF FEMALE BREAST, UNSPECIFIED ESTROGEN RECEPTOR STATUS, UNSPECIFIED LATERALITY, UNSPECIFIED SITE OF BREAST (HCC): ICD-10-CM

## 2022-10-18 RX ORDER — LETROZOLE 2.5 MG/1
TABLET, FILM COATED ORAL
Qty: 90 TABLET | Refills: 2 | Status: SHIPPED | OUTPATIENT
Start: 2022-10-18

## 2022-11-01 ENCOUNTER — APPOINTMENT (OUTPATIENT)
Dept: LAB | Facility: HOSPITAL | Age: 57
End: 2022-11-01

## 2022-11-01 ENCOUNTER — HOSPITAL ENCOUNTER (OUTPATIENT)
Dept: CT IMAGING | Facility: HOSPITAL | Age: 57
Discharge: HOME | End: 2022-11-01
Attending: INTERNAL MEDICINE

## 2022-11-01 DIAGNOSIS — Z15.02 BRCA1 GENE MUTATION POSITIVE IN FEMALE: ICD-10-CM

## 2022-11-01 DIAGNOSIS — C50.212 MALIGNANT NEOPLASM OF UPPER-INNER QUADRANT OF LEFT BREAST IN FEMALE, ESTROGEN RECEPTOR POSITIVE (HCC): ICD-10-CM

## 2022-11-01 DIAGNOSIS — Z14.8 CARRIER OF GENE MUTATION FOR HIGH RISK OF CANCER: ICD-10-CM

## 2022-11-01 DIAGNOSIS — E27.8 ADRENAL NODULE (HCC): ICD-10-CM

## 2022-11-01 DIAGNOSIS — Z15.09 BRCA1 GENE MUTATION POSITIVE IN FEMALE: ICD-10-CM

## 2022-11-01 DIAGNOSIS — Z15.01 BRCA1 GENE MUTATION POSITIVE IN FEMALE: ICD-10-CM

## 2022-11-01 DIAGNOSIS — Z17.0 MALIGNANT NEOPLASM OF UPPER-INNER QUADRANT OF LEFT BREAST IN FEMALE, ESTROGEN RECEPTOR POSITIVE (HCC): ICD-10-CM

## 2022-11-01 DIAGNOSIS — C78.7 LIVER METASTASES (HCC): ICD-10-CM

## 2022-11-01 RX ADMIN — IOHEXOL 100 ML: 350 INJECTION, SOLUTION INTRAVENOUS at 19:35

## 2022-11-02 RX ORDER — SODIUM CHLORIDE 9 MG/ML
20 INJECTION, SOLUTION INTRAVENOUS ONCE
Status: CANCELLED | OUTPATIENT
Start: 2022-11-04

## 2022-11-04 ENCOUNTER — HOSPITAL ENCOUNTER (OUTPATIENT)
Dept: INFUSION CENTER | Facility: CLINIC | Age: 57
End: 2022-11-04

## 2022-11-04 VITALS
SYSTOLIC BLOOD PRESSURE: 155 MMHG | HEART RATE: 81 BPM | WEIGHT: 218.7 LBS | RESPIRATION RATE: 18 BRPM | DIASTOLIC BLOOD PRESSURE: 87 MMHG | TEMPERATURE: 97.2 F | BODY MASS INDEX: 36.39 KG/M2

## 2022-11-04 DIAGNOSIS — Z90.722 HISTORY OF BILATERAL SALPINGO-OOPHORECTOMY (BSO): ICD-10-CM

## 2022-11-04 DIAGNOSIS — C50.212 MALIGNANT NEOPLASM OF UPPER-INNER QUADRANT OF LEFT BREAST IN FEMALE, ESTROGEN RECEPTOR POSITIVE (HCC): ICD-10-CM

## 2022-11-04 DIAGNOSIS — C79.51 BONE METASTASIS (HCC): Primary | ICD-10-CM

## 2022-11-04 DIAGNOSIS — Z90.79 HISTORY OF BILATERAL SALPINGO-OOPHORECTOMY (BSO): ICD-10-CM

## 2022-11-04 DIAGNOSIS — Z17.0 MALIGNANT NEOPLASM OF UPPER-INNER QUADRANT OF LEFT BREAST IN FEMALE, ESTROGEN RECEPTOR POSITIVE (HCC): ICD-10-CM

## 2022-11-04 DIAGNOSIS — C78.7 LIVER METASTASES (HCC): ICD-10-CM

## 2022-11-04 RX ORDER — SODIUM CHLORIDE 9 MG/ML
20 INJECTION, SOLUTION INTRAVENOUS ONCE
Status: COMPLETED | OUTPATIENT
Start: 2022-11-04 | End: 2022-11-04

## 2022-11-04 RX ADMIN — TRASTUZUMAB 600 MG: 150 INJECTION, POWDER, LYOPHILIZED, FOR SOLUTION INTRAVENOUS at 15:11

## 2022-11-04 RX ADMIN — SODIUM CHLORIDE 20 ML/HR: 0.9 INJECTION, SOLUTION INTRAVENOUS at 14:18

## 2022-11-04 RX ADMIN — PERTUZUMAB 420 MG: 30 INJECTION, SOLUTION, CONCENTRATE INTRAVENOUS at 14:37

## 2022-11-04 NOTE — PROGRESS NOTES
Patient arrived on unit for treatment  Denies any present issues/concerns  Cleared for treatment as per lab parameters on orders  Tolerated infusion without incident  Aware of next appointment   Declined AVS

## 2022-11-09 ENCOUNTER — OFFICE VISIT (OUTPATIENT)
Dept: HEMATOLOGY ONCOLOGY | Facility: CLINIC | Age: 57
End: 2022-11-09

## 2022-11-09 VITALS
BODY MASS INDEX: 36.32 KG/M2 | HEIGHT: 65 IN | OXYGEN SATURATION: 96 % | WEIGHT: 218 LBS | RESPIRATION RATE: 18 BRPM | SYSTOLIC BLOOD PRESSURE: 128 MMHG | TEMPERATURE: 98.8 F | DIASTOLIC BLOOD PRESSURE: 80 MMHG | HEART RATE: 72 BPM

## 2022-11-09 DIAGNOSIS — Z15.01 BRCA1 GENE MUTATION POSITIVE IN FEMALE: ICD-10-CM

## 2022-11-09 DIAGNOSIS — E27.8 ADRENAL NODULE (HCC): ICD-10-CM

## 2022-11-09 DIAGNOSIS — C79.51 BONE METASTASIS (HCC): ICD-10-CM

## 2022-11-09 DIAGNOSIS — C50.919 MALIGNANT NEOPLASM OF FEMALE BREAST, UNSPECIFIED ESTROGEN RECEPTOR STATUS, UNSPECIFIED LATERALITY, UNSPECIFIED SITE OF BREAST (HCC): Primary | ICD-10-CM

## 2022-11-09 DIAGNOSIS — T45.1X5A CARDIOMYOPATHY DUE TO CHEMOTHERAPY (HCC): ICD-10-CM

## 2022-11-09 DIAGNOSIS — C78.7 LIVER METASTASES (HCC): ICD-10-CM

## 2022-11-09 DIAGNOSIS — I42.7 CARDIOMYOPATHY DUE TO CHEMOTHERAPY (HCC): ICD-10-CM

## 2022-11-09 DIAGNOSIS — Z15.09 BRCA1 GENE MUTATION POSITIVE IN FEMALE: ICD-10-CM

## 2022-11-09 DIAGNOSIS — Z15.02 BRCA1 GENE MUTATION POSITIVE IN FEMALE: ICD-10-CM

## 2022-11-09 DIAGNOSIS — T45.1X5A CHEMOTHERAPY INDUCED CARDIOMYOPATHY (HCC): ICD-10-CM

## 2022-11-09 DIAGNOSIS — Z95.828 PORT-A-CATH IN PLACE: ICD-10-CM

## 2022-11-09 DIAGNOSIS — I42.7 CHEMOTHERAPY INDUCED CARDIOMYOPATHY (HCC): ICD-10-CM

## 2022-11-09 DIAGNOSIS — Z14.8 CARRIER OF GENE MUTATION FOR HIGH RISK OF CANCER: ICD-10-CM

## 2022-11-09 DIAGNOSIS — Z90.722 S/P BSO (BILATERAL SALPINGO-OOPHORECTOMY): ICD-10-CM

## 2022-11-09 NOTE — PROGRESS NOTES
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 and that was diagnosed in 2016  Initial treatment was with  Taxotere plus Herceptin and Perjeta and after 6 cycles Taxotere was discontinued and tamoxifen was added  Later tamoxifen was changed to letrozole after she had  BSO  She had Xgeva  She  had radiation to left hip for palliation  She continued to respond and in  September 2016 patient had lumpectomy of left breast followed by radiation therapy   At the time of lumpectomy there was minimal residual disease, 1 4 cm   Lymph nodes were not sampled  Patient tested positive for BRCA 1  She already had BSO  She gets imaging studies for early detection of other cancers like peritoneal and pancreatic cancers  She has been encouraged to see a dermatologist but she does not want to  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    Presently she is still on Herceptin, Perjeta and letrozole  Gema Leavitt has been taking vitamin-D and calcium and is staying active    She has been tolerating treatments without much problem   She goes for blood tests and echocardiogram on regular basis  She has done well all these years and  had very good response but recently there was a new lesion in the liver on CT scan and MRI scan and on biopsy that proved to be metastatic cancer from breast, strongly positive for ER, negative for OR and 2+ for HER 2 that was negative by FISH (low positive HER2)  Patient saw radiation oncologist for local radiation to liver lesion and discussed risks and benefits and decided to be observed because subsequent CT scan showed slight decrease in size of liver lesion from 2 7 to 2 4  She preferred to stay on letrozole, Herceptin and Perjeta  We did discuss ENHERTU  Most recent CT scan has shown slight further decrease in the size of liver lesion  No bone pains  Has good appetite    And she is maintaining her weight         Patient knows that she is at high risk for breast and other cancers like  ovarian cancer, pancreatic cancer, peritoneal cancer, melanoma and other cancers  Patient goes to breast surgeon for evaluation and imaging studies  She gets CT scan and that also checks the pancreas and peritoneum  She had BSO  She does not want to go to in dermatologist for melanoma or ophthalmologist for melanoma in the eye  She goes to her optometrist   Also she does not want to have colonoscopy and not even Cologuard or stool test for blood       She has grade 1 peripheral neuropathy    Has some tiredness and arthritic symptoms  Patient has history of anxiety and insomnia  For leg cramps at night and she is taking one baby aspirin daily with food in the evening and also one magnesium tablet daily and that is helping          She is not on Xgeva anymore at this time  She had Xgeva for 2 years  Viky Po continues to take  calcium and vitamin-D                              Current Outpatient Medications:   •  letrozole (FEMARA) 2 5 mg tablet, TAKE 1 TABLET BY MOUTH EVERY DAY, Disp: 90 tablet, Rfl: 2  •  multivitamin (THERAGRAN) TABS, Take 1 tablet by mouth 3 (three) times a week , Disp: , Rfl:   •  pertuzumab (PERJETA) 420 mg/14 mL SOLN, Infuse into a venous catheter every 21 days At infusion  center, Disp: , Rfl:   •  Trastuzumab (HERCEPTIN IV), Infuse into a venous catheter every 21 days At infusion center, Disp: , Rfl:   •  VITAMIN D, CHOLECALCIFEROL, PO, Take 1,000 Units by mouth 3 (three) times a week , Disp: , Rfl:     No Known Allergies    Oncology History   Malignant neoplasm of upper-inner quadrant of left female breast ([facility] [place] [address] )   12/2015 Initial Diagnosis    Malignant neoplasm of upper-outer quadrant of left female breast ([facility] [place] [address] )     2016 -  Hormone Therapy    Tamoxifen  Ended 8/2019      Dr Alton Toledo  Letrozole 9/2019 1/27/2016 Surgery    Port placement     2/19/2016 - 6/2/2018 Chemotherapy Taxotere,  herceptin, perjeta, xgeva  After six cycles, taxotere was discontinued and tamoxifen added     Continues with Perjeta and Herceptin every 3 weeks     - Dr Israel [facility]       9/16/2016 Surgery    Left breast partial mastectomy     11/7/2016 - 12/23/2016 Radiation    Plan ID Energy Fractions Dose per Fraction (cGy) Total Dose Delivered (cGy) Elapsed Days   Lt Breast 6X 25 / 25 200 5,000 36   Lt Brst Boost 6X 8 / 8 200 1,600 9   Lt Femur 10X 10 / 10 300 3,000 11   Lt PAB 6X 25 / 25 60 1,500 36   Lt Sclav 6X 25 / 25 200 5,000 36                                   Total dose to left breast lumpectomy cavity: 6600 cGy                   Total dose to the supraclavicular and axillary regions: 5000 cGy       4/17/2019 -  Chemotherapy    pertuzumab (PERJETA) IVPB, 840 mg, Intravenous, Once, 61 of 64 cycles  Administration: 420 mg (5/17/2019), 420 mg (6/7/2019), 420 mg (6/28/2019), 420 mg (7/19/2019), 420 mg (8/30/2019), 420 mg (9/20/2019), 420 mg (8/9/2019), 420 mg (10/9/2019), 420 mg (11/1/2019), 420 mg (11/22/2019), 420 mg (12/13/2019), 420 mg (1/3/2020), 420 mg (1/24/2020), 420 mg (2/14/2020), 420 mg (3/6/2020), 420 mg (3/27/2020), 420 mg (4/17/2020), 420 mg (5/8/2020), 420 mg (5/29/2020), 420 mg (6/19/2020), 420 mg (7/10/2020), 420 mg (7/31/2020), 420 mg (8/21/2020), 420 mg (9/11/2020), 420 mg (10/2/2020), 420 mg (10/23/2020), 420 mg (11/13/2020), 420 mg (12/4/2020), 420 mg (12/24/2020), 420 mg (1/15/2021), 420 mg (2/5/2021), 420 mg (2/26/2021), 420 mg (3/19/2021), 420 mg (4/9/2021), 420 mg (4/30/2021), 420 mg (5/21/2021), 420 mg (6/11/2021), 420 mg (7/2/2021), 420 mg (7/23/2021), 420 mg (8/13/2021), 420 mg (9/3/2021), 420 mg (9/24/2021), 420 mg (10/15/2021), 420 mg (11/5/2021), 420 mg (11/26/2021), 420 mg (12/17/2021), 420 mg (1/7/2022), 420 mg (1/28/2022), 420 mg (2/18/2022), 420 mg (3/11/2022), 420 mg (4/1/2022), 420 mg (5/13/2022), 420 mg (6/3/2022), 420 mg (6/28/2022), 420 mg (7/22/2022), 420 mg (9/2/2022), 420 mg (9/23/2022), 420 mg (8/12/2022), 420 mg (10/14/2022)  trastuzumab (HERCEPTIN) chemo infusion, 6 mg/kg = 589 mg (75 % of original dose 8 mg/kg), Intravenous, Once, 61 of 64 cycles  Dose modification: 6 mg/kg (original dose 8 mg/kg, Cycle 1, Reason: Other (Must fill in a comment))  Administration: 589 mg (5/17/2019), 589 mg (6/7/2019), 600 mg (6/28/2019), 600 mg (7/19/2019), 600 mg (8/30/2019), 600 mg (9/20/2019), 600 mg (8/9/2019), 600 mg (10/9/2019), 600 mg (11/1/2019), 600 mg (11/22/2019), 600 mg (12/13/2019), 600 mg (1/3/2020), 600 mg (1/24/2020), 600 mg (2/14/2020), 600 mg (3/6/2020), 600 mg (3/27/2020), 600 mg (4/17/2020), 600 mg (5/8/2020), 600 mg (5/29/2020), 600 mg (6/19/2020), 600 mg (7/10/2020), 600 mg (7/31/2020), 600 mg (8/21/2020), 600 mg (9/11/2020), 600 mg (10/2/2020), 600 mg (10/23/2020), 600 mg (11/13/2020), 600 mg (12/4/2020), 600 mg (12/24/2020), 600 mg (1/15/2021), 600 mg (2/5/2021), 600 mg (2/26/2021), 600 mg (3/19/2021), 600 mg (4/9/2021), 600 mg (4/30/2021), 600 mg (5/21/2021), 600 mg (6/11/2021), 600 mg (7/2/2021), 600 mg (7/23/2021), 600 mg (8/13/2021), 600 mg (9/3/2021), 600 mg (9/24/2021), 600 mg (10/15/2021), 600 mg (11/5/2021), 600 mg (11/26/2021), 600 mg (12/17/2021), 600 mg (1/7/2022), 600 mg (1/28/2022), 600 mg (2/18/2022), 600 mg (3/11/2022), 600 mg (4/1/2022), 600 mg (5/13/2022), 600 mg (6/3/2022), 600 mg (6/28/2022), 600 mg (7/22/2022), 600 mg (9/2/2022), 600 mg (9/23/2022), 600 mg (8/12/2022), 600 mg (10/14/2022)     8/16/2019 Surgery    she underwent BSO      7/5/2022 -  Cancer Staged    Staging form: Breast, AJCC 8th Edition  - Clinical: Stage IV (rcTX, cNX, pM1) - Signed by Joshua Alcaraz MD on 7/5/2022  Stage prefix: Recurrence       Liver metastases ([facility] [address] )   4/27/2018 Initial Diagnosis    Liver metastases ([facility] [address] )     4/17/2019 -  Chemotherapy    pertuzumab (PERJETA) IVPB, 840 mg, Intravenous, Once, 61 of 64 cycles  Administration: 420 mg (5/17/2019), 420 mg (6/7/2019), 420 mg (6/28/2019), 420 mg (7/19/2019), 420 mg (8/30/2019), 420 mg (9/20/2019), 420 mg (8/9/2019), 420 mg (10/9/2019), 420 mg (11/1/2019), 420 mg (11/22/2019), 420 mg (12/13/2019), 420 mg (1/3/2020), 420 mg (1/24/2020), 420 mg (2/14/2020), 420 mg (3/6/2020), 420 mg (3/27/2020), 420 mg (4/17/2020), 420 mg (5/8/2020), 420 mg (5/29/2020), 420 mg (6/19/2020), 420 mg (7/10/2020), 420 mg (7/31/2020), 420 mg (8/21/2020), 420 mg (9/11/2020), 420 mg (10/2/2020), 420 mg (10/23/2020), 420 mg (11/13/2020), 420 mg (12/4/2020), 420 mg (12/24/2020), 420 mg (1/15/2021), 420 mg (2/5/2021), 420 mg (2/26/2021), 420 mg (3/19/2021), 420 mg (4/9/2021), 420 mg (4/30/2021), 420 mg (5/21/2021), 420 mg (6/11/2021), 420 mg (7/2/2021), 420 mg (7/23/2021), 420 mg (8/13/2021), 420 mg (9/3/2021), 420 mg (9/24/2021), 420 mg (10/15/2021), 420 mg (11/5/2021), 420 mg (11/26/2021), 420 mg (12/17/2021), 420 mg (1/7/2022), 420 mg (1/28/2022), 420 mg (2/18/2022), 420 mg (3/11/2022), 420 mg (4/1/2022), 420 mg (5/13/2022), 420 mg (6/3/2022), 420 mg (6/28/2022), 420 mg (7/22/2022), 420 mg (9/2/2022), 420 mg (9/23/2022), 420 mg (8/12/2022), 420 mg (10/14/2022)  trastuzumab (HERCEPTIN) chemo infusion, 6 mg/kg = 589 mg (75 % of original dose 8 mg/kg), Intravenous, Once, 61 of 64 cycles  Dose modification: 6 mg/kg (original dose 8 mg/kg, Cycle 1, Reason: Other (Must fill in a comment))  Administration: 589 mg (5/17/2019), 589 mg (6/7/2019), 600 mg (6/28/2019), 600 mg (7/19/2019), 600 mg (8/30/2019), 600 mg (9/20/2019), 600 mg (8/9/2019), 600 mg (10/9/2019), 600 mg (11/1/2019), 600 mg (11/22/2019), 600 mg (12/13/2019), 600 mg (1/3/2020), 600 mg (1/24/2020), 600 mg (2/14/2020), 600 mg (3/6/2020), 600 mg (3/27/2020), 600 mg (4/17/2020), 600 mg (5/8/2020), 600 mg (5/29/2020), 600 mg (6/19/2020), 600 mg (7/10/2020), 600 mg (7/31/2020), 600 mg (8/21/2020), 600 mg (9/11/2020), 600 mg (10/2/2020), 600 mg (10/23/2020), 600 mg (11/13/2020), 600 mg (12/4/2020), 600 mg (12/24/2020), 600 mg (1/15/2021), 600 mg (2/5/2021), 600 mg (2/26/2021), 600 mg (3/19/2021), 600 mg (4/9/2021), 600 mg (4/30/2021), 600 mg (5/21/2021), 600 mg (6/11/2021), 600 mg (7/2/2021), 600 mg (7/23/2021), 600 mg (8/13/2021), 600 mg (9/3/2021), 600 mg (9/24/2021), 600 mg (10/15/2021), 600 mg (11/5/2021), 600 mg (11/26/2021), 600 mg (12/17/2021), 600 mg (1/7/2022), 600 mg (1/28/2022), 600 mg (2/18/2022), 600 mg (3/11/2022), 600 mg (4/1/2022), 600 mg (5/13/2022), 600 mg (6/3/2022), 600 mg (6/28/2022), 600 mg (7/22/2022), 600 mg (9/2/2022), 600 mg (9/23/2022), 600 mg (8/12/2022), 600 mg (10/14/2022)     6/24/2022 Biopsy    Final Diagnosis   A  Liver, core biopsies:     - Metastatic carcinoma compatible with a breast primary          Bone metastasis ([facility] [address] )   4/27/2018 Initial Diagnosis    Bone metastasis ([facility] [address] )     4/17/2019 -  Chemotherapy    pertuzumab (PERJETA) IVPB, 840 mg, Intravenous, Once, 61 of 64 cycles  Administration: 420 mg (5/17/2019), 420 mg (6/7/2019), 420 mg (6/28/2019), 420 mg (7/19/2019), 420 mg (8/30/2019), 420 mg (9/20/2019), 420 mg (8/9/2019), 420 mg (10/9/2019), 420 mg (11/1/2019), 420 mg (11/22/2019), 420 mg (12/13/2019), 420 mg (1/3/2020), 420 mg (1/24/2020), 420 mg (2/14/2020), 420 mg (3/6/2020), 420 mg (3/27/2020), 420 mg (4/17/2020), 420 mg (5/8/2020), 420 mg (5/29/2020), 420 mg (6/19/2020), 420 mg (7/10/2020), 420 mg (7/31/2020), 420 mg (8/21/2020), 420 mg (9/11/2020), 420 mg (10/2/2020), 420 mg (10/23/2020), 420 mg (11/13/2020), 420 mg (12/4/2020), 420 mg (12/24/2020), 420 mg (1/15/2021), 420 mg (2/5/2021), 420 mg (2/26/2021), 420 mg (3/19/2021), 420 mg (4/9/2021), 420 mg (4/30/2021), 420 mg (5/21/2021), 420 mg (6/11/2021), 420 mg (7/2/2021), 420 mg (7/23/2021), 420 mg (8/13/2021), 420 mg (9/3/2021), 420 mg (9/24/2021), 420 mg (10/15/2021), 420 mg (11/5/2021), 420 mg (11/26/2021), 420 mg (12/17/2021), 420 mg (1/7/2022), 420 mg (1/28/2022), 420 mg (2/18/2022), 420 mg (3/11/2022), 420 mg (4/1/2022), 420 mg (5/13/2022), 420 mg (6/3/2022), 420 mg (6/28/2022), 420 mg (7/22/2022), 420 mg (9/2/2022), 420 mg (9/23/2022), 420 mg (8/12/2022), 420 mg (10/14/2022)  trastuzumab (HERCEPTIN) chemo infusion, 6 mg/kg = 589 mg (75 % of original dose 8 mg/kg), Intravenous, Once, 61 of 64 cycles  Dose modification: 6 mg/kg (original dose 8 mg/kg, Cycle 1, Reason: Other (Must fill in a comment))  Administration: 589 mg (5/17/2019), 589 mg (6/7/2019), 600 mg (6/28/2019), 600 mg (7/19/2019), 600 mg (8/30/2019), 600 mg (9/20/2019), 600 mg (8/9/2019), 600 mg (10/9/2019), 600 mg (11/1/2019), 600 mg (11/22/2019), 600 mg (12/13/2019), 600 mg (1/3/2020), 600 mg (1/24/2020), 600 mg (2/14/2020), 600 mg (3/6/2020), 600 mg (3/27/2020), 600 mg (4/17/2020), 600 mg (5/8/2020), 600 mg (5/29/2020), 600 mg (6/19/2020), 600 mg (7/10/2020), 600 mg (7/31/2020), 600 mg (8/21/2020), 600 mg (9/11/2020), 600 mg (10/2/2020), 600 mg (10/23/2020), 600 mg (11/13/2020), 600 mg (12/4/2020), 600 mg (12/24/2020), 600 mg (1/15/2021), 600 mg (2/5/2021), 600 mg (2/26/2021), 600 mg (3/19/2021), 600 mg (4/9/2021), 600 mg (4/30/2021), 600 mg (5/21/2021), 600 mg (6/11/2021), 600 mg (7/2/2021), 600 mg (7/23/2021), 600 mg (8/13/2021), 600 mg (9/3/2021), 600 mg (9/24/2021), 600 mg (10/15/2021), 600 mg (11/5/2021), 600 mg (11/26/2021), 600 mg (12/17/2021), 600 mg (1/7/2022), 600 mg (1/28/2022), 600 mg (2/18/2022), 600 mg (3/11/2022), 600 mg (4/1/2022), 600 mg (5/13/2022), 600 mg (6/3/2022), 600 mg (6/28/2022), 600 mg (7/22/2022), 600 mg (9/2/2022), 600 mg (9/23/2022), 600 mg (8/12/2022), 600 mg (10/14/2022)     6/24/2022 Biopsy    Final Diagnosis   A  Liver, core biopsies:     - Metastatic carcinoma compatible with a breast primary  ROS:  11/09/22 Reviewed 12 systems:    See symptoms in HPI  Presently no other neurological, cardiac, pulmonary, GI and  symptoms other than mentioned   in HPI     Other symptoms are in HPI  No fever, chills, bleeding, bone pains, skin rash, weight loss, weakness,  claudication and gait problem  No frequent infections  Not unusually sensitive to heat or cold  No swelling of the ankles  No swollen glands  Overall she has been feeling reasonably well  /80 (BP Location: Right arm, Patient Position: Sitting, Cuff Size: Adult)   Pulse 72   Temp 98 8 °F (37 1 °C) (Temporal)   Resp 18   Ht 5' 5" (1 651 m)   Wt 98 9 kg (218 lb)   SpO2 96%   BMI 36 28 kg/m²     Physical Exam:   Patient is alert and oriented  Patient is not in distress  Vital signs are above  There is no icterus  no oral thrush, no palpable neck mass,  lung fields clear  to percussion and auscultation , regular heart rate,    soft and non tender abdomen , no palpable abdominal mass, no ascites, no edema of ankles, no calf tenderness, no objective focal neurological deficit, no skin rash, no palpable lymphadenopathy in the neck and axillary areas, no clubbing, Patient is  anxious  Performance status 1  No lymphedema      IMAGING:  IMPRESSION: 02/24/2021     1  No scintigraphic evidence of osseous metastasis           Workstation performed: [id number]      Imaging     Ejection fraction 65% on 08/06/2022      Study Result    Narrative & Impression   CENTRAL  DXA SCAN on 03/08/2022     CLINICAL HISTORY:   49year old post-menopausal  female risk factors include osteoporosis screening  Additional medical history: Breast cancer with mets to Hip , right Hip scanned      TECHNIQUE: Bone densitometry was performed using a [employer] W bone densitometer  Regions of interest appear properly placed   There are   other confounding variables which could limit the study        Her elevated  BMI may influence the T score result      Degenerative or osteoarthritic changes are noted on the spine image eliminating L4 from evaluation      COMPARISON:  Follow-up     RESULTS:   LUMBAR SPINE:  L1-L3:  BMD 1 021 gm/cm2  T-score 0 0 and unchanged from 2019   Z-score 1 0     RIGHT TOTAL HIP:  BMD 1 122 gm/cm2  T-score 1 5  Z-score 2 1     RIGHT FEMORAL NECK:  BMD 0 842 gm/cm2  T-score -0 1 and unchanged from 2019  Z-score 0 9           IMPRESSION:  1  Based on the [facility] classification, this study is normal and the patient is considered at low risk for fracture  IMPRESSION:     Since August 2, 2022:  1  Decreased size and conspicuity of the biopsy-proven hepatic metastasis      2   No new sites of metastatic disease in the abdomen         Workstation performed: [id number]          Imaging    CT abdomen w contrast (Order: [id number]) - 11/1/2022      LABS:    Results for orders placed or performed in visit on 10/14/22   CBC and differential   Result Value Ref Range    WBC 10 35 (H) 4 31 - 10 16 Thousand/uL    RBC 4 85 3 81 - 5 12 Million/uL    Hemoglobin 13 0 11 5 - 15 4 g/dL    Hematocrit 41 4 34 8 - 46 1 %    MCV 85 82 - 98 fL    MCH 26 8 26 8 - 34 3 pg    MCHC 31 4 31 4 - 37 4 g/dL    RDW 14 3 11 6 - 15 1 %    MPV 10 9 8 9 - 12 7 fL    Platelets 143 205 - 900 Thousands/uL    nRBC 0 /100 WBCs    Neutrophils Relative 67 43 - 75 %    Immat GRANS % 1 0 - 2 %    Lymphocytes Relative 22 14 - 44 %    Monocytes Relative 6 4 - 12 %    Eosinophils Relative 3 0 - 6 %    Basophils Relative 1 0 - 1 %    Neutrophils Absolute 7 10 1 85 - 7 62 Thousands/µL    Immature Grans Absolute 0 06 0 00 - 0 20 Thousand/uL    Lymphocytes Absolute 2 23 0 60 - 4 47 Thousands/µL    Monocytes Absolute 0 61 0 17 - 1 22 Thousand/µL    Eosinophils Absolute 0 29 0 00 - 0 61 Thousand/µL    Basophils Absolute 0 06 0 00 - 0 10 Thousands/µL   Comprehensive metabolic panel   Result Value Ref Range    Sodium 140 135 - 147 mmol/L    Potassium 4 0 3 5 - 5 3 mmol/L    Chloride 105 96 - 108 mmol/L    CO2 28 21 - 32 mmol/L    ANION GAP 7 4 - 13 mmol/L    BUN 22 5 - 25 mg/dL    Creatinine 0 91 0 60 - 1 30 mg/dL    Glucose 162 (H) 65 - 140 mg/dL    Calcium 9 1 8 3 - 10 1 mg/dL    Corrected Calcium 9 7 8 3 - 10 1 mg/dL    AST 26 5 - 45 U/L    ALT 44 12 - 78 U/L    Alkaline Phosphatase 103 46 - 116 U/L    Total Protein 7 8 6 4 - 8 4 g/dL    Albumin 3 2 (L) 3 5 - 5 0 g/dL    Total Bilirubin 0 28 0 20 - 1 00 mg/dL    eGFR 71 ml/min/1 73sq m     Labs, Imaging, & Other studies:   All pertinent labs and imaging studies were personally reviewed    Lab Results   Component Value Date    K 4 0 11/01/2022     11/01/2022    CO2 28 11/01/2022    BUN 22 11/01/2022    CREATININE 0 91 11/01/2022    GLUF 127 (H) 06/24/2022    CALCIUM 9 1 11/01/2022    CORRECTEDCA 9 7 11/01/2022    AST 26 11/01/2022    ALT 44 11/01/2022    ALKPHOS 103 11/01/2022    EGFR 71 11/01/2022     Lab Results   Component Value Date    WBC 10 35 (H) 11/01/2022    HGB 13 0 11/01/2022    HCT 41 4 11/01/2022    MCV 85 11/01/2022     11/01/2022      Normal differential   Normal tumor marker CA 27-29  22 5    Reviewed  test results and discussed with patient and explained  Assessment and plan:   Patient has history of 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 bones and that was diagnosed in 2016  Initial treatment was with  Taxotere plus Herceptin and Perjeta and after 6 cycles Taxotere was discontinued and tamoxifen was added  Later tamoxifen was changed to letrozole after she had  BSO  She had Xgeva  She  had radiation to left hip for palliation  She continued to respond and in  September 2016 patient had lumpectomy of left breast followed by radiation therapy   At the time of lumpectomy there was minimal residual disease, 1 4 cm   Lymph nodes were not sampled  Patient tested positive for BRCA 1  She already had BSO  She gets imaging studies for early detection of other cancers like peritoneal and pancreatic cancers  She has been encouraged to see a dermatologist but she does not want to    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    Presently she is still on Herceptin, Perjeta and letrozole  Issac Givens has been taking vitamin-D and calcium and is staying active    She has been tolerating treatments without much problem   She goes for blood tests and echocardiogram on regular basis  She has done well all these years and  had very good response but recently there was a new lesion in the liver on CT scan and MRI scan and on biopsy that proved to be metastatic cancer from breast, strongly positive for ER, negative for NE and 2+ for HER 2 that was negative by FISH (low positive HER2)  Patient saw radiation oncologist for local radiation to liver lesion and discussed risks and benefits and decided to be observed because subsequent CT scan showed slight decrease in size of liver lesion from 2 7 to 2 4  She preferred to stay on letrozole, Herceptin and Perjeta  We did discuss ENHERTU  Most recent CT scan has shown slight further decrease in the size of liver lesion  No bone pains  Has good appetite  And she is maintaining her weight         Patient knows that she is at high risk for breast and other cancers like  ovarian cancer, pancreatic cancer, peritoneal cancer, melanoma and other cancers  Patient goes to breast surgeon for evaluation and imaging studies  She gets CT scan and that also checks the pancreas and peritoneum  She had BSO  She does not want to go to in dermatologist for melanoma or ophthalmologist for melanoma in the eye  She goes to her optometrist   Also she does not want to have colonoscopy and not even Cologuard or stool test for blood       She has grade 1 peripheral neuropathy    Has some tiredness and arthritic symptoms  Patient has history of anxiety and insomnia  For leg cramps at night and she is taking one baby aspirin daily with food in the evening and also one magnesium tablet daily and that is helping          She is not on Xgeva anymore at this time    She had Xgeva for 2 years  Issac Givens continues to take  calcium and vitamin-D             Physical examination and test results are as recorded and discussed  Plan is as above  No change in therapy for now   She gets  echocardiogram on regular basis   Condition discussed and explained   Questions answered  Nolvia Cross discussed the importance of self-breast examination, eating healthy foods, staying active and health screening tests   Patient goes to her breast surgeon for examination and  imaging studies  Kenny Caldwell is capable of self-care   Goal is prolonged survival from stage IV breast cancer  Patient will continue to follow with her primary physician and other consultants  Discussed precautions against coronavirus and flu     See diagnoses, orders and instructions below    1  Malignant neoplasm of female breast, unspecified estrogen receptor status, unspecified laterality, unspecified site of breast ([facility] )      2  Liver metastases ([place] [address] )      3  Bone metastasis ([place] [address] )      4  BRCA1 gene mutation positive in female      11  Cardiomyopathy due to chemotherapy ([place] [address] )      6  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      7  Port-A-Cath in place      8  S/P BSO (bilateral salpingo-oophorectomy)      9  Adrenal nodule ([place] [address] )      10  Chemotherapy induced cardiomyopathy ([place] [address] )    - Echo follow up/limited w/ contrast if indicated; Future      No change in Herceptin, Perjeta and letrozole  Ordered echo  Follow-up in 3 months  She has standing orders for blood work        I used dictation device to dictated this note and there could be mistakes in my note                                       Patient voiced understanding and agreed       Counseling / Coordination of Care       Provided counseling and support

## 2022-11-09 NOTE — PATIENT INSTRUCTIONS
No change in Herceptin, Perjeta and letrozole  Ordered echo  Follow-up in 3 months    She has standing orders for blood work

## 2022-11-20 RX ORDER — SODIUM CHLORIDE 9 MG/ML
20 INJECTION, SOLUTION INTRAVENOUS ONCE
Status: CANCELLED | OUTPATIENT
Start: 2022-11-25

## 2022-11-25 ENCOUNTER — HOSPITAL ENCOUNTER (OUTPATIENT)
Dept: INFUSION CENTER | Facility: CLINIC | Age: 57
End: 2022-11-25

## 2022-11-25 VITALS
HEART RATE: 67 BPM | SYSTOLIC BLOOD PRESSURE: 152 MMHG | TEMPERATURE: 96.7 F | HEIGHT: 65 IN | RESPIRATION RATE: 18 BRPM | WEIGHT: 219.58 LBS | DIASTOLIC BLOOD PRESSURE: 88 MMHG | BODY MASS INDEX: 36.58 KG/M2

## 2022-11-25 DIAGNOSIS — Z90.722 HISTORY OF BILATERAL SALPINGO-OOPHORECTOMY (BSO): ICD-10-CM

## 2022-11-25 DIAGNOSIS — C78.7 LIVER METASTASES (HCC): ICD-10-CM

## 2022-11-25 DIAGNOSIS — C50.212 MALIGNANT NEOPLASM OF UPPER-INNER QUADRANT OF LEFT BREAST IN FEMALE, ESTROGEN RECEPTOR POSITIVE (HCC): ICD-10-CM

## 2022-11-25 DIAGNOSIS — C79.51 BONE METASTASIS (HCC): Primary | ICD-10-CM

## 2022-11-25 DIAGNOSIS — Z17.0 MALIGNANT NEOPLASM OF UPPER-INNER QUADRANT OF LEFT BREAST IN FEMALE, ESTROGEN RECEPTOR POSITIVE (HCC): ICD-10-CM

## 2022-11-25 DIAGNOSIS — Z90.79 HISTORY OF BILATERAL SALPINGO-OOPHORECTOMY (BSO): ICD-10-CM

## 2022-11-25 RX ORDER — SODIUM CHLORIDE 9 MG/ML
20 INJECTION, SOLUTION INTRAVENOUS ONCE
Status: COMPLETED | OUTPATIENT
Start: 2022-11-25 | End: 2022-11-25

## 2022-11-25 RX ADMIN — TRASTUZUMAB 600 MG: 150 INJECTION, POWDER, LYOPHILIZED, FOR SOLUTION INTRAVENOUS at 15:14

## 2022-11-25 RX ADMIN — SODIUM CHLORIDE 20 ML/HR: 0.9 INJECTION, SOLUTION INTRAVENOUS at 14:26

## 2022-11-25 RX ADMIN — PERTUZUMAB 420 MG: 30 INJECTION, SOLUTION, CONCENTRATE INTRAVENOUS at 14:42

## 2022-11-25 NOTE — PROGRESS NOTES
Patient arrived to appointment without concerns  Tolerated treatment with no adverse effects  Denies need for AVS, aware of upcoming appointments

## 2022-11-25 NOTE — PLAN OF CARE
Problem: Potential for Falls  Goal: Patient will remain free of falls  Description: INTERVENTIONS:  - Educate patient/family on patient safety including physical limitations  - Instruct patient to call for assistance with activity   - Consult OT/PT to assist with strengthening/mobility   - Keep Call bell within reach  - Keep bed low and locked with side rails adjusted as appropriate  - Keep care items and personal belongings within reach  - Initiate and maintain comfort rounds  - Make Fall Risk Sign visible to staff  - Apply yellow socks and bracelet for high fall risk patients  - Consider moving patient to room near nurses station

## 2022-12-14 RX ORDER — SODIUM CHLORIDE 9 MG/ML
20 INJECTION, SOLUTION INTRAVENOUS ONCE
Status: CANCELLED | OUTPATIENT
Start: 2022-12-15

## 2022-12-15 ENCOUNTER — HOSPITAL ENCOUNTER (OUTPATIENT)
Dept: INFUSION CENTER | Facility: CLINIC | Age: 57
Discharge: HOME | End: 2022-12-15

## 2022-12-15 VITALS
TEMPERATURE: 97.8 F | BODY MASS INDEX: 36.03 KG/M2 | SYSTOLIC BLOOD PRESSURE: 136 MMHG | RESPIRATION RATE: 18 BRPM | DIASTOLIC BLOOD PRESSURE: 87 MMHG | HEART RATE: 63 BPM | HEIGHT: 65 IN | WEIGHT: 216.27 LBS

## 2022-12-15 DIAGNOSIS — Z90.722 HISTORY OF BILATERAL SALPINGO-OOPHORECTOMY (BSO): ICD-10-CM

## 2022-12-15 DIAGNOSIS — C50.212 MALIGNANT NEOPLASM OF UPPER-INNER QUADRANT OF LEFT BREAST IN FEMALE, ESTROGEN RECEPTOR POSITIVE (HCC): ICD-10-CM

## 2022-12-15 DIAGNOSIS — C78.7 LIVER METASTASES (HCC): ICD-10-CM

## 2022-12-15 DIAGNOSIS — Z90.79 HISTORY OF BILATERAL SALPINGO-OOPHORECTOMY (BSO): ICD-10-CM

## 2022-12-15 DIAGNOSIS — C79.51 BONE METASTASIS (HCC): Primary | ICD-10-CM

## 2022-12-15 DIAGNOSIS — Z17.0 MALIGNANT NEOPLASM OF UPPER-INNER QUADRANT OF LEFT BREAST IN FEMALE, ESTROGEN RECEPTOR POSITIVE (HCC): ICD-10-CM

## 2022-12-15 RX ORDER — SODIUM CHLORIDE 9 MG/ML
20 INJECTION, SOLUTION INTRAVENOUS ONCE
Status: COMPLETED | OUTPATIENT
Start: 2022-12-15 | End: 2022-12-15

## 2022-12-15 RX ADMIN — SODIUM CHLORIDE 20 ML/HR: 0.9 INJECTION, SOLUTION INTRAVENOUS at 08:47

## 2022-12-15 RX ADMIN — TRASTUZUMAB 600 MG: 150 INJECTION, POWDER, LYOPHILIZED, FOR SOLUTION INTRAVENOUS at 09:56

## 2022-12-15 RX ADMIN — PERTUZUMAB 420 MG: 30 INJECTION, SOLUTION, CONCENTRATE INTRAVENOUS at 09:21

## 2022-12-29 ENCOUNTER — HOSPITAL ENCOUNTER (OUTPATIENT)
Dept: NON INVASIVE DIAGNOSTICS | Facility: HOSPITAL | Age: 57
Discharge: HOME | End: 2022-12-29
Attending: INTERNAL MEDICINE

## 2022-12-29 VITALS
SYSTOLIC BLOOD PRESSURE: 144 MMHG | HEIGHT: 65 IN | WEIGHT: 216 LBS | BODY MASS INDEX: 35.99 KG/M2 | DIASTOLIC BLOOD PRESSURE: 68 MMHG | HEART RATE: 66 BPM

## 2022-12-29 DIAGNOSIS — I42.7 CHEMOTHERAPY INDUCED CARDIOMYOPATHY (HCC): ICD-10-CM

## 2022-12-29 DIAGNOSIS — T45.1X5A CHEMOTHERAPY INDUCED CARDIOMYOPATHY (HCC): ICD-10-CM

## 2022-12-29 LAB
AORTIC VALVE MEAN VELOCITY: 8.9 M/S
APICAL FOUR CHAMBER EJECTION FRACTION: 74 %
AV LVOT MEAN GRADIENT: 2 MMHG
AV LVOT PEAK GRADIENT: 4 MMHG
AV MEAN GRADIENT: 4 MMHG
AV PEAK GRADIENT: 8 MMHG
DOP CALC AO PEAK VEL: 1.43 M/S
DOP CALC AO VTI: 31.58 CM
DOP CALC LVOT PEAK VEL VTI: 23.32 CM
DOP CALC LVOT PEAK VEL: 1.02 M/S
E WAVE DECELERATION TIME: 241 MS
E/A RATIO: 0.85
FRACTIONAL SHORTENING: 34 (ref 28–44)
INTERVENTRICULAR SEPTUM IN DIASTOLE (PARASTERNAL SHORT AXIS VIEW): 1 CM
INTERVENTRICULAR SEPTUM: 1 CM (ref 0.6–1.1)
LEFT INTERNAL DIMENSION IN SYSTOLE: 2.5 CM (ref 2.1–4)
LEFT VENTRICULAR INTERNAL DIMENSION IN DIASTOLE: 3.8 CM (ref 3.5–6)
LEFT VENTRICULAR POSTERIOR WALL IN END DIASTOLE: 1 CM
LEFT VENTRICULAR STROKE VOLUME: 40 ML
LVSV (TEICH): 40 ML
MV E'TISSUE VEL-LAT: 16 CM/S
MV E'TISSUE VEL-SEP: 11 CM/S
MV PEAK A VEL: 0.8 M/S
MV PEAK E VEL: 68 CM/S
SL CV PED ECHO LEFT VENTRICLE DIASTOLIC VOLUME (MOD BIPLANE) 2D: 62 ML
SL CV PED ECHO LEFT VENTRICLE SYSTOLIC VOLUME (MOD BIPLANE) 2D: 22 ML

## 2023-01-03 RX ORDER — SODIUM CHLORIDE 9 MG/ML
20 INJECTION, SOLUTION INTRAVENOUS ONCE
Status: CANCELLED | OUTPATIENT
Start: 2023-01-06

## 2023-01-06 ENCOUNTER — HOSPITAL ENCOUNTER (OUTPATIENT)
Dept: INFUSION CENTER | Facility: CLINIC | Age: 58
End: 2023-01-06

## 2023-01-06 VITALS — WEIGHT: 218.26 LBS | BODY MASS INDEX: 36.32 KG/M2

## 2023-01-06 DIAGNOSIS — C78.7 LIVER METASTASES (HCC): ICD-10-CM

## 2023-01-06 DIAGNOSIS — C79.51 BONE METASTASIS (HCC): Primary | ICD-10-CM

## 2023-01-06 DIAGNOSIS — Z17.0 MALIGNANT NEOPLASM OF UPPER-INNER QUADRANT OF LEFT BREAST IN FEMALE, ESTROGEN RECEPTOR POSITIVE (HCC): ICD-10-CM

## 2023-01-06 DIAGNOSIS — Z90.722 HISTORY OF BILATERAL SALPINGO-OOPHORECTOMY (BSO): ICD-10-CM

## 2023-01-06 DIAGNOSIS — C50.212 MALIGNANT NEOPLASM OF UPPER-INNER QUADRANT OF LEFT BREAST IN FEMALE, ESTROGEN RECEPTOR POSITIVE (HCC): ICD-10-CM

## 2023-01-06 DIAGNOSIS — Z90.79 HISTORY OF BILATERAL SALPINGO-OOPHORECTOMY (BSO): ICD-10-CM

## 2023-01-06 RX ORDER — SODIUM CHLORIDE 9 MG/ML
20 INJECTION, SOLUTION INTRAVENOUS ONCE
Status: COMPLETED | OUTPATIENT
Start: 2023-01-06 | End: 2023-01-06

## 2023-01-06 RX ADMIN — SODIUM CHLORIDE 20 ML/HR: 0.9 INJECTION, SOLUTION INTRAVENOUS at 14:20

## 2023-01-06 RX ADMIN — TRASTUZUMAB 600 MG: 150 INJECTION, POWDER, LYOPHILIZED, FOR SOLUTION INTRAVENOUS at 15:02

## 2023-01-06 RX ADMIN — PERTUZUMAB 420 MG: 30 INJECTION, SOLUTION, CONCENTRATE INTRAVENOUS at 14:30

## 2023-01-27 ENCOUNTER — HOSPITAL ENCOUNTER (OUTPATIENT)
Dept: INFUSION CENTER | Facility: CLINIC | Age: 58
End: 2023-01-27

## 2023-01-27 VITALS — HEIGHT: 65 IN | BODY MASS INDEX: 36.07 KG/M2 | WEIGHT: 216.49 LBS

## 2023-01-27 DIAGNOSIS — Z17.0 MALIGNANT NEOPLASM OF UPPER-INNER QUADRANT OF LEFT BREAST IN FEMALE, ESTROGEN RECEPTOR POSITIVE (HCC): ICD-10-CM

## 2023-01-27 DIAGNOSIS — Z90.79 HISTORY OF BILATERAL SALPINGO-OOPHORECTOMY (BSO): Primary | ICD-10-CM

## 2023-01-27 DIAGNOSIS — Z90.722 HISTORY OF BILATERAL SALPINGO-OOPHORECTOMY (BSO): ICD-10-CM

## 2023-01-27 DIAGNOSIS — Z90.79 HISTORY OF BILATERAL SALPINGO-OOPHORECTOMY (BSO): ICD-10-CM

## 2023-01-27 DIAGNOSIS — C50.212 MALIGNANT NEOPLASM OF UPPER-INNER QUADRANT OF LEFT BREAST IN FEMALE, ESTROGEN RECEPTOR POSITIVE (HCC): ICD-10-CM

## 2023-01-27 DIAGNOSIS — C78.7 LIVER METASTASES (HCC): ICD-10-CM

## 2023-01-27 DIAGNOSIS — C79.51 BONE METASTASIS (HCC): ICD-10-CM

## 2023-01-27 DIAGNOSIS — Z90.722 HISTORY OF BILATERAL SALPINGO-OOPHORECTOMY (BSO): Primary | ICD-10-CM

## 2023-01-27 DIAGNOSIS — C79.51 BONE METASTASIS (HCC): Primary | ICD-10-CM

## 2023-01-27 RX ORDER — SODIUM CHLORIDE 9 MG/ML
20 INJECTION, SOLUTION INTRAVENOUS ONCE
Status: COMPLETED | OUTPATIENT
Start: 2023-01-27 | End: 2023-01-27

## 2023-01-27 RX ORDER — SODIUM CHLORIDE 9 MG/ML
20 INJECTION, SOLUTION INTRAVENOUS ONCE
Status: CANCELLED | OUTPATIENT
Start: 2023-01-27

## 2023-01-27 RX ADMIN — PERTUZUMAB 420 MG: 30 INJECTION, SOLUTION, CONCENTRATE INTRAVENOUS at 14:42

## 2023-01-27 RX ADMIN — TRASTUZUMAB 600 MG: 150 INJECTION, POWDER, LYOPHILIZED, FOR SOLUTION INTRAVENOUS at 15:12

## 2023-01-27 RX ADMIN — SODIUM CHLORIDE 20 ML/HR: 0.9 INJECTION, SOLUTION INTRAVENOUS at 14:40

## 2023-01-27 NOTE — PROGRESS NOTES
Patient tolerated Herceptin/Perjeta infusions without incident  Pt is aware of all future appointments   Declined AVS

## 2023-02-12 RX ORDER — SODIUM CHLORIDE 9 MG/ML
20 INJECTION, SOLUTION INTRAVENOUS ONCE
Status: CANCELLED | OUTPATIENT
Start: 2023-02-17

## 2023-02-15 ENCOUNTER — OFFICE VISIT (OUTPATIENT)
Dept: HEMATOLOGY ONCOLOGY | Facility: CLINIC | Age: 58
End: 2023-02-15

## 2023-02-15 VITALS
TEMPERATURE: 96.9 F | RESPIRATION RATE: 18 BRPM | OXYGEN SATURATION: 96 % | HEART RATE: 83 BPM | WEIGHT: 215.5 LBS | BODY MASS INDEX: 35.9 KG/M2 | HEIGHT: 65 IN | DIASTOLIC BLOOD PRESSURE: 78 MMHG | SYSTOLIC BLOOD PRESSURE: 138 MMHG

## 2023-02-15 DIAGNOSIS — Z95.828 PORT-A-CATH IN PLACE: ICD-10-CM

## 2023-02-15 DIAGNOSIS — C79.51 BONE METASTASIS (HCC): ICD-10-CM

## 2023-02-15 DIAGNOSIS — I42.7 CARDIOMYOPATHY DUE TO CHEMOTHERAPY (HCC): ICD-10-CM

## 2023-02-15 DIAGNOSIS — T38.6X5A OSTEOPOROSIS DUE TO AROMATASE INHIBITOR: ICD-10-CM

## 2023-02-15 DIAGNOSIS — T45.1X5A CARDIOMYOPATHY DUE TO CHEMOTHERAPY (HCC): ICD-10-CM

## 2023-02-15 DIAGNOSIS — Z15.01 BRCA1 GENE MUTATION POSITIVE IN FEMALE: ICD-10-CM

## 2023-02-15 DIAGNOSIS — M81.8 OSTEOPOROSIS DUE TO AROMATASE INHIBITOR: ICD-10-CM

## 2023-02-15 DIAGNOSIS — C50.212 MALIGNANT NEOPLASM OF UPPER-INNER QUADRANT OF LEFT BREAST IN FEMALE, ESTROGEN RECEPTOR POSITIVE (HCC): Primary | ICD-10-CM

## 2023-02-15 DIAGNOSIS — Z90.722 S/P BSO (BILATERAL SALPINGO-OOPHORECTOMY): ICD-10-CM

## 2023-02-15 DIAGNOSIS — Z15.02 BRCA1 GENE MUTATION POSITIVE IN FEMALE: ICD-10-CM

## 2023-02-15 DIAGNOSIS — Z17.0 MALIGNANT NEOPLASM OF UPPER-INNER QUADRANT OF LEFT BREAST IN FEMALE, ESTROGEN RECEPTOR POSITIVE (HCC): Primary | ICD-10-CM

## 2023-02-15 DIAGNOSIS — C78.7 LIVER METASTASES (HCC): ICD-10-CM

## 2023-02-15 DIAGNOSIS — Z15.09 BRCA1 GENE MUTATION POSITIVE IN FEMALE: ICD-10-CM

## 2023-02-15 DIAGNOSIS — Z14.8 CARRIER OF GENE MUTATION FOR HIGH RISK OF CANCER: ICD-10-CM

## 2023-02-15 NOTE — PATIENT INSTRUCTIONS
No change in therapy at infusion center  No change in letrozole  Ordered echocardiogram and blood work  Follow-up in 2 months

## 2023-02-17 ENCOUNTER — HOSPITAL ENCOUNTER (OUTPATIENT)
Dept: INFUSION CENTER | Facility: CLINIC | Age: 58
End: 2023-02-17

## 2023-02-17 VITALS
TEMPERATURE: 97.1 F | DIASTOLIC BLOOD PRESSURE: 91 MMHG | HEART RATE: 72 BPM | BODY MASS INDEX: 36.2 KG/M2 | SYSTOLIC BLOOD PRESSURE: 172 MMHG | HEIGHT: 65 IN | WEIGHT: 217.26 LBS | RESPIRATION RATE: 18 BRPM

## 2023-02-17 DIAGNOSIS — C79.51 BONE METASTASIS (HCC): Primary | ICD-10-CM

## 2023-02-17 DIAGNOSIS — Z17.0 MALIGNANT NEOPLASM OF UPPER-INNER QUADRANT OF LEFT BREAST IN FEMALE, ESTROGEN RECEPTOR POSITIVE (HCC): ICD-10-CM

## 2023-02-17 DIAGNOSIS — Z90.79 HISTORY OF BILATERAL SALPINGO-OOPHORECTOMY (BSO): ICD-10-CM

## 2023-02-17 DIAGNOSIS — C50.212 MALIGNANT NEOPLASM OF UPPER-INNER QUADRANT OF LEFT BREAST IN FEMALE, ESTROGEN RECEPTOR POSITIVE (HCC): ICD-10-CM

## 2023-02-17 DIAGNOSIS — C78.7 LIVER METASTASES (HCC): ICD-10-CM

## 2023-02-17 DIAGNOSIS — Z90.722 HISTORY OF BILATERAL SALPINGO-OOPHORECTOMY (BSO): ICD-10-CM

## 2023-02-17 RX ORDER — SODIUM CHLORIDE 9 MG/ML
20 INJECTION, SOLUTION INTRAVENOUS ONCE
Status: COMPLETED | OUTPATIENT
Start: 2023-02-17 | End: 2023-02-17

## 2023-02-17 RX ADMIN — ALTEPLASE 2 MG: 2.2 INJECTION, POWDER, LYOPHILIZED, FOR SOLUTION INTRAVENOUS at 15:37

## 2023-02-17 RX ADMIN — SODIUM CHLORIDE 20 ML/HR: 0.9 INJECTION, SOLUTION INTRAVENOUS at 14:46

## 2023-02-17 RX ADMIN — TRASTUZUMAB 600 MG: 150 INJECTION, POWDER, LYOPHILIZED, FOR SOLUTION INTRAVENOUS at 15:45

## 2023-02-17 RX ADMIN — PERTUZUMAB 420 MG: 30 INJECTION, SOLUTION, CONCENTRATE INTRAVENOUS at 14:46

## 2023-02-17 NOTE — PROGRESS NOTES
Patient arrived to unit without complaint  Patient tolerated chemotherapy without incident  Between Perjeta and Herceptin patient did not have blood return from port  Cathflo instilled and after 45 minutes blood return was restored  AVS declined and patient aware of next appointment  Patient left in stable condition

## 2023-02-17 NOTE — PROGRESS NOTES
HPI: Follow-up visit for de brandon triple positive, stage IV left breast cancer with metastatic disease to liver and bones, diagnosed in 2016  Initial treatment was with  Taxotere plus Herceptin and Perjeta (THP) and after 6 cycles Taxotere was discontinued and she was continued on Herceptin and Perjeta and tamoxifen was added  Later tamoxifen was changed to letrozole after she had  BSO  She had Xgeva  She  had radiation to left hip for palliation  She continued to respond and in  September 2016 patient had lumpectomy of left breast followed by radiation therapy   At the time of lumpectomy there was a small residual disease, 1 4 cm   Lymph nodes were not sampled  Patient tested positive for BRCA 1  She already had BSO  She gets imaging studies for early detection of other cancers like peritoneal and pancreatic cancers  She has been encouraged to see a dermatologist but she does not want to  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    Presently she is still on Herceptin, Perjeta and letrozole  Lester Berumen has been taking vitamin-D and calcium and is staying active  She is not on Xgeva anymore that she had for 2 years  Rima Rivas has been tolerating treatments without much problem  Lester Berumen goes for blood tests and echocardiogram on regular basis  He will be going for dental work  She has done well all these years and  had very good response but recently there was a new lesion in the liver on CT scan and MRI scan and on biopsy that proved to be metastatic cancer from breast, strongly positive for ER, negative for MI and 2+ for HER 2 that was negative by FISH (low positive HER2)  Patient saw radiation oncologist for local radiation to liver lesion and discussed risks and benefits and decided to be observed because subsequent CT scan showed slight decrease in size of liver lesion from 2 7 to 2 4  She preferred to stay on letrozole, Herceptin and Perjeta  We did discuss ENHERTU    Most recent CT scan has shown slight further decrease in the size of liver lesion  No bone pains  Has good appetite  And she is maintaining her weight         Patient knows that she is at high risk for breast and other cancers like  ovarian cancer, pancreatic cancer, peritoneal cancer, melanoma and other cancers  Patient goes to breast surgeon for evaluation and imaging studies  She gets CT scan and that also checks the pancreas and peritoneum  She had BSO  She does not want to go to a dermatologist to check for melanoma or ophthalmologist to check for melanoma in the eye  She goes to her optometrist   Also she does not want to have colonoscopy and not even Cologuard or stool test for blood       She has grade 1 peripheral neuropathy from Taxotere    Has some tiredness and arthritic symptoms  Patient has history of anxiety and insomnia  For leg cramps at night and she is taking one baby aspirin daily with food in the evening and also one magnesium tablet daily and that is helping          She is not on Xgeva anymore at this time    She had Xgeva for 2 years  Moody Loose continues to take  calcium and vitamin-D                              Current Outpatient Medications:   •  letrozole (FEMARA) 2 5 mg tablet, TAKE 1 TABLET BY MOUTH EVERY DAY, Disp: 90 tablet, Rfl: 2  •  multivitamin (THERAGRAN) TABS, Take 1 tablet by mouth 3 (three) times a week , Disp: , Rfl:   •  pertuzumab (PERJETA) 420 mg/14 mL SOLN, Infuse into a venous catheter every 21 days At infusion  center, Disp: , Rfl:   •  Trastuzumab (HERCEPTIN IV), Infuse into a venous catheter every 21 days At infusion center, Disp: , Rfl:   •  VITAMIN D, CHOLECALCIFEROL, PO, Take 1,000 Units by mouth 3 (three) times a week , Disp: , Rfl:     No Known Allergies    Oncology History   Malignant neoplasm of upper-inner quadrant of left female breast ([facility] [address] )   12/2015 Initial Diagnosis    Malignant neoplasm of upper-outer quadrant of left female breast ([facility] [address] )     2016 -  Hormone Therapy    Tamoxifen  Ended 8/2019  Dr Oscar Roy  Letrozole 9/2019 1/27/2016 Surgery    Port placement     2/19/2016 - 6/2/2018 Chemotherapy    Taxotere,  herceptin, perjeta, xgeva  After six cycles, taxotere was discontinued and tamoxifen added     Continues with Perjeta and Herceptin every 3 weeks     - Dr Oscar Roy       9/16/2016 Surgery    Left breast partial mastectomy     11/7/2016 - 12/23/2016 Radiation    Plan ID Energy Fractions Dose per Fraction (cGy) Total Dose Delivered (cGy) Elapsed Days   Lt Breast 6X 25 / 25 200 5,000 36   Lt Brst Boost 6X 8 / 8 200 1,600 9   Lt Femur 10X 10 / 10 300 3,000 11   Lt PAB 6X 25 / 25 60 1,500 36   Lt Sclav 6X 25 / 25 200 5,000 36                                   Total dose to left breast lumpectomy cavity: 6600 cGy                   Total dose to the supraclavicular and axillary regions: 5000 cGy       4/17/2019 -  Chemotherapy    pertuzumab (PERJETA) IVPB, 840 mg, Intravenous, Once, 65 of 69 cycles  Administration: 420 mg (5/17/2019), 420 mg (6/7/2019), 420 mg (6/28/2019), 420 mg (7/19/2019), 420 mg (8/30/2019), 420 mg (9/20/2019), 420 mg (8/9/2019), 420 mg (10/9/2019), 420 mg (11/1/2019), 420 mg (11/22/2019), 420 mg (12/13/2019), 420 mg (1/3/2020), 420 mg (1/24/2020), 420 mg (2/14/2020), 420 mg (3/6/2020), 420 mg (3/27/2020), 420 mg (4/17/2020), 420 mg (5/8/2020), 420 mg (5/29/2020), 420 mg (6/19/2020), 420 mg (7/10/2020), 420 mg (7/31/2020), 420 mg (8/21/2020), 420 mg (9/11/2020), 420 mg (10/2/2020), 420 mg (10/23/2020), 420 mg (11/13/2020), 420 mg (12/4/2020), 420 mg (12/24/2020), 420 mg (1/15/2021), 420 mg (2/5/2021), 420 mg (2/26/2021), 420 mg (3/19/2021), 420 mg (4/9/2021), 420 mg (4/30/2021), 420 mg (5/21/2021), 420 mg (6/11/2021), 420 mg (7/2/2021), 420 mg (7/23/2021), 420 mg (8/13/2021), 420 mg (9/3/2021), 420 mg (9/24/2021), 420 mg (10/15/2021), 420 mg (11/5/2021), 420 mg (11/26/2021), 420 mg (12/17/2021), 420 mg (1/7/2022), 420 mg (1/28/2022), 420 mg (2/18/2022), 420 mg (3/11/2022), 420 mg (4/1/2022), 420 mg (5/13/2022), 420 mg (6/3/2022), 420 mg (6/28/2022), 420 mg (7/22/2022), 420 mg (9/2/2022), 420 mg (9/23/2022), 420 mg (8/12/2022), 420 mg (10/14/2022), 420 mg (11/4/2022), 420 mg (11/25/2022), 420 mg (12/15/2022), 420 mg (1/6/2023), 420 mg (1/27/2023)  trastuzumab (HERCEPTIN) chemo infusion, 6 mg/kg = 589 mg (75 % of original dose 8 mg/kg), Intravenous, Once, 65 of 69 cycles  Dose modification: 6 mg/kg (original dose 8 mg/kg, Cycle 1, Reason: Other (Must fill in a comment))  Administration: 589 mg (5/17/2019), 589 mg (6/7/2019), 600 mg (6/28/2019), 600 mg (7/19/2019), 600 mg (8/30/2019), 600 mg (9/20/2019), 600 mg (8/9/2019), 600 mg (10/9/2019), 600 mg (11/1/2019), 600 mg (11/22/2019), 600 mg (12/13/2019), 600 mg (1/3/2020), 600 mg (1/24/2020), 600 mg (2/14/2020), 600 mg (3/6/2020), 600 mg (3/27/2020), 600 mg (4/17/2020), 600 mg (5/8/2020), 600 mg (5/29/2020), 600 mg (6/19/2020), 600 mg (7/10/2020), 600 mg (7/31/2020), 600 mg (8/21/2020), 600 mg (9/11/2020), 600 mg (10/2/2020), 600 mg (10/23/2020), 600 mg (11/13/2020), 600 mg (12/4/2020), 600 mg (12/24/2020), 600 mg (1/15/2021), 600 mg (2/5/2021), 600 mg (2/26/2021), 600 mg (3/19/2021), 600 mg (4/9/2021), 600 mg (4/30/2021), 600 mg (5/21/2021), 600 mg (6/11/2021), 600 mg (7/2/2021), 600 mg (7/23/2021), 600 mg (8/13/2021), 600 mg (9/3/2021), 600 mg (9/24/2021), 600 mg (10/15/2021), 600 mg (11/5/2021), 600 mg (11/26/2021), 600 mg (12/17/2021), 600 mg (1/7/2022), 600 mg (1/28/2022), 600 mg (2/18/2022), 600 mg (3/11/2022), 600 mg (4/1/2022), 600 mg (5/13/2022), 600 mg (6/3/2022), 600 mg (6/28/2022), 600 mg (7/22/2022), 600 mg (9/2/2022), 600 mg (9/23/2022), 600 mg (8/12/2022), 600 mg (10/14/2022), 600 mg (11/4/2022), 600 mg (11/25/2022), 600 mg (12/15/2022), 600 mg (1/6/2023), 600 mg (1/27/2023)     8/16/2019 Surgery    she underwent BSO      7/5/2022 -  Cancer Staged    Staging form: Breast, AJCC 8th Edition  - Clinical: Stage IV (rcTX, cNX, pM1) - Signed by Tabitha Ferrer MD on 7/5/2022  Stage prefix: Recurrence       Liver metastases ([facility] [address] )   4/27/2018 Initial Diagnosis    Liver metastases ([facility] [address] )     4/17/2019 -  Chemotherapy    pertuzumab (PERJETA) IVPB, 840 mg, Intravenous, Once, 65 of 69 cycles  Administration: 420 mg (5/17/2019), 420 mg (6/7/2019), 420 mg (6/28/2019), 420 mg (7/19/2019), 420 mg (8/30/2019), 420 mg (9/20/2019), 420 mg (8/9/2019), 420 mg (10/9/2019), 420 mg (11/1/2019), 420 mg (11/22/2019), 420 mg (12/13/2019), 420 mg (1/3/2020), 420 mg (1/24/2020), 420 mg (2/14/2020), 420 mg (3/6/2020), 420 mg (3/27/2020), 420 mg (4/17/2020), 420 mg (5/8/2020), 420 mg (5/29/2020), 420 mg (6/19/2020), 420 mg (7/10/2020), 420 mg (7/31/2020), 420 mg (8/21/2020), 420 mg (9/11/2020), 420 mg (10/2/2020), 420 mg (10/23/2020), 420 mg (11/13/2020), 420 mg (12/4/2020), 420 mg (12/24/2020), 420 mg (1/15/2021), 420 mg (2/5/2021), 420 mg (2/26/2021), 420 mg (3/19/2021), 420 mg (4/9/2021), 420 mg (4/30/2021), 420 mg (5/21/2021), 420 mg (6/11/2021), 420 mg (7/2/2021), 420 mg (7/23/2021), 420 mg (8/13/2021), 420 mg (9/3/2021), 420 mg (9/24/2021), 420 mg (10/15/2021), 420 mg (11/5/2021), 420 mg (11/26/2021), 420 mg (12/17/2021), 420 mg (1/7/2022), 420 mg (1/28/2022), 420 mg (2/18/2022), 420 mg (3/11/2022), 420 mg (4/1/2022), 420 mg (5/13/2022), 420 mg (6/3/2022), 420 mg (6/28/2022), 420 mg (7/22/2022), 420 mg (9/2/2022), 420 mg (9/23/2022), 420 mg (8/12/2022), 420 mg (10/14/2022), 420 mg (11/4/2022), 420 mg (11/25/2022), 420 mg (12/15/2022), 420 mg (1/6/2023), 420 mg (1/27/2023)  trastuzumab (HERCEPTIN) chemo infusion, 6 mg/kg = 589 mg (75 % of original dose 8 mg/kg), Intravenous, Once, 65 of 69 cycles  Dose modification: 6 mg/kg (original dose 8 mg/kg, Cycle 1, Reason: Other (Must fill in a comment))  Administration: 589 mg (5/17/2019), 589 mg (6/7/2019), 600 mg (6/28/2019), 600 mg (7/19/2019), 600 mg (8/30/2019), 600 mg (9/20/2019), 600 mg (8/9/2019), 600 mg (10/9/2019), 600 mg (11/1/2019), 600 mg (11/22/2019), 600 mg (12/13/2019), 600 mg (1/3/2020), 600 mg (1/24/2020), 600 mg (2/14/2020), 600 mg (3/6/2020), 600 mg (3/27/2020), 600 mg (4/17/2020), 600 mg (5/8/2020), 600 mg (5/29/2020), 600 mg (6/19/2020), 600 mg (7/10/2020), 600 mg (7/31/2020), 600 mg (8/21/2020), 600 mg (9/11/2020), 600 mg (10/2/2020), 600 mg (10/23/2020), 600 mg (11/13/2020), 600 mg (12/4/2020), 600 mg (12/24/2020), 600 mg (1/15/2021), 600 mg (2/5/2021), 600 mg (2/26/2021), 600 mg (3/19/2021), 600 mg (4/9/2021), 600 mg (4/30/2021), 600 mg (5/21/2021), 600 mg (6/11/2021), 600 mg (7/2/2021), 600 mg (7/23/2021), 600 mg (8/13/2021), 600 mg (9/3/2021), 600 mg (9/24/2021), 600 mg (10/15/2021), 600 mg (11/5/2021), 600 mg (11/26/2021), 600 mg (12/17/2021), 600 mg (1/7/2022), 600 mg (1/28/2022), 600 mg (2/18/2022), 600 mg (3/11/2022), 600 mg (4/1/2022), 600 mg (5/13/2022), 600 mg (6/3/2022), 600 mg (6/28/2022), 600 mg (7/22/2022), 600 mg (9/2/2022), 600 mg (9/23/2022), 600 mg (8/12/2022), 600 mg (10/14/2022), 600 mg (11/4/2022), 600 mg (11/25/2022), 600 mg (12/15/2022), 600 mg (1/6/2023), 600 mg (1/27/2023)     6/24/2022 Biopsy    Final Diagnosis   A  Liver, core biopsies:     - Metastatic carcinoma compatible with a breast primary          Bone metastasis ([facility] [address] )   4/27/2018 Initial Diagnosis    Bone metastasis ([facility] [address] )     4/17/2019 -  Chemotherapy    pertuzumab (PERJETA) IVPB, 840 mg, Intravenous, Once, 65 of 69 cycles  Administration: 420 mg (5/17/2019), 420 mg (6/7/2019), 420 mg (6/28/2019), 420 mg (7/19/2019), 420 mg (8/30/2019), 420 mg (9/20/2019), 420 mg (8/9/2019), 420 mg (10/9/2019), 420 mg (11/1/2019), 420 mg (11/22/2019), 420 mg (12/13/2019), 420 mg (1/3/2020), 420 mg (1/24/2020), 420 mg (2/14/2020), 420 mg (3/6/2020), 420 mg (3/27/2020), 420 mg (4/17/2020), 420 mg (5/8/2020), 420 mg (5/29/2020), 420 mg (6/19/2020), 420 mg (7/10/2020), 420 mg (7/31/2020), 420 mg (8/21/2020), 420 mg (9/11/2020), 420 mg (10/2/2020), 420 mg (10/23/2020), 420 mg (11/13/2020), 420 mg (12/4/2020), 420 mg (12/24/2020), 420 mg (1/15/2021), 420 mg (2/5/2021), 420 mg (2/26/2021), 420 mg (3/19/2021), 420 mg (4/9/2021), 420 mg (4/30/2021), 420 mg (5/21/2021), 420 mg (6/11/2021), 420 mg (7/2/2021), 420 mg (7/23/2021), 420 mg (8/13/2021), 420 mg (9/3/2021), 420 mg (9/24/2021), 420 mg (10/15/2021), 420 mg (11/5/2021), 420 mg (11/26/2021), 420 mg (12/17/2021), 420 mg (1/7/2022), 420 mg (1/28/2022), 420 mg (2/18/2022), 420 mg (3/11/2022), 420 mg (4/1/2022), 420 mg (5/13/2022), 420 mg (6/3/2022), 420 mg (6/28/2022), 420 mg (7/22/2022), 420 mg (9/2/2022), 420 mg (9/23/2022), 420 mg (8/12/2022), 420 mg (10/14/2022), 420 mg (11/4/2022), 420 mg (11/25/2022), 420 mg (12/15/2022), 420 mg (1/6/2023), 420 mg (1/27/2023)  trastuzumab (HERCEPTIN) chemo infusion, 6 mg/kg = 589 mg (75 % of original dose 8 mg/kg), Intravenous, Once, 65 of 69 cycles  Dose modification: 6 mg/kg (original dose 8 mg/kg, Cycle 1, Reason: Other (Must fill in a comment))  Administration: 589 mg (5/17/2019), 589 mg (6/7/2019), 600 mg (6/28/2019), 600 mg (7/19/2019), 600 mg (8/30/2019), 600 mg (9/20/2019), 600 mg (8/9/2019), 600 mg (10/9/2019), 600 mg (11/1/2019), 600 mg (11/22/2019), 600 mg (12/13/2019), 600 mg (1/3/2020), 600 mg (1/24/2020), 600 mg (2/14/2020), 600 mg (3/6/2020), 600 mg (3/27/2020), 600 mg (4/17/2020), 600 mg (5/8/2020), 600 mg (5/29/2020), 600 mg (6/19/2020), 600 mg (7/10/2020), 600 mg (7/31/2020), 600 mg (8/21/2020), 600 mg (9/11/2020), 600 mg (10/2/2020), 600 mg (10/23/2020), 600 mg (11/13/2020), 600 mg (12/4/2020), 600 mg (12/24/2020), 600 mg (1/15/2021), 600 mg (2/5/2021), 600 mg (2/26/2021), 600 mg (3/19/2021), 600 mg (4/9/2021), 600 mg (4/30/2021), 600 mg (5/21/2021), 600 mg (6/11/2021), 600 mg (7/2/2021), 600 mg (7/23/2021), 600 mg (8/13/2021), 600 mg (9/3/2021), 600 mg (9/24/2021), 600 mg (10/15/2021), 600 mg (11/5/2021), 600 mg (11/26/2021), 600 mg (12/17/2021), 600 mg (1/7/2022), 600 mg (1/28/2022), 600 mg (2/18/2022), 600 mg (3/11/2022), 600 mg (4/1/2022), 600 mg (5/13/2022), 600 mg (6/3/2022), 600 mg (6/28/2022), 600 mg (7/22/2022), 600 mg (9/2/2022), 600 mg (9/23/2022), 600 mg (8/12/2022), 600 mg (10/14/2022), 600 mg (11/4/2022), 600 mg (11/25/2022), 600 mg (12/15/2022), 600 mg (1/6/2023), 600 mg (1/27/2023)     6/24/2022 Biopsy    Final Diagnosis   A  Liver, core biopsies:     - Metastatic carcinoma compatible with a breast primary  ROS:  02/17/23 Reviewed 12 systems:    See symptoms in HPI  Presently no other neurological, cardiac, pulmonary, GI and  symptoms other than mentioned   in HPI     Other symptoms are in HPI  No symptoms like fever, chills, bleeding, bone pains, skin rash, weight loss, weakness,  claudication and gait problem  No frequent infections  Not unusually sensitive to heat or cold  No swelling of the ankles  No swollen glands  Less anxious    /78 (BP Location: Left arm)   Pulse 83   Temp (!) 96 9 °F (36 1 °C) (Tympanic)   Resp 18   Ht 5' 5" (1 651 m)   Wt 97 8 kg (215 lb 8 oz)   SpO2 96%   BMI 35 86 kg/m²     Physical Exam:   Alert and oriented and not in distress  Stable vitals  No icterus  no oral thrush, no palpable neck mass, clear lung fields   to percussion and auscultation , regular heart rate, abdomen   soft and non tender, liver is not palpably enlarged, no palpable abdominal mass, no ascites, no edema of ankles, no calf tenderness, no objective focal neurological deficit, no skin rash, no palpable lymphadenopathy in the neck and axillary areas, no clubbing, Patient is less anxious  Performance status 1  No lymphedema      IMAGING:  IMPRESSION: 02/24/2021     1  No scintigraphic evidence of osseous metastasis             Workstation performed: [id number]      Imaging     Ejection fraction 65% on 08/06/2022      Study Result    Narrative & Impression CENTRAL  DXA SCAN on 03/08/2022     CLINICAL HISTORY:   49year old post-menopausal  female risk factors include osteoporosis screening  Additional medical history: Breast cancer with mets to Hip , right Hip scanned      TECHNIQUE: Bone densitometry was performed using a [employer] W bone densitometer  Regions of interest appear properly placed  There are   other confounding variables which could limit the study        Her elevated  BMI may influence the T score result      Degenerative or osteoarthritic changes are noted on the spine image eliminating L4 from evaluation      COMPARISON:  Follow-up     RESULTS:   LUMBAR SPINE:  L1-L3:  BMD 1 021 gm/cm2  T-score 0 0 and unchanged from 2019  Z-score 1 0     RIGHT TOTAL HIP:  BMD 1 122 gm/cm2  T-score 1 5  Z-score 2 1     RIGHT FEMORAL NECK:  BMD 0 842 gm/cm2  T-score -0 1 and unchanged from 2019  Z-score 0 9           IMPRESSION:  1  Based on the [facility] classification, this study is normal and the patient is considered at low risk for fracture  IMPRESSION:     Since August 2, 2022:  1  Decreased size and conspicuity of the biopsy-proven hepatic metastasis      2   No new sites of metastatic disease in the abdomen         Workstation performed: [id number]          Imaging    CT abdomen w contrast (Order: [id number]) - 11/1/2022      LABS:    Results for orders placed or performed during the hospital encounter of 12/29/22   Echo follow up/limited w/ contrast if indicated   Result Value Ref Range    A4C EF 74 %    LVIDd 3 80 cm    LVIDS 2 50 cm    IVSd 1 00 cm    LVPWd 1 00 cm    FS 34 28 - 44    MV E' Tissue Velocity Septal 11 cm/s    MV E' Tissue Velocity Lateral 16 cm/s    E/A ratio 0 85     E wave deceleration time 241 ms    MV Peak E David 68 cm/s    MV Peak A David 0 8 m/s    AV LVOT peak gradient 4 mmHg    LVOT peak VTI 23 32 cm    LVOT peak david 1 02 m/s    Aortic valve peak velocity 1 43 m/s    Ao VTI 31 58 cm    AV mean gradient 4 mmHg LVOT mn grad 2 0 mmHg    AV peak gradient 8 mmHg    Aortic valve mean velocity 8 90 m/s    Left ventricular stroke volume (2D) 40 00 mL    IVS 1 cm    LEFT VENTRICLE SYSTOLIC VOLUME (MOD BIPLANE) 2D 22 mL    LV DIASTOLIC VOLUME (MOD BIPLANE) 2D 62 mL    LVSV, 2D 40 mL     Labs, Imaging, & Other studies:   All pertinent labs and imaging studies were personally reviewed    Lab Results   Component Value Date    K 4 0 11/01/2022     11/01/2022    CO2 28 11/01/2022    BUN 22 11/01/2022    CREATININE 0 91 11/01/2022    GLUF 127 (H) 06/24/2022    CALCIUM 9 1 11/01/2022    CORRECTEDCA 9 7 11/01/2022    AST 26 11/01/2022    ALT 44 11/01/2022    ALKPHOS 103 11/01/2022    EGFR 71 11/01/2022     Lab Results   Component Value Date    WBC 10 35 (H) 11/01/2022    HGB 13 0 11/01/2022    HCT 41 4 11/01/2022    MCV 85 11/01/2022     11/01/2022      Normal differential   Normal tumor marker CA 27-29  22 5    Reviewed  test results and discussed with patient and explained  Assessment and plan: Noble Many Follow-up visit for de brandon triple positive, stage IV left breast cancer with metastatic disease to liver and bones, diagnosed in 2016  Initial treatment was with  Taxotere plus Herceptin and Perjeta (THP) and after 6 cycles Taxotere was discontinued and she was continued on Herceptin and Perjeta and tamoxifen was added  Later tamoxifen was changed to letrozole after she had  BSO  She had Xgeva  She  had radiation to left hip for palliation  She continued to respond and in  September 2016 patient had lumpectomy of left breast followed by radiation therapy   At the time of lumpectomy there was a small residual disease, 1 4 cm   Lymph nodes were not sampled  Patient tested positive for BRCA 1  She already had BSO  She gets imaging studies for early detection of other cancers like peritoneal and pancreatic cancers  She has been encouraged to see a dermatologist but she does not want to    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    Presently she is still on Herceptin, Perjeta and letrozole  Colton Block has been taking vitamin-D and calcium and is staying active  She is not on Xgeva anymore that she had for 2 years  Dianne Selby has been tolerating treatments without much problem  Colton Block goes for blood tests and echocardiogram on regular basis  He will be going for dental work  She has done well all these years and  had very good response but recently there was a new lesion in the liver on CT scan and MRI scan and on biopsy that proved to be metastatic cancer from breast, strongly positive for ER, negative for [place] and 2+ for HER 2 that was negative by FISH (low positive HER2)  Patient saw radiation oncologist for local radiation to liver lesion and discussed risks and benefits and decided to be observed because subsequent CT scan showed slight decrease in size of liver lesion from 2 7 to 2 4  She preferred to stay on letrozole, Herceptin and Perjeta  We did discuss ENHERTU  Most recent CT scan has shown slight further decrease in the size of liver lesion  No bone pains  Has good appetite  And she is maintaining her weight         Patient knows that she is at high risk for breast and other cancers like  ovarian cancer, pancreatic cancer, peritoneal cancer, melanoma and other cancers  Patient goes to breast surgeon for evaluation and imaging studies  She gets CT scan and that also checks the pancreas and peritoneum  She had BSO  She does not want to go to a dermatologist to check for melanoma or ophthalmologist to check for melanoma in the eye  She goes to her optometrist   Also she does not want to have colonoscopy and not even Cologuard or stool test for blood       She has grade 1 peripheral neuropathy from Taxotere    Has some tiredness and arthritic symptoms  Patient has history of anxiety and insomnia  For leg cramps at night and she is taking one baby aspirin daily with food in the evening and also one magnesium tablet daily and that is helping          She is not on Xgeva anymore at this time  She had Xgeva for 2 years  Poonam Ferrari continues to take  calcium and vitamin-D             Physical examination and test results are as recorded and discussed  No change in Herceptin plus Perjeta and letrozole and vitamin D and calcium  She gets  echocardiogram on regular basis   Condition discussed and explained   Questions answered  Bonny Cage discussed the importance of self-breast examination, eating healthy foods, staying active and health screening tests   Patient goes to her breast surgeon for examination and  imaging studies  Mariama Sullivan is capable of self-care   Goal is prolonged survival from stage IV breast cancer  Patient has been aware of significance of positive BRCA1 mutation and cancer risks  Patient will continue to follow with her primary physician and other consultants  Discussed precautions against coronavirus and flu and other infections     See diagnoses, orders and instructions below      1  Malignant neoplasm of upper-inner quadrant of left breast in female, estrogen receptor positive (HCC)    - CBC and differential; Standing  - Comprehensive metabolic panel; Standing  - CBC and differential  - Comprehensive metabolic panel    2  Liver metastases (HCC)    - CBC and differential; Standing  - Comprehensive metabolic panel; Standing  - CBC and differential  - Comprehensive metabolic panel    3  Bone metastasis ([facility] [address] )      4  BRCA1 gene mutation positive in female      11  Cardiomyopathy due to chemotherapy ([facility] [address] )    - Echo follow up/limited w/ contrast if indicated; Future    6  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      7  Port-A-Cath in place      8  S/P BSO (bilateral salpingo-oophorectomy)      9  Osteoporosis due to aromatase inhibitor        No change in therapy at infusion center  No change in letrozole  Ordered echocardiogram and blood work  Follow-up in 2 months            I used dictation device to dictated this note and there could be mistakes in my note and for that she may contact my office                                       Patient voiced understanding and agreed       Counseling / Coordination of Care       Provided counseling and support

## 2023-03-05 RX ORDER — SODIUM CHLORIDE 9 MG/ML
20 INJECTION, SOLUTION INTRAVENOUS ONCE
Status: CANCELLED | OUTPATIENT
Start: 2023-03-10

## 2023-03-10 ENCOUNTER — HOSPITAL ENCOUNTER (OUTPATIENT)
Dept: INFUSION CENTER | Facility: CLINIC | Age: 58
End: 2023-03-10

## 2023-03-10 VITALS
RESPIRATION RATE: 18 BRPM | WEIGHT: 216.7 LBS | TEMPERATURE: 97.6 F | HEIGHT: 65 IN | DIASTOLIC BLOOD PRESSURE: 84 MMHG | BODY MASS INDEX: 36.1 KG/M2 | HEART RATE: 62 BPM | SYSTOLIC BLOOD PRESSURE: 132 MMHG

## 2023-03-10 DIAGNOSIS — C50.212 MALIGNANT NEOPLASM OF UPPER-INNER QUADRANT OF LEFT BREAST IN FEMALE, ESTROGEN RECEPTOR POSITIVE (HCC): ICD-10-CM

## 2023-03-10 DIAGNOSIS — Z90.79 HISTORY OF BILATERAL SALPINGO-OOPHORECTOMY (BSO): ICD-10-CM

## 2023-03-10 DIAGNOSIS — Z17.0 MALIGNANT NEOPLASM OF UPPER-INNER QUADRANT OF LEFT BREAST IN FEMALE, ESTROGEN RECEPTOR POSITIVE (HCC): ICD-10-CM

## 2023-03-10 DIAGNOSIS — C79.51 BONE METASTASIS (HCC): Primary | ICD-10-CM

## 2023-03-10 DIAGNOSIS — Z90.722 HISTORY OF BILATERAL SALPINGO-OOPHORECTOMY (BSO): ICD-10-CM

## 2023-03-10 DIAGNOSIS — C78.7 LIVER METASTASES (HCC): ICD-10-CM

## 2023-03-10 LAB
ALBUMIN SERPL BCP-MCNC: 3.8 G/DL (ref 3.5–5)
ALP SERPL-CCNC: 91 U/L (ref 34–104)
ALT SERPL W P-5'-P-CCNC: 27 U/L (ref 7–52)
ANION GAP SERPL CALCULATED.3IONS-SCNC: 5 MMOL/L (ref 4–13)
AST SERPL W P-5'-P-CCNC: 19 U/L (ref 13–39)
BASOPHILS # BLD AUTO: 0.03 THOUSANDS/ÂΜL (ref 0–0.1)
BASOPHILS NFR BLD AUTO: 0 % (ref 0–1)
BILIRUB SERPL-MCNC: 0.26 MG/DL (ref 0.2–1)
BUN SERPL-MCNC: 21 MG/DL (ref 5–25)
CALCIUM SERPL-MCNC: 9.2 MG/DL (ref 8.4–10.2)
CHLORIDE SERPL-SCNC: 106 MMOL/L (ref 96–108)
CO2 SERPL-SCNC: 29 MMOL/L (ref 21–32)
CREAT SERPL-MCNC: 0.85 MG/DL (ref 0.6–1.3)
EOSINOPHIL # BLD AUTO: 0.32 THOUSAND/ÂΜL (ref 0–0.61)
EOSINOPHIL NFR BLD AUTO: 4 % (ref 0–6)
ERYTHROCYTE [DISTWIDTH] IN BLOOD BY AUTOMATED COUNT: 13.9 % (ref 11.6–15.1)
GFR SERPL CREATININE-BSD FRML MDRD: 77 ML/MIN/1.73SQ M
GLUCOSE SERPL-MCNC: 107 MG/DL (ref 65–140)
HCT VFR BLD AUTO: 40.2 % (ref 34.8–46.1)
HGB BLD-MCNC: 12.3 G/DL (ref 11.5–15.4)
IMM GRANULOCYTES # BLD AUTO: 0.02 THOUSAND/UL (ref 0–0.2)
IMM GRANULOCYTES NFR BLD AUTO: 0 % (ref 0–2)
LYMPHOCYTES # BLD AUTO: 2.63 THOUSANDS/ÂΜL (ref 0.6–4.47)
LYMPHOCYTES NFR BLD AUTO: 34 % (ref 14–44)
MCH RBC QN AUTO: 26.4 PG (ref 26.8–34.3)
MCHC RBC AUTO-ENTMCNC: 30.6 G/DL (ref 31.4–37.4)
MCV RBC AUTO: 86 FL (ref 82–98)
MONOCYTES # BLD AUTO: 0.54 THOUSAND/ÂΜL (ref 0.17–1.22)
MONOCYTES NFR BLD AUTO: 7 % (ref 4–12)
NEUTROPHILS # BLD AUTO: 4.16 THOUSANDS/ÂΜL (ref 1.85–7.62)
NEUTS SEG NFR BLD AUTO: 55 % (ref 43–75)
NRBC BLD AUTO-RTO: 0 /100 WBCS
PLATELET # BLD AUTO: 330 THOUSANDS/UL (ref 149–390)
PMV BLD AUTO: 10.9 FL (ref 8.9–12.7)
POTASSIUM SERPL-SCNC: 3.9 MMOL/L (ref 3.5–5.3)
PROT SERPL-MCNC: 6.9 G/DL (ref 6.4–8.4)
RBC # BLD AUTO: 4.66 MILLION/UL (ref 3.81–5.12)
SODIUM SERPL-SCNC: 140 MMOL/L (ref 135–147)
WBC # BLD AUTO: 7.7 THOUSAND/UL (ref 4.31–10.16)

## 2023-03-10 RX ORDER — SODIUM CHLORIDE 9 MG/ML
20 INJECTION, SOLUTION INTRAVENOUS ONCE
Status: COMPLETED | OUTPATIENT
Start: 2023-03-10 | End: 2023-03-10

## 2023-03-10 RX ADMIN — SODIUM CHLORIDE 20 ML/HR: 0.9 INJECTION, SOLUTION INTRAVENOUS at 14:30

## 2023-03-10 RX ADMIN — TRASTUZUMAB 600 MG: 150 INJECTION, POWDER, LYOPHILIZED, FOR SOLUTION INTRAVENOUS at 15:20

## 2023-03-10 RX ADMIN — PERTUZUMAB 420 MG: 30 INJECTION, SOLUTION, CONCENTRATE INTRAVENOUS at 14:45

## 2023-03-24 RX ORDER — SODIUM CHLORIDE 9 MG/ML
20 INJECTION, SOLUTION INTRAVENOUS ONCE
Status: CANCELLED | OUTPATIENT
Start: 2023-03-31

## 2023-03-31 ENCOUNTER — HOSPITAL ENCOUNTER (OUTPATIENT)
Dept: INFUSION CENTER | Facility: CLINIC | Age: 58
End: 2023-03-31

## 2023-03-31 VITALS
TEMPERATURE: 97 F | RESPIRATION RATE: 16 BRPM | SYSTOLIC BLOOD PRESSURE: 146 MMHG | HEART RATE: 65 BPM | WEIGHT: 215.61 LBS | DIASTOLIC BLOOD PRESSURE: 82 MMHG | BODY MASS INDEX: 35.88 KG/M2

## 2023-03-31 DIAGNOSIS — C50.212 MALIGNANT NEOPLASM OF UPPER-INNER QUADRANT OF LEFT BREAST IN FEMALE, ESTROGEN RECEPTOR POSITIVE (HCC): ICD-10-CM

## 2023-03-31 DIAGNOSIS — Z90.79 HISTORY OF BILATERAL SALPINGO-OOPHORECTOMY (BSO): ICD-10-CM

## 2023-03-31 DIAGNOSIS — Z17.0 MALIGNANT NEOPLASM OF UPPER-INNER QUADRANT OF LEFT BREAST IN FEMALE, ESTROGEN RECEPTOR POSITIVE (HCC): ICD-10-CM

## 2023-03-31 DIAGNOSIS — C79.51 BONE METASTASIS: Primary | ICD-10-CM

## 2023-03-31 DIAGNOSIS — C78.7 LIVER METASTASES: ICD-10-CM

## 2023-03-31 DIAGNOSIS — Z90.722 HISTORY OF BILATERAL SALPINGO-OOPHORECTOMY (BSO): ICD-10-CM

## 2023-03-31 DIAGNOSIS — C79.51 BONE METASTASIS: ICD-10-CM

## 2023-03-31 RX ORDER — SODIUM CHLORIDE 9 MG/ML
20 INJECTION, SOLUTION INTRAVENOUS ONCE
Status: COMPLETED | OUTPATIENT
Start: 2023-03-31 | End: 2023-03-31

## 2023-03-31 RX ADMIN — PERTUZUMAB 420 MG: 30 INJECTION, SOLUTION, CONCENTRATE INTRAVENOUS at 14:37

## 2023-03-31 RX ADMIN — SODIUM CHLORIDE 20 ML/HR: 0.9 INJECTION, SOLUTION INTRAVENOUS at 14:23

## 2023-03-31 RX ADMIN — TRASTUZUMAB 600 MG: 150 INJECTION, POWDER, LYOPHILIZED, FOR SOLUTION INTRAVENOUS at 15:11

## 2023-03-31 NOTE — PROGRESS NOTES
Patient arrived on unit for treatment  Denies any present issues/concerns  Tolerated perjeta/herceptin infusions without incident  Aware of next appointment   Declined AVS

## 2023-04-21 ENCOUNTER — HOSPITAL ENCOUNTER (OUTPATIENT)
Dept: INFUSION CENTER | Facility: CLINIC | Age: 58
Discharge: HOME | End: 2023-04-21

## 2023-04-21 VITALS
WEIGHT: 217.81 LBS | TEMPERATURE: 97.1 F | SYSTOLIC BLOOD PRESSURE: 143 MMHG | HEART RATE: 68 BPM | RESPIRATION RATE: 18 BRPM | DIASTOLIC BLOOD PRESSURE: 84 MMHG | BODY MASS INDEX: 36.25 KG/M2

## 2023-04-21 DIAGNOSIS — Z90.79 HISTORY OF BILATERAL SALPINGO-OOPHORECTOMY (BSO): ICD-10-CM

## 2023-04-21 DIAGNOSIS — Z17.0 MALIGNANT NEOPLASM OF UPPER-INNER QUADRANT OF LEFT BREAST IN FEMALE, ESTROGEN RECEPTOR POSITIVE (HCC): ICD-10-CM

## 2023-04-21 DIAGNOSIS — C50.212 MALIGNANT NEOPLASM OF UPPER-INNER QUADRANT OF LEFT BREAST IN FEMALE, ESTROGEN RECEPTOR POSITIVE (HCC): ICD-10-CM

## 2023-04-21 DIAGNOSIS — C79.51 MALIGNANT NEOPLASM METASTATIC TO BONE (HCC): ICD-10-CM

## 2023-04-21 DIAGNOSIS — C78.7 MALIGNANT NEOPLASM METASTATIC TO LIVER (HCC): Primary | ICD-10-CM

## 2023-04-21 DIAGNOSIS — Z90.722 HISTORY OF BILATERAL SALPINGO-OOPHORECTOMY (BSO): ICD-10-CM

## 2023-04-21 RX ORDER — SODIUM CHLORIDE 9 MG/ML
20 INJECTION, SOLUTION INTRAVENOUS ONCE
Status: COMPLETED | OUTPATIENT
Start: 2023-04-21 | End: 2023-04-21

## 2023-04-21 RX ADMIN — SODIUM CHLORIDE 20 ML/HR: 0.9 INJECTION, SOLUTION INTRAVENOUS at 14:11

## 2023-04-21 RX ADMIN — PERTUZUMAB 420 MG: 30 INJECTION, SOLUTION, CONCENTRATE INTRAVENOUS at 14:35

## 2023-04-21 RX ADMIN — TRASTUZUMAB 600 MG: 150 INJECTION, POWDER, LYOPHILIZED, FOR SOLUTION INTRAVENOUS at 15:06

## 2023-04-21 NOTE — PROGRESS NOTES
Patient tolerated Perjeta/Herceptin infusions without incident  Pt is aware of all future appointments   Declined AVS

## 2023-05-05 RX ORDER — SODIUM CHLORIDE 9 MG/ML
20 INJECTION, SOLUTION INTRAVENOUS ONCE
OUTPATIENT
Start: 2023-05-12

## 2023-05-11 ENCOUNTER — TELEPHONE (OUTPATIENT)
Dept: HEMATOLOGY ONCOLOGY | Facility: CLINIC | Age: 58
End: 2023-05-11

## 2023-05-11 NOTE — TELEPHONE ENCOUNTER
Per oncology finance Cornel Mortensen is still pending for treatment scheduled tomorrow. Patient will need to be rescheduled to next week.   Patient was rescheduled to 5/19/23  I attempted to call the patient - msg left  [facility] will follow up with patient on 5/12/23

## 2023-05-12 ENCOUNTER — HOSPITAL ENCOUNTER (OUTPATIENT)
Dept: INFUSION CENTER | Facility: CLINIC | Age: 58
End: 2023-05-12

## 2023-05-12 ENCOUNTER — TELEPHONE (OUTPATIENT)
Dept: HEMATOLOGY ONCOLOGY | Facility: CLINIC | Age: 58
End: 2023-05-12

## 2023-05-12 NOTE — TELEPHONE ENCOUNTER
Telephone call left voice message  Insurance has not yet approved tx so they have rescheduled your appt from today to next Friday 5/19/23 at 2 pm   Any questions call me back on my direct line

## 2023-05-15 ENCOUNTER — TELEPHONE (OUTPATIENT)
Dept: HEMATOLOGY ONCOLOGY | Facility: CLINIC | Age: 58
End: 2023-05-15

## 2023-05-15 NOTE — TELEPHONE ENCOUNTER
Returned telephone call spoke with Pt  Finance dept received approval for tx and she is schedule for 5/19/23  She states she understands  She is requesting her 6/9/23 infusion appt be changed to 6/13/23 in the morning  Message sent to [facility] infusion to r/s that appt

## 2023-05-15 NOTE — TELEPHONE ENCOUNTER
Norma [place] calling from [facility] to inform office there is no authorization needed for infustion treatment    Any questions she can be reached at [phone]

## 2023-05-19 ENCOUNTER — HOSPITAL ENCOUNTER (OUTPATIENT)
Dept: INFUSION CENTER | Facility: CLINIC | Age: 58
End: 2023-05-19

## 2023-05-19 VITALS
RESPIRATION RATE: 18 BRPM | BODY MASS INDEX: 36.19 KG/M2 | SYSTOLIC BLOOD PRESSURE: 143 MMHG | DIASTOLIC BLOOD PRESSURE: 85 MMHG | HEIGHT: 65 IN | WEIGHT: 217.2 LBS | TEMPERATURE: 97.9 F | HEART RATE: 71 BPM

## 2023-05-19 DIAGNOSIS — Z17.0 MALIGNANT NEOPLASM OF UPPER-INNER QUADRANT OF LEFT BREAST IN FEMALE, ESTROGEN RECEPTOR POSITIVE (HCC): ICD-10-CM

## 2023-05-19 DIAGNOSIS — C78.7 MALIGNANT NEOPLASM METASTATIC TO LIVER (HCC): Primary | ICD-10-CM

## 2023-05-19 DIAGNOSIS — Z90.79 HISTORY OF BILATERAL SALPINGO-OOPHORECTOMY (BSO): ICD-10-CM

## 2023-05-19 DIAGNOSIS — Z90.722 HISTORY OF BILATERAL SALPINGO-OOPHORECTOMY (BSO): ICD-10-CM

## 2023-05-19 DIAGNOSIS — C79.51 MALIGNANT NEOPLASM METASTATIC TO BONE (HCC): ICD-10-CM

## 2023-05-19 DIAGNOSIS — C50.212 MALIGNANT NEOPLASM OF UPPER-INNER QUADRANT OF LEFT BREAST IN FEMALE, ESTROGEN RECEPTOR POSITIVE (HCC): ICD-10-CM

## 2023-05-19 RX ORDER — SODIUM CHLORIDE 9 MG/ML
20 INJECTION, SOLUTION INTRAVENOUS ONCE
Status: COMPLETED | OUTPATIENT
Start: 2023-05-19 | End: 2023-05-19

## 2023-05-19 RX ADMIN — SODIUM CHLORIDE 20 ML/HR: 0.9 INJECTION, SOLUTION INTRAVENOUS at 14:36

## 2023-05-19 RX ADMIN — PERTUZUMAB 420 MG: 30 INJECTION, SOLUTION, CONCENTRATE INTRAVENOUS at 14:37

## 2023-05-19 RX ADMIN — TRASTUZUMAB 600 MG: 150 INJECTION, POWDER, LYOPHILIZED, FOR SOLUTION INTRAVENOUS at 15:09

## 2023-05-19 NOTE — PROGRESS NOTES
Pt presents for herceptin and perjeta  No new meds or concerns  Pt tolerated infusion without adverse reaction  Future appointments discussed  Pt will be out of town for 6/9, appointment moved to 6/13  Other appointments will remain unchanged per Dr Kimberley [facility] declined

## 2023-06-06 RX ORDER — SODIUM CHLORIDE 9 MG/ML
20 INJECTION, SOLUTION INTRAVENOUS ONCE
Status: CANCELLED | OUTPATIENT
Start: 2023-06-13

## 2023-06-13 ENCOUNTER — HOSPITAL ENCOUNTER (OUTPATIENT)
Dept: INFUSION CENTER | Facility: CLINIC | Age: 58
Discharge: HOME | End: 2023-06-13
Payer: COMMERCIAL

## 2023-06-13 VITALS
TEMPERATURE: 97.4 F | RESPIRATION RATE: 16 BRPM | DIASTOLIC BLOOD PRESSURE: 74 MMHG | WEIGHT: 214.73 LBS | HEIGHT: 65 IN | BODY MASS INDEX: 35.78 KG/M2 | SYSTOLIC BLOOD PRESSURE: 169 MMHG | HEART RATE: 67 BPM

## 2023-06-13 DIAGNOSIS — C50.212 MALIGNANT NEOPLASM OF UPPER-INNER QUADRANT OF LEFT BREAST IN FEMALE, ESTROGEN RECEPTOR POSITIVE (HCC): ICD-10-CM

## 2023-06-13 DIAGNOSIS — Z90.79 HISTORY OF BILATERAL SALPINGO-OOPHORECTOMY (BSO): ICD-10-CM

## 2023-06-13 DIAGNOSIS — Z90.722 HISTORY OF BILATERAL SALPINGO-OOPHORECTOMY (BSO): ICD-10-CM

## 2023-06-13 DIAGNOSIS — Z17.0 MALIGNANT NEOPLASM OF UPPER-INNER QUADRANT OF LEFT BREAST IN FEMALE, ESTROGEN RECEPTOR POSITIVE (HCC): ICD-10-CM

## 2023-06-13 DIAGNOSIS — C78.7 MALIGNANT NEOPLASM METASTATIC TO LIVER (HCC): Primary | ICD-10-CM

## 2023-06-13 DIAGNOSIS — C79.51 MALIGNANT NEOPLASM METASTATIC TO BONE (HCC): ICD-10-CM

## 2023-06-13 RX ORDER — SODIUM CHLORIDE 9 MG/ML
20 INJECTION, SOLUTION INTRAVENOUS ONCE
Status: COMPLETED | OUTPATIENT
Start: 2023-06-13 | End: 2023-06-13

## 2023-06-13 RX ADMIN — SODIUM CHLORIDE 20 ML/HR: 0.9 INJECTION, SOLUTION INTRAVENOUS at 14:35

## 2023-06-13 RX ADMIN — TRASTUZUMAB 600 MG: 150 INJECTION, POWDER, LYOPHILIZED, FOR SOLUTION INTRAVENOUS at 15:14

## 2023-06-13 RX ADMIN — PERTUZUMAB 420 MG: 30 INJECTION, SOLUTION, CONCENTRATE INTRAVENOUS at 14:39

## 2023-06-13 NOTE — PROGRESS NOTES
Pt  Denies new symptoms or concerns today  There are no lab parameters  EF 65 from ECHO on 4/23/23  Perjeta and Herceptin ordered today

## 2023-06-13 NOTE — PLAN OF CARE
Problem: INFECTION - ADULT  Goal: Absence or prevention of progression during hospitalization  Description: INTERVENTIONS:  - Assess and monitor for signs and symptoms of infection  - Monitor lab/diagnostic results  - Monitor all insertion sites, i e  indwelling lines, tubes, and drains  - Monitor endotracheal if appropriate and nasal secretions for changes in amount and color  - [place] appropriate cooling/warming therapies per order  - Administer medications as ordered  - Instruct and encourage patient and family to use good hand hygiene technique  - Identify and instruct in appropriate isolation precautions for identified infection/condition  Outcome: Progressing  Goal: Absence of fever/infection during neutropenic period  Description: INTERVENTIONS:  - Monitor WBC    Outcome: Progressing     Problem: Knowledge Deficit  Goal: Patient/family/caregiver demonstrates understanding of disease process, treatment plan, medications, and discharge instructions  Description: Complete learning assessment and assess knowledge base    Interventions:  - Provide teaching at level of understanding  - Provide teaching via preferred learning methods  Outcome: Progressing

## 2023-06-13 NOTE — PLAN OF CARE
Problem: INFECTION - ADULT  Goal: Absence or prevention of progression during hospitalization  Description: INTERVENTIONS:  - Assess and monitor for signs and symptoms of infection  - Monitor lab/diagnostic results  - Monitor all insertion sites, i e  indwelling lines, tubes, and drains  - Monitor endotracheal if appropriate and nasal secretions for changes in amount and color  - [place] appropriate cooling/warming therapies per order  - Administer medications as ordered  - Instruct and encourage patient and family to use good hand hygiene technique  - Identify and instruct in appropriate isolation precautions for identified infection/condition  Outcome: Progressing  Goal: Absence of fever/infection during neutropenic period  Description: INTERVENTIONS:  - Monitor WBC    Outcome: Progressing     Problem: Knowledge Deficit  Goal: Patient/family/caregiver demonstrates understanding of disease process, treatment plan, medications, and discharge instructions  Description: Complete learning assessment and assess knowledge base    Interventions:  - Provide teaching at level of understanding  - Provide teaching via preferred learning methods  Outcome: Progressing

## 2023-06-27 RX ORDER — SODIUM CHLORIDE 9 MG/ML
20 INJECTION, SOLUTION INTRAVENOUS ONCE
Status: CANCELLED | OUTPATIENT
Start: 2023-06-30

## 2023-06-30 ENCOUNTER — HOSPITAL ENCOUNTER (OUTPATIENT)
Dept: INFUSION CENTER | Facility: CLINIC | Age: 58
End: 2023-06-30
Payer: COMMERCIAL

## 2023-06-30 VITALS
BODY MASS INDEX: 35.99 KG/M2 | HEART RATE: 59 BPM | WEIGHT: 216 LBS | TEMPERATURE: 98.2 F | RESPIRATION RATE: 16 BRPM | SYSTOLIC BLOOD PRESSURE: 166 MMHG | DIASTOLIC BLOOD PRESSURE: 84 MMHG | HEIGHT: 65 IN

## 2023-06-30 DIAGNOSIS — Z90.722 HISTORY OF BILATERAL SALPINGO-OOPHORECTOMY (BSO): ICD-10-CM

## 2023-06-30 DIAGNOSIS — C79.51 MALIGNANT NEOPLASM METASTATIC TO BONE (HCC): ICD-10-CM

## 2023-06-30 DIAGNOSIS — Z17.0 MALIGNANT NEOPLASM OF UPPER-INNER QUADRANT OF LEFT BREAST IN FEMALE, ESTROGEN RECEPTOR POSITIVE (HCC): ICD-10-CM

## 2023-06-30 DIAGNOSIS — C78.7 MALIGNANT NEOPLASM METASTATIC TO LIVER (HCC): Primary | ICD-10-CM

## 2023-06-30 DIAGNOSIS — C50.212 MALIGNANT NEOPLASM OF UPPER-INNER QUADRANT OF LEFT BREAST IN FEMALE, ESTROGEN RECEPTOR POSITIVE (HCC): ICD-10-CM

## 2023-06-30 DIAGNOSIS — Z90.79 HISTORY OF BILATERAL SALPINGO-OOPHORECTOMY (BSO): ICD-10-CM

## 2023-06-30 LAB
ALBUMIN SERPL BCP-MCNC: 3.8 G/DL (ref 3.5–5)
ALP SERPL-CCNC: 77 U/L (ref 34–104)
ALT SERPL W P-5'-P-CCNC: 27 U/L (ref 7–52)
ANION GAP SERPL CALCULATED.3IONS-SCNC: 4 MMOL/L
AST SERPL W P-5'-P-CCNC: 19 U/L (ref 13–39)
BASOPHILS # BLD AUTO: 0.04 THOUSANDS/ÂΜL (ref 0–0.1)
BASOPHILS NFR BLD AUTO: 1 % (ref 0–1)
BILIRUB SERPL-MCNC: 0.37 MG/DL (ref 0.2–1)
BUN SERPL-MCNC: 15 MG/DL (ref 5–25)
CALCIUM SERPL-MCNC: 9.2 MG/DL (ref 8.4–10.2)
CHLORIDE SERPL-SCNC: 106 MMOL/L (ref 96–108)
CO2 SERPL-SCNC: 29 MMOL/L (ref 21–32)
CREAT SERPL-MCNC: 0.83 MG/DL (ref 0.6–1.3)
EOSINOPHIL # BLD AUTO: 0.3 THOUSAND/ÂΜL (ref 0–0.61)
EOSINOPHIL NFR BLD AUTO: 4 % (ref 0–6)
ERYTHROCYTE [DISTWIDTH] IN BLOOD BY AUTOMATED COUNT: 14.2 % (ref 11.6–15.1)
GFR SERPL CREATININE-BSD FRML MDRD: 79 ML/MIN/1.73SQ M
GLUCOSE SERPL-MCNC: 108 MG/DL (ref 65–140)
HCT VFR BLD AUTO: 41.6 % (ref 34.8–46.1)
HGB BLD-MCNC: 12.7 G/DL (ref 11.5–15.4)
IMM GRANULOCYTES # BLD AUTO: 0.04 THOUSAND/UL (ref 0–0.2)
IMM GRANULOCYTES NFR BLD AUTO: 1 % (ref 0–2)
LYMPHOCYTES # BLD AUTO: 2.54 THOUSANDS/ÂΜL (ref 0.6–4.47)
LYMPHOCYTES NFR BLD AUTO: 31 % (ref 14–44)
MCH RBC QN AUTO: 26.2 PG (ref 26.8–34.3)
MCHC RBC AUTO-ENTMCNC: 30.5 G/DL (ref 31.4–37.4)
MCV RBC AUTO: 86 FL (ref 82–98)
MONOCYTES # BLD AUTO: 0.49 THOUSAND/ÂΜL (ref 0.17–1.22)
MONOCYTES NFR BLD AUTO: 6 % (ref 4–12)
NEUTROPHILS # BLD AUTO: 4.74 THOUSANDS/ÂΜL (ref 1.85–7.62)
NEUTS SEG NFR BLD AUTO: 57 % (ref 43–75)
NRBC BLD AUTO-RTO: 0 /100 WBCS
PLATELET # BLD AUTO: 310 THOUSANDS/UL (ref 149–390)
PMV BLD AUTO: 11.6 FL (ref 8.9–12.7)
POTASSIUM SERPL-SCNC: 3.8 MMOL/L (ref 3.5–5.3)
PROT SERPL-MCNC: 6.8 G/DL (ref 6.4–8.4)
RBC # BLD AUTO: 4.85 MILLION/UL (ref 3.81–5.12)
SODIUM SERPL-SCNC: 139 MMOL/L (ref 135–147)
WBC # BLD AUTO: 8.15 THOUSAND/UL (ref 4.31–10.16)

## 2023-06-30 PROCEDURE — 80053 COMPREHEN METABOLIC PANEL: CPT

## 2023-06-30 PROCEDURE — 96417 CHEMO IV INFUS EACH ADDL SEQ: CPT

## 2023-06-30 PROCEDURE — 96413 CHEMO IV INFUSION 1 HR: CPT

## 2023-06-30 PROCEDURE — 85025 COMPLETE CBC W/AUTO DIFF WBC: CPT

## 2023-06-30 RX ORDER — SODIUM CHLORIDE 9 MG/ML
20 INJECTION, SOLUTION INTRAVENOUS ONCE
Status: COMPLETED | OUTPATIENT
Start: 2023-06-30 | End: 2023-06-30

## 2023-06-30 RX ADMIN — TRASTUZUMAB 600 MG: 150 INJECTION, POWDER, LYOPHILIZED, FOR SOLUTION INTRAVENOUS at 15:20

## 2023-06-30 RX ADMIN — PERTUZUMAB 420 MG: 30 INJECTION, SOLUTION, CONCENTRATE INTRAVENOUS at 14:48

## 2023-06-30 RX ADMIN — SODIUM CHLORIDE 20 ML/HR: 0.9 INJECTION, SOLUTION INTRAVENOUS at 14:48

## 2023-06-30 NOTE — PROGRESS NOTES
Pt presents for perjeta herceptin  No new meds or concerns  Pt tolerated infusion without adverse reaction  Future appointments discussed  AVS declined

## 2023-07-18 ENCOUNTER — OFFICE VISIT (OUTPATIENT)
Dept: HEMATOLOGY ONCOLOGY | Facility: CLINIC | Age: 58
End: 2023-07-18
Payer: COMMERCIAL

## 2023-07-18 VITALS
HEIGHT: 65 IN | SYSTOLIC BLOOD PRESSURE: 126 MMHG | HEART RATE: 74 BPM | OXYGEN SATURATION: 98 % | RESPIRATION RATE: 16 BRPM | WEIGHT: 214 LBS | DIASTOLIC BLOOD PRESSURE: 70 MMHG | BODY MASS INDEX: 35.65 KG/M2 | TEMPERATURE: 98.5 F

## 2023-07-18 DIAGNOSIS — Z95.828 PORT-A-CATH IN PLACE: ICD-10-CM

## 2023-07-18 DIAGNOSIS — Z15.02 BRCA1 GENE MUTATION POSITIVE IN FEMALE: ICD-10-CM

## 2023-07-18 DIAGNOSIS — Z15.01 BRCA1 GENE MUTATION POSITIVE IN FEMALE: ICD-10-CM

## 2023-07-18 DIAGNOSIS — T45.1X5A CARDIOMYOPATHY DUE TO CHEMOTHERAPY (HCC): Primary | ICD-10-CM

## 2023-07-18 DIAGNOSIS — T45.1X5A CARDIOMYOPATHY DUE TO CHEMOTHERAPY (HCC): ICD-10-CM

## 2023-07-18 DIAGNOSIS — C79.51 MALIGNANT NEOPLASM METASTATIC TO BONE (HCC): ICD-10-CM

## 2023-07-18 DIAGNOSIS — I42.7 CARDIOMYOPATHY DUE TO CHEMOTHERAPY (HCC): Primary | ICD-10-CM

## 2023-07-18 DIAGNOSIS — Z14.8 CARRIER OF GENE MUTATION FOR HIGH RISK OF CANCER: ICD-10-CM

## 2023-07-18 DIAGNOSIS — Z17.0 MALIGNANT NEOPLASM OF UPPER-INNER QUADRANT OF LEFT BREAST IN FEMALE, ESTROGEN RECEPTOR POSITIVE (HCC): Primary | ICD-10-CM

## 2023-07-18 DIAGNOSIS — C78.7 MALIGNANT NEOPLASM METASTATIC TO LIVER (HCC): ICD-10-CM

## 2023-07-18 DIAGNOSIS — I42.7 CARDIOMYOPATHY DUE TO CHEMOTHERAPY (HCC): ICD-10-CM

## 2023-07-18 DIAGNOSIS — Z15.09 BRCA1 GENE MUTATION POSITIVE IN FEMALE: ICD-10-CM

## 2023-07-18 DIAGNOSIS — Z90.722 S/P BSO (BILATERAL SALPINGO-OOPHORECTOMY): ICD-10-CM

## 2023-07-18 DIAGNOSIS — C50.212 MALIGNANT NEOPLASM OF UPPER-INNER QUADRANT OF LEFT BREAST IN FEMALE, ESTROGEN RECEPTOR POSITIVE (HCC): Primary | ICD-10-CM

## 2023-07-18 PROCEDURE — 99214 OFFICE O/P EST MOD 30 MIN: CPT | Performed by: INTERNAL MEDICINE

## 2023-07-18 NOTE — PATIENT INSTRUCTIONS
Blood work every 6 weeks. No change in Herceptin Perjeta and letrozole. She will reschedule CT scans. Follow-up in 3 months.

## 2023-07-18 NOTE — PROGRESS NOTES
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 Patient was started on THP,  Taxotere plus Herceptin and Perjeta and after 6 cycles Taxotere was discontinued and she was continued on Herceptin and Perjeta and to that tamoxifen was added and she was premenopausal at that time. Later tamoxifen was changed to letrozole after she had  BSO. She had Xgeva. She  had radiation to left hip for palliation. She continued to respond and in  September 2016 patient had lumpectomy of left breast followed by radiation therapy.  At the time of lumpectomy there was a small residual disease, 1.4 cm.  Lymph nodes were not sampled. Patient tested positive for BRCA 1. She already had BSO. She has been getting imaging studies for early detection of other cancers like peritoneal and pancreatic cancers. She has been encouraged to see a dermatologist but she does not want to. 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 Patient is still on Herceptin, Perjeta and letrozole. Victoria Ruelas has been taking vitamin-D and calcium and is staying active. She is not on Xgeva anymore that she had for 2 years. Paul Medina has been tolerating treatments without much problem. Victoria Ruelas goes for blood tests and echocardiogram on regular basis. She has done well all these years and  had very good response but then in 2022 there was a new lesion in the liver on CT scan and MRI scan and on biopsy that proved to be metastatic cancer from breast, strongly positive for ER, negative for AR and 2+ for HER 2 that was negative by FISH (low positive HER2). Patient saw radiation oncologist for local radiation to liver lesion and discussed risks and benefits and decided to be observed. Subsequent CT scan showed slight decrease in size of liver lesion from 2.7 to 2.4. She preferred to stay on letrozole, Herceptin and Perjeta.   We did discuss ENHERTU. Most recent CT scan  showed slight further decrease in the size of liver lesion. No bone pains. Has good appetite. And she is maintaining her weight.     .  Patient knows that she is at high risk for breast and other cancers like  ovarian cancer, pancreatic cancer, peritoneal cancer, melanoma and other cancers. Patient goes to breast surgeon for evaluation and imaging studies. She gets CT scan and that also checks the pancreas and peritoneum. She had BSO. She does not want to go to a dermatologist to check for melanoma or ophthalmologist to check for melanoma in the eye. She goes to her optometrist.  Also she does not want to have colonoscopy and not even Cologuard or stool test for blood.      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 Patient has history of anxiety and insomnia  For leg cramps at night and she is taking one baby aspirin daily with food in the evening and also one magnesium tablet daily and that is helping.   ..      She continues to take  calcium and vitamin-D  .                           Current Outpatient Medications:   •  letrozole (FEMARA) 2.5 mg tablet, Take 1 tablet (2.5 mg total) by mouth daily, Disp: 90 tablet, Rfl: 2  •  Multiple Vitamins-Minerals (Theragran-M Premier 50 Plus) TABS, as directed, Disp: , Rfl:   •  multivitamin (THERAGRAN) TABS, Take 1 tablet by mouth 3 (three) times a week , Disp: , Rfl:   •  pertuzumab (PERJETA) 420 mg/14 mL SOLN, Infuse into a venous catheter every 21 days At infusion  center, Disp: , Rfl:   •  Trastuzumab (HERCEPTIN IV), Infuse into a venous catheter every 21 days At infusion center, Disp: , Rfl:   •  VITAMIN D, CHOLECALCIFEROL, PO, Take 1,000 Units by mouth 3 (three) times a week , Disp: , Rfl:     No Known Allergies    Oncology History   Malignant neoplasm of upper-inner quadrant of left female breast ([address])   12/2015 Initial Diagnosis    Malignant neoplasm of upper-outer quadrant of left female breast ([address])     2016 -  Hormone Therapy    Tamoxifen  Ended 8/2019. Dr Azra Hernandez  Letrozole 9/2019.     1/27/2016 Surgery    Port placement     2/19/2016 - 6/2/2018 Chemotherapy    Taxotere,  herceptin, perjeta, xgeva. After six cycles, taxotere was discontinued and tamoxifen added.    Continues with Perjeta and Herceptin every 3 weeks     - Dr Azra Hernandez       9/16/2016 Surgery    Left breast partial mastectomy     11/7/2016 - 12/23/2016 Radiation    Plan ID Energy Fractions Dose per Fraction (cGy) Total Dose Delivered (cGy) Elapsed Days   Lt Breast 6X 25 / 25 200 5,000 36   Lt Brst Boost 6X 8 / 8 200 1,600 9   Lt Femur 10X 10 / 10 300 3,000 11   Lt PAB 6X 25 / 25 60 1,500 36   Lt Sclav 6X 25 / 25 200 5,000 36                                   Total dose to left breast lumpectomy cavity: 6600 cGy                   Total dose to the supraclavicular and axillary regions: 5000 cGy       4/17/2019 -  Chemotherapy    pertuzumab (PERJETA) IVPB, 840 mg, Intravenous, Once, 72 of 74 cycles  Administration: 420 mg (5/17/2019), 420 mg (6/7/2019), 420 mg (6/28/2019), 420 mg (7/19/2019), 420 mg (8/30/2019), 420 mg (9/20/2019), 420 mg (8/9/2019), 420 mg (10/9/2019), 420 mg (11/1/2019), 420 mg (11/22/2019), 420 mg (12/13/2019), 420 mg (1/3/2020), 420 mg (1/24/2020), 420 mg (2/14/2020), 420 mg (3/6/2020), 420 mg (3/27/2020), 420 mg (4/17/2020), 420 mg (5/8/2020), 420 mg (5/29/2020), 420 mg (6/19/2020), 420 mg (7/10/2020), 420 mg (7/31/2020), 420 mg (8/21/2020), 420 mg (9/11/2020), 420 mg (10/2/2020), 420 mg (10/23/2020), 420 mg (11/13/2020), 420 mg (12/4/2020), 420 mg (12/24/2020), 420 mg (1/15/2021), 420 mg (2/5/2021), 420 mg (2/26/2021), 420 mg (3/19/2021), 420 mg (4/9/2021), 420 mg (4/30/2021), 420 mg (5/21/2021), 420 mg (6/11/2021), 420 mg (7/2/2021), 420 mg (7/23/2021), 420 mg (8/13/2021), 420 mg (9/3/2021), 420 mg (9/24/2021), 420 mg (10/15/2021), 420 mg (11/5/2021), 420 mg (11/26/2021), 420 mg (12/17/2021), 420 mg (1/7/2022), 420 mg (1/28/2022), 420 mg (2/18/2022), 420 mg (3/11/2022), 420 mg (4/1/2022), 420 mg (5/13/2022), 420 mg (6/3/2022), 420 mg (6/28/2022), 420 mg (7/22/2022), 420 mg (9/2/2022), 420 mg (9/23/2022), 420 mg (8/12/2022), 420 mg (10/14/2022), 420 mg (11/4/2022), 420 mg (11/25/2022), 420 mg (12/15/2022), 420 mg (1/6/2023), 420 mg (1/27/2023), 420 mg (2/17/2023), 420 mg (3/10/2023), 420 mg (3/31/2023), 420 mg (4/21/2023), 420 mg (5/19/2023), 420 mg (6/13/2023), 420 mg (6/30/2023)  trastuzumab (HERCEPTIN) chemo infusion, 6 mg/kg = 589 mg (75 % of original dose 8 mg/kg), Intravenous, Once, 72 of 74 cycles  Dose modification: 6 mg/kg (original dose 8 mg/kg, Cycle 1, Reason: Other (Must fill in a comment))  Administration: 589 mg (5/17/2019), 589 mg (6/7/2019), 600 mg (6/28/2019), 600 mg (7/19/2019), 600 mg (8/30/2019), 600 mg (9/20/2019), 600 mg (8/9/2019), 600 mg (10/9/2019), 600 mg (11/1/2019), 600 mg (11/22/2019), 600 mg (12/13/2019), 600 mg (1/3/2020), 600 mg (1/24/2020), 600 mg (2/14/2020), 600 mg (3/6/2020), 600 mg (3/27/2020), 600 mg (4/17/2020), 600 mg (5/8/2020), 600 mg (5/29/2020), 600 mg (6/19/2020), 600 mg (7/10/2020), 600 mg (7/31/2020), 600 mg (8/21/2020), 600 mg (9/11/2020), 600 mg (10/2/2020), 600 mg (10/23/2020), 600 mg (11/13/2020), 600 mg (12/4/2020), 600 mg (12/24/2020), 600 mg (1/15/2021), 600 mg (2/5/2021), 600 mg (2/26/2021), 600 mg (3/19/2021), 600 mg (4/9/2021), 600 mg (4/30/2021), 600 mg (5/21/2021), 600 mg (6/11/2021), 600 mg (7/2/2021), 600 mg (7/23/2021), 600 mg (8/13/2021), 600 mg (9/3/2021), 600 mg (9/24/2021), 600 mg (10/15/2021), 600 mg (11/5/2021), 600 mg (11/26/2021), 600 mg (12/17/2021), 600 mg (1/7/2022), 600 mg (1/28/2022), 600 mg (2/18/2022), 600 mg (3/11/2022), 600 mg (4/1/2022), 600 mg (5/13/2022), 600 mg (6/3/2022), 600 mg (6/28/2022), 600 mg (7/22/2022), 600 mg (9/2/2022), 600 mg (9/23/2022), 600 mg (8/12/2022), 600 mg (10/14/2022), 600 mg (11/4/2022), 600 mg (11/25/2022), 600 mg (12/15/2022), 600 mg (1/6/2023), 600 mg (1/27/2023), 600 mg (2/17/2023), 600 mg (3/10/2023), 600 mg (3/31/2023), 600 mg (4/21/2023), 600 mg (5/19/2023), 600 mg (6/13/2023), 600 mg (6/30/2023)     8/16/2019 Surgery    she underwent BSO.     7/5/2022 -  Cancer Staged    Staging form: Breast, AJCC 8th Edition  - Clinical: Stage IV (rcTX, cNX, pM1) - Signed by Misbah Paredes MD on 7/5/2022  Stage prefix: Recurrence       Malignant neoplasm metastatic to liver ([address])   4/27/2018 Initial Diagnosis    Liver metastases ([address])     4/17/2019 -  Chemotherapy    pertuzumab (PERJETA) IVPB, 840 mg, Intravenous, Once, 72 of 74 cycles  Administration: 420 mg (5/17/2019), 420 mg (6/7/2019), 420 mg (6/28/2019), 420 mg (7/19/2019), 420 mg (8/30/2019), 420 mg (9/20/2019), 420 mg (8/9/2019), 420 mg (10/9/2019), 420 mg (11/1/2019), 420 mg (11/22/2019), 420 mg (12/13/2019), 420 mg (1/3/2020), 420 mg (1/24/2020), 420 mg (2/14/2020), 420 mg (3/6/2020), 420 mg (3/27/2020), 420 mg (4/17/2020), 420 mg (5/8/2020), 420 mg (5/29/2020), 420 mg (6/19/2020), 420 mg (7/10/2020), 420 mg (7/31/2020), 420 mg (8/21/2020), 420 mg (9/11/2020), 420 mg (10/2/2020), 420 mg (10/23/2020), 420 mg (11/13/2020), 420 mg (12/4/2020), 420 mg (12/24/2020), 420 mg (1/15/2021), 420 mg (2/5/2021), 420 mg (2/26/2021), 420 mg (3/19/2021), 420 mg (4/9/2021), 420 mg (4/30/2021), 420 mg (5/21/2021), 420 mg (6/11/2021), 420 mg (7/2/2021), 420 mg (7/23/2021), 420 mg (8/13/2021), 420 mg (9/3/2021), 420 mg (9/24/2021), 420 mg (10/15/2021), 420 mg (11/5/2021), 420 mg (11/26/2021), 420 mg (12/17/2021), 420 mg (1/7/2022), 420 mg (1/28/2022), 420 mg (2/18/2022), 420 mg (3/11/2022), 420 mg (4/1/2022), 420 mg (5/13/2022), 420 mg (6/3/2022), 420 mg (6/28/2022), 420 mg (7/22/2022), 420 mg (9/2/2022), 420 mg (9/23/2022), 420 mg (8/12/2022), 420 mg (10/14/2022), 420 mg (11/4/2022), 420 mg (11/25/2022), 420 mg (12/15/2022), 420 mg (1/6/2023), 420 mg (1/27/2023), 420 mg (2/17/2023), 420 mg (3/10/2023), 420 mg (3/31/2023), 420 mg (4/21/2023), 420 mg (5/19/2023), 420 mg (6/13/2023), 420 mg (6/30/2023)  trastuzumab (HERCEPTIN) chemo infusion, 6 mg/kg = 589 mg (75 % of original dose 8 mg/kg), Intravenous, Once, 72 of 74 cycles  Dose modification: 6 mg/kg (original dose 8 mg/kg, Cycle 1, Reason: Other (Must fill in a comment))  Administration: 589 mg (5/17/2019), 589 mg (6/7/2019), 600 mg (6/28/2019), 600 mg (7/19/2019), 600 mg (8/30/2019), 600 mg (9/20/2019), 600 mg (8/9/2019), 600 mg (10/9/2019), 600 mg (11/1/2019), 600 mg (11/22/2019), 600 mg (12/13/2019), 600 mg (1/3/2020), 600 mg (1/24/2020), 600 mg (2/14/2020), 600 mg (3/6/2020), 600 mg (3/27/2020), 600 mg (4/17/2020), 600 mg (5/8/2020), 600 mg (5/29/2020), 600 mg (6/19/2020), 600 mg (7/10/2020), 600 mg (7/31/2020), 600 mg (8/21/2020), 600 mg (9/11/2020), 600 mg (10/2/2020), 600 mg (10/23/2020), 600 mg (11/13/2020), 600 mg (12/4/2020), 600 mg (12/24/2020), 600 mg (1/15/2021), 600 mg (2/5/2021), 600 mg (2/26/2021), 600 mg (3/19/2021), 600 mg (4/9/2021), 600 mg (4/30/2021), 600 mg (5/21/2021), 600 mg (6/11/2021), 600 mg (7/2/2021), 600 mg (7/23/2021), 600 mg (8/13/2021), 600 mg (9/3/2021), 600 mg (9/24/2021), 600 mg (10/15/2021), 600 mg (11/5/2021), 600 mg (11/26/2021), 600 mg (12/17/2021), 600 mg (1/7/2022), 600 mg (1/28/2022), 600 mg (2/18/2022), 600 mg (3/11/2022), 600 mg (4/1/2022), 600 mg (5/13/2022), 600 mg (6/3/2022), 600 mg (6/28/2022), 600 mg (7/22/2022), 600 mg (9/2/2022), 600 mg (9/23/2022), 600 mg (8/12/2022), 600 mg (10/14/2022), 600 mg (11/4/2022), 600 mg (11/25/2022), 600 mg (12/15/2022), 600 mg (1/6/2023), 600 mg (1/27/2023), 600 mg (2/17/2023), 600 mg (3/10/2023), 600 mg (3/31/2023), 600 mg (4/21/2023), 600 mg (5/19/2023), 600 mg (6/13/2023), 600 mg (6/30/2023)     6/24/2022 Biopsy    Final Diagnosis   A. Liver, core biopsies:     - Metastatic carcinoma compatible with a breast primary. Malignant neoplasm metastatic to bone ([address])   4/27/2018 Initial Diagnosis    Bone metastasis ([address])     4/17/2019 -  Chemotherapy    pertuzumab (PERJETA) IVPB, 840 mg, Intravenous, Once, 72 of 74 cycles  Administration: 420 mg (5/17/2019), 420 mg (6/7/2019), 420 mg (6/28/2019), 420 mg (7/19/2019), 420 mg (8/30/2019), 420 mg (9/20/2019), 420 mg (8/9/2019), 420 mg (10/9/2019), 420 mg (11/1/2019), 420 mg (11/22/2019), 420 mg (12/13/2019), 420 mg (1/3/2020), 420 mg (1/24/2020), 420 mg (2/14/2020), 420 mg (3/6/2020), 420 mg (3/27/2020), 420 mg (4/17/2020), 420 mg (5/8/2020), 420 mg (5/29/2020), 420 mg (6/19/2020), 420 mg (7/10/2020), 420 mg (7/31/2020), 420 mg (8/21/2020), 420 mg (9/11/2020), 420 mg (10/2/2020), 420 mg (10/23/2020), 420 mg (11/13/2020), 420 mg (12/4/2020), 420 mg (12/24/2020), 420 mg (1/15/2021), 420 mg (2/5/2021), 420 mg (2/26/2021), 420 mg (3/19/2021), 420 mg (4/9/2021), 420 mg (4/30/2021), 420 mg (5/21/2021), 420 mg (6/11/2021), 420 mg (7/2/2021), 420 mg (7/23/2021), 420 mg (8/13/2021), 420 mg (9/3/2021), 420 mg (9/24/2021), 420 mg (10/15/2021), 420 mg (11/5/2021), 420 mg (11/26/2021), 420 mg (12/17/2021), 420 mg (1/7/2022), 420 mg (1/28/2022), 420 mg (2/18/2022), 420 mg (3/11/2022), 420 mg (4/1/2022), 420 mg (5/13/2022), 420 mg (6/3/2022), 420 mg (6/28/2022), 420 mg (7/22/2022), 420 mg (9/2/2022), 420 mg (9/23/2022), 420 mg (8/12/2022), 420 mg (10/14/2022), 420 mg (11/4/2022), 420 mg (11/25/2022), 420 mg (12/15/2022), 420 mg (1/6/2023), 420 mg (1/27/2023), 420 mg (2/17/2023), 420 mg (3/10/2023), 420 mg (3/31/2023), 420 mg (4/21/2023), 420 mg (5/19/2023), 420 mg (6/13/2023), 420 mg (6/30/2023)  trastuzumab (HERCEPTIN) chemo infusion, 6 mg/kg = 589 mg (75 % of original dose 8 mg/kg), Intravenous, Once, 72 of 74 cycles  Dose modification: 6 mg/kg (original dose 8 mg/kg, Cycle 1, Reason: Other (Must fill in a comment))  Administration: 589 mg (5/17/2019), 589 mg (6/7/2019), 600 mg (6/28/2019), 600 mg (7/19/2019), 600 mg (8/30/2019), 600 mg (9/20/2019), 600 mg (8/9/2019), 600 mg (10/9/2019), 600 mg (11/1/2019), 600 mg (11/22/2019), 600 mg (12/13/2019), 600 mg (1/3/2020), 600 mg (1/24/2020), 600 mg (2/14/2020), 600 mg (3/6/2020), 600 mg (3/27/2020), 600 mg (4/17/2020), 600 mg (5/8/2020), 600 mg (5/29/2020), 600 mg (6/19/2020), 600 mg (7/10/2020), 600 mg (7/31/2020), 600 mg (8/21/2020), 600 mg (9/11/2020), 600 mg (10/2/2020), 600 mg (10/23/2020), 600 mg (11/13/2020), 600 mg (12/4/2020), 600 mg (12/24/2020), 600 mg (1/15/2021), 600 mg (2/5/2021), 600 mg (2/26/2021), 600 mg (3/19/2021), 600 mg (4/9/2021), 600 mg (4/30/2021), 600 mg (5/21/2021), 600 mg (6/11/2021), 600 mg (7/2/2021), 600 mg (7/23/2021), 600 mg (8/13/2021), 600 mg (9/3/2021), 600 mg (9/24/2021), 600 mg (10/15/2021), 600 mg (11/5/2021), 600 mg (11/26/2021), 600 mg (12/17/2021), 600 mg (1/7/2022), 600 mg (1/28/2022), 600 mg (2/18/2022), 600 mg (3/11/2022), 600 mg (4/1/2022), 600 mg (5/13/2022), 600 mg (6/3/2022), 600 mg (6/28/2022), 600 mg (7/22/2022), 600 mg (9/2/2022), 600 mg (9/23/2022), 600 mg (8/12/2022), 600 mg (10/14/2022), 600 mg (11/4/2022), 600 mg (11/25/2022), 600 mg (12/15/2022), 600 mg (1/6/2023), 600 mg (1/27/2023), 600 mg (2/17/2023), 600 mg (3/10/2023), 600 mg (3/31/2023), 600 mg (4/21/2023), 600 mg (5/19/2023), 600 mg (6/13/2023), 600 mg (6/30/2023)     6/24/2022 Biopsy    Final Diagnosis   A. Liver, core biopsies:     - Metastatic carcinoma compatible with a breast primary. ROS:  07/18/23 Reviewed 12 systems: .  See symptoms in HPI  Presently no other neurological, cardiac, pulmonary, GI and  symptoms other than mentioned   in HPI. .  Other symptoms are in HPI  No other symptoms like fever, chills, bleeding, bone pains, skin rash, weight loss, weakness,  claudication and gait problem. No frequent infections. Not unusually sensitive to heat or cold. No swelling of the ankles. No swollen glands.   Patient is anxious    BP 126/70 (BP Location: Left arm, Patient Position: Sitting, Cuff Size: Adult)   Pulse 74   Temp 98.5 °F (36.9 °C) (Temporal)   Resp 16   Ht 5' 5" (1.651 m)   Wt 97.1 kg (214 lb)   SpO2 98%   BMI 35.61 kg/m²     Physical Exam:   Alert and oriented and not in distress. Vitals are above. No icterus. No oral thrush. No palpable neck mass. Clear lung fields. Regular heart rate. Liver not palpably enlarged. Soft and nontender abdomen. No palp abdominal mass. No ascites. No edema of the ankles. No calf tenderness. There is no objective focal neurological deficit, there is no skin rash, no palpable lymphadenopathy in the neck and axillary areas, no clubbing, Patient is  anxious. Performance status 1. No lymphedema. .   IMAGING:  IMPRESSION: 02/24/2021     1. No scintigraphic evidence of osseous metastasis.          Workstation performed: [id number]      Imaging     Ejection fraction 65% on 08/06/2022      Study Result    Narrative & Impression   CENTRAL  DXA SCAN on 03/08/2022     CLINICAL HISTORY:   49year old post-menopausal  female risk factors include osteoporosis screening. Additional medical history: Breast cancer with mets to Hip , right Hip scanned.     TECHNIQUE: Bone densitometry was performed using a [employer] W bone densitometer. Regions of interest appear properly placed. There are   other confounding variables which could limit the study.       Her elevated  BMI may influence the T score result.     Degenerative or osteoarthritic changes are noted on the spine image eliminating L4 from evaluation.     COMPARISON:  Follow-up     RESULTS:   LUMBAR SPINE:  L1-L3:  BMD 1.021 gm/cm2  T-score 0.0 and unchanged from 2019. Z-score 1.0     RIGHT TOTAL HIP:  BMD 1.122 gm/cm2  T-score 1.5  Z-score 2.1     RIGHT FEMORAL NECK:  BMD 0.842 gm/cm2  T-score -0.1 and unchanged from 2019. Z-score 0.9           IMPRESSION:  1.  Based on the [facility] classification, this study is normal and the patient is considered at low risk for fracture. IMPRESSION:     Since August 2, 2022:  1. Decreased size and conspicuity of the biopsy-proven hepatic metastasis.     2.  No new sites of metastatic disease in the abdomen.        Workstation performed: [id number]          Imaging    CT abdomen w contrast (Order: [id number]) - 11/1/2022      LABS:    Results for orders placed or performed during the hospital encounter of 06/30/23   CBC and differential   Result Value Ref Range    WBC 8.15 4.31 - 10.16 Thousand/uL    RBC 4.85 3.81 - 5.12 Million/uL    Hemoglobin 12.7 11.5 - 15.4 g/dL    Hematocrit 41.6 34.8 - 46.1 %    MCV 86 82 - 98 fL    MCH 26.2 (L) 26.8 - 34.3 pg    MCHC 30.5 (L) 31.4 - 37.4 g/dL    RDW 14.2 11.6 - 15.1 %    MPV 11.6 8.9 - 12.7 fL    Platelets 948 043 - 664 Thousands/uL    nRBC 0 /100 WBCs    Neutrophils Relative 57 43 - 75 %    Immat GRANS % 1 0 - 2 %    Lymphocytes Relative 31 14 - 44 %    Monocytes Relative 6 4 - 12 %    Eosinophils Relative 4 0 - 6 %    Basophils Relative 1 0 - 1 %    Neutrophils Absolute 4.74 1.85 - 7.62 Thousands/µL    Immature Grans Absolute 0.04 0.00 - 0.20 Thousand/uL    Lymphocytes Absolute 2.54 0.60 - 4.47 Thousands/µL    Monocytes Absolute 0.49 0.17 - 1.22 Thousand/µL    Eosinophils Absolute 0.30 0.00 - 0.61 Thousand/µL    Basophils Absolute 0.04 0.00 - 0.10 Thousands/µL   Comprehensive metabolic panel   Result Value Ref Range    Sodium 139 135 - 147 mmol/L    Potassium 3.8 3.5 - 5.3 mmol/L    Chloride 106 96 - 108 mmol/L    CO2 29 21 - 32 mmol/L    ANION GAP 4 mmol/L    BUN 15 5 - 25 mg/dL    Creatinine 0.83 0.60 - 1.30 mg/dL    Glucose 108 65 - 140 mg/dL    Calcium 9.2 8.4 - 10.2 mg/dL    AST 19 13 - 39 U/L    ALT 27 7 - 52 U/L    Alkaline Phosphatase 77 34 - 104 U/L    Total Protein 6.8 6.4 - 8.4 g/dL    Albumin 3.8 3.5 - 5.0 g/dL    Total Bilirubin 0.37 0.20 - 1.00 mg/dL    eGFR 79 ml/min/1.73sq m     Labs, Imaging, & Other studies:   All pertinent labs and imaging studies were personally reviewed        Reviewed  test results and discussed with patient and explained. Assessment and plan: Follow-up visit for de brandon triple positive stage IV left breast cancer  with metastatic disease to liver and bones that was diagnosed in 2016. Patient was started on THP,  Taxotere plus Herceptin and Perjeta and after 6 cycles Taxotere was discontinued and she was continued on Herceptin and Perjeta and to that tamoxifen was added and she was premenopausal at that time. Later tamoxifen was changed to letrozole after she had  BSO. She had Xgeva. She  had radiation to left hip for palliation. She continued to respond and in  September 2016 patient had lumpectomy of left breast followed by radiation therapy.  At the time of lumpectomy there was a small residual disease, 1.4 cm.  Lymph nodes were not sampled. Patient tested positive for BRCA 1. She already had BSO. She has been getting imaging studies for early detection of other cancers like peritoneal and pancreatic cancers. She has been encouraged to see a dermatologist but she does not want to. 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 Patient is still on Herceptin, Perjeta and letrozole. Juancarlos Morgan has been taking vitamin-D and calcium and is staying active. She is not on Xgeva anymore that she had for 2 years. Isabelle Thornton has been tolerating treatments without much problem. Juancarlos Morgan goes for blood tests and echocardiogram on regular basis. She has done well all these years and  had very good response but then in 2022 there was a new lesion in the liver on CT scan and MRI scan and on biopsy that proved to be metastatic cancer from breast, strongly positive for ER, negative for [place] and 2+ for HER 2 that was negative by FISH (low positive HER2). Patient saw radiation oncologist for local radiation to liver lesion and discussed risks and benefits and decided to be observed.  Subsequent CT scan showed slight decrease in size of liver lesion from 2.7 to 2.4. She preferred to stay on letrozole, Herceptin and Perjeta. We did discuss ENHERTU. Most recent CT scan  showed slight further decrease in the size of liver lesion. No bone pains. Has good appetite. And she is maintaining her weight.     .  Patient knows that she is at high risk for breast and other cancers like  ovarian cancer, pancreatic cancer, peritoneal cancer, melanoma and other cancers. Patient goes to breast surgeon for evaluation and imaging studies. She gets CT scan and that also checks the pancreas and peritoneum. She had BSO. She does not want to go to a dermatologist to check for melanoma or ophthalmologist to check for melanoma in the eye. She goes to her optometrist.  Also she does not want to have colonoscopy and not even Cologuard or stool test for blood.      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 Patient has history of anxiety and insomnia  For leg cramps at night and she is taking one baby aspirin daily with food in the evening and also one magnesium tablet daily and that is helping.  ..      She continues to take  calcium and vitamin-D  .     .     Physical examination and test results are as recorded and discussed. There is no change in therapy,  Herceptin plus Perjeta and letrozole and vitamin D and calcium. She gets  echocardiogram on regular basis.  Condition discussed and explained.  Questions answered. Yolanda Benavides discussed the importance of self-breast examination, eating healthy foods, staying active and health screening tests.  Patient goes to her breast surgeon for examination and  imaging studies. Gage Cintron is capable of self-care.  Goal is prolonged survival from stage IV breast cancer. Patient has been aware of significance of positive BRCA1 mutation and cancer risks.   Patient will continue to follow with her primary physician and other consultants. Discussed precautions against coronavirus and flu and other infections. .  See diagnoses, orders and instructions below      1. Malignant neoplasm of upper-inner quadrant of left breast in female, estrogen receptor positive (HCC)    - CBC and differential; Standing  - Comprehensive metabolic panel; Standing  - CBC and differential  - Comprehensive metabolic panel    2. Malignant neoplasm metastatic to liver ([address])      3. Malignant neoplasm metastatic to bone ([address])      4. BRCA1 gene mutation positive in female      11. 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      6. S/P BSO (bilateral salpingo-oophorectomy)      7. Cardiomyopathy due to chemotherapy ([address])      8. Port-A-Cath in place    Blood work every 6 weeks. No change in Herceptin Perjeta and letrozole. She will reschedule CT scans. Follow-up in 3 months. I used dictation device to dictated this note and there could be mistakes in my note and for that she may contact my office    Patient voiced understanding and agreed       Counseling / Coordination of Care   .   Provided counseling and support

## 2023-07-19 RX ORDER — SODIUM CHLORIDE 9 MG/ML
20 INJECTION, SOLUTION INTRAVENOUS ONCE
Status: CANCELLED | OUTPATIENT
Start: 2023-07-21

## 2023-07-21 ENCOUNTER — HOSPITAL ENCOUNTER (OUTPATIENT)
Dept: INFUSION CENTER | Facility: CLINIC | Age: 58
End: 2023-07-21
Payer: COMMERCIAL

## 2023-07-21 VITALS
HEART RATE: 75 BPM | BODY MASS INDEX: 35.73 KG/M2 | WEIGHT: 214.73 LBS | RESPIRATION RATE: 18 BRPM | DIASTOLIC BLOOD PRESSURE: 85 MMHG | TEMPERATURE: 97.5 F | SYSTOLIC BLOOD PRESSURE: 138 MMHG

## 2023-07-21 DIAGNOSIS — C78.7 MALIGNANT NEOPLASM METASTATIC TO LIVER (HCC): Primary | ICD-10-CM

## 2023-07-21 DIAGNOSIS — C50.212 MALIGNANT NEOPLASM OF UPPER-INNER QUADRANT OF LEFT BREAST IN FEMALE, ESTROGEN RECEPTOR POSITIVE (HCC): ICD-10-CM

## 2023-07-21 DIAGNOSIS — Z90.722 HISTORY OF BILATERAL SALPINGO-OOPHORECTOMY (BSO): ICD-10-CM

## 2023-07-21 DIAGNOSIS — Z17.0 MALIGNANT NEOPLASM OF UPPER-INNER QUADRANT OF LEFT BREAST IN FEMALE, ESTROGEN RECEPTOR POSITIVE (HCC): ICD-10-CM

## 2023-07-21 DIAGNOSIS — Z90.79 HISTORY OF BILATERAL SALPINGO-OOPHORECTOMY (BSO): ICD-10-CM

## 2023-07-21 DIAGNOSIS — C79.51 MALIGNANT NEOPLASM METASTATIC TO BONE (HCC): ICD-10-CM

## 2023-07-21 PROCEDURE — 96417 CHEMO IV INFUS EACH ADDL SEQ: CPT

## 2023-07-21 PROCEDURE — 96413 CHEMO IV INFUSION 1 HR: CPT

## 2023-07-21 RX ORDER — SODIUM CHLORIDE 9 MG/ML
20 INJECTION, SOLUTION INTRAVENOUS ONCE
Status: COMPLETED | OUTPATIENT
Start: 2023-07-21 | End: 2023-07-21

## 2023-07-21 RX ADMIN — TRASTUZUMAB 600 MG: 150 INJECTION, POWDER, LYOPHILIZED, FOR SOLUTION INTRAVENOUS at 15:37

## 2023-07-21 RX ADMIN — SODIUM CHLORIDE 20 ML/HR: 0.9 INJECTION, SOLUTION INTRAVENOUS at 14:56

## 2023-07-21 RX ADMIN — PERTUZUMAB 420 MG: 30 INJECTION, SOLUTION, CONCENTRATE INTRAVENOUS at 15:06

## 2023-08-04 RX ORDER — SODIUM CHLORIDE 9 MG/ML
20 INJECTION, SOLUTION INTRAVENOUS ONCE
Status: CANCELLED | OUTPATIENT
Start: 2023-08-11

## 2023-08-11 ENCOUNTER — HOSPITAL ENCOUNTER (OUTPATIENT)
Dept: INFUSION CENTER | Facility: CLINIC | Age: 58
Discharge: HOME | End: 2023-08-11
Payer: COMMERCIAL

## 2023-08-11 VITALS — BODY MASS INDEX: 36.07 KG/M2 | HEIGHT: 65 IN | WEIGHT: 216.49 LBS

## 2023-08-11 DIAGNOSIS — C78.7 MALIGNANT NEOPLASM METASTATIC TO LIVER (HCC): Primary | ICD-10-CM

## 2023-08-11 DIAGNOSIS — Z17.0 MALIGNANT NEOPLASM OF UPPER-INNER QUADRANT OF LEFT BREAST IN FEMALE, ESTROGEN RECEPTOR POSITIVE (HCC): ICD-10-CM

## 2023-08-11 DIAGNOSIS — C79.51 MALIGNANT NEOPLASM METASTATIC TO BONE (HCC): ICD-10-CM

## 2023-08-11 DIAGNOSIS — Z90.79 HISTORY OF BILATERAL SALPINGO-OOPHORECTOMY (BSO): ICD-10-CM

## 2023-08-11 DIAGNOSIS — C50.212 MALIGNANT NEOPLASM OF UPPER-INNER QUADRANT OF LEFT BREAST IN FEMALE, ESTROGEN RECEPTOR POSITIVE (HCC): ICD-10-CM

## 2023-08-11 DIAGNOSIS — Z90.722 HISTORY OF BILATERAL SALPINGO-OOPHORECTOMY (BSO): ICD-10-CM

## 2023-08-11 PROCEDURE — 96417 CHEMO IV INFUS EACH ADDL SEQ: CPT

## 2023-08-11 PROCEDURE — 96413 CHEMO IV INFUSION 1 HR: CPT

## 2023-08-11 RX ORDER — SODIUM CHLORIDE 9 MG/ML
20 INJECTION, SOLUTION INTRAVENOUS ONCE
Status: COMPLETED | OUTPATIENT
Start: 2023-08-11 | End: 2023-08-11

## 2023-08-11 RX ADMIN — TRASTUZUMAB 600 MG: 150 INJECTION, POWDER, LYOPHILIZED, FOR SOLUTION INTRAVENOUS at 15:12

## 2023-08-11 RX ADMIN — PERTUZUMAB 420 MG: 30 INJECTION, SOLUTION, CONCENTRATE INTRAVENOUS at 14:35

## 2023-08-11 RX ADMIN — SODIUM CHLORIDE 20 ML/HR: 0.9 INJECTION, SOLUTION INTRAVENOUS at 14:25

## 2023-08-11 NOTE — PROGRESS NOTES
Pt. Denied new symptoms or concerns today. There are no lab parameters. EF 65%. Pt. Tolerated Perjeta and Herceptin without adverse event. Future appointments reviewed. AVS declined.

## 2023-09-01 ENCOUNTER — HOSPITAL ENCOUNTER (OUTPATIENT)
Dept: INFUSION CENTER | Facility: CLINIC | Age: 58
End: 2023-09-01
Payer: COMMERCIAL

## 2023-09-01 ENCOUNTER — TELEPHONE (OUTPATIENT)
Dept: HEMATOLOGY ONCOLOGY | Facility: CLINIC | Age: 58
End: 2023-09-01

## 2023-09-01 VITALS
BODY MASS INDEX: 35.78 KG/M2 | DIASTOLIC BLOOD PRESSURE: 88 MMHG | HEIGHT: 65 IN | RESPIRATION RATE: 16 BRPM | SYSTOLIC BLOOD PRESSURE: 146 MMHG | TEMPERATURE: 98.3 F | HEART RATE: 61 BPM | WEIGHT: 214.73 LBS

## 2023-09-01 DIAGNOSIS — Z90.722 HISTORY OF BILATERAL SALPINGO-OOPHORECTOMY (BSO): ICD-10-CM

## 2023-09-01 DIAGNOSIS — Z17.0 MALIGNANT NEOPLASM OF UPPER-INNER QUADRANT OF LEFT BREAST IN FEMALE, ESTROGEN RECEPTOR POSITIVE (HCC): ICD-10-CM

## 2023-09-01 DIAGNOSIS — Z90.79 HISTORY OF BILATERAL SALPINGO-OOPHORECTOMY (BSO): Primary | ICD-10-CM

## 2023-09-01 DIAGNOSIS — C79.51 MALIGNANT NEOPLASM METASTATIC TO BONE (HCC): ICD-10-CM

## 2023-09-01 DIAGNOSIS — Z90.79 HISTORY OF BILATERAL SALPINGO-OOPHORECTOMY (BSO): ICD-10-CM

## 2023-09-01 DIAGNOSIS — C50.212 MALIGNANT NEOPLASM OF UPPER-INNER QUADRANT OF LEFT BREAST IN FEMALE, ESTROGEN RECEPTOR POSITIVE (HCC): ICD-10-CM

## 2023-09-01 DIAGNOSIS — C78.7 MALIGNANT NEOPLASM METASTATIC TO LIVER (HCC): Primary | ICD-10-CM

## 2023-09-01 DIAGNOSIS — C78.7 MALIGNANT NEOPLASM METASTATIC TO LIVER (HCC): ICD-10-CM

## 2023-09-01 DIAGNOSIS — Z90.722 HISTORY OF BILATERAL SALPINGO-OOPHORECTOMY (BSO): Primary | ICD-10-CM

## 2023-09-01 PROCEDURE — 96413 CHEMO IV INFUSION 1 HR: CPT

## 2023-09-01 PROCEDURE — 96417 CHEMO IV INFUS EACH ADDL SEQ: CPT

## 2023-09-01 RX ORDER — SODIUM CHLORIDE 9 MG/ML
20 INJECTION, SOLUTION INTRAVENOUS ONCE
Status: CANCELLED | OUTPATIENT
Start: 2023-09-01

## 2023-09-01 RX ORDER — SODIUM CHLORIDE 9 MG/ML
20 INJECTION, SOLUTION INTRAVENOUS ONCE
Status: COMPLETED | OUTPATIENT
Start: 2023-09-01 | End: 2023-09-01

## 2023-09-01 RX ADMIN — PERTUZUMAB 420 MG: 30 INJECTION, SOLUTION, CONCENTRATE INTRAVENOUS at 15:28

## 2023-09-01 RX ADMIN — TRASTUZUMAB 600 MG: 150 INJECTION, POWDER, LYOPHILIZED, FOR SOLUTION INTRAVENOUS at 16:03

## 2023-09-01 RX ADMIN — SODIUM CHLORIDE 20 ML/HR: 0.9 INJECTION, SOLUTION INTRAVENOUS at 15:28

## 2023-09-01 NOTE — PLAN OF CARE
Problem: Potential for Falls  Goal: Patient will remain free of falls  Description: INTERVENTIONS:  - Educate patient/family on patient safety including physical limitations  - Instruct patient to call for assistance with activity   - Keep Call bell within reach    Outcome: Progressing

## 2023-09-01 NOTE — PROGRESS NOTES
Patient arrived at infusion center for treatment. Port accessed with positive blood return and tolerated infusions and port de accessed. Patient aware of next appt and declined AVS, left infusion center in baseline condition.

## 2023-09-01 NOTE — TELEPHONE ENCOUNTER
Patient Call    Who are you speaking with? self   If it is not the patient, are they listed on an active communication consent form? self   What is the reason for this call? Need to schedule treatment   Does this require a call back? yes   If a call back is required, please list best call back number [phone]   If a call back is required, advise that a message will be forwarded to their care team and someone will return their call as soon as possible. Did you relay this information to the patient?  yes

## 2023-09-14 ENCOUNTER — HOSPITAL ENCOUNTER (OUTPATIENT)
Dept: CT IMAGING | Facility: HOSPITAL | Age: 58
Discharge: HOME | End: 2023-09-14
Attending: INTERNAL MEDICINE
Payer: COMMERCIAL

## 2023-09-14 DIAGNOSIS — Z15.01 BRCA1 GENE MUTATION POSITIVE IN FEMALE: ICD-10-CM

## 2023-09-14 DIAGNOSIS — Z17.0 MALIGNANT NEOPLASM OF UPPER-INNER QUADRANT OF LEFT BREAST IN FEMALE, ESTROGEN RECEPTOR POSITIVE: ICD-10-CM

## 2023-09-14 DIAGNOSIS — Z14.8 CARRIER OF GENE MUTATION FOR HIGH RISK OF CANCER: ICD-10-CM

## 2023-09-14 DIAGNOSIS — Z15.09 BRCA1 GENE MUTATION POSITIVE IN FEMALE: ICD-10-CM

## 2023-09-14 DIAGNOSIS — C78.7 METASTASIS TO LIVER (HCC): ICD-10-CM

## 2023-09-14 DIAGNOSIS — Z15.02 BRCA1 GENE MUTATION POSITIVE IN FEMALE: ICD-10-CM

## 2023-09-14 DIAGNOSIS — C50.212 MALIGNANT NEOPLASM OF UPPER-INNER QUADRANT OF LEFT BREAST IN FEMALE, ESTROGEN RECEPTOR POSITIVE: ICD-10-CM

## 2023-09-14 PROCEDURE — 74160 CT ABDOMEN W/CONTRAST: CPT

## 2023-09-14 PROCEDURE — G1004 CDSM NDSC: HCPCS

## 2023-09-14 RX ADMIN — IOHEXOL 100 ML: 350 INJECTION, SOLUTION INTRAVENOUS at 18:11

## 2023-09-16 RX ORDER — SODIUM CHLORIDE 9 MG/ML
20 INJECTION, SOLUTION INTRAVENOUS ONCE
Status: CANCELLED | OUTPATIENT
Start: 2023-09-22

## 2023-09-22 ENCOUNTER — HOSPITAL ENCOUNTER (OUTPATIENT)
Dept: INFUSION CENTER | Facility: CLINIC | Age: 58
End: 2023-09-22
Payer: COMMERCIAL

## 2023-09-22 VITALS
BODY MASS INDEX: 36.03 KG/M2 | DIASTOLIC BLOOD PRESSURE: 87 MMHG | HEART RATE: 69 BPM | WEIGHT: 216.27 LBS | TEMPERATURE: 97.5 F | HEIGHT: 65 IN | RESPIRATION RATE: 18 BRPM | SYSTOLIC BLOOD PRESSURE: 146 MMHG

## 2023-09-22 DIAGNOSIS — Z17.0 MALIGNANT NEOPLASM OF UPPER-INNER QUADRANT OF LEFT BREAST IN FEMALE, ESTROGEN RECEPTOR POSITIVE: ICD-10-CM

## 2023-09-22 DIAGNOSIS — C50.212 MALIGNANT NEOPLASM OF UPPER-INNER QUADRANT OF LEFT BREAST IN FEMALE, ESTROGEN RECEPTOR POSITIVE: ICD-10-CM

## 2023-09-22 DIAGNOSIS — C79.51 MALIGNANT NEOPLASM METASTATIC TO BONE (HCC): ICD-10-CM

## 2023-09-22 DIAGNOSIS — Z90.722 HISTORY OF BILATERAL SALPINGO-OOPHORECTOMY (BSO): ICD-10-CM

## 2023-09-22 DIAGNOSIS — C78.7 MALIGNANT NEOPLASM METASTATIC TO LIVER (HCC): Primary | ICD-10-CM

## 2023-09-22 DIAGNOSIS — Z90.79 HISTORY OF BILATERAL SALPINGO-OOPHORECTOMY (BSO): ICD-10-CM

## 2023-09-22 PROCEDURE — 96417 CHEMO IV INFUS EACH ADDL SEQ: CPT

## 2023-09-22 PROCEDURE — 96413 CHEMO IV INFUSION 1 HR: CPT

## 2023-09-22 RX ORDER — SODIUM CHLORIDE 9 MG/ML
20 INJECTION, SOLUTION INTRAVENOUS ONCE
Status: COMPLETED | OUTPATIENT
Start: 2023-09-22 | End: 2023-09-22

## 2023-09-22 RX ADMIN — SODIUM CHLORIDE 20 ML/HR: 0.9 INJECTION, SOLUTION INTRAVENOUS at 14:46

## 2023-09-22 RX ADMIN — TRASTUZUMAB 600 MG: 150 INJECTION, POWDER, LYOPHILIZED, FOR SOLUTION INTRAVENOUS at 15:18

## 2023-09-22 RX ADMIN — PERTUZUMAB 420 MG: 30 INJECTION, SOLUTION, CONCENTRATE INTRAVENOUS at 14:46

## 2023-10-02 DIAGNOSIS — Z15.02 BRCA1 GENE MUTATION POSITIVE IN FEMALE: ICD-10-CM

## 2023-10-02 DIAGNOSIS — Z15.09 BRCA1 GENE MUTATION POSITIVE IN FEMALE: ICD-10-CM

## 2023-10-02 DIAGNOSIS — C79.51 MALIGNANT NEOPLASM METASTATIC TO BONE (HCC): ICD-10-CM

## 2023-10-02 DIAGNOSIS — Z15.01 BRCA1 GENE MUTATION POSITIVE IN FEMALE: ICD-10-CM

## 2023-10-02 DIAGNOSIS — Z17.0 MALIGNANT NEOPLASM OF UPPER-INNER QUADRANT OF LEFT BREAST IN FEMALE, ESTROGEN RECEPTOR POSITIVE: Primary | ICD-10-CM

## 2023-10-02 DIAGNOSIS — C78.7 MALIGNANT NEOPLASM METASTATIC TO LIVER (HCC): ICD-10-CM

## 2023-10-02 DIAGNOSIS — Z14.8 CARRIER OF GENE MUTATION FOR HIGH RISK OF CANCER: ICD-10-CM

## 2023-10-02 DIAGNOSIS — C50.212 MALIGNANT NEOPLASM OF UPPER-INNER QUADRANT OF LEFT BREAST IN FEMALE, ESTROGEN RECEPTOR POSITIVE: Primary | ICD-10-CM

## 2023-10-02 NOTE — PROGRESS NOTES
I spoke with patient and ordered PET scan and liver directed therapy consultation to radiation. Patient was seen by radiation oncologist previously for liver directed therapy.

## 2023-10-06 DIAGNOSIS — Z15.09 BRCA1 GENE MUTATION POSITIVE IN FEMALE: ICD-10-CM

## 2023-10-06 DIAGNOSIS — Z15.01 BRCA1 GENE MUTATION POSITIVE IN FEMALE: ICD-10-CM

## 2023-10-06 DIAGNOSIS — C50.212 MALIGNANT NEOPLASM OF UPPER-INNER QUADRANT OF LEFT BREAST IN FEMALE, ESTROGEN RECEPTOR POSITIVE: Primary | ICD-10-CM

## 2023-10-06 DIAGNOSIS — C78.7 MALIGNANT NEOPLASM METASTATIC TO LIVER (HCC): ICD-10-CM

## 2023-10-06 DIAGNOSIS — Z15.02 BRCA1 GENE MUTATION POSITIVE IN FEMALE: ICD-10-CM

## 2023-10-06 DIAGNOSIS — C79.51 MALIGNANT NEOPLASM METASTATIC TO BONE (HCC): ICD-10-CM

## 2023-10-06 DIAGNOSIS — Z17.0 MALIGNANT NEOPLASM OF UPPER-INNER QUADRANT OF LEFT BREAST IN FEMALE, ESTROGEN RECEPTOR POSITIVE: Primary | ICD-10-CM

## 2023-10-06 RX ORDER — SODIUM CHLORIDE 9 MG/ML
20 INJECTION, SOLUTION INTRAVENOUS ONCE
OUTPATIENT
Start: 2023-10-13

## 2023-10-09 ENCOUNTER — TELEPHONE (OUTPATIENT)
Dept: RADIATION ONCOLOGY | Facility: HOSPITAL | Age: 58
End: 2023-10-09

## 2023-10-13 ENCOUNTER — HOSPITAL ENCOUNTER (OUTPATIENT)
Dept: INFUSION CENTER | Facility: CLINIC | Age: 58
End: 2023-10-13
Payer: COMMERCIAL

## 2023-10-13 VITALS
BODY MASS INDEX: 36.07 KG/M2 | HEIGHT: 65 IN | TEMPERATURE: 97.4 F | RESPIRATION RATE: 18 BRPM | DIASTOLIC BLOOD PRESSURE: 87 MMHG | SYSTOLIC BLOOD PRESSURE: 134 MMHG | HEART RATE: 78 BPM | WEIGHT: 216.49 LBS

## 2023-10-13 DIAGNOSIS — C78.7 MALIGNANT NEOPLASM METASTATIC TO LIVER (HCC): ICD-10-CM

## 2023-10-13 DIAGNOSIS — Z90.722 HISTORY OF BILATERAL SALPINGO-OOPHORECTOMY (BSO): ICD-10-CM

## 2023-10-13 DIAGNOSIS — C79.51 MALIGNANT NEOPLASM METASTATIC TO BONE (HCC): ICD-10-CM

## 2023-10-13 DIAGNOSIS — C50.212 MALIGNANT NEOPLASM OF UPPER-INNER QUADRANT OF LEFT BREAST IN FEMALE, ESTROGEN RECEPTOR POSITIVE: Primary | ICD-10-CM

## 2023-10-13 DIAGNOSIS — Z90.79 HISTORY OF BILATERAL SALPINGO-OOPHORECTOMY (BSO): ICD-10-CM

## 2023-10-13 DIAGNOSIS — Z17.0 MALIGNANT NEOPLASM OF UPPER-INNER QUADRANT OF LEFT BREAST IN FEMALE, ESTROGEN RECEPTOR POSITIVE: Primary | ICD-10-CM

## 2023-10-13 LAB
ALBUMIN SERPL BCP-MCNC: 3.7 G/DL (ref 3.5–5)
ALP SERPL-CCNC: 74 U/L (ref 34–104)
ALT SERPL W P-5'-P-CCNC: 29 U/L (ref 7–52)
ANION GAP SERPL CALCULATED.3IONS-SCNC: 4 MMOL/L
AST SERPL W P-5'-P-CCNC: 21 U/L (ref 13–39)
BASOPHILS # BLD AUTO: 0.03 THOUSANDS/ÂΜL (ref 0–0.1)
BASOPHILS NFR BLD AUTO: 0 % (ref 0–1)
BILIRUB SERPL-MCNC: 0.33 MG/DL (ref 0.2–1)
BUN SERPL-MCNC: 19 MG/DL (ref 5–25)
CALCIUM SERPL-MCNC: 8.7 MG/DL (ref 8.4–10.2)
CHLORIDE SERPL-SCNC: 107 MMOL/L (ref 96–108)
CO2 SERPL-SCNC: 27 MMOL/L (ref 21–32)
CREAT SERPL-MCNC: 0.78 MG/DL (ref 0.6–1.3)
EOSINOPHIL # BLD AUTO: 0.31 THOUSAND/ÂΜL (ref 0–0.61)
EOSINOPHIL NFR BLD AUTO: 4 % (ref 0–6)
ERYTHROCYTE [DISTWIDTH] IN BLOOD BY AUTOMATED COUNT: 14.5 % (ref 11.6–15.1)
GFR SERPL CREATININE-BSD FRML MDRD: 85 ML/MIN/1.73SQ M
GLUCOSE SERPL-MCNC: 140 MG/DL (ref 65–140)
HCT VFR BLD AUTO: 40.4 % (ref 34.8–46.1)
HGB BLD-MCNC: 12.2 G/DL (ref 11.5–15.4)
IMM GRANULOCYTES # BLD AUTO: 0.03 THOUSAND/UL (ref 0–0.2)
IMM GRANULOCYTES NFR BLD AUTO: 0 % (ref 0–2)
LYMPHOCYTES # BLD AUTO: 2.12 THOUSANDS/ÂΜL (ref 0.6–4.47)
LYMPHOCYTES NFR BLD AUTO: 28 % (ref 14–44)
MCH RBC QN AUTO: 26.4 PG (ref 26.8–34.3)
MCHC RBC AUTO-ENTMCNC: 30.2 G/DL (ref 31.4–37.4)
MCV RBC AUTO: 87 FL (ref 82–98)
MONOCYTES # BLD AUTO: 0.49 THOUSAND/ÂΜL (ref 0.17–1.22)
MONOCYTES NFR BLD AUTO: 7 % (ref 4–12)
NEUTROPHILS # BLD AUTO: 4.54 THOUSANDS/ÂΜL (ref 1.85–7.62)
NEUTS SEG NFR BLD AUTO: 61 % (ref 43–75)
NRBC BLD AUTO-RTO: 0 /100 WBCS
PLATELET # BLD AUTO: 326 THOUSANDS/UL (ref 149–390)
PMV BLD AUTO: 10.9 FL (ref 8.9–12.7)
POTASSIUM SERPL-SCNC: 3.7 MMOL/L (ref 3.5–5.3)
PROT SERPL-MCNC: 6.7 G/DL (ref 6.4–8.4)
RBC # BLD AUTO: 4.62 MILLION/UL (ref 3.81–5.12)
SODIUM SERPL-SCNC: 138 MMOL/L (ref 135–147)
WBC # BLD AUTO: 7.52 THOUSAND/UL (ref 4.31–10.16)

## 2023-10-13 PROCEDURE — 85025 COMPLETE CBC W/AUTO DIFF WBC: CPT

## 2023-10-13 PROCEDURE — 80053 COMPREHEN METABOLIC PANEL: CPT

## 2023-10-13 RX ORDER — SODIUM CHLORIDE 9 MG/ML
20 INJECTION, SOLUTION INTRAVENOUS ONCE
Status: COMPLETED | OUTPATIENT
Start: 2023-10-13 | End: 2023-10-13

## 2023-10-13 RX ADMIN — SODIUM CHLORIDE 20 ML/HR: 0.9 INJECTION, SOLUTION INTRAVENOUS at 14:20

## 2023-10-13 RX ADMIN — PERTUZUMAB 420 MG: 30 INJECTION, SOLUTION, CONCENTRATE INTRAVENOUS at 14:30

## 2023-10-13 RX ADMIN — TRASTUZUMAB 600 MG: 150 INJECTION, POWDER, LYOPHILIZED, FOR SOLUTION INTRAVENOUS at 15:03

## 2023-10-13 NOTE — PROGRESS NOTES
Pt tolerated treatment without incident. Labs drawn for port for f/u appt with Dr Vincent Garrison next week. Pt declined AVS, aware of future appts.

## 2023-10-18 ENCOUNTER — OFFICE VISIT (OUTPATIENT)
Dept: HEMATOLOGY ONCOLOGY | Facility: CLINIC | Age: 58
End: 2023-10-18

## 2023-10-18 ENCOUNTER — HOSPITAL ENCOUNTER (OUTPATIENT)
Dept: NON INVASIVE DIAGNOSTICS | Facility: HOSPITAL | Age: 58
Discharge: HOME | End: 2023-10-18
Attending: INTERNAL MEDICINE
Payer: COMMERCIAL

## 2023-10-18 ENCOUNTER — TELEPHONE (OUTPATIENT)
Dept: HEMATOLOGY ONCOLOGY | Facility: CLINIC | Age: 58
End: 2023-10-18

## 2023-10-18 VITALS
SYSTOLIC BLOOD PRESSURE: 137 MMHG | BODY MASS INDEX: 35.99 KG/M2 | HEART RATE: 72 BPM | WEIGHT: 216 LBS | DIASTOLIC BLOOD PRESSURE: 69 MMHG | HEIGHT: 65 IN

## 2023-10-18 VITALS
HEIGHT: 65 IN | OXYGEN SATURATION: 97 % | BODY MASS INDEX: 35.9 KG/M2 | HEART RATE: 73 BPM | DIASTOLIC BLOOD PRESSURE: 80 MMHG | WEIGHT: 215.5 LBS | TEMPERATURE: 96.9 F | RESPIRATION RATE: 18 BRPM | SYSTOLIC BLOOD PRESSURE: 134 MMHG

## 2023-10-18 DIAGNOSIS — C79.51 MALIGNANT NEOPLASM METASTATIC TO BONE (HCC): ICD-10-CM

## 2023-10-18 DIAGNOSIS — T45.1X5A CARDIOMYOPATHY DUE TO CHEMOTHERAPY: ICD-10-CM

## 2023-10-18 DIAGNOSIS — Z95.828 PORT-A-CATH IN PLACE: ICD-10-CM

## 2023-10-18 DIAGNOSIS — Z14.8 CARRIER OF GENE MUTATION FOR HIGH RISK OF CANCER: ICD-10-CM

## 2023-10-18 DIAGNOSIS — Z17.0 MALIGNANT NEOPLASM OF UPPER-INNER QUADRANT OF LEFT BREAST IN FEMALE, ESTROGEN RECEPTOR POSITIVE: Primary | ICD-10-CM

## 2023-10-18 DIAGNOSIS — Z90.722 S/P BSO (BILATERAL SALPINGO-OOPHORECTOMY): ICD-10-CM

## 2023-10-18 DIAGNOSIS — Z17.0 MALIGNANT NEOPLASM OF UPPER-INNER QUADRANT OF LEFT BREAST IN FEMALE, ESTROGEN RECEPTOR POSITIVE: ICD-10-CM

## 2023-10-18 DIAGNOSIS — Z79.811 AROMATASE INHIBITOR USE: ICD-10-CM

## 2023-10-18 DIAGNOSIS — Z15.01 BRCA1 GENE MUTATION POSITIVE IN FEMALE: ICD-10-CM

## 2023-10-18 DIAGNOSIS — C50.212 MALIGNANT NEOPLASM OF UPPER-INNER QUADRANT OF LEFT BREAST IN FEMALE, ESTROGEN RECEPTOR POSITIVE: ICD-10-CM

## 2023-10-18 DIAGNOSIS — I42.7 CARDIOMYOPATHY DUE TO CHEMOTHERAPY: ICD-10-CM

## 2023-10-18 DIAGNOSIS — C50.212 MALIGNANT NEOPLASM OF UPPER-INNER QUADRANT OF LEFT BREAST IN FEMALE, ESTROGEN RECEPTOR POSITIVE: Primary | ICD-10-CM

## 2023-10-18 DIAGNOSIS — Z90.722 HISTORY OF BILATERAL SALPINGO-OOPHORECTOMY (BSO): ICD-10-CM

## 2023-10-18 DIAGNOSIS — C78.7 MALIGNANT NEOPLASM METASTATIC TO LIVER (HCC): ICD-10-CM

## 2023-10-18 DIAGNOSIS — Z90.79 HISTORY OF BILATERAL SALPINGO-OOPHORECTOMY (BSO): ICD-10-CM

## 2023-10-18 DIAGNOSIS — T38.6X5A OSTEOPOROSIS DUE TO AROMATASE INHIBITOR: ICD-10-CM

## 2023-10-18 DIAGNOSIS — Z15.02 BRCA1 GENE MUTATION POSITIVE IN FEMALE: ICD-10-CM

## 2023-10-18 DIAGNOSIS — M81.8 OSTEOPOROSIS DUE TO AROMATASE INHIBITOR: ICD-10-CM

## 2023-10-18 DIAGNOSIS — Z15.09 BRCA1 GENE MUTATION POSITIVE IN FEMALE: ICD-10-CM

## 2023-10-18 DIAGNOSIS — C78.7 MALIGNANT NEOPLASM METASTATIC TO LIVER (HCC): Primary | ICD-10-CM

## 2023-10-18 LAB
AORTIC ROOT: 3 CM
AORTIC VALVE MEAN VELOCITY: 10 M/S
APICAL FOUR CHAMBER EJECTION FRACTION: 60 %
AV AREA BY CONTINUOUS VTI: 2.7 CM2
AV AREA PEAK VELOCITY: 2.6 CM2
AV LVOT MEAN GRADIENT: 3 MMHG
AV LVOT PEAK GRADIENT: 6 MMHG
AV MEAN GRADIENT: 5 MMHG
AV PEAK GRADIENT: 9 MMHG
AV VALVE AREA: 2.67 CM2
AV VELOCITY RATIO: 0.83
DOP CALC AO PEAK VEL: 1.51 M/S
DOP CALC AO VTI: 32.14 CM
DOP CALC LVOT AREA: 3.14 CM2
DOP CALC LVOT CARDIAC INDEX: 2.86 L/MIN/M2
DOP CALC LVOT CARDIAC OUTPUT: 5.87 L/MIN
DOP CALC LVOT DIAMETER: 2 CM
DOP CALC LVOT PEAK VEL VTI: 27.32 CM
DOP CALC LVOT PEAK VEL: 1.25 M/S
DOP CALC LVOT STROKE INDEX: 42 ML/M2
DOP CALC LVOT STROKE VOLUME: 86
E WAVE DECELERATION TIME: 197 MS
E/A RATIO: 0.85
FRACTIONAL SHORTENING: 44 (ref 28–44)
GLOBAL LONGITUIDAL STRAIN: -17 %
INTERVENTRICULAR SEPTUM IN DIASTOLE (PARASTERNAL SHORT AXIS VIEW): 1.1 CM
INTERVENTRICULAR SEPTUM: 1.1 CM (ref 0.6–1.1)
LEFT ATRIUM SIZE: 3.6 CM
LEFT INTERNAL DIMENSION IN SYSTOLE: 2.3 CM (ref 2.1–4)
LEFT VENTRICULAR INTERNAL DIMENSION IN DIASTOLE: 4.1 CM (ref 3.5–6)
LEFT VENTRICULAR POSTERIOR WALL IN END DIASTOLE: 1.1 CM
LEFT VENTRICULAR STROKE VOLUME: 56 ML
LVSV (TEICH): 56 ML
MV E'TISSUE VEL-LAT: 12 CM/S
MV E'TISSUE VEL-SEP: 10 CM/S
MV PEAK A VEL: 0.79 M/S
MV PEAK E VEL: 67 CM/S
MV STENOSIS PRESSURE HALF TIME: 57 MS
MV VALVE AREA P 1/2 METHOD: 3.9
SL CV LV EF: 60
SL CV PED ECHO LEFT VENTRICLE DIASTOLIC VOLUME (MOD BIPLANE) 2D: 74 ML
SL CV PED ECHO LEFT VENTRICLE SYSTOLIC VOLUME (MOD BIPLANE) 2D: 18 ML
TRICUSPID ANNULAR PLANE SYSTOLIC EXCURSION: 1.8 CM

## 2023-10-18 PROCEDURE — 93321 DOPPLER ECHO F-UP/LMTD STD: CPT

## 2023-10-18 PROCEDURE — 93308 TTE F-UP OR LMTD: CPT

## 2023-10-18 PROCEDURE — 93325 DOPPLER ECHO COLOR FLOW MAPG: CPT

## 2023-10-18 NOTE — PROGRESS NOTES
HPI: Patient is being treated for relapsed stage IV breast cancer and presently she is on Herceptin plus Perjeta. Disease progression in the liver and plan is to evaluate for liver directed therapy and if not systemic therapy could be changed to Enhertu. She was referred to radiation department to work with IR for liver directed therapy. Patient states she received a call from radiation department but she was too busy to make an appointment but she said she will do that now. She has some tiredness, anxiety and insomnia and occasionally leg cramps at night. In 2016 she was diagnosed to have stage IV de brandon triple positive  left breast cancer  with metastatic disease to liver and bones that was diagnosed . Patient was started on THP,  Taxotere plus Herceptin and Perjeta and after 6 cycles Taxotere was discontinued and she was continued on Herceptin and Perjeta and to that tamoxifen was added. Patient  was premenopausal at that time. Later tamoxifen was changed to letrozole after she had  BSO. She had Xgeva. She  had radiation to left hip for palliation. She continued to respond and in September 2016 patient had lumpectomy of left breast followed by radiation therapy. At the time of lumpectomy there was a small residual disease, 1.4 cm. Lymph nodes were not sampled. Patient tested positive for BRCA 1. She already had BSO. She has been getting imaging studies for early detection of other cancers like peritoneal and pancreatic cancers. She has been encouraged to see a dermatologist but she does not want to. 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 Patient is still on Herceptin, Perjeta and letrozole. She has been taking vitamin-D and calcium and is staying active. She is not on Xgeva anymore that she had for 2 years. She has been tolerating treatments without much problem. She goes for blood tests and echocardiogram on regular basis.     She has done well all these years and  had very good response but then in 2022 there was a new lesion in the liver on CT scan and MRI scan and on biopsy that proved to be metastatic cancer from breast, strongly positive for ER, negative for DC and 2+ for HER 2 that was negative by FISH (low positive HER2). Patient saw radiation oncologist for local radiation to liver lesion and discussed risks and benefits and decided to be observed. Subsequent CT scan showed slight decrease in size of liver lesion from 2.7 to 2.4. She preferred to stay on letrozole, Herceptin and Perjeta. We did discuss ENHERTU. Most recent CT scan  showed slight further increase in the size of liver lesion, 2.7 x 2.1 cm that is why she was referred to back to radiation for liver directed therapy. She will be going for PET scan. No bone pains. Has good appetite. And she is maintaining her weight. .  Patient knows that she is at high risk for breast and other cancers like  ovarian cancer, pancreatic cancer, peritoneal cancer, melanoma and other cancers. Patient goes to breast surgeon for evaluation and imaging studies. She gets CT scan and that also checks the pancreas and peritoneum. She had BSO. She does not want to go to a dermatologist to check for melanoma or to ophthalmologist to check for melanoma in the eye. She goes to her optometrist.  Also she does not want to have colonoscopy and not even Cologuard or stool test for blood. 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 Patient has history of anxiety and insomnia  For leg cramps at night  she is taking one baby aspirin daily with food in the evening and also one magnesium tablet daily and that is helping. She continues to take  calcium and vitamin-D  .                            Current Outpatient Medications:     letrozole (FEMARA) 2.5 mg tablet, Take 1 tablet (2.5 mg total) by mouth daily, Disp: 90 tablet, Rfl: 2    Multiple Vitamins-Minerals (Theragran-M Premier 50 Plus) TABS, as directed, Disp: , Rfl:     multivitamin (THERAGRAN) TABS, Take 1 tablet by mouth 3 (three) times a week , Disp: , Rfl:     pertuzumab (PERJETA) 420 mg/14 mL SOLN, Infuse into a venous catheter every 21 days At infusion  center, Disp: , Rfl:     Trastuzumab (HERCEPTIN IV), Infuse into a venous catheter every 21 days At infusion center, Disp: , Rfl:     VITAMIN D, CHOLECALCIFEROL, PO, Take 1,000 Units by mouth 3 (three) times a week , Disp: , Rfl:     No Known Allergies    Oncology History   Malignant neoplasm of upper-inner quadrant of left female breast ([address])   12/2015 Initial Diagnosis    Malignant neoplasm of upper-outer quadrant of left female breast ([address])     2016 -  Hormone Therapy    Tamoxifen  Ended 8/2019. Dr Ulises Chicas  Letrozole 9/2019.     1/27/2016 Surgery    Port placement     2/19/2016 - 6/2/2018 Chemotherapy    Taxotere,  herceptin, perjeta, xgeva. After six cycles, taxotere was discontinued and tamoxifen added.    Continues with Perjeta and Herceptin every 3 weeks     - Dr Ulises Chicas       9/16/2016 Surgery    Left breast partial mastectomy     11/7/2016 - 12/23/2016 Radiation    Plan ID Energy Fractions Dose per Fraction (cGy) Total Dose Delivered (cGy) Elapsed Days   Lt Breast 6X 25 / 25 200 5,000 36   Lt Brst Boost 6X 8 / 8 200 1,600 9   Lt Femur 10X 10 / 10 300 3,000 11   Lt PAB 6X 25 / 25 60 1,500 36   Lt Sclav 6X 25 / 25 200 5,000 36                                   Total dose to left breast lumpectomy cavity: 6600 cGy                   Total dose to the supraclavicular and axillary regions: 5000 cGy       4/17/2019 -  Chemotherapy    pertuzumab (PERJETA) IVPB, 840 mg, Intravenous, Once, 77 of 81 cycles  Administration: 420 mg (5/17/2019), 420 mg (6/7/2019), 420 mg (6/28/2019), 420 mg (7/19/2019), 420 mg (8/30/2019), 420 mg (9/20/2019), 420 mg (8/9/2019), 420 mg (10/9/2019), 420 mg (11/1/2019), 420 mg (11/22/2019), 420 mg (12/13/2019), 420 mg (1/3/2020), 420 mg (1/24/2020), 420 mg (2/14/2020), 420 mg (3/6/2020), 420 mg (3/27/2020), 420 mg (4/17/2020), 420 mg (5/8/2020), 420 mg (5/29/2020), 420 mg (6/19/2020), 420 mg (7/10/2020), 420 mg (7/31/2020), 420 mg (8/21/2020), 420 mg (9/11/2020), 420 mg (10/2/2020), 420 mg (10/23/2020), 420 mg (11/13/2020), 420 mg (12/4/2020), 420 mg (12/24/2020), 420 mg (1/15/2021), 420 mg (2/5/2021), 420 mg (2/26/2021), 420 mg (3/19/2021), 420 mg (4/9/2021), 420 mg (4/30/2021), 420 mg (5/21/2021), 420 mg (6/11/2021), 420 mg (7/2/2021), 420 mg (7/23/2021), 420 mg (8/13/2021), 420 mg (9/3/2021), 420 mg (9/24/2021), 420 mg (10/15/2021), 420 mg (11/5/2021), 420 mg (11/26/2021), 420 mg (12/17/2021), 420 mg (1/7/2022), 420 mg (1/28/2022), 420 mg (2/18/2022), 420 mg (3/11/2022), 420 mg (4/1/2022), 420 mg (5/13/2022), 420 mg (6/3/2022), 420 mg (6/28/2022), 420 mg (7/22/2022), 420 mg (9/2/2022), 420 mg (9/23/2022), 420 mg (8/12/2022), 420 mg (10/14/2022), 420 mg (11/4/2022), 420 mg (11/25/2022), 420 mg (12/15/2022), 420 mg (1/6/2023), 420 mg (1/27/2023), 420 mg (2/17/2023), 420 mg (3/10/2023), 420 mg (3/31/2023), 420 mg (4/21/2023), 420 mg (5/19/2023), 420 mg (6/13/2023), 420 mg (6/30/2023), 420 mg (7/21/2023), 420 mg (8/11/2023), 420 mg (9/1/2023), 420 mg (9/22/2023), 420 mg (10/13/2023)  trastuzumab (HERCEPTIN) chemo infusion, 6 mg/kg = 589 mg (75 % of original dose 8 mg/kg), Intravenous, Once, 77 of 81 cycles  Dose modification: 6 mg/kg (original dose 8 mg/kg, Cycle 1, Reason: Other (Must fill in a comment))  Administration: 589 mg (5/17/2019), 589 mg (6/7/2019), 600 mg (6/28/2019), 600 mg (7/19/2019), 600 mg (8/30/2019), 600 mg (9/20/2019), 600 mg (8/9/2019), 600 mg (10/9/2019), 600 mg (11/1/2019), 600 mg (11/22/2019), 600 mg (12/13/2019), 600 mg (1/3/2020), 600 mg (1/24/2020), 600 mg (2/14/2020), 600 mg (3/6/2020), 600 mg (3/27/2020), 600 mg (4/17/2020), 600 mg (5/8/2020), 600 mg (5/29/2020), 600 mg (6/19/2020), 600 mg (7/10/2020), 600 mg (7/31/2020), 600 mg (8/21/2020), 600 mg (9/11/2020), 600 mg (10/2/2020), 600 mg (10/23/2020), 600 mg (11/13/2020), 600 mg (12/4/2020), 600 mg (12/24/2020), 600 mg (1/15/2021), 600 mg (2/5/2021), 600 mg (2/26/2021), 600 mg (3/19/2021), 600 mg (4/9/2021), 600 mg (4/30/2021), 600 mg (5/21/2021), 600 mg (6/11/2021), 600 mg (7/2/2021), 600 mg (7/23/2021), 600 mg (8/13/2021), 600 mg (9/3/2021), 600 mg (9/24/2021), 600 mg (10/15/2021), 600 mg (11/5/2021), 600 mg (11/26/2021), 600 mg (12/17/2021), 600 mg (1/7/2022), 600 mg (1/28/2022), 600 mg (2/18/2022), 600 mg (3/11/2022), 600 mg (4/1/2022), 600 mg (5/13/2022), 600 mg (6/3/2022), 600 mg (6/28/2022), 600 mg (7/22/2022), 600 mg (9/2/2022), 600 mg (9/23/2022), 600 mg (8/12/2022), 600 mg (10/14/2022), 600 mg (11/4/2022), 600 mg (11/25/2022), 600 mg (12/15/2022), 600 mg (1/6/2023), 600 mg (1/27/2023), 600 mg (2/17/2023), 600 mg (3/10/2023), 600 mg (3/31/2023), 600 mg (4/21/2023), 600 mg (5/19/2023), 600 mg (6/13/2023), 600 mg (6/30/2023), 600 mg (7/21/2023), 600 mg (8/11/2023), 600 mg (9/1/2023), 600 mg (9/22/2023), 600 mg (10/13/2023)     8/16/2019 Surgery    she underwent BSO.     7/5/2022 -  Cancer Staged    Staging form: Breast, AJCC 8th Edition  - Clinical: Stage IV (rcTX, cNX, pM1) - Signed by Joseline Figueroa MD on 7/5/2022  Stage prefix: Recurrence       Malignant neoplasm metastatic to liver ([address])   4/27/2018 Initial Diagnosis    Liver metastases ([address])     4/17/2019 -  Chemotherapy    pertuzumab (PERJETA) IVPB, 840 mg, Intravenous, Once, 77 of 81 cycles  Administration: 420 mg (5/17/2019), 420 mg (6/7/2019), 420 mg (6/28/2019), 420 mg (7/19/2019), 420 mg (8/30/2019), 420 mg (9/20/2019), 420 mg (8/9/2019), 420 mg (10/9/2019), 420 mg (11/1/2019), 420 mg (11/22/2019), 420 mg (12/13/2019), 420 mg (1/3/2020), 420 mg (1/24/2020), 420 mg (2/14/2020), 420 mg (3/6/2020), 420 mg (3/27/2020), 420 mg (4/17/2020), 420 mg (5/8/2020), 420 mg (5/29/2020), 420 mg (6/19/2020), 420 mg (7/10/2020), 420 mg (7/31/2020), 420 mg (8/21/2020), 420 mg (9/11/2020), 420 mg (10/2/2020), 420 mg (10/23/2020), 420 mg (11/13/2020), 420 mg (12/4/2020), 420 mg (12/24/2020), 420 mg (1/15/2021), 420 mg (2/5/2021), 420 mg (2/26/2021), 420 mg (3/19/2021), 420 mg (4/9/2021), 420 mg (4/30/2021), 420 mg (5/21/2021), 420 mg (6/11/2021), 420 mg (7/2/2021), 420 mg (7/23/2021), 420 mg (8/13/2021), 420 mg (9/3/2021), 420 mg (9/24/2021), 420 mg (10/15/2021), 420 mg (11/5/2021), 420 mg (11/26/2021), 420 mg (12/17/2021), 420 mg (1/7/2022), 420 mg (1/28/2022), 420 mg (2/18/2022), 420 mg (3/11/2022), 420 mg (4/1/2022), 420 mg (5/13/2022), 420 mg (6/3/2022), 420 mg (6/28/2022), 420 mg (7/22/2022), 420 mg (9/2/2022), 420 mg (9/23/2022), 420 mg (8/12/2022), 420 mg (10/14/2022), 420 mg (11/4/2022), 420 mg (11/25/2022), 420 mg (12/15/2022), 420 mg (1/6/2023), 420 mg (1/27/2023), 420 mg (2/17/2023), 420 mg (3/10/2023), 420 mg (3/31/2023), 420 mg (4/21/2023), 420 mg (5/19/2023), 420 mg (6/13/2023), 420 mg (6/30/2023), 420 mg (7/21/2023), 420 mg (8/11/2023), 420 mg (9/1/2023), 420 mg (9/22/2023), 420 mg (10/13/2023)  trastuzumab (HERCEPTIN) chemo infusion, 6 mg/kg = 589 mg (75 % of original dose 8 mg/kg), Intravenous, Once, 77 of 81 cycles  Dose modification: 6 mg/kg (original dose 8 mg/kg, Cycle 1, Reason: Other (Must fill in a comment))  Administration: 589 mg (5/17/2019), 589 mg (6/7/2019), 600 mg (6/28/2019), 600 mg (7/19/2019), 600 mg (8/30/2019), 600 mg (9/20/2019), 600 mg (8/9/2019), 600 mg (10/9/2019), 600 mg (11/1/2019), 600 mg (11/22/2019), 600 mg (12/13/2019), 600 mg (1/3/2020), 600 mg (1/24/2020), 600 mg (2/14/2020), 600 mg (3/6/2020), 600 mg (3/27/2020), 600 mg (4/17/2020), 600 mg (5/8/2020), 600 mg (5/29/2020), 600 mg (6/19/2020), 600 mg (7/10/2020), 600 mg (7/31/2020), 600 mg (8/21/2020), 600 mg (9/11/2020), 600 mg (10/2/2020), 600 mg (10/23/2020), 600 mg (11/13/2020), 600 mg (12/4/2020), 600 mg (12/24/2020), 600 mg (1/15/2021), 600 mg (2/5/2021), 600 mg (2/26/2021), 600 mg (3/19/2021), 600 mg (4/9/2021), 600 mg (4/30/2021), 600 mg (5/21/2021), 600 mg (6/11/2021), 600 mg (7/2/2021), 600 mg (7/23/2021), 600 mg (8/13/2021), 600 mg (9/3/2021), 600 mg (9/24/2021), 600 mg (10/15/2021), 600 mg (11/5/2021), 600 mg (11/26/2021), 600 mg (12/17/2021), 600 mg (1/7/2022), 600 mg (1/28/2022), 600 mg (2/18/2022), 600 mg (3/11/2022), 600 mg (4/1/2022), 600 mg (5/13/2022), 600 mg (6/3/2022), 600 mg (6/28/2022), 600 mg (7/22/2022), 600 mg (9/2/2022), 600 mg (9/23/2022), 600 mg (8/12/2022), 600 mg (10/14/2022), 600 mg (11/4/2022), 600 mg (11/25/2022), 600 mg (12/15/2022), 600 mg (1/6/2023), 600 mg (1/27/2023), 600 mg (2/17/2023), 600 mg (3/10/2023), 600 mg (3/31/2023), 600 mg (4/21/2023), 600 mg (5/19/2023), 600 mg (6/13/2023), 600 mg (6/30/2023), 600 mg (7/21/2023), 600 mg (8/11/2023), 600 mg (9/1/2023), 600 mg (9/22/2023), 600 mg (10/13/2023)     6/24/2022 Biopsy    Final Diagnosis   A. Liver, core biopsies:     - Metastatic carcinoma compatible with a breast primary.         Malignant neoplasm metastatic to bone ([address])   4/27/2018 Initial Diagnosis    Bone metastasis ([address])     4/17/2019 -  Chemotherapy    pertuzumab (PERJETA) IVPB, 840 mg, Intravenous, Once, 77 of 81 cycles  Administration: 420 mg (5/17/2019), 420 mg (6/7/2019), 420 mg (6/28/2019), 420 mg (7/19/2019), 420 mg (8/30/2019), 420 mg (9/20/2019), 420 mg (8/9/2019), 420 mg (10/9/2019), 420 mg (11/1/2019), 420 mg (11/22/2019), 420 mg (12/13/2019), 420 mg (1/3/2020), 420 mg (1/24/2020), 420 mg (2/14/2020), 420 mg (3/6/2020), 420 mg (3/27/2020), 420 mg (4/17/2020), 420 mg (5/8/2020), 420 mg (5/29/2020), 420 mg (6/19/2020), 420 mg (7/10/2020), 420 mg (7/31/2020), 420 mg (8/21/2020), 420 mg (9/11/2020), 420 mg (10/2/2020), 420 mg (10/23/2020), 420 mg (11/13/2020), 420 mg (12/4/2020), 420 mg (12/24/2020), 420 mg (1/15/2021), 420 mg (2/5/2021), 420 mg (2/26/2021), 420 mg (3/19/2021), 420 mg (4/9/2021), 420 mg (4/30/2021), 420 mg (5/21/2021), 420 mg (6/11/2021), 420 mg (7/2/2021), 420 mg (7/23/2021), 420 mg (8/13/2021), 420 mg (9/3/2021), 420 mg (9/24/2021), 420 mg (10/15/2021), 420 mg (11/5/2021), 420 mg (11/26/2021), 420 mg (12/17/2021), 420 mg (1/7/2022), 420 mg (1/28/2022), 420 mg (2/18/2022), 420 mg (3/11/2022), 420 mg (4/1/2022), 420 mg (5/13/2022), 420 mg (6/3/2022), 420 mg (6/28/2022), 420 mg (7/22/2022), 420 mg (9/2/2022), 420 mg (9/23/2022), 420 mg (8/12/2022), 420 mg (10/14/2022), 420 mg (11/4/2022), 420 mg (11/25/2022), 420 mg (12/15/2022), 420 mg (1/6/2023), 420 mg (1/27/2023), 420 mg (2/17/2023), 420 mg (3/10/2023), 420 mg (3/31/2023), 420 mg (4/21/2023), 420 mg (5/19/2023), 420 mg (6/13/2023), 420 mg (6/30/2023), 420 mg (7/21/2023), 420 mg (8/11/2023), 420 mg (9/1/2023), 420 mg (9/22/2023), 420 mg (10/13/2023)  trastuzumab (HERCEPTIN) chemo infusion, 6 mg/kg = 589 mg (75 % of original dose 8 mg/kg), Intravenous, Once, 77 of 81 cycles  Dose modification: 6 mg/kg (original dose 8 mg/kg, Cycle 1, Reason: Other (Must fill in a comment))  Administration: 589 mg (5/17/2019), 589 mg (6/7/2019), 600 mg (6/28/2019), 600 mg (7/19/2019), 600 mg (8/30/2019), 600 mg (9/20/2019), 600 mg (8/9/2019), 600 mg (10/9/2019), 600 mg (11/1/2019), 600 mg (11/22/2019), 600 mg (12/13/2019), 600 mg (1/3/2020), 600 mg (1/24/2020), 600 mg (2/14/2020), 600 mg (3/6/2020), 600 mg (3/27/2020), 600 mg (4/17/2020), 600 mg (5/8/2020), 600 mg (5/29/2020), 600 mg (6/19/2020), 600 mg (7/10/2020), 600 mg (7/31/2020), 600 mg (8/21/2020), 600 mg (9/11/2020), 600 mg (10/2/2020), 600 mg (10/23/2020), 600 mg (11/13/2020), 600 mg (12/4/2020), 600 mg (12/24/2020), 600 mg (1/15/2021), 600 mg (2/5/2021), 600 mg (2/26/2021), 600 mg (3/19/2021), 600 mg (4/9/2021), 600 mg (4/30/2021), 600 mg (5/21/2021), 600 mg (6/11/2021), 600 mg (7/2/2021), 600 mg (7/23/2021), 600 mg (8/13/2021), 600 mg (9/3/2021), 600 mg (9/24/2021), 600 mg (10/15/2021), 600 mg (11/5/2021), 600 mg (11/26/2021), 600 mg (12/17/2021), 600 mg (1/7/2022), 600 mg (1/28/2022), 600 mg (2/18/2022), 600 mg (3/11/2022), 600 mg (4/1/2022), 600 mg (5/13/2022), 600 mg (6/3/2022), 600 mg (6/28/2022), 600 mg (7/22/2022), 600 mg (9/2/2022), 600 mg (9/23/2022), 600 mg (8/12/2022), 600 mg (10/14/2022), 600 mg (11/4/2022), 600 mg (11/25/2022), 600 mg (12/15/2022), 600 mg (1/6/2023), 600 mg (1/27/2023), 600 mg (2/17/2023), 600 mg (3/10/2023), 600 mg (3/31/2023), 600 mg (4/21/2023), 600 mg (5/19/2023), 600 mg (6/13/2023), 600 mg (6/30/2023), 600 mg (7/21/2023), 600 mg (8/11/2023), 600 mg (9/1/2023), 600 mg (9/22/2023), 600 mg (10/13/2023)     6/24/2022 Biopsy    Final Diagnosis   A. Liver, core biopsies:     - Metastatic carcinoma compatible with a breast primary. ROS:  10/18/23 Reviewed 12 systems: .  See symptoms in HPI  Presently no other neurological, cardiac, pulmonary, GI and  symptoms other than mentioned   in HPI. .  Other symptoms are in HPI  No  fever, chills, bleeding, bone pains, skin rash, weight loss, weakness,  claudication and gait problem. No frequent infections. Not unusually sensitive to heat or cold. No swelling of the ankles. No swollen glands. Patient is anxious    /80 (BP Location: Left arm)   Pulse 73   Temp (!) 96.9 °F (36.1 °C) (Tympanic)   Resp 18   Ht 5' 5" (1.651 m)   Wt 97.8 kg (215 lb 8 oz)   SpO2 97%   BMI 35.86 kg/m²     Physical Exam:   Patient is alert and oriented. Patient is not in distress. Vital signs are above. There is no icterus. There is no oral thrush. There is no palpable neck mass. Lung fields are clear. Heart rate is regular. There is no palpable abdominal mass. Abdomen is soft and nontender. There is no ascites. There is no edema of the ankles. No focal neurological deficit. No skin rash. Lymph nodes are not palpably enlarged in the neck and axillary areas.   There is no clubbing, Patient is  anxious. Performance status 1. No lymphedema. .   IMAGING:  IMPRESSION: 02/24/2021     1. No scintigraphic evidence of osseous metastasis. Workstation performed: [id number]      Imaging     Ejection fraction 65% on 08/06/2022      Study Result    Narrative & Impression   CENTRAL  DXA SCAN on 03/08/2022     CLINICAL HISTORY:   49year old post-menopausal  female risk factors include osteoporosis screening. Additional medical history: Breast cancer with mets to Hip , right Hip scanned. TECHNIQUE: Bone densitometry was performed using a [employer] bone densitometer. Regions of interest appear properly placed. There are   other confounding variables which could limit the study. Her elevated  BMI may influence the T score result. Degenerative or osteoarthritic changes are noted on the spine image eliminating L4 from evaluation. COMPARISON:  Follow-up     RESULTS:   LUMBAR SPINE:  L1-L3:  BMD 1.021 gm/cm2  T-score 0.0 and unchanged from 2019. Z-score 1.0     RIGHT TOTAL HIP:  BMD 1.122 gm/cm2  T-score 1.5  Z-score 2.1     RIGHT FEMORAL NECK:  BMD 0.842 gm/cm2  T-score -0.1 and unchanged from 2019. Z-score 0.9           IMPRESSION:  1. Based on the [facility] classification, this study is normal and the patient is considered at low risk for fracture. IMPRESSION:     Since August 2, 2022:  1. Decreased size and conspicuity of the biopsy-proven hepatic metastasis. 2.  No new sites of metastatic disease in the abdomen. Workstation performed: [id number]          Imaging    CT abdomen w contrast (Order: [id number]) - 11/1/2022    IMPRESSION:     1. Enlargement of the known caudate intrahepatic mass with ill-defined margins estimated to measure 2.1 x 2.7 on today's examination. 2. No new intrahepatic mass is evident. Study marked significant in epic for notification purposes.      Workstation performed: [id number]        Imaging    CT abdomen w contrast (Order: [id number]) - 9/14/2023    LABS:      Ref Range & Units 10/13/23  3:36 PM 6/30/23  2:44 PM 3/10/23  2:39 PM 11/1/22  5:01 PM 6/24/22  8:51 AM 3/25/22  3:10 PM 12/17/21  2:41 PM    WBC 4.31 - 10.16 Thousand/uL 7.52 8.15 7.70 10.35 High  8.84 8.41 8.62   RBC 3.81 - 5.12 Million/uL 4.62 4.85 4.66 4.85 4.95 5.02 4.78   Hemoglobin 11.5 - 15.4 g/dL 12.2 12.7 12.3 13.0 13.1 13.3 13.0   Hematocrit 34.8 - 46.1 % 40.4 41.6 40.2 41.4 42.0 42.1 41.2   MCV 82 - 98 fL 87 86 86 85 85 84 86   MCH 26.8 - 34.3 pg 26.4 Low  26.2 Low  26.4 Low  26.8 26.5 Low  26.5 Low  27.2   MCHC 31.4 - 37.4 g/dL 30.2 Low  30.5 Low  30.6 Low  31.4 31.2 Low  31.6 31.6   RDW 11.6 - 15.1 % 14.5 14.2 13.9 14.3 14.4 14.6 14.6   MPV 8.9 - 12.7 fL 10.9 11.6 10.9 10.9 10.9 11.2 11.8   Platelets 969 - 698 Thousands/uL 326 310 330 320 309 326 299   nRBC /100 WBCs 0 0 0 0 0 0 0   Neutrophils Relative 43 - 75 % 61 57 55 67 63 58 65   Immat GRANS % 0 - 2 % 0 1 0 1 0 0 1   Lymphocytes Relative 14 - 44 % 28 31 34 22 26 30 22   Monocytes Relative 4 - 12 % 7 6 7 6 7 7 7   Eosinophils Relative 0 - 6 % 4 4 4 3 3 4 4   Basophils Relative 0 - 1 % 0 1 0 1 1 1 1   Neutrophils Absolute 1.85 - 7.62 Thousands/µL 4.54 4.74 4.16 7.10 5.60 4.84 5.65   Immature Grans Absolute 0.00 - 0.20 Thousand/uL 0.03 0.04 0.02 0.06 0.03 0.03 0.04   Lymphocytes Absolute 0.60 - 4.47 Thousands/µL 2.12 2.54 2.63 2.23 2.27 2.55 1.91   Monocytes Absolute 0.17 - 1.22 Thousand/µL 0.49 0.49 0.54 0.61 0.62 0.61 0.59   Eosinophils Absolute 0.00 - 0.61 Thousand/µL 0.31 0.30 0.32 0.29 0.28 0.34 0.38   Basophils Absolute 0.00 - 0.10 Thousands/µL 0.03 0.04 0.03 0.06 0.04 0.04 0.05              Specimen Collected: 10/13/23  3:36 PM Last Resulted: 10/13/23  8:03 PM         0 Result Notes             Component Ref Range & Units 10/13/23  3:36 PM 6/30/23  2:44 PM 3/10/23  2:39 PM 11/1/22  5:01 PM 6/24/22  8:51 AM 3/25/22  3:10 PM 12/17/21  2:41 PM   Sodium 135 - 147 mmol/L 138 139 140 140 139 R 140 R 141 R   Potassium 3.5 - 5.3 mmol/L 3.7 3.8 3.9 4.0 4.0 4.0 3.9   Chloride 96 - 108 mmol/L 107 106 106 105 108 R 104 R 105 R   CO2 21 - 32 mmol/L 27 29 29 28 29 30 28   ANION GAP mmol/L 4 4 5 R 7 R 2 Low  R 6 R 8 R   BUN 5 - 25 mg/dL 19 15 21 22 18 19 17   Creatinine 0.60 - 1.30 mg/dL 0.78 0.83 CM 0.85 CM 0.91 CM 0.94 CM 0.97 CM 0.90 CM   Comment: Standardized to IDMS reference method   Glucose 65 - 140 mg/dL 140 108   High   CM 89  High  CM   Comment: If the patient is fasting, the ADA then defines impaired fasting glucose as > 100 mg/dL and diabetes as > or equal to 123 mg/dL. Calcium 8.4 - 10.2 mg/dL 8.7 9.2 9.2 9.1 R 9.3 R 8.9 R 8.9 R   AST 13 - 39 U/L 21 19 19 CM 26 R, CM 20 R, CM 23 R, CM 26 R, CM   ALT 7 - 52 U/L 29 27 CM 27 CM 44 R, CM 48 R, CM 48 R, CM 51 R, CM   Comment: Specimen collection should occur prior to Sulfasalazine administration due to the potential for falsely depressed results. Alkaline Phosphatase 34 - 104 U/L 74 77 91 103 R 96 R 106 R 110 R   Total Protein 6.4 - 8.4 g/dL 6.7 6.8 6.9 7.8 7.7 R 7.6 R 7.3 R   Albumin 3.5 - 5.0 g/dL 3.7 3.8 3.8 3.2 Low  3.3 Low  3.4 Low  3.3 Low    Total Bilirubin 0.20 - 1.00 mg/dL 0.33 0.37 CM 0.26 0.28 CM 0.43 CM 0.30 CM 0.31 CM   Comment: Use of this assay is not recommended for patients undergoing treatment with eltrombopag due to the potential for falsely elevated results. N-acetyl-p-benzoquinone imine (metabolite of Acetaminophen) will generate erroneously low results in samples for patients that have taken an overdose of Acetaminophen.    eGFR ml/min/1.73sq m 85 79 77 71 68 66 72              Narrative            Results for orders placed or performed during the hospital encounter of 10/18/23   Echo follow up/limited w/ contrast if indicated   Result Value Ref Range    A4C EF 60 %    LVOT stroke volume 86.00     LVOT stroke volume index 42.00 ml/m2    LVOT Cardiac Index 2.86 l/min/m2    LVOT Cardiac Output 5.87 l/min LVIDd 4.10 cm    LVIDS 2.30 cm    IVSd 1.10 cm    LVPWd 1.10 cm    FS 44 28 - 44    MV E' Tissue Velocity Septal 10 cm/s    MV E' Tissue Velocity Lateral 12 cm/s    E/A ratio 0.85     E wave deceleration time 197 ms    MV Peak E David 67 cm/s    MV Peak A David 0.79 m/s    AV LVOT peak gradient 6 mmHg    LVOT peak VTI 27.32 cm    LVOT peak david 1.25 m/s    Tricuspid annular plane systolic excursion 4.49 cm    LA size 3.6 cm    LVOT diameter 2.0 cm    Aortic valve peak velocity 1.51 m/s    Ao VTI 32.14 cm    AV mean gradient 5 mmHg    LVOT mn grad 3.0 mmHg    AV peak gradient 9 mmHg    AV area by cont VTI 2.7 cm2    AV area peak david 2.6 cm2    MV stenosis pressure 1/2 time 57 ms    MV valve area p 1/2 method 3.90     Ao root 3.00 cm    Aortic valve mean velocity 10.00 m/s    Left ventricular stroke volume (2D) 56.00 mL    IVS 1.1 cm    LEFT VENTRICLE SYSTOLIC VOLUME (MOD BIPLANE) 2D 18 mL    LV DIASTOLIC VOLUME (MOD BIPLANE) 2D 74 mL    LVSV, 2D 56 mL    LVOT area 3.14 cm2    DVI 0.83     AV valve area 2.67 cm2    LV EF 60     GLS -17 %     Labs, Imaging, & Other studies:   All pertinent labs and imaging studies were personally reviewed        Reviewed  test results and discussed with patient and explained. Assessment and plan:      . Patient is being treated for relapsed stage IV breast cancer and presently she is on Herceptin plus Perjeta. Disease progression in the liver and plan is to evaluate for liver directed therapy and if not systemic therapy could be changed to Enhertu. She was referred to radiation department to work with IR for liver directed therapy. Patient states she received a call from radiation department but she was too busy to make an appointment but she said she will do that now. She has some tiredness, anxiety and insomnia and occasionally leg cramps at night.    In 2016 she was diagnosed to have stage IV de brandon triple positive  left breast cancer  with metastatic disease to liver and bones that was diagnosed . Patient was started on THP,  Taxotere plus Herceptin and Perjeta and after 6 cycles Taxotere was discontinued and she was continued on Herceptin and Perjeta and to that tamoxifen was added. Patient  was premenopausal at that time. Later tamoxifen was changed to letrozole after she had  BSO. She had Xgeva. She  had radiation to left hip for palliation. She continued to respond and in September 2016 patient had lumpectomy of left breast followed by radiation therapy. At the time of lumpectomy there was a small residual disease, 1.4 cm. Lymph nodes were not sampled. Patient tested positive for BRCA 1. She already had BSO. She has been getting imaging studies for early detection of other cancers like peritoneal and pancreatic cancers. She has been encouraged to see a dermatologist but she does not want to. 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 Patient is still on Herceptin, Perjeta and letrozole. She has been taking vitamin-D and calcium and is staying active. She is not on Xgeva anymore that she had for 2 years. She has been tolerating treatments without much problem. She goes for blood tests and echocardiogram on regular basis. She has done well all these years and  had very good response but then in 2022 there was a new lesion in the liver on CT scan and MRI scan and on biopsy that proved to be metastatic cancer from breast, strongly positive for ER, negative for OH and 2+ for HER 2 that was negative by FISH (low positive HER2). Patient saw radiation oncologist for local radiation to liver lesion and discussed risks and benefits and decided to be observed. Subsequent CT scan showed slight decrease in size of liver lesion from 2.7 to 2.4. She preferred to stay on letrozole, Herceptin and Perjeta. We did discuss ENHERTU.   Most recent CT scan  showed slight further increase in the size of liver lesion, 2.7 x 2.1 cm that is why she was referred to back to radiation for liver directed therapy. She will be going for PET scan. No bone pains. Has good appetite. And she is maintaining her weight. .  Patient knows that she is at high risk for breast and other cancers like  ovarian cancer, pancreatic cancer, peritoneal cancer, melanoma and other cancers. Patient goes to breast surgeon for evaluation and imaging studies. She gets CT scan and that also checks the pancreas and peritoneum. She had BSO. She does not want to go to a dermatologist to check for melanoma or to ophthalmologist to check for melanoma in the eye. She goes to her optometrist.  Also she does not want to have colonoscopy and not even Cologuard or stool test for blood. 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 Patient has history of anxiety and insomnia  For leg cramps at night  she is taking one baby aspirin daily with food in the evening and also one magnesium tablet daily and that is helping. She continues to take  calcium and vitamin-D     . Physical examination and test results are as recorded and discussed. Patient is being considered prior liver directed therapy 1 more time otherwise she could go on Enhertu. In the meantime she is being continued on Herceptin plus Perjeta and letrozole ,vitamin D and calcium. Even when when she will be switched to Enhertu she will be continued on letrozole. She gets  echocardiogram on regular basis. Condition discussed and explained. Questions answered. Also discussed the importance of self-breast examination, eating healthy foods, staying active and health screening tests. Patient goes to her breast surgeon for examination and  imaging studies. Patient is capable of self-care. Goal is prolonged survival from stage IV breast cancer. Patient has been aware of significance of positive BRCA1 mutation and cancer risks.   Patient will continue to follow with her primary physician and other consultants. Discussed precautions against coronavirus and flu and other infections. .  See diagnoses, orders and instructions below      1. Malignant neoplasm of upper-inner quadrant of left breast in female, estrogen receptor positive       2. Malignant neoplasm metastatic to liver ([address])      3. Malignant neoplasm metastatic to bone ([address])      4. BRCA1 gene mutation positive in female      11. 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      6. Cardiomyopathy due to chemotherapy       7. Osteoporosis due to aromatase inhibitor      8. S/P BSO (bilateral salpingo-oophorectomy)      9. Port-A-Cath in place      10. Aromatase inhibitor use    Please schedule PET scan for the patient within 1 or 2 weeks. This test had been already requested. She will call radiation oncology department herself to set up an appointment. No change in the therapy in the meantime. She will check out Enhertu on [employer]. Follow-up in 6 weeks. I used dictation device to dictated this note and there could be mistakes in my note and for that she may contact my office    Patient voiced understanding and agreed       Counseling / Coordination of Care   .   Provided counseling and support

## 2023-10-18 NOTE — PATIENT INSTRUCTIONS
Please schedule PET scan for the patient within 1 or 2 weeks. This test had been already requested. She will call radiation oncology department herself to set up an appointment. No change in the therapy in the meantime. She will check out Enhertu on [employer]. Follow-up in 6 weeks.

## 2023-10-29 RX ORDER — SODIUM CHLORIDE 9 MG/ML
20 INJECTION, SOLUTION INTRAVENOUS ONCE
Status: CANCELLED | OUTPATIENT
Start: 2023-11-03

## 2023-11-03 ENCOUNTER — HOSPITAL ENCOUNTER (OUTPATIENT)
Dept: INFUSION CENTER | Facility: CLINIC | Age: 58
End: 2023-11-03
Payer: COMMERCIAL

## 2023-11-03 VITALS
TEMPERATURE: 98 F | WEIGHT: 214.51 LBS | DIASTOLIC BLOOD PRESSURE: 84 MMHG | SYSTOLIC BLOOD PRESSURE: 166 MMHG | RESPIRATION RATE: 18 BRPM | HEART RATE: 74 BPM | HEIGHT: 65 IN | BODY MASS INDEX: 35.74 KG/M2

## 2023-11-03 DIAGNOSIS — C79.51 MALIGNANT NEOPLASM METASTATIC TO BONE (HCC): ICD-10-CM

## 2023-11-03 DIAGNOSIS — Z17.0 MALIGNANT NEOPLASM OF UPPER-INNER QUADRANT OF LEFT BREAST IN FEMALE, ESTROGEN RECEPTOR POSITIVE: Primary | ICD-10-CM

## 2023-11-03 DIAGNOSIS — C50.212 MALIGNANT NEOPLASM OF UPPER-INNER QUADRANT OF LEFT BREAST IN FEMALE, ESTROGEN RECEPTOR POSITIVE: Primary | ICD-10-CM

## 2023-11-03 DIAGNOSIS — Z90.722 HISTORY OF BILATERAL SALPINGO-OOPHORECTOMY (BSO): ICD-10-CM

## 2023-11-03 DIAGNOSIS — Z90.79 HISTORY OF BILATERAL SALPINGO-OOPHORECTOMY (BSO): ICD-10-CM

## 2023-11-03 DIAGNOSIS — C78.7 MALIGNANT NEOPLASM METASTATIC TO LIVER (HCC): ICD-10-CM

## 2023-11-03 PROCEDURE — 96413 CHEMO IV INFUSION 1 HR: CPT

## 2023-11-03 PROCEDURE — 96417 CHEMO IV INFUS EACH ADDL SEQ: CPT

## 2023-11-03 RX ORDER — SODIUM CHLORIDE 9 MG/ML
20 INJECTION, SOLUTION INTRAVENOUS ONCE
Status: COMPLETED | OUTPATIENT
Start: 2023-11-03 | End: 2023-11-03

## 2023-11-03 RX ADMIN — SODIUM CHLORIDE 600 MG: 0.9 INJECTION, SOLUTION INTRAVENOUS at 15:01

## 2023-11-03 RX ADMIN — PERTUZUMAB 420 MG: 30 INJECTION, SOLUTION, CONCENTRATE INTRAVENOUS at 14:28

## 2023-11-03 RX ADMIN — SODIUM CHLORIDE 20 ML/HR: 0.9 INJECTION, SOLUTION INTRAVENOUS at 14:21

## 2023-11-18 RX ORDER — SODIUM CHLORIDE 9 MG/ML
20 INJECTION, SOLUTION INTRAVENOUS ONCE
Status: CANCELLED | OUTPATIENT
Start: 2023-11-24

## 2023-11-20 ENCOUNTER — HOSPITAL ENCOUNTER (OUTPATIENT)
Dept: NUCLEAR MEDICINE | Facility: HOSPITAL | Age: 58
Discharge: HOME | End: 2023-11-20
Payer: COMMERCIAL

## 2023-11-20 DIAGNOSIS — Z14.8 CARRIER OF GENE MUTATION FOR HIGH RISK OF CANCER: ICD-10-CM

## 2023-11-20 DIAGNOSIS — Z15.01 BRCA1 GENE MUTATION POSITIVE IN FEMALE: ICD-10-CM

## 2023-11-20 DIAGNOSIS — C78.7 MALIGNANT NEOPLASM METASTATIC TO LIVER (HCC): ICD-10-CM

## 2023-11-20 DIAGNOSIS — Z17.0 MALIGNANT NEOPLASM OF UPPER-INNER QUADRANT OF LEFT BREAST IN FEMALE, ESTROGEN RECEPTOR POSITIVE: ICD-10-CM

## 2023-11-20 DIAGNOSIS — Z15.02 BRCA1 GENE MUTATION POSITIVE IN FEMALE: ICD-10-CM

## 2023-11-20 DIAGNOSIS — C50.212 MALIGNANT NEOPLASM OF UPPER-INNER QUADRANT OF LEFT BREAST IN FEMALE, ESTROGEN RECEPTOR POSITIVE: ICD-10-CM

## 2023-11-20 DIAGNOSIS — Z15.09 BRCA1 GENE MUTATION POSITIVE IN FEMALE: ICD-10-CM

## 2023-11-20 DIAGNOSIS — C79.51 MALIGNANT NEOPLASM METASTATIC TO BONE (HCC): ICD-10-CM

## 2023-11-20 LAB — GLUCOSE SERPL-MCNC: 117 MG/DL (ref 65–140)

## 2023-11-20 PROCEDURE — A9552 F18 FDG: HCPCS

## 2023-11-20 PROCEDURE — 82948 REAGENT STRIP/BLOOD GLUCOSE: CPT

## 2023-11-20 PROCEDURE — G1004 CDSM NDSC: HCPCS

## 2023-11-20 PROCEDURE — 78815 PET IMAGE W/CT SKULL-THIGH: CPT

## 2023-11-24 ENCOUNTER — HOSPITAL ENCOUNTER (OUTPATIENT)
Dept: INFUSION CENTER | Facility: CLINIC | Age: 58
End: 2023-11-24
Payer: COMMERCIAL

## 2023-11-24 VITALS
HEIGHT: 65 IN | SYSTOLIC BLOOD PRESSURE: 170 MMHG | HEART RATE: 67 BPM | WEIGHT: 212.74 LBS | BODY MASS INDEX: 35.45 KG/M2 | TEMPERATURE: 96.5 F | DIASTOLIC BLOOD PRESSURE: 73 MMHG | RESPIRATION RATE: 18 BRPM

## 2023-11-24 DIAGNOSIS — Z90.722 HISTORY OF BILATERAL SALPINGO-OOPHORECTOMY (BSO): ICD-10-CM

## 2023-11-24 DIAGNOSIS — C78.7 MALIGNANT NEOPLASM METASTATIC TO LIVER (HCC): ICD-10-CM

## 2023-11-24 DIAGNOSIS — C50.212 MALIGNANT NEOPLASM OF UPPER-INNER QUADRANT OF LEFT BREAST IN FEMALE, ESTROGEN RECEPTOR POSITIVE: Primary | ICD-10-CM

## 2023-11-24 DIAGNOSIS — C79.51 MALIGNANT NEOPLASM METASTATIC TO BONE (HCC): ICD-10-CM

## 2023-11-24 DIAGNOSIS — Z17.0 MALIGNANT NEOPLASM OF UPPER-INNER QUADRANT OF LEFT BREAST IN FEMALE, ESTROGEN RECEPTOR POSITIVE: Primary | ICD-10-CM

## 2023-11-24 DIAGNOSIS — Z90.79 HISTORY OF BILATERAL SALPINGO-OOPHORECTOMY (BSO): ICD-10-CM

## 2023-11-24 PROCEDURE — 96413 CHEMO IV INFUSION 1 HR: CPT

## 2023-11-24 RX ORDER — SODIUM CHLORIDE 9 MG/ML
20 INJECTION, SOLUTION INTRAVENOUS ONCE
Status: COMPLETED | OUTPATIENT
Start: 2023-11-24 | End: 2023-11-24

## 2023-11-24 RX ADMIN — TRASTUZUMAB 600 MG: 150 INJECTION, POWDER, LYOPHILIZED, FOR SOLUTION INTRAVENOUS at 15:23

## 2023-11-24 RX ADMIN — SODIUM CHLORIDE 20 ML/HR: 9 INJECTION, SOLUTION INTRAVENOUS at 14:30

## 2023-11-24 RX ADMIN — PERTUZUMAB 420 MG: 30 INJECTION, SOLUTION, CONCENTRATE INTRAVENOUS at 14:47

## 2023-11-24 NOTE — PROGRESS NOTES
Pt arrived for chemotherapy infusions. Port accessed with positive blood return. Patient tolerated infusion. Port flushed with positive blood return and de accessed. Patient declined AVS aware of next appt. Left infusion center in baseline condition.

## 2023-11-29 ENCOUNTER — TELEMEDICINE (OUTPATIENT)
Dept: INTERVENTIONAL RADIOLOGY/VASCULAR | Facility: CLINIC | Age: 58
End: 2023-11-29
Payer: COMMERCIAL

## 2023-11-29 DIAGNOSIS — C78.7 MALIGNANT NEOPLASM METASTATIC TO LIVER (HCC): ICD-10-CM

## 2023-11-29 PROCEDURE — 99214 OFFICE O/P EST MOD 30 MIN: CPT | Performed by: RADIOLOGY

## 2023-11-29 NOTE — LETTER
November 30, 2023     Cheo Gunn, [address] [address]    Patient: Nancy Angulo   YOB: 1965   Date of Visit: 11/29/2023       Dear Dr. Lakshmi Avila:    Thank you for referring Nancy Angulo to me for evaluation. Below are my notes for this consultation. If you have questions, please do not hesitate to call me. I look forward to following your patient along with you. Sincerely,        Mansi Kelly MD        CC: MD Mansi Cook MD  11/30/2023  3:32 PM  Sign when Signing Visit  Virtual Regular Visit    Verification of patient location:    Patient is located at Home in the following state in which I hold an active license [place]      Assessment/Plan:    Breast cancer since 2016, known metastatic disease    I met the patient in 2022 when a caudate lobe mass was discovered in the liver and we planned for biopsy and possible SBRT    At the time I performed biopsy with onsite pathology support  Plan was for fiducial marker placement if onsite pathology reported cancer cells  Onsite pathology did not confirm cancer cells so I did not place fiducial markers  However final pathology confirmed metastatic breast cancer in the liver    She was understanding this was a 'lesion' but not cancer. I reviewed today that this is confirmed cancer in her liver    Since then she has been doing very well with high quality of life    With regards to current medications - Enhertu  (conjugate antibody/drug)    The lesion has slightly enlarged, I personally reviewed imaging.   It takes up most of the caudate lobe at this time and is FDG avid    I discussed the location of her lesion and various treatment options  It is not a good location for percutaneous ablation  Although surgery may be an option and limited metastatic disease she does not want a big surgery  She feels that her quality of life is excellent at this time    Internal radiation such as Y90 is very difficult in the caudate where there are multiple feeding vessels    However external beam may be an option here and was previously discussed    She has previously seen Dr Esperanza Cotton - radiation oncology  She had breast radiation and is familiar with external beam radiation, It was well tolerated    I will reach out to Dr. Alannah Barger of radiation oncology  We will not plan for a catheter-based procedure at this time. If external beam radiation is not an option and we wish for more aggressive measures we could consider a mapping study to see if there is a single vessel supplying the caudate lobe    I discussed this with the patient  We await radiation oncology consultation      Problem List Items Addressed This Visit          Digestive    Malignant neoplasm metastatic to liver [facility])            Reason for visit is liver mass     Encounter provider Sarah Zelaya MD    Provider located at [address] [place] [id number] [phone]      Recent Visits  No visits were found meeting these conditions. Showing recent visits within past 7 days and meeting all other requirements  Future Appointments  No visits were found meeting these conditions. Showing future appointments within next 150 days and meeting all other requirements       The patient was identified by name and date of birth. Dylan Blake was informed that this is a telemedicine visit and that the visit is being conducted through  [employer] . Shai Larsen My office door was closed. No one else was in the room. She acknowledged consent and understanding of privacy and security of the video platform. The patient has agreed to participate and understands they can discontinue the visit at any time. Patient is aware this is a billable service. Subjective  Dylan Blake is a 66 y.o. female *** .       HPI     Past Medical History:   Diagnosis Date   • Breast cancer ([address])    • Hx of radiation therapy breast and left  hip   • Liver metastases    • Port-A-Cath in place     right chest   • Wears glasses        Past Surgical History:   Procedure Laterality Date   • BREAST BIOPSY Left 12/30/2015   • BREAST LUMPECTOMY Left 09/16/2016   • BREAST SURGERY Left     biopsy   • CHOLECYSTECTOMY     • CHOLECYSTECTOMY     • IR PLACEMENT FIDUCIAL MARKER  6/24/2022   • OOPHORECTOMY     • PORTACATH PLACEMENT Right    • ID LAPAROSCOPY W/RMVL ADNEXAL STRUCTURES Bilateral 08/16/2019    Procedure: LAPAROSCOPIC SALPINGO-OOPHORECTOMY;  Surgeon: Trina Blackmon MD;  [facility];  Service: Gynecology Oncology   • SIMPLE MASTECTOMY Left 09/16/2016    Procedure: BREAST PARTIAL MASTECTOMY ;  Surgeon: Priya Moore MD;  [facility];  Service:        Current Outpatient Medications   Medication Sig Dispense Refill   • letrozole (FEMARA) 2.5 mg tablet Take 1 tablet (2.5 mg total) by mouth daily 90 tablet 2   • Multiple Vitamins-Minerals (Theragran-M Premier 50 Plus) TABS as directed     • multivitamin (THERAGRAN) TABS Take 1 tablet by mouth 3 (three) times a week      • pertuzumab (PERJETA) 420 mg/14 mL SOLN Infuse into a venous catheter every 21 days At infusion  center     • Trastuzumab (HERCEPTIN IV) Infuse into a venous catheter every 21 days At infusion center     • VITAMIN D, CHOLECALCIFEROL, PO Take 1,000 Units by mouth 3 (three) times a week        No current facility-administered medications for this visit. No Known Allergies    Review of Systems    Video Exam    There were no vitals filed for this visit.     Physical Exam     Visit Time  Total Visit Duration: 21 min

## 2023-11-29 NOTE — LETTER
November 30, 2023     Sudeep Haider, [address] [facility]    Patient: Christina Candelario   YOB: 1965   Date of Visit: 11/29/2023       Dear Dr. Munroe Has:    Thank you for referring Christina Candelario to me for evaluation. Below are my notes for this consultation. If you have questions, please do not hesitate to call me. I look forward to following your patient along with you. Sincerely,        Selma Cole MD        CC: MD Selma Hearn MD  11/30/2023  3:33 PM  Sign when Signing Visit  Virtual Regular Visit    Verification of patient location:    Patient is located at Home in the following state in which I hold an active license [place]      Assessment/Plan:    Breast cancer since 2016, known metastatic disease    I met the patient in 2022 when a caudate lobe mass was discovered in the liver and we planned for biopsy and possible SBRT    At the time I performed biopsy with onsite pathology support  Plan was for fiducial marker placement if onsite pathology reported cancer cells  Onsite pathology did not confirm cancer cells so I did not place fiducial markers  However final pathology confirmed metastatic breast cancer in the liver    She was understanding this was a 'lesion' but not cancer. I reviewed today that this is confirmed cancer in her liver    Since then she has been doing very well with high quality of life    With regards to current medications - Enhertu  (conjugate antibody/drug)    The lesion has slightly enlarged, I personally reviewed imaging.   It takes up most of the caudate lobe at this time and is FDG avid    I discussed the location of her lesion and various treatment options  It is not a good location for percutaneous ablation  Although surgery may be an option and limited metastatic disease she does not want a big surgery  She feels that her quality of life is excellent at this time    Internal radiation such as Y90 is very difficult in the caudate where there are multiple feeding vessels    However external beam may be an option here and was previously discussed    She has previously seen Dr Esperanza Cotton - radiation oncology  She had breast radiation and is familiar with external beam radiation, It was well tolerated    I will reach out to Dr. Alannah Barger of radiation oncology  We will not plan for a catheter-based procedure at this time. If external beam radiation is not an option and we wish for more aggressive measures we could consider a mapping study to see if there is a single vessel supplying the caudate lobe    I discussed this with the patient  We await radiation oncology consultation      Problem List Items Addressed This Visit          Digestive    Malignant neoplasm metastatic to liver [facility])            Reason for visit is liver mass     Encounter provider Sarah Zelaya MD    Provider located at [address] [place] [id number] [phone]      Recent Visits  No visits were found meeting these conditions. Showing recent visits within past 7 days and meeting all other requirements  Future Appointments  No visits were found meeting these conditions. Showing future appointments within next 150 days and meeting all other requirements       The patient was identified by name and date of birth. Dylan Blake was informed that this is a telemedicine visit and that the visit is being conducted through  [employer] . Shai Larsen My office door was closed. No one else was in the room. She acknowledged consent and understanding of privacy and security of the video platform. The patient has agreed to participate and understands they can discontinue the visit at any time. Patient is aware this is a billable service. Subjective  Dylan Blake is a 66 y.o. female with breast cancer .       HPI     Past Medical History:   Diagnosis Date   • Breast cancer ([address])    • Hx of radiation therapy     breast and left  hip   • Liver metastases    • Port-A-Cath in place     right chest   • Wears glasses        Past Surgical History:   Procedure Laterality Date   • BREAST BIOPSY Left 12/30/2015   • BREAST LUMPECTOMY Left 09/16/2016   • BREAST SURGERY Left     biopsy   • CHOLECYSTECTOMY     • CHOLECYSTECTOMY     • IR PLACEMENT FIDUCIAL MARKER  6/24/2022   • OOPHORECTOMY     • PORTACATH PLACEMENT Right    • CO LAPAROSCOPY W/RMVL ADNEXAL STRUCTURES Bilateral 08/16/2019    Procedure: LAPAROSCOPIC SALPINGO-OOPHORECTOMY;  Surgeon: Norma Mccall MD;  [facility];  Service: Gynecology Oncology   • SIMPLE MASTECTOMY Left 09/16/2016    Procedure: BREAST PARTIAL MASTECTOMY ;  Surgeon: Evgeny Shi MD;  [facility];  Service:        Current Outpatient Medications   Medication Sig Dispense Refill   • letrozole (FEMARA) 2.5 mg tablet Take 1 tablet (2.5 mg total) by mouth daily 90 tablet 2   • Multiple Vitamins-Minerals (Theragran-M Premier 50 Plus) TABS as directed     • multivitamin (THERAGRAN) TABS Take 1 tablet by mouth 3 (three) times a week      • pertuzumab (PERJETA) 420 mg/14 mL SOLN Infuse into a venous catheter every 21 days At infusion  center     • Trastuzumab (HERCEPTIN IV) Infuse into a venous catheter every 21 days At infusion center     • VITAMIN D, CHOLECALCIFEROL, PO Take 1,000 Units by mouth 3 (three) times a week        No current facility-administered medications for this visit. No Known Allergies    Review of Systems    Video Exam    There were no vitals filed for this visit.     Physical Exam     Visit Time  Total Visit Duration: 21 min

## 2023-11-29 NOTE — PROGRESS NOTES
Virtual Regular Visit    Verification of patient location:    Patient is located at Home in the following state in which I hold an active license [place]      Assessment/Plan:    Breast cancer since 2016, known metastatic disease    I met the patient in 2022 when a caudate lobe mass was discovered in the liver and we planned for biopsy and possible SBRT    At the time I performed biopsy with onsite pathology support  Plan was for fiducial marker placement if onsite pathology reported cancer cells  Onsite pathology did not confirm cancer cells so I did not place fiducial markers  However final pathology confirmed metastatic breast cancer in the liver    She was understanding this was a 'lesion' but not cancer. I reviewed today that this is confirmed cancer in her liver    Since then she has been doing very well with high quality of life    With regards to current medications - Enhertu  (conjugate antibody/drug)    The lesion has slightly enlarged, I personally reviewed imaging. It takes up most of the caudate lobe at this time and is FDG avid    I discussed the location of her lesion and various treatment options  It is not a good location for percutaneous ablation  Although surgery may be an option and limited metastatic disease she does not want a big surgery  She feels that her quality of life is excellent at this time    Internal radiation such as Y90 is very difficult in the caudate where there are multiple feeding vessels    However external beam may be an option here and was previously discussed    She has previously seen Dr Jeffry Kasper - radiation oncology  She had breast radiation and is familiar with external beam radiation, It was well tolerated    I will reach out to Dr. Deann Krishnan of radiation oncology  We will not plan for a catheter-based procedure at this time.   If external beam radiation is not an option and we wish for more aggressive measures we could consider a mapping study to see if there is a single vessel supplying the caudate lobe    I discussed this with the patient  We await radiation oncology consultation      Problem List Items Addressed This Visit          Digestive    Malignant neoplasm metastatic to liver [facility])            Reason for visit is liver mass     Encounter provider Rebekah Yuan MD    Provider located at [address] [place] [id number]  [phone]      Recent Visits  No visits were found meeting these conditions. Showing recent visits within past 7 days and meeting all other requirements  Future Appointments  No visits were found meeting these conditions. Showing future appointments within next 150 days and meeting all other requirements       The patient was identified by name and date of birth. Antoine Singh was informed that this is a telemedicine visit and that the visit is being conducted through  [employer] . Baldwin Redhead My office door was closed. No one else was in the room. She acknowledged consent and understanding of privacy and security of the video platform. The patient has agreed to participate and understands they can discontinue the visit at any time. Patient is aware this is a billable service. Subjective  Antoine Singh is a 66 y.o. female with breast cancer .       HPI     Past Medical History:   Diagnosis Date    Breast cancer ([address])     Hx of radiation therapy     breast and left  hip    Liver metastases     Port-A-Cath in place     right chest    Wears glasses        Past Surgical History:   Procedure Laterality Date    BREAST BIOPSY Left 12/30/2015    BREAST LUMPECTOMY Left 09/16/2016    BREAST SURGERY Left     biopsy    CHOLECYSTECTOMY      CHOLECYSTECTOMY      IR PLACEMENT FIDUCIAL MARKER  6/24/2022    OOPHORECTOMY      PORTACATH PLACEMENT Right     NC LAPAROSCOPY W/RMVL ADNEXAL STRUCTURES Bilateral 08/16/2019    Procedure: LAPAROSCOPIC SALPINGO-OOPHORECTOMY;  Surgeon: Maggi Peters MD; [facility];  Service: Gynecology Oncology    SIMPLE MASTECTOMY Left 09/16/2016    Procedure: BREAST PARTIAL MASTECTOMY ;  Surgeon: Walker Duran MD;  [facility];  Service:        Current Outpatient Medications   Medication Sig Dispense Refill    letrozole (FEMARA) 2.5 mg tablet Take 1 tablet (2.5 mg total) by mouth daily 90 tablet 2    Multiple Vitamins-Minerals (Theragran-M Premier 50 Plus) TABS as directed      multivitamin (THERAGRAN) TABS Take 1 tablet by mouth 3 (three) times a week       pertuzumab (PERJETA) 420 mg/14 mL SOLN Infuse into a venous catheter every 21 days At infusion  center      Trastuzumab (HERCEPTIN IV) Infuse into a venous catheter every 21 days At infusion center      VITAMIN D, CHOLECALCIFEROL, PO Take 1,000 Units by mouth 3 (three) times a week        No current facility-administered medications for this visit. No Known Allergies    Review of Systems    Video Exam    There were no vitals filed for this visit.     Physical Exam     Visit Time  Total Visit Duration: 21 min

## 2023-12-12 ENCOUNTER — TELEPHONE (OUTPATIENT)
Dept: HEMATOLOGY ONCOLOGY | Facility: CLINIC | Age: 58
End: 2023-12-12

## 2023-12-12 DIAGNOSIS — C78.7 MALIGNANT NEOPLASM METASTATIC TO LIVER (HCC): ICD-10-CM

## 2023-12-12 DIAGNOSIS — C50.212 MALIGNANT NEOPLASM OF UPPER-INNER QUADRANT OF LEFT BREAST IN FEMALE, ESTROGEN RECEPTOR POSITIVE: Primary | ICD-10-CM

## 2023-12-12 DIAGNOSIS — Z90.79 HISTORY OF BILATERAL SALPINGO-OOPHORECTOMY (BSO): ICD-10-CM

## 2023-12-12 DIAGNOSIS — C79.51 MALIGNANT NEOPLASM METASTATIC TO BONE (HCC): ICD-10-CM

## 2023-12-12 DIAGNOSIS — Z17.0 MALIGNANT NEOPLASM OF UPPER-INNER QUADRANT OF LEFT BREAST IN FEMALE, ESTROGEN RECEPTOR POSITIVE: Primary | ICD-10-CM

## 2023-12-12 DIAGNOSIS — Z90.722 HISTORY OF BILATERAL SALPINGO-OOPHORECTOMY (BSO): ICD-10-CM

## 2023-12-12 NOTE — TELEPHONE ENCOUNTER
Spoke with patient to make her aware that her treatment can be deferred by 1 week or to continue in 3 weeks as scheduled. Patient would prefer to the 1 week deferment. Please adjust patient's schedule & let her know, thanks!

## 2023-12-12 NOTE — TELEPHONE ENCOUNTER
Patient Call    Who are you speaking with? Patient    If it is not the patient, are they listed on an active communication consent form? N/A   What is the reason for this call? Patient tested positive for COVID and she is wondering if she is able to go for her treatment on Friday 12/15/2023. Does this require a call back? Yes   If a call back is required, please list best call back number [phone]   If a call back is required, advise that a message will be forwarded to their care team and someone will return their call as soon as possible. Did you relay this information to the patient?  Yes

## 2023-12-13 RX ORDER — SODIUM CHLORIDE 9 MG/ML
20 INJECTION, SOLUTION INTRAVENOUS ONCE
Status: CANCELLED | OUTPATIENT
Start: 2023-12-22

## 2023-12-22 ENCOUNTER — HOSPITAL ENCOUNTER (OUTPATIENT)
Dept: INFUSION CENTER | Facility: CLINIC | Age: 58
End: 2023-12-22
Payer: COMMERCIAL

## 2023-12-22 VITALS
HEART RATE: 68 BPM | TEMPERATURE: 97 F | WEIGHT: 212.52 LBS | DIASTOLIC BLOOD PRESSURE: 86 MMHG | HEIGHT: 65 IN | RESPIRATION RATE: 18 BRPM | SYSTOLIC BLOOD PRESSURE: 154 MMHG | BODY MASS INDEX: 35.41 KG/M2

## 2023-12-22 DIAGNOSIS — Z90.722 HISTORY OF BILATERAL SALPINGO-OOPHORECTOMY (BSO): ICD-10-CM

## 2023-12-22 DIAGNOSIS — C78.7 MALIGNANT NEOPLASM METASTATIC TO LIVER (HCC): ICD-10-CM

## 2023-12-22 DIAGNOSIS — Z17.0 MALIGNANT NEOPLASM OF UPPER-INNER QUADRANT OF LEFT BREAST IN FEMALE, ESTROGEN RECEPTOR POSITIVE: Primary | ICD-10-CM

## 2023-12-22 DIAGNOSIS — C79.51 MALIGNANT NEOPLASM METASTATIC TO BONE (HCC): ICD-10-CM

## 2023-12-22 DIAGNOSIS — Z90.79 HISTORY OF BILATERAL SALPINGO-OOPHORECTOMY (BSO): ICD-10-CM

## 2023-12-22 DIAGNOSIS — C50.212 MALIGNANT NEOPLASM OF UPPER-INNER QUADRANT OF LEFT BREAST IN FEMALE, ESTROGEN RECEPTOR POSITIVE: Primary | ICD-10-CM

## 2023-12-22 PROCEDURE — 96417 CHEMO IV INFUS EACH ADDL SEQ: CPT

## 2023-12-22 PROCEDURE — 96413 CHEMO IV INFUSION 1 HR: CPT

## 2023-12-22 RX ORDER — SODIUM CHLORIDE 9 MG/ML
20 INJECTION, SOLUTION INTRAVENOUS ONCE
Status: COMPLETED | OUTPATIENT
Start: 2023-12-22 | End: 2023-12-22

## 2023-12-22 RX ADMIN — TRASTUZUMAB 600 MG: 150 INJECTION, POWDER, LYOPHILIZED, FOR SOLUTION INTRAVENOUS at 14:37

## 2023-12-22 RX ADMIN — PERTUZUMAB 420 MG: 30 INJECTION, SOLUTION, CONCENTRATE INTRAVENOUS at 14:02

## 2023-12-22 RX ADMIN — SODIUM CHLORIDE 20 ML/HR: 0.9 INJECTION, SOLUTION INTRAVENOUS at 13:47

## 2024-01-05 ENCOUNTER — RADIATION ONCOLOGY FOLLOW-UP (OUTPATIENT)
Dept: RADIATION ONCOLOGY | Facility: HOSPITAL | Age: 59
End: 2024-01-05
Attending: STUDENT IN AN ORGANIZED HEALTH CARE EDUCATION/TRAINING PROGRAM
Payer: COMMERCIAL

## 2024-01-05 VITALS
HEART RATE: 81 BPM | TEMPERATURE: 98.5 F | RESPIRATION RATE: 18 BRPM | DIASTOLIC BLOOD PRESSURE: 82 MMHG | HEIGHT: 64 IN | OXYGEN SATURATION: 98 % | SYSTOLIC BLOOD PRESSURE: 130 MMHG | BODY MASS INDEX: 36.48 KG/M2

## 2024-01-05 DIAGNOSIS — Z17.0 MALIGNANT NEOPLASM OF UPPER-INNER QUADRANT OF LEFT BREAST IN FEMALE, ESTROGEN RECEPTOR POSITIVE: ICD-10-CM

## 2024-01-05 DIAGNOSIS — C50.212 MALIGNANT NEOPLASM OF UPPER-INNER QUADRANT OF LEFT BREAST IN FEMALE, ESTROGEN RECEPTOR POSITIVE: ICD-10-CM

## 2024-01-05 DIAGNOSIS — C78.7 MALIGNANT NEOPLASM METASTATIC TO LIVER (HCC): Primary | ICD-10-CM

## 2024-01-05 DIAGNOSIS — C78.7 MALIGNANT NEOPLASM METASTATIC TO LIVER (HCC): ICD-10-CM

## 2024-01-05 DIAGNOSIS — C79.51 MALIGNANT NEOPLASM METASTATIC TO BONE (HCC): ICD-10-CM

## 2024-01-05 PROCEDURE — 99215 OFFICE O/P EST HI 40 MIN: CPT | Performed by: STUDENT IN AN ORGANIZED HEALTH CARE EDUCATION/TRAINING PROGRAM

## 2024-01-05 PROCEDURE — G0463 HOSPITAL OUTPT CLINIC VISIT: HCPCS | Performed by: STUDENT IN AN ORGANIZED HEALTH CARE EDUCATION/TRAINING PROGRAM

## 2024-01-05 PROCEDURE — 99211 OFF/OP EST MAY X REQ PHY/QHP: CPT | Performed by: STUDENT IN AN ORGANIZED HEALTH CARE EDUCATION/TRAINING PROGRAM

## 2024-01-05 NOTE — PROGRESS NOTES
Alejandrina Barba 1965 is a 58 y.o. female initially diagnosed with Stage IV inflammatory breast cancer in 2015 s/p chemotherapy with good response, on maintenance letrozole/herceptin/perjeta. She developed an enhancing lesion in the caudate lobe of the liver concerning for metastasis in 2022. She was last seen on 8/4/22 for consideration of SBRT.  At that time she preferred to defer any local therapy to the liver and continued systemic therapy. Plan to re-address liver directed therapy with SBRT should her metastasis grow or become more problematic. She returns today to re-discuss liver SBRT.       9/14/23 CT abdomen w contrast  1. Enlargement of the known caudate intrahepatic mass with ill-defined margins estimated to measure 2.1 x 2.7 on today's examination.  2. No new intrahepatic mass is evident.      10/18/23 Med Onc, Dr. Benavidez  Most recent CT scan showed slight further increase in the size of liver lesion, 2.7 x 2.1 cm, refer back to radiation for liver directed therapy.  She will be going for PET scan.     No change in the therapy at this time.  Discussed Enhertu and she will research this.  Follow-up in 6 weeks       11/20/23 PET/CT  1. Hypermetabolic caudate liver metastasis. No additional hypermetabolic lesions in the liver or upper abdomen.  2. No hypermetabolic metastases in the neck, chest, pelvis or osseous structures.      11/21/23 IR, Dr. Jeffers  Liver lesion has slightly enlarged, It takes up most of the caudate lobe at this time and is FDG avid   Discussed the location of her lesion and various treatment options  It is not a good location for percutaneous ablation  Although surgery may be an option and limited metastatic disease she does not want a big surgery  She feels that her quality of life is excellent at this time   Internal radiation such as Y90 is very difficult in the caudate where there are multiple feeding vessels   However external beam may be an option here and was previously  discussed   Will not plan for a catheter-based procedure at this time.  If external beam radiation is not an option and she wishes for more aggressive measures, could consider a mapping study to see if there is a single vessel supplying the caudate lobe  We await radiation oncology consultation      Upcoming appts:  1/12/24 Infusion   1/16/24 Dr. Benavidez, med onc  2/2/24 Infusion     Oncology History   Malignant neoplasm of upper-inner quadrant of left female breast (HCC)   12/2015 Initial Diagnosis    Malignant neoplasm of upper-outer quadrant of left female breast (HCC)     2016 -  Hormone Therapy    Tamoxifen  Ended 8/2019.    Dr Benavidez  Letrozole 9/2019.     1/27/2016 Surgery    Port placement     2/19/2016 - 6/2/2018 Chemotherapy    Taxotere,  herceptin, perjeta, xgeva.   After six cycles, taxotere was discontinued and tamoxifen added.   Continues with Perjeta and Herceptin every 3 weeks     - Dr Benavidez       9/16/2016 Surgery    Left breast partial mastectomy     11/7/2016 - 12/23/2016 Radiation    Plan ID Energy Fractions Dose per Fraction (cGy) Total Dose Delivered (cGy) Elapsed Days   Lt Breast 6X 25 / 25 200 5,000 36   Lt Brst Boost 6X 8 / 8 200 1,600 9   Lt Femur 10X 10 / 10 300 3,000 11   Lt PAB 6X 25 / 25 60 1,500 36   Lt Sclav 6X 25 / 25 200 5,000 36                                   Total dose to left breast lumpectomy cavity: 6600 cGy                   Total dose to the supraclavicular and axillary regions: 5000 cGy       4/17/2019 -  Chemotherapy    pertuzumab (PERJETA) IVPB, 840 mg, Intravenous, Once, 80 of 82 cycles  Administration: 420 mg (5/17/2019), 420 mg (6/7/2019), 420 mg (6/28/2019), 420 mg (7/19/2019), 420 mg (8/30/2019), 420 mg (9/20/2019), 420 mg (8/9/2019), 420 mg (10/9/2019), 420 mg (11/1/2019), 420 mg (11/22/2019), 420 mg (12/13/2019), 420 mg (1/3/2020), 420 mg (1/24/2020), 420 mg (2/14/2020), 420 mg (3/6/2020), 420 mg (3/27/2020), 420 mg (4/17/2020), 420 mg (5/8/2020), 420 mg  (5/29/2020), 420 mg (6/19/2020), 420 mg (7/10/2020), 420 mg (7/31/2020), 420 mg (8/21/2020), 420 mg (9/11/2020), 420 mg (10/2/2020), 420 mg (10/23/2020), 420 mg (11/13/2020), 420 mg (12/4/2020), 420 mg (12/24/2020), 420 mg (1/15/2021), 420 mg (2/5/2021), 420 mg (2/26/2021), 420 mg (3/19/2021), 420 mg (4/9/2021), 420 mg (4/30/2021), 420 mg (5/21/2021), 420 mg (6/11/2021), 420 mg (7/2/2021), 420 mg (7/23/2021), 420 mg (8/13/2021), 420 mg (9/3/2021), 420 mg (9/24/2021), 420 mg (10/15/2021), 420 mg (11/5/2021), 420 mg (11/26/2021), 420 mg (12/17/2021), 420 mg (1/7/2022), 420 mg (1/28/2022), 420 mg (2/18/2022), 420 mg (3/11/2022), 420 mg (4/1/2022), 420 mg (5/13/2022), 420 mg (6/3/2022), 420 mg (6/28/2022), 420 mg (7/22/2022), 420 mg (9/2/2022), 420 mg (9/23/2022), 420 mg (8/12/2022), 420 mg (10/14/2022), 420 mg (11/4/2022), 420 mg (11/25/2022), 420 mg (12/15/2022), 420 mg (1/6/2023), 420 mg (1/27/2023), 420 mg (2/17/2023), 420 mg (3/10/2023), 420 mg (3/31/2023), 420 mg (4/21/2023), 420 mg (5/19/2023), 420 mg (6/13/2023), 420 mg (6/30/2023), 420 mg (7/21/2023), 420 mg (8/11/2023), 420 mg (9/1/2023), 420 mg (9/22/2023), 420 mg (10/13/2023), 420 mg (11/3/2023), 420 mg (11/24/2023), 420 mg (12/22/2023)  trastuzumab (HERCEPTIN) chemo infusion, 6 mg/kg = 589 mg (75 % of original dose 8 mg/kg), Intravenous, Once, 80 of 82 cycles  Dose modification: 6 mg/kg (original dose 8 mg/kg, Cycle 1, Reason: Other (Must fill in a comment))  Administration: 589 mg (5/17/2019), 589 mg (6/7/2019), 600 mg (6/28/2019), 600 mg (7/19/2019), 600 mg (8/30/2019), 600 mg (9/20/2019), 600 mg (8/9/2019), 600 mg (10/9/2019), 600 mg (11/1/2019), 600 mg (11/22/2019), 600 mg (12/13/2019), 600 mg (1/3/2020), 600 mg (1/24/2020), 600 mg (2/14/2020), 600 mg (3/6/2020), 600 mg (3/27/2020), 600 mg (4/17/2020), 600 mg (5/8/2020), 600 mg (5/29/2020), 600 mg (6/19/2020), 600 mg (7/10/2020), 600 mg (7/31/2020), 600 mg (8/21/2020), 600 mg (9/11/2020), 600 mg  (10/2/2020), 600 mg (10/23/2020), 600 mg (11/13/2020), 600 mg (12/4/2020), 600 mg (12/24/2020), 600 mg (1/15/2021), 600 mg (2/5/2021), 600 mg (2/26/2021), 600 mg (3/19/2021), 600 mg (4/9/2021), 600 mg (4/30/2021), 600 mg (5/21/2021), 600 mg (6/11/2021), 600 mg (7/2/2021), 600 mg (7/23/2021), 600 mg (8/13/2021), 600 mg (9/3/2021), 600 mg (9/24/2021), 600 mg (10/15/2021), 600 mg (11/5/2021), 600 mg (11/26/2021), 600 mg (12/17/2021), 600 mg (1/7/2022), 600 mg (1/28/2022), 600 mg (2/18/2022), 600 mg (3/11/2022), 600 mg (4/1/2022), 600 mg (5/13/2022), 600 mg (6/3/2022), 600 mg (6/28/2022), 600 mg (7/22/2022), 600 mg (9/2/2022), 600 mg (9/23/2022), 600 mg (8/12/2022), 600 mg (10/14/2022), 600 mg (11/4/2022), 600 mg (11/25/2022), 600 mg (12/15/2022), 600 mg (1/6/2023), 600 mg (1/27/2023), 600 mg (2/17/2023), 600 mg (3/10/2023), 600 mg (3/31/2023), 600 mg (4/21/2023), 600 mg (5/19/2023), 600 mg (6/13/2023), 600 mg (6/30/2023), 600 mg (7/21/2023), 600 mg (8/11/2023), 600 mg (9/1/2023), 600 mg (9/22/2023), 600 mg (10/13/2023), 600 mg (11/3/2023), 600 mg (11/24/2023), 600 mg (12/22/2023)     8/16/2019 Surgery    she underwent BSO.     7/5/2022 -  Cancer Staged    Staging form: Breast, AJCC 8th Edition  - Clinical: Stage IV (rcTX, cNX, pM1) - Signed by Dejuan Hernandez MD on 7/5/2022  Stage prefix: Recurrence       Malignant neoplasm metastatic to liver (HCC)   4/27/2018 Initial Diagnosis    Liver metastases (HCC)     4/17/2019 -  Chemotherapy    pertuzumab (PERJETA) IVPB, 840 mg, Intravenous, Once, 80 of 82 cycles  Administration: 420 mg (5/17/2019), 420 mg (6/7/2019), 420 mg (6/28/2019), 420 mg (7/19/2019), 420 mg (8/30/2019), 420 mg (9/20/2019), 420 mg (8/9/2019), 420 mg (10/9/2019), 420 mg (11/1/2019), 420 mg (11/22/2019), 420 mg (12/13/2019), 420 mg (1/3/2020), 420 mg (1/24/2020), 420 mg (2/14/2020), 420 mg (3/6/2020), 420 mg (3/27/2020), 420 mg (4/17/2020), 420 mg (5/8/2020), 420 mg (5/29/2020), 420 mg (6/19/2020),  420 mg (7/10/2020), 420 mg (7/31/2020), 420 mg (8/21/2020), 420 mg (9/11/2020), 420 mg (10/2/2020), 420 mg (10/23/2020), 420 mg (11/13/2020), 420 mg (12/4/2020), 420 mg (12/24/2020), 420 mg (1/15/2021), 420 mg (2/5/2021), 420 mg (2/26/2021), 420 mg (3/19/2021), 420 mg (4/9/2021), 420 mg (4/30/2021), 420 mg (5/21/2021), 420 mg (6/11/2021), 420 mg (7/2/2021), 420 mg (7/23/2021), 420 mg (8/13/2021), 420 mg (9/3/2021), 420 mg (9/24/2021), 420 mg (10/15/2021), 420 mg (11/5/2021), 420 mg (11/26/2021), 420 mg (12/17/2021), 420 mg (1/7/2022), 420 mg (1/28/2022), 420 mg (2/18/2022), 420 mg (3/11/2022), 420 mg (4/1/2022), 420 mg (5/13/2022), 420 mg (6/3/2022), 420 mg (6/28/2022), 420 mg (7/22/2022), 420 mg (9/2/2022), 420 mg (9/23/2022), 420 mg (8/12/2022), 420 mg (10/14/2022), 420 mg (11/4/2022), 420 mg (11/25/2022), 420 mg (12/15/2022), 420 mg (1/6/2023), 420 mg (1/27/2023), 420 mg (2/17/2023), 420 mg (3/10/2023), 420 mg (3/31/2023), 420 mg (4/21/2023), 420 mg (5/19/2023), 420 mg (6/13/2023), 420 mg (6/30/2023), 420 mg (7/21/2023), 420 mg (8/11/2023), 420 mg (9/1/2023), 420 mg (9/22/2023), 420 mg (10/13/2023), 420 mg (11/3/2023), 420 mg (11/24/2023), 420 mg (12/22/2023)  trastuzumab (HERCEPTIN) chemo infusion, 6 mg/kg = 589 mg (75 % of original dose 8 mg/kg), Intravenous, Once, 80 of 82 cycles  Dose modification: 6 mg/kg (original dose 8 mg/kg, Cycle 1, Reason: Other (Must fill in a comment))  Administration: 589 mg (5/17/2019), 589 mg (6/7/2019), 600 mg (6/28/2019), 600 mg (7/19/2019), 600 mg (8/30/2019), 600 mg (9/20/2019), 600 mg (8/9/2019), 600 mg (10/9/2019), 600 mg (11/1/2019), 600 mg (11/22/2019), 600 mg (12/13/2019), 600 mg (1/3/2020), 600 mg (1/24/2020), 600 mg (2/14/2020), 600 mg (3/6/2020), 600 mg (3/27/2020), 600 mg (4/17/2020), 600 mg (5/8/2020), 600 mg (5/29/2020), 600 mg (6/19/2020), 600 mg (7/10/2020), 600 mg (7/31/2020), 600 mg (8/21/2020), 600 mg (9/11/2020), 600 mg (10/2/2020), 600 mg (10/23/2020), 600 mg  (11/13/2020), 600 mg (12/4/2020), 600 mg (12/24/2020), 600 mg (1/15/2021), 600 mg (2/5/2021), 600 mg (2/26/2021), 600 mg (3/19/2021), 600 mg (4/9/2021), 600 mg (4/30/2021), 600 mg (5/21/2021), 600 mg (6/11/2021), 600 mg (7/2/2021), 600 mg (7/23/2021), 600 mg (8/13/2021), 600 mg (9/3/2021), 600 mg (9/24/2021), 600 mg (10/15/2021), 600 mg (11/5/2021), 600 mg (11/26/2021), 600 mg (12/17/2021), 600 mg (1/7/2022), 600 mg (1/28/2022), 600 mg (2/18/2022), 600 mg (3/11/2022), 600 mg (4/1/2022), 600 mg (5/13/2022), 600 mg (6/3/2022), 600 mg (6/28/2022), 600 mg (7/22/2022), 600 mg (9/2/2022), 600 mg (9/23/2022), 600 mg (8/12/2022), 600 mg (10/14/2022), 600 mg (11/4/2022), 600 mg (11/25/2022), 600 mg (12/15/2022), 600 mg (1/6/2023), 600 mg (1/27/2023), 600 mg (2/17/2023), 600 mg (3/10/2023), 600 mg (3/31/2023), 600 mg (4/21/2023), 600 mg (5/19/2023), 600 mg (6/13/2023), 600 mg (6/30/2023), 600 mg (7/21/2023), 600 mg (8/11/2023), 600 mg (9/1/2023), 600 mg (9/22/2023), 600 mg (10/13/2023), 600 mg (11/3/2023), 600 mg (11/24/2023), 600 mg (12/22/2023)     6/24/2022 Biopsy    Final Diagnosis   A.  Liver, core biopsies:     - Metastatic carcinoma compatible with a breast primary.        Malignant neoplasm metastatic to bone (HCC)   4/27/2018 Initial Diagnosis    Bone metastasis (HCC)     4/17/2019 -  Chemotherapy    pertuzumab (PERJETA) IVPB, 840 mg, Intravenous, Once, 80 of 82 cycles  Administration: 420 mg (5/17/2019), 420 mg (6/7/2019), 420 mg (6/28/2019), 420 mg (7/19/2019), 420 mg (8/30/2019), 420 mg (9/20/2019), 420 mg (8/9/2019), 420 mg (10/9/2019), 420 mg (11/1/2019), 420 mg (11/22/2019), 420 mg (12/13/2019), 420 mg (1/3/2020), 420 mg (1/24/2020), 420 mg (2/14/2020), 420 mg (3/6/2020), 420 mg (3/27/2020), 420 mg (4/17/2020), 420 mg (5/8/2020), 420 mg (5/29/2020), 420 mg (6/19/2020), 420 mg (7/10/2020), 420 mg (7/31/2020), 420 mg (8/21/2020), 420 mg (9/11/2020), 420 mg (10/2/2020), 420 mg (10/23/2020), 420 mg (11/13/2020),  420 mg (12/4/2020), 420 mg (12/24/2020), 420 mg (1/15/2021), 420 mg (2/5/2021), 420 mg (2/26/2021), 420 mg (3/19/2021), 420 mg (4/9/2021), 420 mg (4/30/2021), 420 mg (5/21/2021), 420 mg (6/11/2021), 420 mg (7/2/2021), 420 mg (7/23/2021), 420 mg (8/13/2021), 420 mg (9/3/2021), 420 mg (9/24/2021), 420 mg (10/15/2021), 420 mg (11/5/2021), 420 mg (11/26/2021), 420 mg (12/17/2021), 420 mg (1/7/2022), 420 mg (1/28/2022), 420 mg (2/18/2022), 420 mg (3/11/2022), 420 mg (4/1/2022), 420 mg (5/13/2022), 420 mg (6/3/2022), 420 mg (6/28/2022), 420 mg (7/22/2022), 420 mg (9/2/2022), 420 mg (9/23/2022), 420 mg (8/12/2022), 420 mg (10/14/2022), 420 mg (11/4/2022), 420 mg (11/25/2022), 420 mg (12/15/2022), 420 mg (1/6/2023), 420 mg (1/27/2023), 420 mg (2/17/2023), 420 mg (3/10/2023), 420 mg (3/31/2023), 420 mg (4/21/2023), 420 mg (5/19/2023), 420 mg (6/13/2023), 420 mg (6/30/2023), 420 mg (7/21/2023), 420 mg (8/11/2023), 420 mg (9/1/2023), 420 mg (9/22/2023), 420 mg (10/13/2023), 420 mg (11/3/2023), 420 mg (11/24/2023), 420 mg (12/22/2023)  trastuzumab (HERCEPTIN) chemo infusion, 6 mg/kg = 589 mg (75 % of original dose 8 mg/kg), Intravenous, Once, 80 of 82 cycles  Dose modification: 6 mg/kg (original dose 8 mg/kg, Cycle 1, Reason: Other (Must fill in a comment))  Administration: 589 mg (5/17/2019), 589 mg (6/7/2019), 600 mg (6/28/2019), 600 mg (7/19/2019), 600 mg (8/30/2019), 600 mg (9/20/2019), 600 mg (8/9/2019), 600 mg (10/9/2019), 600 mg (11/1/2019), 600 mg (11/22/2019), 600 mg (12/13/2019), 600 mg (1/3/2020), 600 mg (1/24/2020), 600 mg (2/14/2020), 600 mg (3/6/2020), 600 mg (3/27/2020), 600 mg (4/17/2020), 600 mg (5/8/2020), 600 mg (5/29/2020), 600 mg (6/19/2020), 600 mg (7/10/2020), 600 mg (7/31/2020), 600 mg (8/21/2020), 600 mg (9/11/2020), 600 mg (10/2/2020), 600 mg (10/23/2020), 600 mg (11/13/2020), 600 mg (12/4/2020), 600 mg (12/24/2020), 600 mg (1/15/2021), 600 mg (2/5/2021), 600 mg (2/26/2021), 600 mg (3/19/2021), 600 mg  (4/9/2021), 600 mg (4/30/2021), 600 mg (5/21/2021), 600 mg (6/11/2021), 600 mg (7/2/2021), 600 mg (7/23/2021), 600 mg (8/13/2021), 600 mg (9/3/2021), 600 mg (9/24/2021), 600 mg (10/15/2021), 600 mg (11/5/2021), 600 mg (11/26/2021), 600 mg (12/17/2021), 600 mg (1/7/2022), 600 mg (1/28/2022), 600 mg (2/18/2022), 600 mg (3/11/2022), 600 mg (4/1/2022), 600 mg (5/13/2022), 600 mg (6/3/2022), 600 mg (6/28/2022), 600 mg (7/22/2022), 600 mg (9/2/2022), 600 mg (9/23/2022), 600 mg (8/12/2022), 600 mg (10/14/2022), 600 mg (11/4/2022), 600 mg (11/25/2022), 600 mg (12/15/2022), 600 mg (1/6/2023), 600 mg (1/27/2023), 600 mg (2/17/2023), 600 mg (3/10/2023), 600 mg (3/31/2023), 600 mg (4/21/2023), 600 mg (5/19/2023), 600 mg (6/13/2023), 600 mg (6/30/2023), 600 mg (7/21/2023), 600 mg (8/11/2023), 600 mg (9/1/2023), 600 mg (9/22/2023), 600 mg (10/13/2023), 600 mg (11/3/2023), 600 mg (11/24/2023), 600 mg (12/22/2023)     6/24/2022 Biopsy    Final Diagnosis   A.  Liver, core biopsies:     - Metastatic carcinoma compatible with a breast primary.            Review of Systems:  Review of Systems   Constitutional: Negative.    HENT: Negative.     Eyes: Negative.    Respiratory: Negative.     Cardiovascular: Negative.    Gastrointestinal: Negative.    Endocrine: Negative.    Genitourinary: Negative.    Musculoskeletal: Negative.    Skin: Negative.    Allergic/Immunologic: Negative.    Neurological: Negative.    Hematological: Negative.    Psychiatric/Behavioral: Negative.         Clinical Trial: no        Health Maintenance   Topic Date Due    Hepatitis C Screening  Never done    Pneumococcal Vaccine: Pediatrics (0 to 5 Years) and At-Risk Patients (6 to 64 Years) (1 - PCV) Never done    HIV Screening  Never done    BMI: Followup Plan  Never done    Annual Physical  Never done    DTaP,Tdap,and Td Vaccines (1 - Tdap) Never done    Cervical Cancer Screening  Never done    Colorectal Cancer Screening  Never done    Breast Cancer Screening:  Mammogram  06/12/2021    MAMMOGRAPHY BRCA POSITIVE  06/12/2021    COVID-19 Vaccine (3 - Pfizer risk series) 09/15/2021    BMI: Adult  12/22/2024    Depression Screening  01/05/2025    Osteoporosis Screening  Completed    Influenza Vaccine  Completed    HIB Vaccine  Aged Out    IPV Vaccine  Aged Out    Hepatitis A Vaccine  Aged Out    Meningococcal ACWY Vaccine  Aged Out    HPV Vaccine  Aged Out     Patient Active Problem List   Diagnosis    Malignant neoplasm of upper-inner quadrant of left female breast (HCC)    Malignant neoplasm metastatic to liver (HCC)    Malignant neoplasm metastatic to bone (HCC)    Port-A-Cath in place    Long term use of drug    BRCA1 gene mutation positive in female    History of bilateral salpingo-oophorectomy (BSO)    S/P BSO (bilateral salpingo-oophorectomy)    Aromatase inhibitor use    Dense breast tissue    Cardiomyopathy due to chemotherapy     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    Encounter for care related to vascular access port    Osteoporosis due to aromatase inhibitor     Past Medical History:   Diagnosis Date    Breast cancer (HCC)     Hx of radiation therapy     breast and left  hip    Liver metastases     Port-A-Cath in place     right chest    Wears glasses      Past Surgical History:   Procedure Laterality Date    BREAST BIOPSY Left 12/30/2015    BREAST LUMPECTOMY Left 09/16/2016    BREAST SURGERY Left     biopsy    CHOLECYSTECTOMY      CHOLECYSTECTOMY      IR PLACEMENT FIDUCIAL MARKER  6/24/2022    OOPHORECTOMY      PORTACATH PLACEMENT Right     ND LAPAROSCOPY W/RMVL ADNEXAL STRUCTURES Bilateral 08/16/2019    Procedure: LAPAROSCOPIC SALPINGO-OOPHORECTOMY;  Surgeon: Omar Rudolph MD;  Location: [facility];  Service: Gynecology Oncology    SIMPLE MASTECTOMY Left 09/16/2016    Procedure: BREAST PARTIAL MASTECTOMY ;  Surgeon: Abril Chandler MD;  Location: [facility];  Service:      Family History   Problem Relation Age of Onset    No Known Problems Mother      Heart attack Father     No Known Problems Daughter     No Known Problems Maternal Grandmother     No Known Problems Maternal Grandfather     No Known Problems Paternal Aunt     No Known Problems Paternal Aunt     Lung cancer Maternal Uncle 70     Social History     Socioeconomic History    Marital status: /Civil Union     Spouse name: Not on file    Number of children: Not on file    Years of education: Not on file    Highest education level: Not on file   Occupational History    Not on file   Tobacco Use    Smoking status: Former     Current packs/day: 0.00     Types: Cigarettes     Quit date: 2009     Years since quittin.4     Passive exposure: Past    Smokeless tobacco: Never   Vaping Use    Vaping status: Never Used   Substance and Sexual Activity    Alcohol use: No    Drug use: No    Sexual activity: Not on file   Other Topics Concern    Not on file   Social History Narrative    Not on file     Social Determinants of Health     Financial Resource Strain: Not on file   Food Insecurity: Not on file   Transportation Needs: Not on file   Physical Activity: Not on file   Stress: Not on file   Social Connections: Not on file   Intimate Partner Violence: Not on file   Housing Stability: Not on file       Current Outpatient Medications:     letrozole (FEMARA) 2.5 mg tablet, Take 1 tablet (2.5 mg total) by mouth daily, Disp: 90 tablet, Rfl: 2    Multiple Vitamins-Minerals (Theragran-M Premier 50 Plus) TABS, as directed, Disp: , Rfl:     multivitamin (THERAGRAN) TABS, Take 1 tablet by mouth 3 (three) times a week , Disp: , Rfl:     pertuzumab (PERJETA) 420 mg/14 mL SOLN, Infuse into a venous catheter every 21 days At infusion  center, Disp: , Rfl:     Trastuzumab (HERCEPTIN IV), Infuse into a venous catheter every 21 days At infusion center, Disp: , Rfl:     VITAMIN D, CHOLECALCIFEROL, PO, Take 1,000 Units by mouth 3 (three) times a week , Disp: , Rfl:   No Known Allergies  Vitals:    24 1448  "  BP: 130/82   BP Location: Left arm   Patient Position: Sitting   Cuff Size: Standard   Pulse: 81   Resp: 18   Temp: 98.5 °F (36.9 °C)   TempSrc: Temporal   SpO2: 98%   Height: 5' 4\" (1.626 m)      Pain Score: 0-No pain  "

## 2024-01-07 RX ORDER — SODIUM CHLORIDE 9 MG/ML
20 INJECTION, SOLUTION INTRAVENOUS ONCE
Status: CANCELLED | OUTPATIENT
Start: 2024-01-12

## 2024-01-08 NOTE — PROGRESS NOTES
Follow-up - Radiation Oncology   Alejandrina Barba 1965 58 y.o. female [id number]      History of Present Illness   Cancer Staging   Malignant neoplasm of upper-inner quadrant of left female breast (HCC)  Staging form: Breast, AJCC 8th Edition  - Clinical: Stage IV (rcTX, cNX, pM1) - Signed by Dejuan Hernandez MD on 7/5/2022  Stage prefix: Recurrence    Ms. Alejandrina Barba is a 58 year old woman with a history of Stage IV inflammatory breast cancer diagnosed in 2015 s/p chemotherapy with good response, now on maintenance letrozole/herceptin/perjeta. She was seen in initial consultation on 6/16/22 with recommendation for SBRT to her caudate liver lesion. Prior to proceeding with SBRT the patient opted to continue systemic therapy alone.     Interval History:  The patient was last seen in clinic on 8/4/22 via telemedicine. Since then she has continued on systemic therapy under the care of Dr. Benavidez on letrozole/herceptin/perjeta.     Repeat CT abdomen with contrast (9/14/23) and PET/CT (11/20/23) demonstrated enlargement of the caudate intrahepatic mass with ill defined margins measuring 2.7cm. No other evidence of disease present on imaging.     Currently the patient is doing well overall and remains asymptomatic of her metastasis. She has no abdominal pain, no nausea, no vomiting, and is tolerating her current systemic therapy well.     Historical Information   Oncology History   Malignant neoplasm of upper-inner quadrant of left female breast (HCC)   12/2015 Initial Diagnosis    Malignant neoplasm of upper-outer quadrant of left female breast (HCC)     2016 -  Hormone Therapy    Tamoxifen  Ended 8/2019.    Dr Benavidez  Letrozole 9/2019.     1/27/2016 Surgery    Port placement     2/19/2016 - 6/2/2018 Chemotherapy    Taxotere,  herceptin, perjeta, xgeva.   After six cycles, taxotere was discontinued and tamoxifen added.   Continues with Perjeta and Herceptin every 3 weeks     - Dr Benavidez       9/16/2016  Surgery    Left breast partial mastectomy     11/7/2016 - 12/23/2016 Radiation    Plan ID Energy Fractions Dose per Fraction (cGy) Total Dose Delivered (cGy) Elapsed Days   Lt Breast 6X 25 / 25 200 5,000 36   Lt Brst Boost 6X 8 / 8 200 1,600 9   Lt Femur 10X 10 / 10 300 3,000 11   Lt PAB 6X 25 / 25 60 1,500 36   Lt Sclav 6X 25 / 25 200 5,000 36                                   Total dose to left breast lumpectomy cavity: 6600 cGy                   Total dose to the supraclavicular and axillary regions: 5000 cGy       4/17/2019 -  Chemotherapy    pertuzumab (PERJETA) IVPB, 840 mg, Intravenous, Once, 80 of 82 cycles  Administration: 420 mg (5/17/2019), 420 mg (6/7/2019), 420 mg (6/28/2019), 420 mg (7/19/2019), 420 mg (8/30/2019), 420 mg (9/20/2019), 420 mg (8/9/2019), 420 mg (10/9/2019), 420 mg (11/1/2019), 420 mg (11/22/2019), 420 mg (12/13/2019), 420 mg (1/3/2020), 420 mg (1/24/2020), 420 mg (2/14/2020), 420 mg (3/6/2020), 420 mg (3/27/2020), 420 mg (4/17/2020), 420 mg (5/8/2020), 420 mg (5/29/2020), 420 mg (6/19/2020), 420 mg (7/10/2020), 420 mg (7/31/2020), 420 mg (8/21/2020), 420 mg (9/11/2020), 420 mg (10/2/2020), 420 mg (10/23/2020), 420 mg (11/13/2020), 420 mg (12/4/2020), 420 mg (12/24/2020), 420 mg (1/15/2021), 420 mg (2/5/2021), 420 mg (2/26/2021), 420 mg (3/19/2021), 420 mg (4/9/2021), 420 mg (4/30/2021), 420 mg (5/21/2021), 420 mg (6/11/2021), 420 mg (7/2/2021), 420 mg (7/23/2021), 420 mg (8/13/2021), 420 mg (9/3/2021), 420 mg (9/24/2021), 420 mg (10/15/2021), 420 mg (11/5/2021), 420 mg (11/26/2021), 420 mg (12/17/2021), 420 mg (1/7/2022), 420 mg (1/28/2022), 420 mg (2/18/2022), 420 mg (3/11/2022), 420 mg (4/1/2022), 420 mg (5/13/2022), 420 mg (6/3/2022), 420 mg (6/28/2022), 420 mg (7/22/2022), 420 mg (9/2/2022), 420 mg (9/23/2022), 420 mg (8/12/2022), 420 mg (10/14/2022), 420 mg (11/4/2022), 420 mg (11/25/2022), 420 mg (12/15/2022), 420 mg (1/6/2023), 420 mg (1/27/2023), 420 mg (2/17/2023), 420 mg  (3/10/2023), 420 mg (3/31/2023), 420 mg (4/21/2023), 420 mg (5/19/2023), 420 mg (6/13/2023), 420 mg (6/30/2023), 420 mg (7/21/2023), 420 mg (8/11/2023), 420 mg (9/1/2023), 420 mg (9/22/2023), 420 mg (10/13/2023), 420 mg (11/3/2023), 420 mg (11/24/2023), 420 mg (12/22/2023)  trastuzumab (HERCEPTIN) chemo infusion, 6 mg/kg = 589 mg (75 % of original dose 8 mg/kg), Intravenous, Once, 80 of 82 cycles  Dose modification: 6 mg/kg (original dose 8 mg/kg, Cycle 1, Reason: Other (Must fill in a comment))  Administration: 589 mg (5/17/2019), 589 mg (6/7/2019), 600 mg (6/28/2019), 600 mg (7/19/2019), 600 mg (8/30/2019), 600 mg (9/20/2019), 600 mg (8/9/2019), 600 mg (10/9/2019), 600 mg (11/1/2019), 600 mg (11/22/2019), 600 mg (12/13/2019), 600 mg (1/3/2020), 600 mg (1/24/2020), 600 mg (2/14/2020), 600 mg (3/6/2020), 600 mg (3/27/2020), 600 mg (4/17/2020), 600 mg (5/8/2020), 600 mg (5/29/2020), 600 mg (6/19/2020), 600 mg (7/10/2020), 600 mg (7/31/2020), 600 mg (8/21/2020), 600 mg (9/11/2020), 600 mg (10/2/2020), 600 mg (10/23/2020), 600 mg (11/13/2020), 600 mg (12/4/2020), 600 mg (12/24/2020), 600 mg (1/15/2021), 600 mg (2/5/2021), 600 mg (2/26/2021), 600 mg (3/19/2021), 600 mg (4/9/2021), 600 mg (4/30/2021), 600 mg (5/21/2021), 600 mg (6/11/2021), 600 mg (7/2/2021), 600 mg (7/23/2021), 600 mg (8/13/2021), 600 mg (9/3/2021), 600 mg (9/24/2021), 600 mg (10/15/2021), 600 mg (11/5/2021), 600 mg (11/26/2021), 600 mg (12/17/2021), 600 mg (1/7/2022), 600 mg (1/28/2022), 600 mg (2/18/2022), 600 mg (3/11/2022), 600 mg (4/1/2022), 600 mg (5/13/2022), 600 mg (6/3/2022), 600 mg (6/28/2022), 600 mg (7/22/2022), 600 mg (9/2/2022), 600 mg (9/23/2022), 600 mg (8/12/2022), 600 mg (10/14/2022), 600 mg (11/4/2022), 600 mg (11/25/2022), 600 mg (12/15/2022), 600 mg (1/6/2023), 600 mg (1/27/2023), 600 mg (2/17/2023), 600 mg (3/10/2023), 600 mg (3/31/2023), 600 mg (4/21/2023), 600 mg (5/19/2023), 600 mg (6/13/2023), 600 mg (6/30/2023), 600 mg  (7/21/2023), 600 mg (8/11/2023), 600 mg (9/1/2023), 600 mg (9/22/2023), 600 mg (10/13/2023), 600 mg (11/3/2023), 600 mg (11/24/2023), 600 mg (12/22/2023)     8/16/2019 Surgery    she underwent BSO.     7/5/2022 -  Cancer Staged    Staging form: Breast, AJCC 8th Edition  - Clinical: Stage IV (rcTX, cNX, pM1) - Signed by Dejuan Hernandez MD on 7/5/2022  Stage prefix: Recurrence       Malignant neoplasm metastatic to liver (HCC)   4/27/2018 Initial Diagnosis    Liver metastases (HCC)     4/17/2019 -  Chemotherapy    pertuzumab (PERJETA) IVPB, 840 mg, Intravenous, Once, 80 of 82 cycles  Administration: 420 mg (5/17/2019), 420 mg (6/7/2019), 420 mg (6/28/2019), 420 mg (7/19/2019), 420 mg (8/30/2019), 420 mg (9/20/2019), 420 mg (8/9/2019), 420 mg (10/9/2019), 420 mg (11/1/2019), 420 mg (11/22/2019), 420 mg (12/13/2019), 420 mg (1/3/2020), 420 mg (1/24/2020), 420 mg (2/14/2020), 420 mg (3/6/2020), 420 mg (3/27/2020), 420 mg (4/17/2020), 420 mg (5/8/2020), 420 mg (5/29/2020), 420 mg (6/19/2020), 420 mg (7/10/2020), 420 mg (7/31/2020), 420 mg (8/21/2020), 420 mg (9/11/2020), 420 mg (10/2/2020), 420 mg (10/23/2020), 420 mg (11/13/2020), 420 mg (12/4/2020), 420 mg (12/24/2020), 420 mg (1/15/2021), 420 mg (2/5/2021), 420 mg (2/26/2021), 420 mg (3/19/2021), 420 mg (4/9/2021), 420 mg (4/30/2021), 420 mg (5/21/2021), 420 mg (6/11/2021), 420 mg (7/2/2021), 420 mg (7/23/2021), 420 mg (8/13/2021), 420 mg (9/3/2021), 420 mg (9/24/2021), 420 mg (10/15/2021), 420 mg (11/5/2021), 420 mg (11/26/2021), 420 mg (12/17/2021), 420 mg (1/7/2022), 420 mg (1/28/2022), 420 mg (2/18/2022), 420 mg (3/11/2022), 420 mg (4/1/2022), 420 mg (5/13/2022), 420 mg (6/3/2022), 420 mg (6/28/2022), 420 mg (7/22/2022), 420 mg (9/2/2022), 420 mg (9/23/2022), 420 mg (8/12/2022), 420 mg (10/14/2022), 420 mg (11/4/2022), 420 mg (11/25/2022), 420 mg (12/15/2022), 420 mg (1/6/2023), 420 mg (1/27/2023), 420 mg (2/17/2023), 420 mg (3/10/2023), 420 mg (3/31/2023),  420 mg (4/21/2023), 420 mg (5/19/2023), 420 mg (6/13/2023), 420 mg (6/30/2023), 420 mg (7/21/2023), 420 mg (8/11/2023), 420 mg (9/1/2023), 420 mg (9/22/2023), 420 mg (10/13/2023), 420 mg (11/3/2023), 420 mg (11/24/2023), 420 mg (12/22/2023)  trastuzumab (HERCEPTIN) chemo infusion, 6 mg/kg = 589 mg (75 % of original dose 8 mg/kg), Intravenous, Once, 80 of 82 cycles  Dose modification: 6 mg/kg (original dose 8 mg/kg, Cycle 1, Reason: Other (Must fill in a comment))  Administration: 589 mg (5/17/2019), 589 mg (6/7/2019), 600 mg (6/28/2019), 600 mg (7/19/2019), 600 mg (8/30/2019), 600 mg (9/20/2019), 600 mg (8/9/2019), 600 mg (10/9/2019), 600 mg (11/1/2019), 600 mg (11/22/2019), 600 mg (12/13/2019), 600 mg (1/3/2020), 600 mg (1/24/2020), 600 mg (2/14/2020), 600 mg (3/6/2020), 600 mg (3/27/2020), 600 mg (4/17/2020), 600 mg (5/8/2020), 600 mg (5/29/2020), 600 mg (6/19/2020), 600 mg (7/10/2020), 600 mg (7/31/2020), 600 mg (8/21/2020), 600 mg (9/11/2020), 600 mg (10/2/2020), 600 mg (10/23/2020), 600 mg (11/13/2020), 600 mg (12/4/2020), 600 mg (12/24/2020), 600 mg (1/15/2021), 600 mg (2/5/2021), 600 mg (2/26/2021), 600 mg (3/19/2021), 600 mg (4/9/2021), 600 mg (4/30/2021), 600 mg (5/21/2021), 600 mg (6/11/2021), 600 mg (7/2/2021), 600 mg (7/23/2021), 600 mg (8/13/2021), 600 mg (9/3/2021), 600 mg (9/24/2021), 600 mg (10/15/2021), 600 mg (11/5/2021), 600 mg (11/26/2021), 600 mg (12/17/2021), 600 mg (1/7/2022), 600 mg (1/28/2022), 600 mg (2/18/2022), 600 mg (3/11/2022), 600 mg (4/1/2022), 600 mg (5/13/2022), 600 mg (6/3/2022), 600 mg (6/28/2022), 600 mg (7/22/2022), 600 mg (9/2/2022), 600 mg (9/23/2022), 600 mg (8/12/2022), 600 mg (10/14/2022), 600 mg (11/4/2022), 600 mg (11/25/2022), 600 mg (12/15/2022), 600 mg (1/6/2023), 600 mg (1/27/2023), 600 mg (2/17/2023), 600 mg (3/10/2023), 600 mg (3/31/2023), 600 mg (4/21/2023), 600 mg (5/19/2023), 600 mg (6/13/2023), 600 mg (6/30/2023), 600 mg (7/21/2023), 600 mg (8/11/2023), 600 mg  (9/1/2023), 600 mg (9/22/2023), 600 mg (10/13/2023), 600 mg (11/3/2023), 600 mg (11/24/2023), 600 mg (12/22/2023)     6/24/2022 Biopsy    Final Diagnosis   A.  Liver, core biopsies:     - Metastatic carcinoma compatible with a breast primary.        Malignant neoplasm metastatic to bone (HCC)   4/27/2018 Initial Diagnosis    Bone metastasis (HCC)     4/17/2019 -  Chemotherapy    pertuzumab (PERJETA) IVPB, 840 mg, Intravenous, Once, 80 of 82 cycles  Administration: 420 mg (5/17/2019), 420 mg (6/7/2019), 420 mg (6/28/2019), 420 mg (7/19/2019), 420 mg (8/30/2019), 420 mg (9/20/2019), 420 mg (8/9/2019), 420 mg (10/9/2019), 420 mg (11/1/2019), 420 mg (11/22/2019), 420 mg (12/13/2019), 420 mg (1/3/2020), 420 mg (1/24/2020), 420 mg (2/14/2020), 420 mg (3/6/2020), 420 mg (3/27/2020), 420 mg (4/17/2020), 420 mg (5/8/2020), 420 mg (5/29/2020), 420 mg (6/19/2020), 420 mg (7/10/2020), 420 mg (7/31/2020), 420 mg (8/21/2020), 420 mg (9/11/2020), 420 mg (10/2/2020), 420 mg (10/23/2020), 420 mg (11/13/2020), 420 mg (12/4/2020), 420 mg (12/24/2020), 420 mg (1/15/2021), 420 mg (2/5/2021), 420 mg (2/26/2021), 420 mg (3/19/2021), 420 mg (4/9/2021), 420 mg (4/30/2021), 420 mg (5/21/2021), 420 mg (6/11/2021), 420 mg (7/2/2021), 420 mg (7/23/2021), 420 mg (8/13/2021), 420 mg (9/3/2021), 420 mg (9/24/2021), 420 mg (10/15/2021), 420 mg (11/5/2021), 420 mg (11/26/2021), 420 mg (12/17/2021), 420 mg (1/7/2022), 420 mg (1/28/2022), 420 mg (2/18/2022), 420 mg (3/11/2022), 420 mg (4/1/2022), 420 mg (5/13/2022), 420 mg (6/3/2022), 420 mg (6/28/2022), 420 mg (7/22/2022), 420 mg (9/2/2022), 420 mg (9/23/2022), 420 mg (8/12/2022), 420 mg (10/14/2022), 420 mg (11/4/2022), 420 mg (11/25/2022), 420 mg (12/15/2022), 420 mg (1/6/2023), 420 mg (1/27/2023), 420 mg (2/17/2023), 420 mg (3/10/2023), 420 mg (3/31/2023), 420 mg (4/21/2023), 420 mg (5/19/2023), 420 mg (6/13/2023), 420 mg (6/30/2023), 420 mg (7/21/2023), 420 mg (8/11/2023), 420 mg (9/1/2023), 420  mg (9/22/2023), 420 mg (10/13/2023), 420 mg (11/3/2023), 420 mg (11/24/2023), 420 mg (12/22/2023)  trastuzumab (HERCEPTIN) chemo infusion, 6 mg/kg = 589 mg (75 % of original dose 8 mg/kg), Intravenous, Once, 80 of 82 cycles  Dose modification: 6 mg/kg (original dose 8 mg/kg, Cycle 1, Reason: Other (Must fill in a comment))  Administration: 589 mg (5/17/2019), 589 mg (6/7/2019), 600 mg (6/28/2019), 600 mg (7/19/2019), 600 mg (8/30/2019), 600 mg (9/20/2019), 600 mg (8/9/2019), 600 mg (10/9/2019), 600 mg (11/1/2019), 600 mg (11/22/2019), 600 mg (12/13/2019), 600 mg (1/3/2020), 600 mg (1/24/2020), 600 mg (2/14/2020), 600 mg (3/6/2020), 600 mg (3/27/2020), 600 mg (4/17/2020), 600 mg (5/8/2020), 600 mg (5/29/2020), 600 mg (6/19/2020), 600 mg (7/10/2020), 600 mg (7/31/2020), 600 mg (8/21/2020), 600 mg (9/11/2020), 600 mg (10/2/2020), 600 mg (10/23/2020), 600 mg (11/13/2020), 600 mg (12/4/2020), 600 mg (12/24/2020), 600 mg (1/15/2021), 600 mg (2/5/2021), 600 mg (2/26/2021), 600 mg (3/19/2021), 600 mg (4/9/2021), 600 mg (4/30/2021), 600 mg (5/21/2021), 600 mg (6/11/2021), 600 mg (7/2/2021), 600 mg (7/23/2021), 600 mg (8/13/2021), 600 mg (9/3/2021), 600 mg (9/24/2021), 600 mg (10/15/2021), 600 mg (11/5/2021), 600 mg (11/26/2021), 600 mg (12/17/2021), 600 mg (1/7/2022), 600 mg (1/28/2022), 600 mg (2/18/2022), 600 mg (3/11/2022), 600 mg (4/1/2022), 600 mg (5/13/2022), 600 mg (6/3/2022), 600 mg (6/28/2022), 600 mg (7/22/2022), 600 mg (9/2/2022), 600 mg (9/23/2022), 600 mg (8/12/2022), 600 mg (10/14/2022), 600 mg (11/4/2022), 600 mg (11/25/2022), 600 mg (12/15/2022), 600 mg (1/6/2023), 600 mg (1/27/2023), 600 mg (2/17/2023), 600 mg (3/10/2023), 600 mg (3/31/2023), 600 mg (4/21/2023), 600 mg (5/19/2023), 600 mg (6/13/2023), 600 mg (6/30/2023), 600 mg (7/21/2023), 600 mg (8/11/2023), 600 mg (9/1/2023), 600 mg (9/22/2023), 600 mg (10/13/2023), 600 mg (11/3/2023), 600 mg (11/24/2023), 600 mg (12/22/2023)     6/24/2022 Biopsy    Final  Diagnosis   A.  Liver, core biopsies:     - Metastatic carcinoma compatible with a breast primary.            Past Medical History:   Diagnosis Date    Breast cancer (HCC)     Hx of radiation therapy     breast and left  hip    Liver metastases     Port-A-Cath in place     right chest    Wears glasses      Past Surgical History:   Procedure Laterality Date    BREAST BIOPSY Left 2015    BREAST LUMPECTOMY Left 2016    BREAST SURGERY Left     biopsy    CHOLECYSTECTOMY      CHOLECYSTECTOMY      IR PLACEMENT FIDUCIAL MARKER  2022    OOPHORECTOMY      PORTACATH PLACEMENT Right     PA LAPAROSCOPY W/RMVL ADNEXAL STRUCTURES Bilateral 2019    Procedure: LAPAROSCOPIC SALPINGO-OOPHORECTOMY;  Surgeon: Omar Rudolph MD;  Location: [facility];  Service: Gynecology Oncology    SIMPLE MASTECTOMY Left 2016    Procedure: BREAST PARTIAL MASTECTOMY ;  Surgeon: Abril Chandler MD;  Location: [facility];  Service:        Social History   Social History     Substance and Sexual Activity   Alcohol Use No     Social History     Substance and Sexual Activity   Drug Use No     Social History     Tobacco Use   Smoking Status Former    Current packs/day: 0.00    Types: Cigarettes    Quit date: 2009    Years since quittin.4    Passive exposure: Past   Smokeless Tobacco Never         Meds/Allergies     Current Outpatient Medications:     letrozole (FEMARA) 2.5 mg tablet, Take 1 tablet (2.5 mg total) by mouth daily, Disp: 90 tablet, Rfl: 2    Multiple Vitamins-Minerals (Theragran-M Premier 50 Plus) TABS, as directed, Disp: , Rfl:     multivitamin (THERAGRAN) TABS, Take 1 tablet by mouth 3 (three) times a week , Disp: , Rfl:     pertuzumab (PERJETA) 420 mg/14 mL SOLN, Infuse into a venous catheter every 21 days At infusion  center, Disp: , Rfl:     Trastuzumab (HERCEPTIN IV), Infuse into a venous catheter every 21 days At infusion center, Disp: , Rfl:     VITAMIN D, CHOLECALCIFEROL, PO, Take 1,000 Units by  "mouth 3 (three) times a week , Disp: , Rfl:   No Known Allergies    Review of Systems   Constitutional: Negative.    HENT: Negative.     Eyes: Negative.    Respiratory: Negative.     Cardiovascular: Negative.    Gastrointestinal: Negative.    Endocrine: Negative.    Genitourinary: Negative.    Musculoskeletal: Negative.    Skin: Negative.    Allergic/Immunologic: Negative.    Neurological: Negative.    Hematological: Negative.    Psychiatric/Behavioral: Negative.       OBJECTIVE:   /82 (BP Location: Left arm, Patient Position: Sitting, Cuff Size: Standard)   Pulse 81   Temp 98.5 °F (36.9 °C) (Temporal)   Resp 18   Ht 5' 4\" (1.626 m)   SpO2 98%   BMI 36.48 kg/m²   Pain Assessment:  0  ECOG/Zubrod/WHO: 0 - Asymptomatic    Physical Exam   Well appearing. NAD.  No increased work of breathing.   Extremities warm and well perfused.     RESULTS    Lab Results: No results found for this or any previous visit (from the past 672 hour(s)).    Imaging Studies:No results found.        Assessment/Plan:  Orders Placed This Encounter   Procedures    CT abdomen w contrast      We again reviewed the potential utility of consolidative SBRT in the management of oligometastatic/oligoprogressive breast cancer. In particular, we discussed that  randomized evidence dose not show substantial oncologic benefit to local treatment in the management of oligometastatic breast cancer.     We again discussed the results of NRG , which randomized patients with oligometastatic breast cancer to SOC vs SOC with SBRT to sites of oligometastatic disease. This trial did not show an OS benefit for SBRT, though there were previously described limitations (paucity of HER2+ cases).     We additionally discussed the recent findings from the CURB phase II clinical trial, which randomized patients with oligoprogressive NSCLC and breast cancer to SOC vs SOC + SBRT to oligoprogressive sites. While there was a significant PFS benefit seen in " "overall population, this was primarily driven by the NSCLC group with no benefit seen among patients with breast cancer.     We discussed that despite these limitations, local treatment with SBRT may be reasonable if her disease were to become symptomatic or if it were to begin to impinge of the portal hilar structures. The patient plans to travel abroad and would like to obtain repeat imaging in 6 months. If her disease shows further enlargement at that time and she continues to have no other evidence of metastatic disease or if she becomes symptomatic we will reconsider treatment at that time.     Dejuan Hernandez MD  1/8/2024,9:38 AM    Portions of the record may have been created with voice recognition software.  Occasional wrong word or \"sound a like\" substitutions may have occurred due to the inherent limitations of voice recognition software.  Read the chart carefully and recognize, using context, where substitutions have occurred.        "

## 2024-01-12 ENCOUNTER — HOSPITAL ENCOUNTER (OUTPATIENT)
Dept: INFUSION CENTER | Facility: CLINIC | Age: 59
End: 2024-01-12
Payer: COMMERCIAL

## 2024-01-12 VITALS
BODY MASS INDEX: 36.29 KG/M2 | HEIGHT: 65 IN | DIASTOLIC BLOOD PRESSURE: 72 MMHG | SYSTOLIC BLOOD PRESSURE: 168 MMHG | HEART RATE: 68 BPM | WEIGHT: 217.81 LBS | RESPIRATION RATE: 18 BRPM | TEMPERATURE: 97.6 F

## 2024-01-12 DIAGNOSIS — Z17.0 MALIGNANT NEOPLASM OF UPPER-INNER QUADRANT OF LEFT BREAST IN FEMALE, ESTROGEN RECEPTOR POSITIVE: Primary | ICD-10-CM

## 2024-01-12 DIAGNOSIS — C79.51 MALIGNANT NEOPLASM METASTATIC TO BONE (HCC): ICD-10-CM

## 2024-01-12 DIAGNOSIS — Z90.79 HISTORY OF BILATERAL SALPINGO-OOPHORECTOMY (BSO): ICD-10-CM

## 2024-01-12 DIAGNOSIS — C50.212 MALIGNANT NEOPLASM OF UPPER-INNER QUADRANT OF LEFT BREAST IN FEMALE, ESTROGEN RECEPTOR POSITIVE: Primary | ICD-10-CM

## 2024-01-12 DIAGNOSIS — Z90.722 HISTORY OF BILATERAL SALPINGO-OOPHORECTOMY (BSO): ICD-10-CM

## 2024-01-12 DIAGNOSIS — C78.7 MALIGNANT NEOPLASM METASTATIC TO LIVER (HCC): ICD-10-CM

## 2024-01-12 PROCEDURE — 96417 CHEMO IV INFUS EACH ADDL SEQ: CPT

## 2024-01-12 PROCEDURE — 96413 CHEMO IV INFUSION 1 HR: CPT

## 2024-01-12 RX ORDER — SODIUM CHLORIDE 9 MG/ML
20 INJECTION, SOLUTION INTRAVENOUS ONCE
Status: COMPLETED | OUTPATIENT
Start: 2024-01-12 | End: 2024-01-12

## 2024-01-12 RX ADMIN — PERTUZUMAB 420 MG: 30 INJECTION, SOLUTION, CONCENTRATE INTRAVENOUS at 14:35

## 2024-01-12 RX ADMIN — TRASTUZUMAB 600 MG: 150 INJECTION, POWDER, LYOPHILIZED, FOR SOLUTION INTRAVENOUS at 15:07

## 2024-01-12 RX ADMIN — SODIUM CHLORIDE 20 ML/HR: 0.9 INJECTION, SOLUTION INTRAVENOUS at 14:34

## 2024-01-12 NOTE — PROGRESS NOTES
Pt tolerated treatment today without incident.  Pt declined AVS but is aware of future appt on Feb 2 at 1400

## 2024-01-16 ENCOUNTER — OFFICE VISIT (OUTPATIENT)
Dept: HEMATOLOGY ONCOLOGY | Facility: CLINIC | Age: 59
End: 2024-01-16
Payer: COMMERCIAL

## 2024-01-16 ENCOUNTER — TELEPHONE (OUTPATIENT)
Dept: HEMATOLOGY ONCOLOGY | Facility: CLINIC | Age: 59
End: 2024-01-16

## 2024-01-16 VITALS
HEART RATE: 69 BPM | HEIGHT: 65 IN | OXYGEN SATURATION: 96 % | WEIGHT: 212 LBS | RESPIRATION RATE: 16 BRPM | BODY MASS INDEX: 35.32 KG/M2 | SYSTOLIC BLOOD PRESSURE: 128 MMHG | DIASTOLIC BLOOD PRESSURE: 74 MMHG | TEMPERATURE: 98.1 F

## 2024-01-16 DIAGNOSIS — Z90.722 HISTORY OF BILATERAL SALPINGO-OOPHORECTOMY (BSO): ICD-10-CM

## 2024-01-16 DIAGNOSIS — C78.7 MALIGNANT NEOPLASM METASTATIC TO LIVER (HCC): ICD-10-CM

## 2024-01-16 DIAGNOSIS — Z14.8 CARRIER OF GENE MUTATION FOR HIGH RISK OF CANCER: ICD-10-CM

## 2024-01-16 DIAGNOSIS — Z15.01 BRCA1 GENE MUTATION POSITIVE IN FEMALE: ICD-10-CM

## 2024-01-16 DIAGNOSIS — Z17.0 MALIGNANT NEOPLASM OF UPPER-INNER QUADRANT OF LEFT BREAST IN FEMALE, ESTROGEN RECEPTOR POSITIVE: Primary | ICD-10-CM

## 2024-01-16 DIAGNOSIS — R11.0 CHEMOTHERAPY-INDUCED NAUSEA: Primary | ICD-10-CM

## 2024-01-16 DIAGNOSIS — Z17.0 MALIGNANT NEOPLASM OF UPPER-INNER QUADRANT OF LEFT BREAST IN FEMALE, ESTROGEN RECEPTOR POSITIVE: ICD-10-CM

## 2024-01-16 DIAGNOSIS — Z90.79 HISTORY OF BILATERAL SALPINGO-OOPHORECTOMY (BSO): ICD-10-CM

## 2024-01-16 DIAGNOSIS — M81.8 OSTEOPOROSIS DUE TO AROMATASE INHIBITOR: ICD-10-CM

## 2024-01-16 DIAGNOSIS — T45.1X5A CHEMOTHERAPY-INDUCED NAUSEA: Primary | ICD-10-CM

## 2024-01-16 DIAGNOSIS — C79.51 MALIGNANT NEOPLASM METASTATIC TO BONE (HCC): ICD-10-CM

## 2024-01-16 DIAGNOSIS — Z15.02 BRCA1 GENE MUTATION POSITIVE IN FEMALE: ICD-10-CM

## 2024-01-16 DIAGNOSIS — C50.212 MALIGNANT NEOPLASM OF UPPER-INNER QUADRANT OF LEFT BREAST IN FEMALE, ESTROGEN RECEPTOR POSITIVE: Primary | ICD-10-CM

## 2024-01-16 DIAGNOSIS — C50.212 MALIGNANT NEOPLASM OF UPPER-INNER QUADRANT OF LEFT BREAST IN FEMALE, ESTROGEN RECEPTOR POSITIVE: ICD-10-CM

## 2024-01-16 DIAGNOSIS — Z15.09 BRCA1 GENE MUTATION POSITIVE IN FEMALE: ICD-10-CM

## 2024-01-16 DIAGNOSIS — T38.6X5A OSTEOPOROSIS DUE TO AROMATASE INHIBITOR: ICD-10-CM

## 2024-01-16 DIAGNOSIS — I42.7 CARDIOMYOPATHY DUE TO CHEMOTHERAPY: ICD-10-CM

## 2024-01-16 DIAGNOSIS — T45.1X5A CARDIOMYOPATHY DUE TO CHEMOTHERAPY: ICD-10-CM

## 2024-01-16 DIAGNOSIS — Z95.828 PORT-A-CATH IN PLACE: ICD-10-CM

## 2024-01-16 DIAGNOSIS — Z79.811 AROMATASE INHIBITOR USE: ICD-10-CM

## 2024-01-16 DIAGNOSIS — C78.7 MALIGNANT NEOPLASM METASTATIC TO LIVER (HCC): Primary | ICD-10-CM

## 2024-01-16 PROCEDURE — 99215 OFFICE O/P EST HI 40 MIN: CPT | Performed by: INTERNAL MEDICINE

## 2024-01-16 RX ORDER — ONDANSETRON 4 MG/1
4 TABLET, FILM COATED ORAL EVERY 8 HOURS PRN
Qty: 30 TABLET | Refills: 1 | Status: SHIPPED | OUTPATIENT
Start: 2024-01-16

## 2024-01-16 NOTE — PROGRESS NOTES
HPI: Follow-up visit for relapsed triple positive stage IV breast cancer and presently she is on Herceptin plus Perjeta and letrozole.  Lately there has been some disease progression in 1 area in the liver and she was evaluated for liver directed therapy and because of the location SBRT could be considered but not now and may be after 6 months after getting another scan.  Liver lesion has increased in size and she is going to go ahead with Enhertu.   She has some tiredness, anxiety and insomnia and occasionally leg cramps at night.   In 2016 she was diagnosed to have stage IV de brandon triple positive  left breast cancer  with metastatic disease to liver and bones . Patient was started on THP,  Taxotere plus Herceptin and Perjeta and after 6 cycles Taxotere was discontinued and she was continued on Herceptin and Perjeta and to that tamoxifen was added.  Patient  was premenopausal at that time.  Later tamoxifen was changed to letrozole after she had  BSO.  She had Xgeva. She  had radiation to left hip for palliation.  She continued to respond and in September 2016 patient had lumpectomy of left breast followed by radiation therapy.  At the time of lumpectomy there was a small residual disease, 1.4 cm.  Lymph nodes were not sampled.   Patient tested positive for BRCA 1. She already had BSO.  She has been getting imaging studies for early detection of other cancers like peritoneal and pancreatic cancers.  She has been encouraged to see a dermatologist but she does not want to.  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   She has been taking vitamin-D and calcium and is staying active.  She is not on Xgeva anymore that she had for 2 years.   She has been tolerating treatments without much problem.  She goes for blood tests and echocardiogram on regular basis.    She has done well all these years and  had very good response but then in 2022 there was a new lesion in the liver on CT scan and  MRI scan and on biopsy that proved to be metastatic cancer from breast, strongly positive for ER, negative for AL and 2+ for HER 2 that was negative by FISH (low positive HER2).  Patient saw radiation oncologist for local radiation to liver lesion and discussed risks and benefits and decided to be observed. Subsequent CT scan showed slight decrease in size of liver lesion from 2.7 to 2.4.  She preferred to stay on letrozole, Herceptin and Perjeta.  We did discuss ENHERTU.  Most recent CT scan  showed increase in the size of liver lesion.  She was referred to back to radiation for liver directed therapy.       No bone pains.  Has good appetite.  And she is maintaining her weight.     .  Patient knows that she is at high risk for breast and other cancers like  ovarian cancer, pancreatic cancer, peritoneal cancer, melanoma and other cancers.  Patient goes to breast surgeon for evaluation and imaging studies.  She gets CT scan and that also checks the pancreas and peritoneum.  She had BSO.  She does not want to go to a dermatologist to check for melanoma or to ophthalmologist to check for melanoma in the eye.  She goes to her optometrist.  Also she does not want to have colonoscopy and not even Cologuard or stool test for blood.      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       She continues to take  calcium and vitamin-D  .                           Current Outpatient Medications:     letrozole (FEMARA) 2.5 mg tablet, Take 1 tablet (2.5 mg total) by mouth daily, Disp: 90 tablet, Rfl: 2    Multiple Vitamins-Minerals (Theragran-M Premier 50 Plus) TABS, as directed, Disp: , Rfl:     multivitamin (THERAGRAN) TABS, Take 1 tablet by mouth 3 (three) times a week , Disp: ,  Rfl:     pertuzumab (PERJETA) 420 mg/14 mL SOLN, Infuse into a venous catheter every 21 days At infusion  center, Disp: , Rfl:     Trastuzumab (HERCEPTIN IV), Infuse into a venous catheter every 21 days At infusion center, Disp: , Rfl:     VITAMIN D, CHOLECALCIFEROL, PO, Take 1,000 Units by mouth 3 (three) times a week , Disp: , Rfl:     No Known Allergies    Oncology History   Malignant neoplasm of upper-inner quadrant of left female breast (HCC)   12/2015 Initial Diagnosis    Malignant neoplasm of upper-outer quadrant of left female breast (HCC)     2016 -  Hormone Therapy    Tamoxifen  Ended 8/2019.    Dr Benavidez  Letrozole 9/2019.     1/27/2016 Surgery    Port placement     2/19/2016 - 6/2/2018 Chemotherapy    Taxotere,  herceptin, perjeta, xgeva.   After six cycles, taxotere was discontinued and tamoxifen added.   Continues with Perjeta and Herceptin every 3 weeks     - Dr Benavidez       9/16/2016 Surgery    Left breast partial mastectomy     11/7/2016 - 12/23/2016 Radiation    Plan ID Energy Fractions Dose per Fraction (cGy) Total Dose Delivered (cGy) Elapsed Days   Lt Breast 6X 25 / 25 200 5,000 36   Lt Brst Boost 6X 8 / 8 200 1,600 9   Lt Femur 10X 10 / 10 300 3,000 11   Lt PAB 6X 25 / 25 60 1,500 36   Lt Sclav 6X 25 / 25 200 5,000 36                                   Total dose to left breast lumpectomy cavity: 6600 cGy                   Total dose to the supraclavicular and axillary regions: 5000 cGy       4/17/2019 -  Chemotherapy    pertuzumab (PERJETA) IVPB, 840 mg, Intravenous, Once, 81 of 82 cycles  Administration: 420 mg (5/17/2019), 420 mg (6/7/2019), 420 mg (6/28/2019), 420 mg (7/19/2019), 420 mg (8/30/2019), 420 mg (9/20/2019), 420 mg (8/9/2019), 420 mg (10/9/2019), 420 mg (11/1/2019), 420 mg (11/22/2019), 420 mg (12/13/2019), 420 mg (1/3/2020), 420 mg (1/24/2020), 420 mg (2/14/2020), 420 mg (3/6/2020), 420 mg (3/27/2020), 420 mg (4/17/2020), 420 mg (5/8/2020), 420 mg (5/29/2020), 420 mg  (6/19/2020), 420 mg (7/10/2020), 420 mg (7/31/2020), 420 mg (8/21/2020), 420 mg (9/11/2020), 420 mg (10/2/2020), 420 mg (10/23/2020), 420 mg (11/13/2020), 420 mg (12/4/2020), 420 mg (12/24/2020), 420 mg (1/15/2021), 420 mg (2/5/2021), 420 mg (2/26/2021), 420 mg (3/19/2021), 420 mg (4/9/2021), 420 mg (4/30/2021), 420 mg (5/21/2021), 420 mg (6/11/2021), 420 mg (7/2/2021), 420 mg (7/23/2021), 420 mg (8/13/2021), 420 mg (9/3/2021), 420 mg (9/24/2021), 420 mg (10/15/2021), 420 mg (11/5/2021), 420 mg (11/26/2021), 420 mg (12/17/2021), 420 mg (1/7/2022), 420 mg (1/28/2022), 420 mg (2/18/2022), 420 mg (3/11/2022), 420 mg (4/1/2022), 420 mg (5/13/2022), 420 mg (6/3/2022), 420 mg (6/28/2022), 420 mg (7/22/2022), 420 mg (9/2/2022), 420 mg (9/23/2022), 420 mg (8/12/2022), 420 mg (10/14/2022), 420 mg (11/4/2022), 420 mg (11/25/2022), 420 mg (12/15/2022), 420 mg (1/6/2023), 420 mg (1/27/2023), 420 mg (2/17/2023), 420 mg (3/10/2023), 420 mg (3/31/2023), 420 mg (4/21/2023), 420 mg (5/19/2023), 420 mg (6/13/2023), 420 mg (6/30/2023), 420 mg (7/21/2023), 420 mg (8/11/2023), 420 mg (9/1/2023), 420 mg (9/22/2023), 420 mg (10/13/2023), 420 mg (11/3/2023), 420 mg (11/24/2023), 420 mg (12/22/2023), 420 mg (1/12/2024)  trastuzumab (HERCEPTIN) chemo infusion, 6 mg/kg = 589 mg (75 % of original dose 8 mg/kg), Intravenous, Once, 81 of 82 cycles  Dose modification: 6 mg/kg (original dose 8 mg/kg, Cycle 1, Reason: Other (Must fill in a comment))  Administration: 589 mg (5/17/2019), 589 mg (6/7/2019), 600 mg (6/28/2019), 600 mg (7/19/2019), 600 mg (8/30/2019), 600 mg (9/20/2019), 600 mg (8/9/2019), 600 mg (10/9/2019), 600 mg (11/1/2019), 600 mg (11/22/2019), 600 mg (12/13/2019), 600 mg (1/3/2020), 600 mg (1/24/2020), 600 mg (2/14/2020), 600 mg (3/6/2020), 600 mg (3/27/2020), 600 mg (4/17/2020), 600 mg (5/8/2020), 600 mg (5/29/2020), 600 mg (6/19/2020), 600 mg (7/10/2020), 600 mg (7/31/2020), 600 mg (8/21/2020), 600 mg (9/11/2020), 600 mg  (10/2/2020), 600 mg (10/23/2020), 600 mg (11/13/2020), 600 mg (12/4/2020), 600 mg (12/24/2020), 600 mg (1/15/2021), 600 mg (2/5/2021), 600 mg (2/26/2021), 600 mg (3/19/2021), 600 mg (4/9/2021), 600 mg (4/30/2021), 600 mg (5/21/2021), 600 mg (6/11/2021), 600 mg (7/2/2021), 600 mg (7/23/2021), 600 mg (8/13/2021), 600 mg (9/3/2021), 600 mg (9/24/2021), 600 mg (10/15/2021), 600 mg (11/5/2021), 600 mg (11/26/2021), 600 mg (12/17/2021), 600 mg (1/7/2022), 600 mg (1/28/2022), 600 mg (2/18/2022), 600 mg (3/11/2022), 600 mg (4/1/2022), 600 mg (5/13/2022), 600 mg (6/3/2022), 600 mg (6/28/2022), 600 mg (7/22/2022), 600 mg (9/2/2022), 600 mg (9/23/2022), 600 mg (8/12/2022), 600 mg (10/14/2022), 600 mg (11/4/2022), 600 mg (11/25/2022), 600 mg (12/15/2022), 600 mg (1/6/2023), 600 mg (1/27/2023), 600 mg (2/17/2023), 600 mg (3/10/2023), 600 mg (3/31/2023), 600 mg (4/21/2023), 600 mg (5/19/2023), 600 mg (6/13/2023), 600 mg (6/30/2023), 600 mg (7/21/2023), 600 mg (8/11/2023), 600 mg (9/1/2023), 600 mg (9/22/2023), 600 mg (10/13/2023), 600 mg (11/3/2023), 600 mg (11/24/2023), 600 mg (12/22/2023), 600 mg (1/12/2024)     8/16/2019 Surgery    she underwent BSO.     7/5/2022 -  Cancer Staged    Staging form: Breast, AJCC 8th Edition  - Clinical: Stage IV (rcTX, cNX, pM1) - Signed by Dejuan Hernandez MD on 7/5/2022  Stage prefix: Recurrence       Malignant neoplasm metastatic to liver (HCC)   4/27/2018 Initial Diagnosis    Liver metastases (HCC)     4/17/2019 -  Chemotherapy    pertuzumab (PERJETA) IVPB, 840 mg, Intravenous, Once, 81 of 82 cycles  Administration: 420 mg (5/17/2019), 420 mg (6/7/2019), 420 mg (6/28/2019), 420 mg (7/19/2019), 420 mg (8/30/2019), 420 mg (9/20/2019), 420 mg (8/9/2019), 420 mg (10/9/2019), 420 mg (11/1/2019), 420 mg (11/22/2019), 420 mg (12/13/2019), 420 mg (1/3/2020), 420 mg (1/24/2020), 420 mg (2/14/2020), 420 mg (3/6/2020), 420 mg (3/27/2020), 420 mg (4/17/2020), 420 mg (5/8/2020), 420 mg (5/29/2020),  420 mg (6/19/2020), 420 mg (7/10/2020), 420 mg (7/31/2020), 420 mg (8/21/2020), 420 mg (9/11/2020), 420 mg (10/2/2020), 420 mg (10/23/2020), 420 mg (11/13/2020), 420 mg (12/4/2020), 420 mg (12/24/2020), 420 mg (1/15/2021), 420 mg (2/5/2021), 420 mg (2/26/2021), 420 mg (3/19/2021), 420 mg (4/9/2021), 420 mg (4/30/2021), 420 mg (5/21/2021), 420 mg (6/11/2021), 420 mg (7/2/2021), 420 mg (7/23/2021), 420 mg (8/13/2021), 420 mg (9/3/2021), 420 mg (9/24/2021), 420 mg (10/15/2021), 420 mg (11/5/2021), 420 mg (11/26/2021), 420 mg (12/17/2021), 420 mg (1/7/2022), 420 mg (1/28/2022), 420 mg (2/18/2022), 420 mg (3/11/2022), 420 mg (4/1/2022), 420 mg (5/13/2022), 420 mg (6/3/2022), 420 mg (6/28/2022), 420 mg (7/22/2022), 420 mg (9/2/2022), 420 mg (9/23/2022), 420 mg (8/12/2022), 420 mg (10/14/2022), 420 mg (11/4/2022), 420 mg (11/25/2022), 420 mg (12/15/2022), 420 mg (1/6/2023), 420 mg (1/27/2023), 420 mg (2/17/2023), 420 mg (3/10/2023), 420 mg (3/31/2023), 420 mg (4/21/2023), 420 mg (5/19/2023), 420 mg (6/13/2023), 420 mg (6/30/2023), 420 mg (7/21/2023), 420 mg (8/11/2023), 420 mg (9/1/2023), 420 mg (9/22/2023), 420 mg (10/13/2023), 420 mg (11/3/2023), 420 mg (11/24/2023), 420 mg (12/22/2023), 420 mg (1/12/2024)  trastuzumab (HERCEPTIN) chemo infusion, 6 mg/kg = 589 mg (75 % of original dose 8 mg/kg), Intravenous, Once, 81 of 82 cycles  Dose modification: 6 mg/kg (original dose 8 mg/kg, Cycle 1, Reason: Other (Must fill in a comment))  Administration: 589 mg (5/17/2019), 589 mg (6/7/2019), 600 mg (6/28/2019), 600 mg (7/19/2019), 600 mg (8/30/2019), 600 mg (9/20/2019), 600 mg (8/9/2019), 600 mg (10/9/2019), 600 mg (11/1/2019), 600 mg (11/22/2019), 600 mg (12/13/2019), 600 mg (1/3/2020), 600 mg (1/24/2020), 600 mg (2/14/2020), 600 mg (3/6/2020), 600 mg (3/27/2020), 600 mg (4/17/2020), 600 mg (5/8/2020), 600 mg (5/29/2020), 600 mg (6/19/2020), 600 mg (7/10/2020), 600 mg (7/31/2020), 600 mg (8/21/2020), 600 mg (9/11/2020), 600 mg  (10/2/2020), 600 mg (10/23/2020), 600 mg (11/13/2020), 600 mg (12/4/2020), 600 mg (12/24/2020), 600 mg (1/15/2021), 600 mg (2/5/2021), 600 mg (2/26/2021), 600 mg (3/19/2021), 600 mg (4/9/2021), 600 mg (4/30/2021), 600 mg (5/21/2021), 600 mg (6/11/2021), 600 mg (7/2/2021), 600 mg (7/23/2021), 600 mg (8/13/2021), 600 mg (9/3/2021), 600 mg (9/24/2021), 600 mg (10/15/2021), 600 mg (11/5/2021), 600 mg (11/26/2021), 600 mg (12/17/2021), 600 mg (1/7/2022), 600 mg (1/28/2022), 600 mg (2/18/2022), 600 mg (3/11/2022), 600 mg (4/1/2022), 600 mg (5/13/2022), 600 mg (6/3/2022), 600 mg (6/28/2022), 600 mg (7/22/2022), 600 mg (9/2/2022), 600 mg (9/23/2022), 600 mg (8/12/2022), 600 mg (10/14/2022), 600 mg (11/4/2022), 600 mg (11/25/2022), 600 mg (12/15/2022), 600 mg (1/6/2023), 600 mg (1/27/2023), 600 mg (2/17/2023), 600 mg (3/10/2023), 600 mg (3/31/2023), 600 mg (4/21/2023), 600 mg (5/19/2023), 600 mg (6/13/2023), 600 mg (6/30/2023), 600 mg (7/21/2023), 600 mg (8/11/2023), 600 mg (9/1/2023), 600 mg (9/22/2023), 600 mg (10/13/2023), 600 mg (11/3/2023), 600 mg (11/24/2023), 600 mg (12/22/2023), 600 mg (1/12/2024)     6/24/2022 Biopsy    Final Diagnosis   A.  Liver, core biopsies:     - Metastatic carcinoma compatible with a breast primary.        Malignant neoplasm metastatic to bone (HCC)   4/27/2018 Initial Diagnosis    Bone metastasis (HCC)     4/17/2019 -  Chemotherapy    pertuzumab (PERJETA) IVPB, 840 mg, Intravenous, Once, 81 of 82 cycles  Administration: 420 mg (5/17/2019), 420 mg (6/7/2019), 420 mg (6/28/2019), 420 mg (7/19/2019), 420 mg (8/30/2019), 420 mg (9/20/2019), 420 mg (8/9/2019), 420 mg (10/9/2019), 420 mg (11/1/2019), 420 mg (11/22/2019), 420 mg (12/13/2019), 420 mg (1/3/2020), 420 mg (1/24/2020), 420 mg (2/14/2020), 420 mg (3/6/2020), 420 mg (3/27/2020), 420 mg (4/17/2020), 420 mg (5/8/2020), 420 mg (5/29/2020), 420 mg (6/19/2020), 420 mg (7/10/2020), 420 mg (7/31/2020), 420 mg (8/21/2020), 420 mg (9/11/2020), 420  mg (10/2/2020), 420 mg (10/23/2020), 420 mg (11/13/2020), 420 mg (12/4/2020), 420 mg (12/24/2020), 420 mg (1/15/2021), 420 mg (2/5/2021), 420 mg (2/26/2021), 420 mg (3/19/2021), 420 mg (4/9/2021), 420 mg (4/30/2021), 420 mg (5/21/2021), 420 mg (6/11/2021), 420 mg (7/2/2021), 420 mg (7/23/2021), 420 mg (8/13/2021), 420 mg (9/3/2021), 420 mg (9/24/2021), 420 mg (10/15/2021), 420 mg (11/5/2021), 420 mg (11/26/2021), 420 mg (12/17/2021), 420 mg (1/7/2022), 420 mg (1/28/2022), 420 mg (2/18/2022), 420 mg (3/11/2022), 420 mg (4/1/2022), 420 mg (5/13/2022), 420 mg (6/3/2022), 420 mg (6/28/2022), 420 mg (7/22/2022), 420 mg (9/2/2022), 420 mg (9/23/2022), 420 mg (8/12/2022), 420 mg (10/14/2022), 420 mg (11/4/2022), 420 mg (11/25/2022), 420 mg (12/15/2022), 420 mg (1/6/2023), 420 mg (1/27/2023), 420 mg (2/17/2023), 420 mg (3/10/2023), 420 mg (3/31/2023), 420 mg (4/21/2023), 420 mg (5/19/2023), 420 mg (6/13/2023), 420 mg (6/30/2023), 420 mg (7/21/2023), 420 mg (8/11/2023), 420 mg (9/1/2023), 420 mg (9/22/2023), 420 mg (10/13/2023), 420 mg (11/3/2023), 420 mg (11/24/2023), 420 mg (12/22/2023), 420 mg (1/12/2024)  trastuzumab (HERCEPTIN) chemo infusion, 6 mg/kg = 589 mg (75 % of original dose 8 mg/kg), Intravenous, Once, 81 of 82 cycles  Dose modification: 6 mg/kg (original dose 8 mg/kg, Cycle 1, Reason: Other (Must fill in a comment))  Administration: 589 mg (5/17/2019), 589 mg (6/7/2019), 600 mg (6/28/2019), 600 mg (7/19/2019), 600 mg (8/30/2019), 600 mg (9/20/2019), 600 mg (8/9/2019), 600 mg (10/9/2019), 600 mg (11/1/2019), 600 mg (11/22/2019), 600 mg (12/13/2019), 600 mg (1/3/2020), 600 mg (1/24/2020), 600 mg (2/14/2020), 600 mg (3/6/2020), 600 mg (3/27/2020), 600 mg (4/17/2020), 600 mg (5/8/2020), 600 mg (5/29/2020), 600 mg (6/19/2020), 600 mg (7/10/2020), 600 mg (7/31/2020), 600 mg (8/21/2020), 600 mg (9/11/2020), 600 mg (10/2/2020), 600 mg (10/23/2020), 600 mg (11/13/2020), 600 mg (12/4/2020), 600 mg (12/24/2020), 600 mg  "(1/15/2021), 600 mg (2/5/2021), 600 mg (2/26/2021), 600 mg (3/19/2021), 600 mg (4/9/2021), 600 mg (4/30/2021), 600 mg (5/21/2021), 600 mg (6/11/2021), 600 mg (7/2/2021), 600 mg (7/23/2021), 600 mg (8/13/2021), 600 mg (9/3/2021), 600 mg (9/24/2021), 600 mg (10/15/2021), 600 mg (11/5/2021), 600 mg (11/26/2021), 600 mg (12/17/2021), 600 mg (1/7/2022), 600 mg (1/28/2022), 600 mg (2/18/2022), 600 mg (3/11/2022), 600 mg (4/1/2022), 600 mg (5/13/2022), 600 mg (6/3/2022), 600 mg (6/28/2022), 600 mg (7/22/2022), 600 mg (9/2/2022), 600 mg (9/23/2022), 600 mg (8/12/2022), 600 mg (10/14/2022), 600 mg (11/4/2022), 600 mg (11/25/2022), 600 mg (12/15/2022), 600 mg (1/6/2023), 600 mg (1/27/2023), 600 mg (2/17/2023), 600 mg (3/10/2023), 600 mg (3/31/2023), 600 mg (4/21/2023), 600 mg (5/19/2023), 600 mg (6/13/2023), 600 mg (6/30/2023), 600 mg (7/21/2023), 600 mg (8/11/2023), 600 mg (9/1/2023), 600 mg (9/22/2023), 600 mg (10/13/2023), 600 mg (11/3/2023), 600 mg (11/24/2023), 600 mg (12/22/2023), 600 mg (1/12/2024)     6/24/2022 Biopsy    Final Diagnosis   A.  Liver, core biopsies:     - Metastatic carcinoma compatible with a breast primary.            ROS:  01/16/24 Reviewed 12 systems: .  See symptoms in HPI  Presently no other neurological, cardiac, pulmonary, GI and  symptoms other than mentioned   in HPI..  Other symptoms are in HPI  No symptoms like fever, chills, bleeding, bone pains, skin rash, weight loss, weakness,  claudication and gait problem. No frequent infections.  Not unusually sensitive to heat or cold. No swelling of the ankles. No swollen glands.  Patient is anxious    /74 (BP Location: Right arm, Patient Position: Sitting, Cuff Size: Adult)   Pulse 69   Temp 98.1 °F (36.7 °C) (Temporal)   Resp 16   Ht 5' 4.96\" (1.65 m)   Wt 96.2 kg (212 lb)   SpO2 96%   BMI 35.32 kg/m²     Physical Exam:     Alert and oriented and not in distress.  Vitals are above.  No icterus.  No oral thrush.  No palpable neck " mass.  Clear lung fields.  Regular heart rate.  Soft and nontender abdomen.  No palpable abdominal mass.  No ascites.  No edema of the ankles.  No calf tenderness.  No focal neurological deficit.  There is no skin rash.  Lymph nodes are not palpably enlarged in the neck and axillary areas.  There is no clubbing, Patient is  anxious.  Performance status 1. No lymphedema.   .   IMAGING:  IMPRESSION: 02/24/2021     1.  No scintigraphic evidence of osseous metastasis.           Workstation performed: [id number]      Imaging     Ejection fraction 65% on 08/06/2022      Study Result    Narrative & Impression   CENTRAL  DXA SCAN on 03/08/2022     CLINICAL HISTORY:   56 year old post-menopausal  female risk factors include osteoporosis screening.Additional medical history: Breast cancer with mets to Hip , right Hip scanned.     TECHNIQUE: Bone densitometry was performed using a HoloTelogis Horizon W bone densitometer.  Regions of interest appear properly placed. There are   other confounding variables which could limit the study.       Her elevated  BMI may influence the T score result.     Degenerative or osteoarthritic changes are noted on the spine image eliminating L4 from evaluation.     COMPARISON:  Follow-up     RESULTS:   LUMBAR SPINE:  L1-L3:  BMD 1.021 gm/cm2  T-score 0.0 and unchanged from 2019.  Z-score 1.0     RIGHT TOTAL HIP:  BMD 1.122 gm/cm2  T-score 1.5  Z-score 2.1     RIGHT FEMORAL NECK:  BMD 0.842 gm/cm2  T-score -0.1 and unchanged from 2019.  Z-score 0.9           IMPRESSION:  1. Based on the WHO classification, this study is normal and the patient is considered at low risk for fracture.       IMPRESSION:     Since August 2, 2022:  1.  Decreased size and conspicuity of the biopsy-proven hepatic metastasis.    2.  No new sites of metastatic disease in the abdomen.        Workstation performed: [id number]          Imaging    CT abdomen w contrast (Order: [id number]) - 11/1/2022    IMPRESSION:      1. Enlargement of the known caudate intrahepatic mass with ill-defined margins estimated to measure 2.1 x 2.7 on today's examination.  2. No new intrahepatic mass is evident.     Study marked significant in epic for notification purposes.     Workstation performed: [id number]        Imaging    CT abdomen w contrast (Order: [id number]) - 9/14/2023  NM PET CT skull base to mid thigh  Status: Final result     PACS Images     Show images for NM PET CT skull base to mid thigh  Study Result    Result Text   PET/CT SCAN     INDICATION: C50.212: Malignant neoplasm of upper-inner quadrant of left female breast  Z17.0: Estrogen receptor positive status (ER+)  C78.7: Secondary malignant neoplasm of liver and intrahepatic bile duct  C79.51: Secondary malignant neoplasm of bone  Z15.01: Genetic susceptibility to malignant neoplasm of breast  Z15.02: Genetic susceptibility to malignant neoplasm of ovary  Z15.09: Genetic susceptibility     MODIFIER: PS     COMPARISON: CT 9/14/2023 and priors     CELL TYPE: Invasive ductal carcinoma     TECHNIQUE:   10.5 mCi F-18-FDG administered IV. Multiplanar attenuation corrected and non attenuation corrected PET images are available for interpretation, and contiguous, low dose, axial CT sections were obtained from the skull base through the femurs.    Intravenous contrast material was not utilized.  This examination, like all CT scans performed in the [facility], was performed utilizing techniques to minimize radiation dose exposure, including the use of iterative reconstruction and   automated exposure control.     Fasting serum glucose: 117 mg/dl     FINDINGS:     VISUALIZED BRAIN:  No acute abnormalities are seen.     HEAD/NECK:  There is a physiologic distribution of FDG. No FDG avid cervical adenopathy is seen.  CT images: Unremarkable.     CHEST:  No FDG avid soft tissue lesions are seen.  CT images: Unremarkable.     ABDOMEN:  Known caudate liver metastatic lesion  demonstrates hypermetabolism, SUV 9.1. This measures approximately 3.8 x 3.3 cm in axial dimensions based on the PET images.     Benign left adrenal adenoma measuring 2.2 x 2.4 cm, stable. There is associated FDG activity, SUV 3.1, typically physiologic.     No additional FDG avid soft tissue lesions are seen.     CT images: Minimal mesenteric haziness is unchanged.  PELVIS:  No FDG avid soft tissue lesions are seen.  CT images: Stable. Lobulated uterus suggesting fibroids     OSSEOUS STRUCTURES:  Stable appearance of lytic lesion in the left femoral intertrochanteric region, without significant FDG uptake.     No FDG avid lesions are seen.  CT images: No significant findings.     IMPRESSION:  1. Hypermetabolic caudate liver metastasis. No additional hypermetabolic lesions in the liver or upper abdomen.  2. No hypermetabolic metastases in the neck, chest, pelvis or osseous structures.              Workstation performed: [id number]        Imaging    NM PET CT skull base to mid thigh (Order: 35    LABS:      Ref Range & Units 10/13/23  3:36 PM 6/30/23  2:44 PM 3/10/23  2:39 PM 11/1/22  5:01 PM 6/24/22  8:51 AM 3/25/22  3:10 PM 12/17/21  2:41 PM    WBC 4.31 - 10.16 Thousand/uL 7.52 8.15 7.70 10.35 High  8.84 8.41 8.62   RBC 3.81 - 5.12 Million/uL 4.62 4.85 4.66 4.85 4.95 5.02 4.78   Hemoglobin 11.5 - 15.4 g/dL 12.2 12.7 12.3 13.0 13.1 13.3 13.0   Hematocrit 34.8 - 46.1 % 40.4 41.6 40.2 41.4 42.0 42.1 41.2   MCV 82 - 98 fL 87 86 86 85 85 84 86   MCH 26.8 - 34.3 pg 26.4 Low  26.2 Low  26.4 Low  26.8 26.5 Low  26.5 Low  27.2   MCHC 31.4 - 37.4 g/dL 30.2 Low  30.5 Low  30.6 Low  31.4 31.2 Low  31.6 31.6   RDW 11.6 - 15.1 % 14.5 14.2 13.9 14.3 14.4 14.6 14.6   MPV 8.9 - 12.7 fL 10.9 11.6 10.9 10.9 10.9 11.2 11.8   Platelets 149 - 390 Thousands/uL 326 310 330 320 309 326 299   nRBC /100 WBCs 0 0 0 0 0 0 0   Neutrophils Relative 43 - 75 % 61 57 55 67 63 58 65   Immat GRANS % 0 - 2 % 0 1 0 1 0 0 1   Lymphocytes Relative 14 - 44  % 28 31 34 22 26 30 22   Monocytes Relative 4 - 12 % 7 6 7 6 7 7 7   Eosinophils Relative 0 - 6 % 4 4 4 3 3 4 4   Basophils Relative 0 - 1 % 0 1 0 1 1 1 1   Neutrophils Absolute 1.85 - 7.62 Thousands/µL 4.54 4.74 4.16 7.10 5.60 4.84 5.65   Immature Grans Absolute 0.00 - 0.20 Thousand/uL 0.03 0.04 0.02 0.06 0.03 0.03 0.04   Lymphocytes Absolute 0.60 - 4.47 Thousands/µL 2.12 2.54 2.63 2.23 2.27 2.55 1.91   Monocytes Absolute 0.17 - 1.22 Thousand/µL 0.49 0.49 0.54 0.61 0.62 0.61 0.59   Eosinophils Absolute 0.00 - 0.61 Thousand/µL 0.31 0.30 0.32 0.29 0.28 0.34 0.38   Basophils Absolute 0.00 - 0.10 Thousands/µL 0.03 0.04 0.03 0.06 0.04 0.04 0.05              Specimen Collected: 10/13/23  3:36 PM Last Resulted: 10/13/23  8:03 PM         0 Result Notes             Component Ref Range & Units 10/13/23  3:36 PM 6/30/23  2:44 PM 3/10/23  2:39 PM 11/1/22  5:01 PM 6/24/22  8:51 AM 3/25/22  3:10 PM 12/17/21  2:41 PM   Sodium 135 - 147 mmol/L 138 139 140 140 139 R 140 R 141 R   Potassium 3.5 - 5.3 mmol/L 3.7 3.8 3.9 4.0 4.0 4.0 3.9   Chloride 96 - 108 mmol/L 107 106 106 105 108 R 104 R 105 R   CO2 21 - 32 mmol/L 27 29 29 28 29 30 28   ANION GAP mmol/L 4 4 5 R 7 R 2 Low  R 6 R 8 R   BUN 5 - 25 mg/dL 19 15 21 22 18 19 17   Creatinine 0.60 - 1.30 mg/dL 0.78 0.83 CM 0.85 CM 0.91 CM 0.94 CM 0.97 CM 0.90 CM   Comment: Standardized to IDMS reference method   Glucose 65 - 140 mg/dL 140 108   High   CM 89  High  CM   Comment: If the patient is fasting, the ADA then defines impaired fasting glucose as > 100 mg/dL and diabetes as > or equal to 123 mg/dL.   Calcium 8.4 - 10.2 mg/dL 8.7 9.2 9.2 9.1 R 9.3 R 8.9 R 8.9 R   AST 13 - 39 U/L 21 19 19 CM 26 R, CM 20 R, CM 23 R, CM 26 R, CM   ALT 7 - 52 U/L 29 27 CM 27 CM 44 R, CM 48 R, CM 48 R, CM 51 R, CM   Comment: Specimen collection should occur prior to Sulfasalazine administration due to the potential for falsely depressed results.   Alkaline Phosphatase 34 - 104 U/L 74  77 91 103 R 96 R 106 R 110 R   Total Protein 6.4 - 8.4 g/dL 6.7 6.8 6.9 7.8 7.7 R 7.6 R 7.3 R   Albumin 3.5 - 5.0 g/dL 3.7 3.8 3.8 3.2 Low  3.3 Low  3.4 Low  3.3 Low    Total Bilirubin 0.20 - 1.00 mg/dL 0.33 0.37 CM 0.26 0.28 CM 0.43 CM 0.30 CM 0.31 CM   Comment: Use of this assay is not recommended for patients undergoing treatment with eltrombopag due to the potential for falsely elevated results.  N-acetyl-p-benzoquinone imine (metabolite of Acetaminophen) will generate erroneously low results in samples for patients that have taken an overdose of Acetaminophen.   eGFR ml/min/1.73sq m 85 79 77 71 68 66 72              Narrative            Results for orders placed or performed during the hospital encounter of 11/20/23   Fingerstick Glucose (POCT)   Result Value Ref Range    POC Glucose 117 65 - 140 mg/dl     Labs, Imaging, & Other studies:   All pertinent labs and imaging studies were personally reviewed        Reviewed  test results and discussed with patient and explained.    Assessment and plan:    Follow-up visit for relapsed triple positive stage IV breast cancer and presently she is on Herceptin plus Perjeta and letrozole.  Lately there has been some disease progression in 1 area in the liver and she was evaluated for liver directed therapy and because of the location SBRT could be considered but not now and may be after 6 months after getting another scan.  Liver lesion has increased in size and she is going to go ahead with Enhertu.   She has some tiredness, anxiety and insomnia and occasionally leg cramps at night.   In 2016 she was diagnosed to have stage IV de brandon triple positive  left breast cancer  with metastatic disease to liver and bones . Patient was started on THP,  Taxotere plus Herceptin and Perjeta and after 6 cycles Taxotere was discontinued and she was continued on Herceptin and Perjeta and to that tamoxifen was added.  Patient  was premenopausal at that time.  Later tamoxifen was changed  to letrozole after she had  BSO.  She had Xgeva. She  had radiation to left hip for palliation.  She continued to respond and in September 2016 patient had lumpectomy of left breast followed by radiation therapy.  At the time of lumpectomy there was a small residual disease, 1.4 cm.  Lymph nodes were not sampled.   Patient tested positive for BRCA 1. She already had BSO.  She has been getting imaging studies for early detection of other cancers like peritoneal and pancreatic cancers.  She has been encouraged to see a dermatologist but she does not want to.  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   She has been taking vitamin-D and calcium and is staying active.  She is not on Xgeva anymore that she had for 2 years.   She has been tolerating treatments without much problem.  She goes for blood tests and echocardiogram on regular basis.    She has done well all these years and  had very good response but then in 2022 there was a new lesion in the liver on CT scan and MRI scan and on biopsy that proved to be metastatic cancer from breast, strongly positive for ER, negative for DE and 2+ for HER 2 that was negative by FISH (low positive HER2).  Patient saw radiation oncologist for local radiation to liver lesion and discussed risks and benefits and decided to be observed. Subsequent CT scan showed slight decrease in size of liver lesion from 2.7 to 2.4.  She preferred to stay on letrozole, Herceptin and Perjeta.  We did discuss ENHERTU.  Most recent CT scan  showed increase in the size of liver lesion.  She was referred to back to radiation for liver directed therapy.       No bone pains.  Has good appetite.  And she is maintaining her weight.     .  Patient knows that she is at high risk for breast and other cancers like  ovarian cancer, pancreatic cancer, peritoneal cancer, melanoma and other cancers.  Patient goes to breast surgeon for evaluation and imaging studies.  She gets CT  scan and that also checks the pancreas and peritoneum.  She had BSO.  She does not want to go to a dermatologist to check for melanoma or to ophthalmologist to check for melanoma in the eye.  She goes to her optometrist.  Also she does not want to have colonoscopy and not even Cologuard or stool test for blood.      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       She continues to take  calcium and vitamin-D  .         Physical examination and test results are as recorded and discussed.  Patient will go with Enhertu rather than waiting for 6 months because liver lesion is growing in size.  Patient received a detailed verbal and printed information on Enhertu.  She has an informed consent and arrangements are being made to get her started on Enhertu.  To decide about letrozole.  She is taking vitamin D and calcium.    She gets  echocardiogram on regular basis.  Condition discussed and explained.  Questions answered.  Also discussed the importance of self-breast examination, eating healthy foods, staying active and health screening tests.  Patient goes to her breast surgeon for examination and  imaging studies.  Patient is capable of self-care.  Goal is prolonged survival from stage IV breast cancer.  Patient has been aware of significance of positive BRCA1 mutation and cancer risks.  Patient will continue to follow with her primary physician and other consultants.  Discussed precautions against coronavirus and flu and other infections.  .  See diagnoses, orders and instructions below    1. Malignant neoplasm of upper-inner quadrant of left breast in female, estrogen receptor positive     - CBC and differential; Standing  - Comprehensive metabolic panel; Standing  - CBC  and differential  - Comprehensive metabolic panel    2. Malignant neoplasm metastatic to liver (HCC)    - CBC and differential; Standing  - Comprehensive metabolic panel; Standing  - CBC and differential  - Comprehensive metabolic panel    3. Malignant neoplasm metastatic to bone (HCC)      4. History of bilateral salpingo-oophorectomy (BSO)      5. BRCA1 gene mutation positive in female      6. 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      7. Cardiomyopathy due to chemotherapy     - Echo follow up/limited w/ contrast if indicated; Future    8. Osteoporosis due to aromatase inhibitor      9. Port-A-Cath in place      10. Aromatase inhibitor use      Changing Herceptin and Perjeta to Enhertu.  To get informed consent.  To start Enhertu in 2 weeks.  Patient will get blood work couple days prior to Enhertu every time every 3 weeks.  Ordered echocardiogram.  Follow-up in 5  weeks.        I used dictation device to dictated this note and there could be mistakes in my note and for that she may contact my office    Patient voiced understanding and agreed       Counseling / Coordination of Care   .  Provided counseling and support

## 2024-01-16 NOTE — PATIENT INSTRUCTIONS
Changing Herceptin and Perjeta to Enhertu.  To get informed consent.  To start Enhertu in 2 weeks.  Patient will get blood work couple days prior to Enhertu every time every 3 weeks.  Ordered echocardiogram.  Follow-up in 5  weeks.

## 2024-01-18 ENCOUNTER — HOSPITAL ENCOUNTER (OUTPATIENT)
Dept: NON INVASIVE DIAGNOSTICS | Facility: HOSPITAL | Age: 59
Discharge: HOME | End: 2024-01-18
Attending: INTERNAL MEDICINE
Payer: COMMERCIAL

## 2024-01-18 VITALS
BODY MASS INDEX: 36.19 KG/M2 | HEIGHT: 64 IN | DIASTOLIC BLOOD PRESSURE: 74 MMHG | SYSTOLIC BLOOD PRESSURE: 128 MMHG | HEART RATE: 69 BPM | WEIGHT: 212 LBS

## 2024-01-18 DIAGNOSIS — T45.1X5A CARDIOMYOPATHY DUE TO CHEMOTHERAPY: ICD-10-CM

## 2024-01-18 DIAGNOSIS — I42.7 CARDIOMYOPATHY DUE TO CHEMOTHERAPY: ICD-10-CM

## 2024-01-18 LAB
APICAL FOUR CHAMBER EJECTION FRACTION: 67 %
BSA FOR ECHO PROCEDURE: 2.01 M2
E WAVE DECELERATION TIME: 250 MS
E/A RATIO: 1
GLOBAL LONGITUIDAL STRAIN: -19 %
MV E'TISSUE VEL-SEP: 9 CM/S
MV PEAK A VEL: 0.77 M/S
MV PEAK E VEL: 77 CM/S
RA PRESSURE ESTIMATED: 3 MMHG
SL CV LV EF: 67

## 2024-01-18 PROCEDURE — 93325 DOPPLER ECHO COLOR FLOW MAPG: CPT

## 2024-01-18 PROCEDURE — 93356 MYOCRD STRAIN IMG SPCKL TRCK: CPT

## 2024-01-18 PROCEDURE — 93308 TTE F-UP OR LMTD: CPT

## 2024-01-18 PROCEDURE — 93321 DOPPLER ECHO F-UP/LMTD STD: CPT

## 2024-01-28 DIAGNOSIS — D70.1 CHEMOTHERAPY INDUCED NEUTROPENIA: Primary | ICD-10-CM

## 2024-01-28 DIAGNOSIS — T45.1X5A CHEMOTHERAPY INDUCED NEUTROPENIA: Primary | ICD-10-CM

## 2024-01-28 RX ORDER — DEXTROSE MONOHYDRATE 50 MG/ML
20 INJECTION, SOLUTION INTRAVENOUS ONCE
Status: CANCELLED | OUTPATIENT
Start: 2024-02-02

## 2024-02-01 ENCOUNTER — HOSPITAL ENCOUNTER (OUTPATIENT)
Dept: INFUSION CENTER | Facility: CLINIC | Age: 59
Discharge: HOME | End: 2024-02-01
Payer: COMMERCIAL

## 2024-02-01 VITALS — TEMPERATURE: 98.4 F

## 2024-02-01 DIAGNOSIS — Z17.0 MALIGNANT NEOPLASM OF UPPER-INNER QUADRANT OF LEFT BREAST IN FEMALE, ESTROGEN RECEPTOR POSITIVE: ICD-10-CM

## 2024-02-01 DIAGNOSIS — C78.7 MALIGNANT NEOPLASM METASTATIC TO LIVER (HCC): ICD-10-CM

## 2024-02-01 DIAGNOSIS — D70.1 CHEMOTHERAPY INDUCED NEUTROPENIA: ICD-10-CM

## 2024-02-01 DIAGNOSIS — C50.212 MALIGNANT NEOPLASM OF UPPER-INNER QUADRANT OF LEFT BREAST IN FEMALE, ESTROGEN RECEPTOR POSITIVE: ICD-10-CM

## 2024-02-01 DIAGNOSIS — Z95.828 PORT-A-CATH IN PLACE: Primary | ICD-10-CM

## 2024-02-01 DIAGNOSIS — Z45.2 ENCOUNTER FOR CARE RELATED TO VASCULAR ACCESS PORT: ICD-10-CM

## 2024-02-01 DIAGNOSIS — C79.51 MALIGNANT NEOPLASM METASTATIC TO BONE (HCC): ICD-10-CM

## 2024-02-01 DIAGNOSIS — Z90.722 HISTORY OF BILATERAL SALPINGO-OOPHORECTOMY (BSO): ICD-10-CM

## 2024-02-01 DIAGNOSIS — Z90.79 HISTORY OF BILATERAL SALPINGO-OOPHORECTOMY (BSO): ICD-10-CM

## 2024-02-01 DIAGNOSIS — T45.1X5A CHEMOTHERAPY INDUCED NEUTROPENIA: ICD-10-CM

## 2024-02-01 LAB
ALBUMIN SERPL BCP-MCNC: 3.7 G/DL (ref 3.5–5)
ALP SERPL-CCNC: 66 U/L (ref 34–104)
ALT SERPL W P-5'-P-CCNC: 27 U/L (ref 7–52)
ANION GAP SERPL CALCULATED.3IONS-SCNC: 4 MMOL/L
AST SERPL W P-5'-P-CCNC: 18 U/L (ref 13–39)
BASOPHILS # BLD AUTO: 0.03 THOUSANDS/ÂΜL (ref 0–0.1)
BASOPHILS NFR BLD AUTO: 0 % (ref 0–1)
BILIRUB SERPL-MCNC: 0.29 MG/DL (ref 0.2–1)
BUN SERPL-MCNC: 18 MG/DL (ref 5–25)
CALCIUM SERPL-MCNC: 8.9 MG/DL (ref 8.4–10.2)
CHLORIDE SERPL-SCNC: 108 MMOL/L (ref 96–108)
CO2 SERPL-SCNC: 29 MMOL/L (ref 21–32)
CREAT SERPL-MCNC: 0.88 MG/DL (ref 0.6–1.3)
EOSINOPHIL # BLD AUTO: 0.34 THOUSAND/ÂΜL (ref 0–0.61)
EOSINOPHIL NFR BLD AUTO: 5 % (ref 0–6)
ERYTHROCYTE [DISTWIDTH] IN BLOOD BY AUTOMATED COUNT: 14.4 % (ref 11.6–15.1)
GFR SERPL CREATININE-BSD FRML MDRD: 73 ML/MIN/1.73SQ M
GLUCOSE SERPL-MCNC: 134 MG/DL (ref 65–140)
HCT VFR BLD AUTO: 42 % (ref 34.8–46.1)
HGB BLD-MCNC: 13.1 G/DL (ref 11.5–15.4)
IMM GRANULOCYTES # BLD AUTO: 0.02 THOUSAND/UL (ref 0–0.2)
IMM GRANULOCYTES NFR BLD AUTO: 0 % (ref 0–2)
LYMPHOCYTES # BLD AUTO: 2.05 THOUSANDS/ÂΜL (ref 0.6–4.47)
LYMPHOCYTES NFR BLD AUTO: 29 % (ref 14–44)
MCH RBC QN AUTO: 27.1 PG (ref 26.8–34.3)
MCHC RBC AUTO-ENTMCNC: 31.2 G/DL (ref 31.4–37.4)
MCV RBC AUTO: 87 FL (ref 82–98)
MONOCYTES # BLD AUTO: 0.59 THOUSAND/ÂΜL (ref 0.17–1.22)
MONOCYTES NFR BLD AUTO: 8 % (ref 4–12)
NEUTROPHILS # BLD AUTO: 4.16 THOUSANDS/ÂΜL (ref 1.85–7.62)
NEUTS SEG NFR BLD AUTO: 58 % (ref 43–75)
NRBC BLD AUTO-RTO: 0 /100 WBCS
PLATELET # BLD AUTO: 308 THOUSANDS/UL (ref 149–390)
PMV BLD AUTO: 11.5 FL (ref 8.9–12.7)
POTASSIUM SERPL-SCNC: 4 MMOL/L (ref 3.5–5.3)
PROT SERPL-MCNC: 6.5 G/DL (ref 6.4–8.4)
RBC # BLD AUTO: 4.83 MILLION/UL (ref 3.81–5.12)
SODIUM SERPL-SCNC: 141 MMOL/L (ref 135–147)
WBC # BLD AUTO: 7.19 THOUSAND/UL (ref 4.31–10.16)

## 2024-02-01 PROCEDURE — 85025 COMPLETE CBC W/AUTO DIFF WBC: CPT | Performed by: INTERNAL MEDICINE

## 2024-02-01 PROCEDURE — 80053 COMPREHEN METABOLIC PANEL: CPT | Performed by: INTERNAL MEDICINE

## 2024-02-01 NOTE — PROGRESS NOTES
Alejandrina Barba  tolerated treatment well with no complications.      Alejandrina Barba is aware of future appt on 2/2/24 at 1300.     Patient declined AVS.

## 2024-02-01 NOTE — PLAN OF CARE
Problem: INFECTION - ADULT  Goal: Absence or prevention of progression during hospitalization  Description: INTERVENTIONS:  - Assess and monitor for signs and symptoms of infection  - Monitor lab/diagnostic results  - Monitor all insertion sites, i.e. indwelling lines, tubes, and drains  - Monitor endotracheal if appropriate and nasal secretions for changes in amount and color  - [place] appropriate cooling/warming therapies per order  - Administer medications as ordered  - Instruct and encourage patient and family to use good hand hygiene technique  - Identify and instruct in appropriate isolation precautions for identified infection/condition  Outcome: Progressing  Goal: Absence of fever/infection during neutropenic period  Description: INTERVENTIONS:  - Monitor WBC    Outcome: Progressing

## 2024-02-02 ENCOUNTER — HOSPITAL ENCOUNTER (OUTPATIENT)
Dept: INFUSION CENTER | Facility: CLINIC | Age: 59
End: 2024-02-02
Payer: COMMERCIAL

## 2024-02-02 VITALS
DIASTOLIC BLOOD PRESSURE: 89 MMHG | SYSTOLIC BLOOD PRESSURE: 160 MMHG | RESPIRATION RATE: 18 BRPM | WEIGHT: 212.08 LBS | HEART RATE: 67 BPM | TEMPERATURE: 97.7 F | HEIGHT: 65 IN | BODY MASS INDEX: 35.34 KG/M2

## 2024-02-02 DIAGNOSIS — Z17.0 MALIGNANT NEOPLASM OF UPPER-INNER QUADRANT OF LEFT BREAST IN FEMALE, ESTROGEN RECEPTOR POSITIVE: ICD-10-CM

## 2024-02-02 DIAGNOSIS — C50.212 MALIGNANT NEOPLASM OF UPPER-INNER QUADRANT OF LEFT BREAST IN FEMALE, ESTROGEN RECEPTOR POSITIVE: ICD-10-CM

## 2024-02-02 DIAGNOSIS — C78.7 MALIGNANT NEOPLASM METASTATIC TO LIVER (HCC): ICD-10-CM

## 2024-02-02 DIAGNOSIS — I42.7 CARDIOMYOPATHY DUE TO CHEMOTHERAPY: Primary | ICD-10-CM

## 2024-02-02 DIAGNOSIS — T45.1X5A CHEMOTHERAPY INDUCED NEUTROPENIA: ICD-10-CM

## 2024-02-02 DIAGNOSIS — D70.1 CHEMOTHERAPY INDUCED NEUTROPENIA: ICD-10-CM

## 2024-02-02 DIAGNOSIS — T45.1X5A CARDIOMYOPATHY DUE TO CHEMOTHERAPY: Primary | ICD-10-CM

## 2024-02-02 DIAGNOSIS — C79.51 MALIGNANT NEOPLASM METASTATIC TO BONE (HCC): ICD-10-CM

## 2024-02-02 DIAGNOSIS — Z90.722 HISTORY OF BILATERAL SALPINGO-OOPHORECTOMY (BSO): ICD-10-CM

## 2024-02-02 DIAGNOSIS — Z90.79 HISTORY OF BILATERAL SALPINGO-OOPHORECTOMY (BSO): ICD-10-CM

## 2024-02-02 PROCEDURE — 96415 CHEMO IV INFUSION ADDL HR: CPT

## 2024-02-02 PROCEDURE — 96367 TX/PROPH/DG ADDL SEQ IV INF: CPT

## 2024-02-02 PROCEDURE — 96413 CHEMO IV INFUSION 1 HR: CPT

## 2024-02-02 RX ORDER — DEXTROSE MONOHYDRATE 50 MG/ML
20 INJECTION, SOLUTION INTRAVENOUS ONCE
Status: COMPLETED | OUTPATIENT
Start: 2024-02-02 | End: 2024-02-02

## 2024-02-02 RX ADMIN — DEXTROSE 20 ML/HR: 5 SOLUTION INTRAVENOUS at 13:16

## 2024-02-02 RX ADMIN — ONDANSETRON: 2 INJECTION INTRAMUSCULAR; INTRAVENOUS at 13:16

## 2024-02-02 RX ADMIN — FAM-TRASTUZUMAB DERUXTECAN-NXKI 500 MG: 100 INJECTION, POWDER, LYOPHILIZED, FOR SOLUTION INTRAVENOUS at 13:41

## 2024-02-02 NOTE — PROGRESS NOTES
Pt tolerated treatment without incident. Pt declined AVS, aware of next appt 2/22 at 8am for labs.

## 2024-02-18 RX ORDER — DEXTROSE MONOHYDRATE 50 MG/ML
20 INJECTION, SOLUTION INTRAVENOUS ONCE
Status: CANCELLED | OUTPATIENT
Start: 2024-02-23

## 2024-02-20 ENCOUNTER — TELEPHONE (OUTPATIENT)
Dept: HEMATOLOGY ONCOLOGY | Facility: CLINIC | Age: 59
End: 2024-02-20

## 2024-02-20 NOTE — TELEPHONE ENCOUNTER
Appointment Change  Cancel, Reschedule, Change to Virtual      Who are you speaking with? Patient   If it is not the patient, is the caller listed on the communication consent form? N/A   Which provider is the appointment scheduled with? Dr. Benavidez   When was the original appointment scheduled?    Please list date and time 3/8/24 4:00 PM   At which location is the appointment scheduled to take place? [facility]   Was the appointment rescheduled?     Was the appointment changed from an in person visit to a virtual visit?    If so, please list the details of the change. 2/27/24 9:00 AM  Lian   What is the reason for the appointment change? Pt was not feeling well after he last treatment and pt wanted to see Dr Benavidez sooner       Was STAR transport scheduled? No   Does STAR transport need to be scheduled for the new visit (if applicable) No   Does the patient need an infusion appointment rescheduled? No   Does the patient have an upcoming infusion appointment scheduled? If so, when? Yes, 2/22/24 8:00 AM   Is the patient undergoing chemotherapy? Yes   For appointments cancelled with less than 24 hours:  Was the no-show policy reviewed? Yes

## 2024-02-22 ENCOUNTER — HOSPITAL ENCOUNTER (OUTPATIENT)
Dept: INFUSION CENTER | Facility: CLINIC | Age: 59
Discharge: HOME | End: 2024-02-22
Payer: COMMERCIAL

## 2024-02-22 DIAGNOSIS — C50.212 MALIGNANT NEOPLASM OF UPPER-INNER QUADRANT OF LEFT BREAST IN FEMALE, ESTROGEN RECEPTOR POSITIVE: ICD-10-CM

## 2024-02-22 DIAGNOSIS — Z95.828 PORT-A-CATH IN PLACE: Primary | ICD-10-CM

## 2024-02-22 DIAGNOSIS — Z17.0 MALIGNANT NEOPLASM OF UPPER-INNER QUADRANT OF LEFT BREAST IN FEMALE, ESTROGEN RECEPTOR POSITIVE: ICD-10-CM

## 2024-02-22 DIAGNOSIS — C78.7 MALIGNANT NEOPLASM METASTATIC TO LIVER (HCC): ICD-10-CM

## 2024-02-22 DIAGNOSIS — Z45.2 ENCOUNTER FOR CARE RELATED TO VASCULAR ACCESS PORT: ICD-10-CM

## 2024-02-22 LAB
ALBUMIN SERPL BCP-MCNC: 3.6 G/DL (ref 3.5–5)
ALP SERPL-CCNC: 82 U/L (ref 34–104)
ALT SERPL W P-5'-P-CCNC: 39 U/L (ref 7–52)
ANION GAP SERPL CALCULATED.3IONS-SCNC: 4 MMOL/L
AST SERPL W P-5'-P-CCNC: 20 U/L (ref 13–39)
BASOPHILS # BLD AUTO: 0.07 THOUSANDS/ÂΜL (ref 0–0.1)
BASOPHILS NFR BLD AUTO: 1 % (ref 0–1)
BILIRUB SERPL-MCNC: 0.28 MG/DL (ref 0.2–1)
BUN SERPL-MCNC: 17 MG/DL (ref 5–25)
CALCIUM SERPL-MCNC: 9.1 MG/DL (ref 8.4–10.2)
CHLORIDE SERPL-SCNC: 109 MMOL/L (ref 96–108)
CO2 SERPL-SCNC: 28 MMOL/L (ref 21–32)
CREAT SERPL-MCNC: 0.77 MG/DL (ref 0.6–1.3)
EOSINOPHIL # BLD AUTO: 0.33 THOUSAND/ÂΜL (ref 0–0.61)
EOSINOPHIL NFR BLD AUTO: 5 % (ref 0–6)
ERYTHROCYTE [DISTWIDTH] IN BLOOD BY AUTOMATED COUNT: 14.9 % (ref 11.6–15.1)
GFR SERPL CREATININE-BSD FRML MDRD: 86 ML/MIN/1.73SQ M
GLUCOSE SERPL-MCNC: 135 MG/DL (ref 65–140)
HCT VFR BLD AUTO: 41.6 % (ref 34.8–46.1)
HGB BLD-MCNC: 12.9 G/DL (ref 11.5–15.4)
IMM GRANULOCYTES # BLD AUTO: 0.1 THOUSAND/UL (ref 0–0.2)
IMM GRANULOCYTES NFR BLD AUTO: 2 % (ref 0–2)
LYMPHOCYTES # BLD AUTO: 2.11 THOUSANDS/ÂΜL (ref 0.6–4.47)
LYMPHOCYTES NFR BLD AUTO: 34 % (ref 14–44)
MCH RBC QN AUTO: 26.8 PG (ref 26.8–34.3)
MCHC RBC AUTO-ENTMCNC: 31 G/DL (ref 31.4–37.4)
MCV RBC AUTO: 86 FL (ref 82–98)
MONOCYTES # BLD AUTO: 0.76 THOUSAND/ÂΜL (ref 0.17–1.22)
MONOCYTES NFR BLD AUTO: 12 % (ref 4–12)
NEUTROPHILS # BLD AUTO: 2.79 THOUSANDS/ÂΜL (ref 1.85–7.62)
NEUTS SEG NFR BLD AUTO: 46 % (ref 43–75)
NRBC BLD AUTO-RTO: 0 /100 WBCS
PLATELET # BLD AUTO: 605 THOUSANDS/UL (ref 149–390)
PMV BLD AUTO: 10.5 FL (ref 8.9–12.7)
POTASSIUM SERPL-SCNC: 3.9 MMOL/L (ref 3.5–5.3)
PROT SERPL-MCNC: 6.5 G/DL (ref 6.4–8.4)
RBC # BLD AUTO: 4.82 MILLION/UL (ref 3.81–5.12)
SODIUM SERPL-SCNC: 141 MMOL/L (ref 135–147)
WBC # BLD AUTO: 6.16 THOUSAND/UL (ref 4.31–10.16)

## 2024-02-22 PROCEDURE — 85025 COMPLETE CBC W/AUTO DIFF WBC: CPT

## 2024-02-22 PROCEDURE — 80053 COMPREHEN METABOLIC PANEL: CPT

## 2024-02-22 NOTE — PROGRESS NOTES
"Pt arrived to unit for central labs, pt upset about her reaction to her first cycle of Enhertu given on 2/2/24. Pt states she did not tolerate it at all. Pt states she had 10 days of N/V/D with fatigue. Pt states she could not get out of bed. Pt states she was afraid she was dying. Pt did not  report any of her symptoms to her provider as she \"knew it would get better and she did not want to complain\". Pt states symptoms have been resolving but she does not feel back to baseline. Pt informed me that she is not coming for her treatment tomorrow, she can not handle it. I inquired if she communicated this to her provider. She stated she did not but that she would discuss with Dr Benavidez at her appt next Tuesday. I encouraged pt to always reach out to her doctor and care team if symptoms like this happen again. In the meantime I communicated pt's concerns to Dr Benavidez via Olivia ERNST RN. OK given to cancel pt's appt tomorrow and Dr Benavidez will discuss future plans with pt next Tuesday. Pt's CBC and CMP were drawn for monitoring purposes, pt tolerated well. Future appts not discussed as I do not know her plan.   "

## 2024-02-23 ENCOUNTER — HOSPITAL ENCOUNTER (OUTPATIENT)
Dept: INFUSION CENTER | Facility: CLINIC | Age: 59
End: 2024-02-23

## 2024-02-27 ENCOUNTER — OFFICE VISIT (OUTPATIENT)
Dept: HEMATOLOGY ONCOLOGY | Facility: CLINIC | Age: 59
End: 2024-02-27
Payer: COMMERCIAL

## 2024-02-27 VITALS
HEART RATE: 86 BPM | DIASTOLIC BLOOD PRESSURE: 80 MMHG | HEIGHT: 65 IN | OXYGEN SATURATION: 98 % | WEIGHT: 205 LBS | BODY MASS INDEX: 34.16 KG/M2 | RESPIRATION RATE: 17 BRPM | SYSTOLIC BLOOD PRESSURE: 140 MMHG | TEMPERATURE: 97.8 F

## 2024-02-27 DIAGNOSIS — M81.8 OSTEOPOROSIS DUE TO AROMATASE INHIBITOR: ICD-10-CM

## 2024-02-27 DIAGNOSIS — Z15.01 BRCA1 GENE MUTATION POSITIVE IN FEMALE: ICD-10-CM

## 2024-02-27 DIAGNOSIS — Z95.828 PORT-A-CATH IN PLACE: ICD-10-CM

## 2024-02-27 DIAGNOSIS — Z15.09 BRCA1 GENE MUTATION POSITIVE IN FEMALE: ICD-10-CM

## 2024-02-27 DIAGNOSIS — Z90.722 HISTORY OF BILATERAL SALPINGO-OOPHORECTOMY (BSO): ICD-10-CM

## 2024-02-27 DIAGNOSIS — Z79.811 AROMATASE INHIBITOR USE: ICD-10-CM

## 2024-02-27 DIAGNOSIS — Z15.02 BRCA1 GENE MUTATION POSITIVE IN FEMALE: ICD-10-CM

## 2024-02-27 DIAGNOSIS — T45.1X5A CARDIOMYOPATHY DUE TO CHEMOTHERAPY: ICD-10-CM

## 2024-02-27 DIAGNOSIS — T45.1X5A CHEMOTHERAPY INDUCED DIARRHEA: ICD-10-CM

## 2024-02-27 DIAGNOSIS — Z90.79 HISTORY OF BILATERAL SALPINGO-OOPHORECTOMY (BSO): ICD-10-CM

## 2024-02-27 DIAGNOSIS — D70.1 CHEMOTHERAPY INDUCED NEUTROPENIA: ICD-10-CM

## 2024-02-27 DIAGNOSIS — C78.7 MALIGNANT NEOPLASM METASTATIC TO LIVER (HCC): ICD-10-CM

## 2024-02-27 DIAGNOSIS — R11.0 CHEMOTHERAPY-INDUCED NAUSEA: ICD-10-CM

## 2024-02-27 DIAGNOSIS — T38.6X5A OSTEOPOROSIS DUE TO AROMATASE INHIBITOR: ICD-10-CM

## 2024-02-27 DIAGNOSIS — I42.7 CARDIOMYOPATHY DUE TO CHEMOTHERAPY: ICD-10-CM

## 2024-02-27 DIAGNOSIS — T45.1X5A CHEMOTHERAPY-INDUCED NAUSEA: ICD-10-CM

## 2024-02-27 DIAGNOSIS — C79.51 MALIGNANT NEOPLASM METASTATIC TO BONE (HCC): ICD-10-CM

## 2024-02-27 DIAGNOSIS — C50.212 MALIGNANT NEOPLASM OF UPPER-INNER QUADRANT OF LEFT BREAST IN FEMALE, ESTROGEN RECEPTOR POSITIVE: Primary | ICD-10-CM

## 2024-02-27 DIAGNOSIS — Z14.8 CARRIER OF GENE MUTATION FOR HIGH RISK OF CANCER: ICD-10-CM

## 2024-02-27 DIAGNOSIS — K52.1 CHEMOTHERAPY INDUCED DIARRHEA: ICD-10-CM

## 2024-02-27 DIAGNOSIS — T45.1X5A CHEMOTHERAPY INDUCED NEUTROPENIA: ICD-10-CM

## 2024-02-27 DIAGNOSIS — Z17.0 MALIGNANT NEOPLASM OF UPPER-INNER QUADRANT OF LEFT BREAST IN FEMALE, ESTROGEN RECEPTOR POSITIVE: Primary | ICD-10-CM

## 2024-02-27 PROCEDURE — 99215 OFFICE O/P EST HI 40 MIN: CPT | Performed by: INTERNAL MEDICINE

## 2024-02-27 NOTE — PROGRESS NOTES
HPI: Continuation of care.  Patient has relapsed triple positive stage IV breast cancer and most recent treatment was with Enhertu and she had only 1 treatment and 1/30/2024.  She did not go for second treatment that was scheduled on 2/23/2024 because she was having symptoms from Enhertu. After the first cycle of treatment she had GI symptoms, increased tiredness and blurred vision.  Symptoms improved after 10 days.  Next treatment will be at a reduced dose and if she still had problem Enhertu will be discontinued and treatment will be switched.  I explained this to the patient.  No pulmonary symptoms.  Prior to Enhertu she was on  Herceptin plus Perjeta and letrozole.  She will stay on letrozole.    Lately there was disease progression in 1 area in the liver and she was evaluated for liver directed therapy and because of the location SBRT was considered but not now and may be after 6 months after getting another scan.  Liver lesion  increased in size and she was started on Enhertu.    She has  tiredness, anxiety and insomnia and occasionally leg cramps at night.   In 2016 she was diagnosed to have stage IV de brandon triple positive  left breast cancer  with metastatic disease to liver and bones . Patient was started on THP,  Taxotere plus Herceptin and Perjeta and after 6 cycles Taxotere was discontinued and she was continued on Herceptin and Perjeta and to that tamoxifen was added.  Patient  was premenopausal at that time.  Later tamoxifen was changed to letrozole after she had  BSO.  She had Xgeva. She  had radiation to left hip for palliation.  She continued to respond and in September 2016 patient had lumpectomy of left breast followed by radiation therapy.  At the time of lumpectomy there was a small residual disease, 1.4 cm.  Lymph nodes were not sampled.   Patient tested positive for BRCA 1. She already had BSO.  She has been getting imaging studies for early detection of other cancers like peritoneal and  pancreatic cancers.  She has been encouraged to see a dermatologist but she does not want to.  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   She has been taking vitamin-D and calcium and is staying active.  She is not on Xgeva anymore that she had for 2 years.   She has been tolerating treatments without much problem.  She goes for blood tests and echocardiogram on regular basis.    She has done well all these years and  had very good response but then in 2022 there was a new lesion in the liver on CT scan and MRI scan and on biopsy that proved to be metastatic cancer from breast, strongly positive for ER, negative for MT and 2+ for HER 2 that was negative by FISH (low positive HER2).  Patient saw radiation oncologist for local radiation to liver lesion and discussed risks and benefits and decided to be observed. Subsequent CT scan showed slight decrease in size of liver lesion from 2.7 to 2.4.  She preferred to stay on letrozole, Herceptin and Perjeta.  We did discuss ENHERTU.  Most recent CT scan  showed increase in the size of liver lesion.  She was referred to back to radiation for liver directed therapy.  SBRT was considered but was not given.  On 1/30/2024 she had first and only dose of Enhertu as above.     No bone pains.  Appetite has decreased.  She has lost some weight.     .  Patient knows that she is at high risk for breast and other cancers like  ovarian cancer, pancreatic cancer, peritoneal cancer, melanoma and other cancers.  Patient goes to breast surgeon for evaluation and imaging studies.  She gets CT scan and that also checks the pancreas and peritoneum.  She had BSO.  She does not want to go to a dermatologist to check for melanoma or to ophthalmologist to check for melanoma in the eye.  She goes to her optometrist.  Also she does not want to have colonoscopy and not even Cologuard or stool test for blood.      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       She continues to take  calcium and vitamin-D  .                           Current Outpatient Medications:   •  letrozole (FEMARA) 2.5 mg tablet, Take 1 tablet (2.5 mg total) by mouth daily, Disp: 90 tablet, Rfl: 2  •  Multiple Vitamins-Minerals (Theragran-M Premier 50 Plus) TABS, as directed, Disp: , Rfl:   •  multivitamin (THERAGRAN) TABS, Take 1 tablet by mouth 3 (three) times a week , Disp: , Rfl:   •  ondansetron (ZOFRAN) 4 mg tablet, Take 1 tablet (4 mg total) by mouth every 8 (eight) hours as needed for nausea or vomiting, Disp: 30 tablet, Rfl: 1  •  pertuzumab (PERJETA) 420 mg/14 mL SOLN, Infuse into a venous catheter every 21 days At infusion  center, Disp: , Rfl:   •  Trastuzumab (HERCEPTIN IV), Infuse into a venous catheter every 21 days At infusion center, Disp: , Rfl:   •  VITAMIN D, CHOLECALCIFEROL, PO, Take 1,000 Units by mouth 3 (three) times a week , Disp: , Rfl:     No Known Allergies    Oncology History   Malignant neoplasm of upper-inner quadrant of left female breast (HCC)   12/2015 Initial Diagnosis    Malignant neoplasm of upper-outer quadrant of left female breast (HCC)     2016 -  Hormone Therapy    Tamoxifen  Ended 8/2019.    Dr Benavidez  Letrozole 9/2019.     1/27/2016 Surgery    Port placement     2/19/2016 - 6/2/2018 Chemotherapy    Taxotere,  herceptin, perjeta, xgeva.   After six cycles, taxotere was discontinued and tamoxifen added.   Continues with Perjeta and Herceptin every 3 weeks     - Dr Benavidez       9/16/2016 Surgery    Left breast partial mastectomy     11/7/2016 - 12/23/2016 Radiation    Plan ID Energy Fractions Dose per Fraction (cGy) Total Dose Delivered (cGy) Elapsed Days   Lt Breast 6X 25 / 25 200 5,000 36   Lt Brst Boost 6X 8 /  8 200 1,600 9   Lt Femur 10X 10 / 10 300 3,000 11   Lt PAB 6X 25 / 25 60 1,500 36   Lt Sclav 6X 25 / 25 200 5,000 36                                   Total dose to left breast lumpectomy cavity: 6600 cGy                   Total dose to the supraclavicular and axillary regions: 5000 cGy       4/17/2019 - 1/12/2024 Chemotherapy    pertuzumab (PERJETA) IVPB, 840 mg, Intravenous, Once, 81 of 82 cycles  Administration: 420 mg (5/17/2019), 420 mg (6/7/2019), 420 mg (6/28/2019), 420 mg (7/19/2019), 420 mg (8/30/2019), 420 mg (9/20/2019), 420 mg (8/9/2019), 420 mg (10/9/2019), 420 mg (11/1/2019), 420 mg (11/22/2019), 420 mg (12/13/2019), 420 mg (1/3/2020), 420 mg (1/24/2020), 420 mg (2/14/2020), 420 mg (3/6/2020), 420 mg (3/27/2020), 420 mg (4/17/2020), 420 mg (5/8/2020), 420 mg (5/29/2020), 420 mg (6/19/2020), 420 mg (7/10/2020), 420 mg (7/31/2020), 420 mg (8/21/2020), 420 mg (9/11/2020), 420 mg (10/2/2020), 420 mg (10/23/2020), 420 mg (11/13/2020), 420 mg (12/4/2020), 420 mg (12/24/2020), 420 mg (1/15/2021), 420 mg (2/5/2021), 420 mg (2/26/2021), 420 mg (3/19/2021), 420 mg (4/9/2021), 420 mg (4/30/2021), 420 mg (5/21/2021), 420 mg (6/11/2021), 420 mg (7/2/2021), 420 mg (7/23/2021), 420 mg (8/13/2021), 420 mg (9/3/2021), 420 mg (9/24/2021), 420 mg (10/15/2021), 420 mg (11/5/2021), 420 mg (11/26/2021), 420 mg (12/17/2021), 420 mg (1/7/2022), 420 mg (1/28/2022), 420 mg (2/18/2022), 420 mg (3/11/2022), 420 mg (4/1/2022), 420 mg (5/13/2022), 420 mg (6/3/2022), 420 mg (6/28/2022), 420 mg (7/22/2022), 420 mg (9/2/2022), 420 mg (9/23/2022), 420 mg (8/12/2022), 420 mg (10/14/2022), 420 mg (11/4/2022), 420 mg (11/25/2022), 420 mg (12/15/2022), 420 mg (1/6/2023), 420 mg (1/27/2023), 420 mg (2/17/2023), 420 mg (3/10/2023), 420 mg (3/31/2023), 420 mg (4/21/2023), 420 mg (5/19/2023), 420 mg (6/13/2023), 420 mg (6/30/2023), 420 mg (7/21/2023), 420 mg (8/11/2023), 420 mg (9/1/2023), 420 mg (9/22/2023), 420 mg (10/13/2023), 420 mg  (11/3/2023), 420 mg (11/24/2023), 420 mg (12/22/2023), 420 mg (1/12/2024)  trastuzumab (HERCEPTIN) chemo infusion, 6 mg/kg = 589 mg (75 % of original dose 8 mg/kg), Intravenous, Once, 81 of 82 cycles  Dose modification: 6 mg/kg (original dose 8 mg/kg, Cycle 1, Reason: Other (Must fill in a comment))  Administration: 589 mg (5/17/2019), 589 mg (6/7/2019), 600 mg (6/28/2019), 600 mg (7/19/2019), 600 mg (8/30/2019), 600 mg (9/20/2019), 600 mg (8/9/2019), 600 mg (10/9/2019), 600 mg (11/1/2019), 600 mg (11/22/2019), 600 mg (12/13/2019), 600 mg (1/3/2020), 600 mg (1/24/2020), 600 mg (2/14/2020), 600 mg (3/6/2020), 600 mg (3/27/2020), 600 mg (4/17/2020), 600 mg (5/8/2020), 600 mg (5/29/2020), 600 mg (6/19/2020), 600 mg (7/10/2020), 600 mg (7/31/2020), 600 mg (8/21/2020), 600 mg (9/11/2020), 600 mg (10/2/2020), 600 mg (10/23/2020), 600 mg (11/13/2020), 600 mg (12/4/2020), 600 mg (12/24/2020), 600 mg (1/15/2021), 600 mg (2/5/2021), 600 mg (2/26/2021), 600 mg (3/19/2021), 600 mg (4/9/2021), 600 mg (4/30/2021), 600 mg (5/21/2021), 600 mg (6/11/2021), 600 mg (7/2/2021), 600 mg (7/23/2021), 600 mg (8/13/2021), 600 mg (9/3/2021), 600 mg (9/24/2021), 600 mg (10/15/2021), 600 mg (11/5/2021), 600 mg (11/26/2021), 600 mg (12/17/2021), 600 mg (1/7/2022), 600 mg (1/28/2022), 600 mg (2/18/2022), 600 mg (3/11/2022), 600 mg (4/1/2022), 600 mg (5/13/2022), 600 mg (6/3/2022), 600 mg (6/28/2022), 600 mg (7/22/2022), 600 mg (9/2/2022), 600 mg (9/23/2022), 600 mg (8/12/2022), 600 mg (10/14/2022), 600 mg (11/4/2022), 600 mg (11/25/2022), 600 mg (12/15/2022), 600 mg (1/6/2023), 600 mg (1/27/2023), 600 mg (2/17/2023), 600 mg (3/10/2023), 600 mg (3/31/2023), 600 mg (4/21/2023), 600 mg (5/19/2023), 600 mg (6/13/2023), 600 mg (6/30/2023), 600 mg (7/21/2023), 600 mg (8/11/2023), 600 mg (9/1/2023), 600 mg (9/22/2023), 600 mg (10/13/2023), 600 mg (11/3/2023), 600 mg (11/24/2023), 600 mg (12/22/2023), 600 mg (1/12/2024)     8/16/2019 Surgery    she  underwent BSO.     7/5/2022 -  Cancer Staged    Staging form: Breast, AJCC 8th Edition  - Clinical: Stage IV (rcTX, cNX, pM1) - Signed by Dejuan Hernandez MD on 7/5/2022  Stage prefix: Recurrence       2/2/2024 -  Chemotherapy    alteplase (CATHFLO), 2 mg, Intracatheter, Every 1 Minute as needed, 1 of 5 cycles  fam-trastuzumab deruxtecan-nxki (ENHERTU) IVPB, 520 mg, Intravenous, Once, 1 of 5 cycles  Dose modification: 4.4 mg/kg (original dose 5.4 mg/kg, Cycle 3, Reason: Dose modified as per discussion with consulting physician)  Administration: 500 mg (2/2/2024)     Malignant neoplasm metastatic to liver (HCC)   4/27/2018 Initial Diagnosis    Liver metastases (HCC)     4/17/2019 - 1/12/2024 Chemotherapy    pertuzumab (PERJETA) IVPB, 840 mg, Intravenous, Once, 81 of 82 cycles  Administration: 420 mg (5/17/2019), 420 mg (6/7/2019), 420 mg (6/28/2019), 420 mg (7/19/2019), 420 mg (8/30/2019), 420 mg (9/20/2019), 420 mg (8/9/2019), 420 mg (10/9/2019), 420 mg (11/1/2019), 420 mg (11/22/2019), 420 mg (12/13/2019), 420 mg (1/3/2020), 420 mg (1/24/2020), 420 mg (2/14/2020), 420 mg (3/6/2020), 420 mg (3/27/2020), 420 mg (4/17/2020), 420 mg (5/8/2020), 420 mg (5/29/2020), 420 mg (6/19/2020), 420 mg (7/10/2020), 420 mg (7/31/2020), 420 mg (8/21/2020), 420 mg (9/11/2020), 420 mg (10/2/2020), 420 mg (10/23/2020), 420 mg (11/13/2020), 420 mg (12/4/2020), 420 mg (12/24/2020), 420 mg (1/15/2021), 420 mg (2/5/2021), 420 mg (2/26/2021), 420 mg (3/19/2021), 420 mg (4/9/2021), 420 mg (4/30/2021), 420 mg (5/21/2021), 420 mg (6/11/2021), 420 mg (7/2/2021), 420 mg (7/23/2021), 420 mg (8/13/2021), 420 mg (9/3/2021), 420 mg (9/24/2021), 420 mg (10/15/2021), 420 mg (11/5/2021), 420 mg (11/26/2021), 420 mg (12/17/2021), 420 mg (1/7/2022), 420 mg (1/28/2022), 420 mg (2/18/2022), 420 mg (3/11/2022), 420 mg (4/1/2022), 420 mg (5/13/2022), 420 mg (6/3/2022), 420 mg (6/28/2022), 420 mg (7/22/2022), 420 mg (9/2/2022), 420 mg (9/23/2022), 420 mg  (8/12/2022), 420 mg (10/14/2022), 420 mg (11/4/2022), 420 mg (11/25/2022), 420 mg (12/15/2022), 420 mg (1/6/2023), 420 mg (1/27/2023), 420 mg (2/17/2023), 420 mg (3/10/2023), 420 mg (3/31/2023), 420 mg (4/21/2023), 420 mg (5/19/2023), 420 mg (6/13/2023), 420 mg (6/30/2023), 420 mg (7/21/2023), 420 mg (8/11/2023), 420 mg (9/1/2023), 420 mg (9/22/2023), 420 mg (10/13/2023), 420 mg (11/3/2023), 420 mg (11/24/2023), 420 mg (12/22/2023), 420 mg (1/12/2024)  trastuzumab (HERCEPTIN) chemo infusion, 6 mg/kg = 589 mg (75 % of original dose 8 mg/kg), Intravenous, Once, 81 of 82 cycles  Dose modification: 6 mg/kg (original dose 8 mg/kg, Cycle 1, Reason: Other (Must fill in a comment))  Administration: 589 mg (5/17/2019), 589 mg (6/7/2019), 600 mg (6/28/2019), 600 mg (7/19/2019), 600 mg (8/30/2019), 600 mg (9/20/2019), 600 mg (8/9/2019), 600 mg (10/9/2019), 600 mg (11/1/2019), 600 mg (11/22/2019), 600 mg (12/13/2019), 600 mg (1/3/2020), 600 mg (1/24/2020), 600 mg (2/14/2020), 600 mg (3/6/2020), 600 mg (3/27/2020), 600 mg (4/17/2020), 600 mg (5/8/2020), 600 mg (5/29/2020), 600 mg (6/19/2020), 600 mg (7/10/2020), 600 mg (7/31/2020), 600 mg (8/21/2020), 600 mg (9/11/2020), 600 mg (10/2/2020), 600 mg (10/23/2020), 600 mg (11/13/2020), 600 mg (12/4/2020), 600 mg (12/24/2020), 600 mg (1/15/2021), 600 mg (2/5/2021), 600 mg (2/26/2021), 600 mg (3/19/2021), 600 mg (4/9/2021), 600 mg (4/30/2021), 600 mg (5/21/2021), 600 mg (6/11/2021), 600 mg (7/2/2021), 600 mg (7/23/2021), 600 mg (8/13/2021), 600 mg (9/3/2021), 600 mg (9/24/2021), 600 mg (10/15/2021), 600 mg (11/5/2021), 600 mg (11/26/2021), 600 mg (12/17/2021), 600 mg (1/7/2022), 600 mg (1/28/2022), 600 mg (2/18/2022), 600 mg (3/11/2022), 600 mg (4/1/2022), 600 mg (5/13/2022), 600 mg (6/3/2022), 600 mg (6/28/2022), 600 mg (7/22/2022), 600 mg (9/2/2022), 600 mg (9/23/2022), 600 mg (8/12/2022), 600 mg (10/14/2022), 600 mg (11/4/2022), 600 mg (11/25/2022), 600 mg (12/15/2022), 600 mg  (1/6/2023), 600 mg (1/27/2023), 600 mg (2/17/2023), 600 mg (3/10/2023), 600 mg (3/31/2023), 600 mg (4/21/2023), 600 mg (5/19/2023), 600 mg (6/13/2023), 600 mg (6/30/2023), 600 mg (7/21/2023), 600 mg (8/11/2023), 600 mg (9/1/2023), 600 mg (9/22/2023), 600 mg (10/13/2023), 600 mg (11/3/2023), 600 mg (11/24/2023), 600 mg (12/22/2023), 600 mg (1/12/2024)     6/24/2022 Biopsy    Final Diagnosis   A.  Liver, core biopsies:     - Metastatic carcinoma compatible with a breast primary.      2/2/2024 -  Chemotherapy    alteplase (CATHFLO), 2 mg, Intracatheter, Every 1 Minute as needed, 1 of 5 cycles  fam-trastuzumab deruxtecan-nxki (ENHERTU) IVPB, 520 mg, Intravenous, Once, 1 of 5 cycles  Dose modification: 4.4 mg/kg (original dose 5.4 mg/kg, Cycle 3, Reason: Dose modified as per discussion with consulting physician)  Administration: 500 mg (2/2/2024)     Malignant neoplasm metastatic to bone (HCC)   4/27/2018 Initial Diagnosis    Bone metastasis (HCC)     4/17/2019 - 1/12/2024 Chemotherapy    pertuzumab (PERJETA) IVPB, 840 mg, Intravenous, Once, 81 of 82 cycles  Administration: 420 mg (5/17/2019), 420 mg (6/7/2019), 420 mg (6/28/2019), 420 mg (7/19/2019), 420 mg (8/30/2019), 420 mg (9/20/2019), 420 mg (8/9/2019), 420 mg (10/9/2019), 420 mg (11/1/2019), 420 mg (11/22/2019), 420 mg (12/13/2019), 420 mg (1/3/2020), 420 mg (1/24/2020), 420 mg (2/14/2020), 420 mg (3/6/2020), 420 mg (3/27/2020), 420 mg (4/17/2020), 420 mg (5/8/2020), 420 mg (5/29/2020), 420 mg (6/19/2020), 420 mg (7/10/2020), 420 mg (7/31/2020), 420 mg (8/21/2020), 420 mg (9/11/2020), 420 mg (10/2/2020), 420 mg (10/23/2020), 420 mg (11/13/2020), 420 mg (12/4/2020), 420 mg (12/24/2020), 420 mg (1/15/2021), 420 mg (2/5/2021), 420 mg (2/26/2021), 420 mg (3/19/2021), 420 mg (4/9/2021), 420 mg (4/30/2021), 420 mg (5/21/2021), 420 mg (6/11/2021), 420 mg (7/2/2021), 420 mg (7/23/2021), 420 mg (8/13/2021), 420 mg (9/3/2021), 420 mg (9/24/2021), 420 mg (10/15/2021), 420 mg  (11/5/2021), 420 mg (11/26/2021), 420 mg (12/17/2021), 420 mg (1/7/2022), 420 mg (1/28/2022), 420 mg (2/18/2022), 420 mg (3/11/2022), 420 mg (4/1/2022), 420 mg (5/13/2022), 420 mg (6/3/2022), 420 mg (6/28/2022), 420 mg (7/22/2022), 420 mg (9/2/2022), 420 mg (9/23/2022), 420 mg (8/12/2022), 420 mg (10/14/2022), 420 mg (11/4/2022), 420 mg (11/25/2022), 420 mg (12/15/2022), 420 mg (1/6/2023), 420 mg (1/27/2023), 420 mg (2/17/2023), 420 mg (3/10/2023), 420 mg (3/31/2023), 420 mg (4/21/2023), 420 mg (5/19/2023), 420 mg (6/13/2023), 420 mg (6/30/2023), 420 mg (7/21/2023), 420 mg (8/11/2023), 420 mg (9/1/2023), 420 mg (9/22/2023), 420 mg (10/13/2023), 420 mg (11/3/2023), 420 mg (11/24/2023), 420 mg (12/22/2023), 420 mg (1/12/2024)  trastuzumab (HERCEPTIN) chemo infusion, 6 mg/kg = 589 mg (75 % of original dose 8 mg/kg), Intravenous, Once, 81 of 82 cycles  Dose modification: 6 mg/kg (original dose 8 mg/kg, Cycle 1, Reason: Other (Must fill in a comment))  Administration: 589 mg (5/17/2019), 589 mg (6/7/2019), 600 mg (6/28/2019), 600 mg (7/19/2019), 600 mg (8/30/2019), 600 mg (9/20/2019), 600 mg (8/9/2019), 600 mg (10/9/2019), 600 mg (11/1/2019), 600 mg (11/22/2019), 600 mg (12/13/2019), 600 mg (1/3/2020), 600 mg (1/24/2020), 600 mg (2/14/2020), 600 mg (3/6/2020), 600 mg (3/27/2020), 600 mg (4/17/2020), 600 mg (5/8/2020), 600 mg (5/29/2020), 600 mg (6/19/2020), 600 mg (7/10/2020), 600 mg (7/31/2020), 600 mg (8/21/2020), 600 mg (9/11/2020), 600 mg (10/2/2020), 600 mg (10/23/2020), 600 mg (11/13/2020), 600 mg (12/4/2020), 600 mg (12/24/2020), 600 mg (1/15/2021), 600 mg (2/5/2021), 600 mg (2/26/2021), 600 mg (3/19/2021), 600 mg (4/9/2021), 600 mg (4/30/2021), 600 mg (5/21/2021), 600 mg (6/11/2021), 600 mg (7/2/2021), 600 mg (7/23/2021), 600 mg (8/13/2021), 600 mg (9/3/2021), 600 mg (9/24/2021), 600 mg (10/15/2021), 600 mg (11/5/2021), 600 mg (11/26/2021), 600 mg (12/17/2021), 600 mg (1/7/2022), 600 mg (1/28/2022), 600 mg  "(2/18/2022), 600 mg (3/11/2022), 600 mg (4/1/2022), 600 mg (5/13/2022), 600 mg (6/3/2022), 600 mg (6/28/2022), 600 mg (7/22/2022), 600 mg (9/2/2022), 600 mg (9/23/2022), 600 mg (8/12/2022), 600 mg (10/14/2022), 600 mg (11/4/2022), 600 mg (11/25/2022), 600 mg (12/15/2022), 600 mg (1/6/2023), 600 mg (1/27/2023), 600 mg (2/17/2023), 600 mg (3/10/2023), 600 mg (3/31/2023), 600 mg (4/21/2023), 600 mg (5/19/2023), 600 mg (6/13/2023), 600 mg (6/30/2023), 600 mg (7/21/2023), 600 mg (8/11/2023), 600 mg (9/1/2023), 600 mg (9/22/2023), 600 mg (10/13/2023), 600 mg (11/3/2023), 600 mg (11/24/2023), 600 mg (12/22/2023), 600 mg (1/12/2024)     6/24/2022 Biopsy    Final Diagnosis   A.  Liver, core biopsies:     - Metastatic carcinoma compatible with a breast primary.      2/2/2024 -  Chemotherapy    alteplase (CATHFLO), 2 mg, Intracatheter, Every 1 Minute as needed, 1 of 5 cycles  fam-trastuzumab deruxtecan-nxki (ENHERTU) IVPB, 520 mg, Intravenous, Once, 1 of 5 cycles  Dose modification: 4.4 mg/kg (original dose 5.4 mg/kg, Cycle 3, Reason: Dose modified as per discussion with consulting physician)  Administration: 500 mg (2/2/2024)         ROS:  03/01/24 Reviewed 12 systems: .  See symptoms in HPI  Presently no other neurological, cardiac, pulmonary, GI and  symptoms other than mentioned  in HPI..  Other symptoms are in HPI  No  fever, chills, bleeding, bone pains, skin rash,  claudication and gait problem. No frequent infections.  Not unusually sensitive to heat or cold. No swelling of the ankles. No swollen glands.  Patient is anxious    /80 (BP Location: Right arm, Patient Position: Sitting, Cuff Size: Adult)   Pulse 86   Temp 97.8 °F (36.6 °C)   Resp 17   Ht 5' 4.96\" (1.65 m)   Wt 93 kg (205 lb)   SpO2 98%   BMI 34.16 kg/m²     Physical Exam:   Patient is alert and oriented.  Patient is not in distress.  Vital signs are above.  There is no icterus.  There is no oral thrush.  There is no palpable neck mass.  Lung " fields are clear to percussion and auscultation.  Heart rate is regular.  There is no palpable abdominal mass.  Abdomen is soft and nontender.  There is no ascites.  There is no edema of the ankles.  There is no calf tenderness.  There is no focal neurological deficit.  There is no skin rash.  Lymph nodes are not palpably enlarged in the neck and axillary areas.  There is no clubbing, Patient is  anxious.  Performance status 2. No lymphedema.   .   IMAGING:  IMPRESSION: 02/24/2021     1.  No scintigraphic evidence of osseous metastasis.           Workstation performed: [id number]      Imaging     Ejection fraction 65% on 08/06/2022      Study Result    Narrative & Impression   CENTRAL  DXA SCAN on 03/08/2022     CLINICAL HISTORY:   56 year old post-menopausal  female risk factors include osteoporosis screening.Additional medical history: Breast cancer with mets to Hip , right Hip scanned.     TECHNIQUE: Bone densitometry was performed using a H[employer] Horizon W bone densitometer.  Regions of interest appear properly placed. There are   other confounding variables which could limit the study.       Her elevated  BMI may influence the T score result.     Degenerative or osteoarthritic changes are noted on the spine image eliminating L4 from evaluation.     COMPARISON:  Follow-up     RESULTS:   LUMBAR SPINE:  L1-L3:  BMD 1.021 gm/cm2  T-score 0.0 and unchanged from 2019.  Z-score 1.0     RIGHT TOTAL HIP:  BMD 1.122 gm/cm2  T-score 1.5  Z-score 2.1     RIGHT FEMORAL NECK:  BMD 0.842 gm/cm2  T-score -0.1 and unchanged from 2019.  Z-score 0.9           IMPRESSION:  1. Based on the WHO classification, this study is normal and the patient is considered at low risk for fracture.       IMPRESSION:     Since August 2, 2022:  1.  Decreased size and conspicuity of the biopsy-proven hepatic metastasis.    2.  No new sites of metastatic disease in the abdomen.        Workstation performed: [id number]           Imaging    CT abdomen w contrast (Order: [id number]) - 11/1/2022    IMPRESSION:     1. Enlargement of the known caudate intrahepatic mass with ill-defined margins estimated to measure 2.1 x 2.7 on today's examination.  2. No new intrahepatic mass is evident.     Study marked significant in epic for notification purposes.     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 Multiplanar attenuation corrected and non attenuation corrected PET images are available for interpretation, and contiguous, low dose, axial CT sections were obtained from the skull base through the femurs.    Intravenous contrast material was not utilized.  This examination, like all CT scans performed in the [facility], was performed utilizing techniques to minimize radiation dose exposure, including the use of iterative reconstruction and   automated exposure control.     Fasting serum glucose: 117 mg/dl     FINDINGS:     VISUALIZED BRAIN:  No acute abnormalities are seen.     HEAD/NECK:  There is a physiologic distribution of FDG. No FDG avid cervical adenopathy is seen.  CT images: Unremarkable.     CHEST:  No FDG avid soft tissue lesions are  seen.  CT images: Unremarkable.     ABDOMEN:  Known caudate liver metastatic lesion demonstrates hypermetabolism, SUV 9.1. This measures approximately 3.8 x 3.3 cm in axial dimensions based on the PET images.     Benign left adrenal adenoma measuring 2.2 x 2.4 cm, stable. There is associated FDG activity, SUV 3.1, typically physiologic.     No additional FDG avid soft tissue lesions are seen.     CT images: Minimal mesenteric haziness is unchanged.  PELVIS:  No FDG avid soft tissue lesions are seen.  CT images: Stable. Lobulated uterus suggesting fibroids     OSSEOUS STRUCTURES:  Stable appearance of lytic lesion in the left femoral intertrochanteric region, without significant FDG uptake.     No FDG avid lesions are seen.  CT images: No significant findings.     IMPRESSION:  1. Hypermetabolic caudate liver metastasis. No additional hypermetabolic lesions in the liver or upper abdomen.  2. No hypermetabolic metastases in the neck, chest, pelvis or osseous structures.              Workstation performed: [id number]        Imaging    NM PET CT skull base to mid thigh (Order: 35    LABS:            Results for orders placed or performed during the hospital encounter of 02/22/24   CBC and differential   Result Value Ref Range    WBC 6.16 4.31 - 10.16 Thousand/uL    RBC 4.82 3.81 - 5.12 Million/uL    Hemoglobin 12.9 11.5 - 15.4 g/dL    Hematocrit 41.6 34.8 - 46.1 %    MCV 86 82 - 98 fL    MCH 26.8 26.8 - 34.3 pg    MCHC 31.0 (L) 31.4 - 37.4 g/dL    RDW 14.9 11.6 - 15.1 %    MPV 10.5 8.9 - 12.7 fL    Platelets 605 (H) 149 - 390 Thousands/uL    nRBC 0 /100 WBCs    Neutrophils Relative 46 43 - 75 %    Immat GRANS % 2 0 - 2 %    Lymphocytes Relative 34 14 - 44 %    Monocytes Relative 12 4 - 12 %    Eosinophils Relative 5 0 - 6 %    Basophils Relative 1 0 - 1 %    Neutrophils Absolute 2.79 1.85 - 7.62 Thousands/µL    Immature Grans Absolute 0.10 0.00 - 0.20 Thousand/uL    Lymphocytes Absolute 2.11 0.60 - 4.47 Thousands/µL     Monocytes Absolute 0.76 0.17 - 1.22 Thousand/µL    Eosinophils Absolute 0.33 0.00 - 0.61 Thousand/µL    Basophils Absolute 0.07 0.00 - 0.10 Thousands/µL   Comprehensive metabolic panel   Result Value Ref Range    Sodium 141 135 - 147 mmol/L    Potassium 3.9 3.5 - 5.3 mmol/L    Chloride 109 (H) 96 - 108 mmol/L    CO2 28 21 - 32 mmol/L    ANION GAP 4 mmol/L    BUN 17 5 - 25 mg/dL    Creatinine 0.77 0.60 - 1.30 mg/dL    Glucose 135 65 - 140 mg/dL    Calcium 9.1 8.4 - 10.2 mg/dL    AST 20 13 - 39 U/L    ALT 39 7 - 52 U/L    Alkaline Phosphatase 82 34 - 104 U/L    Total Protein 6.5 6.4 - 8.4 g/dL    Albumin 3.6 3.5 - 5.0 g/dL    Total Bilirubin 0.28 0.20 - 1.00 mg/dL    eGFR 86 ml/min/1.73sq m     Labs, Imaging, & Other studies:   All pertinent labs and imaging studies were personally reviewed        Reviewed  test results and discussed with patient and explained.    Assessment and plan:    Continuation of care.  Patient has relapsed triple positive stage IV breast cancer and most recent treatment was with Enhertu and she had only 1 treatment and 1/30/2024.  She did not go for second treatment that was scheduled on 2/23/2024 because she was having symptoms from Enhertu. After the first cycle of treatment she had GI symptoms, increased tiredness and blurred vision.  Symptoms improved after 10 days.  Next treatment will be at a reduced dose and if she still had problem Enhertu will be discontinued and treatment will be switched.  I explained this to the patient.  No pulmonary symptoms.  Prior to Enhertu she was on  Herceptin plus Perjeta and letrozole.  She will stay on letrozole.    Lately there was disease progression in 1 area in the liver and she was evaluated for liver directed therapy and because of the location SBRT was considered but not now and may be after 6 months after getting another scan.  Liver lesion  increased in size and she was started on Enhertu.    She has  tiredness, anxiety and insomnia and  occasionally leg cramps at night.   In 2016 she was diagnosed to have stage IV de brandon triple positive  left breast cancer  with metastatic disease to liver and bones . Patient was started on THP,  Taxotere plus Herceptin and Perjeta and after 6 cycles Taxotere was discontinued and she was continued on Herceptin and Perjeta and to that tamoxifen was added.  Patient  was premenopausal at that time.  Later tamoxifen was changed to letrozole after she had  BSO.  She had Xgeva. She  had radiation to left hip for palliation.  She continued to respond and in September 2016 patient had lumpectomy of left breast followed by radiation therapy.  At the time of lumpectomy there was a small residual disease, 1.4 cm.  Lymph nodes were not sampled.   Patient tested positive for BRCA 1. She already had BSO.  She has been getting imaging studies for early detection of other cancers like peritoneal and pancreatic cancers.  She has been encouraged to see a dermatologist but she does not want to.  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   She has been taking vitamin-D and calcium and is staying active.  She is not on Xgeva anymore that she had for 2 years.   She has been tolerating treatments without much problem.  She goes for blood tests and echocardiogram on regular basis.    She has done well all these years and  had very good response but then in 2022 there was a new lesion in the liver on CT scan and MRI scan and on biopsy that proved to be metastatic cancer from breast, strongly positive for ER, negative for [place] and 2+ for HER 2 that was negative by FISH (low positive HER2).  Patient saw radiation oncologist for local radiation to liver lesion and discussed risks and benefits and decided to be observed. Subsequent CT scan showed slight decrease in size of liver lesion from 2.7 to 2.4.  She preferred to stay on letrozole, Herceptin and Perjeta.  We did discuss ENHERTU.  Most recent CT scan  showed  increase in the size of liver lesion.  She was referred to back to radiation for liver directed therapy.  SBRT was considered but was not given.  On 1/30/2024 she had first and only dose of Enhertu as above.     No bone pains.  Appetite has decreased.  She has lost some weight.      Patient knows that she is at high risk for breast and other cancers like  ovarian cancer, pancreatic cancer, peritoneal cancer, melanoma and other cancers.  Patient goes to breast surgeon for evaluation and imaging studies.  She gets CT scan and that also checks the pancreas and peritoneum.  She had BSO.  She does not want to go to a dermatologist to check for melanoma or to ophthalmologist to check for melanoma in the eye.  She goes to her optometrist.  Also she does not want to have colonoscopy and not even Cologuard or stool test for blood.      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       She continues to take  calcium and vitamin-D.  Patient knows that she is at high risk for breast and other cancers like  ovarian cancer, pancreatic cancer, peritoneal cancer, melanoma and other cancers.  Patient goes to breast surgeon for evaluation and imaging studies.  She gets CT scan and that also checks the pancreas and peritoneum.  She had BSO.  She does not want to go to a dermatologist to check for melanoma or to ophthalmologist to check for melanoma in the eye.  She goes to her optometrist.  Also she does not want to have colonoscopy and not even Cologuard or stool test for blood.      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       She continues to take  calcium and vitamin-D  .         Physical examination and test results are as recorded and discussed.  Discussed treatment options.  Plan is as above, 1 level dose reduction of Enhertu.  She is being continued on letrozole.  She is taking vitamin D and calcium.    She gets  echocardiogram on regular basis.  Condition discussed and explained.  Questions answered.  Also discussed the importance of self-breast examination, eating healthy foods, staying active and health screening tests.  Patient goes to her breast surgeon for examination and  imaging studies.  Patient is capable of self-care.  Goal is prolonged survival from stage IV breast cancer.  Patient has been aware of significance of positive BRCA1 mutation and cancer risks.  Patient will continue to follow with her primary physician and other consultants.  Discussed precautions against coronavirus and flu and other infections.  .  See diagnoses, orders and instructions below    1. Malignant neoplasm of upper-inner quadrant of left breast in female, estrogen receptor positive       2. Malignant neoplasm metastatic to liver (HCC)      3. Malignant neoplasm metastatic to bone (HCC)      4. History of bilateral salpingo-oophorectomy (BSO)      5. BRCA1 gene mutation positive in female      6. 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      7. Chemotherapy induced neutropenia       8. Chemotherapy-induced nausea      9. Chemotherapy induced diarrhea      10. Cardiomyopathy due to chemotherapy       11. Osteoporosis due to aromatase inhibitor      12. Port-A-Cath in place      13. Aromatase inhibitor use    Patient has skipped #2 treatment because of side effects to chemotherapy.  Chemotherapy dose will be lowered to 4.4 mg/m².  Follow-up in 1 month.      I used dictation device to dictated this note and there could be mistakes in my note and for  that she may contact my office    Patient voiced understanding and agreed       Counseling / Coordination of Care   .  Provided counseling and support

## 2024-02-27 NOTE — PATIENT INSTRUCTIONS
Patient is skipping #2 treatment because of side effects to chemotherapy.  Chemotherapy dose will be lowered to 4.4 mg/m².  Follow-up in 1 month.

## 2024-02-28 RX ORDER — DEXTROSE MONOHYDRATE 50 MG/ML
20 INJECTION, SOLUTION INTRAVENOUS ONCE
OUTPATIENT
Start: 2024-03-15

## 2024-03-14 ENCOUNTER — HOSPITAL ENCOUNTER (OUTPATIENT)
Dept: INFUSION CENTER | Facility: CLINIC | Age: 59
Discharge: HOME | End: 2024-03-14
Payer: COMMERCIAL

## 2024-03-14 DIAGNOSIS — Z95.828 PORT-A-CATH IN PLACE: Primary | ICD-10-CM

## 2024-03-14 DIAGNOSIS — C50.212 MALIGNANT NEOPLASM OF UPPER-INNER QUADRANT OF LEFT BREAST IN FEMALE, ESTROGEN RECEPTOR POSITIVE (HCC): ICD-10-CM

## 2024-03-14 DIAGNOSIS — C78.7 MALIGNANT NEOPLASM METASTATIC TO LIVER (HCC): ICD-10-CM

## 2024-03-14 DIAGNOSIS — Z45.2 ENCOUNTER FOR CARE RELATED TO VASCULAR ACCESS PORT: ICD-10-CM

## 2024-03-14 DIAGNOSIS — Z17.0 MALIGNANT NEOPLASM OF UPPER-INNER QUADRANT OF LEFT BREAST IN FEMALE, ESTROGEN RECEPTOR POSITIVE (HCC): ICD-10-CM

## 2024-03-14 LAB
ALBUMIN SERPL BCP-MCNC: 4 G/DL (ref 3.5–5)
ALP SERPL-CCNC: 74 U/L (ref 34–104)
ALT SERPL W P-5'-P-CCNC: 28 U/L (ref 7–52)
ANION GAP SERPL CALCULATED.3IONS-SCNC: 5 MMOL/L (ref 4–13)
AST SERPL W P-5'-P-CCNC: 19 U/L (ref 13–39)
BASOPHILS # BLD AUTO: 0.06 THOUSANDS/ÂΜL (ref 0–0.1)
BASOPHILS NFR BLD AUTO: 1 % (ref 0–1)
BILIRUB SERPL-MCNC: 0.47 MG/DL (ref 0.2–1)
BUN SERPL-MCNC: 17 MG/DL (ref 5–25)
CALCIUM SERPL-MCNC: 9.2 MG/DL (ref 8.4–10.2)
CHLORIDE SERPL-SCNC: 107 MMOL/L (ref 96–108)
CO2 SERPL-SCNC: 28 MMOL/L (ref 21–32)
CREAT SERPL-MCNC: 0.8 MG/DL (ref 0.6–1.3)
EOSINOPHIL # BLD AUTO: 0.59 THOUSAND/ÂΜL (ref 0–0.61)
EOSINOPHIL NFR BLD AUTO: 8 % (ref 0–6)
ERYTHROCYTE [DISTWIDTH] IN BLOOD BY AUTOMATED COUNT: 15.9 % (ref 11.6–15.1)
GFR SERPL CREATININE-BSD FRML MDRD: 82 ML/MIN/1.73SQ M
GLUCOSE SERPL-MCNC: 161 MG/DL (ref 65–140)
HCT VFR BLD AUTO: 43.9 % (ref 34.8–46.1)
HGB BLD-MCNC: 13.6 G/DL (ref 11.5–15.4)
IMM GRANULOCYTES # BLD AUTO: 0.02 THOUSAND/UL (ref 0–0.2)
IMM GRANULOCYTES NFR BLD AUTO: 0 % (ref 0–2)
LYMPHOCYTES # BLD AUTO: 2.44 THOUSANDS/ÂΜL (ref 0.6–4.47)
LYMPHOCYTES NFR BLD AUTO: 32 % (ref 14–44)
MCH RBC QN AUTO: 26.8 PG (ref 26.8–34.3)
MCHC RBC AUTO-ENTMCNC: 31 G/DL (ref 31.4–37.4)
MCV RBC AUTO: 87 FL (ref 82–98)
MONOCYTES # BLD AUTO: 0.55 THOUSAND/ÂΜL (ref 0.17–1.22)
MONOCYTES NFR BLD AUTO: 7 % (ref 4–12)
NEUTROPHILS # BLD AUTO: 4.05 THOUSANDS/ÂΜL (ref 1.85–7.62)
NEUTS SEG NFR BLD AUTO: 52 % (ref 43–75)
NRBC BLD AUTO-RTO: 0 /100 WBCS
PLATELET # BLD AUTO: 276 THOUSANDS/UL (ref 149–390)
PMV BLD AUTO: 11.2 FL (ref 8.9–12.7)
POTASSIUM SERPL-SCNC: 4.1 MMOL/L (ref 3.5–5.3)
PROT SERPL-MCNC: 6.8 G/DL (ref 6.4–8.4)
RBC # BLD AUTO: 5.07 MILLION/UL (ref 3.81–5.12)
SODIUM SERPL-SCNC: 140 MMOL/L (ref 135–147)
WBC # BLD AUTO: 7.71 THOUSAND/UL (ref 4.31–10.16)

## 2024-03-14 PROCEDURE — 80053 COMPREHEN METABOLIC PANEL: CPT

## 2024-03-14 PROCEDURE — 85025 COMPLETE CBC W/AUTO DIFF WBC: CPT

## 2024-03-14 NOTE — PROGRESS NOTES
Alejandrina Barba presented for central labs and tolerated treatment well with no complications.      Alejandrina Barba is aware of future appt on Friday Mar 15, 2024 2:00 PM.    AVS declined by Alejandrina Barba.

## 2024-03-15 ENCOUNTER — HOSPITAL ENCOUNTER (OUTPATIENT)
Dept: INFUSION CENTER | Facility: CLINIC | Age: 59
End: 2024-03-15
Payer: COMMERCIAL

## 2024-03-15 VITALS
DIASTOLIC BLOOD PRESSURE: 92 MMHG | SYSTOLIC BLOOD PRESSURE: 164 MMHG | BODY MASS INDEX: 34.38 KG/M2 | WEIGHT: 206.35 LBS | HEART RATE: 73 BPM | HEIGHT: 65 IN | TEMPERATURE: 98.3 F | RESPIRATION RATE: 18 BRPM

## 2024-03-15 DIAGNOSIS — Z90.79 HISTORY OF BILATERAL SALPINGO-OOPHORECTOMY (BSO): ICD-10-CM

## 2024-03-15 DIAGNOSIS — Z90.722 HISTORY OF BILATERAL SALPINGO-OOPHORECTOMY (BSO): ICD-10-CM

## 2024-03-15 DIAGNOSIS — C79.51 MALIGNANT NEOPLASM METASTATIC TO BONE (HCC): ICD-10-CM

## 2024-03-15 DIAGNOSIS — C78.7 MALIGNANT NEOPLASM METASTATIC TO LIVER (HCC): ICD-10-CM

## 2024-03-15 DIAGNOSIS — C50.212 MALIGNANT NEOPLASM OF UPPER-INNER QUADRANT OF LEFT BREAST IN FEMALE, ESTROGEN RECEPTOR POSITIVE (HCC): Primary | ICD-10-CM

## 2024-03-15 DIAGNOSIS — T45.1X5A CHEMOTHERAPY INDUCED NEUTROPENIA (HCC): ICD-10-CM

## 2024-03-15 DIAGNOSIS — D70.1 CHEMOTHERAPY INDUCED NEUTROPENIA (HCC): ICD-10-CM

## 2024-03-15 DIAGNOSIS — Z17.0 MALIGNANT NEOPLASM OF UPPER-INNER QUADRANT OF LEFT BREAST IN FEMALE, ESTROGEN RECEPTOR POSITIVE (HCC): Primary | ICD-10-CM

## 2024-03-15 PROCEDURE — 96367 TX/PROPH/DG ADDL SEQ IV INF: CPT

## 2024-03-15 PROCEDURE — 96413 CHEMO IV INFUSION 1 HR: CPT

## 2024-03-15 RX ORDER — DEXTROSE MONOHYDRATE 50 MG/ML
20 INJECTION, SOLUTION INTRAVENOUS ONCE
Status: COMPLETED | OUTPATIENT
Start: 2024-03-15 | End: 2024-03-15

## 2024-03-15 RX ADMIN — DEXTROSE 20 ML/HR: 5 SOLUTION INTRAVENOUS at 14:07

## 2024-03-15 RX ADMIN — DEXAMETHASONE SODIUM PHOSPHATE: 100 INJECTION INTRAMUSCULAR; INTRAVENOUS at 14:07

## 2024-03-15 RX ADMIN — FAM-TRASTUZUMAB DERUXTECAN-NXKI 400 MG: 100 INJECTION, POWDER, LYOPHILIZED, FOR SOLUTION INTRAVENOUS at 14:31

## 2024-03-15 NOTE — PROGRESS NOTES
Pt presents for enhertu. No new meds or concerns. Pt tolerated treatment without adverse reaction. Future appointments discussed, confirmed with patient for 4/4/2024 0800. AVS declined.

## 2024-04-01 RX ORDER — DEXTROSE MONOHYDRATE 50 MG/ML
20 INJECTION, SOLUTION INTRAVENOUS ONCE
Status: CANCELLED | OUTPATIENT
Start: 2024-04-05

## 2024-04-03 ENCOUNTER — HOSPITAL ENCOUNTER (OUTPATIENT)
Dept: INFUSION CENTER | Facility: CLINIC | Age: 59
Discharge: HOME | End: 2024-04-03
Payer: COMMERCIAL

## 2024-04-03 ENCOUNTER — TELEPHONE (OUTPATIENT)
Dept: HEMATOLOGY ONCOLOGY | Facility: CLINIC | Age: 59
End: 2024-04-03

## 2024-04-03 ENCOUNTER — OFFICE VISIT (OUTPATIENT)
Dept: HEMATOLOGY ONCOLOGY | Facility: CLINIC | Age: 59
End: 2024-04-03
Payer: COMMERCIAL

## 2024-04-03 VITALS
WEIGHT: 206 LBS | HEIGHT: 65 IN | HEART RATE: 84 BPM | RESPIRATION RATE: 17 BRPM | OXYGEN SATURATION: 98 % | DIASTOLIC BLOOD PRESSURE: 82 MMHG | BODY MASS INDEX: 34.32 KG/M2 | TEMPERATURE: 93.4 F | SYSTOLIC BLOOD PRESSURE: 142 MMHG

## 2024-04-03 DIAGNOSIS — T45.1X5A CHEMOTHERAPY-INDUCED NAUSEA: ICD-10-CM

## 2024-04-03 DIAGNOSIS — D70.1 CHEMOTHERAPY INDUCED NEUTROPENIA (HCC): ICD-10-CM

## 2024-04-03 DIAGNOSIS — Z45.2 ENCOUNTER FOR CARE RELATED TO VASCULAR ACCESS PORT: ICD-10-CM

## 2024-04-03 DIAGNOSIS — Z95.828 PORT-A-CATH IN PLACE: ICD-10-CM

## 2024-04-03 DIAGNOSIS — Z90.79 HISTORY OF BILATERAL SALPINGO-OOPHORECTOMY (BSO): ICD-10-CM

## 2024-04-03 DIAGNOSIS — M81.8 OSTEOPOROSIS DUE TO AROMATASE INHIBITOR: ICD-10-CM

## 2024-04-03 DIAGNOSIS — I42.7 CARDIOMYOPATHY DUE TO CHEMOTHERAPY (HCC): ICD-10-CM

## 2024-04-03 DIAGNOSIS — C79.51 MALIGNANT NEOPLASM METASTATIC TO BONE (HCC): ICD-10-CM

## 2024-04-03 DIAGNOSIS — Z15.09 BRCA1 GENE MUTATION POSITIVE IN FEMALE: ICD-10-CM

## 2024-04-03 DIAGNOSIS — T45.1X5A CARDIOMYOPATHY DUE TO CHEMOTHERAPY (HCC): ICD-10-CM

## 2024-04-03 DIAGNOSIS — K52.1 CHEMOTHERAPY INDUCED DIARRHEA: ICD-10-CM

## 2024-04-03 DIAGNOSIS — C78.7 MALIGNANT NEOPLASM METASTATIC TO LIVER (HCC): ICD-10-CM

## 2024-04-03 DIAGNOSIS — T45.1X5A CHEMOTHERAPY INDUCED DIARRHEA: ICD-10-CM

## 2024-04-03 DIAGNOSIS — R11.0 CHEMOTHERAPY-INDUCED NAUSEA: ICD-10-CM

## 2024-04-03 DIAGNOSIS — Z14.8 CARRIER OF GENE MUTATION FOR HIGH RISK OF CANCER: ICD-10-CM

## 2024-04-03 DIAGNOSIS — T38.6X5A OSTEOPOROSIS DUE TO AROMATASE INHIBITOR: ICD-10-CM

## 2024-04-03 DIAGNOSIS — Z95.828 PORT-A-CATH IN PLACE: Primary | ICD-10-CM

## 2024-04-03 DIAGNOSIS — T45.1X5A CHEMOTHERAPY INDUCED NEUTROPENIA (HCC): ICD-10-CM

## 2024-04-03 DIAGNOSIS — C50.212 MALIGNANT NEOPLASM OF UPPER-INNER QUADRANT OF LEFT BREAST IN FEMALE, ESTROGEN RECEPTOR POSITIVE (HCC): ICD-10-CM

## 2024-04-03 DIAGNOSIS — Z17.0 MALIGNANT NEOPLASM OF UPPER-INNER QUADRANT OF LEFT BREAST IN FEMALE, ESTROGEN RECEPTOR POSITIVE (HCC): Primary | ICD-10-CM

## 2024-04-03 DIAGNOSIS — C50.212 MALIGNANT NEOPLASM OF UPPER-INNER QUADRANT OF LEFT BREAST IN FEMALE, ESTROGEN RECEPTOR POSITIVE (HCC): Primary | ICD-10-CM

## 2024-04-03 DIAGNOSIS — Z90.722 HISTORY OF BILATERAL SALPINGO-OOPHORECTOMY (BSO): ICD-10-CM

## 2024-04-03 DIAGNOSIS — Z17.0 MALIGNANT NEOPLASM OF UPPER-INNER QUADRANT OF LEFT BREAST IN FEMALE, ESTROGEN RECEPTOR POSITIVE (HCC): ICD-10-CM

## 2024-04-03 DIAGNOSIS — Z15.01 BRCA1 GENE MUTATION POSITIVE IN FEMALE: ICD-10-CM

## 2024-04-03 DIAGNOSIS — Z15.02 BRCA1 GENE MUTATION POSITIVE IN FEMALE: ICD-10-CM

## 2024-04-03 LAB
ALBUMIN SERPL BCP-MCNC: 4 G/DL (ref 3.5–5)
ALP SERPL-CCNC: 80 U/L (ref 34–104)
ALT SERPL W P-5'-P-CCNC: 39 U/L (ref 7–52)
ANION GAP SERPL CALCULATED.3IONS-SCNC: 5 MMOL/L (ref 4–13)
AST SERPL W P-5'-P-CCNC: 22 U/L (ref 13–39)
BASOPHILS # BLD AUTO: 0.05 THOUSANDS/ÂΜL (ref 0–0.1)
BASOPHILS NFR BLD AUTO: 1 % (ref 0–1)
BILIRUB SERPL-MCNC: 0.27 MG/DL (ref 0.2–1)
BUN SERPL-MCNC: 13 MG/DL (ref 5–25)
CALCIUM SERPL-MCNC: 9.1 MG/DL (ref 8.4–10.2)
CHLORIDE SERPL-SCNC: 108 MMOL/L (ref 96–108)
CO2 SERPL-SCNC: 27 MMOL/L (ref 21–32)
CREAT SERPL-MCNC: 0.83 MG/DL (ref 0.6–1.3)
EOSINOPHIL # BLD AUTO: 0.24 THOUSAND/ÂΜL (ref 0–0.61)
EOSINOPHIL NFR BLD AUTO: 4 % (ref 0–6)
ERYTHROCYTE [DISTWIDTH] IN BLOOD BY AUTOMATED COUNT: 15.9 % (ref 11.6–15.1)
GFR SERPL CREATININE-BSD FRML MDRD: 79 ML/MIN/1.73SQ M
GLUCOSE SERPL-MCNC: 91 MG/DL (ref 65–140)
HCT VFR BLD AUTO: 40.2 % (ref 34.8–46.1)
HGB BLD-MCNC: 12.7 G/DL (ref 11.5–15.4)
IMM GRANULOCYTES # BLD AUTO: 0.03 THOUSAND/UL (ref 0–0.2)
IMM GRANULOCYTES NFR BLD AUTO: 1 % (ref 0–2)
LYMPHOCYTES # BLD AUTO: 2.54 THOUSANDS/ÂΜL (ref 0.6–4.47)
LYMPHOCYTES NFR BLD AUTO: 38 % (ref 14–44)
MCH RBC QN AUTO: 27 PG (ref 26.8–34.3)
MCHC RBC AUTO-ENTMCNC: 31.6 G/DL (ref 31.4–37.4)
MCV RBC AUTO: 86 FL (ref 82–98)
MONOCYTES # BLD AUTO: 0.76 THOUSAND/ÂΜL (ref 0.17–1.22)
MONOCYTES NFR BLD AUTO: 11 % (ref 4–12)
NEUTROPHILS # BLD AUTO: 3.02 THOUSANDS/ÂΜL (ref 1.85–7.62)
NEUTS SEG NFR BLD AUTO: 45 % (ref 43–75)
NRBC BLD AUTO-RTO: 0 /100 WBCS
PLATELET # BLD AUTO: 487 THOUSANDS/UL (ref 149–390)
PMV BLD AUTO: 10.6 FL (ref 8.9–12.7)
POTASSIUM SERPL-SCNC: 3.8 MMOL/L (ref 3.5–5.3)
PROT SERPL-MCNC: 6.9 G/DL (ref 6.4–8.4)
RBC # BLD AUTO: 4.7 MILLION/UL (ref 3.81–5.12)
SODIUM SERPL-SCNC: 140 MMOL/L (ref 135–147)
WBC # BLD AUTO: 6.64 THOUSAND/UL (ref 4.31–10.16)

## 2024-04-03 PROCEDURE — 99214 OFFICE O/P EST MOD 30 MIN: CPT | Performed by: INTERNAL MEDICINE

## 2024-04-03 PROCEDURE — 85025 COMPLETE CBC W/AUTO DIFF WBC: CPT | Performed by: INTERNAL MEDICINE

## 2024-04-03 PROCEDURE — 80053 COMPREHEN METABOLIC PANEL: CPT | Performed by: INTERNAL MEDICINE

## 2024-04-03 NOTE — PLAN OF CARE
Problem: Potential for Falls  Goal: Patient will remain free of falls  Description: INTERVENTIONS:  - Educate patient/family on patient safety including physical limitations  - Instruct patient to call for assistance with activity   - Consult OT/PT to assist with strengthening/mobility   - Keep Call bell within reach  - Keep bed low and locked with side rails adjusted as appropriate  - Keep care items and personal belongings within reach  - Initiate and maintain comfort rounds  - Make Fall Risk Sign visible to staff  - O- Apply yellow socks and bracelet for high fall risk patients  - Consider moving patient to room near nurses station  Outcome: Progressing

## 2024-04-03 NOTE — PATIENT INSTRUCTIONS
Please cancel patient's appointments at the infusion center on 4/23, 4/24 and 4/26/2024.  She will continue with chemotherapy after that, already scheduled.  Follow-up visit on 5/14/2024.

## 2024-04-03 NOTE — PROGRESS NOTES
Pt without complaint. Pt tolerated port flush and central [facility]. Pt declines AVS today, aware of next appt  4/5/24 1400

## 2024-04-03 NOTE — TELEPHONE ENCOUNTER
Per provider, please cancel patient's appointments at the infusion center on 4/23, 4/24 and 4/26/2024.  She will continue with chemotherapy after that, already scheduled.

## 2024-04-05 ENCOUNTER — HOSPITAL ENCOUNTER (OUTPATIENT)
Dept: INFUSION CENTER | Facility: CLINIC | Age: 59
End: 2024-04-05
Payer: COMMERCIAL

## 2024-04-05 VITALS
DIASTOLIC BLOOD PRESSURE: 95 MMHG | HEART RATE: 70 BPM | HEIGHT: 65 IN | RESPIRATION RATE: 18 BRPM | SYSTOLIC BLOOD PRESSURE: 153 MMHG | TEMPERATURE: 97.2 F | WEIGHT: 208.56 LBS | BODY MASS INDEX: 34.75 KG/M2

## 2024-04-05 DIAGNOSIS — Z90.79 HISTORY OF BILATERAL SALPINGO-OOPHORECTOMY (BSO): ICD-10-CM

## 2024-04-05 DIAGNOSIS — C78.7 MALIGNANT NEOPLASM METASTATIC TO LIVER (HCC): ICD-10-CM

## 2024-04-05 DIAGNOSIS — D70.1 CHEMOTHERAPY INDUCED NEUTROPENIA (HCC): ICD-10-CM

## 2024-04-05 DIAGNOSIS — T45.1X5A CHEMOTHERAPY INDUCED NEUTROPENIA (HCC): ICD-10-CM

## 2024-04-05 DIAGNOSIS — C50.212 MALIGNANT NEOPLASM OF UPPER-INNER QUADRANT OF LEFT BREAST IN FEMALE, ESTROGEN RECEPTOR POSITIVE (HCC): Primary | ICD-10-CM

## 2024-04-05 DIAGNOSIS — C79.51 MALIGNANT NEOPLASM METASTATIC TO BONE (HCC): ICD-10-CM

## 2024-04-05 DIAGNOSIS — Z17.0 MALIGNANT NEOPLASM OF UPPER-INNER QUADRANT OF LEFT BREAST IN FEMALE, ESTROGEN RECEPTOR POSITIVE (HCC): Primary | ICD-10-CM

## 2024-04-05 DIAGNOSIS — Z90.722 HISTORY OF BILATERAL SALPINGO-OOPHORECTOMY (BSO): ICD-10-CM

## 2024-04-05 RX ORDER — DEXTROSE MONOHYDRATE 50 MG/ML
20 INJECTION, SOLUTION INTRAVENOUS ONCE
Status: COMPLETED | OUTPATIENT
Start: 2024-04-05 | End: 2024-04-05

## 2024-04-05 RX ADMIN — DEXAMETHASONE SODIUM PHOSPHATE: 100 INJECTION INTRAMUSCULAR; INTRAVENOUS at 14:32

## 2024-04-05 RX ADMIN — DEXTROSE 20 ML/HR: 5 SOLUTION INTRAVENOUS at 14:29

## 2024-04-05 RX ADMIN — FAM-TRASTUZUMAB DERUXTECAN-NXKI 400 MG: 100 INJECTION, POWDER, LYOPHILIZED, FOR SOLUTION INTRAVENOUS at 15:03

## 2024-04-05 NOTE — PROGRESS NOTES
HPI: Continuation of care for breast cancer with metastasis.  In 2016 she was diagnosed to have stage IV de brandon triple positive  left breast cancer  with metastatic disease to liver and bones . Patient was started on THP,  Taxotere plus Herceptin and Perjeta and after 6 cycles Taxotere was discontinued and she was continued on Herceptin and Perjeta and to that tamoxifen was added.  Patient  was premenopausal at that time.  Later tamoxifen was changed to letrozole after she had  BSO.  She had Xgeva. She  had radiation to left hip for palliation.  She continued to respond and in September 2016 patient had lumpectomy of left breast followed by radiation therapy.  At the time of lumpectomy there was a small residual disease, 1.4 cm.  Lymph nodes were not sampled.   Patient tested positive for BRCA 1. She already had BSO.  She has been getting imaging studies for early detection of other cancers like peritoneal and pancreatic cancers.  She has been encouraged to see a dermatologist but she does not want to.  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   She has been taking vitamin-D and calcium and is staying active.  She is not on Xgeva anymore that she had for 2 years.   She has been tolerating treatments without much problem.  She goes for blood tests and echocardiogram on regular basis.    She has done well all these years and  had very good response but then in 2022 there was a new lesion in the liver on CT scan and MRI scan and on biopsy that proved to be metastatic cancer from breast, strongly positive for ER, negative for DC and 2+ for HER 2 that was negative by FISH (low positive HER2).  Patient saw radiation oncologist for local radiation to liver lesion and discussed risks and benefits and decided to be observed. Subsequent CT scan showed slight decrease in size of liver lesion from 2.7 to 2.4.  She preferred to stay on letrozole, Herceptin and Perjeta.  We did discuss ENHERTU.   Most recent CT scan  showed increase in the size of liver lesion.  She was referred to back to radiation for liver directed therapy.  SBRT was considered but was not given.  On 1/30/2024 she had first and only dose of Enhertu as above.  Patient has relapsed triple positive stage IV breast cancer and most recent treatment was with Enhertu and she had 1st treatment and 1/30/2024.  She did not go for second treatment that was scheduled on 2/23/2024 because she was having symptoms from Enhertu. After the first cycle of treatment.  She had GI symptoms, increased tiredness and blurred vision.  Symptoms improved after 10 days.  She had second treatment on 3/15/2024 she tolerated that better but she is upset because she has lost hair and that is bothering her quite a bit.  She is thinking of changing treatment for that reason.  She plans to go ahead with third treatment on 4/5/2024 but 1 up-to-date on 4/26/2024 she would not have because she is planning to go to [place].  Treatment will be resumed after she returns.     No pulmonary symptoms.  Prior to Enhertu she was on  Herceptin plus Perjeta and letrozole.  She will stay on letrozole.          Patient knows that she is at high risk for breast and other cancers like  ovarian cancer, pancreatic cancer, peritoneal cancer, melanoma and other cancers.  Patient goes to breast surgeon for evaluation and imaging studies.  She gets CT scan and that also checks the pancreas and peritoneum.  She had BSO.  She does not want to go to a dermatologist to be checked for melanoma or to ophthalmologist to be checked for melanoma in the eye.  She goes to her optometrist.  Also she does not want to have colonoscopy and not even Cologuard or stool test for blood.      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       She continues to take  calcium and vitamin-D  .                           Current Outpatient Medications:   •  letrozole (FEMARA) 2.5 mg tablet, Take 1 tablet (2.5 mg total) by mouth daily, Disp: 90 tablet, Rfl: 2  •  Multiple Vitamins-Minerals (Theragran-M Premier 50 Plus) TABS, as directed, Disp: , Rfl:   •  multivitamin (THERAGRAN) TABS, Take 1 tablet by mouth 3 (three) times a week , Disp: , Rfl:   •  ondansetron (ZOFRAN) 4 mg tablet, Take 1 tablet (4 mg total) by mouth every 8 (eight) hours as needed for nausea or vomiting, Disp: 30 tablet, Rfl: 1  •  pertuzumab (PERJETA) 420 mg/14 mL SOLN, Infuse into a venous catheter every 21 days At infusion  center, Disp: , Rfl:   •  Trastuzumab (HERCEPTIN IV), Infuse into a venous catheter every 21 days At infusion center, Disp: , Rfl:   •  VITAMIN D, CHOLECALCIFEROL, PO, Take 1,000 Units by mouth 3 (three) times a week , Disp: , Rfl:     No Known Allergies    Oncology History   Malignant neoplasm of upper-inner quadrant of left female breast (HCC)   12/2015 Initial Diagnosis    Malignant neoplasm of upper-outer quadrant of left female breast (HCC)     2016 -  Hormone Therapy    Tamoxifen  Ended 8/2019.    Dr Benavidez  Letrozole 9/2019.     1/27/2016 Surgery    Port placement     2/19/2016 - 6/2/2018 Chemotherapy    Taxotere,  herceptin, perjeta, xgeva.   After six cycles, taxotere was discontinued and tamoxifen added.   Continues with Perjeta and Herceptin every 3 weeks     - Dr Benavidez       9/16/2016 Surgery    Left breast partial mastectomy     11/7/2016 - 12/23/2016 Radiation    Plan ID Energy Fractions Dose per Fraction (cGy) Total Dose Delivered (cGy) Elapsed Days   Lt Breast 6X 25 / 25 200 5,000 36   Lt Brst Boost 6X 8 / 8 200 1,600 9   Lt Femur 10X 10 / 10 300 3,000 11   Lt PAB 6X 25 / 25 60 1,500 36   Lt Sclav 6X 25 / 25 200 5,000 36                                   Total dose to left  breast lumpectomy cavity: 6600 cGy                   Total dose to the supraclavicular and axillary regions: 5000 cGy       4/17/2019 - 1/12/2024 Chemotherapy    pertuzumab (PERJETA) IVPB, 840 mg, Intravenous, Once, 81 of 82 cycles  Administration: 420 mg (5/17/2019), 420 mg (6/7/2019), 420 mg (6/28/2019), 420 mg (7/19/2019), 420 mg (8/30/2019), 420 mg (9/20/2019), 420 mg (8/9/2019), 420 mg (10/9/2019), 420 mg (11/1/2019), 420 mg (11/22/2019), 420 mg (12/13/2019), 420 mg (1/3/2020), 420 mg (1/24/2020), 420 mg (2/14/2020), 420 mg (3/6/2020), 420 mg (3/27/2020), 420 mg (4/17/2020), 420 mg (5/8/2020), 420 mg (5/29/2020), 420 mg (6/19/2020), 420 mg (7/10/2020), 420 mg (7/31/2020), 420 mg (8/21/2020), 420 mg (9/11/2020), 420 mg (10/2/2020), 420 mg (10/23/2020), 420 mg (11/13/2020), 420 mg (12/4/2020), 420 mg (12/24/2020), 420 mg (1/15/2021), 420 mg (2/5/2021), 420 mg (2/26/2021), 420 mg (3/19/2021), 420 mg (4/9/2021), 420 mg (4/30/2021), 420 mg (5/21/2021), 420 mg (6/11/2021), 420 mg (7/2/2021), 420 mg (7/23/2021), 420 mg (8/13/2021), 420 mg (9/3/2021), 420 mg (9/24/2021), 420 mg (10/15/2021), 420 mg (11/5/2021), 420 mg (11/26/2021), 420 mg (12/17/2021), 420 mg (1/7/2022), 420 mg (1/28/2022), 420 mg (2/18/2022), 420 mg (3/11/2022), 420 mg (4/1/2022), 420 mg (5/13/2022), 420 mg (6/3/2022), 420 mg (6/28/2022), 420 mg (7/22/2022), 420 mg (9/2/2022), 420 mg (9/23/2022), 420 mg (8/12/2022), 420 mg (10/14/2022), 420 mg (11/4/2022), 420 mg (11/25/2022), 420 mg (12/15/2022), 420 mg (1/6/2023), 420 mg (1/27/2023), 420 mg (2/17/2023), 420 mg (3/10/2023), 420 mg (3/31/2023), 420 mg (4/21/2023), 420 mg (5/19/2023), 420 mg (6/13/2023), 420 mg (6/30/2023), 420 mg (7/21/2023), 420 mg (8/11/2023), 420 mg (9/1/2023), 420 mg (9/22/2023), 420 mg (10/13/2023), 420 mg (11/3/2023), 420 mg (11/24/2023), 420 mg (12/22/2023), 420 mg (1/12/2024)  trastuzumab (HERCEPTIN) chemo infusion, 6 mg/kg = 589 mg (75 % of original dose 8 mg/kg),  Intravenous, Once, 81 of 82 cycles  Dose modification: 6 mg/kg (original dose 8 mg/kg, Cycle 1, Reason: Other (Must fill in a comment))  Administration: 589 mg (5/17/2019), 589 mg (6/7/2019), 600 mg (6/28/2019), 600 mg (7/19/2019), 600 mg (8/30/2019), 600 mg (9/20/2019), 600 mg (8/9/2019), 600 mg (10/9/2019), 600 mg (11/1/2019), 600 mg (11/22/2019), 600 mg (12/13/2019), 600 mg (1/3/2020), 600 mg (1/24/2020), 600 mg (2/14/2020), 600 mg (3/6/2020), 600 mg (3/27/2020), 600 mg (4/17/2020), 600 mg (5/8/2020), 600 mg (5/29/2020), 600 mg (6/19/2020), 600 mg (7/10/2020), 600 mg (7/31/2020), 600 mg (8/21/2020), 600 mg (9/11/2020), 600 mg (10/2/2020), 600 mg (10/23/2020), 600 mg (11/13/2020), 600 mg (12/4/2020), 600 mg (12/24/2020), 600 mg (1/15/2021), 600 mg (2/5/2021), 600 mg (2/26/2021), 600 mg (3/19/2021), 600 mg (4/9/2021), 600 mg (4/30/2021), 600 mg (5/21/2021), 600 mg (6/11/2021), 600 mg (7/2/2021), 600 mg (7/23/2021), 600 mg (8/13/2021), 600 mg (9/3/2021), 600 mg (9/24/2021), 600 mg (10/15/2021), 600 mg (11/5/2021), 600 mg (11/26/2021), 600 mg (12/17/2021), 600 mg (1/7/2022), 600 mg (1/28/2022), 600 mg (2/18/2022), 600 mg (3/11/2022), 600 mg (4/1/2022), 600 mg (5/13/2022), 600 mg (6/3/2022), 600 mg (6/28/2022), 600 mg (7/22/2022), 600 mg (9/2/2022), 600 mg (9/23/2022), 600 mg (8/12/2022), 600 mg (10/14/2022), 600 mg (11/4/2022), 600 mg (11/25/2022), 600 mg (12/15/2022), 600 mg (1/6/2023), 600 mg (1/27/2023), 600 mg (2/17/2023), 600 mg (3/10/2023), 600 mg (3/31/2023), 600 mg (4/21/2023), 600 mg (5/19/2023), 600 mg (6/13/2023), 600 mg (6/30/2023), 600 mg (7/21/2023), 600 mg (8/11/2023), 600 mg (9/1/2023), 600 mg (9/22/2023), 600 mg (10/13/2023), 600 mg (11/3/2023), 600 mg (11/24/2023), 600 mg (12/22/2023), 600 mg (1/12/2024)     8/16/2019 Surgery    she underwent BSO.     7/5/2022 -  Cancer Staged    Staging form: Breast, AJCC 8th Edition  - Clinical: Stage IV (rcTX, cNX, pM1) - Signed by Dejuan Hernandez MD on  7/5/2022  Stage prefix: Recurrence       2/2/2024 -  Chemotherapy    alteplase (CATHFLO), 2 mg, Intracatheter, Every 1 Minute as needed, 2 of 6 cycles  fam-trastuzumab deruxtecan-nxki (ENHERTU) IVPB, 520 mg, Intravenous, Once, 2 of 6 cycles  Dose modification: 4.4 mg/kg (original dose 5.4 mg/kg, Cycle 3, Reason: Dose modified as per discussion with consulting physician)  Administration: 500 mg (2/2/2024), 400 mg (3/15/2024)     Malignant neoplasm metastatic to liver (HCC)   4/27/2018 Initial Diagnosis    Liver metastases (HCC)     4/17/2019 - 1/12/2024 Chemotherapy    pertuzumab (PERJETA) IVPB, 840 mg, Intravenous, Once, 81 of 82 cycles  Administration: 420 mg (5/17/2019), 420 mg (6/7/2019), 420 mg (6/28/2019), 420 mg (7/19/2019), 420 mg (8/30/2019), 420 mg (9/20/2019), 420 mg (8/9/2019), 420 mg (10/9/2019), 420 mg (11/1/2019), 420 mg (11/22/2019), 420 mg (12/13/2019), 420 mg (1/3/2020), 420 mg (1/24/2020), 420 mg (2/14/2020), 420 mg (3/6/2020), 420 mg (3/27/2020), 420 mg (4/17/2020), 420 mg (5/8/2020), 420 mg (5/29/2020), 420 mg (6/19/2020), 420 mg (7/10/2020), 420 mg (7/31/2020), 420 mg (8/21/2020), 420 mg (9/11/2020), 420 mg (10/2/2020), 420 mg (10/23/2020), 420 mg (11/13/2020), 420 mg (12/4/2020), 420 mg (12/24/2020), 420 mg (1/15/2021), 420 mg (2/5/2021), 420 mg (2/26/2021), 420 mg (3/19/2021), 420 mg (4/9/2021), 420 mg (4/30/2021), 420 mg (5/21/2021), 420 mg (6/11/2021), 420 mg (7/2/2021), 420 mg (7/23/2021), 420 mg (8/13/2021), 420 mg (9/3/2021), 420 mg (9/24/2021), 420 mg (10/15/2021), 420 mg (11/5/2021), 420 mg (11/26/2021), 420 mg (12/17/2021), 420 mg (1/7/2022), 420 mg (1/28/2022), 420 mg (2/18/2022), 420 mg (3/11/2022), 420 mg (4/1/2022), 420 mg (5/13/2022), 420 mg (6/3/2022), 420 mg (6/28/2022), 420 mg (7/22/2022), 420 mg (9/2/2022), 420 mg (9/23/2022), 420 mg (8/12/2022), 420 mg (10/14/2022), 420 mg (11/4/2022), 420 mg (11/25/2022), 420 mg (12/15/2022), 420 mg (1/6/2023), 420 mg (1/27/2023), 420 mg  (2/17/2023), 420 mg (3/10/2023), 420 mg (3/31/2023), 420 mg (4/21/2023), 420 mg (5/19/2023), 420 mg (6/13/2023), 420 mg (6/30/2023), 420 mg (7/21/2023), 420 mg (8/11/2023), 420 mg (9/1/2023), 420 mg (9/22/2023), 420 mg (10/13/2023), 420 mg (11/3/2023), 420 mg (11/24/2023), 420 mg (12/22/2023), 420 mg (1/12/2024)  trastuzumab (HERCEPTIN) chemo infusion, 6 mg/kg = 589 mg (75 % of original dose 8 mg/kg), Intravenous, Once, 81 of 82 cycles  Dose modification: 6 mg/kg (original dose 8 mg/kg, Cycle 1, Reason: Other (Must fill in a comment))  Administration: 589 mg (5/17/2019), 589 mg (6/7/2019), 600 mg (6/28/2019), 600 mg (7/19/2019), 600 mg (8/30/2019), 600 mg (9/20/2019), 600 mg (8/9/2019), 600 mg (10/9/2019), 600 mg (11/1/2019), 600 mg (11/22/2019), 600 mg (12/13/2019), 600 mg (1/3/2020), 600 mg (1/24/2020), 600 mg (2/14/2020), 600 mg (3/6/2020), 600 mg (3/27/2020), 600 mg (4/17/2020), 600 mg (5/8/2020), 600 mg (5/29/2020), 600 mg (6/19/2020), 600 mg (7/10/2020), 600 mg (7/31/2020), 600 mg (8/21/2020), 600 mg (9/11/2020), 600 mg (10/2/2020), 600 mg (10/23/2020), 600 mg (11/13/2020), 600 mg (12/4/2020), 600 mg (12/24/2020), 600 mg (1/15/2021), 600 mg (2/5/2021), 600 mg (2/26/2021), 600 mg (3/19/2021), 600 mg (4/9/2021), 600 mg (4/30/2021), 600 mg (5/21/2021), 600 mg (6/11/2021), 600 mg (7/2/2021), 600 mg (7/23/2021), 600 mg (8/13/2021), 600 mg (9/3/2021), 600 mg (9/24/2021), 600 mg (10/15/2021), 600 mg (11/5/2021), 600 mg (11/26/2021), 600 mg (12/17/2021), 600 mg (1/7/2022), 600 mg (1/28/2022), 600 mg (2/18/2022), 600 mg (3/11/2022), 600 mg (4/1/2022), 600 mg (5/13/2022), 600 mg (6/3/2022), 600 mg (6/28/2022), 600 mg (7/22/2022), 600 mg (9/2/2022), 600 mg (9/23/2022), 600 mg (8/12/2022), 600 mg (10/14/2022), 600 mg (11/4/2022), 600 mg (11/25/2022), 600 mg (12/15/2022), 600 mg (1/6/2023), 600 mg (1/27/2023), 600 mg (2/17/2023), 600 mg (3/10/2023), 600 mg (3/31/2023), 600 mg (4/21/2023), 600 mg (5/19/2023), 600 mg  (6/13/2023), 600 mg (6/30/2023), 600 mg (7/21/2023), 600 mg (8/11/2023), 600 mg (9/1/2023), 600 mg (9/22/2023), 600 mg (10/13/2023), 600 mg (11/3/2023), 600 mg (11/24/2023), 600 mg (12/22/2023), 600 mg (1/12/2024)     6/24/2022 Biopsy    Final Diagnosis   A.  Liver, core biopsies:     - Metastatic carcinoma compatible with a breast primary.      2/2/2024 -  Chemotherapy    alteplase (CATHFLO), 2 mg, Intracatheter, Every 1 Minute as needed, 2 of 6 cycles  fam-trastuzumab deruxtecan-nxki (ENHERTU) IVPB, 520 mg, Intravenous, Once, 2 of 6 cycles  Dose modification: 4.4 mg/kg (original dose 5.4 mg/kg, Cycle 3, Reason: Dose modified as per discussion with consulting physician)  Administration: 500 mg (2/2/2024), 400 mg (3/15/2024)     Malignant neoplasm metastatic to bone (HCC)   4/27/2018 Initial Diagnosis    Bone metastasis (HCC)     4/17/2019 - 1/12/2024 Chemotherapy    pertuzumab (PERJETA) IVPB, 840 mg, Intravenous, Once, 81 of 82 cycles  Administration: 420 mg (5/17/2019), 420 mg (6/7/2019), 420 mg (6/28/2019), 420 mg (7/19/2019), 420 mg (8/30/2019), 420 mg (9/20/2019), 420 mg (8/9/2019), 420 mg (10/9/2019), 420 mg (11/1/2019), 420 mg (11/22/2019), 420 mg (12/13/2019), 420 mg (1/3/2020), 420 mg (1/24/2020), 420 mg (2/14/2020), 420 mg (3/6/2020), 420 mg (3/27/2020), 420 mg (4/17/2020), 420 mg (5/8/2020), 420 mg (5/29/2020), 420 mg (6/19/2020), 420 mg (7/10/2020), 420 mg (7/31/2020), 420 mg (8/21/2020), 420 mg (9/11/2020), 420 mg (10/2/2020), 420 mg (10/23/2020), 420 mg (11/13/2020), 420 mg (12/4/2020), 420 mg (12/24/2020), 420 mg (1/15/2021), 420 mg (2/5/2021), 420 mg (2/26/2021), 420 mg (3/19/2021), 420 mg (4/9/2021), 420 mg (4/30/2021), 420 mg (5/21/2021), 420 mg (6/11/2021), 420 mg (7/2/2021), 420 mg (7/23/2021), 420 mg (8/13/2021), 420 mg (9/3/2021), 420 mg (9/24/2021), 420 mg (10/15/2021), 420 mg (11/5/2021), 420 mg (11/26/2021), 420 mg (12/17/2021), 420 mg (1/7/2022), 420 mg (1/28/2022), 420 mg (2/18/2022), 420  mg (3/11/2022), 420 mg (4/1/2022), 420 mg (5/13/2022), 420 mg (6/3/2022), 420 mg (6/28/2022), 420 mg (7/22/2022), 420 mg (9/2/2022), 420 mg (9/23/2022), 420 mg (8/12/2022), 420 mg (10/14/2022), 420 mg (11/4/2022), 420 mg (11/25/2022), 420 mg (12/15/2022), 420 mg (1/6/2023), 420 mg (1/27/2023), 420 mg (2/17/2023), 420 mg (3/10/2023), 420 mg (3/31/2023), 420 mg (4/21/2023), 420 mg (5/19/2023), 420 mg (6/13/2023), 420 mg (6/30/2023), 420 mg (7/21/2023), 420 mg (8/11/2023), 420 mg (9/1/2023), 420 mg (9/22/2023), 420 mg (10/13/2023), 420 mg (11/3/2023), 420 mg (11/24/2023), 420 mg (12/22/2023), 420 mg (1/12/2024)  trastuzumab (HERCEPTIN) chemo infusion, 6 mg/kg = 589 mg (75 % of original dose 8 mg/kg), Intravenous, Once, 81 of 82 cycles  Dose modification: 6 mg/kg (original dose 8 mg/kg, Cycle 1, Reason: Other (Must fill in a comment))  Administration: 589 mg (5/17/2019), 589 mg (6/7/2019), 600 mg (6/28/2019), 600 mg (7/19/2019), 600 mg (8/30/2019), 600 mg (9/20/2019), 600 mg (8/9/2019), 600 mg (10/9/2019), 600 mg (11/1/2019), 600 mg (11/22/2019), 600 mg (12/13/2019), 600 mg (1/3/2020), 600 mg (1/24/2020), 600 mg (2/14/2020), 600 mg (3/6/2020), 600 mg (3/27/2020), 600 mg (4/17/2020), 600 mg (5/8/2020), 600 mg (5/29/2020), 600 mg (6/19/2020), 600 mg (7/10/2020), 600 mg (7/31/2020), 600 mg (8/21/2020), 600 mg (9/11/2020), 600 mg (10/2/2020), 600 mg (10/23/2020), 600 mg (11/13/2020), 600 mg (12/4/2020), 600 mg (12/24/2020), 600 mg (1/15/2021), 600 mg (2/5/2021), 600 mg (2/26/2021), 600 mg (3/19/2021), 600 mg (4/9/2021), 600 mg (4/30/2021), 600 mg (5/21/2021), 600 mg (6/11/2021), 600 mg (7/2/2021), 600 mg (7/23/2021), 600 mg (8/13/2021), 600 mg (9/3/2021), 600 mg (9/24/2021), 600 mg (10/15/2021), 600 mg (11/5/2021), 600 mg (11/26/2021), 600 mg (12/17/2021), 600 mg (1/7/2022), 600 mg (1/28/2022), 600 mg (2/18/2022), 600 mg (3/11/2022), 600 mg (4/1/2022), 600 mg (5/13/2022), 600 mg (6/3/2022), 600 mg (6/28/2022), 600 mg  "(7/22/2022), 600 mg (9/2/2022), 600 mg (9/23/2022), 600 mg (8/12/2022), 600 mg (10/14/2022), 600 mg (11/4/2022), 600 mg (11/25/2022), 600 mg (12/15/2022), 600 mg (1/6/2023), 600 mg (1/27/2023), 600 mg (2/17/2023), 600 mg (3/10/2023), 600 mg (3/31/2023), 600 mg (4/21/2023), 600 mg (5/19/2023), 600 mg (6/13/2023), 600 mg (6/30/2023), 600 mg (7/21/2023), 600 mg (8/11/2023), 600 mg (9/1/2023), 600 mg (9/22/2023), 600 mg (10/13/2023), 600 mg (11/3/2023), 600 mg (11/24/2023), 600 mg (12/22/2023), 600 mg (1/12/2024)     6/24/2022 Biopsy    Final Diagnosis   A.  Liver, core biopsies:     - Metastatic carcinoma compatible with a breast primary.      2/2/2024 -  Chemotherapy    alteplase (CATHFLO), 2 mg, Intracatheter, Every 1 Minute as needed, 2 of 6 cycles  fam-trastuzumab deruxtecan-nxki (ENHERTU) IVPB, 520 mg, Intravenous, Once, 2 of 6 cycles  Dose modification: 4.4 mg/kg (original dose 5.4 mg/kg, Cycle 3, Reason: Dose modified as per discussion with consulting physician)  Administration: 500 mg (2/2/2024), 400 mg (3/15/2024)         ROS:  04/05/24 Reviewed 12 systems: .  See symptoms in HPI  Presently no other neurological, cardiac, pulmonary, GI and  symptoms other than mentioned  in HPI..  Other symptoms are in HPI  No symptoms like fever, chills, bleeding, bone pains, skin rash,  claudication and gait problem. No frequent infections.  Not unusually sensitive to heat or cold. No swelling of the ankles. No swollen glands.  Patient is anxious    /82 (BP Location: Right arm, Patient Position: Sitting, Cuff Size: Adult)   Pulse 84   Temp (!) 93.4 °F (34.1 °C)   Resp 17   Ht 5' 4.96\" (1.65 m)   Wt 93.4 kg (206 lb)   SpO2 98%   BMI 34.32 kg/m²     Physical Exam:   Alert and oriented and not in distress.  Vitals are above.  No icterus.  No oral thrush.  No palpable neck mass.  Clear lung fields.  Regular heart rate.  Soft and nontender abdomen.  No palpable abdominal mass.  No ascites.  No edema of ankles.  No " calf tenderness.  No focal neurological deficit.  No skin rash.  Lymph nodes are not palpably enlarged in the neck and axillary areas.  There is no clubbing, Patient is  anxious.  Performance status 2. No lymphedema.   .   IMAGING:  IMPRESSION: 02/24/2021     1.  No scintigraphic evidence of osseous metastasis.           Workstation performed: [id number]      Imaging     Ejection fraction 65% on 08/06/2022      Study Result    Narrative & Impression   CENTRAL  DXA SCAN on 03/08/2022     CLINICAL HISTORY:   56 year old post-menopausal  female risk factors include osteoporosis screening.Additional medical history: Breast cancer with mets to Hip , right Hip scanned.     TECHNIQUE: Bone densitometry was performed using a Hologic Horizon W bone densitometer.  Regions of interest appear properly placed. There are   other confounding variables which could limit the study.       Her elevated  BMI may influence the T score result.     Degenerative or osteoarthritic changes are noted on the spine image eliminating L4 from evaluation.     COMPARISON:  Follow-up     RESULTS:   LUMBAR SPINE:  L1-L3:  BMD 1.021 gm/cm2  T-score 0.0 and unchanged from 2019.  Z-score 1.0     RIGHT TOTAL HIP:  BMD 1.122 gm/cm2  T-score 1.5  Z-score 2.1     RIGHT FEMORAL NECK:  BMD 0.842 gm/cm2  T-score -0.1 and unchanged from 2019.  Z-score 0.9           IMPRESSION:  1. Based on the WHO classification, this study is normal and the patient is considered at low risk for fracture.       IMPRESSION:     Since August 2, 2022:  1.  Decreased size and conspicuity of the biopsy-proven hepatic metastasis.    2.  No new sites of metastatic disease in the abdomen.        Workstation performed: [id number]          Imaging    CT abdomen w contrast (Order: [id number]) - 11/1/2022    IMPRESSION:     1. Enlargement of the known caudate intrahepatic mass with ill-defined margins estimated to measure 2.1 x 2.7 on today's examination.  2. No new  intrahepatic mass is evident.     Study marked significant in epic for notification purposes.     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 Multiplanar attenuation corrected and non attenuation corrected PET images are available for interpretation, and contiguous, low dose, axial CT sections were obtained from the skull base through the femurs.    Intravenous contrast material was not utilized.  This examination, like all CT scans performed in the [facility], was performed utilizing techniques to minimize radiation dose exposure, including the use of iterative reconstruction and   automated exposure control.     Fasting serum glucose: 117 mg/dl     FINDINGS:     VISUALIZED BRAIN:  No acute abnormalities are seen.     HEAD/NECK:  There is a physiologic distribution of FDG. No FDG avid cervical adenopathy is seen.  CT images: Unremarkable.     CHEST:  No FDG avid soft tissue lesions are seen.  CT images: Unremarkable.     ABDOMEN:  Known caudate liver metastatic lesion demonstrates hypermetabolism, SUV 9.1. This measures approximately 3.8 x 3.3 cm in axial dimensions based on the PET images.     Benign left adrenal  adenoma measuring 2.2 x 2.4 cm, stable. There is associated FDG activity, SUV 3.1, typically physiologic.     No additional FDG avid soft tissue lesions are seen.     CT images: Minimal mesenteric haziness is unchanged.  PELVIS:  No FDG avid soft tissue lesions are seen.  CT images: Stable. Lobulated uterus suggesting fibroids     OSSEOUS STRUCTURES:  Stable appearance of lytic lesion in the left femoral intertrochanteric region, without significant FDG uptake.     No FDG avid lesions are seen.  CT images: No significant findings.     IMPRESSION:  1. Hypermetabolic caudate liver metastasis. No additional hypermetabolic lesions in the liver or upper abdomen.  2. No hypermetabolic metastases in the neck, chest, pelvis or osseous structures.              Workstation performed: [id number]        Imaging    NM PET CT skull base to mid thigh (Order: 35    LABS:            Results for orders placed or performed during the hospital encounter of 03/14/24   CBC and differential   Result Value Ref Range    WBC 7.71 4.31 - 10.16 Thousand/uL    RBC 5.07 3.81 - 5.12 Million/uL    Hemoglobin 13.6 11.5 - 15.4 g/dL    Hematocrit 43.9 34.8 - 46.1 %    MCV 87 82 - 98 fL    MCH 26.8 26.8 - 34.3 pg    MCHC 31.0 (L) 31.4 - 37.4 g/dL    RDW 15.9 (H) 11.6 - 15.1 %    MPV 11.2 8.9 - 12.7 fL    Platelets 276 149 - 390 Thousands/uL    nRBC 0 /100 WBCs    Neutrophils Relative 52 43 - 75 %    Immature Grans % 0 0 - 2 %    Lymphocytes Relative 32 14 - 44 %    Monocytes Relative 7 4 - 12 %    Eosinophils Relative 8 (H) 0 - 6 %    Basophils Relative 1 0 - 1 %    Neutrophils Absolute 4.05 1.85 - 7.62 Thousands/µL    Absolute Immature Grans 0.02 0.00 - 0.20 Thousand/uL    Absolute Lymphocytes 2.44 0.60 - 4.47 Thousands/µL    Absolute Monocytes 0.55 0.17 - 1.22 Thousand/µL    Eosinophils Absolute 0.59 0.00 - 0.61 Thousand/µL    Basophils Absolute 0.06 0.00 - 0.10 Thousands/µL   Comprehensive metabolic panel   Result Value Ref Range    Sodium 140 135 -  147 mmol/L    Potassium 4.1 3.5 - 5.3 mmol/L    Chloride 107 96 - 108 mmol/L    CO2 28 21 - 32 mmol/L    ANION GAP 5 4 - 13 mmol/L    BUN 17 5 - 25 mg/dL    Creatinine 0.80 0.60 - 1.30 mg/dL    Glucose 161 (H) 65 - 140 mg/dL    Calcium 9.2 8.4 - 10.2 mg/dL    AST 19 13 - 39 U/L    ALT 28 7 - 52 U/L    Alkaline Phosphatase 74 34 - 104 U/L    Total Protein 6.8 6.4 - 8.4 g/dL    Albumin 4.0 3.5 - 5.0 g/dL    Total Bilirubin 0.47 0.20 - 1.00 mg/dL    eGFR 82 ml/min/1.73sq m     Labs, Imaging, & Other studies:   All pertinent labs and imaging studies were personally reviewed        Reviewed  test results and discussed with patient and explained.    Assessment and plan:    Continuation of care for breast cancer with metastasis.  In 2016 she was diagnosed to have stage IV de brandon triple positive  left breast cancer  with metastatic disease to liver and bones . Patient was started on THP,  Taxotere plus Herceptin and Perjeta and after 6 cycles Taxotere was discontinued and she was continued on Herceptin and Perjeta and to that tamoxifen was added.  Patient  was premenopausal at that time.  Later tamoxifen was changed to letrozole after she had  BSO.  She had Xgeva. She  had radiation to left hip for palliation.  She continued to respond and in September 2016 patient had lumpectomy of left breast followed by radiation therapy.  At the time of lumpectomy there was a small residual disease, 1.4 cm.  Lymph nodes were not sampled.   Patient tested positive for BRCA 1. She already had BSO.  She has been getting imaging studies for early detection of other cancers like peritoneal and pancreatic cancers.  She has been encouraged to see a dermatologist but she does not want to.  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   She has been taking vitamin-D and calcium and is staying active.  She is not on Xgeva anymore that she had for 2 years.   She has been tolerating treatments without much problem.  She  goes for blood tests and echocardiogram on regular basis.    She has done well all these years and  had very good response but then in 2022 there was a new lesion in the liver on CT scan and MRI scan and on biopsy that proved to be metastatic cancer from breast, strongly positive for ER, negative for CO and 2+ for HER 2 that was negative by FISH (low positive HER2).  Patient saw radiation oncologist for local radiation to liver lesion and discussed risks and benefits and decided to be observed. Subsequent CT scan showed slight decrease in size of liver lesion from 2.7 to 2.4.  She preferred to stay on letrozole, Herceptin and Perjeta.  We did discuss ENHERTU.  Most recent CT scan  showed increase in the size of liver lesion.  She was referred to back to radiation for liver directed therapy.  SBRT was considered but was not given.  On 1/30/2024 she had first and only dose of Enhertu as above.  Patient has relapsed triple positive stage IV breast cancer and most recent treatment was with Enhertu and she had 1st treatment and 1/30/2024.  She did not go for second treatment that was scheduled on 2/23/2024 because she was having symptoms from Enhertu. After the first cycle of treatment.  She had GI symptoms, increased tiredness and blurred vision.  Symptoms improved after 10 days.  She had second treatment on 3/15/2024 she tolerated that better but she is upset because she has lost hair and that is bothering her quite a bit.  She is thinking of changing treatment for that reason.  She plans to go ahead with third treatment on 4/5/2024 but 1 up-to-date on 4/26/2024 she would not have because she is planning to go to [place].  Treatment will be resumed after she returns.     No pulmonary symptoms.  Prior to Enhertu she was on  Herceptin plus Perjeta and letrozole.  She will stay on letrozole.          Patient knows that she is at high risk for breast and other cancers like  ovarian cancer, pancreatic cancer, peritoneal  cancer, melanoma and other cancers.  Patient goes to breast surgeon for evaluation and imaging studies.  She gets CT scan and that also checks the pancreas and peritoneum.  She had BSO.  She does not want to go to a dermatologist to be checked for melanoma or to ophthalmologist to be checked for melanoma in the eye.  She goes to her optometrist.  Also she does not want to have colonoscopy and not even Cologuard or stool test for blood.      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       She continues to take  calcium and vitamin-D.         Physical examination and test results are as recorded and discussed.  Discussed treatment options if she decided not to continue with Enhertu and maybe she can make that decision after negative CT scan ordered by radiation oncologist.  Plan is as above, reduced dose of Enhertu and skipping treatment because she will be in [place].  She is being continued on letrozole.  She is taking vitamin D and calcium.    She gets  echocardiogram on regular basis.  Condition discussed and explained.  Questions answered.  Also discussed the importance of self-breast examination, eating healthy foods, staying active and health screening tests.  Patient goes to her breast surgeon for examination and  imaging studies.  Patient is capable of self-care.  Goal is prolonged survival from stage IV breast cancer.  Patient has been aware of significance of positive BRCA1 mutation and cancer risks.  Patient will continue to follow with her primary physician and other consultants.  Discussed precautions against coronavirus and flu and other infections.  .  See diagnoses, orders and instructions below  1. Malignant neoplasm of upper-inner quadrant of left  breast in female, estrogen receptor positive (HCC)    - CBC and differential; Future    2. Malignant neoplasm metastatic to liver (HCC)      3. Malignant neoplasm metastatic to bone (HCC)      4. History of bilateral salpingo-oophorectomy (BSO)      5. BRCA1 gene mutation positive in female      6. 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      7. Chemotherapy induced neutropenia (HCC)      8. Chemotherapy-induced nausea      9. Chemotherapy induced diarrhea      10. 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 Osteoporosis due to aromatase inhibitor      12. Port-A-Cath in place    Please cancel patient's appointments at the infusion center on 4/23, 4/24 and 4/26/2024.  She will continue with chemotherapy after that, already scheduled.  Follow-up visit on 5/14/2024.      I used dictation device to dictated this note and there could be mistakes in my note and for that she may contact my office    Patient voiced understanding and agreed       Counseling / Coordination of Care   .  Provided counseling and support

## 2024-04-05 NOTE — PROGRESS NOTES
Alejandrina Barba  tolerated treatment well with no complications.      Alejandrina Barba is aware of future appt on 5/16/24 @ 0800    AVS declined by Alejandrina Barba.

## 2024-04-27 PROBLEM — K52.1 CHEMOTHERAPY INDUCED DIARRHEA: Status: RESOLVED | Noted: 2024-02-27 | Resolved: 2024-04-27

## 2024-04-27 PROBLEM — T45.1X5A CHEMOTHERAPY INDUCED DIARRHEA: Status: RESOLVED | Noted: 2024-02-27 | Resolved: 2024-04-27

## 2024-05-07 ENCOUNTER — TELEPHONE (OUTPATIENT)
Dept: HEMATOLOGY ONCOLOGY | Facility: CLINIC | Age: 59
End: 2024-05-07

## 2024-05-07 NOTE — TELEPHONE ENCOUNTER
I called Alejandrina regarding an appointment that they have scheduled with Dr. Benavidez scheduled on  06/12/2024       I left a voicemail explaining to patient that this appointment will need to be rescheduled due to a change in the providers schedule. Patient was advised to call [facility] to reschedule.    Appointment canceled and my chart message was sent if applicable.

## 2024-05-09 RX ORDER — DEXTROSE MONOHYDRATE 50 MG/ML
20 INJECTION, SOLUTION INTRAVENOUS ONCE
Status: CANCELLED | OUTPATIENT
Start: 2024-05-17

## 2024-05-14 ENCOUNTER — TELEPHONE (OUTPATIENT)
Dept: HEMATOLOGY ONCOLOGY | Facility: CLINIC | Age: 59
End: 2024-05-14

## 2024-05-14 NOTE — TELEPHONE ENCOUNTER
Appointment Change  Cancel, Reschedule, Change to Virtual      Who are you speaking with? Patient   If it is not the patient, is the caller listed on the communication consent form? N/A   Which provider is the appointment scheduled with? Dr. Benavidez   When was the original appointment scheduled?    Please list date and time 6/12/24 @ 3:40pm   At which location is the appointment scheduled to take place? [place]   Was the appointment rescheduled?     Was the appointment changed from an in person visit to a virtual visit?    If so, please list the details of the change. Yes 7/2/24 @ 4:20pm   What is the reason for the appointment change? Dr. Benavidez will be OOO       Was STAR transport scheduled? no   Does STAR transport need to be scheduled for the new visit (if applicable) no   Does the patient need an infusion appointment rescheduled? no   Does the patient have an upcoming infusion appointment scheduled? If so, when? no   Is the patient undergoing chemotherapy? no   For appointments cancelled with less than 24 hours:  Was the no-show policy reviewed? yes

## 2024-05-15 DIAGNOSIS — Z90.79 HISTORY OF BILATERAL SALPINGO-OOPHORECTOMY (BSO): ICD-10-CM

## 2024-05-15 DIAGNOSIS — Z17.0 MALIGNANT NEOPLASM OF UPPER-INNER QUADRANT OF LEFT BREAST IN FEMALE, ESTROGEN RECEPTOR POSITIVE (HCC): ICD-10-CM

## 2024-05-15 DIAGNOSIS — T45.1X5A CHEMOTHERAPY INDUCED NEUTROPENIA (HCC): ICD-10-CM

## 2024-05-15 DIAGNOSIS — C78.7 MALIGNANT NEOPLASM METASTATIC TO LIVER (HCC): Primary | ICD-10-CM

## 2024-05-15 DIAGNOSIS — Z90.722 HISTORY OF BILATERAL SALPINGO-OOPHORECTOMY (BSO): ICD-10-CM

## 2024-05-15 DIAGNOSIS — D70.1 CHEMOTHERAPY INDUCED NEUTROPENIA (HCC): ICD-10-CM

## 2024-05-15 DIAGNOSIS — C79.51 MALIGNANT NEOPLASM METASTATIC TO BONE (HCC): ICD-10-CM

## 2024-05-15 DIAGNOSIS — C50.212 MALIGNANT NEOPLASM OF UPPER-INNER QUADRANT OF LEFT BREAST IN FEMALE, ESTROGEN RECEPTOR POSITIVE (HCC): ICD-10-CM

## 2024-05-15 RX ORDER — DEXTROSE MONOHYDRATE 50 MG/ML
20 INJECTION, SOLUTION INTRAVENOUS ONCE
OUTPATIENT
Start: 2024-06-07

## 2024-05-16 ENCOUNTER — HOSPITAL ENCOUNTER (OUTPATIENT)
Dept: INFUSION CENTER | Facility: CLINIC | Age: 59
Discharge: HOME | End: 2024-05-16
Payer: COMMERCIAL

## 2024-05-16 DIAGNOSIS — C78.7 MALIGNANT NEOPLASM METASTATIC TO LIVER (HCC): ICD-10-CM

## 2024-05-16 DIAGNOSIS — Z17.0 MALIGNANT NEOPLASM OF UPPER-INNER QUADRANT OF LEFT BREAST IN FEMALE, ESTROGEN RECEPTOR POSITIVE (HCC): ICD-10-CM

## 2024-05-16 DIAGNOSIS — Z95.828 PORT-A-CATH IN PLACE: Primary | ICD-10-CM

## 2024-05-16 DIAGNOSIS — C50.212 MALIGNANT NEOPLASM OF UPPER-INNER QUADRANT OF LEFT BREAST IN FEMALE, ESTROGEN RECEPTOR POSITIVE (HCC): ICD-10-CM

## 2024-05-16 DIAGNOSIS — Z45.2 ENCOUNTER FOR CARE RELATED TO VASCULAR ACCESS PORT: ICD-10-CM

## 2024-05-16 LAB
ALBUMIN SERPL BCP-MCNC: 4.1 G/DL (ref 3.5–5)
ALP SERPL-CCNC: 83 U/L (ref 34–104)
ALT SERPL W P-5'-P-CCNC: 31 U/L (ref 7–52)
ANION GAP SERPL CALCULATED.3IONS-SCNC: 4 MMOL/L (ref 4–13)
AST SERPL W P-5'-P-CCNC: 20 U/L (ref 13–39)
BASOPHILS # BLD AUTO: 0.05 THOUSANDS/ÂΜL (ref 0–0.1)
BASOPHILS NFR BLD AUTO: 1 % (ref 0–1)
BILIRUB SERPL-MCNC: 0.52 MG/DL (ref 0.2–1)
BUN SERPL-MCNC: 22 MG/DL (ref 5–25)
CALCIUM SERPL-MCNC: 9.5 MG/DL (ref 8.4–10.2)
CHLORIDE SERPL-SCNC: 105 MMOL/L (ref 96–108)
CO2 SERPL-SCNC: 29 MMOL/L (ref 21–32)
CREAT SERPL-MCNC: 0.89 MG/DL (ref 0.6–1.3)
EOSINOPHIL # BLD AUTO: 0.37 THOUSAND/ÂΜL (ref 0–0.61)
EOSINOPHIL NFR BLD AUTO: 4 % (ref 0–6)
ERYTHROCYTE [DISTWIDTH] IN BLOOD BY AUTOMATED COUNT: 15.9 % (ref 11.6–15.1)
GFR SERPL CREATININE-BSD FRML MDRD: 72 ML/MIN/1.73SQ M
GLUCOSE SERPL-MCNC: 112 MG/DL (ref 65–140)
HCT VFR BLD AUTO: 43.6 % (ref 34.8–46.1)
HGB BLD-MCNC: 13.9 G/DL (ref 11.5–15.4)
IMM GRANULOCYTES # BLD AUTO: 0.03 THOUSAND/UL (ref 0–0.2)
IMM GRANULOCYTES NFR BLD AUTO: 0 % (ref 0–2)
LYMPHOCYTES # BLD AUTO: 2.43 THOUSANDS/ÂΜL (ref 0.6–4.47)
LYMPHOCYTES NFR BLD AUTO: 27 % (ref 14–44)
MCH RBC QN AUTO: 28.4 PG (ref 26.8–34.3)
MCHC RBC AUTO-ENTMCNC: 31.9 G/DL (ref 31.4–37.4)
MCV RBC AUTO: 89 FL (ref 82–98)
MONOCYTES # BLD AUTO: 0.69 THOUSAND/ÂΜL (ref 0.17–1.22)
MONOCYTES NFR BLD AUTO: 8 % (ref 4–12)
NEUTROPHILS # BLD AUTO: 5.33 THOUSANDS/ÂΜL (ref 1.85–7.62)
NEUTS SEG NFR BLD AUTO: 60 % (ref 43–75)
NRBC BLD AUTO-RTO: 0 /100 WBCS
PLATELET # BLD AUTO: 291 THOUSANDS/UL (ref 149–390)
PMV BLD AUTO: 12 FL (ref 8.9–12.7)
POTASSIUM SERPL-SCNC: 4.2 MMOL/L (ref 3.5–5.3)
PROT SERPL-MCNC: 6.9 G/DL (ref 6.4–8.4)
RBC # BLD AUTO: 4.89 MILLION/UL (ref 3.81–5.12)
SODIUM SERPL-SCNC: 138 MMOL/L (ref 135–147)
WBC # BLD AUTO: 8.9 THOUSAND/UL (ref 4.31–10.16)

## 2024-05-16 PROCEDURE — 80053 COMPREHEN METABOLIC PANEL: CPT

## 2024-05-16 PROCEDURE — 85025 COMPLETE CBC W/AUTO DIFF WBC: CPT

## 2024-05-16 NOTE — PLAN OF CARE
Problem: INFECTION - ADULT  Goal: Absence or prevention of progression during hospitalization  Description: INTERVENTIONS:  - Assess and monitor for signs and symptoms of infection  - Monitor lab/diagnostic results  - Monitor all insertion sites, i.e. indwelling lines, tubes, and drains  - Monitor endotracheal if appropriate and nasal secretions for changes in amount and color  - Satin appropriate cooling/warming therapies per order  - Administer medications as ordered  - Instruct and encourage patient and family to use good hand hygiene technique  - Identify and instruct in appropriate isolation precautions for identified infection/condition  Outcome: Progressing  Goal: Absence of fever/infection during neutropenic period  Description: INTERVENTIONS:  - Monitor WBC    Outcome: Progressing     Problem: Knowledge Deficit  Goal: Patient/family/caregiver demonstrates understanding of disease process, treatment plan, medications, and discharge instructions  Description: Complete learning assessment and assess knowledge base.  Interventions:  - Provide teaching at level of understanding  - Provide teaching via preferred learning methods  Outcome: Progressing     
What Type Of Note Output Would You Prefer (Optional)?: Standard Output
How Severe Are Your Spot(S)?: mild
Have Your Spot(S) Been Treated In The Past?: has not been treated
Hpi Title: Evaluation of Skin Lesions

## 2024-05-16 NOTE — PROGRESS NOTES
Pt. Denies new symptoms or concerns today. Labs obtained via port a cath with noted excellent blood return.  Future appointment reviewed  Pt. Will return tomorrow 5/17/24 at 1400.  AVS declined.

## 2024-05-17 ENCOUNTER — HOSPITAL ENCOUNTER (OUTPATIENT)
Dept: INFUSION CENTER | Facility: CLINIC | Age: 59
End: 2024-05-17
Payer: COMMERCIAL

## 2024-05-17 ENCOUNTER — TELEPHONE (OUTPATIENT)
Dept: HEMATOLOGY ONCOLOGY | Facility: CLINIC | Age: 59
End: 2024-05-17

## 2024-05-17 VITALS — BODY MASS INDEX: 33.5 KG/M2 | HEIGHT: 65 IN | WEIGHT: 201.06 LBS

## 2024-05-17 DIAGNOSIS — C79.51 MALIGNANT NEOPLASM METASTATIC TO BONE (HCC): ICD-10-CM

## 2024-05-17 DIAGNOSIS — Z90.722 HISTORY OF BILATERAL SALPINGO-OOPHORECTOMY (BSO): ICD-10-CM

## 2024-05-17 DIAGNOSIS — T45.1X5A CHEMOTHERAPY INDUCED NEUTROPENIA (HCC): Primary | ICD-10-CM

## 2024-05-17 DIAGNOSIS — C78.7 MALIGNANT NEOPLASM METASTATIC TO LIVER (HCC): ICD-10-CM

## 2024-05-17 DIAGNOSIS — C78.7 MALIGNANT NEOPLASM METASTATIC TO LIVER (HCC): Primary | ICD-10-CM

## 2024-05-17 DIAGNOSIS — D70.1 CHEMOTHERAPY INDUCED NEUTROPENIA (HCC): Primary | ICD-10-CM

## 2024-05-17 DIAGNOSIS — D70.1 CHEMOTHERAPY INDUCED NEUTROPENIA (HCC): ICD-10-CM

## 2024-05-17 DIAGNOSIS — Z17.0 MALIGNANT NEOPLASM OF UPPER-INNER QUADRANT OF LEFT BREAST IN FEMALE, ESTROGEN RECEPTOR POSITIVE (HCC): ICD-10-CM

## 2024-05-17 DIAGNOSIS — T45.1X5A CHEMOTHERAPY INDUCED NEUTROPENIA (HCC): ICD-10-CM

## 2024-05-17 DIAGNOSIS — C50.212 MALIGNANT NEOPLASM OF UPPER-INNER QUADRANT OF LEFT BREAST IN FEMALE, ESTROGEN RECEPTOR POSITIVE (HCC): ICD-10-CM

## 2024-05-17 DIAGNOSIS — Z90.79 HISTORY OF BILATERAL SALPINGO-OOPHORECTOMY (BSO): ICD-10-CM

## 2024-05-17 RX ORDER — DEXTROSE MONOHYDRATE 50 MG/ML
20 INJECTION, SOLUTION INTRAVENOUS ONCE
Status: COMPLETED | OUTPATIENT
Start: 2024-05-17 | End: 2024-05-17

## 2024-05-17 RX ADMIN — DEXTROSE 20 ML/HR: 5 SOLUTION INTRAVENOUS at 14:23

## 2024-05-17 RX ADMIN — FAM-TRASTUZUMAB DERUXTECAN-NXKI 400 MG: 100 INJECTION, POWDER, LYOPHILIZED, FOR SOLUTION INTRAVENOUS at 14:50

## 2024-05-17 RX ADMIN — DEXAMETHASONE SODIUM PHOSPHATE: 100 INJECTION INTRAMUSCULAR; INTRAVENOUS at 14:23

## 2024-05-17 NOTE — TELEPHONE ENCOUNTER
Spoke with infusion RN to discuss further treatment  Patient does not have any further enhertu treatment scheduled past today  She sees Dr. Benavidez on July 2 and has imaging scan on 6/7    Will discuss with Dr. Benavidez and contact patient on Monday to discuss further and set up next treatment

## 2024-05-17 NOTE — PROGRESS NOTES
Pt without complaint, tolerated treatment today without incident. Pt does not have any further treatments scheduled at this time, her understanding is that she will wait until after her next CT scan and office visit with Dr Benavidez to proceed with further treatment. Pt declines AVS at this time. I did reach out to Olivia ERNST RN with Dr Benavidez to verify this schedule. Per Olivia, pt should be scheduled for at least one more cycle Enhertu before her office visit with Dr Benavidez. Olivia will call the patient to explain.

## 2024-05-23 ENCOUNTER — PATIENT OUTREACH (OUTPATIENT)
Dept: CASE MANAGEMENT | Facility: HOSPITAL | Age: 59
End: 2024-05-23

## 2024-05-23 DIAGNOSIS — Z17.0 MALIGNANT NEOPLASM OF UPPER-INNER QUADRANT OF LEFT BREAST IN FEMALE, ESTROGEN RECEPTOR POSITIVE (HCC): Primary | ICD-10-CM

## 2024-05-23 DIAGNOSIS — C50.212 MALIGNANT NEOPLASM OF UPPER-INNER QUADRANT OF LEFT BREAST IN FEMALE, ESTROGEN RECEPTOR POSITIVE (HCC): Primary | ICD-10-CM

## 2024-05-23 NOTE — PROGRESS NOTES
OSW received email from patient requesting a consult with OSW. OSW/patient agreed to talk via phone today during her lunch.     Assessment not completed today due to patient having particular questions she needed addressed today and limited time to talk. OSW will follow up with patient during another lunch break to complete assessment and dt.    Biopsychosocial and Barriers Assessment    Cancer Diagnosis: breast cancer with mets to liver and bones  Home/Cell Phone: [phone]  Emergency Contact: Spouse, Predrg  Marital Status:    Interpretation concerns, speaks another language, preferred language:  Cultural concerns: English  Ability to read or write: yes    Caregiver/Support:  Children: adult son, 24 yrs old  Child/Elder care: no    Housing:  Home Setup:   Lives With: spouse and son  Daily Living Activities: independent   Durable Medical Equipment:  Ambulation:     Preferred Pharmacy: [facility]  High co-pays with insurance:   High co-pays with medication coverage:  No medication coverage:     Primary Care Provider: Dr. Sung  Hx of Home Health Care:  Hx of Short term rehab:   Mental Health Hx:   Substance Abuse Hx:   Employment: patient works full time and is sole earner in the home.    Status/Location:  Ability to pay bills: currently able to pay bills however expressed concern should she have to reduce her work hours due to her dx and tx. Patient reported that it's becoming increasingly difficult for her to work the amount of hours she is due to side effects of treatment.   POA/LW/AD:  Transportation Plan/Concerns:      What do you know about your Cancer Diagnosis    What has your doctor told you about your cancer diagnosis: breast cancer with mets to liver and bones    What has your doctor told you about your cancer treatment:  Patient receiving chemo  What specific concerns do you have about your diagnosis and treatment:  Patient expressed concern that she is not feeling well with current treatment  and she doesn't know that she will be able to maintain current work hours. Patient concerned that if she reduces work hours, she will not be able to pay the bills.   Have you been made aware of any hair loss associated with treatment:    Additional Comments:    Patient/OSW discussed disability. OSW provided resource of [employer] to assist with SSDI. Patient reported that she has been working throughout her 7 years being treated for cancer but that it's more difficult with the new treatment she is receiving. OSW/patient discussed what will happen if she can't work, patient reported that she doesn't know. Her son works odd jobs, cutting lawns, etc. Spouse is blind in 1 eye and was an . OSW suggested spouse apply for disability to see if he is eligible at least they would have a regular income coming in. Patient will also reach out to [employer] for assistance/guidance in her applying for SSDI.     OSW daksha also send information on additional resources for her.     Jimena Klein Vital Brody by: [employer]  Next Steps:    Apply on their website, [email or web address].org/brody-application.   About: The brody program will assist those facing a significant financial hardship due to the diagnosis of a chronic, terminal or rare condition such as cancer, Parkinson’s, Spencer’s Disease or many others.    This program provides:    - Utility assistance  - Grocery assistance    Please note, this brody will only cover utilities OR groceries. Additionally, [employer]) partners with organizations that might offer other assistance.  Eligibility: This program serves those facing a significant financial hardship due to the diagnosis of a chronic, terminal or rare condition such as cancer, Parkinson’s, Spencer’s Disease or many others. Must select assistance with utilities OR groceries.  Nearest location: 38.82 miles away.  [employer]  [address]  [place] [address]   [phone]  Hours:  Monday:08:00 AM - 05:00 PM  Tuesday:08:00 AM - 05:00 PM  Wednesday:08:00 AM - 05:00 PM  Thursday:08:00 AM - 05:00 PM  Friday:08:00 AM - 05:00 PM  Financial Help by: [employer]  Next Steps:    Apply on their website, [email or web address]/qualification-[email or web address]   About: The Pink Fund distributes short-term financial aid for basic living expenses on behalf of breast cancer patients who have lost all or a part of their income during active treatment (Active treatment does not include reconstruction surgeries or long-term hormonal therapies). Payments are made directly to the patient’s creditors.    This program provides financial assistance to help with:    - Mortgage  - Rent  - Car Payements  - Insurance  - Utilities    All applications and supporting documentation to this program must be mailed to the address listed below.    The Pink Fund provides up to $3,000 in financial assistance through direct bill payments over the course of 3 months. This is not an emergency fund and cannot provide immediate financial assistance.    Bill must be able to be paid in full to be considered. No partial payments will be made. Bills in the process of collection will not be considered.  Eligibility: Breast Cancer patients who have lost all or a part of their income during active treatment. Must be in active treatment for breast cancer AND Active treatment is defined as the period after a positive diagnosis of breast cancer has been made (with a diagnostic biopsy), and during which therapies are being administered, including surgical procedures (e.g., single or bi-lateral mastectomy, lumpectomy, axillary dissection or sentinel node biopsy), chemotherapy or radiation. Active treatment does not include long-term hormonal therapies (including Tamoxifen, Fareston, Arimidex, Aromasin, Femara, Zoladex/Lupron, Megace, and Halotestin). This program helps people with income  at or below 500% of federal poverty guidelines.  Nearest location: 419.34 miles away.  [facility]  [address]  [place] [address]  Hours:  Monday:08:00 AM - 05:00 PM  Tuesday:08:00 AM - 05:00 PM  Wednesday:08:00 AM - 05:00 PM  Thursday:08:00 AM - 05:00 PM  Friday:08:00 AM - 05:00 PM  Financial Resource Center by: [employer]  Next Steps:    Apply on their website, [email or web address]      Email [email or web address] to get more info.   About: Provides immediate and ongoing support with food, housing, utilities, and transportation during cancer treatment. Find financial resources specific to your current needs, apply for financial assistance, review tip sheets for talking about money with your care team, and more.  Eligibility: Be in active treatment for cancer at a hospital within the [address] [place] [address], [facility], [facility] and [facility]; AND, This program helps people with income at or below 300% of federal poverty guidelines. Be represented by a healthcare professional who can communicate with [employer] on patient's behalf.  Nearest location: 263.89 miles away.  [facility]  [address] [place] [address] [address]  [place] [id number] [phone]  Hours:  Monday:09:00 AM - 05:00 PM  Tuesday:09:00 AM - 05:00 PM  Wednesday:09:00 AM - 05:00 PM  Thursday:09:00 AM - 05:00 PM  Friday:09:00 AM - 05:00 PM  The Cristin Cooper Foundation by: The Cristin [employer]  Next Steps:    Apply on their website, [email or web address].      Email [email or web address] to apply.   About: The Cristin Cooper Foundation is devoted to assisting metastatic breast cancer patients and their families to enjoy Cristin's silver linings. The Cristin Cooper Foundation enables patients to improve their quality of life from a heidi of financial assistance.    This program provides:    - Help paying for outstanding medical bills  - Help pay for mortgage payments  -  Help pay for tuition  - Financial assistance for vacations and trips    Applicants may be awarded up to $1,800. Recipients must wait a year before reapplying for a heidi from [employer]. Applications must be emailed.  Eligibility: This program serves patients with metastatic breast cancer. Must be a permanent resident of the [place]. Must be receiving treatment within the [place].  Nearest location: 1291.62 miles away.  The [employer] [address] [address]  Hours:  Monday:08:00 AM - 05:00 PM  Tuesday:08:00 AM - 05:00 PM  Wednesday:08:00 AM - 05:00 PM  Thursday:08:00 AM - 05:00 PM  Friday:08:00 AM - 05:00 PM  Living Beyond Breast Cancer Fund by: [facility]  Next Steps:    Go to https://www.lbbc.org/about-us/what-we-do/lbbc-fund to get more info.   About: [facility] offers a financial assistance program to serve the national breast cancer community through one-time grants meant to help decrease financial burden.    This program provides:    - Financial assistance    The heidi can be used for living expenses only. Funding can be used for:    - Rent  - Mortgage  - Utilities (such as gas, electric, heating oil, water, phone, internet or cable)  - Car payment  - Car insurance    Applications will be accepted every other month on the third Wednesday (January, March, May, July, September, and November). Please visit the website for more information about fund availability.  Eligibility: Applicants with early-stage breast cancer (stage 0/DCIS, I, II, or III) must have been diagnosed within the past 12 months from the date of heidi application and have an active treatment plan that includes surgery, chemotherapy, radiation, targeted therapy, and/or immunotherapy. Applicants must be experiencing financial need due to breast cancer treatment. Applicants with metastatic breast cancer (stage IV) can apply at any time,  regardless of their date of diagnosis or treatment plan. This program helps people with income at or below 400% of federal poverty guidelines. Applicants must live within one of the 50 [place] or the [facility]. Applicants must not have received a previous heidi through the [employer] Fund.  Nearest location: 44.44 miles away.  [facility]  [address]  [address]  [place] [address]   [phone]  Hours:  Wednesday:09:00 AM - 05:00 PM  Breast Cancer Financial Assistance Heidi by: [employer]  Next Steps:    Apply on their website, https://www.Synerscope/apply-for-assistance.      Email [email or web address] to get more info.   About: [employer] helps minimize the financial burden associated with breast cancer for patients and survivors by providing direct financial support to them so that they can focus on recovery and quality of life.    This program provides:    - Financial Assistance    Each applicant may select one of the following categories for assistance:    - Housing  - Medical bills  - Transportation  - General use assistance  - Lymphedivas (compression garments) gift certificates    Applications are only accepted from the 1st-7th of each month.    Applications are accepted via fax or mail. This provider does not accept applications via email. Due to the volume of inquiries and applications received by [employer], the program is not able to confirm receipt of applications mailed or faxed. Only heidi recipients will be notified.    Applicants chosen for assistance can re-apply in 15 months time, and applicants that are not chosen for assistance can re-apply in 3 months time.  Eligibility: This program helps breast cancer patients and survivors. Applications are only accepted from the 1st-7th of each month.  Nearest location: 2364.98 miles away.  [facility]  [address]    [place] [address]  Hours:  Monday:08:00 AM - 05:00 PM  Tuesday:08:00 AM - 05:00 PM  Wednesday:08:00 AM - 05:00 PM  Thursday:08:00 AM - 05:00 PM  Friday:08:00 AM - 05:00 PM

## 2024-06-04 ENCOUNTER — PATIENT OUTREACH (OUTPATIENT)
Dept: CASE MANAGEMENT | Facility: HOSPITAL | Age: 59
End: 2024-06-04

## 2024-06-04 NOTE — PROGRESS NOTES
Biopsychosocial and Barriers Assessment     Cancer Diagnosis: breast cancer with mets to liver and bones  Home/Cell Phone: [phone]  Emergency Contact: Spouse, Predrg  Marital Status:    Interpretation concerns, speaks another language, preferred language:  Cultural concerns: English  Ability to read or write: yes     Caregiver/Support: patient reported that she has good support system  Children: adult son, 24 yrs old and daughter who resides in [place]  Child/Elder care: no     Housing: [facility]  Home Setup: patient denies difficulty navigating her home  Lives With: spouse and son  Daily Living Activities: independent   Durable Medical Equipment: none  Ambulation: independent      Preferred Pharmacy: [facility]  High co-pays with insurance: no  High co-pays with medication coverage: no  No medication coverage: has coverage     Primary Care Provider: Dr. Sung  Hx of Home Health Care: none  Hx of Short term rehab: none  Mental Health Hx: denies  Substance Abuse Hx: denies  Employment: patient works full time and is sole earner in the home.    Status/Location: no  Ability to pay bills: currently able to pay bills however expressed concern should she have to reduce her work hours due to her dx and tx. Patient reported that it's becoming increasingly difficult for her to work the amount of hours she is due to side effects of treatment.    Patient reported that she went on [facility] to see about SSD and reported that she would have to stop working completely for her to complete the disability application and they can't afford for her to do so.      POA/LW/AD: patient agreeable to OSW mailing out information     Transportation Plan/Concerns: denies concerns        What do you know about your Cancer Diagnosis     What has your doctor told you about your cancer diagnosis: breast cancer with mets to liver and bones     What has your doctor told you about your cancer treatment:  Patient receiving chemo    What  specific concerns do you have about your diagnosis and treatment:  Patient expressed concern that she is not feeling well with current treatment and she doesn't know that she will be able to maintain current work hours. Patient concerned that if she reduces work hours, she will not be able to pay the bills.     Have you been made aware of any hair loss associated with treatment: Did not discuss     Additional Comments:     OSW outreached to patient to complete assessment and DT and to follow up on previous referrals.     Patient self rated DT as 5/10. Patient reported that she does have fatigue especially for a few days post treatment. Patient also reports decline in appetite 1-2 weeks post treatment. Patient reported that she worries all the time about finances and that anxiety comes/goes. Patient reported that she does have sadness at times but not clinical depression.     Patient denies any social or spiritual concerns..     Patient does worry about work, especially if she can no longer work. Patient reported that finances and housing are a concern if she stops/reduces work.     Patient did look into some of the resources that OSW provided however has not contacted them yet.     OSW will send ACP to patient per her request.

## 2024-06-07 ENCOUNTER — HOSPITAL ENCOUNTER (OUTPATIENT)
Dept: CT IMAGING | Facility: HOSPITAL | Age: 59
Discharge: HOME | End: 2024-06-07
Attending: STUDENT IN AN ORGANIZED HEALTH CARE EDUCATION/TRAINING PROGRAM
Payer: COMMERCIAL

## 2024-06-07 DIAGNOSIS — C78.7 MALIGNANT NEOPLASM METASTATIC TO LIVER (HCC): ICD-10-CM

## 2024-06-07 PROCEDURE — 74160 CT ABDOMEN W/CONTRAST: CPT

## 2024-06-07 PROCEDURE — G1004 CDSM NDSC: HCPCS

## 2024-06-07 RX ADMIN — IOHEXOL 100 ML: 350 INJECTION, SOLUTION INTRAVENOUS at 15:28

## 2024-06-13 ENCOUNTER — TELEPHONE (OUTPATIENT)
Dept: HEMATOLOGY ONCOLOGY | Facility: CLINIC | Age: 59
End: 2024-06-13

## 2024-06-13 NOTE — TELEPHONE ENCOUNTER
Call out to patient in regards to recommendations for labs prior to infusion treatment scheduled for 6/14/24. Left  with call back number.

## 2024-06-14 ENCOUNTER — HOSPITAL ENCOUNTER (OUTPATIENT)
Dept: INFUSION CENTER | Facility: CLINIC | Age: 59
End: 2024-06-14
Payer: COMMERCIAL

## 2024-06-14 VITALS
WEIGHT: 207.23 LBS | DIASTOLIC BLOOD PRESSURE: 88 MMHG | RESPIRATION RATE: 18 BRPM | HEART RATE: 60 BPM | HEIGHT: 65 IN | TEMPERATURE: 98.6 F | BODY MASS INDEX: 34.53 KG/M2 | SYSTOLIC BLOOD PRESSURE: 158 MMHG

## 2024-06-14 DIAGNOSIS — C50.212 MALIGNANT NEOPLASM OF UPPER-INNER QUADRANT OF LEFT BREAST IN FEMALE, ESTROGEN RECEPTOR POSITIVE (HCC): ICD-10-CM

## 2024-06-14 DIAGNOSIS — C78.7 MALIGNANT NEOPLASM METASTATIC TO LIVER (HCC): ICD-10-CM

## 2024-06-14 DIAGNOSIS — Z45.2 ENCOUNTER FOR CARE RELATED TO VASCULAR ACCESS PORT: ICD-10-CM

## 2024-06-14 DIAGNOSIS — C79.51 MALIGNANT NEOPLASM METASTATIC TO BONE (HCC): ICD-10-CM

## 2024-06-14 DIAGNOSIS — Z90.722 HISTORY OF BILATERAL SALPINGO-OOPHORECTOMY (BSO): ICD-10-CM

## 2024-06-14 DIAGNOSIS — Z95.828 PORT-A-CATH IN PLACE: Primary | ICD-10-CM

## 2024-06-14 DIAGNOSIS — T45.1X5A CHEMOTHERAPY INDUCED NEUTROPENIA (HCC): ICD-10-CM

## 2024-06-14 DIAGNOSIS — Z17.0 MALIGNANT NEOPLASM OF UPPER-INNER QUADRANT OF LEFT BREAST IN FEMALE, ESTROGEN RECEPTOR POSITIVE (HCC): ICD-10-CM

## 2024-06-14 DIAGNOSIS — T45.1X5A CHEMOTHERAPY INDUCED NEUTROPENIA (HCC): Primary | ICD-10-CM

## 2024-06-14 DIAGNOSIS — D70.1 CHEMOTHERAPY INDUCED NEUTROPENIA (HCC): Primary | ICD-10-CM

## 2024-06-14 DIAGNOSIS — D70.1 CHEMOTHERAPY INDUCED NEUTROPENIA (HCC): ICD-10-CM

## 2024-06-14 DIAGNOSIS — Z90.79 HISTORY OF BILATERAL SALPINGO-OOPHORECTOMY (BSO): ICD-10-CM

## 2024-06-14 LAB
ALBUMIN SERPL BCP-MCNC: 3.7 G/DL (ref 3.5–5)
ALP SERPL-CCNC: 89 U/L (ref 34–104)
ALT SERPL W P-5'-P-CCNC: 32 U/L (ref 7–52)
ANION GAP SERPL CALCULATED.3IONS-SCNC: 3 MMOL/L (ref 4–13)
AST SERPL W P-5'-P-CCNC: 22 U/L (ref 13–39)
BASOPHILS # BLD AUTO: 0.05 THOUSANDS/ÂΜL (ref 0–0.1)
BASOPHILS NFR BLD AUTO: 1 % (ref 0–1)
BILIRUB SERPL-MCNC: 0.33 MG/DL (ref 0.2–1)
BUN SERPL-MCNC: 15 MG/DL (ref 5–25)
CALCIUM SERPL-MCNC: 9.1 MG/DL (ref 8.4–10.2)
CHLORIDE SERPL-SCNC: 110 MMOL/L (ref 96–108)
CO2 SERPL-SCNC: 27 MMOL/L (ref 21–32)
CREAT SERPL-MCNC: 0.76 MG/DL (ref 0.6–1.3)
EOSINOPHIL # BLD AUTO: 0.59 THOUSAND/ÂΜL (ref 0–0.61)
EOSINOPHIL NFR BLD AUTO: 7 % (ref 0–6)
ERYTHROCYTE [DISTWIDTH] IN BLOOD BY AUTOMATED COUNT: 15.1 % (ref 11.6–15.1)
GFR SERPL CREATININE-BSD FRML MDRD: 87 ML/MIN/1.73SQ M
GLUCOSE SERPL-MCNC: 143 MG/DL (ref 65–140)
HCT VFR BLD AUTO: 40.7 % (ref 34.8–46.1)
HGB BLD-MCNC: 12.9 G/DL (ref 11.5–15.4)
IMM GRANULOCYTES # BLD AUTO: 0.06 THOUSAND/UL (ref 0–0.2)
IMM GRANULOCYTES NFR BLD AUTO: 1 % (ref 0–2)
LYMPHOCYTES # BLD AUTO: 2.3 THOUSANDS/ÂΜL (ref 0.6–4.47)
LYMPHOCYTES NFR BLD AUTO: 25 % (ref 14–44)
MCH RBC QN AUTO: 28 PG (ref 26.8–34.3)
MCHC RBC AUTO-ENTMCNC: 31.7 G/DL (ref 31.4–37.4)
MCV RBC AUTO: 89 FL (ref 82–98)
MONOCYTES # BLD AUTO: 0.75 THOUSAND/ÂΜL (ref 0.17–1.22)
MONOCYTES NFR BLD AUTO: 8 % (ref 4–12)
NEUTROPHILS # BLD AUTO: 5.31 THOUSANDS/ÂΜL (ref 1.85–7.62)
NEUTS SEG NFR BLD AUTO: 58 % (ref 43–75)
NRBC BLD AUTO-RTO: 0 /100 WBCS
PLATELET # BLD AUTO: 324 THOUSANDS/UL (ref 149–390)
PMV BLD AUTO: 11.7 FL (ref 8.9–12.7)
POTASSIUM SERPL-SCNC: 4.1 MMOL/L (ref 3.5–5.3)
PROT SERPL-MCNC: 6.5 G/DL (ref 6.4–8.4)
RBC # BLD AUTO: 4.6 MILLION/UL (ref 3.81–5.12)
SODIUM SERPL-SCNC: 140 MMOL/L (ref 135–147)
WBC # BLD AUTO: 9.06 THOUSAND/UL (ref 4.31–10.16)

## 2024-06-14 PROCEDURE — 96367 TX/PROPH/DG ADDL SEQ IV INF: CPT

## 2024-06-14 PROCEDURE — 85025 COMPLETE CBC W/AUTO DIFF WBC: CPT | Performed by: INTERNAL MEDICINE

## 2024-06-14 PROCEDURE — 80053 COMPREHEN METABOLIC PANEL: CPT | Performed by: INTERNAL MEDICINE

## 2024-06-14 PROCEDURE — 96413 CHEMO IV INFUSION 1 HR: CPT

## 2024-06-14 RX ORDER — DEXTROSE MONOHYDRATE 50 MG/ML
20 INJECTION, SOLUTION INTRAVENOUS ONCE
Status: COMPLETED | OUTPATIENT
Start: 2024-06-14 | End: 2024-06-14

## 2024-06-14 RX ADMIN — FAM-TRASTUZUMAB DERUXTECAN-NXKI 400 MG: 100 INJECTION, POWDER, LYOPHILIZED, FOR SOLUTION INTRAVENOUS at 14:44

## 2024-06-14 RX ADMIN — DEXTROSE 20 ML/HR: 5 SOLUTION INTRAVENOUS at 14:15

## 2024-06-14 RX ADMIN — DEXAMETHASONE SODIUM PHOSPHATE: 100 INJECTION INTRAMUSCULAR; INTRAVENOUS at 14:15

## 2024-06-14 NOTE — PLAN OF CARE
Problem: INFECTION - ADULT  Goal: Absence of fever/infection during neutropenic period  Description: INTERVENTIONS:  - Monitor WBC    Outcome: Progressing     Problem: INFECTION - ADULT  Goal: Absence or prevention of progression during hospitalization  Description: INTERVENTIONS:  - Assess and monitor for signs and symptoms of infection  - Monitor lab/diagnostic results  - Monitor all insertion sites, i.e. indwelling lines, tubes, and drains  - Monitor endotracheal if appropriate and nasal secretions for changes in amount and color  - [place] appropriate cooling/warming therapies per order  - Administer medications as ordered  - Instruct and encourage patient and family to use good hand hygiene technique  - Identify and instruct in appropriate isolation precautions for identified infection/condition  Outcome: Progressing

## 2024-06-28 RX ORDER — DEXTROSE MONOHYDRATE 50 MG/ML
20 INJECTION, SOLUTION INTRAVENOUS ONCE
Status: CANCELLED | OUTPATIENT
Start: 2024-07-05

## 2024-07-01 ENCOUNTER — OFFICE VISIT (OUTPATIENT)
Dept: RADIATION ONCOLOGY | Facility: HOSPITAL | Age: 59
End: 2024-07-01
Attending: STUDENT IN AN ORGANIZED HEALTH CARE EDUCATION/TRAINING PROGRAM
Payer: COMMERCIAL

## 2024-07-01 VITALS
OXYGEN SATURATION: 97 % | SYSTOLIC BLOOD PRESSURE: 132 MMHG | HEIGHT: 64 IN | TEMPERATURE: 97.3 F | BODY MASS INDEX: 35.34 KG/M2 | HEART RATE: 65 BPM | DIASTOLIC BLOOD PRESSURE: 74 MMHG | RESPIRATION RATE: 18 BRPM | WEIGHT: 207 LBS

## 2024-07-01 DIAGNOSIS — C78.7 MALIGNANT NEOPLASM METASTATIC TO LIVER (HCC): Primary | ICD-10-CM

## 2024-07-01 PROCEDURE — G0463 HOSPITAL OUTPT CLINIC VISIT: HCPCS | Performed by: STUDENT IN AN ORGANIZED HEALTH CARE EDUCATION/TRAINING PROGRAM

## 2024-07-01 PROCEDURE — 99215 OFFICE O/P EST HI 40 MIN: CPT | Performed by: STUDENT IN AN ORGANIZED HEALTH CARE EDUCATION/TRAINING PROGRAM

## 2024-07-01 PROCEDURE — 99211 OFF/OP EST MAY X REQ PHY/QHP: CPT | Performed by: STUDENT IN AN ORGANIZED HEALTH CARE EDUCATION/TRAINING PROGRAM

## 2024-07-01 NOTE — PROGRESS NOTES
Alejandrina Barba 1965 is a 58 y.o. female  with a history of Stage IV inflammatory breast cancer diagnosed in 2015 s/p chemotherapy with good response, now on maintenance letrozole/enhertu. She was seen in initial consultation on 22 with recommendation for SBRT to her caudate liver lesion. Prior to proceeding with SBRT the patient opted to continue systemic therapy alone. Last seen 24, at that time plan was to obtain repeat imaging in 6 months to see if disease showed further enlargement. She presents today for 6 month follow up.     24 Hematology Oncology - Dr. Benavidez  Changing Herceptin and Perjeta to Enhertu.   Patient will go with Enhertu rather than waiting for 6 months because liver lesion is growing in size.     24 Hematology Oncology - Dr. Benavidez  Only 1 treatment with Enhertu received due to side effects   Next treatment will be at a reduced dose and if she still had problem Enhertu will be discontinued and treatment will be switched.   Continues on letrozole.    4/3/24 Hematology Oncology - Dr. Benavidez  Second treamtment with Enhertu  3/15/2024   Plans to go ahead with third treatment on 2024 but 1 up-to-date on 2024 she would not have because she is planning to go to [place].   Treatment will be resumed after she returns.     24 CT abdomen w contrast  IMPRESSION:  1. Interval decrease in size of caudate lobe lesion. No new hepatic lesions. No intra-abdominal lymphadenopathy.  2. Stable left adrenal adenoma.  3. Diffuse hepatic steatosis.    Upcomin24 Dr. Benavidez    Follow up visit     Oncology History   Malignant neoplasm of upper-inner quadrant of left female breast (HCC)   2015 Initial Diagnosis    Malignant neoplasm of upper-outer quadrant of left female breast (HCC)      -  Hormone Therapy    Tamoxifen  Ended 2019.    Dr Benavidez  Letrozole 2019.     2016 Surgery    Port placement     2016 - 2018 Chemotherapy    Taxotere,   herceptin, perjeta, xgeva.   After six cycles, taxotere was discontinued and tamoxifen added.   Continues with Perjeta and Herceptin every 3 weeks     - Dr Benavidez       9/16/2016 Surgery    Left breast partial mastectomy     11/7/2016 - 12/23/2016 Radiation    Plan ID Energy Fractions Dose per Fraction (cGy) Total Dose Delivered (cGy) Elapsed Days   Lt Breast 6X 25 / 25 200 5,000 36   Lt Brst Boost 6X 8 / 8 200 1,600 9   Lt Femur 10X 10 / 10 300 3,000 11   Lt PAB 6X 25 / 25 60 1,500 36   Lt Sclav 6X 25 / 25 200 5,000 36                                   Total dose to left breast lumpectomy cavity: 6600 cGy                   Total dose to the supraclavicular and axillary regions: 5000 cGy       4/17/2019 - 1/12/2024 Chemotherapy    pertuzumab (PERJETA) IVPB, 840 mg, Intravenous, Once, 81 of 82 cycles  Administration: 420 mg (5/17/2019), 420 mg (6/7/2019), 420 mg (6/28/2019), 420 mg (7/19/2019), 420 mg (8/30/2019), 420 mg (9/20/2019), 420 mg (8/9/2019), 420 mg (10/9/2019), 420 mg (11/1/2019), 420 mg (11/22/2019), 420 mg (12/13/2019), 420 mg (1/3/2020), 420 mg (1/24/2020), 420 mg (2/14/2020), 420 mg (3/6/2020), 420 mg (3/27/2020), 420 mg (4/17/2020), 420 mg (5/8/2020), 420 mg (5/29/2020), 420 mg (6/19/2020), 420 mg (7/10/2020), 420 mg (7/31/2020), 420 mg (8/21/2020), 420 mg (9/11/2020), 420 mg (10/2/2020), 420 mg (10/23/2020), 420 mg (11/13/2020), 420 mg (12/4/2020), 420 mg (12/24/2020), 420 mg (1/15/2021), 420 mg (2/5/2021), 420 mg (2/26/2021), 420 mg (3/19/2021), 420 mg (4/9/2021), 420 mg (4/30/2021), 420 mg (5/21/2021), 420 mg (6/11/2021), 420 mg (7/2/2021), 420 mg (7/23/2021), 420 mg (8/13/2021), 420 mg (9/3/2021), 420 mg (9/24/2021), 420 mg (10/15/2021), 420 mg (11/5/2021), 420 mg (11/26/2021), 420 mg (12/17/2021), 420 mg (1/7/2022), 420 mg (1/28/2022), 420 mg (2/18/2022), 420 mg (3/11/2022), 420 mg (4/1/2022), 420 mg (5/13/2022), 420 mg (6/3/2022), 420 mg (6/28/2022), 420 mg (7/22/2022), 420 mg (9/2/2022), 420  mg (9/23/2022), 420 mg (8/12/2022), 420 mg (10/14/2022), 420 mg (11/4/2022), 420 mg (11/25/2022), 420 mg (12/15/2022), 420 mg (1/6/2023), 420 mg (1/27/2023), 420 mg (2/17/2023), 420 mg (3/10/2023), 420 mg (3/31/2023), 420 mg (4/21/2023), 420 mg (5/19/2023), 420 mg (6/13/2023), 420 mg (6/30/2023), 420 mg (7/21/2023), 420 mg (8/11/2023), 420 mg (9/1/2023), 420 mg (9/22/2023), 420 mg (10/13/2023), 420 mg (11/3/2023), 420 mg (11/24/2023), 420 mg (12/22/2023), 420 mg (1/12/2024)  trastuzumab (HERCEPTIN) chemo infusion, 6 mg/kg = 589 mg (75 % of original dose 8 mg/kg), Intravenous, Once, 81 of 82 cycles  Dose modification: 6 mg/kg (original dose 8 mg/kg, Cycle 1, Reason: Other (Must fill in a comment))  Administration: 589 mg (5/17/2019), 589 mg (6/7/2019), 600 mg (6/28/2019), 600 mg (7/19/2019), 600 mg (8/30/2019), 600 mg (9/20/2019), 600 mg (8/9/2019), 600 mg (10/9/2019), 600 mg (11/1/2019), 600 mg (11/22/2019), 600 mg (12/13/2019), 600 mg (1/3/2020), 600 mg (1/24/2020), 600 mg (2/14/2020), 600 mg (3/6/2020), 600 mg (3/27/2020), 600 mg (4/17/2020), 600 mg (5/8/2020), 600 mg (5/29/2020), 600 mg (6/19/2020), 600 mg (7/10/2020), 600 mg (7/31/2020), 600 mg (8/21/2020), 600 mg (9/11/2020), 600 mg (10/2/2020), 600 mg (10/23/2020), 600 mg (11/13/2020), 600 mg (12/4/2020), 600 mg (12/24/2020), 600 mg (1/15/2021), 600 mg (2/5/2021), 600 mg (2/26/2021), 600 mg (3/19/2021), 600 mg (4/9/2021), 600 mg (4/30/2021), 600 mg (5/21/2021), 600 mg (6/11/2021), 600 mg (7/2/2021), 600 mg (7/23/2021), 600 mg (8/13/2021), 600 mg (9/3/2021), 600 mg (9/24/2021), 600 mg (10/15/2021), 600 mg (11/5/2021), 600 mg (11/26/2021), 600 mg (12/17/2021), 600 mg (1/7/2022), 600 mg (1/28/2022), 600 mg (2/18/2022), 600 mg (3/11/2022), 600 mg (4/1/2022), 600 mg (5/13/2022), 600 mg (6/3/2022), 600 mg (6/28/2022), 600 mg (7/22/2022), 600 mg (9/2/2022), 600 mg (9/23/2022), 600 mg (8/12/2022), 600 mg (10/14/2022), 600 mg (11/4/2022), 600 mg (11/25/2022), 600 mg  (12/15/2022), 600 mg (1/6/2023), 600 mg (1/27/2023), 600 mg (2/17/2023), 600 mg (3/10/2023), 600 mg (3/31/2023), 600 mg (4/21/2023), 600 mg (5/19/2023), 600 mg (6/13/2023), 600 mg (6/30/2023), 600 mg (7/21/2023), 600 mg (8/11/2023), 600 mg (9/1/2023), 600 mg (9/22/2023), 600 mg (10/13/2023), 600 mg (11/3/2023), 600 mg (11/24/2023), 600 mg (12/22/2023), 600 mg (1/12/2024)     8/16/2019 Surgery    she underwent BSO.     7/5/2022 -  Cancer Staged    Staging form: Breast, AJCC 8th Edition  - Clinical: Stage IV (rcTX, cNX, pM1) - Signed by Dejuan Hernandez MD on 7/5/2022  Stage prefix: Recurrence       2/2/2024 -  Chemotherapy    alteplase (CATHFLO), 2 mg, Intracatheter, Every 1 Minute as needed, 5 of 6 cycles  fam-trastuzumab deruxtecan-nxki (ENHERTU) IVPB, 520 mg, Intravenous, Once, 5 of 6 cycles  Dose modification: 4.4 mg/kg (original dose 5.4 mg/kg, Cycle 3, Reason: Dose modified as per discussion with consulting physician)  Administration: 500 mg (2/2/2024), 400 mg (3/15/2024), 400 mg (4/5/2024), 400 mg (5/17/2024), 400 mg (6/14/2024)     Malignant neoplasm metastatic to liver (HCC)   4/27/2018 Initial Diagnosis    Liver metastases (HCC)     4/17/2019 - 1/12/2024 Chemotherapy    pertuzumab (PERJETA) IVPB, 840 mg, Intravenous, Once, 81 of 82 cycles  Administration: 420 mg (5/17/2019), 420 mg (6/7/2019), 420 mg (6/28/2019), 420 mg (7/19/2019), 420 mg (8/30/2019), 420 mg (9/20/2019), 420 mg (8/9/2019), 420 mg (10/9/2019), 420 mg (11/1/2019), 420 mg (11/22/2019), 420 mg (12/13/2019), 420 mg (1/3/2020), 420 mg (1/24/2020), 420 mg (2/14/2020), 420 mg (3/6/2020), 420 mg (3/27/2020), 420 mg (4/17/2020), 420 mg (5/8/2020), 420 mg (5/29/2020), 420 mg (6/19/2020), 420 mg (7/10/2020), 420 mg (7/31/2020), 420 mg (8/21/2020), 420 mg (9/11/2020), 420 mg (10/2/2020), 420 mg (10/23/2020), 420 mg (11/13/2020), 420 mg (12/4/2020), 420 mg (12/24/2020), 420 mg (1/15/2021), 420 mg (2/5/2021), 420 mg (2/26/2021), 420 mg (3/19/2021),  420 mg (4/9/2021), 420 mg (4/30/2021), 420 mg (5/21/2021), 420 mg (6/11/2021), 420 mg (7/2/2021), 420 mg (7/23/2021), 420 mg (8/13/2021), 420 mg (9/3/2021), 420 mg (9/24/2021), 420 mg (10/15/2021), 420 mg (11/5/2021), 420 mg (11/26/2021), 420 mg (12/17/2021), 420 mg (1/7/2022), 420 mg (1/28/2022), 420 mg (2/18/2022), 420 mg (3/11/2022), 420 mg (4/1/2022), 420 mg (5/13/2022), 420 mg (6/3/2022), 420 mg (6/28/2022), 420 mg (7/22/2022), 420 mg (9/2/2022), 420 mg (9/23/2022), 420 mg (8/12/2022), 420 mg (10/14/2022), 420 mg (11/4/2022), 420 mg (11/25/2022), 420 mg (12/15/2022), 420 mg (1/6/2023), 420 mg (1/27/2023), 420 mg (2/17/2023), 420 mg (3/10/2023), 420 mg (3/31/2023), 420 mg (4/21/2023), 420 mg (5/19/2023), 420 mg (6/13/2023), 420 mg (6/30/2023), 420 mg (7/21/2023), 420 mg (8/11/2023), 420 mg (9/1/2023), 420 mg (9/22/2023), 420 mg (10/13/2023), 420 mg (11/3/2023), 420 mg (11/24/2023), 420 mg (12/22/2023), 420 mg (1/12/2024)  trastuzumab (HERCEPTIN) chemo infusion, 6 mg/kg = 589 mg (75 % of original dose 8 mg/kg), Intravenous, Once, 81 of 82 cycles  Dose modification: 6 mg/kg (original dose 8 mg/kg, Cycle 1, Reason: Other (Must fill in a comment))  Administration: 589 mg (5/17/2019), 589 mg (6/7/2019), 600 mg (6/28/2019), 600 mg (7/19/2019), 600 mg (8/30/2019), 600 mg (9/20/2019), 600 mg (8/9/2019), 600 mg (10/9/2019), 600 mg (11/1/2019), 600 mg (11/22/2019), 600 mg (12/13/2019), 600 mg (1/3/2020), 600 mg (1/24/2020), 600 mg (2/14/2020), 600 mg (3/6/2020), 600 mg (3/27/2020), 600 mg (4/17/2020), 600 mg (5/8/2020), 600 mg (5/29/2020), 600 mg (6/19/2020), 600 mg (7/10/2020), 600 mg (7/31/2020), 600 mg (8/21/2020), 600 mg (9/11/2020), 600 mg (10/2/2020), 600 mg (10/23/2020), 600 mg (11/13/2020), 600 mg (12/4/2020), 600 mg (12/24/2020), 600 mg (1/15/2021), 600 mg (2/5/2021), 600 mg (2/26/2021), 600 mg (3/19/2021), 600 mg (4/9/2021), 600 mg (4/30/2021), 600 mg (5/21/2021), 600 mg (6/11/2021), 600 mg (7/2/2021), 600 mg  (7/23/2021), 600 mg (8/13/2021), 600 mg (9/3/2021), 600 mg (9/24/2021), 600 mg (10/15/2021), 600 mg (11/5/2021), 600 mg (11/26/2021), 600 mg (12/17/2021), 600 mg (1/7/2022), 600 mg (1/28/2022), 600 mg (2/18/2022), 600 mg (3/11/2022), 600 mg (4/1/2022), 600 mg (5/13/2022), 600 mg (6/3/2022), 600 mg (6/28/2022), 600 mg (7/22/2022), 600 mg (9/2/2022), 600 mg (9/23/2022), 600 mg (8/12/2022), 600 mg (10/14/2022), 600 mg (11/4/2022), 600 mg (11/25/2022), 600 mg (12/15/2022), 600 mg (1/6/2023), 600 mg (1/27/2023), 600 mg (2/17/2023), 600 mg (3/10/2023), 600 mg (3/31/2023), 600 mg (4/21/2023), 600 mg (5/19/2023), 600 mg (6/13/2023), 600 mg (6/30/2023), 600 mg (7/21/2023), 600 mg (8/11/2023), 600 mg (9/1/2023), 600 mg (9/22/2023), 600 mg (10/13/2023), 600 mg (11/3/2023), 600 mg (11/24/2023), 600 mg (12/22/2023), 600 mg (1/12/2024)     6/24/2022 Biopsy    Final Diagnosis   A.  Liver, core biopsies:     - Metastatic carcinoma compatible with a breast primary.        2/2/2024 -  Chemotherapy    alteplase (CATHFLO), 2 mg, Intracatheter, Every 1 Minute as needed, 5 of 6 cycles  fam-trastuzumab deruxtecan-nxki (ENHERTU) IVPB, 520 mg, Intravenous, Once, 5 of 6 cycles  Dose modification: 4.4 mg/kg (original dose 5.4 mg/kg, Cycle 3, Reason: Dose modified as per discussion with consulting physician)  Administration: 500 mg (2/2/2024), 400 mg (3/15/2024), 400 mg (4/5/2024), 400 mg (5/17/2024), 400 mg (6/14/2024)     Malignant neoplasm metastatic to bone (HCC)   4/27/2018 Initial Diagnosis    Bone metastasis (HCC)     4/17/2019 - 1/12/2024 Chemotherapy    pertuzumab (PERJETA) IVPB, 840 mg, Intravenous, Once, 81 of 82 cycles  Administration: 420 mg (5/17/2019), 420 mg (6/7/2019), 420 mg (6/28/2019), 420 mg (7/19/2019), 420 mg (8/30/2019), 420 mg (9/20/2019), 420 mg (8/9/2019), 420 mg (10/9/2019), 420 mg (11/1/2019), 420 mg (11/22/2019), 420 mg (12/13/2019), 420 mg (1/3/2020), 420 mg (1/24/2020), 420 mg (2/14/2020), 420 mg (3/6/2020), 420  mg (3/27/2020), 420 mg (4/17/2020), 420 mg (5/8/2020), 420 mg (5/29/2020), 420 mg (6/19/2020), 420 mg (7/10/2020), 420 mg (7/31/2020), 420 mg (8/21/2020), 420 mg (9/11/2020), 420 mg (10/2/2020), 420 mg (10/23/2020), 420 mg (11/13/2020), 420 mg (12/4/2020), 420 mg (12/24/2020), 420 mg (1/15/2021), 420 mg (2/5/2021), 420 mg (2/26/2021), 420 mg (3/19/2021), 420 mg (4/9/2021), 420 mg (4/30/2021), 420 mg (5/21/2021), 420 mg (6/11/2021), 420 mg (7/2/2021), 420 mg (7/23/2021), 420 mg (8/13/2021), 420 mg (9/3/2021), 420 mg (9/24/2021), 420 mg (10/15/2021), 420 mg (11/5/2021), 420 mg (11/26/2021), 420 mg (12/17/2021), 420 mg (1/7/2022), 420 mg (1/28/2022), 420 mg (2/18/2022), 420 mg (3/11/2022), 420 mg (4/1/2022), 420 mg (5/13/2022), 420 mg (6/3/2022), 420 mg (6/28/2022), 420 mg (7/22/2022), 420 mg (9/2/2022), 420 mg (9/23/2022), 420 mg (8/12/2022), 420 mg (10/14/2022), 420 mg (11/4/2022), 420 mg (11/25/2022), 420 mg (12/15/2022), 420 mg (1/6/2023), 420 mg (1/27/2023), 420 mg (2/17/2023), 420 mg (3/10/2023), 420 mg (3/31/2023), 420 mg (4/21/2023), 420 mg (5/19/2023), 420 mg (6/13/2023), 420 mg (6/30/2023), 420 mg (7/21/2023), 420 mg (8/11/2023), 420 mg (9/1/2023), 420 mg (9/22/2023), 420 mg (10/13/2023), 420 mg (11/3/2023), 420 mg (11/24/2023), 420 mg (12/22/2023), 420 mg (1/12/2024)  trastuzumab (HERCEPTIN) chemo infusion, 6 mg/kg = 589 mg (75 % of original dose 8 mg/kg), Intravenous, Once, 81 of 82 cycles  Dose modification: 6 mg/kg (original dose 8 mg/kg, Cycle 1, Reason: Other (Must fill in a comment))  Administration: 589 mg (5/17/2019), 589 mg (6/7/2019), 600 mg (6/28/2019), 600 mg (7/19/2019), 600 mg (8/30/2019), 600 mg (9/20/2019), 600 mg (8/9/2019), 600 mg (10/9/2019), 600 mg (11/1/2019), 600 mg (11/22/2019), 600 mg (12/13/2019), 600 mg (1/3/2020), 600 mg (1/24/2020), 600 mg (2/14/2020), 600 mg (3/6/2020), 600 mg (3/27/2020), 600 mg (4/17/2020), 600 mg (5/8/2020), 600 mg (5/29/2020), 600 mg (6/19/2020), 600 mg  (7/10/2020), 600 mg (7/31/2020), 600 mg (8/21/2020), 600 mg (9/11/2020), 600 mg (10/2/2020), 600 mg (10/23/2020), 600 mg (11/13/2020), 600 mg (12/4/2020), 600 mg (12/24/2020), 600 mg (1/15/2021), 600 mg (2/5/2021), 600 mg (2/26/2021), 600 mg (3/19/2021), 600 mg (4/9/2021), 600 mg (4/30/2021), 600 mg (5/21/2021), 600 mg (6/11/2021), 600 mg (7/2/2021), 600 mg (7/23/2021), 600 mg (8/13/2021), 600 mg (9/3/2021), 600 mg (9/24/2021), 600 mg (10/15/2021), 600 mg (11/5/2021), 600 mg (11/26/2021), 600 mg (12/17/2021), 600 mg (1/7/2022), 600 mg (1/28/2022), 600 mg (2/18/2022), 600 mg (3/11/2022), 600 mg (4/1/2022), 600 mg (5/13/2022), 600 mg (6/3/2022), 600 mg (6/28/2022), 600 mg (7/22/2022), 600 mg (9/2/2022), 600 mg (9/23/2022), 600 mg (8/12/2022), 600 mg (10/14/2022), 600 mg (11/4/2022), 600 mg (11/25/2022), 600 mg (12/15/2022), 600 mg (1/6/2023), 600 mg (1/27/2023), 600 mg (2/17/2023), 600 mg (3/10/2023), 600 mg (3/31/2023), 600 mg (4/21/2023), 600 mg (5/19/2023), 600 mg (6/13/2023), 600 mg (6/30/2023), 600 mg (7/21/2023), 600 mg (8/11/2023), 600 mg (9/1/2023), 600 mg (9/22/2023), 600 mg (10/13/2023), 600 mg (11/3/2023), 600 mg (11/24/2023), 600 mg (12/22/2023), 600 mg (1/12/2024)     6/24/2022 Biopsy    Final Diagnosis   A.  Liver, core biopsies:     - Metastatic carcinoma compatible with a breast primary.        2/2/2024 -  Chemotherapy    alteplase (CATHFLO), 2 mg, Intracatheter, Every 1 Minute as needed, 5 of 6 cycles  fam-trastuzumab deruxtecan-nxki (ENHERTU) IVPB, 520 mg, Intravenous, Once, 5 of 6 cycles  Dose modification: 4.4 mg/kg (original dose 5.4 mg/kg, Cycle 3, Reason: Dose modified as per discussion with consulting physician)  Administration: 500 mg (2/2/2024), 400 mg (3/15/2024), 400 mg (4/5/2024), 400 mg (5/17/2024), 400 mg (6/14/2024)         Review of Systems:  Review of Systems   Constitutional: Negative.    HENT: Negative.     Eyes: Negative.    Respiratory: Negative.     Cardiovascular: Negative.     Gastrointestinal: Negative.    Endocrine: Negative.    Genitourinary: Negative.    Musculoskeletal: Negative.    Skin: Negative.    Allergic/Immunologic: Negative.    Neurological: Negative.    Hematological: Negative.    Psychiatric/Behavioral: Negative.         Clinical Trial: no    Pregnancy test needed:  no    Teaching n/a    Health Maintenance   Topic Date Due    Hepatitis C Screening  Never done    Pneumococcal Vaccine: Pediatrics (0 to 5 Years) and At-Risk Patients (6 to 64 Years) (1 of 2 - PCV) Never done    HIV Screening  Never done    BMI: Followup Plan  Never done    Annual Physical  Never done    Zoster Vaccine (1 of 2) Never done    DTaP,Tdap,and Td Vaccines (1 - Tdap) Never done    Cervical Cancer Screening  Never done    Colorectal Cancer Screening  Never done    Breast Cancer Screening: Mammogram  06/12/2021    MAMMOGRAPHY BRCA POSITIVE  06/12/2021    COVID-19 Vaccine (3 - Pfizer risk series) 09/15/2021    Influenza Vaccine (1) 09/01/2024    Depression Screening  01/05/2025    BMI: Adult  06/14/2025    RSV Vaccine Age 60+ Years (1 - 1-dose 60+ series) 11/24/2027    Osteoporosis Screening  Completed    RSV Vaccine age 0-20 Months  Aged Out    HIB Vaccine  Aged Out    IPV Vaccine  Aged Out    Hepatitis A Vaccine  Aged Out    Meningococcal ACWY Vaccine  Aged Out    HPV Vaccine  Aged Out     Patient Active Problem List   Diagnosis    Malignant neoplasm of upper-inner quadrant of left female breast (HCC)    Malignant neoplasm metastatic to liver (HCC)    Malignant neoplasm metastatic to bone (HCC)    Port-A-Cath in place    Long term use of drug    BRCA1 gene mutation positive in female    History of bilateral salpingo-oophorectomy (BSO)    S/P BSO (bilateral salpingo-oophorectomy)    Aromatase inhibitor use    Dense breast tissue    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)    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    Encounter for care related to vascular access port    Osteoporosis due to  aromatase inhibitor    Chemotherapy induced neutropenia (HCC)    Chemotherapy-induced nausea     Past Medical History:   Diagnosis Date    Breast cancer (HCC)     Hx of radiation therapy     breast and left  hip    Liver metastases     Port-A-Cath in place     right chest    Wears glasses      Past Surgical History:   Procedure Laterality Date    BREAST BIOPSY Left 2015    BREAST LUMPECTOMY Left 2016    BREAST SURGERY Left     biopsy    CHOLECYSTECTOMY      CHOLECYSTECTOMY      IR PLACEMENT FIDUCIAL MARKER  2022    OOPHORECTOMY      PORTACATH PLACEMENT Right     [place] LAPAROSCOPY W/RMVL ADNEXAL STRUCTURES Bilateral 2019    Procedure: LAPAROSCOPIC SALPINGO-OOPHORECTOMY;  Surgeon: Omar Rudolph MD;  Location: [facility];  Service: Gynecology Oncology    SIMPLE MASTECTOMY Left 2016    Procedure: BREAST PARTIAL MASTECTOMY ;  Surgeon: Abril Chandler MD;  Location: [facility];  Service:      Family History   Problem Relation Age of Onset    No Known Problems Mother     Heart attack Father     No Known Problems Daughter     No Known Problems Maternal Grandmother     No Known Problems Maternal Grandfather     No Known Problems Paternal Aunt     No Known Problems Paternal Aunt     Lung cancer Maternal Uncle 70     Social History     Socioeconomic History    Marital status: /Civil Union     Spouse name: Not on file    Number of children: Not on file    Years of education: Not on file    Highest education level: Not on file   Occupational History    Not on file   Tobacco Use    Smoking status: Former     Current packs/day: 0.00     Types: Cigarettes     Quit date: 2009     Years since quittin.9     Passive exposure: Past    Smokeless tobacco: Never   Vaping Use    Vaping status: Never Used   Substance and Sexual Activity    Alcohol use: No    Drug use: No    Sexual activity: Not on file   Other Topics Concern    Not on file   Social History Narrative    Not on file     Social  Determinants of Health     Financial Resource Strain: Not on file   Food Insecurity: Not on file   Transportation Needs: Not on file   Physical Activity: Not on file   Stress: Not on file   Social Connections: Not on file   Intimate Partner Violence: Not on file   Housing Stability: Not on file       Current Outpatient Medications:     letrozole (FEMARA) 2.5 mg tablet, Take 1 tablet (2.5 mg total) by mouth daily, Disp: 90 tablet, Rfl: 2    Multiple Vitamins-Minerals (Theragran-M Premier 50 Plus) TABS, as directed, Disp: , Rfl:     multivitamin (THERAGRAN) TABS, Take 1 tablet by mouth 3 (three) times a week , Disp: , Rfl:     ondansetron (ZOFRAN) 4 mg tablet, Take 1 tablet (4 mg total) by mouth every 8 (eight) hours as needed for nausea or vomiting, Disp: 30 tablet, Rfl: 1    pertuzumab (PERJETA) 420 mg/14 mL SOLN, Infuse into a venous catheter every 21 days At infusion  center, Disp: , Rfl:     Trastuzumab (HERCEPTIN IV), Infuse into a venous catheter every 21 days At infusion center, Disp: , Rfl:     VITAMIN D, CHOLECALCIFEROL, PO, Take 1,000 Units by mouth 3 (three) times a week , Disp: , Rfl:   No Known Allergies  There were no vitals filed for this visit.

## 2024-07-02 ENCOUNTER — TELEPHONE (OUTPATIENT)
Dept: HEMATOLOGY ONCOLOGY | Facility: CLINIC | Age: 59
End: 2024-07-02

## 2024-07-02 NOTE — TELEPHONE ENCOUNTER
Called patient regarding no show. Patient forgot about appt. Requested to reschedule. Rescheduled to first available.

## 2024-07-02 NOTE — PROGRESS NOTES
Follow-up - Radiation Oncology   Alejandrina Barba 1965 58 y.o. female [id number]      History of Present Illness   Cancer Staging   Malignant neoplasm of upper-inner quadrant of left female breast (HCC)  Staging form: Breast, AJCC 8th Edition  - Clinical: Stage IV (rcTX, cNX, pM1) - Signed by Dejuan Hernandez MD on 7/5/2022  Stage prefix: Recurrence    Ms. Alejandrina Barba is a 58 year old woman with a history of Stage IV inflammatory breast cancer diagnosed in 2015 s/p chemotherapy with good response, now on maintenance letrozole/herceptin/perjeta. She was seen in initial consultation on 6/16/22 with recommendation for SBRT to her caudate liver lesion. Prior to proceeding with SBRT the patient opted to continue systemic therapy alone.     Interval History:  The patient was last seen in clinic in 1/2024. Since then she has been doing well overall and has been undergoing therapy with enhertu under the care of Dr. Benavidez. She has had some difficulty with this regimen, which she plans to discuss with Dr. Benavidez at next follow-up.     CT abdomen (6/7/24) demonstrated:  1. Interval decrease in size of caudate lobe lesion. No new hepatic lesions. No intra-abdominal lymphadenopathy.  2. Stable left adrenal adenoma.  3. Diffuse hepatic steatosis.    Currently the patient is doing fair overall. As above she has had difficulty from side effects of systemic therapy, but remains asymptomatic of her liver metastasis.     Historical Information   Oncology History   Malignant neoplasm of upper-inner quadrant of left female breast (HCC)   12/2015 Initial Diagnosis    Malignant neoplasm of upper-outer quadrant of left female breast (HCC)     2016 -  Hormone Therapy    Tamoxifen  Ended 8/2019.    Dr Benavidez  Letrozole 9/2019.     1/27/2016 Surgery    Port placement     2/19/2016 - 6/2/2018 Chemotherapy    Taxotere,  herceptin, perjeta, xgeva.   After six cycles, taxotere was discontinued and tamoxifen added.   Continues  with Perjeta and Herceptin every 3 weeks     - Dr Benavidez       9/16/2016 Surgery    Left breast partial mastectomy     11/7/2016 - 12/23/2016 Radiation    Plan ID Energy Fractions Dose per Fraction (cGy) Total Dose Delivered (cGy) Elapsed Days   Lt Breast 6X 25 / 25 200 5,000 36   Lt Brst Boost 6X 8 / 8 200 1,600 9   Lt Femur 10X 10 / 10 300 3,000 11   Lt PAB 6X 25 / 25 60 1,500 36   Lt Sclav 6X 25 / 25 200 5,000 36                                   Total dose to left breast lumpectomy cavity: 6600 cGy                   Total dose to the supraclavicular and axillary regions: 5000 cGy       4/17/2019 - 1/12/2024 Chemotherapy    pertuzumab (PERJETA) IVPB, 840 mg, Intravenous, Once, 81 of 82 cycles  Administration: 420 mg (5/17/2019), 420 mg (6/7/2019), 420 mg (6/28/2019), 420 mg (7/19/2019), 420 mg (8/30/2019), 420 mg (9/20/2019), 420 mg (8/9/2019), 420 mg (10/9/2019), 420 mg (11/1/2019), 420 mg (11/22/2019), 420 mg (12/13/2019), 420 mg (1/3/2020), 420 mg (1/24/2020), 420 mg (2/14/2020), 420 mg (3/6/2020), 420 mg (3/27/2020), 420 mg (4/17/2020), 420 mg (5/8/2020), 420 mg (5/29/2020), 420 mg (6/19/2020), 420 mg (7/10/2020), 420 mg (7/31/2020), 420 mg (8/21/2020), 420 mg (9/11/2020), 420 mg (10/2/2020), 420 mg (10/23/2020), 420 mg (11/13/2020), 420 mg (12/4/2020), 420 mg (12/24/2020), 420 mg (1/15/2021), 420 mg (2/5/2021), 420 mg (2/26/2021), 420 mg (3/19/2021), 420 mg (4/9/2021), 420 mg (4/30/2021), 420 mg (5/21/2021), 420 mg (6/11/2021), 420 mg (7/2/2021), 420 mg (7/23/2021), 420 mg (8/13/2021), 420 mg (9/3/2021), 420 mg (9/24/2021), 420 mg (10/15/2021), 420 mg (11/5/2021), 420 mg (11/26/2021), 420 mg (12/17/2021), 420 mg (1/7/2022), 420 mg (1/28/2022), 420 mg (2/18/2022), 420 mg (3/11/2022), 420 mg (4/1/2022), 420 mg (5/13/2022), 420 mg (6/3/2022), 420 mg (6/28/2022), 420 mg (7/22/2022), 420 mg (9/2/2022), 420 mg (9/23/2022), 420 mg (8/12/2022), 420 mg (10/14/2022), 420 mg (11/4/2022), 420 mg (11/25/2022), 420 mg  (12/15/2022), 420 mg (1/6/2023), 420 mg (1/27/2023), 420 mg (2/17/2023), 420 mg (3/10/2023), 420 mg (3/31/2023), 420 mg (4/21/2023), 420 mg (5/19/2023), 420 mg (6/13/2023), 420 mg (6/30/2023), 420 mg (7/21/2023), 420 mg (8/11/2023), 420 mg (9/1/2023), 420 mg (9/22/2023), 420 mg (10/13/2023), 420 mg (11/3/2023), 420 mg (11/24/2023), 420 mg (12/22/2023), 420 mg (1/12/2024)  trastuzumab (HERCEPTIN) chemo infusion, 6 mg/kg = 589 mg (75 % of original dose 8 mg/kg), Intravenous, Once, 81 of 82 cycles  Dose modification: 6 mg/kg (original dose 8 mg/kg, Cycle 1, Reason: Other (Must fill in a comment))  Administration: 589 mg (5/17/2019), 589 mg (6/7/2019), 600 mg (6/28/2019), 600 mg (7/19/2019), 600 mg (8/30/2019), 600 mg (9/20/2019), 600 mg (8/9/2019), 600 mg (10/9/2019), 600 mg (11/1/2019), 600 mg (11/22/2019), 600 mg (12/13/2019), 600 mg (1/3/2020), 600 mg (1/24/2020), 600 mg (2/14/2020), 600 mg (3/6/2020), 600 mg (3/27/2020), 600 mg (4/17/2020), 600 mg (5/8/2020), 600 mg (5/29/2020), 600 mg (6/19/2020), 600 mg (7/10/2020), 600 mg (7/31/2020), 600 mg (8/21/2020), 600 mg (9/11/2020), 600 mg (10/2/2020), 600 mg (10/23/2020), 600 mg (11/13/2020), 600 mg (12/4/2020), 600 mg (12/24/2020), 600 mg (1/15/2021), 600 mg (2/5/2021), 600 mg (2/26/2021), 600 mg (3/19/2021), 600 mg (4/9/2021), 600 mg (4/30/2021), 600 mg (5/21/2021), 600 mg (6/11/2021), 600 mg (7/2/2021), 600 mg (7/23/2021), 600 mg (8/13/2021), 600 mg (9/3/2021), 600 mg (9/24/2021), 600 mg (10/15/2021), 600 mg (11/5/2021), 600 mg (11/26/2021), 600 mg (12/17/2021), 600 mg (1/7/2022), 600 mg (1/28/2022), 600 mg (2/18/2022), 600 mg (3/11/2022), 600 mg (4/1/2022), 600 mg (5/13/2022), 600 mg (6/3/2022), 600 mg (6/28/2022), 600 mg (7/22/2022), 600 mg (9/2/2022), 600 mg (9/23/2022), 600 mg (8/12/2022), 600 mg (10/14/2022), 600 mg (11/4/2022), 600 mg (11/25/2022), 600 mg (12/15/2022), 600 mg (1/6/2023), 600 mg (1/27/2023), 600 mg (2/17/2023), 600 mg (3/10/2023), 600 mg  (3/31/2023), 600 mg (4/21/2023), 600 mg (5/19/2023), 600 mg (6/13/2023), 600 mg (6/30/2023), 600 mg (7/21/2023), 600 mg (8/11/2023), 600 mg (9/1/2023), 600 mg (9/22/2023), 600 mg (10/13/2023), 600 mg (11/3/2023), 600 mg (11/24/2023), 600 mg (12/22/2023), 600 mg (1/12/2024)     8/16/2019 Surgery    she underwent BSO.     7/5/2022 -  Cancer Staged    Staging form: Breast, AJCC 8th Edition  - Clinical: Stage IV (rcTX, cNX, pM1) - Signed by Dejuan Hernandez MD on 7/5/2022  Stage prefix: Recurrence       2/2/2024 -  Chemotherapy    alteplase (CATHFLO), 2 mg, Intracatheter, Every 1 Minute as needed, 5 of 6 cycles  fam-trastuzumab deruxtecan-nxki (ENHERTU) IVPB, 520 mg, Intravenous, Once, 5 of 6 cycles  Dose modification: 4.4 mg/kg (original dose 5.4 mg/kg, Cycle 3, Reason: Dose modified as per discussion with consulting physician)  Administration: 500 mg (2/2/2024), 400 mg (3/15/2024), 400 mg (4/5/2024), 400 mg (5/17/2024), 400 mg (6/14/2024)     Malignant neoplasm metastatic to liver (HCC)   4/27/2018 Initial Diagnosis    Liver metastases (HCC)     4/17/2019 - 1/12/2024 Chemotherapy    pertuzumab (PERJETA) IVPB, 840 mg, Intravenous, Once, 81 of 82 cycles  Administration: 420 mg (5/17/2019), 420 mg (6/7/2019), 420 mg (6/28/2019), 420 mg (7/19/2019), 420 mg (8/30/2019), 420 mg (9/20/2019), 420 mg (8/9/2019), 420 mg (10/9/2019), 420 mg (11/1/2019), 420 mg (11/22/2019), 420 mg (12/13/2019), 420 mg (1/3/2020), 420 mg (1/24/2020), 420 mg (2/14/2020), 420 mg (3/6/2020), 420 mg (3/27/2020), 420 mg (4/17/2020), 420 mg (5/8/2020), 420 mg (5/29/2020), 420 mg (6/19/2020), 420 mg (7/10/2020), 420 mg (7/31/2020), 420 mg (8/21/2020), 420 mg (9/11/2020), 420 mg (10/2/2020), 420 mg (10/23/2020), 420 mg (11/13/2020), 420 mg (12/4/2020), 420 mg (12/24/2020), 420 mg (1/15/2021), 420 mg (2/5/2021), 420 mg (2/26/2021), 420 mg (3/19/2021), 420 mg (4/9/2021), 420 mg (4/30/2021), 420 mg (5/21/2021), 420 mg (6/11/2021), 420 mg (7/2/2021),  420 mg (7/23/2021), 420 mg (8/13/2021), 420 mg (9/3/2021), 420 mg (9/24/2021), 420 mg (10/15/2021), 420 mg (11/5/2021), 420 mg (11/26/2021), 420 mg (12/17/2021), 420 mg (1/7/2022), 420 mg (1/28/2022), 420 mg (2/18/2022), 420 mg (3/11/2022), 420 mg (4/1/2022), 420 mg (5/13/2022), 420 mg (6/3/2022), 420 mg (6/28/2022), 420 mg (7/22/2022), 420 mg (9/2/2022), 420 mg (9/23/2022), 420 mg (8/12/2022), 420 mg (10/14/2022), 420 mg (11/4/2022), 420 mg (11/25/2022), 420 mg (12/15/2022), 420 mg (1/6/2023), 420 mg (1/27/2023), 420 mg (2/17/2023), 420 mg (3/10/2023), 420 mg (3/31/2023), 420 mg (4/21/2023), 420 mg (5/19/2023), 420 mg (6/13/2023), 420 mg (6/30/2023), 420 mg (7/21/2023), 420 mg (8/11/2023), 420 mg (9/1/2023), 420 mg (9/22/2023), 420 mg (10/13/2023), 420 mg (11/3/2023), 420 mg (11/24/2023), 420 mg (12/22/2023), 420 mg (1/12/2024)  trastuzumab (HERCEPTIN) chemo infusion, 6 mg/kg = 589 mg (75 % of original dose 8 mg/kg), Intravenous, Once, 81 of 82 cycles  Dose modification: 6 mg/kg (original dose 8 mg/kg, Cycle 1, Reason: Other (Must fill in a comment))  Administration: 589 mg (5/17/2019), 589 mg (6/7/2019), 600 mg (6/28/2019), 600 mg (7/19/2019), 600 mg (8/30/2019), 600 mg (9/20/2019), 600 mg (8/9/2019), 600 mg (10/9/2019), 600 mg (11/1/2019), 600 mg (11/22/2019), 600 mg (12/13/2019), 600 mg (1/3/2020), 600 mg (1/24/2020), 600 mg (2/14/2020), 600 mg (3/6/2020), 600 mg (3/27/2020), 600 mg (4/17/2020), 600 mg (5/8/2020), 600 mg (5/29/2020), 600 mg (6/19/2020), 600 mg (7/10/2020), 600 mg (7/31/2020), 600 mg (8/21/2020), 600 mg (9/11/2020), 600 mg (10/2/2020), 600 mg (10/23/2020), 600 mg (11/13/2020), 600 mg (12/4/2020), 600 mg (12/24/2020), 600 mg (1/15/2021), 600 mg (2/5/2021), 600 mg (2/26/2021), 600 mg (3/19/2021), 600 mg (4/9/2021), 600 mg (4/30/2021), 600 mg (5/21/2021), 600 mg (6/11/2021), 600 mg (7/2/2021), 600 mg (7/23/2021), 600 mg (8/13/2021), 600 mg (9/3/2021), 600 mg (9/24/2021), 600 mg (10/15/2021), 600 mg  (11/5/2021), 600 mg (11/26/2021), 600 mg (12/17/2021), 600 mg (1/7/2022), 600 mg (1/28/2022), 600 mg (2/18/2022), 600 mg (3/11/2022), 600 mg (4/1/2022), 600 mg (5/13/2022), 600 mg (6/3/2022), 600 mg (6/28/2022), 600 mg (7/22/2022), 600 mg (9/2/2022), 600 mg (9/23/2022), 600 mg (8/12/2022), 600 mg (10/14/2022), 600 mg (11/4/2022), 600 mg (11/25/2022), 600 mg (12/15/2022), 600 mg (1/6/2023), 600 mg (1/27/2023), 600 mg (2/17/2023), 600 mg (3/10/2023), 600 mg (3/31/2023), 600 mg (4/21/2023), 600 mg (5/19/2023), 600 mg (6/13/2023), 600 mg (6/30/2023), 600 mg (7/21/2023), 600 mg (8/11/2023), 600 mg (9/1/2023), 600 mg (9/22/2023), 600 mg (10/13/2023), 600 mg (11/3/2023), 600 mg (11/24/2023), 600 mg (12/22/2023), 600 mg (1/12/2024)     6/24/2022 Biopsy    Final Diagnosis   A.  Liver, core biopsies:     - Metastatic carcinoma compatible with a breast primary.        2/2/2024 -  Chemotherapy    alteplase (CATHFLO), 2 mg, Intracatheter, Every 1 Minute as needed, 5 of 6 cycles  fam-trastuzumab deruxtecan-nxki (ENHERTU) IVPB, 520 mg, Intravenous, Once, 5 of 6 cycles  Dose modification: 4.4 mg/kg (original dose 5.4 mg/kg, Cycle 3, Reason: Dose modified as per discussion with consulting physician)  Administration: 500 mg (2/2/2024), 400 mg (3/15/2024), 400 mg (4/5/2024), 400 mg (5/17/2024), 400 mg (6/14/2024)     Malignant neoplasm metastatic to bone (HCC)   4/27/2018 Initial Diagnosis    Bone metastasis (HCC)     4/17/2019 - 1/12/2024 Chemotherapy    pertuzumab (PERJETA) IVPB, 840 mg, Intravenous, Once, 81 of 82 cycles  Administration: 420 mg (5/17/2019), 420 mg (6/7/2019), 420 mg (6/28/2019), 420 mg (7/19/2019), 420 mg (8/30/2019), 420 mg (9/20/2019), 420 mg (8/9/2019), 420 mg (10/9/2019), 420 mg (11/1/2019), 420 mg (11/22/2019), 420 mg (12/13/2019), 420 mg (1/3/2020), 420 mg (1/24/2020), 420 mg (2/14/2020), 420 mg (3/6/2020), 420 mg (3/27/2020), 420 mg (4/17/2020), 420 mg (5/8/2020), 420 mg (5/29/2020), 420 mg (6/19/2020), 420  mg (7/10/2020), 420 mg (7/31/2020), 420 mg (8/21/2020), 420 mg (9/11/2020), 420 mg (10/2/2020), 420 mg (10/23/2020), 420 mg (11/13/2020), 420 mg (12/4/2020), 420 mg (12/24/2020), 420 mg (1/15/2021), 420 mg (2/5/2021), 420 mg (2/26/2021), 420 mg (3/19/2021), 420 mg (4/9/2021), 420 mg (4/30/2021), 420 mg (5/21/2021), 420 mg (6/11/2021), 420 mg (7/2/2021), 420 mg (7/23/2021), 420 mg (8/13/2021), 420 mg (9/3/2021), 420 mg (9/24/2021), 420 mg (10/15/2021), 420 mg (11/5/2021), 420 mg (11/26/2021), 420 mg (12/17/2021), 420 mg (1/7/2022), 420 mg (1/28/2022), 420 mg (2/18/2022), 420 mg (3/11/2022), 420 mg (4/1/2022), 420 mg (5/13/2022), 420 mg (6/3/2022), 420 mg (6/28/2022), 420 mg (7/22/2022), 420 mg (9/2/2022), 420 mg (9/23/2022), 420 mg (8/12/2022), 420 mg (10/14/2022), 420 mg (11/4/2022), 420 mg (11/25/2022), 420 mg (12/15/2022), 420 mg (1/6/2023), 420 mg (1/27/2023), 420 mg (2/17/2023), 420 mg (3/10/2023), 420 mg (3/31/2023), 420 mg (4/21/2023), 420 mg (5/19/2023), 420 mg (6/13/2023), 420 mg (6/30/2023), 420 mg (7/21/2023), 420 mg (8/11/2023), 420 mg (9/1/2023), 420 mg (9/22/2023), 420 mg (10/13/2023), 420 mg (11/3/2023), 420 mg (11/24/2023), 420 mg (12/22/2023), 420 mg (1/12/2024)  trastuzumab (HERCEPTIN) chemo infusion, 6 mg/kg = 589 mg (75 % of original dose 8 mg/kg), Intravenous, Once, 81 of 82 cycles  Dose modification: 6 mg/kg (original dose 8 mg/kg, Cycle 1, Reason: Other (Must fill in a comment))  Administration: 589 mg (5/17/2019), 589 mg (6/7/2019), 600 mg (6/28/2019), 600 mg (7/19/2019), 600 mg (8/30/2019), 600 mg (9/20/2019), 600 mg (8/9/2019), 600 mg (10/9/2019), 600 mg (11/1/2019), 600 mg (11/22/2019), 600 mg (12/13/2019), 600 mg (1/3/2020), 600 mg (1/24/2020), 600 mg (2/14/2020), 600 mg (3/6/2020), 600 mg (3/27/2020), 600 mg (4/17/2020), 600 mg (5/8/2020), 600 mg (5/29/2020), 600 mg (6/19/2020), 600 mg (7/10/2020), 600 mg (7/31/2020), 600 mg (8/21/2020), 600 mg (9/11/2020), 600 mg (10/2/2020), 600 mg  (10/23/2020), 600 mg (11/13/2020), 600 mg (12/4/2020), 600 mg (12/24/2020), 600 mg (1/15/2021), 600 mg (2/5/2021), 600 mg (2/26/2021), 600 mg (3/19/2021), 600 mg (4/9/2021), 600 mg (4/30/2021), 600 mg (5/21/2021), 600 mg (6/11/2021), 600 mg (7/2/2021), 600 mg (7/23/2021), 600 mg (8/13/2021), 600 mg (9/3/2021), 600 mg (9/24/2021), 600 mg (10/15/2021), 600 mg (11/5/2021), 600 mg (11/26/2021), 600 mg (12/17/2021), 600 mg (1/7/2022), 600 mg (1/28/2022), 600 mg (2/18/2022), 600 mg (3/11/2022), 600 mg (4/1/2022), 600 mg (5/13/2022), 600 mg (6/3/2022), 600 mg (6/28/2022), 600 mg (7/22/2022), 600 mg (9/2/2022), 600 mg (9/23/2022), 600 mg (8/12/2022), 600 mg (10/14/2022), 600 mg (11/4/2022), 600 mg (11/25/2022), 600 mg (12/15/2022), 600 mg (1/6/2023), 600 mg (1/27/2023), 600 mg (2/17/2023), 600 mg (3/10/2023), 600 mg (3/31/2023), 600 mg (4/21/2023), 600 mg (5/19/2023), 600 mg (6/13/2023), 600 mg (6/30/2023), 600 mg (7/21/2023), 600 mg (8/11/2023), 600 mg (9/1/2023), 600 mg (9/22/2023), 600 mg (10/13/2023), 600 mg (11/3/2023), 600 mg (11/24/2023), 600 mg (12/22/2023), 600 mg (1/12/2024)     6/24/2022 Biopsy    Final Diagnosis   A.  Liver, core biopsies:     - Metastatic carcinoma compatible with a breast primary.        2/2/2024 -  Chemotherapy    alteplase (CATHFLO), 2 mg, Intracatheter, Every 1 Minute as needed, 5 of 6 cycles  fam-trastuzumab deruxtecan-nxki (ENHERTU) IVPB, 520 mg, Intravenous, Once, 5 of 6 cycles  Dose modification: 4.4 mg/kg (original dose 5.4 mg/kg, Cycle 3, Reason: Dose modified as per discussion with consulting physician)  Administration: 500 mg (2/2/2024), 400 mg (3/15/2024), 400 mg (4/5/2024), 400 mg (5/17/2024), 400 mg (6/14/2024)         Past Medical History:   Diagnosis Date    Breast cancer (HCC)     Hx of radiation therapy     breast and left  hip    Liver metastases     Port-A-Cath in place     right chest    Wears glasses      Past Surgical History:   Procedure Laterality Date    BREAST BIOPSY  Left 2015    BREAST LUMPECTOMY Left 2016    BREAST SURGERY Left     biopsy    CHOLECYSTECTOMY      CHOLECYSTECTOMY      IR PLACEMENT FIDUCIAL MARKER  2022    OOPHORECTOMY      PORTACATH PLACEMENT Right     HI LAPAROSCOPY W/RMVL ADNEXAL STRUCTURES Bilateral 2019    Procedure: LAPAROSCOPIC SALPINGO-OOPHORECTOMY;  Surgeon: Omar Rudolph MD;  Location: [facility];  Service: Gynecology Oncology    SIMPLE MASTECTOMY Left 2016    Procedure: BREAST PARTIAL MASTECTOMY ;  Surgeon: Abril Chandler MD;  Location: [facility];  Service:        Social History   Social History     Substance and Sexual Activity   Alcohol Use No     Social History     Substance and Sexual Activity   Drug Use No     Social History     Tobacco Use   Smoking Status Former    Current packs/day: 0.00    Types: Cigarettes    Quit date: 2009    Years since quittin.9    Passive exposure: Past   Smokeless Tobacco Never         Meds/Allergies     Current Outpatient Medications:     letrozole (FEMARA) 2.5 mg tablet, Take 1 tablet (2.5 mg total) by mouth daily, Disp: 90 tablet, Rfl: 2    Multiple Vitamins-Minerals (Theragran-M Premier 50 Plus) TABS, as directed, Disp: , Rfl:     multivitamin (THERAGRAN) TABS, Take 1 tablet by mouth 3 (three) times a week , Disp: , Rfl:     ondansetron (ZOFRAN) 4 mg tablet, Take 1 tablet (4 mg total) by mouth every 8 (eight) hours as needed for nausea or vomiting, Disp: 30 tablet, Rfl: 1    pertuzumab (PERJETA) 420 mg/14 mL SOLN, Infuse into a venous catheter every 21 days At infusion  center, Disp: , Rfl:     Trastuzumab (HERCEPTIN IV), Infuse into a venous catheter every 21 days At infusion center, Disp: , Rfl:     VITAMIN D, CHOLECALCIFEROL, PO, Take 1,000 Units by mouth 3 (three) times a week , Disp: , Rfl:   No Known Allergies    Review of Systems   Constitutional: Negative.    HENT: Negative.     Eyes: Negative.    Respiratory: Negative.     Cardiovascular: Negative.   "  Gastrointestinal: Negative.    Endocrine: Negative.    Genitourinary: Negative.    Musculoskeletal: Negative.    Skin: Negative.    Allergic/Immunologic: Negative.    Neurological: Negative.    Hematological: Negative.    Psychiatric/Behavioral: Negative.       OBJECTIVE:   /74 (BP Location: Right arm, Patient Position: Sitting, Cuff Size: Large)   Pulse 65   Temp (!) 97.3 °F (36.3 °C) (Temporal)   Resp 18   Ht 5' 4\" (1.626 m)   Wt 93.9 kg (207 lb)   SpO2 97%   BMI 35.53 kg/m²   Pain Assessment:  0  ECOG/Zubrod/WHO: 0 - Asymptomatic    Physical Exam   Well appearing. NAD.  No increased work of breathing.   Extremities warm and well perfused.     RESULTS    Lab Results:   Recent Results (from the past 672 hour(s))   CBC and differential    Collection Time: 06/14/24  8:19 AM   Result Value Ref Range    WBC 9.06 4.31 - 10.16 Thousand/uL    RBC 4.60 3.81 - 5.12 Million/uL    Hemoglobin 12.9 11.5 - 15.4 g/dL    Hematocrit 40.7 34.8 - 46.1 %    MCV 89 82 - 98 fL    MCH 28.0 26.8 - 34.3 pg    MCHC 31.7 31.4 - 37.4 g/dL    RDW 15.1 11.6 - 15.1 %    MPV 11.7 8.9 - 12.7 fL    Platelets 324 149 - 390 Thousands/uL    nRBC 0 /100 WBCs    Segmented % 58 43 - 75 %    Immature Grans % 1 0 - 2 %    Lymphocytes % 25 14 - 44 %    Monocytes % 8 4 - 12 %    Eosinophils Relative 7 (H) 0 - 6 %    Basophils Relative 1 0 - 1 %    Absolute Neutrophils 5.31 1.85 - 7.62 Thousands/µL    Absolute Immature Grans 0.06 0.00 - 0.20 Thousand/uL    Absolute Lymphocytes 2.30 0.60 - 4.47 Thousands/µL    Absolute Monocytes 0.75 0.17 - 1.22 Thousand/µL    Eosinophils Absolute 0.59 0.00 - 0.61 Thousand/µL    Basophils Absolute 0.05 0.00 - 0.10 Thousands/µL   Comprehensive metabolic panel    Collection Time: 06/14/24  8:19 AM   Result Value Ref Range    Sodium 140 135 - 147 mmol/L    Potassium 4.1 3.5 - 5.3 mmol/L    Chloride 110 (H) 96 - 108 mmol/L    CO2 27 21 - 32 mmol/L    ANION GAP 3 (L) 4 - 13 mmol/L    BUN 15 5 - 25 mg/dL    Creatinine " "0.76 0.60 - 1.30 mg/dL    Glucose 143 (H) 65 - 140 mg/dL    Calcium 9.1 8.4 - 10.2 mg/dL    AST 22 13 - 39 U/L    ALT 32 7 - 52 U/L    Alkaline Phosphatase 89 34 - 104 U/L    Total Protein 6.5 6.4 - 8.4 g/dL    Albumin 3.7 3.5 - 5.0 g/dL    Total Bilirubin 0.33 0.20 - 1.00 mg/dL    eGFR 87 ml/min/1.73sq m       Imaging Studies:No results found.        Assessment/Plan:  No orders of the defined types were placed in this encounter.     We reviewed her repeat CT Abdomen in detail in comparison to prior imaging studies. On my review, this shows improvement in her caudate liver metastasis as compared to prior imaging. As previously discussed, data supporting the use of consolidative SBRT in the setting of oligometastatic breast cancer is limited (please see prior note for full discussion). Given the radiographic improvement, the risk associated with SBRT to this area, and the limited data to support its use I would not recommend consolidative SBRT in this case. Rather I would recommend continued systemic therapy with Dr. Benavidez.     If the patient were to become symptomatic from her liver metastasis or if it were to display features concerning for injury due to local progression she can be reconsidered for SBRT.     Dejuan Hernandez MD  7/2/2024,12:44 PM    Portions of the record may have been created with voice recognition software.  Occasional wrong word or \"sound a like\" substitutions may have occurred due to the inherent limitations of voice recognition software.  Read the chart carefully and recognize, using context, where substitutions have occurred.        "

## 2024-07-05 ENCOUNTER — HOSPITAL ENCOUNTER (OUTPATIENT)
Dept: INFUSION CENTER | Facility: CLINIC | Age: 59
End: 2024-07-05
Payer: COMMERCIAL

## 2024-07-05 ENCOUNTER — TELEPHONE (OUTPATIENT)
Dept: HEMATOLOGY ONCOLOGY | Facility: CLINIC | Age: 59
End: 2024-07-05

## 2024-07-05 ENCOUNTER — APPOINTMENT (OUTPATIENT)
Dept: LAB | Facility: HOSPITAL | Age: 59
End: 2024-07-05
Payer: COMMERCIAL

## 2024-07-05 VITALS
TEMPERATURE: 97.6 F | HEART RATE: 70 BPM | WEIGHT: 206.13 LBS | RESPIRATION RATE: 18 BRPM | SYSTOLIC BLOOD PRESSURE: 135 MMHG | BODY MASS INDEX: 35.38 KG/M2 | DIASTOLIC BLOOD PRESSURE: 80 MMHG

## 2024-07-05 DIAGNOSIS — C78.7 MALIGNANT NEOPLASM METASTATIC TO LIVER (HCC): ICD-10-CM

## 2024-07-05 DIAGNOSIS — Z17.0 MALIGNANT NEOPLASM OF UPPER-INNER QUADRANT OF LEFT BREAST IN FEMALE, ESTROGEN RECEPTOR POSITIVE (HCC): ICD-10-CM

## 2024-07-05 DIAGNOSIS — T45.1X5A CHEMOTHERAPY INDUCED NEUTROPENIA (HCC): ICD-10-CM

## 2024-07-05 DIAGNOSIS — T45.1X5A CHEMOTHERAPY INDUCED NEUTROPENIA (HCC): Primary | ICD-10-CM

## 2024-07-05 DIAGNOSIS — C79.51 MALIGNANT NEOPLASM METASTATIC TO BONE (HCC): ICD-10-CM

## 2024-07-05 DIAGNOSIS — C50.212 MALIGNANT NEOPLASM OF UPPER-INNER QUADRANT OF LEFT BREAST IN FEMALE, ESTROGEN RECEPTOR POSITIVE (HCC): ICD-10-CM

## 2024-07-05 DIAGNOSIS — D70.1 CHEMOTHERAPY INDUCED NEUTROPENIA (HCC): Primary | ICD-10-CM

## 2024-07-05 DIAGNOSIS — Z90.722 HISTORY OF BILATERAL SALPINGO-OOPHORECTOMY (BSO): ICD-10-CM

## 2024-07-05 DIAGNOSIS — D70.1 CHEMOTHERAPY INDUCED NEUTROPENIA (HCC): ICD-10-CM

## 2024-07-05 DIAGNOSIS — Z90.79 HISTORY OF BILATERAL SALPINGO-OOPHORECTOMY (BSO): ICD-10-CM

## 2024-07-05 DIAGNOSIS — C78.7 MALIGNANT NEOPLASM METASTATIC TO LIVER (HCC): Primary | ICD-10-CM

## 2024-07-05 PROCEDURE — 96413 CHEMO IV INFUSION 1 HR: CPT

## 2024-07-05 PROCEDURE — 96367 TX/PROPH/DG ADDL SEQ IV INF: CPT

## 2024-07-05 RX ORDER — DEXTROSE MONOHYDRATE 50 MG/ML
20 INJECTION, SOLUTION INTRAVENOUS ONCE
Status: COMPLETED | OUTPATIENT
Start: 2024-07-05 | End: 2024-07-05

## 2024-07-05 RX ADMIN — DEXAMETHASONE SODIUM PHOSPHATE: 10 INJECTION, SOLUTION INTRAMUSCULAR; INTRAVENOUS at 14:33

## 2024-07-05 RX ADMIN — FAM-TRASTUZUMAB DERUXTECAN-NXKI 400 MG: 100 INJECTION, POWDER, LYOPHILIZED, FOR SOLUTION INTRAVENOUS at 15:08

## 2024-07-05 RX ADMIN — DEXTROSE 20 ML/HR: 5 SOLUTION INTRAVENOUS at 14:24

## 2024-07-05 NOTE — PROGRESS NOTES
Pt here for enhertu, pt states she has nausea/fatigue for many days post treatment and is just feeling better when she has to return for next cycle.  Pt requesting to try 4 weeks between cycles. Advised with Olivia Antoine RN pt will get treatment every 4 weeks now instead of 3.  Patient also hasn't had echo since January 2024, pt is scheduled for next echo Monday 7-8-24 2:00 pm, pt aware and uses mychart, also pt next enhertu scheduled for 8-2-24 2:00.

## 2024-07-05 NOTE — TELEPHONE ENCOUNTER
Patient requested Enhertu to be given every 4 weeks instead of every 3 weeks  This is OK per Dr. Benavidez and has been reflected in the orders  She also needs an echo to be completed    Please schedule STAT echo

## 2024-07-19 ENCOUNTER — TELEPHONE (OUTPATIENT)
Dept: HEMATOLOGY ONCOLOGY | Facility: CLINIC | Age: 59
End: 2024-07-19

## 2024-07-19 ENCOUNTER — HOSPITAL ENCOUNTER (OUTPATIENT)
Dept: NON INVASIVE DIAGNOSTICS | Facility: HOSPITAL | Age: 59
Discharge: HOME | End: 2024-07-19
Attending: INTERNAL MEDICINE
Payer: COMMERCIAL

## 2024-07-19 VITALS
HEART RATE: 70 BPM | WEIGHT: 206.13 LBS | BODY MASS INDEX: 35.19 KG/M2 | SYSTOLIC BLOOD PRESSURE: 135 MMHG | HEIGHT: 64 IN | DIASTOLIC BLOOD PRESSURE: 80 MMHG

## 2024-07-19 DIAGNOSIS — Z17.0 MALIGNANT NEOPLASM OF UPPER-INNER QUADRANT OF LEFT BREAST IN FEMALE, ESTROGEN RECEPTOR POSITIVE (HCC): ICD-10-CM

## 2024-07-19 DIAGNOSIS — Z90.79 HISTORY OF BILATERAL SALPINGO-OOPHORECTOMY (BSO): ICD-10-CM

## 2024-07-19 DIAGNOSIS — C78.7 MALIGNANT NEOPLASM METASTATIC TO LIVER (HCC): ICD-10-CM

## 2024-07-19 DIAGNOSIS — C50.212 MALIGNANT NEOPLASM OF UPPER-INNER QUADRANT OF LEFT BREAST IN FEMALE, ESTROGEN RECEPTOR POSITIVE (HCC): ICD-10-CM

## 2024-07-19 DIAGNOSIS — T45.1X5A CHEMOTHERAPY INDUCED NEUTROPENIA (HCC): ICD-10-CM

## 2024-07-19 DIAGNOSIS — Z90.722 HISTORY OF BILATERAL SALPINGO-OOPHORECTOMY (BSO): ICD-10-CM

## 2024-07-19 DIAGNOSIS — C79.51 MALIGNANT NEOPLASM METASTATIC TO BONE (HCC): ICD-10-CM

## 2024-07-19 DIAGNOSIS — D70.1 CHEMOTHERAPY INDUCED NEUTROPENIA (HCC): ICD-10-CM

## 2024-07-19 LAB
AORTIC ROOT: 3.2 CM
APICAL FOUR CHAMBER EJECTION FRACTION: 71 %
ASCENDING AORTA: 3.4 CM
BSA FOR ECHO PROCEDURE: 1.98 M2
E WAVE DECELERATION TIME: 160 MS
E/A RATIO: 1.01
FRACTIONAL SHORTENING: 30 (ref 28–44)
INTERVENTRICULAR SEPTUM IN DIASTOLE (PARASTERNAL SHORT AXIS VIEW): 1.1 CM
INTERVENTRICULAR SEPTUM: 1.1 CM (ref 0.6–1.1)
LAAS-AP2: 13.3 CM2
LAAS-AP4: 17 CM2
LEFT ATRIUM SIZE: 3.3 CM
LEFT ATRIUM VOLUME (MOD BIPLANE): 37 ML
LEFT ATRIUM VOLUME INDEX (MOD BIPLANE): 18.7 ML/M2
LEFT INTERNAL DIMENSION IN SYSTOLE: 3.1 CM (ref 2.1–4)
LEFT VENTRICULAR INTERNAL DIMENSION IN DIASTOLE: 4.4 CM (ref 3.5–6)
LEFT VENTRICULAR POSTERIOR WALL IN END DIASTOLE: 1 CM
LEFT VENTRICULAR STROKE VOLUME: 50 ML
LVSV (TEICH): 50 ML
MV E'TISSUE VEL-LAT: 14 CM/S
MV E'TISSUE VEL-SEP: 11 CM/S
MV PEAK A VEL: 0.7 M/S
MV PEAK E VEL: 71 CM/S
MV STENOSIS PRESSURE HALF TIME: 46 MS
MV VALVE AREA P 1/2 METHOD: 4.8
RIGHT ATRIUM AREA SYSTOLE A4C: 10.7 CM2
RIGHT VENTRICLE ID DIMENSION: 2.6 CM
SL CV LEFT ATRIUM LENGTH A2C: 5 CM
SL CV LV EF: 71
SL CV PED ECHO LEFT VENTRICLE DIASTOLIC VOLUME (MOD BIPLANE) 2D: 89 ML
SL CV PED ECHO LEFT VENTRICLE SYSTOLIC VOLUME (MOD BIPLANE) 2D: 39 ML
TRICUSPID ANNULAR PLANE SYSTOLIC EXCURSION: 2.4 CM

## 2024-07-19 PROCEDURE — 93356 MYOCRD STRAIN IMG SPCKL TRCK: CPT | Performed by: INTERNAL MEDICINE

## 2024-07-19 PROCEDURE — 93306 TTE W/DOPPLER COMPLETE: CPT | Performed by: INTERNAL MEDICINE

## 2024-07-19 PROCEDURE — 93356 MYOCRD STRAIN IMG SPCKL TRCK: CPT

## 2024-07-19 PROCEDURE — 93306 TTE W/DOPPLER COMPLETE: CPT

## 2024-07-19 NOTE — TELEPHONE ENCOUNTER
SPOKE WITH PATIENT REGARDING NEEDING TO R/S PT APPT FROM 8/7 TO 8/14 PT VERBALIZED UNDERSTANDING.

## 2024-07-29 RX ORDER — DEXTROSE MONOHYDRATE 50 MG/ML
20 INJECTION, SOLUTION INTRAVENOUS ONCE
Status: CANCELLED | OUTPATIENT
Start: 2024-08-02

## 2024-08-01 ENCOUNTER — APPOINTMENT (OUTPATIENT)
Dept: LAB | Facility: HOSPITAL | Age: 59
End: 2024-08-01
Payer: COMMERCIAL

## 2024-08-01 DIAGNOSIS — D70.1 CHEMOTHERAPY INDUCED NEUTROPENIA (HCC): ICD-10-CM

## 2024-08-01 DIAGNOSIS — C78.7 MALIGNANT NEOPLASM METASTATIC TO LIVER (HCC): ICD-10-CM

## 2024-08-01 DIAGNOSIS — Z90.722 HISTORY OF BILATERAL SALPINGO-OOPHORECTOMY (BSO): ICD-10-CM

## 2024-08-01 DIAGNOSIS — Z17.0 MALIGNANT NEOPLASM OF UPPER-INNER QUADRANT OF LEFT BREAST IN FEMALE, ESTROGEN RECEPTOR POSITIVE (HCC): ICD-10-CM

## 2024-08-01 DIAGNOSIS — T45.1X5A CHEMOTHERAPY INDUCED NEUTROPENIA (HCC): ICD-10-CM

## 2024-08-01 DIAGNOSIS — C50.212 MALIGNANT NEOPLASM OF UPPER-INNER QUADRANT OF LEFT BREAST IN FEMALE, ESTROGEN RECEPTOR POSITIVE (HCC): ICD-10-CM

## 2024-08-01 DIAGNOSIS — C79.51 MALIGNANT NEOPLASM METASTATIC TO BONE (HCC): ICD-10-CM

## 2024-08-01 DIAGNOSIS — Z90.79 HISTORY OF BILATERAL SALPINGO-OOPHORECTOMY (BSO): ICD-10-CM

## 2024-08-02 ENCOUNTER — HOSPITAL ENCOUNTER (OUTPATIENT)
Dept: INFUSION CENTER | Facility: CLINIC | Age: 59
End: 2024-08-02
Payer: COMMERCIAL

## 2024-08-02 VITALS
SYSTOLIC BLOOD PRESSURE: 133 MMHG | WEIGHT: 207.23 LBS | DIASTOLIC BLOOD PRESSURE: 85 MMHG | TEMPERATURE: 97.3 F | HEART RATE: 67 BPM | BODY MASS INDEX: 35.57 KG/M2 | RESPIRATION RATE: 18 BRPM

## 2024-08-02 DIAGNOSIS — C79.51 MALIGNANT NEOPLASM METASTATIC TO BONE (HCC): ICD-10-CM

## 2024-08-02 DIAGNOSIS — D70.1 CHEMOTHERAPY INDUCED NEUTROPENIA (HCC): Primary | ICD-10-CM

## 2024-08-02 DIAGNOSIS — Z90.722 HISTORY OF BILATERAL SALPINGO-OOPHORECTOMY (BSO): ICD-10-CM

## 2024-08-02 DIAGNOSIS — T45.1X5A CHEMOTHERAPY INDUCED NEUTROPENIA (HCC): Primary | ICD-10-CM

## 2024-08-02 DIAGNOSIS — Z17.0 MALIGNANT NEOPLASM OF UPPER-INNER QUADRANT OF LEFT BREAST IN FEMALE, ESTROGEN RECEPTOR POSITIVE (HCC): ICD-10-CM

## 2024-08-02 DIAGNOSIS — Z90.79 HISTORY OF BILATERAL SALPINGO-OOPHORECTOMY (BSO): ICD-10-CM

## 2024-08-02 DIAGNOSIS — C50.212 MALIGNANT NEOPLASM OF UPPER-INNER QUADRANT OF LEFT BREAST IN FEMALE, ESTROGEN RECEPTOR POSITIVE (HCC): ICD-10-CM

## 2024-08-02 DIAGNOSIS — C78.7 MALIGNANT NEOPLASM METASTATIC TO LIVER (HCC): ICD-10-CM

## 2024-08-02 PROCEDURE — 96413 CHEMO IV INFUSION 1 HR: CPT

## 2024-08-02 PROCEDURE — 96367 TX/PROPH/DG ADDL SEQ IV INF: CPT

## 2024-08-02 RX ORDER — DEXTROSE MONOHYDRATE 50 MG/ML
20 INJECTION, SOLUTION INTRAVENOUS ONCE
Status: COMPLETED | OUTPATIENT
Start: 2024-08-02 | End: 2024-08-02

## 2024-08-02 RX ADMIN — DEXAMETHASONE SODIUM PHOSPHATE: 100 INJECTION INTRAMUSCULAR; INTRAVENOUS at 14:28

## 2024-08-02 RX ADMIN — FAM-TRASTUZUMAB DERUXTECAN-NXKI 400 MG: 100 INJECTION, POWDER, LYOPHILIZED, FOR SOLUTION INTRAVENOUS at 15:00

## 2024-08-02 RX ADMIN — DEXTROSE 20 ML/HR: 5 SOLUTION INTRAVENOUS at 14:26

## 2024-08-02 NOTE — PROGRESS NOTES
Pt offers no new complaints today, tolerated enhertu without complications, confirmed appt back 8-30-24 2:00, AVS declined.

## 2024-08-21 ENCOUNTER — OFFICE VISIT (OUTPATIENT)
Dept: HEMATOLOGY ONCOLOGY | Facility: CLINIC | Age: 59
End: 2024-08-21
Payer: COMMERCIAL

## 2024-08-21 VITALS
OXYGEN SATURATION: 96 % | BODY MASS INDEX: 35.68 KG/M2 | HEART RATE: 71 BPM | DIASTOLIC BLOOD PRESSURE: 60 MMHG | SYSTOLIC BLOOD PRESSURE: 132 MMHG | HEIGHT: 64 IN | RESPIRATION RATE: 16 BRPM | TEMPERATURE: 96.4 F | WEIGHT: 209 LBS

## 2024-08-21 DIAGNOSIS — I42.7 CARDIOMYOPATHY DUE TO CHEMOTHERAPY (HCC): ICD-10-CM

## 2024-08-21 DIAGNOSIS — D70.1 CHEMOTHERAPY INDUCED NEUTROPENIA (HCC): ICD-10-CM

## 2024-08-21 DIAGNOSIS — C78.7 MALIGNANT NEOPLASM METASTATIC TO LIVER (HCC): Primary | ICD-10-CM

## 2024-08-21 DIAGNOSIS — T45.1X5A CHEMOTHERAPY INDUCED NEUTROPENIA (HCC): ICD-10-CM

## 2024-08-21 DIAGNOSIS — Z15.09 BRCA1 GENE MUTATION POSITIVE IN FEMALE: ICD-10-CM

## 2024-08-21 DIAGNOSIS — T45.1X5A CARDIOMYOPATHY DUE TO CHEMOTHERAPY (HCC): ICD-10-CM

## 2024-08-21 DIAGNOSIS — Z14.8 CARRIER OF GENE MUTATION FOR HIGH RISK OF CANCER: ICD-10-CM

## 2024-08-21 DIAGNOSIS — C79.51 MALIGNANT NEOPLASM METASTATIC TO BONE (HCC): ICD-10-CM

## 2024-08-21 DIAGNOSIS — Z15.02 BRCA1 GENE MUTATION POSITIVE IN FEMALE: ICD-10-CM

## 2024-08-21 DIAGNOSIS — Z90.722 HISTORY OF BILATERAL SALPINGO-OOPHORECTOMY (BSO): ICD-10-CM

## 2024-08-21 DIAGNOSIS — R11.0 CHEMOTHERAPY-INDUCED NAUSEA: ICD-10-CM

## 2024-08-21 DIAGNOSIS — Z90.79 HISTORY OF BILATERAL SALPINGO-OOPHORECTOMY (BSO): ICD-10-CM

## 2024-08-21 DIAGNOSIS — C50.212 MALIGNANT NEOPLASM OF UPPER-INNER QUADRANT OF LEFT BREAST IN FEMALE, ESTROGEN RECEPTOR POSITIVE (HCC): ICD-10-CM

## 2024-08-21 DIAGNOSIS — Z17.0 MALIGNANT NEOPLASM OF UPPER-INNER QUADRANT OF LEFT BREAST IN FEMALE, ESTROGEN RECEPTOR POSITIVE (HCC): ICD-10-CM

## 2024-08-21 DIAGNOSIS — Z15.01 BRCA1 GENE MUTATION POSITIVE IN FEMALE: ICD-10-CM

## 2024-08-21 DIAGNOSIS — T45.1X5A CHEMOTHERAPY-INDUCED NAUSEA: ICD-10-CM

## 2024-08-21 PROCEDURE — 99214 OFFICE O/P EST MOD 30 MIN: CPT | Performed by: INTERNAL MEDICINE

## 2024-08-21 NOTE — PROGRESS NOTES
HPI: This is oncological continuation of care for triple positive stage IV breast cancer with metastatic disease to liver and bones and presently patient is on Enhertu and letrozole.  She had Xgeva previously.  She had radiation to left hip for palliation.  De brandon stage IV triple positive left breast cancer with metastatic disease to liver and bones was diagnosed in 2016.   Patient was started on THP,  Taxotere plus Herceptin and Perjeta and after 6 cycles Taxotere was discontinued and she was continued on Herceptin and Perjeta and to that tamoxifen was added.  Patient  was premenopausal at that time.  Later tamoxifen was changed to letrozole after she had  BSO.  She had Xgeva. She  had radiation to left hip for palliation.  She continued to respond and in September 2016 patient had lumpectomy of left breast followed by radiation therapy.  At the time of lumpectomy there was a small residual disease, 1.4 cm.  Lymph nodes were not sampled.   Patient tested positive for BRCA 1. She already had BSO.  She has been getting imaging studies for early detection of other cancers like peritoneal and pancreatic cancers.  She has been encouraged to see a dermatologist but she does not want to.  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   She has been taking vitamin-D and calcium and is staying active.  She is not on Xgeva anymore that she had for 2 years.   She has been tolerating treatments without much problem.  She goes for blood tests and echocardiogram on regular basis.    She has done well all these years and  had very good response but then in 2022 there was a new lesion in the liver on CT scan and MRI scan and on biopsy that proved to be metastatic cancer from breast, strongly positive for ER, negative for AR and 2+ for HER 2 that was negative by FISH (low positive HER2).  Patient saw radiation oncologist for local radiation to liver lesion and discussed risks and benefits and decided  to be observed. Subsequent CT scan showed slight decrease in size of liver lesion from 2.7 to 2.4.  She preferred to stay on letrozole, Herceptin and Perjeta.  We did discuss ENHERTU.  Most recent CT scan  showed increase in the size of liver lesion.  She was referred to back to radiation for liver directed therapy.  SBRT was considered but was not given.     Enhertu was started on 1/30/2024 and dose was 5.4 mg/kg equal 500 mg.  She tolerated that poorly. She had GI symptoms, increased tiredness and blurred vision.  Symptoms improved after 10 days.  She missed treatment in February 2024.  Next treatment was on 3/15/2024 she tolerated that better but she was upset because she had lost hair.  She was thinking of changing treatment for that reason.  She agreed to have lower dose and she is getting 80% of the dose, 400 mg.  She gets sick the next day and has vomitings.  After that she started to feel better.  She has received a total of 8 treatments.  She missed 1 treatment again when she went to [place].      No pulmonary symptoms.  Prior to Enhertu she was on  Herceptin plus Perjeta and letrozole.  She will stay on letrozole.          Patient knows that she is at high risk for breast and other cancers like  ovarian cancer, pancreatic cancer, peritoneal cancer, melanoma and other cancers.  Patient goes to breast surgeon for evaluation and imaging studies.  She gets CT scan and that also checks the pancreas and peritoneum.  She had BSO.  She does not want to go to a dermatologist to be checked for melanoma or to ophthalmologist to be checked for melanoma in the eye.  She goes to her optometrist.  Also she does not want to have colonoscopy and not even Cologuard or stool test for blood.      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       She continues to take  calcium and vitamin-D  .                           Current Outpatient Medications:   •  letrozole (FEMARA) 2.5 mg tablet, Take 1 tablet (2.5 mg total) by mouth daily, Disp: 90 tablet, Rfl: 2  •  Multiple Vitamins-Minerals (Theragran-M Premier 50 Plus) TABS, as directed, Disp: , Rfl:   •  multivitamin (THERAGRAN) TABS, Take 1 tablet by mouth 3 (three) times a week , Disp: , Rfl:   •  ondansetron (ZOFRAN) 4 mg tablet, Take 1 tablet (4 mg total) by mouth every 8 (eight) hours as needed for nausea or vomiting, Disp: 30 tablet, Rfl: 1  •  pertuzumab (PERJETA) 420 mg/14 mL SOLN, Infuse into a venous catheter every 21 days At infusion  center, Disp: , Rfl:   •  Trastuzumab (HERCEPTIN IV), Infuse into a venous catheter every 21 days At infusion center, Disp: , Rfl:   •  VITAMIN D, CHOLECALCIFEROL, PO, Take 1,000 Units by mouth 3 (three) times a week , Disp: , Rfl:     No Known Allergies    Oncology History   Malignant neoplasm of upper-inner quadrant of left female breast (HCC)   12/2015 Initial Diagnosis    Malignant neoplasm of upper-outer quadrant of left female breast (HCC)     2016 -  Hormone Therapy    Tamoxifen  Ended 8/2019.    Dr Benavidez  Letrozole 9/2019.     1/27/2016 Surgery    Port placement     2/19/2016 - 6/2/2018 Chemotherapy    Taxotere,  herceptin, perjeta, xgeva.   After six cycles, taxotere was discontinued and tamoxifen added.   Continues with Perjeta and Herceptin every 3 weeks     - Dr Benavidze       9/16/2016 Surgery    Left breast partial mastectomy     11/7/2016 - 12/23/2016 Radiation    Plan ID Energy Fractions Dose per Fraction (cGy) Total Dose Delivered (cGy) Elapsed Days   Lt Breast 6X 25 / 25 200 5,000 36   Lt Brst Boost 6X 8 / 8 200 1,600 9   Lt Femur 10X 10 / 10 300 3,000 11   Lt PAB 6X 25 / 25 60 1,500 36   Lt Sclav 6X 25 / 25 200 5,000 36                                   Total dose to left breast  lumpectomy cavity: 6600 cGy                   Total dose to the supraclavicular and axillary regions: 5000 cGy       4/17/2019 - 1/12/2024 Chemotherapy    pertuzumab (PERJETA) IVPB, 840 mg, Intravenous, Once, 81 of 82 cycles  Administration: 420 mg (5/17/2019), 420 mg (6/7/2019), 420 mg (6/28/2019), 420 mg (7/19/2019), 420 mg (8/30/2019), 420 mg (9/20/2019), 420 mg (8/9/2019), 420 mg (10/9/2019), 420 mg (11/1/2019), 420 mg (11/22/2019), 420 mg (12/13/2019), 420 mg (1/3/2020), 420 mg (1/24/2020), 420 mg (2/14/2020), 420 mg (3/6/2020), 420 mg (3/27/2020), 420 mg (4/17/2020), 420 mg (5/8/2020), 420 mg (5/29/2020), 420 mg (6/19/2020), 420 mg (7/10/2020), 420 mg (7/31/2020), 420 mg (8/21/2020), 420 mg (9/11/2020), 420 mg (10/2/2020), 420 mg (10/23/2020), 420 mg (11/13/2020), 420 mg (12/4/2020), 420 mg (12/24/2020), 420 mg (1/15/2021), 420 mg (2/5/2021), 420 mg (2/26/2021), 420 mg (3/19/2021), 420 mg (4/9/2021), 420 mg (4/30/2021), 420 mg (5/21/2021), 420 mg (6/11/2021), 420 mg (7/2/2021), 420 mg (7/23/2021), 420 mg (8/13/2021), 420 mg (9/3/2021), 420 mg (9/24/2021), 420 mg (10/15/2021), 420 mg (11/5/2021), 420 mg (11/26/2021), 420 mg (12/17/2021), 420 mg (1/7/2022), 420 mg (1/28/2022), 420 mg (2/18/2022), 420 mg (3/11/2022), 420 mg (4/1/2022), 420 mg (5/13/2022), 420 mg (6/3/2022), 420 mg (6/28/2022), 420 mg (7/22/2022), 420 mg (9/2/2022), 420 mg (9/23/2022), 420 mg (8/12/2022), 420 mg (10/14/2022), 420 mg (11/4/2022), 420 mg (11/25/2022), 420 mg (12/15/2022), 420 mg (1/6/2023), 420 mg (1/27/2023), 420 mg (2/17/2023), 420 mg (3/10/2023), 420 mg (3/31/2023), 420 mg (4/21/2023), 420 mg (5/19/2023), 420 mg (6/13/2023), 420 mg (6/30/2023), 420 mg (7/21/2023), 420 mg (8/11/2023), 420 mg (9/1/2023), 420 mg (9/22/2023), 420 mg (10/13/2023), 420 mg (11/3/2023), 420 mg (11/24/2023), 420 mg (12/22/2023), 420 mg (1/12/2024)  trastuzumab (HERCEPTIN) chemo infusion, 6 mg/kg = 589 mg (75 % of original dose 8 mg/kg), Intravenous, Once,  81 of 82 cycles  Dose modification: 6 mg/kg (original dose 8 mg/kg, Cycle 1, Reason: Other (Must fill in a comment))  Administration: 589 mg (5/17/2019), 589 mg (6/7/2019), 600 mg (6/28/2019), 600 mg (7/19/2019), 600 mg (8/30/2019), 600 mg (9/20/2019), 600 mg (8/9/2019), 600 mg (10/9/2019), 600 mg (11/1/2019), 600 mg (11/22/2019), 600 mg (12/13/2019), 600 mg (1/3/2020), 600 mg (1/24/2020), 600 mg (2/14/2020), 600 mg (3/6/2020), 600 mg (3/27/2020), 600 mg (4/17/2020), 600 mg (5/8/2020), 600 mg (5/29/2020), 600 mg (6/19/2020), 600 mg (7/10/2020), 600 mg (7/31/2020), 600 mg (8/21/2020), 600 mg (9/11/2020), 600 mg (10/2/2020), 600 mg (10/23/2020), 600 mg (11/13/2020), 600 mg (12/4/2020), 600 mg (12/24/2020), 600 mg (1/15/2021), 600 mg (2/5/2021), 600 mg (2/26/2021), 600 mg (3/19/2021), 600 mg (4/9/2021), 600 mg (4/30/2021), 600 mg (5/21/2021), 600 mg (6/11/2021), 600 mg (7/2/2021), 600 mg (7/23/2021), 600 mg (8/13/2021), 600 mg (9/3/2021), 600 mg (9/24/2021), 600 mg (10/15/2021), 600 mg (11/5/2021), 600 mg (11/26/2021), 600 mg (12/17/2021), 600 mg (1/7/2022), 600 mg (1/28/2022), 600 mg (2/18/2022), 600 mg (3/11/2022), 600 mg (4/1/2022), 600 mg (5/13/2022), 600 mg (6/3/2022), 600 mg (6/28/2022), 600 mg (7/22/2022), 600 mg (9/2/2022), 600 mg (9/23/2022), 600 mg (8/12/2022), 600 mg (10/14/2022), 600 mg (11/4/2022), 600 mg (11/25/2022), 600 mg (12/15/2022), 600 mg (1/6/2023), 600 mg (1/27/2023), 600 mg (2/17/2023), 600 mg (3/10/2023), 600 mg (3/31/2023), 600 mg (4/21/2023), 600 mg (5/19/2023), 600 mg (6/13/2023), 600 mg (6/30/2023), 600 mg (7/21/2023), 600 mg (8/11/2023), 600 mg (9/1/2023), 600 mg (9/22/2023), 600 mg (10/13/2023), 600 mg (11/3/2023), 600 mg (11/24/2023), 600 mg (12/22/2023), 600 mg (1/12/2024)     8/16/2019 Surgery    she underwent BSO.     7/5/2022 -  Cancer Staged    Staging form: Breast, AJCC 8th Edition  - Clinical: Stage IV (rcTX, cNX, pM1) - Signed by Dejuan Hernandez MD on 7/5/2022  Stage  prefix: Recurrence       2/2/2024 -  Chemotherapy    alteplase (CATHFLO), 2 mg, Intracatheter, Every 1 Minute as needed, 7 of 10 cycles  fam-trastuzumab deruxtecan-nxki (ENHERTU) IVPB, 520 mg, Intravenous, Once, 7 of 10 cycles  Dose modification: 4.4 mg/kg (original dose 5.4 mg/kg, Cycle 3, Reason: Dose modified as per discussion with consulting physician)  Administration: 500 mg (2/2/2024), 400 mg (3/15/2024), 400 mg (4/5/2024), 400 mg (5/17/2024), 400 mg (6/14/2024), 400 mg (7/5/2024), 400 mg (8/2/2024)     Malignant neoplasm metastatic to liver (HCC)   4/27/2018 Initial Diagnosis    Liver metastases (HCC)     4/17/2019 - 1/12/2024 Chemotherapy    pertuzumab (PERJETA) IVPB, 840 mg, Intravenous, Once, 81 of 82 cycles  Administration: 420 mg (5/17/2019), 420 mg (6/7/2019), 420 mg (6/28/2019), 420 mg (7/19/2019), 420 mg (8/30/2019), 420 mg (9/20/2019), 420 mg (8/9/2019), 420 mg (10/9/2019), 420 mg (11/1/2019), 420 mg (11/22/2019), 420 mg (12/13/2019), 420 mg (1/3/2020), 420 mg (1/24/2020), 420 mg (2/14/2020), 420 mg (3/6/2020), 420 mg (3/27/2020), 420 mg (4/17/2020), 420 mg (5/8/2020), 420 mg (5/29/2020), 420 mg (6/19/2020), 420 mg (7/10/2020), 420 mg (7/31/2020), 420 mg (8/21/2020), 420 mg (9/11/2020), 420 mg (10/2/2020), 420 mg (10/23/2020), 420 mg (11/13/2020), 420 mg (12/4/2020), 420 mg (12/24/2020), 420 mg (1/15/2021), 420 mg (2/5/2021), 420 mg (2/26/2021), 420 mg (3/19/2021), 420 mg (4/9/2021), 420 mg (4/30/2021), 420 mg (5/21/2021), 420 mg (6/11/2021), 420 mg (7/2/2021), 420 mg (7/23/2021), 420 mg (8/13/2021), 420 mg (9/3/2021), 420 mg (9/24/2021), 420 mg (10/15/2021), 420 mg (11/5/2021), 420 mg (11/26/2021), 420 mg (12/17/2021), 420 mg (1/7/2022), 420 mg (1/28/2022), 420 mg (2/18/2022), 420 mg (3/11/2022), 420 mg (4/1/2022), 420 mg (5/13/2022), 420 mg (6/3/2022), 420 mg (6/28/2022), 420 mg (7/22/2022), 420 mg (9/2/2022), 420 mg (9/23/2022), 420 mg (8/12/2022), 420 mg (10/14/2022), 420 mg (11/4/2022), 420 mg  (11/25/2022), 420 mg (12/15/2022), 420 mg (1/6/2023), 420 mg (1/27/2023), 420 mg (2/17/2023), 420 mg (3/10/2023), 420 mg (3/31/2023), 420 mg (4/21/2023), 420 mg (5/19/2023), 420 mg (6/13/2023), 420 mg (6/30/2023), 420 mg (7/21/2023), 420 mg (8/11/2023), 420 mg (9/1/2023), 420 mg (9/22/2023), 420 mg (10/13/2023), 420 mg (11/3/2023), 420 mg (11/24/2023), 420 mg (12/22/2023), 420 mg (1/12/2024)  trastuzumab (HERCEPTIN) chemo infusion, 6 mg/kg = 589 mg (75 % of original dose 8 mg/kg), Intravenous, Once, 81 of 82 cycles  Dose modification: 6 mg/kg (original dose 8 mg/kg, Cycle 1, Reason: Other (Must fill in a comment))  Administration: 589 mg (5/17/2019), 589 mg (6/7/2019), 600 mg (6/28/2019), 600 mg (7/19/2019), 600 mg (8/30/2019), 600 mg (9/20/2019), 600 mg (8/9/2019), 600 mg (10/9/2019), 600 mg (11/1/2019), 600 mg (11/22/2019), 600 mg (12/13/2019), 600 mg (1/3/2020), 600 mg (1/24/2020), 600 mg (2/14/2020), 600 mg (3/6/2020), 600 mg (3/27/2020), 600 mg (4/17/2020), 600 mg (5/8/2020), 600 mg (5/29/2020), 600 mg (6/19/2020), 600 mg (7/10/2020), 600 mg (7/31/2020), 600 mg (8/21/2020), 600 mg (9/11/2020), 600 mg (10/2/2020), 600 mg (10/23/2020), 600 mg (11/13/2020), 600 mg (12/4/2020), 600 mg (12/24/2020), 600 mg (1/15/2021), 600 mg (2/5/2021), 600 mg (2/26/2021), 600 mg (3/19/2021), 600 mg (4/9/2021), 600 mg (4/30/2021), 600 mg (5/21/2021), 600 mg (6/11/2021), 600 mg (7/2/2021), 600 mg (7/23/2021), 600 mg (8/13/2021), 600 mg (9/3/2021), 600 mg (9/24/2021), 600 mg (10/15/2021), 600 mg (11/5/2021), 600 mg (11/26/2021), 600 mg (12/17/2021), 600 mg (1/7/2022), 600 mg (1/28/2022), 600 mg (2/18/2022), 600 mg (3/11/2022), 600 mg (4/1/2022), 600 mg (5/13/2022), 600 mg (6/3/2022), 600 mg (6/28/2022), 600 mg (7/22/2022), 600 mg (9/2/2022), 600 mg (9/23/2022), 600 mg (8/12/2022), 600 mg (10/14/2022), 600 mg (11/4/2022), 600 mg (11/25/2022), 600 mg (12/15/2022), 600 mg (1/6/2023), 600 mg (1/27/2023), 600 mg (2/17/2023), 600 mg  (3/10/2023), 600 mg (3/31/2023), 600 mg (4/21/2023), 600 mg (5/19/2023), 600 mg (6/13/2023), 600 mg (6/30/2023), 600 mg (7/21/2023), 600 mg (8/11/2023), 600 mg (9/1/2023), 600 mg (9/22/2023), 600 mg (10/13/2023), 600 mg (11/3/2023), 600 mg (11/24/2023), 600 mg (12/22/2023), 600 mg (1/12/2024)     6/24/2022 Biopsy    Final Diagnosis   A.  Liver, core biopsies:     - Metastatic carcinoma compatible with a breast primary.      2/2/2024 -  Chemotherapy    alteplase (CATHFLO), 2 mg, Intracatheter, Every 1 Minute as needed, 7 of 10 cycles  fam-trastuzumab deruxtecan-nxki (ENHERTU) IVPB, 520 mg, Intravenous, Once, 7 of 10 cycles  Dose modification: 4.4 mg/kg (original dose 5.4 mg/kg, Cycle 3, Reason: Dose modified as per discussion with consulting physician)  Administration: 500 mg (2/2/2024), 400 mg (3/15/2024), 400 mg (4/5/2024), 400 mg (5/17/2024), 400 mg (6/14/2024), 400 mg (7/5/2024), 400 mg (8/2/2024)     Malignant neoplasm metastatic to bone (HCC)   4/27/2018 Initial Diagnosis    Bone metastasis (HCC)     4/17/2019 - 1/12/2024 Chemotherapy    pertuzumab (PERJETA) IVPB, 840 mg, Intravenous, Once, 81 of 82 cycles  Administration: 420 mg (5/17/2019), 420 mg (6/7/2019), 420 mg (6/28/2019), 420 mg (7/19/2019), 420 mg (8/30/2019), 420 mg (9/20/2019), 420 mg (8/9/2019), 420 mg (10/9/2019), 420 mg (11/1/2019), 420 mg (11/22/2019), 420 mg (12/13/2019), 420 mg (1/3/2020), 420 mg (1/24/2020), 420 mg (2/14/2020), 420 mg (3/6/2020), 420 mg (3/27/2020), 420 mg (4/17/2020), 420 mg (5/8/2020), 420 mg (5/29/2020), 420 mg (6/19/2020), 420 mg (7/10/2020), 420 mg (7/31/2020), 420 mg (8/21/2020), 420 mg (9/11/2020), 420 mg (10/2/2020), 420 mg (10/23/2020), 420 mg (11/13/2020), 420 mg (12/4/2020), 420 mg (12/24/2020), 420 mg (1/15/2021), 420 mg (2/5/2021), 420 mg (2/26/2021), 420 mg (3/19/2021), 420 mg (4/9/2021), 420 mg (4/30/2021), 420 mg (5/21/2021), 420 mg (6/11/2021), 420 mg (7/2/2021), 420 mg (7/23/2021), 420 mg (8/13/2021), 420 mg  (9/3/2021), 420 mg (9/24/2021), 420 mg (10/15/2021), 420 mg (11/5/2021), 420 mg (11/26/2021), 420 mg (12/17/2021), 420 mg (1/7/2022), 420 mg (1/28/2022), 420 mg (2/18/2022), 420 mg (3/11/2022), 420 mg (4/1/2022), 420 mg (5/13/2022), 420 mg (6/3/2022), 420 mg (6/28/2022), 420 mg (7/22/2022), 420 mg (9/2/2022), 420 mg (9/23/2022), 420 mg (8/12/2022), 420 mg (10/14/2022), 420 mg (11/4/2022), 420 mg (11/25/2022), 420 mg (12/15/2022), 420 mg (1/6/2023), 420 mg (1/27/2023), 420 mg (2/17/2023), 420 mg (3/10/2023), 420 mg (3/31/2023), 420 mg (4/21/2023), 420 mg (5/19/2023), 420 mg (6/13/2023), 420 mg (6/30/2023), 420 mg (7/21/2023), 420 mg (8/11/2023), 420 mg (9/1/2023), 420 mg (9/22/2023), 420 mg (10/13/2023), 420 mg (11/3/2023), 420 mg (11/24/2023), 420 mg (12/22/2023), 420 mg (1/12/2024)  trastuzumab (HERCEPTIN) chemo infusion, 6 mg/kg = 589 mg (75 % of original dose 8 mg/kg), Intravenous, Once, 81 of 82 cycles  Dose modification: 6 mg/kg (original dose 8 mg/kg, Cycle 1, Reason: Other (Must fill in a comment))  Administration: 589 mg (5/17/2019), 589 mg (6/7/2019), 600 mg (6/28/2019), 600 mg (7/19/2019), 600 mg (8/30/2019), 600 mg (9/20/2019), 600 mg (8/9/2019), 600 mg (10/9/2019), 600 mg (11/1/2019), 600 mg (11/22/2019), 600 mg (12/13/2019), 600 mg (1/3/2020), 600 mg (1/24/2020), 600 mg (2/14/2020), 600 mg (3/6/2020), 600 mg (3/27/2020), 600 mg (4/17/2020), 600 mg (5/8/2020), 600 mg (5/29/2020), 600 mg (6/19/2020), 600 mg (7/10/2020), 600 mg (7/31/2020), 600 mg (8/21/2020), 600 mg (9/11/2020), 600 mg (10/2/2020), 600 mg (10/23/2020), 600 mg (11/13/2020), 600 mg (12/4/2020), 600 mg (12/24/2020), 600 mg (1/15/2021), 600 mg (2/5/2021), 600 mg (2/26/2021), 600 mg (3/19/2021), 600 mg (4/9/2021), 600 mg (4/30/2021), 600 mg (5/21/2021), 600 mg (6/11/2021), 600 mg (7/2/2021), 600 mg (7/23/2021), 600 mg (8/13/2021), 600 mg (9/3/2021), 600 mg (9/24/2021), 600 mg (10/15/2021), 600 mg (11/5/2021), 600 mg (11/26/2021), 600 mg  "(12/17/2021), 600 mg (1/7/2022), 600 mg (1/28/2022), 600 mg (2/18/2022), 600 mg (3/11/2022), 600 mg (4/1/2022), 600 mg (5/13/2022), 600 mg (6/3/2022), 600 mg (6/28/2022), 600 mg (7/22/2022), 600 mg (9/2/2022), 600 mg (9/23/2022), 600 mg (8/12/2022), 600 mg (10/14/2022), 600 mg (11/4/2022), 600 mg (11/25/2022), 600 mg (12/15/2022), 600 mg (1/6/2023), 600 mg (1/27/2023), 600 mg (2/17/2023), 600 mg (3/10/2023), 600 mg (3/31/2023), 600 mg (4/21/2023), 600 mg (5/19/2023), 600 mg (6/13/2023), 600 mg (6/30/2023), 600 mg (7/21/2023), 600 mg (8/11/2023), 600 mg (9/1/2023), 600 mg (9/22/2023), 600 mg (10/13/2023), 600 mg (11/3/2023), 600 mg (11/24/2023), 600 mg (12/22/2023), 600 mg (1/12/2024)     6/24/2022 Biopsy    Final Diagnosis   A.  Liver, core biopsies:     - Metastatic carcinoma compatible with a breast primary.      2/2/2024 -  Chemotherapy    alteplase (CATHFLO), 2 mg, Intracatheter, Every 1 Minute as needed, 7 of 10 cycles  fam-trastuzumab deruxtecan-nxki (ENHERTU) IVPB, 520 mg, Intravenous, Once, 7 of 10 cycles  Dose modification: 4.4 mg/kg (original dose 5.4 mg/kg, Cycle 3, Reason: Dose modified as per discussion with consulting physician)  Administration: 500 mg (2/2/2024), 400 mg (3/15/2024), 400 mg (4/5/2024), 400 mg (5/17/2024), 400 mg (6/14/2024), 400 mg (7/5/2024), 400 mg (8/2/2024)         ROS:  08/24/24 Reviewed 12 systems: .  See symptoms in HPI  Presently no other neurological, cardiac, pulmonary, GI and  symptoms other than mentioned  in HPI..  Other symptoms are in HPI  No  fever, chills, bleeding, bone pains, skin rash and gait problem. No frequent infections.  Not unusually sensitive to heat or cold. No swelling of the ankles. No swollen glands.  Patient is anxious    /60 (BP Location: Left arm, Patient Position: Sitting, Cuff Size: Adult)   Pulse 71   Temp (!) 96.4 °F (35.8 °C) (Temporal)   Resp 16   Ht 5' 4\" (1.626 m)   Wt 94.8 kg (209 lb)   SpO2 96%   BMI 35.87 kg/m²     Physical " Exam:   Patient is alert and oriented.  Patient is not in distress.  Vital signs are above.  There is no icterus.  There is no oral thrush.  There is no palpable neck mass.  Lung fields are clear to percussion and auscultation.  Heart rate is regular.  There is no palpable abdominal mass.  Abdomen is soft and nontender.  There is no ascites.  There is no edema of the ankles.  There is no calf tenderness.  There is no  focal neurological deficit.  No skin rash.  Lymph nodes are not palpably enlarged in the neck and axillary areas.  There is no clubbing, Patient is  anxious.  Performance status 1. No lymphedema.   .   IMAGING:  IMPRESSION: 02/24/2021     1.  No scintigraphic evidence of osseous metastasis.           Workstation performed: [id number]      Imaging     Ejection fraction 65% on 08/06/2022      Study Result    Narrative & Impression   CENTRAL  DXA SCAN on 03/08/2022     CLINICAL HISTORY:   56 year old post-menopausal  female risk factors include osteoporosis screening.Additional medical history: Breast cancer with mets to Hip , right Hip scanned.     TECHNIQUE: Bone densitometry was performed using a H[employer] Horizon W bone densitometer.  Regions of interest appear properly placed. There are   other confounding variables which could limit the study.       Her elevated  BMI may influence the T score result.     Degenerative or osteoarthritic changes are noted on the spine image eliminating L4 from evaluation.     COMPARISON:  Follow-up     RESULTS:   LUMBAR SPINE:  L1-L3:  BMD 1.021 gm/cm2  T-score 0.0 and unchanged from 2019.  Z-score 1.0     RIGHT TOTAL HIP:  BMD 1.122 gm/cm2  T-score 1.5  Z-score 2.1     RIGHT FEMORAL NECK:  BMD 0.842 gm/cm2  T-score -0.1 and unchanged from 2019.  Z-score 0.9           IMPRESSION:  1. Based on the WHO classification, this study is normal and the patient is considered at low risk for fracture.       IMPRESSION:     Since August 2, 2022:  1.  Decreased size and  conspicuity of the biopsy-proven hepatic metastasis.    2.  No new sites of metastatic disease in the abdomen.        Workstation performed: [id number]          Imaging    CT abdomen w contrast (Order: [id number]) - 11/1/2022    IMPRESSION:     1. Enlargement of the known caudate intrahepatic mass with ill-defined margins estimated to measure 2.1 x 2.7 on today's examination.  2. No new intrahepatic mass is evident.     Study marked significant in epic for notification purposes.     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 Multiplanar attenuation corrected and non attenuation corrected PET images are available for interpretation, and contiguous, low dose, axial CT sections were obtained from the skull base through the femurs.    Intravenous contrast material was not utilized.  This examination, like all CT scans performed in the [facility], was performed utilizing techniques to minimize radiation dose exposure, including the use of iterative reconstruction and   automated exposure control.     Fasting serum glucose: 117 mg/dl     FINDINGS:     VISUALIZED BRAIN:  No acute abnormalities are seen.      HEAD/NECK:  There is a physiologic distribution of FDG. No FDG avid cervical adenopathy is seen.  CT images: Unremarkable.     CHEST:  No FDG avid soft tissue lesions are seen.  CT images: Unremarkable.     ABDOMEN:  Known caudate liver metastatic lesion demonstrates hypermetabolism, SUV 9.1. This measures approximately 3.8 x 3.3 cm in axial dimensions based on the PET images.     Benign left adrenal adenoma measuring 2.2 x 2.4 cm, stable. There is associated FDG activity, SUV 3.1, typically physiologic.     No additional FDG avid soft tissue lesions are seen.     CT images: Minimal mesenteric haziness is unchanged.  PELVIS:  No FDG avid soft tissue lesions are seen.  CT images: Stable. Lobulated uterus suggesting fibroids     OSSEOUS STRUCTURES:  Stable appearance of lytic lesion in the left femoral intertrochanteric region, without significant FDG uptake.     No FDG avid lesions are seen.  CT images: No significant findings.     IMPRESSION:  1. Hypermetabolic caudate liver metastasis. No additional hypermetabolic lesions in the liver or upper abdomen.  2. No hypermetabolic metastases in the neck, chest, pelvis or osseous structures.              Workstation performed: [id number]        Imaging    NM PET CT skull base to mid thigh 11/20/23  MPRESSION:      1. Interval decrease in size of caudate lobe lesion. No new hepatic lesions. No intra-abdominal lymphadenopathy.     2. Stable left adrenal adenoma.     3. Diffuse hepatic steatosis.        Electronically signed: 06/08/2024 12:42 PM Keith Mcnally MD  Imaging    CT abdomen w contrast (Order: [id number]) - 6/7/2024  LABS:            Results for orders placed or performed during the hospital encounter of 07/19/24   Echo complete w/ contrast if indicated   Result Value Ref Range    RAA A4C 10.7 cm2    LA Volume Index (BP) 18.7 mL/m2    MV Peak A David 0.7 m/s    MV stenosis pressure 1/2 time 46 ms    MV Peak E David 71 cm/s    E wave deceleration time 160 ms    E/A ratio  1.01     MV valve area p 1/2 method 4.80     RVID d 2.6 cm    A4C EF 71 %    Left ventricular stroke volume (2D) 50.00 mL    IVSd 1.10 cm    Tricuspid annular plane systolic excursion 2.40 cm    Ao root 3.20 cm    LVPWd 1.00 cm    LA size 3.3 cm    Asc Ao 3.4 cm    LA volume (BP) 37 mL    FS 30 28 - 44    LVIDS 3.10 cm    IVS 1.1 cm    LVIDd 4.40 cm    LA length (A2C) 5.00 cm    LEFT VENTRICLE SYSTOLIC VOLUME (MOD BIPLANE) 2D 39 mL    LV DIASTOLIC VOLUME (MOD BIPLANE) 2D 89 mL    Left Atrium Area-systolic Four Chamber 17 cm2    Left Atrium Area-systolic Apical Two Chamber 13.3 cm2    MV E' Tissue Velocity Lateral 14 cm/s    MV E' Tissue Velocity Septal 11 cm/s    LVSV, 2D 50 mL    BSA 1.98 m2    LV EF 71        CBC and differential  Status: Final result      Contains abnormal data CBC and differential  Order: [id number]   Status: Final result       Visible to patient: Yes (seen)       Next appt: 08/30/2024 at 02:00 PM in Infusion Therapy ([facility])       Dx: Malignant neoplasm of upper-inner helder...    0 Result Notes            Component  Ref Range & Units 8/1/24  6:11 PM 7/5/24  9:09 AM 6/14/24  8:19 AM 5/16/24  8:39 AM 4/3/24  1:55 PM 3/14/24  8:30 AM 2/22/24  8:20 AM   WBC  4.31 - 10.16 Thousand/uL 10.06 6.17 9.06 8.90 6.64 7.71 6.16   RBC  3.81 - 5.12 Million/uL 4.77 4.68 4.60 4.89 4.70 5.07 4.82   Hemoglobin  11.5 - 15.4 g/dL 13.5 12.9 12.9 13.9 12.7 13.6 12.9   Hematocrit  34.8 - 46.1 % 41.7 41.3 40.7 43.6 40.2 43.9 41.6   MCV  82 - 98 fL 87 88 89 89 86 87 86   MCH  26.8 - 34.3 pg 28.3 27.6 28.0 28.4 27.0 26.8 26.8   MCHC  31.4 - 37.4 g/dL 32.4 31.2 Low  31.7 31.9 31.6 31.0 Low  31.0 Low    RDW  11.6 - 15.1 % 15.3 High  15.2 High  15.1 15.9 High  15.9 High  15.9 High  14.9   MPV  8.9 - 12.7 fL 10.8 10.3 11.7 12.0 10.6 11.2 10.5   Platelets  149 - 390 Thousands/uL 414 High  488 High  324 291 487 High  276 605 High    nRBC  /100 WBCs 0 0 0 0 0 0 0   Segmented %  43 - 75 % 53 47 58 60 45 52 46   Immature  Grans %  0 - 2 % 1 1 1 0 1 0 2   Lymphocytes %  14 - 44 % 29 34 25 27 38 32 34   Monocytes %  4 - 12 % 9 10 8 8 11 7 12   Eosinophils Relative  0 - 6 % 7 High  7 High  7 High  4 4 8 High  5   Basophils Relative  0 - 1 % 1 1 1 1 1 1 1   Absolute Neutrophils  1.85 - 7.62 Thousands/µL 5.49 2.90 5.31 5.33 3.02 4.05 2.79   Absolute Immature Grans  0.00 - 0.20 Thousand/uL 0.07 0.04 0.06 0.03 0.03 0.02 0.10   Absolute Lymphocytes  0.60 - 4.47 Thousands/µL 2.87 2.12 2.30 2.43 2.54 2.44 2.11   Absolute Monocytes  0.17 - 1.22 Thousand/µL 0.90 0.60 0.75 0.69 0.76 0.55 0.76   Eosinophils Absolute  0.00 - 0.61 Thousand/µL 0.66 High  0.45 0.59 0.37 0.24 0.59 0.33   Basophils Absolute  0.00 - 0.10 Thousands/µL 0.07 0.06 0.05 0.05 0.05 0.06 0.07              Specimen Collected: 08/01/24  6:11 PM Last Resulted: 08/01/24  7:01 PM             Comprehensive metabolic panel  Status: Final result     Comprehensive metabolic panel  Order: [id number]   Status: Final result       Visible to patient: Yes (seen)       Next appt: 08/30/2024 at 02:00 PM in Infusion Therapy ([facility])       Dx: Malignant neoplasm of upper-inner helder...    0 Result Notes            Component  Ref Range & Units 8/1/24  6:11 PM 7/5/24  9:09 AM 6/14/24  8:19 AM 5/16/24  8:39 AM 4/3/24  1:55 PM 3/14/24  8:30 AM 2/22/24  8:20 AM   Sodium  135 - 147 mmol/L 138 141 140 138 140 140 141   Potassium  3.5 - 5.3 mmol/L 3.8 4.3 4.1 4.2 3.8 4.1 3.9   Chloride  96 - 108 mmol/L 104 109 High  110 High  105 108 107 109 High    CO2  21 - 32 mmol/L 27 28 27 29 27 28 28   ANION GAP  4 - 13 mmol/L 7 4 3 Low  4 5 5 4 R   BUN  5 - 25 mg/dL 19 18 15 22 13 17 17   Creatinine  0.60 - 1.30 mg/dL 1.00 0.86 CM 0.76 CM 0.89 CM 0.83 CM 0.80 CM 0.77 CM   Comment: Standardized to IDMS reference method   Glucose  65 - 140 mg/dL 107 136  High   CM 91  High   CM   Comment: If the patient is fasting, the ADA then defines impaired fasting glucose as > 100 mg/dL and  diabetes as > or equal to 123 mg/dL.   Calcium  8.4 - 10.2 mg/dL 9.6 9.2 9.1 9.5 9.1 9.2 9.1   AST  13 - 39 U/L 27 24 22 20 22 19 20   ALT  7 - 52 U/L 42 43 CM 32 CM 31 CM 39 CM 28 CM 39 CM   Comment: Specimen collection should occur prior to Sulfasalazine administration due to the potential for falsely depressed results.   Alkaline Phosphatase  34 - 104 U/L 87 90 89 83 80 74 82   Total Protein  6.4 - 8.4 g/dL 7.2 6.7 6.5 6.9 6.9 6.8 6.5   Albumin  3.5 - 5.0 g/dL 4.1 3.9 3.7 4.1 4.0 4.0 3.6   Total Bilirubin  0.20 - 1.00 mg/dL 0.40 0.33 CM 0.33 CM 0.52 CM 0.27 CM 0.47 CM 0.28 CM   Comment: Use of this assay is not recommended for patients undergoing treatment with eltrombopag due to the potential for falsely elevated results.  N-acetyl-p-benzoquinone imine (metabolite of Acetaminophen) will generate erroneously low results in samples for patients that have taken an overdose of Acetaminophen.   eGFR  ml/min/1.73sq m 63 75 87 72 79 82 86                       Labs, Imaging, & Other studies:   All pertinent labs and imaging studies were personally reviewed        Reviewed  test results and discussed with patient and explained.    Assessment and plan:    This is oncological continuation of care for triple positive stage IV breast cancer with metastatic disease to liver and bones and presently patient is on Enhertu and letrozole.  She had Xgeva previously.  She had radiation to left hip for palliation.  De brandon stage IV triple positive left breast cancer with metastatic disease to liver and bones was diagnosed in 2016.   Patient was started on THP,  Taxotere plus Herceptin and Perjeta and after 6 cycles Taxotere was discontinued and she was continued on Herceptin and Perjeta and to that tamoxifen was added.  Patient  was premenopausal at that time.  Later tamoxifen was changed to letrozole after she had  BSO.  She had Xgeva. She  had radiation to left hip for palliation.  She continued to respond and in September 2016  patient had lumpectomy of left breast followed by radiation therapy.  At the time of lumpectomy there was a small residual disease, 1.4 cm.  Lymph nodes were not sampled.   Patient tested positive for BRCA 1. She already had BSO.  She has been getting imaging studies for early detection of other cancers like peritoneal and pancreatic cancers.  She has been encouraged to see a dermatologist but she does not want to.  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   She has been taking vitamin-D and calcium and is staying active.  She is not on Xgeva anymore that she had for 2 years.   She has been tolerating treatments without much problem.  She goes for blood tests and echocardiogram on regular basis.    She has done well all these years and  had very good response but then in 2022 there was a new lesion in the liver on CT scan and MRI scan and on biopsy that proved to be metastatic cancer from breast, strongly positive for ER, negative for [place] and 2+ for HER 2 that was negative by FISH (low positive HER2).  Patient saw radiation oncologist for local radiation to liver lesion and discussed risks and benefits and decided to be observed. Subsequent CT scan showed slight decrease in size of liver lesion from 2.7 to 2.4.  She preferred to stay on letrozole, Herceptin and Perjeta.  We did discuss ENHERTU.  Most recent CT scan  showed increase in the size of liver lesion.  She was referred to back to radiation for liver directed therapy.  SBRT was considered but was not given.     Enhertu was started on 1/30/2024 and dose was 5.4 mg/kg equal 500 mg.  She tolerated that poorly. She had GI symptoms, increased tiredness and blurred vision.  Symptoms improved after 10 days.  She missed treatment in February 2024.  Next treatment was on 3/15/2024 she tolerated that better but she was upset because she had lost hair.  She was thinking of changing treatment for that reason.  She agreed to have lower dose  and she is getting 80% of the dose, 400 mg.  She gets sick the next day and has vomitings.  After that she started to feel better.  She has received a total of 8 treatments.  She missed 1 treatment again when she went to [place].      No pulmonary symptoms.  Prior to Enhertu she was on  Herceptin plus Perjeta and letrozole.  She will stay on letrozole.          Patient knows that she is at high risk for breast and other cancers like  ovarian cancer, pancreatic cancer, peritoneal cancer, melanoma and other cancers.  Patient goes to breast surgeon for evaluation and imaging studies.  She gets CT scan and that also checks the pancreas and peritoneum.  She had BSO.  She does not want to go to a dermatologist to be checked for melanoma or to ophthalmologist to be checked for melanoma in the eye.  She goes to her optometrist.  Also she does not want to have colonoscopy and not even Cologuard or stool test for blood.      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       She continues to take  calcium and vitamin-D  .           Physical examination and test results are as recorded and discussed.  Patient is responding to therapy.  No change in therapy.    She gets  echocardiogram on regular basis.  Condition discussed and explained.  Questions answered.  Also discussed the importance of self-breast examination, eating healthy foods, staying active and health screening tests.  Patient goes to her breast surgeon for examination and  imaging studies.  Patient is capable of self-care.  Goal is prolonged survival from stage IV breast cancer.  Patient has been aware of significance of positive BRCA1 mutation and cancer risks.  She has shared this information of positive BRCA1 with  her family members.  Patient will continue to follow with her primary physician and other consultants.  Discussed precautions against coronavirus and flu and other infections.  .  See diagnoses, orders and instructions below    1. Malignant neoplasm metastatic to liver (HCC)    - MRI abdomen w wo contrast; Future    2. Malignant neoplasm metastatic to bone (HCC)      3. Chemotherapy induced neutropenia (HCC)      4. History of bilateral salpingo-oophorectomy (BSO)      5. Malignant neoplasm of upper-inner quadrant of left breast in female, estrogen receptor positive (HCC)    - MRI abdomen w wo contrast; Future    6. BRCA1 gene mutation positive in female      7. 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      8. Chemotherapy-induced nausea      9. 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  Ordered echo cardiogram and MRI abdomen prior to next visit in 2 months.          I used dictation device to dictated this note and there could be mistakes in my note and for that she may contact my office    Patient voiced understanding and agreed       Counseling / Coordination of Care   .  Provided counseling and support

## 2024-08-22 ENCOUNTER — TELEPHONE (OUTPATIENT)
Dept: HEMATOLOGY ONCOLOGY | Facility: CLINIC | Age: 59
End: 2024-08-22

## 2024-08-22 NOTE — TELEPHONE ENCOUNTER
Left Voicemail w with 10/8 MRI, 10/18 Echo and 10/30 appt w Dr Benavidez, with times and prep. Left [facility] if she needs to reschedule

## 2024-08-23 RX ORDER — DEXTROSE MONOHYDRATE 50 MG/ML
20 INJECTION, SOLUTION INTRAVENOUS ONCE
Status: CANCELLED | OUTPATIENT
Start: 2024-08-30

## 2024-08-29 ENCOUNTER — APPOINTMENT (OUTPATIENT)
Dept: LAB | Facility: HOSPITAL | Age: 59
End: 2024-08-29
Payer: COMMERCIAL

## 2024-08-29 DIAGNOSIS — C50.212 MALIGNANT NEOPLASM OF UPPER-INNER QUADRANT OF LEFT BREAST IN FEMALE, ESTROGEN RECEPTOR POSITIVE (HCC): ICD-10-CM

## 2024-08-29 DIAGNOSIS — Z90.79 HISTORY OF BILATERAL SALPINGO-OOPHORECTOMY (BSO): ICD-10-CM

## 2024-08-29 DIAGNOSIS — Z90.722 HISTORY OF BILATERAL SALPINGO-OOPHORECTOMY (BSO): ICD-10-CM

## 2024-08-29 DIAGNOSIS — D70.1 CHEMOTHERAPY INDUCED NEUTROPENIA (HCC): ICD-10-CM

## 2024-08-29 DIAGNOSIS — Z17.0 MALIGNANT NEOPLASM OF UPPER-INNER QUADRANT OF LEFT BREAST IN FEMALE, ESTROGEN RECEPTOR POSITIVE (HCC): ICD-10-CM

## 2024-08-29 DIAGNOSIS — C79.51 MALIGNANT NEOPLASM METASTATIC TO BONE (HCC): ICD-10-CM

## 2024-08-29 DIAGNOSIS — T45.1X5A CHEMOTHERAPY INDUCED NEUTROPENIA (HCC): ICD-10-CM

## 2024-08-29 DIAGNOSIS — C78.7 MALIGNANT NEOPLASM METASTATIC TO LIVER (HCC): ICD-10-CM

## 2024-08-29 LAB
ALBUMIN SERPL BCG-MCNC: 3.9 G/DL (ref 3.5–5)
ALP SERPL-CCNC: 86 U/L (ref 34–104)
ALT SERPL W P-5'-P-CCNC: 35 U/L (ref 7–52)
ANION GAP SERPL CALCULATED.3IONS-SCNC: 4 MMOL/L (ref 4–13)
AST SERPL W P-5'-P-CCNC: 22 U/L (ref 13–39)
BASOPHILS # BLD AUTO: 0.06 THOUSANDS/ÂΜL (ref 0–0.1)
BASOPHILS NFR BLD AUTO: 1 % (ref 0–1)
BILIRUB SERPL-MCNC: 0.36 MG/DL (ref 0.2–1)
BUN SERPL-MCNC: 20 MG/DL (ref 5–25)
CALCIUM SERPL-MCNC: 9 MG/DL (ref 8.4–10.2)
CHLORIDE SERPL-SCNC: 106 MMOL/L (ref 96–108)
CO2 SERPL-SCNC: 28 MMOL/L (ref 21–32)
CREAT SERPL-MCNC: 0.92 MG/DL (ref 0.6–1.3)
EOSINOPHIL # BLD AUTO: 0.53 THOUSAND/ÂΜL (ref 0–0.61)
EOSINOPHIL NFR BLD AUTO: 6 % (ref 0–6)
ERYTHROCYTE [DISTWIDTH] IN BLOOD BY AUTOMATED COUNT: 15.6 % (ref 11.6–15.1)
GFR SERPL CREATININE-BSD FRML MDRD: 69 ML/MIN/1.73SQ M
GLUCOSE SERPL-MCNC: 117 MG/DL (ref 65–140)
HCT VFR BLD AUTO: 39.8 % (ref 34.8–46.1)
HGB BLD-MCNC: 12.5 G/DL (ref 11.5–15.4)
IMM GRANULOCYTES # BLD AUTO: 0.05 THOUSAND/UL (ref 0–0.2)
IMM GRANULOCYTES NFR BLD AUTO: 1 % (ref 0–2)
LYMPHOCYTES # BLD AUTO: 2.37 THOUSANDS/ÂΜL (ref 0.6–4.47)
LYMPHOCYTES NFR BLD AUTO: 29 % (ref 14–44)
MCH RBC QN AUTO: 27.9 PG (ref 26.8–34.3)
MCHC RBC AUTO-ENTMCNC: 31.4 G/DL (ref 31.4–37.4)
MCV RBC AUTO: 89 FL (ref 82–98)
MONOCYTES # BLD AUTO: 0.72 THOUSAND/ÂΜL (ref 0.17–1.22)
MONOCYTES NFR BLD AUTO: 9 % (ref 4–12)
NEUTROPHILS # BLD AUTO: 4.57 THOUSANDS/ÂΜL (ref 1.85–7.62)
NEUTS SEG NFR BLD AUTO: 54 % (ref 43–75)
NRBC BLD AUTO-RTO: 0 /100 WBCS
PLATELET # BLD AUTO: 345 THOUSANDS/UL (ref 149–390)
PMV BLD AUTO: 10.5 FL (ref 8.9–12.7)
POTASSIUM SERPL-SCNC: 4 MMOL/L (ref 3.5–5.3)
PROT SERPL-MCNC: 6.6 G/DL (ref 6.4–8.4)
RBC # BLD AUTO: 4.48 MILLION/UL (ref 3.81–5.12)
SODIUM SERPL-SCNC: 138 MMOL/L (ref 135–147)
WBC # BLD AUTO: 8.3 THOUSAND/UL (ref 4.31–10.16)

## 2024-08-30 ENCOUNTER — HOSPITAL ENCOUNTER (OUTPATIENT)
Dept: INFUSION CENTER | Facility: CLINIC | Age: 59
End: 2024-08-30
Payer: COMMERCIAL

## 2024-08-30 VITALS — TEMPERATURE: 97.3 F | BODY MASS INDEX: 36.51 KG/M2 | HEIGHT: 64 IN | WEIGHT: 213.85 LBS

## 2024-08-30 DIAGNOSIS — C50.212 MALIGNANT NEOPLASM OF UPPER-INNER QUADRANT OF LEFT BREAST IN FEMALE, ESTROGEN RECEPTOR POSITIVE (HCC): ICD-10-CM

## 2024-08-30 DIAGNOSIS — D70.1 CHEMOTHERAPY INDUCED NEUTROPENIA (HCC): Primary | ICD-10-CM

## 2024-08-30 DIAGNOSIS — Z90.79 HISTORY OF BILATERAL SALPINGO-OOPHORECTOMY (BSO): ICD-10-CM

## 2024-08-30 DIAGNOSIS — C79.51 MALIGNANT NEOPLASM METASTATIC TO BONE (HCC): ICD-10-CM

## 2024-08-30 DIAGNOSIS — C78.7 MALIGNANT NEOPLASM METASTATIC TO LIVER (HCC): ICD-10-CM

## 2024-08-30 DIAGNOSIS — Z90.722 HISTORY OF BILATERAL SALPINGO-OOPHORECTOMY (BSO): ICD-10-CM

## 2024-08-30 DIAGNOSIS — Z17.0 MALIGNANT NEOPLASM OF UPPER-INNER QUADRANT OF LEFT BREAST IN FEMALE, ESTROGEN RECEPTOR POSITIVE (HCC): ICD-10-CM

## 2024-08-30 DIAGNOSIS — T45.1X5A CHEMOTHERAPY INDUCED NEUTROPENIA (HCC): Primary | ICD-10-CM

## 2024-08-30 PROCEDURE — 96413 CHEMO IV INFUSION 1 HR: CPT

## 2024-08-30 PROCEDURE — 96367 TX/PROPH/DG ADDL SEQ IV INF: CPT

## 2024-08-30 RX ORDER — DEXTROSE MONOHYDRATE 50 MG/ML
20 INJECTION, SOLUTION INTRAVENOUS ONCE
Status: COMPLETED | OUTPATIENT
Start: 2024-08-30 | End: 2024-08-30

## 2024-08-30 RX ADMIN — FAM-TRASTUZUMAB DERUXTECAN-NXKI 400 MG: 100 INJECTION, POWDER, LYOPHILIZED, FOR SOLUTION INTRAVENOUS at 15:11

## 2024-08-30 RX ADMIN — DEXTROSE 20 ML/HR: 5 SOLUTION INTRAVENOUS at 14:52

## 2024-08-30 RX ADMIN — DEXAMETHASONE SODIUM PHOSPHATE: 10 INJECTION, SOLUTION INTRAMUSCULAR; INTRAVENOUS at 14:52

## 2024-08-30 NOTE — PROGRESS NOTES
Patient arrived to the unit and denied complications or infections. She tolerated her treatment well without adverse reaction. Patient is aware to return on 9/27/24.

## 2024-09-05 ENCOUNTER — TELEPHONE (OUTPATIENT)
Age: 59
End: 2024-09-05

## 2024-09-05 NOTE — TELEPHONE ENCOUNTER
Can you please see below?  [facility] am not [facility]

## 2024-09-05 NOTE — TELEPHONE ENCOUNTER
[facility] called because the pt's infusion medication should go through [facility] to  be delivered to the infusion center. They are faxing over forms for the medication.gave hopeline fax

## 2024-09-09 ENCOUNTER — TELEPHONE (OUTPATIENT)
Age: 59
End: 2024-09-09

## 2024-09-09 NOTE — TELEPHONE ENCOUNTER
Dawson from [facility] calling to confirm we received the form they had faxed out, confirmed that it was in [employer] and sent it to [facility] to be scanned in, Dawson informed his contact information was on the form and if there were any questions to reach out to him. Also informed him of normal turn around time.

## 2024-09-20 RX ORDER — DEXTROSE MONOHYDRATE 50 MG/ML
20 INJECTION, SOLUTION INTRAVENOUS ONCE
Status: CANCELLED | OUTPATIENT
Start: 2024-09-27

## 2024-09-26 ENCOUNTER — APPOINTMENT (OUTPATIENT)
Dept: LAB | Facility: HOSPITAL | Age: 59
End: 2024-09-26
Payer: COMMERCIAL

## 2024-09-26 DIAGNOSIS — Z90.722 HISTORY OF BILATERAL SALPINGO-OOPHORECTOMY (BSO): ICD-10-CM

## 2024-09-26 DIAGNOSIS — Z90.79 HISTORY OF BILATERAL SALPINGO-OOPHORECTOMY (BSO): ICD-10-CM

## 2024-09-26 DIAGNOSIS — C79.51 MALIGNANT NEOPLASM METASTATIC TO BONE (HCC): ICD-10-CM

## 2024-09-26 DIAGNOSIS — D70.1 CHEMOTHERAPY INDUCED NEUTROPENIA (HCC): ICD-10-CM

## 2024-09-26 DIAGNOSIS — Z17.0 MALIGNANT NEOPLASM OF UPPER-INNER QUADRANT OF LEFT BREAST IN FEMALE, ESTROGEN RECEPTOR POSITIVE (HCC): ICD-10-CM

## 2024-09-26 DIAGNOSIS — C78.7 MALIGNANT NEOPLASM METASTATIC TO LIVER (HCC): ICD-10-CM

## 2024-09-26 DIAGNOSIS — C50.212 MALIGNANT NEOPLASM OF UPPER-INNER QUADRANT OF LEFT BREAST IN FEMALE, ESTROGEN RECEPTOR POSITIVE (HCC): ICD-10-CM

## 2024-09-26 DIAGNOSIS — T45.1X5A CHEMOTHERAPY INDUCED NEUTROPENIA (HCC): ICD-10-CM

## 2024-09-26 LAB
ALBUMIN SERPL BCG-MCNC: 4.1 G/DL (ref 3.5–5)
ALP SERPL-CCNC: 103 U/L (ref 34–104)
ALT SERPL W P-5'-P-CCNC: 46 U/L (ref 7–52)
ANION GAP SERPL CALCULATED.3IONS-SCNC: 5 MMOL/L (ref 4–13)
AST SERPL W P-5'-P-CCNC: 27 U/L (ref 13–39)
BASOPHILS # BLD AUTO: 0.07 THOUSANDS/ΜL (ref 0–0.1)
BASOPHILS NFR BLD AUTO: 1 % (ref 0–1)
BILIRUB SERPL-MCNC: 0.29 MG/DL (ref 0.2–1)
BUN SERPL-MCNC: 19 MG/DL (ref 5–25)
CALCIUM SERPL-MCNC: 9.6 MG/DL (ref 8.4–10.2)
CHLORIDE SERPL-SCNC: 105 MMOL/L (ref 96–108)
CO2 SERPL-SCNC: 30 MMOL/L (ref 21–32)
CREAT SERPL-MCNC: 0.93 MG/DL (ref 0.6–1.3)
EOSINOPHIL # BLD AUTO: 0.72 THOUSAND/ΜL (ref 0–0.61)
EOSINOPHIL NFR BLD AUTO: 7 % (ref 0–6)
ERYTHROCYTE [DISTWIDTH] IN BLOOD BY AUTOMATED COUNT: 15.7 % (ref 11.6–15.1)
GFR SERPL CREATININE-BSD FRML MDRD: 68 ML/MIN/1.73SQ M
GLUCOSE SERPL-MCNC: 118 MG/DL (ref 65–140)
HCT VFR BLD AUTO: 43.1 % (ref 34.8–46.1)
HGB BLD-MCNC: 13.7 G/DL (ref 11.5–15.4)
IMM GRANULOCYTES # BLD AUTO: 0.09 THOUSAND/UL (ref 0–0.2)
IMM GRANULOCYTES NFR BLD AUTO: 1 % (ref 0–2)
LYMPHOCYTES # BLD AUTO: 2.69 THOUSANDS/ΜL (ref 0.6–4.47)
LYMPHOCYTES NFR BLD AUTO: 26 % (ref 14–44)
MCH RBC QN AUTO: 28.3 PG (ref 26.8–34.3)
MCHC RBC AUTO-ENTMCNC: 31.8 G/DL (ref 31.4–37.4)
MCV RBC AUTO: 89 FL (ref 82–98)
MONOCYTES # BLD AUTO: 0.97 THOUSAND/ΜL (ref 0.17–1.22)
MONOCYTES NFR BLD AUTO: 9 % (ref 4–12)
NEUTROPHILS # BLD AUTO: 5.99 THOUSANDS/ΜL (ref 1.85–7.62)
NEUTS SEG NFR BLD AUTO: 56 % (ref 43–75)
NRBC BLD AUTO-RTO: 0 /100 WBCS
PLATELET # BLD AUTO: 403 THOUSANDS/UL (ref 149–390)
PMV BLD AUTO: 10.4 FL (ref 8.9–12.7)
POTASSIUM SERPL-SCNC: 4 MMOL/L (ref 3.5–5.3)
PROT SERPL-MCNC: 7 G/DL (ref 6.4–8.4)
RBC # BLD AUTO: 4.84 MILLION/UL (ref 3.81–5.12)
SODIUM SERPL-SCNC: 140 MMOL/L (ref 135–147)
WBC # BLD AUTO: 10.53 THOUSAND/UL (ref 4.31–10.16)

## 2024-09-26 PROCEDURE — 85025 COMPLETE CBC W/AUTO DIFF WBC: CPT

## 2024-09-26 PROCEDURE — 36415 COLL VENOUS BLD VENIPUNCTURE: CPT

## 2024-09-26 PROCEDURE — 80053 COMPREHEN METABOLIC PANEL: CPT

## 2024-09-27 ENCOUNTER — HOSPITAL ENCOUNTER (OUTPATIENT)
Dept: INFUSION CENTER | Facility: CLINIC | Age: 59
End: 2024-09-27
Payer: COMMERCIAL

## 2024-09-27 VITALS
TEMPERATURE: 96.4 F | HEART RATE: 68 BPM | SYSTOLIC BLOOD PRESSURE: 141 MMHG | WEIGHT: 212.52 LBS | DIASTOLIC BLOOD PRESSURE: 66 MMHG | RESPIRATION RATE: 18 BRPM | BODY MASS INDEX: 36.46 KG/M2

## 2024-09-27 DIAGNOSIS — T45.1X5A CHEMOTHERAPY INDUCED NEUTROPENIA (HCC): Primary | ICD-10-CM

## 2024-09-27 DIAGNOSIS — Z90.79 HISTORY OF BILATERAL SALPINGO-OOPHORECTOMY (BSO): ICD-10-CM

## 2024-09-27 DIAGNOSIS — C78.7 MALIGNANT NEOPLASM METASTATIC TO LIVER (HCC): ICD-10-CM

## 2024-09-27 DIAGNOSIS — C79.51 MALIGNANT NEOPLASM METASTATIC TO BONE (HCC): ICD-10-CM

## 2024-09-27 DIAGNOSIS — R11.0 CHEMOTHERAPY-INDUCED NAUSEA: ICD-10-CM

## 2024-09-27 DIAGNOSIS — Z90.722 HISTORY OF BILATERAL SALPINGO-OOPHORECTOMY (BSO): ICD-10-CM

## 2024-09-27 DIAGNOSIS — D70.1 CHEMOTHERAPY INDUCED NEUTROPENIA (HCC): Primary | ICD-10-CM

## 2024-09-27 DIAGNOSIS — Z17.0 MALIGNANT NEOPLASM OF UPPER-INNER QUADRANT OF LEFT BREAST IN FEMALE, ESTROGEN RECEPTOR POSITIVE (HCC): ICD-10-CM

## 2024-09-27 DIAGNOSIS — T45.1X5A CHEMOTHERAPY-INDUCED NAUSEA: ICD-10-CM

## 2024-09-27 DIAGNOSIS — C50.212 MALIGNANT NEOPLASM OF UPPER-INNER QUADRANT OF LEFT BREAST IN FEMALE, ESTROGEN RECEPTOR POSITIVE (HCC): ICD-10-CM

## 2024-09-27 PROCEDURE — 96413 CHEMO IV INFUSION 1 HR: CPT

## 2024-09-27 PROCEDURE — 96367 TX/PROPH/DG ADDL SEQ IV INF: CPT

## 2024-09-27 RX ORDER — DEXTROSE MONOHYDRATE 50 MG/ML
20 INJECTION, SOLUTION INTRAVENOUS ONCE
Status: COMPLETED | OUTPATIENT
Start: 2024-09-27 | End: 2024-09-27

## 2024-09-27 RX ADMIN — DEXAMETHASONE SODIUM PHOSPHATE: 10 INJECTION, SOLUTION INTRAMUSCULAR; INTRAVENOUS at 14:24

## 2024-09-27 RX ADMIN — DEXTROSE 20 ML/HR: 5 SOLUTION INTRAVENOUS at 14:24

## 2024-09-27 RX ADMIN — FAM-TRASTUZUMAB DERUXTECAN-NXKI 400 MG: 100 INJECTION, POWDER, LYOPHILIZED, FOR SOLUTION INTRAVENOUS at 14:49

## 2024-09-27 NOTE — PROGRESS NOTES
Patient arrived to the unit and denied infection. She reported that she doesn't feel well starting 2 days after her infusion and getting worse as the week goes on. A week after her infusion she has a day of vomiting then gets better. Dr. Benavidez's nurse Olivia was informed. Compazine was sent to her pharmacy. Patient will call the office if she is struggling during the week. She is aware of her next appointment on 10/25/24.

## 2024-09-27 NOTE — PLAN OF CARE
Problem: INFECTION - ADULT  Goal: Absence or prevention of progression during hospitalization  Description: INTERVENTIONS:  - Assess and monitor for signs and symptoms of infection  - Monitor lab/diagnostic results  - Monitor all insertion sites, i.e. indwelling lines, tubes, and drains  - Monitor endotracheal if appropriate and nasal secretions for changes in amount and color  - [place] appropriate cooling/warming therapies per order  - Administer medications as ordered  - Instruct and encourage patient and family to use good hand hygiene technique  - Identify and instruct in appropriate isolation precautions for identified infection/condition  Outcome: Progressing

## 2024-09-28 RX ORDER — PROCHLORPERAZINE MALEATE 10 MG
10 TABLET ORAL EVERY 6 HOURS PRN
Qty: 30 TABLET | Refills: 1 | Status: SHIPPED | OUTPATIENT
Start: 2024-09-28

## 2024-10-21 RX ORDER — DEXTROSE MONOHYDRATE 50 MG/ML
20 INJECTION, SOLUTION INTRAVENOUS ONCE
Status: CANCELLED | OUTPATIENT
Start: 2024-10-25

## 2024-10-24 ENCOUNTER — APPOINTMENT (OUTPATIENT)
Dept: LAB | Facility: HOSPITAL | Age: 59
End: 2024-10-24
Payer: COMMERCIAL

## 2024-10-24 DIAGNOSIS — C79.51 MALIGNANT NEOPLASM METASTATIC TO BONE (HCC): ICD-10-CM

## 2024-10-24 DIAGNOSIS — Z90.79 HISTORY OF BILATERAL SALPINGO-OOPHORECTOMY (BSO): ICD-10-CM

## 2024-10-24 DIAGNOSIS — C50.212 MALIGNANT NEOPLASM OF UPPER-INNER QUADRANT OF LEFT BREAST IN FEMALE, ESTROGEN RECEPTOR POSITIVE (HCC): ICD-10-CM

## 2024-10-24 DIAGNOSIS — C78.7 MALIGNANT NEOPLASM METASTATIC TO LIVER (HCC): ICD-10-CM

## 2024-10-24 DIAGNOSIS — D70.1 CHEMOTHERAPY INDUCED NEUTROPENIA (HCC): ICD-10-CM

## 2024-10-24 DIAGNOSIS — T45.1X5A CHEMOTHERAPY INDUCED NEUTROPENIA (HCC): ICD-10-CM

## 2024-10-24 DIAGNOSIS — Z90.722 HISTORY OF BILATERAL SALPINGO-OOPHORECTOMY (BSO): ICD-10-CM

## 2024-10-24 DIAGNOSIS — Z17.0 MALIGNANT NEOPLASM OF UPPER-INNER QUADRANT OF LEFT BREAST IN FEMALE, ESTROGEN RECEPTOR POSITIVE (HCC): ICD-10-CM

## 2024-10-24 LAB
ALBUMIN SERPL BCG-MCNC: 4.1 G/DL (ref 3.5–5)
ALP SERPL-CCNC: 85 U/L (ref 34–104)
ALT SERPL W P-5'-P-CCNC: 39 U/L (ref 7–52)
ANION GAP SERPL CALCULATED.3IONS-SCNC: 4 MMOL/L (ref 4–13)
AST SERPL W P-5'-P-CCNC: 25 U/L (ref 13–39)
BASOPHILS # BLD AUTO: 0.06 THOUSANDS/ΜL (ref 0–0.1)
BASOPHILS NFR BLD AUTO: 1 % (ref 0–1)
BILIRUB SERPL-MCNC: 0.31 MG/DL (ref 0.2–1)
BUN SERPL-MCNC: 17 MG/DL (ref 5–25)
CALCIUM SERPL-MCNC: 9.3 MG/DL (ref 8.4–10.2)
CHLORIDE SERPL-SCNC: 108 MMOL/L (ref 96–108)
CO2 SERPL-SCNC: 31 MMOL/L (ref 21–32)
CREAT SERPL-MCNC: 0.84 MG/DL (ref 0.6–1.3)
EOSINOPHIL # BLD AUTO: 0.55 THOUSAND/ΜL (ref 0–0.61)
EOSINOPHIL NFR BLD AUTO: 5 % (ref 0–6)
ERYTHROCYTE [DISTWIDTH] IN BLOOD BY AUTOMATED COUNT: 15.6 % (ref 11.6–15.1)
GFR SERPL CREATININE-BSD FRML MDRD: 77 ML/MIN/1.73SQ M
GLUCOSE SERPL-MCNC: 120 MG/DL (ref 65–140)
HCT VFR BLD AUTO: 41.9 % (ref 34.8–46.1)
HGB BLD-MCNC: 13.1 G/DL (ref 11.5–15.4)
IMM GRANULOCYTES # BLD AUTO: 0.07 THOUSAND/UL (ref 0–0.2)
IMM GRANULOCYTES NFR BLD AUTO: 1 % (ref 0–2)
LYMPHOCYTES # BLD AUTO: 2.57 THOUSANDS/ΜL (ref 0.6–4.47)
LYMPHOCYTES NFR BLD AUTO: 25 % (ref 14–44)
MCH RBC QN AUTO: 28.8 PG (ref 26.8–34.3)
MCHC RBC AUTO-ENTMCNC: 31.3 G/DL (ref 31.4–37.4)
MCV RBC AUTO: 92 FL (ref 82–98)
MONOCYTES # BLD AUTO: 0.95 THOUSAND/ΜL (ref 0.17–1.22)
MONOCYTES NFR BLD AUTO: 9 % (ref 4–12)
NEUTROPHILS # BLD AUTO: 6.09 THOUSANDS/ΜL (ref 1.85–7.62)
NEUTS SEG NFR BLD AUTO: 59 % (ref 43–75)
NRBC BLD AUTO-RTO: 0 /100 WBCS
PLATELET # BLD AUTO: 361 THOUSANDS/UL (ref 149–390)
PMV BLD AUTO: 10.2 FL (ref 8.9–12.7)
POTASSIUM SERPL-SCNC: 4.1 MMOL/L (ref 3.5–5.3)
PROT SERPL-MCNC: 6.8 G/DL (ref 6.4–8.4)
RBC # BLD AUTO: 4.55 MILLION/UL (ref 3.81–5.12)
SODIUM SERPL-SCNC: 143 MMOL/L (ref 135–147)
WBC # BLD AUTO: 10.29 THOUSAND/UL (ref 4.31–10.16)

## 2024-10-24 PROCEDURE — 85025 COMPLETE CBC W/AUTO DIFF WBC: CPT

## 2024-10-24 PROCEDURE — 36415 COLL VENOUS BLD VENIPUNCTURE: CPT

## 2024-10-24 PROCEDURE — 80053 COMPREHEN METABOLIC PANEL: CPT

## 2024-10-25 ENCOUNTER — HOSPITAL ENCOUNTER (OUTPATIENT)
Dept: INFUSION CENTER | Facility: CLINIC | Age: 59
End: 2024-10-25
Payer: COMMERCIAL

## 2024-10-25 VITALS
TEMPERATURE: 98.2 F | HEIGHT: 64 IN | RESPIRATION RATE: 18 BRPM | DIASTOLIC BLOOD PRESSURE: 84 MMHG | WEIGHT: 211.64 LBS | BODY MASS INDEX: 36.13 KG/M2 | HEART RATE: 74 BPM | SYSTOLIC BLOOD PRESSURE: 153 MMHG

## 2024-10-25 DIAGNOSIS — Z90.79 HISTORY OF BILATERAL SALPINGO-OOPHORECTOMY (BSO): ICD-10-CM

## 2024-10-25 DIAGNOSIS — D70.1 CHEMOTHERAPY INDUCED NEUTROPENIA (HCC): ICD-10-CM

## 2024-10-25 DIAGNOSIS — C50.212 MALIGNANT NEOPLASM OF UPPER-INNER QUADRANT OF LEFT BREAST IN FEMALE, ESTROGEN RECEPTOR POSITIVE (HCC): ICD-10-CM

## 2024-10-25 DIAGNOSIS — T45.1X5A CHEMOTHERAPY INDUCED NEUTROPENIA (HCC): Primary | ICD-10-CM

## 2024-10-25 DIAGNOSIS — C78.7 MALIGNANT NEOPLASM METASTATIC TO LIVER (HCC): Primary | ICD-10-CM

## 2024-10-25 DIAGNOSIS — C78.7 MALIGNANT NEOPLASM METASTATIC TO LIVER (HCC): ICD-10-CM

## 2024-10-25 DIAGNOSIS — D70.1 CHEMOTHERAPY INDUCED NEUTROPENIA (HCC): Primary | ICD-10-CM

## 2024-10-25 DIAGNOSIS — Z17.0 MALIGNANT NEOPLASM OF UPPER-INNER QUADRANT OF LEFT BREAST IN FEMALE, ESTROGEN RECEPTOR POSITIVE (HCC): ICD-10-CM

## 2024-10-25 DIAGNOSIS — T45.1X5A CHEMOTHERAPY INDUCED NEUTROPENIA (HCC): ICD-10-CM

## 2024-10-25 DIAGNOSIS — Z90.722 HISTORY OF BILATERAL SALPINGO-OOPHORECTOMY (BSO): ICD-10-CM

## 2024-10-25 DIAGNOSIS — C79.51 MALIGNANT NEOPLASM METASTATIC TO BONE (HCC): ICD-10-CM

## 2024-10-25 PROCEDURE — 96367 TX/PROPH/DG ADDL SEQ IV INF: CPT

## 2024-10-25 PROCEDURE — 96413 CHEMO IV INFUSION 1 HR: CPT

## 2024-10-25 RX ORDER — DEXTROSE MONOHYDRATE 50 MG/ML
20 INJECTION, SOLUTION INTRAVENOUS ONCE
Status: COMPLETED | OUTPATIENT
Start: 2024-10-25 | End: 2024-10-25

## 2024-10-25 RX ADMIN — DEXTROSE 20 ML/HR: 5 SOLUTION INTRAVENOUS at 14:22

## 2024-10-25 RX ADMIN — DEXAMETHASONE SODIUM PHOSPHATE: 10 INJECTION, SOLUTION INTRAMUSCULAR; INTRAVENOUS at 14:24

## 2024-10-25 RX ADMIN — DEXTROSE 400 MG: 50 INJECTION, SOLUTION INTRAVENOUS at 15:07

## 2024-10-25 NOTE — PROGRESS NOTES
Pt offers no new complaints today, tolerated enhertu without complications, confirmed appt back on 11-22-24 2:00, AVS declined, pt uses mychart, pt also scheduled echo during appt for 11-5-24 5:00pm.

## 2024-10-30 ENCOUNTER — OFFICE VISIT (OUTPATIENT)
Dept: HEMATOLOGY ONCOLOGY | Facility: CLINIC | Age: 59
End: 2024-10-30
Payer: COMMERCIAL

## 2024-10-30 ENCOUNTER — TELEPHONE (OUTPATIENT)
Dept: HEMATOLOGY ONCOLOGY | Facility: CLINIC | Age: 59
End: 2024-10-30

## 2024-10-30 VITALS
TEMPERATURE: 97.3 F | BODY MASS INDEX: 36.19 KG/M2 | RESPIRATION RATE: 18 BRPM | WEIGHT: 212 LBS | SYSTOLIC BLOOD PRESSURE: 136 MMHG | HEIGHT: 64 IN | OXYGEN SATURATION: 97 % | HEART RATE: 97 BPM | DIASTOLIC BLOOD PRESSURE: 84 MMHG

## 2024-10-30 DIAGNOSIS — R11.0 CHEMOTHERAPY-INDUCED NAUSEA: ICD-10-CM

## 2024-10-30 DIAGNOSIS — Z17.0 MALIGNANT NEOPLASM OF UPPER-INNER QUADRANT OF LEFT BREAST IN FEMALE, ESTROGEN RECEPTOR POSITIVE (HCC): Primary | ICD-10-CM

## 2024-10-30 DIAGNOSIS — C79.51 MALIGNANT NEOPLASM METASTATIC TO BONE (HCC): ICD-10-CM

## 2024-10-30 DIAGNOSIS — Z15.09 BRCA1 GENE MUTATION POSITIVE IN FEMALE: ICD-10-CM

## 2024-10-30 DIAGNOSIS — I42.7 CARDIOMYOPATHY DUE TO CHEMOTHERAPY (HCC): ICD-10-CM

## 2024-10-30 DIAGNOSIS — Z15.01 BRCA1 GENE MUTATION POSITIVE IN FEMALE: ICD-10-CM

## 2024-10-30 DIAGNOSIS — T45.1X5A CHEMOTHERAPY-INDUCED NAUSEA: ICD-10-CM

## 2024-10-30 DIAGNOSIS — Z90.722 HISTORY OF BILATERAL SALPINGO-OOPHORECTOMY (BSO): ICD-10-CM

## 2024-10-30 DIAGNOSIS — T38.6X5A OSTEOPOROSIS DUE TO AROMATASE INHIBITOR: ICD-10-CM

## 2024-10-30 DIAGNOSIS — M81.8 OSTEOPOROSIS DUE TO AROMATASE INHIBITOR: ICD-10-CM

## 2024-10-30 DIAGNOSIS — C50.212 MALIGNANT NEOPLASM OF UPPER-INNER QUADRANT OF LEFT BREAST IN FEMALE, ESTROGEN RECEPTOR POSITIVE (HCC): Primary | ICD-10-CM

## 2024-10-30 DIAGNOSIS — Z14.8 CARRIER OF GENE MUTATION FOR HIGH RISK OF CANCER: ICD-10-CM

## 2024-10-30 DIAGNOSIS — C78.7 MALIGNANT NEOPLASM METASTATIC TO LIVER (HCC): ICD-10-CM

## 2024-10-30 DIAGNOSIS — Z95.828 PORT-A-CATH IN PLACE: ICD-10-CM

## 2024-10-30 DIAGNOSIS — T45.1X5A CHEMOTHERAPY INDUCED NEUTROPENIA (HCC): ICD-10-CM

## 2024-10-30 DIAGNOSIS — T45.1X5A CARDIOMYOPATHY DUE TO CHEMOTHERAPY (HCC): ICD-10-CM

## 2024-10-30 DIAGNOSIS — Z15.02 BRCA1 GENE MUTATION POSITIVE IN FEMALE: ICD-10-CM

## 2024-10-30 DIAGNOSIS — Z90.79 HISTORY OF BILATERAL SALPINGO-OOPHORECTOMY (BSO): ICD-10-CM

## 2024-10-30 DIAGNOSIS — D70.1 CHEMOTHERAPY INDUCED NEUTROPENIA (HCC): ICD-10-CM

## 2024-10-30 PROCEDURE — 99214 OFFICE O/P EST MOD 30 MIN: CPT | Performed by: INTERNAL MEDICINE

## 2024-10-30 NOTE — PATIENT INSTRUCTIONS
Please skip chemotherapy on 11/22/2024.  Patient has standing orders for blood work and chemotherapy.  Follow-up in 10 weeks.

## 2024-10-30 NOTE — PROGRESS NOTES
HPI: Follow-up visit for stage IV triple positive stage, ER positive, OR positive and HER2 positive breast cancer and she has metastatic disease to liver and bones and presently patient is on Enhertu and letrozole.  She had Xgeva previously.  She had radiation to left hip for palliation.  De brandon stage IV triple positive left breast cancer with metastatic disease to liver and bones was diagnosed in 2016.  Initial treatment was THP, Taxotere, Herceptin and Perjeta.  Patient had THP for 6 cycles and then Taxotere was dropped and she was continued on Herceptin and Perjeta and to that tamoxifen was added.  Patient  was premenopausal at that time.  Later tamoxifen was changed to letrozole after she had  BSO and she tested positive for BRCA1.  She had Xgeva. She  had radiation to left hip for palliation.  She had very good response  and in September 2016 patient had lumpectomy of left breast followed by radiation therapy.  At the time of lumpectomy there was a small residual disease, 1.4 cm.  Lymph nodes were not sampled.   Patient tested positive for BRCA 1.   She has been getting imaging studies for early detection of other cancers like peritoneal and pancreatic cancers.  She has been encouraged to see a dermatologist but she does not want to.  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   She has been taking vitamin-D and calcium and is staying active.  She is not on Xgeva anymore that she had for 2 years.   She was tolerating treatments without much problem and was responding to treatments.    In 2022 there was a new lesion in the liver on CT scan and MRI scan and on biopsy that proved to be metastatic cancer from breast, strongly positive for ER, negative for OR and 2+ for HER 2 that was negative by FISH (low positive HER2).  Patient saw radiation oncologist for local radiation to liver lesion and discussed risks and benefits and decided not to have liver directed therapy. Subsequent CT  scan showed slight decrease in size of liver lesion from 2.7 to 2.4.  She preferred to stay on letrozole, Herceptin and Perjeta.  We did discuss ENHERTU.  Subsequent CT scan  showed increase in the size of liver lesion.  She was referred to back to radiation for liver directed therapy.  SBRT was considered but was not given.    On 1/30/2024 patient was started on Enhertu at a dose of 5.4 mg/kg = 500 mg.  She tolerated that poorly. She had GI symptoms, increased tiredness and blurred vision.  Symptoms improved after 10 days.  She missed treatment in February 2024.  Next treatment was on 3/15/2024 she tolerated that better but she was upset because she had lost hair.  She was thinking of changing treatment for that reason.  She agreed to have lower dose , 80% of the dose, 400 mg.  Present doses 4.4 mg/kg (discussed with patient).  Patient has nausea and she feels tired.  She is working full-time.    Patient wants to skip treatment on 11/22/2024  because she will have people coming to her house at [facility].     No pulmonary symptoms.  Prior to Enhertu she was on  Herceptin plus Perjeta and letrozole.  She  stays on letrozole.  Patient goes for echocardiogram.  She is scheduled to have MRI scan of abdomen/liver.         Patient knows that she is at high risk for breast and other cancers like  ovarian cancer, pancreatic cancer, peritoneal cancer, melanoma and other cancers.  Patient goes to breast surgeon for evaluation and imaging studies.  She gets CT scan or MRI scan and that  checks the pancreas and peritoneum.  She had BSO.  She does not want to go to a dermatologist to be checked for melanoma or to ophthalmologist to be checked for melanoma in the eye.  She goes to her optometrist.  Also she does not want to have colonoscopy and not even Cologuard or stool test for blood.      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  Occasionally mild low back pain.  She continues to take  calcium and vitamin-D  .                           Current Outpatient Medications:     letrozole (FEMARA) 2.5 mg tablet, Take 1 tablet (2.5 mg total) by mouth daily, Disp: 90 tablet, Rfl: 2    Multiple Vitamins-Minerals (Theragran-M Premier 50 Plus) TABS, as directed, Disp: , Rfl:     multivitamin (THERAGRAN) TABS, Take 1 tablet by mouth 3 (three) times a week , Disp: , Rfl:     ondansetron (ZOFRAN) 4 mg tablet, Take 1 tablet (4 mg total) by mouth every 8 (eight) hours as needed for nausea or vomiting, Disp: 30 tablet, Rfl: 1    pertuzumab (PERJETA) 420 mg/14 mL SOLN, Infuse into a venous catheter every 21 days At infusion  center, Disp: , Rfl:     prochlorperazine (COMPAZINE) 10 mg tablet, Take 1 tablet (10 mg total) by mouth every 6 (six) hours as needed for nausea or vomiting, Disp: 30 tablet, Rfl: 1    Trastuzumab (HERCEPTIN IV), Infuse into a venous catheter every 21 days At infusion center, Disp: , Rfl:     VITAMIN D, CHOLECALCIFEROL, PO, Take 1,000 Units by mouth 3 (three) times a week , Disp: , Rfl:     No Known Allergies    Oncology History   Malignant neoplasm of upper-inner quadrant of left female breast (HCC)   12/2015 Initial Diagnosis    Malignant neoplasm of upper-outer quadrant of left female breast (HCC)     2016 -  Hormone Therapy    Tamoxifen  Ended 8/2019.    Dr Benavidez  Letrozole 9/2019.     1/27/2016 Surgery    Port placement     2/19/2016 - 6/2/2018 Chemotherapy    Taxotere,  herceptin, perjeta, xgeva.   After six cycles, taxotere was discontinued and tamoxifen added.   Continues with Perjeta and Herceptin every 3 weeks     - Dr Benavidez       9/16/2016 Surgery    Left breast partial mastectomy     11/7/2016 - 12/23/2016 Radiation    Plan ID Energy Fractions Dose per Fraction (cGy)  Total Dose Delivered (cGy) Elapsed Days   Lt Breast 6X 25 / 25 200 5,000 36   Lt Brst Boost 6X 8 / 8 200 1,600 9   Lt Femur 10X 10 / 10 300 3,000 11   Lt PAB 6X 25 / 25 60 1,500 36   Lt Sclav 6X 25 / 25 200 5,000 36                                   Total dose to left breast lumpectomy cavity: 6600 cGy                   Total dose to the supraclavicular and axillary regions: 5000 cGy       4/17/2019 - 1/12/2024 Chemotherapy    pertuzumab (PERJETA) IVPB, 840 mg, Intravenous, Once, 81 of 82 cycles  Administration: 420 mg (5/17/2019), 420 mg (6/7/2019), 420 mg (6/28/2019), 420 mg (7/19/2019), 420 mg (8/30/2019), 420 mg (9/20/2019), 420 mg (8/9/2019), 420 mg (10/9/2019), 420 mg (11/1/2019), 420 mg (11/22/2019), 420 mg (12/13/2019), 420 mg (1/3/2020), 420 mg (1/24/2020), 420 mg (2/14/2020), 420 mg (3/6/2020), 420 mg (3/27/2020), 420 mg (4/17/2020), 420 mg (5/8/2020), 420 mg (5/29/2020), 420 mg (6/19/2020), 420 mg (7/10/2020), 420 mg (7/31/2020), 420 mg (8/21/2020), 420 mg (9/11/2020), 420 mg (10/2/2020), 420 mg (10/23/2020), 420 mg (11/13/2020), 420 mg (12/4/2020), 420 mg (12/24/2020), 420 mg (1/15/2021), 420 mg (2/5/2021), 420 mg (2/26/2021), 420 mg (3/19/2021), 420 mg (4/9/2021), 420 mg (4/30/2021), 420 mg (5/21/2021), 420 mg (6/11/2021), 420 mg (7/2/2021), 420 mg (7/23/2021), 420 mg (8/13/2021), 420 mg (9/3/2021), 420 mg (9/24/2021), 420 mg (10/15/2021), 420 mg (11/5/2021), 420 mg (11/26/2021), 420 mg (12/17/2021), 420 mg (1/7/2022), 420 mg (1/28/2022), 420 mg (2/18/2022), 420 mg (3/11/2022), 420 mg (4/1/2022), 420 mg (5/13/2022), 420 mg (6/3/2022), 420 mg (6/28/2022), 420 mg (7/22/2022), 420 mg (9/2/2022), 420 mg (9/23/2022), 420 mg (8/12/2022), 420 mg (10/14/2022), 420 mg (11/4/2022), 420 mg (11/25/2022), 420 mg (12/15/2022), 420 mg (1/6/2023), 420 mg (1/27/2023), 420 mg (2/17/2023), 420 mg (3/10/2023), 420 mg (3/31/2023), 420 mg (4/21/2023), 420 mg (5/19/2023), 420 mg (6/13/2023), 420 mg (6/30/2023), 420 mg  (7/21/2023), 420 mg (8/11/2023), 420 mg (9/1/2023), 420 mg (9/22/2023), 420 mg (10/13/2023), 420 mg (11/3/2023), 420 mg (11/24/2023), 420 mg (12/22/2023), 420 mg (1/12/2024)  trastuzumab (HERCEPTIN) chemo infusion, 6 mg/kg = 589 mg (75 % of original dose 8 mg/kg), Intravenous, Once, 81 of 82 cycles  Dose modification: 6 mg/kg (original dose 8 mg/kg, Cycle 1, Reason: Other (Must fill in a comment))  Administration: 589 mg (5/17/2019), 589 mg (6/7/2019), 600 mg (6/28/2019), 600 mg (7/19/2019), 600 mg (8/30/2019), 600 mg (9/20/2019), 600 mg (8/9/2019), 600 mg (10/9/2019), 600 mg (11/1/2019), 600 mg (11/22/2019), 600 mg (12/13/2019), 600 mg (1/3/2020), 600 mg (1/24/2020), 600 mg (2/14/2020), 600 mg (3/6/2020), 600 mg (3/27/2020), 600 mg (4/17/2020), 600 mg (5/8/2020), 600 mg (5/29/2020), 600 mg (6/19/2020), 600 mg (7/10/2020), 600 mg (7/31/2020), 600 mg (8/21/2020), 600 mg (9/11/2020), 600 mg (10/2/2020), 600 mg (10/23/2020), 600 mg (11/13/2020), 600 mg (12/4/2020), 600 mg (12/24/2020), 600 mg (1/15/2021), 600 mg (2/5/2021), 600 mg (2/26/2021), 600 mg (3/19/2021), 600 mg (4/9/2021), 600 mg (4/30/2021), 600 mg (5/21/2021), 600 mg (6/11/2021), 600 mg (7/2/2021), 600 mg (7/23/2021), 600 mg (8/13/2021), 600 mg (9/3/2021), 600 mg (9/24/2021), 600 mg (10/15/2021), 600 mg (11/5/2021), 600 mg (11/26/2021), 600 mg (12/17/2021), 600 mg (1/7/2022), 600 mg (1/28/2022), 600 mg (2/18/2022), 600 mg (3/11/2022), 600 mg (4/1/2022), 600 mg (5/13/2022), 600 mg (6/3/2022), 600 mg (6/28/2022), 600 mg (7/22/2022), 600 mg (9/2/2022), 600 mg (9/23/2022), 600 mg (8/12/2022), 600 mg (10/14/2022), 600 mg (11/4/2022), 600 mg (11/25/2022), 600 mg (12/15/2022), 600 mg (1/6/2023), 600 mg (1/27/2023), 600 mg (2/17/2023), 600 mg (3/10/2023), 600 mg (3/31/2023), 600 mg (4/21/2023), 600 mg (5/19/2023), 600 mg (6/13/2023), 600 mg (6/30/2023), 600 mg (7/21/2023), 600 mg (8/11/2023), 600 mg (9/1/2023), 600 mg (9/22/2023), 600 mg (10/13/2023), 600 mg  (11/3/2023), 600 mg (11/24/2023), 600 mg (12/22/2023), 600 mg (1/12/2024)     8/16/2019 Surgery    she underwent BSO.     7/5/2022 -  Cancer Staged    Staging form: Breast, AJCC 8th Edition  - Clinical: Stage IV (rcTX, cNX, pM1) - Signed by Dejuan Hernandez MD on 7/5/2022  Stage prefix: Recurrence       2/2/2024 -  Chemotherapy    alteplase (CATHFLO), 2 mg, Intracatheter, Every 1 Minute as needed, 10 of 13 cycles  fam-trastuzumab deruxtecan-nxki (ENHERTU) IVPB, 520 mg, Intravenous, Once, 10 of 13 cycles  Dose modification: 4.4 mg/kg (original dose 5.4 mg/kg, Cycle 3, Reason: Dose modified as per discussion with consulting physician), 4.4 mg/kg (original dose 5.4 mg/kg, Cycle 11, Reason: Dose modified as per discussion with consulting physician)  Administration: 500 mg (2/2/2024), 400 mg (3/15/2024), 400 mg (4/5/2024), 400 mg (5/17/2024), 400 mg (6/14/2024), 400 mg (7/5/2024), 400 mg (8/2/2024), 400 mg (8/30/2024), 400 mg (9/27/2024), 400 mg (10/25/2024)     Malignant neoplasm metastatic to liver (HCC)   4/27/2018 Initial Diagnosis    Liver metastases (HCC)     4/17/2019 - 1/12/2024 Chemotherapy    pertuzumab (PERJETA) IVPB, 840 mg, Intravenous, Once, 81 of 82 cycles  Administration: 420 mg (5/17/2019), 420 mg (6/7/2019), 420 mg (6/28/2019), 420 mg (7/19/2019), 420 mg (8/30/2019), 420 mg (9/20/2019), 420 mg (8/9/2019), 420 mg (10/9/2019), 420 mg (11/1/2019), 420 mg (11/22/2019), 420 mg (12/13/2019), 420 mg (1/3/2020), 420 mg (1/24/2020), 420 mg (2/14/2020), 420 mg (3/6/2020), 420 mg (3/27/2020), 420 mg (4/17/2020), 420 mg (5/8/2020), 420 mg (5/29/2020), 420 mg (6/19/2020), 420 mg (7/10/2020), 420 mg (7/31/2020), 420 mg (8/21/2020), 420 mg (9/11/2020), 420 mg (10/2/2020), 420 mg (10/23/2020), 420 mg (11/13/2020), 420 mg (12/4/2020), 420 mg (12/24/2020), 420 mg (1/15/2021), 420 mg (2/5/2021), 420 mg (2/26/2021), 420 mg (3/19/2021), 420 mg (4/9/2021), 420 mg (4/30/2021), 420 mg (5/21/2021), 420 mg (6/11/2021), 420  mg (7/2/2021), 420 mg (7/23/2021), 420 mg (8/13/2021), 420 mg (9/3/2021), 420 mg (9/24/2021), 420 mg (10/15/2021), 420 mg (11/5/2021), 420 mg (11/26/2021), 420 mg (12/17/2021), 420 mg (1/7/2022), 420 mg (1/28/2022), 420 mg (2/18/2022), 420 mg (3/11/2022), 420 mg (4/1/2022), 420 mg (5/13/2022), 420 mg (6/3/2022), 420 mg (6/28/2022), 420 mg (7/22/2022), 420 mg (9/2/2022), 420 mg (9/23/2022), 420 mg (8/12/2022), 420 mg (10/14/2022), 420 mg (11/4/2022), 420 mg (11/25/2022), 420 mg (12/15/2022), 420 mg (1/6/2023), 420 mg (1/27/2023), 420 mg (2/17/2023), 420 mg (3/10/2023), 420 mg (3/31/2023), 420 mg (4/21/2023), 420 mg (5/19/2023), 420 mg (6/13/2023), 420 mg (6/30/2023), 420 mg (7/21/2023), 420 mg (8/11/2023), 420 mg (9/1/2023), 420 mg (9/22/2023), 420 mg (10/13/2023), 420 mg (11/3/2023), 420 mg (11/24/2023), 420 mg (12/22/2023), 420 mg (1/12/2024)  trastuzumab (HERCEPTIN) chemo infusion, 6 mg/kg = 589 mg (75 % of original dose 8 mg/kg), Intravenous, Once, 81 of 82 cycles  Dose modification: 6 mg/kg (original dose 8 mg/kg, Cycle 1, Reason: Other (Must fill in a comment))  Administration: 589 mg (5/17/2019), 589 mg (6/7/2019), 600 mg (6/28/2019), 600 mg (7/19/2019), 600 mg (8/30/2019), 600 mg (9/20/2019), 600 mg (8/9/2019), 600 mg (10/9/2019), 600 mg (11/1/2019), 600 mg (11/22/2019), 600 mg (12/13/2019), 600 mg (1/3/2020), 600 mg (1/24/2020), 600 mg (2/14/2020), 600 mg (3/6/2020), 600 mg (3/27/2020), 600 mg (4/17/2020), 600 mg (5/8/2020), 600 mg (5/29/2020), 600 mg (6/19/2020), 600 mg (7/10/2020), 600 mg (7/31/2020), 600 mg (8/21/2020), 600 mg (9/11/2020), 600 mg (10/2/2020), 600 mg (10/23/2020), 600 mg (11/13/2020), 600 mg (12/4/2020), 600 mg (12/24/2020), 600 mg (1/15/2021), 600 mg (2/5/2021), 600 mg (2/26/2021), 600 mg (3/19/2021), 600 mg (4/9/2021), 600 mg (4/30/2021), 600 mg (5/21/2021), 600 mg (6/11/2021), 600 mg (7/2/2021), 600 mg (7/23/2021), 600 mg (8/13/2021), 600 mg (9/3/2021), 600 mg (9/24/2021), 600 mg  (10/15/2021), 600 mg (11/5/2021), 600 mg (11/26/2021), 600 mg (12/17/2021), 600 mg (1/7/2022), 600 mg (1/28/2022), 600 mg (2/18/2022), 600 mg (3/11/2022), 600 mg (4/1/2022), 600 mg (5/13/2022), 600 mg (6/3/2022), 600 mg (6/28/2022), 600 mg (7/22/2022), 600 mg (9/2/2022), 600 mg (9/23/2022), 600 mg (8/12/2022), 600 mg (10/14/2022), 600 mg (11/4/2022), 600 mg (11/25/2022), 600 mg (12/15/2022), 600 mg (1/6/2023), 600 mg (1/27/2023), 600 mg (2/17/2023), 600 mg (3/10/2023), 600 mg (3/31/2023), 600 mg (4/21/2023), 600 mg (5/19/2023), 600 mg (6/13/2023), 600 mg (6/30/2023), 600 mg (7/21/2023), 600 mg (8/11/2023), 600 mg (9/1/2023), 600 mg (9/22/2023), 600 mg (10/13/2023), 600 mg (11/3/2023), 600 mg (11/24/2023), 600 mg (12/22/2023), 600 mg (1/12/2024)     6/24/2022 Biopsy    Final Diagnosis   A.  Liver, core biopsies:     - Metastatic carcinoma compatible with a breast primary.        2/2/2024 -  Chemotherapy    alteplase (CATHFLO), 2 mg, Intracatheter, Every 1 Minute as needed, 10 of 13 cycles  fam-trastuzumab deruxtecan-nxki (ENHERTU) IVPB, 520 mg, Intravenous, Once, 10 of 13 cycles  Dose modification: 4.4 mg/kg (original dose 5.4 mg/kg, Cycle 3, Reason: Dose modified as per discussion with consulting physician), 4.4 mg/kg (original dose 5.4 mg/kg, Cycle 11, Reason: Dose modified as per discussion with consulting physician)  Administration: 500 mg (2/2/2024), 400 mg (3/15/2024), 400 mg (4/5/2024), 400 mg (5/17/2024), 400 mg (6/14/2024), 400 mg (7/5/2024), 400 mg (8/2/2024), 400 mg (8/30/2024), 400 mg (9/27/2024), 400 mg (10/25/2024)     Malignant neoplasm metastatic to bone (HCC)   4/27/2018 Initial Diagnosis    Bone metastasis (HCC)     4/17/2019 - 1/12/2024 Chemotherapy    pertuzumab (PERJETA) IVPB, 840 mg, Intravenous, Once, 81 of 82 cycles  Administration: 420 mg (5/17/2019), 420 mg (6/7/2019), 420 mg (6/28/2019), 420 mg (7/19/2019), 420 mg (8/30/2019), 420 mg (9/20/2019), 420 mg (8/9/2019), 420 mg (10/9/2019), 420  mg (11/1/2019), 420 mg (11/22/2019), 420 mg (12/13/2019), 420 mg (1/3/2020), 420 mg (1/24/2020), 420 mg (2/14/2020), 420 mg (3/6/2020), 420 mg (3/27/2020), 420 mg (4/17/2020), 420 mg (5/8/2020), 420 mg (5/29/2020), 420 mg (6/19/2020), 420 mg (7/10/2020), 420 mg (7/31/2020), 420 mg (8/21/2020), 420 mg (9/11/2020), 420 mg (10/2/2020), 420 mg (10/23/2020), 420 mg (11/13/2020), 420 mg (12/4/2020), 420 mg (12/24/2020), 420 mg (1/15/2021), 420 mg (2/5/2021), 420 mg (2/26/2021), 420 mg (3/19/2021), 420 mg (4/9/2021), 420 mg (4/30/2021), 420 mg (5/21/2021), 420 mg (6/11/2021), 420 mg (7/2/2021), 420 mg (7/23/2021), 420 mg (8/13/2021), 420 mg (9/3/2021), 420 mg (9/24/2021), 420 mg (10/15/2021), 420 mg (11/5/2021), 420 mg (11/26/2021), 420 mg (12/17/2021), 420 mg (1/7/2022), 420 mg (1/28/2022), 420 mg (2/18/2022), 420 mg (3/11/2022), 420 mg (4/1/2022), 420 mg (5/13/2022), 420 mg (6/3/2022), 420 mg (6/28/2022), 420 mg (7/22/2022), 420 mg (9/2/2022), 420 mg (9/23/2022), 420 mg (8/12/2022), 420 mg (10/14/2022), 420 mg (11/4/2022), 420 mg (11/25/2022), 420 mg (12/15/2022), 420 mg (1/6/2023), 420 mg (1/27/2023), 420 mg (2/17/2023), 420 mg (3/10/2023), 420 mg (3/31/2023), 420 mg (4/21/2023), 420 mg (5/19/2023), 420 mg (6/13/2023), 420 mg (6/30/2023), 420 mg (7/21/2023), 420 mg (8/11/2023), 420 mg (9/1/2023), 420 mg (9/22/2023), 420 mg (10/13/2023), 420 mg (11/3/2023), 420 mg (11/24/2023), 420 mg (12/22/2023), 420 mg (1/12/2024)  trastuzumab (HERCEPTIN) chemo infusion, 6 mg/kg = 589 mg (75 % of original dose 8 mg/kg), Intravenous, Once, 81 of 82 cycles  Dose modification: 6 mg/kg (original dose 8 mg/kg, Cycle 1, Reason: Other (Must fill in a comment))  Administration: 589 mg (5/17/2019), 589 mg (6/7/2019), 600 mg (6/28/2019), 600 mg (7/19/2019), 600 mg (8/30/2019), 600 mg (9/20/2019), 600 mg (8/9/2019), 600 mg (10/9/2019), 600 mg (11/1/2019), 600 mg (11/22/2019), 600 mg (12/13/2019), 600 mg (1/3/2020), 600 mg (1/24/2020), 600 mg  (2/14/2020), 600 mg (3/6/2020), 600 mg (3/27/2020), 600 mg (4/17/2020), 600 mg (5/8/2020), 600 mg (5/29/2020), 600 mg (6/19/2020), 600 mg (7/10/2020), 600 mg (7/31/2020), 600 mg (8/21/2020), 600 mg (9/11/2020), 600 mg (10/2/2020), 600 mg (10/23/2020), 600 mg (11/13/2020), 600 mg (12/4/2020), 600 mg (12/24/2020), 600 mg (1/15/2021), 600 mg (2/5/2021), 600 mg (2/26/2021), 600 mg (3/19/2021), 600 mg (4/9/2021), 600 mg (4/30/2021), 600 mg (5/21/2021), 600 mg (6/11/2021), 600 mg (7/2/2021), 600 mg (7/23/2021), 600 mg (8/13/2021), 600 mg (9/3/2021), 600 mg (9/24/2021), 600 mg (10/15/2021), 600 mg (11/5/2021), 600 mg (11/26/2021), 600 mg (12/17/2021), 600 mg (1/7/2022), 600 mg (1/28/2022), 600 mg (2/18/2022), 600 mg (3/11/2022), 600 mg (4/1/2022), 600 mg (5/13/2022), 600 mg (6/3/2022), 600 mg (6/28/2022), 600 mg (7/22/2022), 600 mg (9/2/2022), 600 mg (9/23/2022), 600 mg (8/12/2022), 600 mg (10/14/2022), 600 mg (11/4/2022), 600 mg (11/25/2022), 600 mg (12/15/2022), 600 mg (1/6/2023), 600 mg (1/27/2023), 600 mg (2/17/2023), 600 mg (3/10/2023), 600 mg (3/31/2023), 600 mg (4/21/2023), 600 mg (5/19/2023), 600 mg (6/13/2023), 600 mg (6/30/2023), 600 mg (7/21/2023), 600 mg (8/11/2023), 600 mg (9/1/2023), 600 mg (9/22/2023), 600 mg (10/13/2023), 600 mg (11/3/2023), 600 mg (11/24/2023), 600 mg (12/22/2023), 600 mg (1/12/2024)     6/24/2022 Biopsy    Final Diagnosis   A.  Liver, core biopsies:     - Metastatic carcinoma compatible with a breast primary.        2/2/2024 -  Chemotherapy    alteplase (CATHFLO), 2 mg, Intracatheter, Every 1 Minute as needed, 10 of 13 cycles  fam-trastuzumab deruxtecan-nxki (ENHERTU) IVPB, 520 mg, Intravenous, Once, 10 of 13 cycles  Dose modification: 4.4 mg/kg (original dose 5.4 mg/kg, Cycle 3, Reason: Dose modified as per discussion with consulting physician), 4.4 mg/kg (original dose 5.4 mg/kg, Cycle 11, Reason: Dose modified as per discussion with consulting physician)  Administration: 500 mg  "(2/2/2024), 400 mg (3/15/2024), 400 mg (4/5/2024), 400 mg (5/17/2024), 400 mg (6/14/2024), 400 mg (7/5/2024), 400 mg (8/2/2024), 400 mg (8/30/2024), 400 mg (9/27/2024), 400 mg (10/25/2024)         ROS:  10/30/24 Reviewed 12 systems: .  See symptoms in HPI  Presently no other neurological, cardiac, pulmonary, GI and  symptoms other than mentioned  in HPI..  Other symptoms are in HPI  No symptoms like fever, chills, bleeding, skin rash and gait problem. No frequent infections.  Not unusually sensitive to heat or cold. No swelling of the ankles. No swollen glands.  Patient is anxious    /84 (BP Location: Left arm, Patient Position: Sitting, Cuff Size: Large)   Pulse 97   Temp (!) 97.3 °F (36.3 °C) (Temporal)   Resp 18   Ht 5' 4\" (1.626 m)   Wt 96.2 kg (212 lb)   SpO2 97%   BMI 36.39 kg/m²     Physical Exam:   Alert and oriented and not in distress.  Vital signs are above.  No icterus.  No oral thrush.  No palpable neck mass.  Clear lung fields.  Regular heart rate.  Soft and nontender abdomen.  No palpable abdominal mass.  No ascites.  No edema of the ankles.  No calf tenderness.  No focal neurological deficit.  No skin rash.  No palpable lymphadenopathy in the neck and axillary areas.  There is no clubbing, Patient is  anxious.  Performance status 1. No lymphedema.   .   IMAGING:  IMPRESSION: 02/24/2021     1.  No scintigraphic evidence of osseous metastasis.           Workstation performed: [id number]      Imaging     Ejection fraction 65% on 08/06/2022      Study Result    Narrative & Impression   CENTRAL  DXA SCAN on 03/08/2022     CLINICAL HISTORY:   56 year old post-menopausal  female risk factors include osteoporosis screening.Additional medical history: Breast cancer with mets to Hip , right Hip scanned.     TECHNIQUE: Bone densitometry was performed using a H[employer] Horizon W bone densitometer.  Regions of interest appear properly placed. There are   other confounding variables which " could limit the study.       Her elevated  BMI may influence the T score result.     Degenerative or osteoarthritic changes are noted on the spine image eliminating L4 from evaluation.     COMPARISON:  Follow-up     RESULTS:   LUMBAR SPINE:  L1-L3:  BMD 1.021 gm/cm2  T-score 0.0 and unchanged from 2019.  Z-score 1.0     RIGHT TOTAL HIP:  BMD 1.122 gm/cm2  T-score 1.5  Z-score 2.1     RIGHT FEMORAL NECK:  BMD 0.842 gm/cm2  T-score -0.1 and unchanged from 2019.  Z-score 0.9           IMPRESSION:  1. Based on the WHO classification, this study is normal and the patient is considered at low risk for fracture.       IMPRESSION:     Since August 2, 2022:  1.  Decreased size and conspicuity of the biopsy-proven hepatic metastasis.    2.  No new sites of metastatic disease in the abdomen.        Workstation performed: [id number]          Imaging    CT abdomen w contrast (Order: [id number]) - 11/1/2022    IMPRESSION:     1. Enlargement of the known caudate intrahepatic mass with ill-defined margins estimated to measure 2.1 x 2.7 on today's examination.  2. No new intrahepatic mass is evident.     Study marked significant in epic for notification purposes.     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 Multiplanar attenuation corrected and non attenuation corrected PET images are available for interpretation, and contiguous, low dose, axial CT sections were obtained from the skull base through the femurs.    Intravenous contrast material was not utilized.  This examination, like all CT scans performed in the [facility], was performed utilizing techniques to minimize radiation dose exposure, including the use of iterative reconstruction and   automated exposure control.     Fasting serum glucose: 117 mg/dl     FINDINGS:     VISUALIZED BRAIN:  No acute abnormalities are seen.     HEAD/NECK:  There is a physiologic distribution of FDG. No FDG avid cervical adenopathy is seen.  CT images: Unremarkable.     CHEST:  No FDG avid soft tissue lesions are seen.  CT images: Unremarkable.     ABDOMEN:  Known caudate liver metastatic lesion demonstrates hypermetabolism, SUV 9.1. This measures approximately 3.8 x 3.3 cm in axial dimensions based on the PET images.     Benign left adrenal adenoma measuring 2.2 x 2.4 cm, stable. There is associated FDG activity, SUV 3.1, typically physiologic.     No additional FDG avid soft tissue lesions are seen.     CT images: Minimal mesenteric haziness is unchanged.  PELVIS:  No FDG avid soft tissue lesions are seen.  CT images: Stable. Lobulated uterus suggesting fibroids     OSSEOUS STRUCTURES:  Stable appearance of lytic lesion in the left femoral intertrochanteric region, without significant FDG uptake.     No FDG avid lesions are seen.  CT images: No significant findings.     IMPRESSION:  1. Hypermetabolic caudate liver metastasis. No additional hypermetabolic lesions in the liver or upper abdomen.  2. No hypermetabolic metastases in the neck, chest, pelvis or osseous structures.              Workstation performed: [id number]        Imaging    NM PET CT skull base to mid thigh 11/20/23  MPRESSION:      1.  Interval decrease in size of caudate lobe lesion. No new hepatic lesions. No intra-abdominal lymphadenopathy.     2. Stable left adrenal adenoma.     3. Diffuse hepatic steatosis.        Electronically signed: 06/08/2024 12:42 PM Keith Mcnally MD  Imaging    CT abdomen w contrast (Order: [id number]) - 6/7/2024  LABS:            Results for orders placed or performed in visit on 10/24/24   CBC and differential    Collection Time: 10/24/24  5:35 PM   Result Value Ref Range    WBC 10.29 (H) 4.31 - 10.16 Thousand/uL    RBC 4.55 3.81 - 5.12 Million/uL    Hemoglobin 13.1 11.5 - 15.4 g/dL    Hematocrit 41.9 34.8 - 46.1 %    MCV 92 82 - 98 fL    MCH 28.8 26.8 - 34.3 pg    MCHC 31.3 (L) 31.4 - 37.4 g/dL    RDW 15.6 (H) 11.6 - 15.1 %    MPV 10.2 8.9 - 12.7 fL    Platelets 361 149 - 390 Thousands/uL    nRBC 0 /100 WBCs    Segmented % 59 43 - 75 %    Immature Grans % 1 0 - 2 %    Lymphocytes % 25 14 - 44 %    Monocytes % 9 4 - 12 %    Eosinophils Relative 5 0 - 6 %    Basophils Relative 1 0 - 1 %    Absolute Neutrophils 6.09 1.85 - 7.62 Thousands/µL    Absolute Immature Grans 0.07 0.00 - 0.20 Thousand/uL    Absolute Lymphocytes 2.57 0.60 - 4.47 Thousands/µL    Absolute Monocytes 0.95 0.17 - 1.22 Thousand/µL    Eosinophils Absolute 0.55 0.00 - 0.61 Thousand/µL    Basophils Absolute 0.06 0.00 - 0.10 Thousands/µL   Comprehensive metabolic panel    Collection Time: 10/24/24  5:35 PM   Result Value Ref Range    Sodium 143 135 - 147 mmol/L    Potassium 4.1 3.5 - 5.3 mmol/L    Chloride 108 96 - 108 mmol/L    CO2 31 21 - 32 mmol/L    ANION GAP 4 4 - 13 mmol/L    BUN 17 5 - 25 mg/dL    Creatinine 0.84 0.60 - 1.30 mg/dL    Glucose 120 65 - 140 mg/dL    Calcium 9.3 8.4 - 10.2 mg/dL    AST 25 13 - 39 U/L    ALT 39 7 - 52 U/L    Alkaline Phosphatase 85 34 - 104 U/L    Total Protein 6.8 6.4 - 8.4 g/dL    Albumin 4.1 3.5 - 5.0 g/dL    Total Bilirubin 0.31 0.20 - 1.00 mg/dL    eGFR 77 ml/min/1.73sq m       CBC and differential  Status: Final  result      Contains abnormal data CBC and differential  Order: [id number]   Status: Final result       Visible to patient: Yes (seen)       Next appt: 08/30/2024 at 02:00 PM in Infusion Therapy ([facility])       Dx: Malignant neoplasm of upper-inner helder...    0 Result Notes            Component  Ref Range & Units 8/1/24  6:11 PM 7/5/24  9:09 AM 6/14/24  8:19 AM 5/16/24  8:39 AM 4/3/24  1:55 PM 3/14/24  8:30 AM 2/22/24  8:20 AM   WBC  4.31 - 10.16 Thousand/uL 10.06 6.17 9.06 8.90 6.64 7.71 6.16   RBC  3.81 - 5.12 Million/uL 4.77 4.68 4.60 4.89 4.70 5.07 4.82   Hemoglobin  11.5 - 15.4 g/dL 13.5 12.9 12.9 13.9 12.7 13.6 12.9   Hematocrit  34.8 - 46.1 % 41.7 41.3 40.7 43.6 40.2 43.9 41.6   MCV  82 - 98 fL 87 88 89 89 86 87 86   MCH  26.8 - 34.3 pg 28.3 27.6 28.0 28.4 27.0 26.8 26.8   MCHC  31.4 - 37.4 g/dL 32.4 31.2 Low  31.7 31.9 31.6 31.0 Low  31.0 Low    RDW  11.6 - 15.1 % 15.3 High  15.2 High  15.1 15.9 High  15.9 High  15.9 High  14.9   MPV  8.9 - 12.7 fL 10.8 10.3 11.7 12.0 10.6 11.2 10.5   Platelets  149 - 390 Thousands/uL 414 High  488 High  324 291 487 High  276 605 High    nRBC  /100 WBCs 0 0 0 0 0 0 0   Segmented %  43 - 75 % 53 47 58 60 45 52 46   Immature Grans %  0 - 2 % 1 1 1 0 1 0 2   Lymphocytes %  14 - 44 % 29 34 25 27 38 32 34   Monocytes %  4 - 12 % 9 10 8 8 11 7 12   Eosinophils Relative  0 - 6 % 7 High  7 High  7 High  4 4 8 High  5   Basophils Relative  0 - 1 % 1 1 1 1 1 1 1   Absolute Neutrophils  1.85 - 7.62 Thousands/µL 5.49 2.90 5.31 5.33 3.02 4.05 2.79   Absolute Immature Grans  0.00 - 0.20 Thousand/uL 0.07 0.04 0.06 0.03 0.03 0.02 0.10   Absolute Lymphocytes  0.60 - 4.47 Thousands/µL 2.87 2.12 2.30 2.43 2.54 2.44 2.11   Absolute Monocytes  0.17 - 1.22 Thousand/µL 0.90 0.60 0.75 0.69 0.76 0.55 0.76   Eosinophils Absolute  0.00 - 0.61 Thousand/µL 0.66 High  0.45 0.59 0.37 0.24 0.59 0.33   Basophils Absolute  0.00 - 0.10 Thousands/µL 0.07 0.06 0.05 0.05 0.05 0.06 0.07               Specimen Collected: 08/01/24  6:11 PM Last Resulted: 08/01/24  7:01 PM             Comprehensive metabolic panel  Status: Final result     Comprehensive metabolic panel  Order: [id number]   Status: Final result       Visible to patient: Yes (seen)       Next appt: 08/30/2024 at 02:00 PM in Infusion Therapy ([facility])       Dx: Malignant neoplasm of upper-inner helder...    0 Result Notes            Component  Ref Range & Units 8/1/24  6:11 PM 7/5/24  9:09 AM 6/14/24  8:19 AM 5/16/24  8:39 AM 4/3/24  1:55 PM 3/14/24  8:30 AM 2/22/24  8:20 AM   Sodium  135 - 147 mmol/L 138 141 140 138 140 140 141   Potassium  3.5 - 5.3 mmol/L 3.8 4.3 4.1 4.2 3.8 4.1 3.9   Chloride  96 - 108 mmol/L 104 109 High  110 High  105 108 107 109 High    CO2  21 - 32 mmol/L 27 28 27 29 27 28 28   ANION GAP  4 - 13 mmol/L 7 4 3 Low  4 5 5 4 R   BUN  5 - 25 mg/dL 19 18 15 22 13 17 17   Creatinine  0.60 - 1.30 mg/dL 1.00 0.86 CM 0.76 CM 0.89 CM 0.83 CM 0.80 CM 0.77 CM   Comment: Standardized to IDMS reference method   Glucose  65 - 140 mg/dL 107 136  High   CM 91  High   CM   Comment: If the patient is fasting, the ADA then defines impaired fasting glucose as > 100 mg/dL and diabetes as > or equal to 123 mg/dL.   Calcium  8.4 - 10.2 mg/dL 9.6 9.2 9.1 9.5 9.1 9.2 9.1   AST  13 - 39 U/L 27 24 22 20 22 19 20   ALT  7 - 52 U/L 42 43 CM 32 CM 31 CM 39 CM 28 CM 39 CM   Comment: Specimen collection should occur prior to Sulfasalazine administration due to the potential for falsely depressed results.   Alkaline Phosphatase  34 - 104 U/L 87 90 89 83 80 74 82   Total Protein  6.4 - 8.4 g/dL 7.2 6.7 6.5 6.9 6.9 6.8 6.5   Albumin  3.5 - 5.0 g/dL 4.1 3.9 3.7 4.1 4.0 4.0 3.6   Total Bilirubin  0.20 - 1.00 mg/dL 0.40 0.33 CM 0.33 CM 0.52 CM 0.27 CM 0.47 CM 0.28 CM   Comment: Use of this assay is not recommended for patients undergoing treatment with eltrombopag due to the potential for falsely elevated  results.  N-acetyl-p-benzoquinone imine (metabolite of Acetaminophen) will generate erroneously low results in samples for patients that have taken an overdose of Acetaminophen.   eGFR  ml/min/1.73sq m 63 75 87 72 79 82 86                       Labs, Imaging, & Other studies:   All pertinent labs and imaging studies were personally reviewed        Reviewed  test results and discussed with patient and explained.    Assessment and plan:    Follow-up visit for stage IV triple positive stage, ER positive, IL positive and HER2 positive breast cancer and she has metastatic disease to liver and bones and presently patient is on Enhertu and letrozole.  She had Xgeva previously.  She had radiation to left hip for palliation.  De brandon stage IV triple positive left breast cancer with metastatic disease to liver and bones was diagnosed in 2016.  Initial treatment was THP, Taxotere, Herceptin and Perjeta.  Patient had THP for 6 cycles and then Taxotere was dropped and she was continued on Herceptin and Perjeta and to that tamoxifen was added.  Patient  was premenopausal at that time.  Later tamoxifen was changed to letrozole after she had  BSO and she tested positive for BRCA1.  She had Xgeva. She  had radiation to left hip for palliation.  She had very good response  and in September 2016 patient had lumpectomy of left breast followed by radiation therapy.  At the time of lumpectomy there was a small residual disease, 1.4 cm.  Lymph nodes were not sampled.   Patient tested positive for BRCA 1.   She has been getting imaging studies for early detection of other cancers like peritoneal and pancreatic cancers.  She has been encouraged to see a dermatologist but she does not want to.  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   She has been taking vitamin-D and calcium and is staying active.  She is not on Xgeva anymore that she had for 2 years.   She was tolerating treatments without much problem and  was responding to treatments.    In 2022 there was a new lesion in the liver on CT scan and MRI scan and on biopsy that proved to be metastatic cancer from breast, strongly positive for ER, negative for OK and 2+ for HER 2 that was negative by FISH (low positive HER2).  Patient saw radiation oncologist for local radiation to liver lesion and discussed risks and benefits and decided not to have liver directed therapy. Subsequent CT scan showed slight decrease in size of liver lesion from 2.7 to 2.4.  She preferred to stay on letrozole, Herceptin and Perjeta.  We did discuss ENHERTU.  Subsequent CT scan  showed increase in the size of liver lesion.  She was referred to back to radiation for liver directed therapy.  SBRT was considered but was not given.    On 1/30/2024 patient was started on Enhertu at a dose of 5.4 mg/kg = 500 mg.  She tolerated that poorly. She had GI symptoms, increased tiredness and blurred vision.  Symptoms improved after 10 days.  She missed treatment in February 2024.  Next treatment was on 3/15/2024 she tolerated that better but she was upset because she had lost hair.  She was thinking of changing treatment for that reason.  She agreed to have lower dose , 80% of the dose, 400 mg.  Present doses 4.4 mg/kg (discussed with patient).  Patient has nausea and she feels tired.  She is working full-time.    Patient wants to skip treatment on 11/22/2024  because she will have people coming to her house at [facility].     No pulmonary symptoms.  Prior to Enhertu she was on  Herceptin plus Perjeta and letrozole.  She  stays on letrozole.  Patient goes for echocardiogram.  She is scheduled to have MRI scan of abdomen/liver.         Patient knows that she is at high risk for breast and other cancers like  ovarian cancer, pancreatic cancer, peritoneal cancer, melanoma and other cancers.  Patient goes to breast surgeon for evaluation and imaging studies.  She gets CT scan or MRI scan and that  checks the  pancreas and peritoneum.  She had BSO.  She does not want to go to a dermatologist to be checked for melanoma or to ophthalmologist to be checked for melanoma in the eye.  She goes to her optometrist.  Also she does not want to have colonoscopy and not even Cologuard or stool test for blood.      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  Occasionally mild low back pain.  She continues to take  calcium and vitamin-D  .      Physical examination and test results are as recorded and discussed.  Patient is responding to therapy.  No change in therapy.    She gets  echocardiogram on regular basis.  Condition discussed and explained.  Questions answered.  Also discussed the importance of self-breast examination, eating healthy foods, staying active and health screening tests.  Patient goes to her breast surgeon for examination and  imaging studies.  Patient is capable of self-care.  Goal is prolonged survival from stage IV breast cancer.  Patient has been aware of significance of positive BRCA1 mutation and cancer risks.  She has shared this information of positive BRCA1 with her family members.  Patient will continue to follow with her primary physician and other consultants.  Discussed precautions against coronavirus and flu and other infections.  As mentioned above patient wants to miss her treatment on 11/22/2024  .  See diagnoses, orders and instructions below  1. Malignant neoplasm of upper-inner quadrant of left breast in female, estrogen receptor positive (HCC)      2. Malignant neoplasm metastatic to liver (HCC)      3. Malignant neoplasm metastatic to bone (HCC)      4. BRCA1 gene mutation positive in female      5. History of bilateral salpingo-oophorectomy  (BSO)      6. Chemotherapy induced neutropenia (HCC)      7. Chemotherapy-induced nausea      8. 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      9. 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 Osteoporosis due to aromatase inhibitor      11. Port-A-Cath in place    Please skip chemotherapy on 11/22/2024 (patient's request).  Patient has standing orders for blood work and chemotherapy.  Follow-up in 10 weeks.              I used dictation device to dictated this note and there could be mistakes in my note and for that she may contact my office    Patient voiced understanding and agreed       Counseling / Coordination of Care   .  Provided counseling and support

## 2024-10-30 NOTE — TELEPHONE ENCOUNTER
Please skip chemotherapy on 11/22/2024.  Patient has standing orders for blood work - per DR Benavidez's checkout note

## 2024-10-31 NOTE — PROGRESS NOTES
Labs collected via port a cath. Labs sent for processing via stat .  Patient is aware  to return at 1400 today for infusion treatment.    Restylane Price Per Syringe: 500 Discount Percentage: 0 Notice: We have created a more complete Cosmetic Quote plan.  The procedure name is also Cosmetic Quote.  Please review the new plan and hide the Cosmetic Quote plan you do not want to use. Botox Price Per Unit: 15 Detail Level: Simple

## 2024-11-05 ENCOUNTER — HOSPITAL ENCOUNTER (OUTPATIENT)
Dept: NON INVASIVE DIAGNOSTICS | Facility: HOSPITAL | Age: 59
Discharge: HOME | End: 2024-11-05
Attending: INTERNAL MEDICINE
Payer: COMMERCIAL

## 2024-11-05 VITALS
SYSTOLIC BLOOD PRESSURE: 136 MMHG | BODY MASS INDEX: 36.19 KG/M2 | WEIGHT: 212 LBS | DIASTOLIC BLOOD PRESSURE: 84 MMHG | HEIGHT: 64 IN | HEART RATE: 65 BPM

## 2024-11-05 DIAGNOSIS — T45.1X5A CARDIOMYOPATHY DUE TO CHEMOTHERAPY (HCC): ICD-10-CM

## 2024-11-05 DIAGNOSIS — I42.7 CARDIOMYOPATHY DUE TO CHEMOTHERAPY (HCC): ICD-10-CM

## 2024-11-05 PROCEDURE — 93325 DOPPLER ECHO COLOR FLOW MAPG: CPT | Performed by: INTERNAL MEDICINE

## 2024-11-05 PROCEDURE — 93321 DOPPLER ECHO F-UP/LMTD STD: CPT | Performed by: INTERNAL MEDICINE

## 2024-11-05 PROCEDURE — 93321 DOPPLER ECHO F-UP/LMTD STD: CPT

## 2024-11-05 PROCEDURE — 93308 TTE F-UP OR LMTD: CPT | Performed by: INTERNAL MEDICINE

## 2024-11-05 PROCEDURE — 93325 DOPPLER ECHO COLOR FLOW MAPG: CPT

## 2024-11-05 PROCEDURE — 93308 TTE F-UP OR LMTD: CPT

## 2024-11-06 LAB
APICAL FOUR CHAMBER EJECTION FRACTION: 73 %
BSA FOR ECHO PROCEDURE: 2.01 M2
SL CV LV EF: 70
TR MAX PG: 14 MMHG
TR PEAK VELOCITY: 1.8 M/S
TRICUSPID VALVE PEAK REGURGITATION VELOCITY: 1.84 M/S

## 2024-11-26 ENCOUNTER — PATIENT OUTREACH (OUTPATIENT)
Dept: CASE MANAGEMENT | Facility: OTHER | Age: 59
End: 2024-11-26

## 2024-11-26 NOTE — PROGRESS NOTES
[facility] has not received call from patient since last outreach. Should patient have additional needs, please feel free to outreach to patient, please re-refer.

## 2024-12-15 RX ORDER — DEXTROSE MONOHYDRATE 50 MG/ML
20 INJECTION, SOLUTION INTRAVENOUS ONCE
Status: CANCELLED | OUTPATIENT
Start: 2024-12-20

## 2024-12-17 DIAGNOSIS — C79.51 MALIGNANT NEOPLASM METASTATIC TO BONE (HCC): ICD-10-CM

## 2024-12-17 DIAGNOSIS — Z90.79 HISTORY OF BILATERAL SALPINGO-OOPHORECTOMY (BSO): ICD-10-CM

## 2024-12-17 DIAGNOSIS — Z17.0 MALIGNANT NEOPLASM OF UPPER-INNER QUADRANT OF LEFT BREAST IN FEMALE, ESTROGEN RECEPTOR POSITIVE (HCC): ICD-10-CM

## 2024-12-17 DIAGNOSIS — D70.1 CHEMOTHERAPY INDUCED NEUTROPENIA (HCC): ICD-10-CM

## 2024-12-17 DIAGNOSIS — C78.7 MALIGNANT NEOPLASM METASTATIC TO LIVER (HCC): Primary | ICD-10-CM

## 2024-12-17 DIAGNOSIS — C50.212 MALIGNANT NEOPLASM OF UPPER-INNER QUADRANT OF LEFT BREAST IN FEMALE, ESTROGEN RECEPTOR POSITIVE (HCC): ICD-10-CM

## 2024-12-17 DIAGNOSIS — Z90.722 HISTORY OF BILATERAL SALPINGO-OOPHORECTOMY (BSO): ICD-10-CM

## 2024-12-17 DIAGNOSIS — T45.1X5A CHEMOTHERAPY INDUCED NEUTROPENIA (HCC): ICD-10-CM

## 2024-12-17 RX ORDER — DEXTROSE MONOHYDRATE 50 MG/ML
20 INJECTION, SOLUTION INTRAVENOUS ONCE
Status: CANCELLED | OUTPATIENT
Start: 2025-01-17

## 2024-12-18 RX ORDER — DEXTROSE MONOHYDRATE 50 MG/ML
20 INJECTION, SOLUTION INTRAVENOUS ONCE
Status: CANCELLED | OUTPATIENT
Start: 2025-01-17

## 2024-12-19 DIAGNOSIS — T45.1X5A CHEMOTHERAPY INDUCED NEUTROPENIA (HCC): ICD-10-CM

## 2024-12-19 DIAGNOSIS — C50.212 MALIGNANT NEOPLASM OF UPPER-INNER QUADRANT OF LEFT BREAST IN FEMALE, ESTROGEN RECEPTOR POSITIVE (HCC): ICD-10-CM

## 2024-12-19 DIAGNOSIS — C78.7 MALIGNANT NEOPLASM METASTATIC TO LIVER (HCC): Primary | ICD-10-CM

## 2024-12-19 DIAGNOSIS — C79.51 MALIGNANT NEOPLASM METASTATIC TO BONE (HCC): ICD-10-CM

## 2024-12-19 DIAGNOSIS — Z90.79 HISTORY OF BILATERAL SALPINGO-OOPHORECTOMY (BSO): ICD-10-CM

## 2024-12-19 DIAGNOSIS — Z90.722 HISTORY OF BILATERAL SALPINGO-OOPHORECTOMY (BSO): ICD-10-CM

## 2024-12-19 DIAGNOSIS — D70.1 CHEMOTHERAPY INDUCED NEUTROPENIA (HCC): ICD-10-CM

## 2024-12-19 DIAGNOSIS — Z17.0 MALIGNANT NEOPLASM OF UPPER-INNER QUADRANT OF LEFT BREAST IN FEMALE, ESTROGEN RECEPTOR POSITIVE (HCC): ICD-10-CM

## 2024-12-20 ENCOUNTER — HOSPITAL ENCOUNTER (OUTPATIENT)
Dept: INFUSION CENTER | Facility: CLINIC | Age: 59
Discharge: HOME | End: 2024-12-20

## 2025-01-09 ENCOUNTER — TELEPHONE (OUTPATIENT)
Dept: HEMATOLOGY ONCOLOGY | Facility: CLINIC | Age: 60
End: 2025-01-09

## 2025-01-09 NOTE — TELEPHONE ENCOUNTER
Spoke with patient in regards to obtaining labwork prior to upcoming appointment with Dr. Benavidez on 1/15.  Advised patient labs are non fasting and in system.  Patient verbalized understanding.

## 2025-01-10 RX ORDER — DEXTROSE MONOHYDRATE 50 MG/ML
20 INJECTION, SOLUTION INTRAVENOUS ONCE
OUTPATIENT
Start: 2025-01-24

## 2025-01-14 ENCOUNTER — APPOINTMENT (OUTPATIENT)
Dept: LAB | Facility: HOSPITAL | Age: 60
End: 2025-01-14
Payer: COMMERCIAL

## 2025-01-14 DIAGNOSIS — C50.212 MALIGNANT NEOPLASM OF UPPER-INNER QUADRANT OF LEFT BREAST IN FEMALE, ESTROGEN RECEPTOR POSITIVE (HCC): ICD-10-CM

## 2025-01-14 DIAGNOSIS — Z90.722 HISTORY OF BILATERAL SALPINGO-OOPHORECTOMY (BSO): ICD-10-CM

## 2025-01-14 DIAGNOSIS — Z90.79 HISTORY OF BILATERAL SALPINGO-OOPHORECTOMY (BSO): ICD-10-CM

## 2025-01-14 DIAGNOSIS — C78.7 MALIGNANT NEOPLASM METASTATIC TO LIVER (HCC): ICD-10-CM

## 2025-01-14 DIAGNOSIS — Z17.0 MALIGNANT NEOPLASM OF UPPER-INNER QUADRANT OF LEFT BREAST IN FEMALE, ESTROGEN RECEPTOR POSITIVE (HCC): ICD-10-CM

## 2025-01-14 DIAGNOSIS — T45.1X5A CHEMOTHERAPY INDUCED NEUTROPENIA (HCC): ICD-10-CM

## 2025-01-14 DIAGNOSIS — D70.1 CHEMOTHERAPY INDUCED NEUTROPENIA (HCC): ICD-10-CM

## 2025-01-14 DIAGNOSIS — C79.51 MALIGNANT NEOPLASM METASTATIC TO BONE (HCC): ICD-10-CM

## 2025-01-14 LAB
ALBUMIN SERPL BCG-MCNC: 4.4 G/DL (ref 3.5–5)
ALP SERPL-CCNC: 92 U/L (ref 34–104)
ALT SERPL W P-5'-P-CCNC: 32 U/L (ref 7–52)
ANION GAP SERPL CALCULATED.3IONS-SCNC: 6 MMOL/L (ref 4–13)
AST SERPL W P-5'-P-CCNC: 23 U/L (ref 13–39)
BASOPHILS # BLD AUTO: 0.06 THOUSANDS/ΜL (ref 0–0.1)
BASOPHILS NFR BLD AUTO: 1 % (ref 0–1)
BILIRUB SERPL-MCNC: 0.45 MG/DL (ref 0.2–1)
BUN SERPL-MCNC: 19 MG/DL (ref 5–25)
CALCIUM SERPL-MCNC: 9.7 MG/DL (ref 8.4–10.2)
CHLORIDE SERPL-SCNC: 103 MMOL/L (ref 96–108)
CO2 SERPL-SCNC: 29 MMOL/L (ref 21–32)
CREAT SERPL-MCNC: 0.83 MG/DL (ref 0.6–1.3)
EOSINOPHIL # BLD AUTO: 0.28 THOUSAND/ΜL (ref 0–0.61)
EOSINOPHIL NFR BLD AUTO: 3 % (ref 0–6)
ERYTHROCYTE [DISTWIDTH] IN BLOOD BY AUTOMATED COUNT: 13.7 % (ref 11.6–15.1)
GFR SERPL CREATININE-BSD FRML MDRD: 78 ML/MIN/1.73SQ M
GLUCOSE SERPL-MCNC: 88 MG/DL (ref 65–140)
HCT VFR BLD AUTO: 44.9 % (ref 34.8–46.1)
HGB BLD-MCNC: 14.3 G/DL (ref 11.5–15.4)
IMM GRANULOCYTES # BLD AUTO: 0.04 THOUSAND/UL (ref 0–0.2)
IMM GRANULOCYTES NFR BLD AUTO: 0 % (ref 0–2)
LYMPHOCYTES # BLD AUTO: 2.66 THOUSANDS/ΜL (ref 0.6–4.47)
LYMPHOCYTES NFR BLD AUTO: 27 % (ref 14–44)
MCH RBC QN AUTO: 27.8 PG (ref 26.8–34.3)
MCHC RBC AUTO-ENTMCNC: 31.8 G/DL (ref 31.4–37.4)
MCV RBC AUTO: 87 FL (ref 82–98)
MONOCYTES # BLD AUTO: 0.98 THOUSAND/ΜL (ref 0.17–1.22)
MONOCYTES NFR BLD AUTO: 10 % (ref 4–12)
NEUTROPHILS # BLD AUTO: 5.91 THOUSANDS/ΜL (ref 1.85–7.62)
NEUTS SEG NFR BLD AUTO: 59 % (ref 43–75)
NRBC BLD AUTO-RTO: 0 /100 WBCS
PLATELET # BLD AUTO: 344 THOUSANDS/UL (ref 149–390)
PMV BLD AUTO: 10 FL (ref 8.9–12.7)
POTASSIUM SERPL-SCNC: 4.1 MMOL/L (ref 3.5–5.3)
PROT SERPL-MCNC: 7.7 G/DL (ref 6.4–8.4)
RBC # BLD AUTO: 5.15 MILLION/UL (ref 3.81–5.12)
SODIUM SERPL-SCNC: 138 MMOL/L (ref 135–147)
WBC # BLD AUTO: 9.93 THOUSAND/UL (ref 4.31–10.16)

## 2025-01-14 PROCEDURE — 80053 COMPREHEN METABOLIC PANEL: CPT

## 2025-01-14 PROCEDURE — 85025 COMPLETE CBC W/AUTO DIFF WBC: CPT

## 2025-01-14 PROCEDURE — 36415 COLL VENOUS BLD VENIPUNCTURE: CPT

## 2025-01-15 ENCOUNTER — OFFICE VISIT (OUTPATIENT)
Dept: HEMATOLOGY ONCOLOGY | Facility: CLINIC | Age: 60
End: 2025-01-15
Payer: COMMERCIAL

## 2025-01-15 ENCOUNTER — TELEPHONE (OUTPATIENT)
Dept: HEMATOLOGY ONCOLOGY | Facility: CLINIC | Age: 60
End: 2025-01-15

## 2025-01-15 VITALS
SYSTOLIC BLOOD PRESSURE: 154 MMHG | RESPIRATION RATE: 18 BRPM | HEART RATE: 82 BPM | WEIGHT: 218 LBS | OXYGEN SATURATION: 97 % | TEMPERATURE: 97.2 F | HEIGHT: 64 IN | DIASTOLIC BLOOD PRESSURE: 86 MMHG | BODY MASS INDEX: 37.22 KG/M2

## 2025-01-15 DIAGNOSIS — T45.1X5A CHEMOTHERAPY INDUCED NEUTROPENIA (HCC): ICD-10-CM

## 2025-01-15 DIAGNOSIS — R11.0 CHEMOTHERAPY-INDUCED NAUSEA: ICD-10-CM

## 2025-01-15 DIAGNOSIS — Z14.8 CARRIER OF GENE MUTATION FOR HIGH RISK OF CANCER: ICD-10-CM

## 2025-01-15 DIAGNOSIS — Z17.0 MALIGNANT NEOPLASM OF UPPER-INNER QUADRANT OF LEFT BREAST IN FEMALE, ESTROGEN RECEPTOR POSITIVE (HCC): Primary | ICD-10-CM

## 2025-01-15 DIAGNOSIS — Z15.02 BRCA1 GENE MUTATION POSITIVE IN FEMALE: ICD-10-CM

## 2025-01-15 DIAGNOSIS — M81.8 OSTEOPOROSIS DUE TO AROMATASE INHIBITOR: ICD-10-CM

## 2025-01-15 DIAGNOSIS — T45.1X5A CARDIOMYOPATHY DUE TO CHEMOTHERAPY (HCC): ICD-10-CM

## 2025-01-15 DIAGNOSIS — I42.7 CARDIOMYOPATHY DUE TO CHEMOTHERAPY (HCC): ICD-10-CM

## 2025-01-15 DIAGNOSIS — C79.51 MALIGNANT NEOPLASM METASTATIC TO BONE (HCC): ICD-10-CM

## 2025-01-15 DIAGNOSIS — Z15.01 BRCA1 GENE MUTATION POSITIVE IN FEMALE: ICD-10-CM

## 2025-01-15 DIAGNOSIS — Z90.722 HISTORY OF BILATERAL SALPINGO-OOPHORECTOMY (BSO): ICD-10-CM

## 2025-01-15 DIAGNOSIS — Z90.79 HISTORY OF BILATERAL SALPINGO-OOPHORECTOMY (BSO): ICD-10-CM

## 2025-01-15 DIAGNOSIS — Z15.09 BRCA1 GENE MUTATION POSITIVE IN FEMALE: ICD-10-CM

## 2025-01-15 DIAGNOSIS — C78.7 MALIGNANT NEOPLASM METASTATIC TO LIVER (HCC): ICD-10-CM

## 2025-01-15 DIAGNOSIS — T45.1X5A CHEMOTHERAPY-INDUCED NAUSEA: ICD-10-CM

## 2025-01-15 DIAGNOSIS — Z95.828 PORT-A-CATH IN PLACE: ICD-10-CM

## 2025-01-15 DIAGNOSIS — T38.6X5A OSTEOPOROSIS DUE TO AROMATASE INHIBITOR: ICD-10-CM

## 2025-01-15 DIAGNOSIS — D70.1 CHEMOTHERAPY INDUCED NEUTROPENIA (HCC): ICD-10-CM

## 2025-01-15 DIAGNOSIS — C50.212 MALIGNANT NEOPLASM OF UPPER-INNER QUADRANT OF LEFT BREAST IN FEMALE, ESTROGEN RECEPTOR POSITIVE (HCC): Primary | ICD-10-CM

## 2025-01-15 PROCEDURE — 99214 OFFICE O/P EST MOD 30 MIN: CPT | Performed by: INTERNAL MEDICINE

## 2025-01-15 NOTE — TELEPHONE ENCOUNTER
Good afternoon per provider please switch patients Enhertu treatment from this Friday to next Friday and after that she would like to have treatment once every 4 weeks. Patients blood work done yesterday is good for next weeks treatment.    Thank you

## 2025-01-15 NOTE — PATIENT INSTRUCTIONS
Please switch Enhertu treatment from this Friday to next Friday and after that she would like to have treatment once every 4 weeks.  For next week treatment we could use the blood test that was done yesterday.  Patient has ordered for MRI of the abdomen and please schedule that in the open MRI within a month.  Patient has standing orders for blood work prior to chemotherapy.  Follow-up in 2 months.

## 2025-01-15 NOTE — ASSESSMENT & PLAN NOTE
Counts are being checked     
Patient is being treated with enhertu.  She wants to delay treatment this Friday by 1 week because he is going to be spending weekend with her daughter and usually patient gets sick for 2 or 3 days post chemotherapy.  She gets very tired and nauseous and gets headache.  Also she wants treatments to be changed to once every 4 weeks.  Patient did not have treatment in November and December 2024 and she felt much better not being on treatment.  She receives reduced dose, 4.4 mg/kg because that 5.4 mg/kg dose she was very sick.  In addition she is also on letrozole.  She had Xgeva previously     
Patient is being treated with enhertu.  She wants to delay treatment this Friday by 1 week because he is going to be spending weekend with her daughter and usually patient gets sick for 2 or 3 days post chemotherapy.  She gets very tired and nauseous and gets headache.  Also she wants treatments to be changed to once every 4 weeks.  Patient did not have treatment in November and December 2024 and she felt much better not being on treatment.  She receives reduced dose, 4.4 mg/kg because that 5.4 mg/kg dose she was very sick.  In addition she is also on letrozole.  She had Xgeva previously  Orders:  •  CBC and differential; Future  
Patient is being treated with enhertu.  She wants to delay treatment this Friday by 1 week because he is going to be spending weekend with her daughter and usually patient gets sick for 2 or 3 days post chemotherapy.  She gets very tired and nauseous and gets headache.  Also she wants treatments to be changed to once every 4 weeks.  Patient did not have treatment in November and December 2024 and she felt much better not being on treatment.  She receives reduced dose, 4.4 mg/kg because that 5.4 mg/kg dose she was very sick.  In addition she is also on letrozole.  She had Xgeva previously  Orders:  •  Mammo diagnostic bilateral w 3d and cad; Future  
She gets CT abdomen for pancreas but not MRI abdomen.  She wants to try open MRI .  She does not go to to her dermatologist and she does not want to.  She had BSO     
She gets CT abdomen for pancreas but not MRI abdomen.  She wants to try open MRI .  She does not go to to her dermatologist and she does not want to.  She had BSO  Orders:  •  Mammo diagnostic bilateral w 3d and cad; Future  
She gets that flushed.     
She has BRCA1 gene mutation     
She has antinausea medication     
She has been on calcium and vitamin D and she had Xgeva previously     
This is because of Enhertu  Orders:  •  Echo follow up/limited w/ contrast if indicated; Future  
[Negative] : Heme/Lymph

## 2025-01-15 NOTE — PROGRESS NOTES
Name: Alejandrina Barba      : 1965      MRN: [id number]  Encounter Provider: Sicnere Benavidez MD  Encounter Date: 1/15/2025   Encounter department: [facility] [place].        Visit on 1/15/2025  :  Visit on 1/15/2025  Assessment & Plan  Malignant neoplasm of upper-inner quadrant of left breast in female, estrogen receptor positive (HCC)  Patient is being treated with enhertu.  She wants to delay treatment this Friday by 1 week because he is going to be spending weekend with her daughter and usually patient gets sick for 2 or 3 days post chemotherapy.  She gets very tired and nauseous and gets headache.  Also she wants treatments to be changed to once every 4 weeks.  Patient did not have treatment in November and 2024 and she felt much better not being on treatment.  She receives reduced dose, 4.4 mg/kg because that 5.4 mg/kg dose she was very sick.  In addition she is also on letrozole.  She had Xgeva previously  Orders:  •  Mammo diagnostic bilateral w 3d and cad; Future    Malignant neoplasm metastatic to liver (HCC)  Patient is being treated with enhertu.  She wants to delay treatment this Friday by 1 week because he is going to be spending weekend with her daughter and usually patient gets sick for 2 or 3 days post chemotherapy.  She gets very tired and nauseous and gets headache.  Also she wants treatments to be changed to once every 4 weeks.  Patient did not have treatment in November and 2024 and she felt much better not being on treatment.  She receives reduced dose, 4.4 mg/kg because that 5.4 mg/kg dose she was very sick.  In addition she is also on letrozole.  She had Xgeva previously  Orders:  •  CBC and differential; Future    Malignant neoplasm metastatic to bone (HCC)  Patient is being treated with enhertu.  She wants to delay treatment this Friday by 1 week because he is going to be spending weekend with her daughter and usually patient gets  sick for 2 or 3 days post chemotherapy.  She gets very tired and nauseous and gets headache.  Also she wants treatments to be changed to once every 4 weeks.  Patient did not have treatment in November and December 2024 and she felt much better not being on treatment.  She receives reduced dose, 4.4 mg/kg because that 5.4 mg/kg dose she was very sick.  In addition she is also on letrozole.  She had Xgeva previously       BRCA1 gene mutation positive in female  She gets CT abdomen for pancreas but not MRI abdomen.  She wants to try open MRI .  She does not go to to her dermatologist and she does not want to.  She had BSO  Orders:  •  Mammo diagnostic bilateral w 3d and cad; Future    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  She gets CT abdomen for pancreas but not MRI abdomen.  She wants to try open MRI .  She does not go to to her dermatologist and she does not want to.  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         Please switch Enhertu treatment from this Friday to next Friday and after that she would like to have treatment once every 4 weeks.  For next week treatment we could use the blood test that was done yesterday.  Patient has order for MRI of the abdomen and please schedule that in the open MRI within a month.  Patient has standing orders for blood work prior to chemotherapy.  Follow-up in 2 months.  Patient did not have Enhertu chemotherapy treatment in November and December 2024 and she felt much better and  she would prefer to have treatment once every 4 weeks.  So far patient has responded to treatments.  No change in therapy except schedule is being changed to once every 4 weeks at her request.  Discussed the importance of eating healthy foods, staying active and health screening test.  Patient is capable of self-care.  Goal is prolongation of progression free and overall survival.  All discussed in detail.  Questions answered.  Patient will continue to follow with her primary physician and other consultants.  Provided counseling and support.  I used a dictation device to dictate this note and there could be mistakes in my note and for that patient may contact my office.    History of Present Illness   Chief Complaint   Patient presents with   • Follow-up   2016 patient was diagnosed to have de brandon stage IV triple positive left breast cancer with metastatic disease to liver and bones .  Initial treatment was THP, Taxotere, Herceptin and Perjeta.  Patient had THP for 6 cycles and then Taxotere was dropped and she was continued on Herceptin and Perjeta and to that tamoxifen was added.  Patient  was premenopausal at that time.  Later tamoxifen was changed to letrozole after she had  BSO and she tested positive for BRCA1.  She had Xgeva. She  had radiation to left hip for palliation.  She had very good response  and in September 2016 patient had lumpectomy of left breast followed by radiation therapy.  At the time of lumpectomy there was a small residual disease, 1.4 cm.  Lymph nodes were not sampled.   Patient tested positive for BRCA 1.   She has been getting imaging studies for early detection of other cancers like peritoneal and pancreatic cancers.  She has been encouraged to see a dermatologist but she does not want to.  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   She has been taking vitamin-D and calcium and is staying active.  She is not on Xgeva anymore that she had for 2 years.    She was tolerating treatments without much problem and was responding to treatments.    In 2022 there was a new lesion in the liver on CT scan and MRI scan and on biopsy that proved to be metastatic cancer from breast, strongly positive for ER, negative for AK and 2+ for HER 2 that was negative by FISH (low positive HER2).  Patient saw radiation oncologist for local radiation to liver lesion and discussed risks and benefits and decided not to have liver directed therapy. Subsequent CT scan showed slight decrease in size of liver lesion from 2.7 to 2.4.  She preferred to stay on letrozole, Herceptin and Perjeta.  We did discuss ENHERTU.  Subsequent CT scan  showed increase in the size of liver lesion.  She was referred to back to radiation for liver directed therapy.  SBRT was considered but was not given.    On 1/30/2024 patient was started on Enhertu at a dose of 5.4 mg/kg = 500 mg.  She tolerated that poorly. She had GI symptoms, increased tiredness and blurred vision.  Symptoms improved after 10 days.  She missed treatment in February 2024.  Next treatment was on 3/15/2024 she tolerated that better but she was upset because she had lost hair.  She was thinking of changing treatment for that reason.  She agreed to have lower dose , 80% of the dose, 400 mg.  Present doses 4.4 mg/kg (discussed with patient).  Patient has nausea and she feels tired.  She is working full-time.    Patient wants to skip treatment on 11/22/2024  because she will have people coming to her house at [facility].     No pulmonary symptoms.  Prior to Enhertu she was on  Herceptin plus Perjeta and letrozole.  She  stays on letrozole.  Patient goes for echocardiogram.  She is scheduled to have MRI scan of abdomen/liver.         Patient knows that she is at high risk for breast and other cancers like  ovarian cancer, pancreatic cancer, peritoneal cancer, melanoma and other cancers.  Patient goes to breast surgeon for evaluation and imaging  studies.  She gets CT scan or MRI scan and that  checks the pancreas and peritoneum.  She had BSO.  She does not want to go to a dermatologist to be checked for melanoma or to ophthalmologist to be checked for melanoma in the eye.  She goes to her optometrist.  Also she does not want to have colonoscopy and not even Cologuard or stool test for blood.      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  Occasionally mild low back pain.  She continues to take  calcium and vitamin-D  .  She wants to delay treatment this Friday by 1 week because he is going to be spending weekend with her daughter and usually patient gets sick for 2 or 3 days post chemotherapy.  She gets very tired and nauseous and gets headache.  Also she wants treatments to be changed to once every 4 weeks.  Patient did not have treatment in November and December 2024 and she felt much better not being on treatment.  She receives reduced dose, 4.4 mg/kg because that 5.4 mg/kg dose she was very sick.  In addition she is also on letrozole.  She had Xgeva previously  Oncology History   Cancer Staging   Malignant neoplasm of upper-inner quadrant of left female breast (HCC)  Staging form: Breast, AJCC 8th Edition  - Clinical: Stage IV (rcTX, cNX, pM1) - Signed by Dejuan Hernandez MD on 7/5/2022  Stage prefix: Recurrence  Oncology History   Malignant neoplasm of upper-inner quadrant of left female breast (HCC)   12/2015 Initial Diagnosis    Malignant neoplasm of upper-outer quadrant of left female breast (HCC)     2016 -  Hormone Therapy    Tamoxifen  Ended 8/2019.    Dr Benavidez  Letrozole 9/2019.     1/27/2016 Surgery    Port placement     2/19/2016 - 6/2/2018 Chemotherapy    Taxotere,  herceptin, perjeta,  xgeva.   After six cycles, taxotere was discontinued and tamoxifen added.   Continues with Perjeta and Herceptin every 3 weeks     - Dr Benavidez       9/16/2016 Surgery    Left breast partial mastectomy     11/7/2016 - 12/23/2016 Radiation    Plan ID Energy Fractions Dose per Fraction (cGy) Total Dose Delivered (cGy) Elapsed Days   Lt Breast 6X 25 / 25 200 5,000 36   Lt Brst Boost 6X 8 / 8 200 1,600 9   Lt Femur 10X 10 / 10 300 3,000 11   Lt PAB 6X 25 / 25 60 1,500 36   Lt Sclav 6X 25 / 25 200 5,000 36                                   Total dose to left breast lumpectomy cavity: 6600 cGy                   Total dose to the supraclavicular and axillary regions: 5000 cGy       4/17/2019 - 1/12/2024 Chemotherapy    pertuzumab (PERJETA) IVPB, 840 mg, Intravenous, Once, 81 of 82 cycles  Administration: 420 mg (5/17/2019), 420 mg (6/7/2019), 420 mg (6/28/2019), 420 mg (7/19/2019), 420 mg (8/30/2019), 420 mg (9/20/2019), 420 mg (8/9/2019), 420 mg (10/9/2019), 420 mg (11/1/2019), 420 mg (11/22/2019), 420 mg (12/13/2019), 420 mg (1/3/2020), 420 mg (1/24/2020), 420 mg (2/14/2020), 420 mg (3/6/2020), 420 mg (3/27/2020), 420 mg (4/17/2020), 420 mg (5/8/2020), 420 mg (5/29/2020), 420 mg (6/19/2020), 420 mg (7/10/2020), 420 mg (7/31/2020), 420 mg (8/21/2020), 420 mg (9/11/2020), 420 mg (10/2/2020), 420 mg (10/23/2020), 420 mg (11/13/2020), 420 mg (12/4/2020), 420 mg (12/24/2020), 420 mg (1/15/2021), 420 mg (2/5/2021), 420 mg (2/26/2021), 420 mg (3/19/2021), 420 mg (4/9/2021), 420 mg (4/30/2021), 420 mg (5/21/2021), 420 mg (6/11/2021), 420 mg (7/2/2021), 420 mg (7/23/2021), 420 mg (8/13/2021), 420 mg (9/3/2021), 420 mg (9/24/2021), 420 mg (10/15/2021), 420 mg (11/5/2021), 420 mg (11/26/2021), 420 mg (12/17/2021), 420 mg (1/7/2022), 420 mg (1/28/2022), 420 mg (2/18/2022), 420 mg (3/11/2022), 420 mg (4/1/2022), 420 mg (5/13/2022), 420 mg (6/3/2022), 420 mg (6/28/2022), 420 mg (7/22/2022), 420 mg (9/2/2022), 420 mg (9/23/2022), 420  mg (8/12/2022), 420 mg (10/14/2022), 420 mg (11/4/2022), 420 mg (11/25/2022), 420 mg (12/15/2022), 420 mg (1/6/2023), 420 mg (1/27/2023), 420 mg (2/17/2023), 420 mg (3/10/2023), 420 mg (3/31/2023), 420 mg (4/21/2023), 420 mg (5/19/2023), 420 mg (6/13/2023), 420 mg (6/30/2023), 420 mg (7/21/2023), 420 mg (8/11/2023), 420 mg (9/1/2023), 420 mg (9/22/2023), 420 mg (10/13/2023), 420 mg (11/3/2023), 420 mg (11/24/2023), 420 mg (12/22/2023), 420 mg (1/12/2024)  trastuzumab (HERCEPTIN) chemo infusion, 6 mg/kg = 589 mg (75 % of original dose 8 mg/kg), Intravenous, Once, 81 of 82 cycles  Dose modification: 6 mg/kg (original dose 8 mg/kg, Cycle 1, Reason: Other (Must fill in a comment))  Administration: 589 mg (5/17/2019), 589 mg (6/7/2019), 600 mg (6/28/2019), 600 mg (7/19/2019), 600 mg (8/30/2019), 600 mg (9/20/2019), 600 mg (8/9/2019), 600 mg (10/9/2019), 600 mg (11/1/2019), 600 mg (11/22/2019), 600 mg (12/13/2019), 600 mg (1/3/2020), 600 mg (1/24/2020), 600 mg (2/14/2020), 600 mg (3/6/2020), 600 mg (3/27/2020), 600 mg (4/17/2020), 600 mg (5/8/2020), 600 mg (5/29/2020), 600 mg (6/19/2020), 600 mg (7/10/2020), 600 mg (7/31/2020), 600 mg (8/21/2020), 600 mg (9/11/2020), 600 mg (10/2/2020), 600 mg (10/23/2020), 600 mg (11/13/2020), 600 mg (12/4/2020), 600 mg (12/24/2020), 600 mg (1/15/2021), 600 mg (2/5/2021), 600 mg (2/26/2021), 600 mg (3/19/2021), 600 mg (4/9/2021), 600 mg (4/30/2021), 600 mg (5/21/2021), 600 mg (6/11/2021), 600 mg (7/2/2021), 600 mg (7/23/2021), 600 mg (8/13/2021), 600 mg (9/3/2021), 600 mg (9/24/2021), 600 mg (10/15/2021), 600 mg (11/5/2021), 600 mg (11/26/2021), 600 mg (12/17/2021), 600 mg (1/7/2022), 600 mg (1/28/2022), 600 mg (2/18/2022), 600 mg (3/11/2022), 600 mg (4/1/2022), 600 mg (5/13/2022), 600 mg (6/3/2022), 600 mg (6/28/2022), 600 mg (7/22/2022), 600 mg (9/2/2022), 600 mg (9/23/2022), 600 mg (8/12/2022), 600 mg (10/14/2022), 600 mg (11/4/2022), 600 mg (11/25/2022), 600 mg (12/15/2022), 600 mg  (1/6/2023), 600 mg (1/27/2023), 600 mg (2/17/2023), 600 mg (3/10/2023), 600 mg (3/31/2023), 600 mg (4/21/2023), 600 mg (5/19/2023), 600 mg (6/13/2023), 600 mg (6/30/2023), 600 mg (7/21/2023), 600 mg (8/11/2023), 600 mg (9/1/2023), 600 mg (9/22/2023), 600 mg (10/13/2023), 600 mg (11/3/2023), 600 mg (11/24/2023), 600 mg (12/22/2023), 600 mg (1/12/2024)     8/16/2019 Surgery    she underwent BSO.     7/5/2022 -  Cancer Staged    Staging form: Breast, AJCC 8th Edition  - Clinical: Stage IV (rcTX, cNX, pM1) - Signed by Dejuan Hernandez MD on 7/5/2022  Stage prefix: Recurrence       2/2/2024 -  Chemotherapy    alteplase (CATHFLO), 2 mg, Intracatheter, Every 1 Minute as needed, 11 of 13 cycles  fam-trastuzumab deruxtecan-nxki (ENHERTU) IVPB, 520 mg, Intravenous, Once, 11 of 13 cycles  Dose modification: 4.4 mg/kg (original dose 5.4 mg/kg, Cycle 3, Reason: Dose modified as per discussion with consulting physician), 4.4 mg/kg (original dose 5.4 mg/kg, Cycle 11, Reason: Dose modified as per discussion with consulting physician)  Administration: 500 mg (2/2/2024), 400 mg (3/15/2024), 400 mg (4/5/2024), 400 mg (5/17/2024), 400 mg (6/14/2024), 400 mg (7/5/2024), 400 mg (8/2/2024), 400 mg (8/30/2024), 400 mg (9/27/2024), 400 mg (10/25/2024)     Malignant neoplasm metastatic to liver (HCC)   4/27/2018 Initial Diagnosis    Liver metastases (HCC)     4/17/2019 - 1/12/2024 Chemotherapy    pertuzumab (PERJETA) IVPB, 840 mg, Intravenous, Once, 81 of 82 cycles  Administration: 420 mg (5/17/2019), 420 mg (6/7/2019), 420 mg (6/28/2019), 420 mg (7/19/2019), 420 mg (8/30/2019), 420 mg (9/20/2019), 420 mg (8/9/2019), 420 mg (10/9/2019), 420 mg (11/1/2019), 420 mg (11/22/2019), 420 mg (12/13/2019), 420 mg (1/3/2020), 420 mg (1/24/2020), 420 mg (2/14/2020), 420 mg (3/6/2020), 420 mg (3/27/2020), 420 mg (4/17/2020), 420 mg (5/8/2020), 420 mg (5/29/2020), 420 mg (6/19/2020), 420 mg (7/10/2020), 420 mg (7/31/2020), 420 mg (8/21/2020), 420 mg  (9/11/2020), 420 mg (10/2/2020), 420 mg (10/23/2020), 420 mg (11/13/2020), 420 mg (12/4/2020), 420 mg (12/24/2020), 420 mg (1/15/2021), 420 mg (2/5/2021), 420 mg (2/26/2021), 420 mg (3/19/2021), 420 mg (4/9/2021), 420 mg (4/30/2021), 420 mg (5/21/2021), 420 mg (6/11/2021), 420 mg (7/2/2021), 420 mg (7/23/2021), 420 mg (8/13/2021), 420 mg (9/3/2021), 420 mg (9/24/2021), 420 mg (10/15/2021), 420 mg (11/5/2021), 420 mg (11/26/2021), 420 mg (12/17/2021), 420 mg (1/7/2022), 420 mg (1/28/2022), 420 mg (2/18/2022), 420 mg (3/11/2022), 420 mg (4/1/2022), 420 mg (5/13/2022), 420 mg (6/3/2022), 420 mg (6/28/2022), 420 mg (7/22/2022), 420 mg (9/2/2022), 420 mg (9/23/2022), 420 mg (8/12/2022), 420 mg (10/14/2022), 420 mg (11/4/2022), 420 mg (11/25/2022), 420 mg (12/15/2022), 420 mg (1/6/2023), 420 mg (1/27/2023), 420 mg (2/17/2023), 420 mg (3/10/2023), 420 mg (3/31/2023), 420 mg (4/21/2023), 420 mg (5/19/2023), 420 mg (6/13/2023), 420 mg (6/30/2023), 420 mg (7/21/2023), 420 mg (8/11/2023), 420 mg (9/1/2023), 420 mg (9/22/2023), 420 mg (10/13/2023), 420 mg (11/3/2023), 420 mg (11/24/2023), 420 mg (12/22/2023), 420 mg (1/12/2024)  trastuzumab (HERCEPTIN) chemo infusion, 6 mg/kg = 589 mg (75 % of original dose 8 mg/kg), Intravenous, Once, 81 of 82 cycles  Dose modification: 6 mg/kg (original dose 8 mg/kg, Cycle 1, Reason: Other (Must fill in a comment))  Administration: 589 mg (5/17/2019), 589 mg (6/7/2019), 600 mg (6/28/2019), 600 mg (7/19/2019), 600 mg (8/30/2019), 600 mg (9/20/2019), 600 mg (8/9/2019), 600 mg (10/9/2019), 600 mg (11/1/2019), 600 mg (11/22/2019), 600 mg (12/13/2019), 600 mg (1/3/2020), 600 mg (1/24/2020), 600 mg (2/14/2020), 600 mg (3/6/2020), 600 mg (3/27/2020), 600 mg (4/17/2020), 600 mg (5/8/2020), 600 mg (5/29/2020), 600 mg (6/19/2020), 600 mg (7/10/2020), 600 mg (7/31/2020), 600 mg (8/21/2020), 600 mg (9/11/2020), 600 mg (10/2/2020), 600 mg (10/23/2020), 600 mg (11/13/2020), 600 mg (12/4/2020), 600 mg  (12/24/2020), 600 mg (1/15/2021), 600 mg (2/5/2021), 600 mg (2/26/2021), 600 mg (3/19/2021), 600 mg (4/9/2021), 600 mg (4/30/2021), 600 mg (5/21/2021), 600 mg (6/11/2021), 600 mg (7/2/2021), 600 mg (7/23/2021), 600 mg (8/13/2021), 600 mg (9/3/2021), 600 mg (9/24/2021), 600 mg (10/15/2021), 600 mg (11/5/2021), 600 mg (11/26/2021), 600 mg (12/17/2021), 600 mg (1/7/2022), 600 mg (1/28/2022), 600 mg (2/18/2022), 600 mg (3/11/2022), 600 mg (4/1/2022), 600 mg (5/13/2022), 600 mg (6/3/2022), 600 mg (6/28/2022), 600 mg (7/22/2022), 600 mg (9/2/2022), 600 mg (9/23/2022), 600 mg (8/12/2022), 600 mg (10/14/2022), 600 mg (11/4/2022), 600 mg (11/25/2022), 600 mg (12/15/2022), 600 mg (1/6/2023), 600 mg (1/27/2023), 600 mg (2/17/2023), 600 mg (3/10/2023), 600 mg (3/31/2023), 600 mg (4/21/2023), 600 mg (5/19/2023), 600 mg (6/13/2023), 600 mg (6/30/2023), 600 mg (7/21/2023), 600 mg (8/11/2023), 600 mg (9/1/2023), 600 mg (9/22/2023), 600 mg (10/13/2023), 600 mg (11/3/2023), 600 mg (11/24/2023), 600 mg (12/22/2023), 600 mg (1/12/2024)     6/24/2022 Biopsy    Final Diagnosis   A.  Liver, core biopsies:     - Metastatic carcinoma compatible with a breast primary.      2/2/2024 -  Chemotherapy    alteplase (CATHFLO), 2 mg, Intracatheter, Every 1 Minute as needed, 11 of 13 cycles  fam-trastuzumab deruxtecan-nxki (ENHERTU) IVPB, 520 mg, Intravenous, Once, 11 of 13 cycles  Dose modification: 4.4 mg/kg (original dose 5.4 mg/kg, Cycle 3, Reason: Dose modified as per discussion with consulting physician), 4.4 mg/kg (original dose 5.4 mg/kg, Cycle 11, Reason: Dose modified as per discussion with consulting physician)  Administration: 500 mg (2/2/2024), 400 mg (3/15/2024), 400 mg (4/5/2024), 400 mg (5/17/2024), 400 mg (6/14/2024), 400 mg (7/5/2024), 400 mg (8/2/2024), 400 mg (8/30/2024), 400 mg (9/27/2024), 400 mg (10/25/2024)     Malignant neoplasm metastatic to bone (HCC)   4/27/2018 Initial Diagnosis    Bone metastasis (HCC)     4/17/2019 -  1/12/2024 Chemotherapy    pertuzumab (PERJETA) IVPB, 840 mg, Intravenous, Once, 81 of 82 cycles  Administration: 420 mg (5/17/2019), 420 mg (6/7/2019), 420 mg (6/28/2019), 420 mg (7/19/2019), 420 mg (8/30/2019), 420 mg (9/20/2019), 420 mg (8/9/2019), 420 mg (10/9/2019), 420 mg (11/1/2019), 420 mg (11/22/2019), 420 mg (12/13/2019), 420 mg (1/3/2020), 420 mg (1/24/2020), 420 mg (2/14/2020), 420 mg (3/6/2020), 420 mg (3/27/2020), 420 mg (4/17/2020), 420 mg (5/8/2020), 420 mg (5/29/2020), 420 mg (6/19/2020), 420 mg (7/10/2020), 420 mg (7/31/2020), 420 mg (8/21/2020), 420 mg (9/11/2020), 420 mg (10/2/2020), 420 mg (10/23/2020), 420 mg (11/13/2020), 420 mg (12/4/2020), 420 mg (12/24/2020), 420 mg (1/15/2021), 420 mg (2/5/2021), 420 mg (2/26/2021), 420 mg (3/19/2021), 420 mg (4/9/2021), 420 mg (4/30/2021), 420 mg (5/21/2021), 420 mg (6/11/2021), 420 mg (7/2/2021), 420 mg (7/23/2021), 420 mg (8/13/2021), 420 mg (9/3/2021), 420 mg (9/24/2021), 420 mg (10/15/2021), 420 mg (11/5/2021), 420 mg (11/26/2021), 420 mg (12/17/2021), 420 mg (1/7/2022), 420 mg (1/28/2022), 420 mg (2/18/2022), 420 mg (3/11/2022), 420 mg (4/1/2022), 420 mg (5/13/2022), 420 mg (6/3/2022), 420 mg (6/28/2022), 420 mg (7/22/2022), 420 mg (9/2/2022), 420 mg (9/23/2022), 420 mg (8/12/2022), 420 mg (10/14/2022), 420 mg (11/4/2022), 420 mg (11/25/2022), 420 mg (12/15/2022), 420 mg (1/6/2023), 420 mg (1/27/2023), 420 mg (2/17/2023), 420 mg (3/10/2023), 420 mg (3/31/2023), 420 mg (4/21/2023), 420 mg (5/19/2023), 420 mg (6/13/2023), 420 mg (6/30/2023), 420 mg (7/21/2023), 420 mg (8/11/2023), 420 mg (9/1/2023), 420 mg (9/22/2023), 420 mg (10/13/2023), 420 mg (11/3/2023), 420 mg (11/24/2023), 420 mg (12/22/2023), 420 mg (1/12/2024)  trastuzumab (HERCEPTIN) chemo infusion, 6 mg/kg = 589 mg (75 % of original dose 8 mg/kg), Intravenous, Once, 81 of 82 cycles  Dose modification: 6 mg/kg (original dose 8 mg/kg, Cycle 1, Reason: Other (Must fill in a  comment))  Administration: 589 mg (5/17/2019), 589 mg (6/7/2019), 600 mg (6/28/2019), 600 mg (7/19/2019), 600 mg (8/30/2019), 600 mg (9/20/2019), 600 mg (8/9/2019), 600 mg (10/9/2019), 600 mg (11/1/2019), 600 mg (11/22/2019), 600 mg (12/13/2019), 600 mg (1/3/2020), 600 mg (1/24/2020), 600 mg (2/14/2020), 600 mg (3/6/2020), 600 mg (3/27/2020), 600 mg (4/17/2020), 600 mg (5/8/2020), 600 mg (5/29/2020), 600 mg (6/19/2020), 600 mg (7/10/2020), 600 mg (7/31/2020), 600 mg (8/21/2020), 600 mg (9/11/2020), 600 mg (10/2/2020), 600 mg (10/23/2020), 600 mg (11/13/2020), 600 mg (12/4/2020), 600 mg (12/24/2020), 600 mg (1/15/2021), 600 mg (2/5/2021), 600 mg (2/26/2021), 600 mg (3/19/2021), 600 mg (4/9/2021), 600 mg (4/30/2021), 600 mg (5/21/2021), 600 mg (6/11/2021), 600 mg (7/2/2021), 600 mg (7/23/2021), 600 mg (8/13/2021), 600 mg (9/3/2021), 600 mg (9/24/2021), 600 mg (10/15/2021), 600 mg (11/5/2021), 600 mg (11/26/2021), 600 mg (12/17/2021), 600 mg (1/7/2022), 600 mg (1/28/2022), 600 mg (2/18/2022), 600 mg (3/11/2022), 600 mg (4/1/2022), 600 mg (5/13/2022), 600 mg (6/3/2022), 600 mg (6/28/2022), 600 mg (7/22/2022), 600 mg (9/2/2022), 600 mg (9/23/2022), 600 mg (8/12/2022), 600 mg (10/14/2022), 600 mg (11/4/2022), 600 mg (11/25/2022), 600 mg (12/15/2022), 600 mg (1/6/2023), 600 mg (1/27/2023), 600 mg (2/17/2023), 600 mg (3/10/2023), 600 mg (3/31/2023), 600 mg (4/21/2023), 600 mg (5/19/2023), 600 mg (6/13/2023), 600 mg (6/30/2023), 600 mg (7/21/2023), 600 mg (8/11/2023), 600 mg (9/1/2023), 600 mg (9/22/2023), 600 mg (10/13/2023), 600 mg (11/3/2023), 600 mg (11/24/2023), 600 mg (12/22/2023), 600 mg (1/12/2024)     6/24/2022 Biopsy    Final Diagnosis   A.  Liver, core biopsies:     - Metastatic carcinoma compatible with a breast primary.      2/2/2024 -  Chemotherapy    alteplase (CATHFLO), 2 mg, Intracatheter, Every 1 Minute as needed, 11 of 13 cycles  fam-trastuzumab deruxtecan-nxki (ENHERTU) IVPB, 520 mg, Intravenous, Once, 11  "of 13 cycles  Dose modification: 4.4 mg/kg (original dose 5.4 mg/kg, Cycle 3, Reason: Dose modified as per discussion with consulting physician), 4.4 mg/kg (original dose 5.4 mg/kg, Cycle 11, Reason: Dose modified as per discussion with consulting physician)  Administration: 500 mg (2/2/2024), 400 mg (3/15/2024), 400 mg (4/5/2024), 400 mg (5/17/2024), 400 mg (6/14/2024), 400 mg (7/5/2024), 400 mg (8/2/2024), 400 mg (8/30/2024), 400 mg (9/27/2024), 400 mg (10/25/2024)        Pertinent Medical History   1/15/2025:   See details in HPI    Review of Systems  Reviewed 12 systems.  Symptoms are as in HPI.  No fevers, chills, bleeding, bone pains, skin rash, weight loss, night sweats, melena, hematuria, swollen glands or swollen ankles.  No other neurological, cardiac, pulmonary, GI and  symptoms other than listed in HPI.      Objective   /86 (BP Location: Right arm, Patient Position: Sitting, Cuff Size: Large)   Pulse 82   Temp (!) 97.2 °F (36.2 °C) (Temporal)   Resp 18   Ht 5' 4\" (1.626 m)   Wt 98.9 kg (218 lb)   SpO2 97%   BMI 37.42 kg/m²     Pain Screening:  Pain Score: 0-No pain  ECOG   1  Physical Exam  Constitutional:       General: She is not in acute distress.     Appearance: Normal appearance. She is not ill-appearing.      Comments: Patient is overweight   HENT:      Head: Normocephalic and atraumatic.      Mouth/Throat:      Comments: No oral thrush  Eyes:      General: No scleral icterus.  Cardiovascular:      Rate and Rhythm: Normal rate and regular rhythm.      Heart sounds: Normal heart sounds. No murmur heard.  Pulmonary:      Effort: Pulmonary effort is normal. No respiratory distress.      Breath sounds: Normal breath sounds. No rhonchi or rales.   Abdominal:      General: Abdomen is flat. There is no distension.      Palpations: Abdomen is soft. There is no mass.      Tenderness: There is no abdominal tenderness.      Comments: Liver is not palpably enlarged.  No ascites. "   Musculoskeletal:         General: No swelling. Normal range of motion.      Cervical back: Normal range of motion.      Right lower leg: No edema.      Left lower leg: No edema.      Comments: No lymphedema.  No palpable lymph node in the axillary areas.  No calf tenderness.  No clubbing.   Lymphadenopathy:      Cervical: No cervical adenopathy.   Skin:     Findings: No bruising or rash.   Neurological:      General: No focal deficit present.      Mental Status: She is alert and oriented to person, place, and time.      Motor: No weakness.      Coordination: Coordination normal.      Gait: Gait normal.   Psychiatric:         Behavior: Behavior normal.         Thought Content: Thought content normal.      Comments: Anxious         Labs: I have reviewed the following labs:  Lab Results   Component Value Date/Time    WBC 9.93 01/14/2025 05:57 PM    RBC 5.15 (H) 01/14/2025 05:57 PM    Hemoglobin 14.3 01/14/2025 05:57 PM    Hematocrit 44.9 01/14/2025 05:57 PM    MCV 87 01/14/2025 05:57 PM    MCH 27.8 01/14/2025 05:57 PM    RDW 13.7 01/14/2025 05:57 PM    Platelets 344 01/14/2025 05:57 PM    Segmented % 59 01/14/2025 05:57 PM    Lymphocytes % 27 01/14/2025 05:57 PM    Monocytes % 10 01/14/2025 05:57 PM    Eosinophils Relative 3 01/14/2025 05:57 PM    Basophils Relative 1 01/14/2025 05:57 PM    Immature Grans % 0 01/14/2025 05:57 PM    Absolute Neutrophils 5.91 01/14/2025 05:57 PM     Lab Results   Component Value Date/Time    Potassium 4.1 01/14/2025 05:57 PM    Chloride 103 01/14/2025 05:57 PM    CO2 29 01/14/2025 05:57 PM    BUN 19 01/14/2025 05:57 PM    Creatinine 0.83 01/14/2025 05:57 PM    Calcium 9.7 01/14/2025 05:57 PM    AST 23 01/14/2025 05:57 PM    ALT 32 01/14/2025 05:57 PM    Alkaline Phosphatase 92 01/14/2025 05:57 PM    Total Protein 7.7 01/14/2025 05:57 PM    Albumin 4.4 01/14/2025 05:57 PM    Total Bilirubin 0.45 01/14/2025 05:57 PM    eGFR 78 01/14/2025 05:57 PM     MPRESSION:        1. Interval  decrease in size of caudate lobe lesion. No new hepatic lesions. No intra-abdominal lymphadenopathy.     2. Stable left adrenal adenoma.     3. Diffuse hepatic steatosis.        Electronically signed: 06/08/2024 12:42 PM Keith Mcnally MD     Imaging    CT abdomen w contrast (Order: [id number]) - 6/7/2024    Addendum: 1/19/2025: I have noticed that her last mammography was in 2020 and that was ordered by Dr. Chandler.  Looks like patient is not following with Dr. Chandler.  I have ordered mammography for her and my office will try to schedule that for her.

## 2025-01-17 ENCOUNTER — HOSPITAL ENCOUNTER (OUTPATIENT)
Dept: INFUSION CENTER | Facility: CLINIC | Age: 60
End: 2025-01-17

## 2025-01-21 DIAGNOSIS — D70.1 CHEMOTHERAPY INDUCED NEUTROPENIA (HCC): ICD-10-CM

## 2025-01-21 DIAGNOSIS — C79.51 MALIGNANT NEOPLASM METASTATIC TO BONE (HCC): ICD-10-CM

## 2025-01-21 DIAGNOSIS — C50.212 MALIGNANT NEOPLASM OF UPPER-INNER QUADRANT OF LEFT BREAST IN FEMALE, ESTROGEN RECEPTOR POSITIVE (HCC): ICD-10-CM

## 2025-01-21 DIAGNOSIS — Z90.722 HISTORY OF BILATERAL SALPINGO-OOPHORECTOMY (BSO): ICD-10-CM

## 2025-01-21 DIAGNOSIS — Z90.79 HISTORY OF BILATERAL SALPINGO-OOPHORECTOMY (BSO): ICD-10-CM

## 2025-01-21 DIAGNOSIS — C78.7 MALIGNANT NEOPLASM METASTATIC TO LIVER (HCC): Primary | ICD-10-CM

## 2025-01-21 DIAGNOSIS — T45.1X5A CHEMOTHERAPY INDUCED NEUTROPENIA (HCC): ICD-10-CM

## 2025-01-21 DIAGNOSIS — Z17.0 MALIGNANT NEOPLASM OF UPPER-INNER QUADRANT OF LEFT BREAST IN FEMALE, ESTROGEN RECEPTOR POSITIVE (HCC): ICD-10-CM

## 2025-02-07 ENCOUNTER — TELEPHONE (OUTPATIENT)
Dept: HEMATOLOGY ONCOLOGY | Facility: CLINIC | Age: 60
End: 2025-02-07

## 2025-02-07 NOTE — TELEPHONE ENCOUNTER
Left message for patient regarding need to reschedule upcoming appointment with Dr. Benavidez from 3/26 to 4/16 at 2:40pm due to provider availability.  Advised patient to return call if this appointment time is not acceptable.  [facility] number provided.

## 2025-02-16 RX ORDER — DEXTROSE MONOHYDRATE 50 MG/ML
20 INJECTION, SOLUTION INTRAVENOUS ONCE
Status: CANCELLED | OUTPATIENT
Start: 2025-02-21

## 2025-02-18 DIAGNOSIS — C50.212 MALIGNANT NEOPLASM OF UPPER-INNER QUADRANT OF LEFT BREAST IN FEMALE, ESTROGEN RECEPTOR POSITIVE (HCC): ICD-10-CM

## 2025-02-18 DIAGNOSIS — C78.7 MALIGNANT NEOPLASM METASTATIC TO LIVER (HCC): Primary | ICD-10-CM

## 2025-02-18 DIAGNOSIS — Z90.722 HISTORY OF BILATERAL SALPINGO-OOPHORECTOMY (BSO): ICD-10-CM

## 2025-02-18 DIAGNOSIS — T45.1X5A CHEMOTHERAPY INDUCED NEUTROPENIA (HCC): ICD-10-CM

## 2025-02-18 DIAGNOSIS — D70.1 CHEMOTHERAPY INDUCED NEUTROPENIA (HCC): ICD-10-CM

## 2025-02-18 DIAGNOSIS — Z17.0 MALIGNANT NEOPLASM OF UPPER-INNER QUADRANT OF LEFT BREAST IN FEMALE, ESTROGEN RECEPTOR POSITIVE (HCC): ICD-10-CM

## 2025-02-18 DIAGNOSIS — Z90.79 HISTORY OF BILATERAL SALPINGO-OOPHORECTOMY (BSO): ICD-10-CM

## 2025-02-18 DIAGNOSIS — C79.51 MALIGNANT NEOPLASM METASTATIC TO BONE (HCC): ICD-10-CM

## 2025-02-19 RX ORDER — DEXTROSE MONOHYDRATE 50 MG/ML
20 INJECTION, SOLUTION INTRAVENOUS ONCE
OUTPATIENT
Start: 2025-02-21

## 2025-03-14 RX ORDER — DEXTROSE MONOHYDRATE 50 MG/ML
20 INJECTION, SOLUTION INTRAVENOUS ONCE
Status: CANCELLED | OUTPATIENT
Start: 2025-04-18

## 2025-03-18 DIAGNOSIS — Z90.722 HISTORY OF BILATERAL SALPINGO-OOPHORECTOMY (BSO): ICD-10-CM

## 2025-03-18 DIAGNOSIS — Z90.79 HISTORY OF BILATERAL SALPINGO-OOPHORECTOMY (BSO): ICD-10-CM

## 2025-03-18 DIAGNOSIS — C79.51 MALIGNANT NEOPLASM METASTATIC TO BONE (HCC): ICD-10-CM

## 2025-03-18 DIAGNOSIS — Z17.0 MALIGNANT NEOPLASM OF UPPER-INNER QUADRANT OF LEFT BREAST IN FEMALE, ESTROGEN RECEPTOR POSITIVE (HCC): ICD-10-CM

## 2025-03-18 DIAGNOSIS — T45.1X5A CHEMOTHERAPY INDUCED NEUTROPENIA (HCC): ICD-10-CM

## 2025-03-18 DIAGNOSIS — C50.212 MALIGNANT NEOPLASM OF UPPER-INNER QUADRANT OF LEFT BREAST IN FEMALE, ESTROGEN RECEPTOR POSITIVE (HCC): ICD-10-CM

## 2025-03-18 DIAGNOSIS — C78.7 MALIGNANT NEOPLASM METASTATIC TO LIVER (HCC): Primary | ICD-10-CM

## 2025-03-18 DIAGNOSIS — D70.1 CHEMOTHERAPY INDUCED NEUTROPENIA (HCC): ICD-10-CM

## 2025-03-20 RX ORDER — DEXTROSE MONOHYDRATE 50 MG/ML
20 INJECTION, SOLUTION INTRAVENOUS ONCE
OUTPATIENT
Start: 2025-03-21

## 2025-04-10 ENCOUNTER — TELEPHONE (OUTPATIENT)
Dept: HEMATOLOGY ONCOLOGY | Facility: CLINIC | Age: 60
End: 2025-04-10

## 2025-04-10 NOTE — TELEPHONE ENCOUNTER
Left message for patient in regards to obtaining labwork prior to upcoming appointment with Dr. Benavidez on 4/16.  Advised patient labs are non fasting and in system.  Advised patient to call office back if they are unable to obtain labwork prior to appointment.  [facility] number provided.

## (undated) DEVICE — SYRINGE 10ML LL

## (undated) DEVICE — GLOVE PI ULTRA TOUCH SZ 6

## (undated) DEVICE — SUT MONOCRYL 4-0 PS-2 27 IN Y426H

## (undated) DEVICE — TROCAR: Brand: KII FIOS FIRST ENTRY

## (undated) DEVICE — [HIGH FLOW INSUFFLATOR,  DO NOT USE IF PACKAGE IS DAMAGED,  KEEP DRY,  KEEP AWAY FROM SUNLIGHT,  PROTECT FROM HEAT AND RADIOACTIVE SOURCES.]: Brand: PNEUMOSURE

## (undated) DEVICE — GLOVE PI ULTRA TOUCH SZ.6.5

## (undated) DEVICE — SUT VICRYL 0 UR-6 27 IN J603H

## (undated) DEVICE — ADHESIVE SKN CLSR HISTOACRYL FLEX 0.5ML LF

## (undated) DEVICE — ENSEAL LAPAROSCOPIC TISSUE SEALER G2 CURVED JAW FOR USE WITH G2 GENERATOR 5MM DIAMETER 35CM SHAFT LENGTH: Brand: ENSEAL

## (undated) DEVICE — GLOVE INDICATOR PI UNDERGLOVE SZ 8.5 BLUE

## (undated) DEVICE — CHLORAPREP HI-LITE 26ML ORANGE

## (undated) DEVICE — TROCAR: Brand: KII® SLEEVE

## (undated) DEVICE — BETHLEHEM UNIVERSAL GYN LAP PK: Brand: CARDINAL HEALTH

## (undated) DEVICE — GLOVE INDICATOR PI UNDERGLOVE SZ 6.5 BLUE

## (undated) DEVICE — ANTI-FOG SOLUTION WITH FOAM PAD: Brand: DEVON

## (undated) DEVICE — DRAPE EQUIPMENT RF WAND

## (undated) DEVICE — DRAPE TOWEL: Brand: CONVERTORS

## (undated) DEVICE — GLOVE PI ULTRA TOUCH SZ.8.5

## (undated) DEVICE — TISSUE RETRIEVAL SYSTEM: Brand: INZII RETRIEVAL SYSTEM